# Patient Record
Sex: FEMALE | Race: BLACK OR AFRICAN AMERICAN | NOT HISPANIC OR LATINO
[De-identification: names, ages, dates, MRNs, and addresses within clinical notes are randomized per-mention and may not be internally consistent; named-entity substitution may affect disease eponyms.]

---

## 2017-08-09 NOTE — ASU PATIENT PROFILE, ADULT - PMH
Afib    CHF (congestive heart failure)    DM (diabetes mellitus)    Glaucoma    HLD (hyperlipidemia)    HTN (hypertension)    Hypothyroid    Personal history of PE (pulmonary embolism)    PVD (peripheral vascular disease)

## 2017-08-10 ENCOUNTER — TRANSCRIPTION ENCOUNTER (OUTPATIENT)
Age: 82
End: 2017-08-10

## 2017-08-10 ENCOUNTER — OUTPATIENT (OUTPATIENT)
Dept: OUTPATIENT SERVICES | Facility: HOSPITAL | Age: 82
LOS: 1 days | End: 2017-08-10
Payer: COMMERCIAL

## 2017-08-10 VITALS
OXYGEN SATURATION: 99 % | DIASTOLIC BLOOD PRESSURE: 73 MMHG | HEIGHT: 65 IN | TEMPERATURE: 98 F | HEART RATE: 83 BPM | RESPIRATION RATE: 20 BRPM | SYSTOLIC BLOOD PRESSURE: 161 MMHG | WEIGHT: 232.81 LBS

## 2017-08-10 VITALS
HEART RATE: 70 BPM | RESPIRATION RATE: 20 BRPM | SYSTOLIC BLOOD PRESSURE: 133 MMHG | OXYGEN SATURATION: 96 % | DIASTOLIC BLOOD PRESSURE: 56 MMHG

## 2017-08-10 DIAGNOSIS — H25.811 COMBINED FORMS OF AGE-RELATED CATARACT, RIGHT EYE: ICD-10-CM

## 2017-08-10 DIAGNOSIS — Z41.9 ENCOUNTER FOR PROCEDURE FOR PURPOSES OTHER THAN REMEDYING HEALTH STATE, UNSPECIFIED: Chronic | ICD-10-CM

## 2017-08-10 DIAGNOSIS — E89.0 POSTPROCEDURAL HYPOTHYROIDISM: Chronic | ICD-10-CM

## 2017-08-10 DIAGNOSIS — H40.1413 CAPSULAR GLAUCOMA WITH PSEUDOEXFOLIATION OF LENS, RIGHT EYE, SEVERE STAGE: ICD-10-CM

## 2017-08-10 PROCEDURE — 66180 AQUEOUS SHUNT EYE W/GRAFT: CPT | Mod: RT

## 2017-08-10 PROCEDURE — C1780: CPT

## 2017-08-10 PROCEDURE — 66984 XCAPSL CTRC RMVL W/O ECP: CPT | Mod: RT

## 2017-08-10 PROCEDURE — C1783: CPT

## 2017-08-10 PROCEDURE — C1762: CPT

## 2017-08-10 NOTE — ASU DISCHARGE PLAN (ADULT/PEDIATRIC). - SPECIAL INSTRUCTIONS
Your eye patch will remain on overnight. Your doctor will remove it at your appointment tomorrow and instruct you on what drops to take at that time. Continue drops as usual in the other eye. Bring the eye kit and all of your other eye drops to the office for each visit. You may experience some itching or scratchiness following surgery. This is normal and is usually relieved with Tylenol which can be taken 650mg by mouth every 4-6 hours for pain. Avoid Aspirin or Motrin. If you experience persistent decrease in vision, pain, excessive bleeding , temperature above 101, persistent nausea or vomiting contact your surgeon at [phone].

## 2017-08-10 NOTE — ASU DISCHARGE PLAN (ADULT/PEDIATRIC). - PROCEDURE
Right eye phacoemulsification cataract extraction with Intraocular Lens implant Right eye phacoemulsification cataract extraction with Intraocular Lens implant,

## 2017-12-11 ENCOUNTER — APPOINTMENT (OUTPATIENT)
Dept: VASCULAR SURGERY | Facility: CLINIC | Age: 82
End: 2017-12-11
Payer: MEDICARE

## 2017-12-11 VITALS
TEMPERATURE: 98 F | HEIGHT: 68 IN | WEIGHT: 225 LBS | HEART RATE: 85 BPM | DIASTOLIC BLOOD PRESSURE: 102 MMHG | BODY MASS INDEX: 34.1 KG/M2 | SYSTOLIC BLOOD PRESSURE: 186 MMHG

## 2017-12-11 DIAGNOSIS — Z86.73 PERSONAL HISTORY OF TRANSIENT ISCHEMIC ATTACK (TIA), AND CEREBRAL INFARCTION W/OUT RESIDUAL DEFICITS: ICD-10-CM

## 2017-12-11 DIAGNOSIS — K46.9 UNSPECIFIED ABDOMINAL HERNIA W/OUT OBSTRUCTION OR GANGRENE: ICD-10-CM

## 2017-12-11 DIAGNOSIS — Z87.448 PERSONAL HISTORY OF OTHER DISEASES OF URINARY SYSTEM: ICD-10-CM

## 2017-12-11 DIAGNOSIS — I73.9 PERIPHERAL VASCULAR DISEASE, UNSPECIFIED: ICD-10-CM

## 2017-12-11 DIAGNOSIS — Z86.69 PERSONAL HISTORY OF OTHER DISEASES OF THE NERVOUS SYSTEM AND SENSE ORGANS: ICD-10-CM

## 2017-12-11 DIAGNOSIS — I10 ESSENTIAL (PRIMARY) HYPERTENSION: ICD-10-CM

## 2017-12-11 DIAGNOSIS — H40.9 UNSPECIFIED GLAUCOMA: ICD-10-CM

## 2017-12-11 DIAGNOSIS — Z86.39 PERSONAL HISTORY OF OTHER ENDOCRINE, NUTRITIONAL AND METABOLIC DISEASE: ICD-10-CM

## 2017-12-11 DIAGNOSIS — I83.90 ASYMPTOMATIC VARICOSE VEINS OF UNSPECIFIED LOWER EXTREMITY: ICD-10-CM

## 2017-12-11 DIAGNOSIS — Z87.39 PERSONAL HISTORY OF OTHER DISEASES OF THE MUSCULOSKELETAL SYSTEM AND CONNECTIVE TISSUE: ICD-10-CM

## 2017-12-11 DIAGNOSIS — E11.9 TYPE 2 DIABETES MELLITUS W/OUT COMPLICATIONS: ICD-10-CM

## 2017-12-11 PROCEDURE — 99204 OFFICE O/P NEW MOD 45 MIN: CPT | Mod: 25

## 2017-12-11 PROCEDURE — 93923 UPR/LXTR ART STDY 3+ LVLS: CPT

## 2017-12-11 RX ORDER — LATANOPROST/PF 0.005 %
0.01 DROPS OPHTHALMIC (EYE)
Qty: 8 | Refills: 0 | Status: ACTIVE | COMMUNITY
Start: 2017-05-31

## 2017-12-11 RX ORDER — BLOOD SUGAR DIAGNOSTIC
STRIP MISCELLANEOUS
Qty: 150 | Refills: 0 | Status: ACTIVE | COMMUNITY
Start: 2017-02-07

## 2017-12-11 RX ORDER — BRIMONIDINE TARTRATE 2 MG/MG
0.2 SOLUTION/ DROPS OPHTHALMIC
Qty: 15 | Refills: 0 | Status: ACTIVE | COMMUNITY
Start: 2016-11-09

## 2017-12-11 RX ORDER — PEN NEEDLE, DIABETIC 29 G X1/2"
32G X 4 MM NEEDLE, DISPOSABLE MISCELLANEOUS
Qty: 100 | Refills: 0 | Status: ACTIVE | COMMUNITY
Start: 2016-12-15

## 2018-01-16 ENCOUNTER — APPOINTMENT (OUTPATIENT)
Age: 83
End: 2018-01-16

## 2018-03-11 ENCOUNTER — INPATIENT (INPATIENT)
Facility: HOSPITAL | Age: 83
LOS: 5 days | Discharge: HOME | End: 2018-03-17
Attending: HOSPITALIST | Admitting: HOSPITALIST
Payer: MEDICARE

## 2018-03-11 VITALS
TEMPERATURE: 98 F | DIASTOLIC BLOOD PRESSURE: 107 MMHG | OXYGEN SATURATION: 98 % | RESPIRATION RATE: 28 BRPM | HEART RATE: 108 BPM | SYSTOLIC BLOOD PRESSURE: 212 MMHG

## 2018-03-11 DIAGNOSIS — J18.9 PNEUMONIA, UNSPECIFIED ORGANISM: ICD-10-CM

## 2018-03-11 DIAGNOSIS — I48.91 UNSPECIFIED ATRIAL FIBRILLATION: ICD-10-CM

## 2018-03-11 DIAGNOSIS — Z41.9 ENCOUNTER FOR PROCEDURE FOR PURPOSES OTHER THAN REMEDYING HEALTH STATE, UNSPECIFIED: Chronic | ICD-10-CM

## 2018-03-11 DIAGNOSIS — Z29.9 ENCOUNTER FOR PROPHYLACTIC MEASURES, UNSPECIFIED: ICD-10-CM

## 2018-03-11 DIAGNOSIS — E03.9 HYPOTHYROIDISM, UNSPECIFIED: ICD-10-CM

## 2018-03-11 DIAGNOSIS — I50.9 HEART FAILURE, UNSPECIFIED: ICD-10-CM

## 2018-03-11 DIAGNOSIS — E89.0 POSTPROCEDURAL HYPOTHYROIDISM: Chronic | ICD-10-CM

## 2018-03-11 DIAGNOSIS — E11.9 TYPE 2 DIABETES MELLITUS WITHOUT COMPLICATIONS: ICD-10-CM

## 2018-03-11 DIAGNOSIS — I10 ESSENTIAL (PRIMARY) HYPERTENSION: ICD-10-CM

## 2018-03-11 DIAGNOSIS — E78.5 HYPERLIPIDEMIA, UNSPECIFIED: ICD-10-CM

## 2018-03-11 DIAGNOSIS — I73.9 PERIPHERAL VASCULAR DISEASE, UNSPECIFIED: ICD-10-CM

## 2018-03-11 LAB
ALBUMIN SERPL ELPH-MCNC: 3.7 G/DL — SIGNIFICANT CHANGE UP (ref 3.3–5)
ALP SERPL-CCNC: 91 U/L — SIGNIFICANT CHANGE UP (ref 40–120)
ALT FLD-CCNC: 31 U/L — SIGNIFICANT CHANGE UP (ref 4–33)
APPEARANCE UR: CLEAR — SIGNIFICANT CHANGE UP
APTT BLD: 35.8 SEC — SIGNIFICANT CHANGE UP (ref 27.5–37.4)
AST SERPL-CCNC: 18 U/L — SIGNIFICANT CHANGE UP (ref 4–32)
B PERT DNA SPEC QL NAA+PROBE: SIGNIFICANT CHANGE UP
BASE EXCESS BLDV CALC-SCNC: 5.8 MMOL/L — SIGNIFICANT CHANGE UP
BASOPHILS # BLD AUTO: 0.02 K/UL — SIGNIFICANT CHANGE UP (ref 0–0.2)
BASOPHILS NFR BLD AUTO: 0.2 % — SIGNIFICANT CHANGE UP (ref 0–2)
BILIRUB SERPL-MCNC: 1.2 MG/DL — SIGNIFICANT CHANGE UP (ref 0.2–1.2)
BILIRUB UR-MCNC: NEGATIVE — SIGNIFICANT CHANGE UP
BLOOD GAS VENOUS - CREATININE: 1.18 MG/DL — SIGNIFICANT CHANGE UP (ref 0.5–1.3)
BLOOD UR QL VISUAL: HIGH
BUN SERPL-MCNC: 19 MG/DL — SIGNIFICANT CHANGE UP (ref 7–23)
C PNEUM DNA SPEC QL NAA+PROBE: NOT DETECTED — SIGNIFICANT CHANGE UP
CALCIUM SERPL-MCNC: 9.5 MG/DL — SIGNIFICANT CHANGE UP (ref 8.4–10.5)
CHLORIDE BLDV-SCNC: 105 MMOL/L — SIGNIFICANT CHANGE UP (ref 96–108)
CHLORIDE SERPL-SCNC: 100 MMOL/L — SIGNIFICANT CHANGE UP (ref 98–107)
CK MB BLD-MCNC: 3.13 NG/ML — SIGNIFICANT CHANGE UP (ref 1–4.7)
CK MB BLD-MCNC: SIGNIFICANT CHANGE UP (ref 0–2.5)
CK SERPL-CCNC: 52 U/L — SIGNIFICANT CHANGE UP (ref 25–170)
CO2 SERPL-SCNC: 27 MMOL/L — SIGNIFICANT CHANGE UP (ref 22–31)
COLOR SPEC: SIGNIFICANT CHANGE UP
CREAT SERPL-MCNC: 1.27 MG/DL — SIGNIFICANT CHANGE UP (ref 0.5–1.3)
EOSINOPHIL # BLD AUTO: 0.14 K/UL — SIGNIFICANT CHANGE UP (ref 0–0.5)
EOSINOPHIL NFR BLD AUTO: 1.6 % — SIGNIFICANT CHANGE UP (ref 0–6)
FLUAV H1 2009 PAND RNA SPEC QL NAA+PROBE: NOT DETECTED — SIGNIFICANT CHANGE UP
FLUAV H1 RNA SPEC QL NAA+PROBE: NOT DETECTED — SIGNIFICANT CHANGE UP
FLUAV H3 RNA SPEC QL NAA+PROBE: NOT DETECTED — SIGNIFICANT CHANGE UP
FLUAV SUBTYP SPEC NAA+PROBE: SIGNIFICANT CHANGE UP
FLUBV RNA SPEC QL NAA+PROBE: NOT DETECTED — SIGNIFICANT CHANGE UP
GAS PNL BLDV: 139 MMOL/L — SIGNIFICANT CHANGE UP (ref 136–146)
GLUCOSE BLDV-MCNC: 200 — HIGH (ref 70–99)
GLUCOSE SERPL-MCNC: 197 MG/DL — HIGH (ref 70–99)
GLUCOSE UR-MCNC: 100 — HIGH
HADV DNA SPEC QL NAA+PROBE: NOT DETECTED — SIGNIFICANT CHANGE UP
HCO3 BLDV-SCNC: 28 MMOL/L — HIGH (ref 20–27)
HCOV 229E RNA SPEC QL NAA+PROBE: NOT DETECTED — SIGNIFICANT CHANGE UP
HCOV HKU1 RNA SPEC QL NAA+PROBE: NOT DETECTED — SIGNIFICANT CHANGE UP
HCOV NL63 RNA SPEC QL NAA+PROBE: NOT DETECTED — SIGNIFICANT CHANGE UP
HCOV OC43 RNA SPEC QL NAA+PROBE: NOT DETECTED — SIGNIFICANT CHANGE UP
HCT VFR BLD CALC: 34.6 % — SIGNIFICANT CHANGE UP (ref 34.5–45)
HCT VFR BLDV CALC: 33.6 % — LOW (ref 34.5–45)
HGB BLD-MCNC: 11.3 G/DL — LOW (ref 11.5–15.5)
HGB BLDV-MCNC: 10.9 G/DL — LOW (ref 11.5–15.5)
HMPV RNA SPEC QL NAA+PROBE: NOT DETECTED — SIGNIFICANT CHANGE UP
HPIV1 RNA SPEC QL NAA+PROBE: NOT DETECTED — SIGNIFICANT CHANGE UP
HPIV2 RNA SPEC QL NAA+PROBE: NOT DETECTED — SIGNIFICANT CHANGE UP
HPIV3 RNA SPEC QL NAA+PROBE: NOT DETECTED — SIGNIFICANT CHANGE UP
HPIV4 RNA SPEC QL NAA+PROBE: NOT DETECTED — SIGNIFICANT CHANGE UP
IMM GRANULOCYTES # BLD AUTO: 0.03 # — SIGNIFICANT CHANGE UP
IMM GRANULOCYTES NFR BLD AUTO: 0.3 % — SIGNIFICANT CHANGE UP (ref 0–1.5)
INR BLD: 2.68 — HIGH (ref 0.88–1.17)
KETONES UR-MCNC: SIGNIFICANT CHANGE UP
LACTATE BLDV-MCNC: 1.3 MMOL/L — SIGNIFICANT CHANGE UP (ref 0.5–2)
LEUKOCYTE ESTERASE UR-ACNC: NEGATIVE — SIGNIFICANT CHANGE UP
LIDOCAIN IGE QN: 23.9 U/L — SIGNIFICANT CHANGE UP (ref 7–60)
LYMPHOCYTES # BLD AUTO: 1.28 K/UL — SIGNIFICANT CHANGE UP (ref 1–3.3)
LYMPHOCYTES # BLD AUTO: 14.8 % — SIGNIFICANT CHANGE UP (ref 13–44)
M PNEUMO DNA SPEC QL NAA+PROBE: NOT DETECTED — SIGNIFICANT CHANGE UP
MCHC RBC-ENTMCNC: 30.5 PG — SIGNIFICANT CHANGE UP (ref 27–34)
MCHC RBC-ENTMCNC: 32.7 % — SIGNIFICANT CHANGE UP (ref 32–36)
MCV RBC AUTO: 93.5 FL — SIGNIFICANT CHANGE UP (ref 80–100)
MONOCYTES # BLD AUTO: 1.17 K/UL — HIGH (ref 0–0.9)
MONOCYTES NFR BLD AUTO: 13.6 % — SIGNIFICANT CHANGE UP (ref 2–14)
NEUTROPHILS # BLD AUTO: 5.99 K/UL — SIGNIFICANT CHANGE UP (ref 1.8–7.4)
NEUTROPHILS NFR BLD AUTO: 69.5 % — SIGNIFICANT CHANGE UP (ref 43–77)
NITRITE UR-MCNC: NEGATIVE — SIGNIFICANT CHANGE UP
NON-SQ EPI CELLS # UR AUTO: <1 — SIGNIFICANT CHANGE UP
NRBC # FLD: 0 — SIGNIFICANT CHANGE UP
PCO2 BLDV: 49 MMHG — SIGNIFICANT CHANGE UP (ref 41–51)
PH BLDV: 7.41 PH — SIGNIFICANT CHANGE UP (ref 7.32–7.43)
PH UR: 7 — SIGNIFICANT CHANGE UP (ref 4.6–8)
PLATELET # BLD AUTO: 223 K/UL — SIGNIFICANT CHANGE UP (ref 150–400)
PMV BLD: 10.8 FL — SIGNIFICANT CHANGE UP (ref 7–13)
PO2 BLDV: 26 MMHG — LOW (ref 35–40)
POTASSIUM BLDV-SCNC: 3.9 MMOL/L — SIGNIFICANT CHANGE UP (ref 3.4–4.5)
POTASSIUM SERPL-MCNC: 4.2 MMOL/L — SIGNIFICANT CHANGE UP (ref 3.5–5.3)
POTASSIUM SERPL-SCNC: 4.2 MMOL/L — SIGNIFICANT CHANGE UP (ref 3.5–5.3)
PROT SERPL-MCNC: 7.5 G/DL — SIGNIFICANT CHANGE UP (ref 6–8.3)
PROT UR-MCNC: 300 MG/DL — HIGH
PROTHROM AB SERPL-ACNC: 30.3 SEC — HIGH (ref 9.8–13.1)
RBC # BLD: 3.7 M/UL — LOW (ref 3.8–5.2)
RBC # FLD: 16.2 % — HIGH (ref 10.3–14.5)
RBC CASTS # UR COMP ASSIST: HIGH (ref 0–?)
RSV RNA SPEC QL NAA+PROBE: NOT DETECTED — SIGNIFICANT CHANGE UP
RV+EV RNA SPEC QL NAA+PROBE: NOT DETECTED — SIGNIFICANT CHANGE UP
SAO2 % BLDV: 37.5 % — LOW (ref 60–85)
SODIUM SERPL-SCNC: 140 MMOL/L — SIGNIFICANT CHANGE UP (ref 135–145)
SP GR SPEC: 1.01 — SIGNIFICANT CHANGE UP (ref 1–1.04)
SQUAMOUS # UR AUTO: SIGNIFICANT CHANGE UP
TROPONIN T SERPL-MCNC: < 0.06 NG/ML — SIGNIFICANT CHANGE UP (ref 0–0.06)
UROBILINOGEN FLD QL: NORMAL MG/DL — SIGNIFICANT CHANGE UP
WBC # BLD: 8.63 K/UL — SIGNIFICANT CHANGE UP (ref 3.8–10.5)
WBC # FLD AUTO: 8.63 K/UL — SIGNIFICANT CHANGE UP (ref 3.8–10.5)
WBC UR QL: SIGNIFICANT CHANGE UP (ref 0–?)

## 2018-03-11 PROCEDURE — 76700 US EXAM ABDOM COMPLETE: CPT | Mod: 26

## 2018-03-11 PROCEDURE — 71046 X-RAY EXAM CHEST 2 VIEWS: CPT | Mod: 26

## 2018-03-11 PROCEDURE — 71275 CT ANGIOGRAPHY CHEST: CPT | Mod: 26

## 2018-03-11 PROCEDURE — 74177 CT ABD & PELVIS W/CONTRAST: CPT | Mod: 26

## 2018-03-11 PROCEDURE — 99223 1ST HOSP IP/OBS HIGH 75: CPT | Mod: GC

## 2018-03-11 RX ORDER — SPIRONOLACTONE 25 MG/1
25 TABLET, FILM COATED ORAL
Qty: 0 | Refills: 0 | Status: DISCONTINUED | OUTPATIENT
Start: 2018-03-11 | End: 2018-03-14

## 2018-03-11 RX ORDER — VALSARTAN 80 MG/1
160 TABLET ORAL DAILY
Qty: 0 | Refills: 0 | Status: DISCONTINUED | OUTPATIENT
Start: 2018-03-11 | End: 2018-03-12

## 2018-03-11 RX ORDER — DEXTROSE 50 % IN WATER 50 %
25 SYRINGE (ML) INTRAVENOUS ONCE
Qty: 0 | Refills: 0 | Status: DISCONTINUED | OUTPATIENT
Start: 2018-03-11 | End: 2018-03-17

## 2018-03-11 RX ORDER — DORZOLAMIDE HYDROCHLORIDE TIMOLOL MALEATE 20; 5 MG/ML; MG/ML
1 SOLUTION/ DROPS OPHTHALMIC
Qty: 0 | Refills: 0 | Status: DISCONTINUED | OUTPATIENT
Start: 2018-03-11 | End: 2018-03-17

## 2018-03-11 RX ORDER — BRIMONIDINE TARTRATE 2 MG/MG
1 SOLUTION/ DROPS OPHTHALMIC
Qty: 0 | Refills: 0 | Status: DISCONTINUED | OUTPATIENT
Start: 2018-03-11 | End: 2018-03-17

## 2018-03-11 RX ORDER — AZITHROMYCIN 500 MG/1
500 TABLET, FILM COATED ORAL ONCE
Qty: 0 | Refills: 0 | Status: COMPLETED | OUTPATIENT
Start: 2018-03-11 | End: 2018-03-11

## 2018-03-11 RX ORDER — ALLOPURINOL 300 MG
100 TABLET ORAL DAILY
Qty: 0 | Refills: 0 | Status: DISCONTINUED | OUTPATIENT
Start: 2018-03-11 | End: 2018-03-17

## 2018-03-11 RX ORDER — AZITHROMYCIN 500 MG/1
500 TABLET, FILM COATED ORAL EVERY 24 HOURS
Qty: 0 | Refills: 0 | Status: DISCONTINUED | OUTPATIENT
Start: 2018-03-12 | End: 2018-03-16

## 2018-03-11 RX ORDER — LEVOTHYROXINE SODIUM 125 MCG
100 TABLET ORAL DAILY
Qty: 0 | Refills: 0 | Status: DISCONTINUED | OUTPATIENT
Start: 2018-03-11 | End: 2018-03-17

## 2018-03-11 RX ORDER — CEFTRIAXONE 500 MG/1
1 INJECTION, POWDER, FOR SOLUTION INTRAMUSCULAR; INTRAVENOUS EVERY 24 HOURS
Qty: 0 | Refills: 0 | Status: DISCONTINUED | OUTPATIENT
Start: 2018-03-12 | End: 2018-03-16

## 2018-03-11 RX ORDER — POLYETHYLENE GLYCOL 3350 17 G/17G
17 POWDER, FOR SOLUTION ORAL DAILY
Qty: 0 | Refills: 0 | Status: DISCONTINUED | OUTPATIENT
Start: 2018-03-11 | End: 2018-03-17

## 2018-03-11 RX ORDER — VALSARTAN 80 MG/1
160 TABLET ORAL ONCE
Qty: 0 | Refills: 0 | Status: COMPLETED | OUTPATIENT
Start: 2018-03-11 | End: 2018-03-11

## 2018-03-11 RX ORDER — ASPIRIN/CALCIUM CARB/MAGNESIUM 324 MG
81 TABLET ORAL DAILY
Qty: 0 | Refills: 0 | Status: DISCONTINUED | OUTPATIENT
Start: 2018-03-11 | End: 2018-03-17

## 2018-03-11 RX ORDER — CARVEDILOL PHOSPHATE 80 MG/1
25 CAPSULE, EXTENDED RELEASE ORAL ONCE
Qty: 0 | Refills: 0 | Status: COMPLETED | OUTPATIENT
Start: 2018-03-11 | End: 2018-03-11

## 2018-03-11 RX ORDER — SIMVASTATIN 20 MG/1
20 TABLET, FILM COATED ORAL AT BEDTIME
Qty: 0 | Refills: 0 | Status: DISCONTINUED | OUTPATIENT
Start: 2018-03-11 | End: 2018-03-17

## 2018-03-11 RX ORDER — INSULIN LISPRO 100/ML
VIAL (ML) SUBCUTANEOUS
Qty: 0 | Refills: 0 | Status: DISCONTINUED | OUTPATIENT
Start: 2018-03-11 | End: 2018-03-17

## 2018-03-11 RX ORDER — LATANOPROST 0.05 MG/ML
1 SOLUTION/ DROPS OPHTHALMIC; TOPICAL AT BEDTIME
Qty: 0 | Refills: 0 | Status: DISCONTINUED | OUTPATIENT
Start: 2018-03-11 | End: 2018-03-17

## 2018-03-11 RX ORDER — CEFTRIAXONE 500 MG/1
1 INJECTION, POWDER, FOR SOLUTION INTRAMUSCULAR; INTRAVENOUS ONCE
Qty: 0 | Refills: 0 | Status: COMPLETED | OUTPATIENT
Start: 2018-03-11 | End: 2018-03-11

## 2018-03-11 RX ORDER — INSULIN LISPRO 100/ML
VIAL (ML) SUBCUTANEOUS AT BEDTIME
Qty: 0 | Refills: 0 | Status: DISCONTINUED | OUTPATIENT
Start: 2018-03-11 | End: 2018-03-17

## 2018-03-11 RX ORDER — CILOSTAZOL 100 MG/1
100 TABLET ORAL
Qty: 0 | Refills: 0 | Status: DISCONTINUED | OUTPATIENT
Start: 2018-03-11 | End: 2018-03-17

## 2018-03-11 RX ORDER — AMLODIPINE BESYLATE 2.5 MG/1
10 TABLET ORAL DAILY
Qty: 0 | Refills: 0 | Status: DISCONTINUED | OUTPATIENT
Start: 2018-03-11 | End: 2018-03-17

## 2018-03-11 RX ORDER — CARVEDILOL PHOSPHATE 80 MG/1
25 CAPSULE, EXTENDED RELEASE ORAL EVERY 12 HOURS
Qty: 0 | Refills: 0 | Status: DISCONTINUED | OUTPATIENT
Start: 2018-03-11 | End: 2018-03-17

## 2018-03-11 RX ADMIN — Medication 1: at 19:00

## 2018-03-11 RX ADMIN — SIMVASTATIN 20 MILLIGRAM(S): 20 TABLET, FILM COATED ORAL at 23:21

## 2018-03-11 RX ADMIN — AZITHROMYCIN 250 MILLIGRAM(S): 500 TABLET, FILM COATED ORAL at 15:34

## 2018-03-11 RX ADMIN — CARVEDILOL PHOSPHATE 25 MILLIGRAM(S): 80 CAPSULE, EXTENDED RELEASE ORAL at 18:53

## 2018-03-11 RX ADMIN — CEFTRIAXONE 100 GRAM(S): 500 INJECTION, POWDER, FOR SOLUTION INTRAMUSCULAR; INTRAVENOUS at 14:58

## 2018-03-11 RX ADMIN — CARVEDILOL PHOSPHATE 25 MILLIGRAM(S): 80 CAPSULE, EXTENDED RELEASE ORAL at 12:47

## 2018-03-11 RX ADMIN — VALSARTAN 160 MILLIGRAM(S): 80 TABLET ORAL at 12:47

## 2018-03-11 RX ADMIN — LATANOPROST 1 DROP(S): 0.05 SOLUTION/ DROPS OPHTHALMIC; TOPICAL at 23:21

## 2018-03-11 RX ADMIN — DORZOLAMIDE HYDROCHLORIDE TIMOLOL MALEATE 1 DROP(S): 20; 5 SOLUTION/ DROPS OPHTHALMIC at 21:00

## 2018-03-11 RX ADMIN — BRIMONIDINE TARTRATE 1 DROP(S): 2 SOLUTION/ DROPS OPHTHALMIC at 20:59

## 2018-03-11 RX ADMIN — CILOSTAZOL 100 MILLIGRAM(S): 100 TABLET ORAL at 23:21

## 2018-03-11 NOTE — ED PROVIDER NOTE - CARE PLAN
Principal Discharge DX:	Pneumonia due to infectious organism, unspecified laterality, unspecified part of lung Principal Discharge DX:	Multifocal pneumonia

## 2018-03-11 NOTE — H&P ADULT - NSHPSOCIALHISTORY_GEN_ALL_CORE
Patient lives alone with  and is his primary caretaker. Able to do all ADLs without help. Walks with cane. No tobacco, alcohol, or drug use.

## 2018-03-11 NOTE — ED PROVIDER NOTE - OBJECTIVE STATEMENT
88 y/o female Afib on coumadin, CHF (EF ), DM, HLD, HTN, Hypothyroid, PE p/w shortness of breath. Symptoms started yesterday at rest and on exertion. Also c/o epigastric pain, described as heaviness, constant, feeling bloated. Passing Gas, last BM was yesterday, nortmal color and consistatncy. Endorsed urinary frequency, no dysuria or hematuria. Denies new swelling in legs. + white productive cough. No fever. 89 y/o female Afib on coumadin, CHF (EF ), DM, HLD, HTN, Hypothyroid, PE p/w shortness of breath. Symptoms started yesterday at rest and on exertion. Also c/o epigastric pain, described as heaviness, constant, feeling bloated. Passing Gas, last BM was yesterday, normal color and consistency. Endorsed urinary frequency, no dysuria or hematuria. Denies new swelling in legs. + white productive cough. No fever. 89 y/o female Afib on coumadin, CHF (EF ), DM, HLD, HTN, Hypothyroid, PE p/w shortness of breath. Symptoms started yesterday at rest and on exertion. Also c/o epigastric pain, described as heaviness, constant, feeling bloated. Passing Gas, last BM was yesterday, normal color and consistency. Endorsed urinary frequency, no dysuria or hematuria. Denies chronic swelling in legs. + white productive cough. No fever.

## 2018-03-11 NOTE — ED PROVIDER NOTE - ATTENDING CONTRIBUTION TO CARE
Lane: 88 yo female with a h/o PE, Afib on coumadin, CHF, DM, HLD, HTN, Hypothyroid, c/o 2 days of progressively worsening SOB that began with exertion but now occurign at rest. Pt also c/o epigastric abdominal  pain and bloating. No associated nausea, vomiting, diarrhea or constipation. No chest pain. + chronic LE edema but no worsening or edema or new pain. + productive cough- white sputum. No fevers, chills, dysuria or flank pain. exam; well appearing, NAD. +tachypnea but speaking in full sentences and does not appear in distress. + subtle rales at b/l bases. abdomen is soft but distended, + reproducible umbilical hernia. No CVA TTP. 3+ b/l LE edema, no calf TTP, venous stasis skin changes but neurovascularly intact distally. A/P- 88 yo female with SOB- h/o PE and abdominal pain. will obtain cbc, cmp, cardiac enzymes, bnp, ct chest and abdomen. will give her home bp meds which she did nto take today and reassess vitals.

## 2018-03-11 NOTE — H&P ADULT - NSHPPHYSICALEXAM_GEN_ALL_CORE
PHYSICAL EXAM:    GENERAL: Comfortable, no acute distress, on NC  HEAD:  Normocephalic, atraumatic  EYES: EOMI, PERRLA  HEENT: dry mucus membranes   NECK: Supple, No JVD  NERVOUS SYSTEM:  AAOx3, no focal neuro deficits, gross sensation and strength in tact bilaterally   CHEST/LUNG: gross ronchi in all lung fields bilaterally   HEART: irregularly irregular, no murmurs  ABDOMEN: +BS, distended but soft, small reducible umbilical hernia appreciated   EXTREMITIES:   No clubbing or cyanosis, 2+ pitting edema in extremities bilaterally   MUSCULOSKELETAL: No muscle tenderness, no joint tenderness  SKIN: warm and dry, no rash Vital Signs Last 24 Hrs  T(C): 36.4 (11 Mar 2018 18:37), Max: 37 (11 Mar 2018 18:04)  T(F): 97.5 (11 Mar 2018 18:37), Max: 98.6 (11 Mar 2018 18:04)  HR: 72 (11 Mar 2018 18:37) (71 - 108)  BP: 167/84 (11 Mar 2018 18:37) (148/72 - 212/107)  BP(mean): --  RR: 18 (11 Mar 2018 18:37) (18 - 28)  SpO2: 100% (11 Mar 2018 18:37) (98% - 100%)    PHYSICAL EXAM:    GENERAL: Comfortable, no acute distress, on NC  HEAD:  Normocephalic, atraumatic  EYES: EOMI, PERRLA  HEENT: dry mucus membranes   NECK: Supple, No JVD  NERVOUS SYSTEM:  AAOx3, no focal neuro deficits, gross sensation and strength in tact bilaterally   CHEST/LUNG: gross ronchi in all lung fields bilaterally   HEART: irregularly irregular, no murmurs  ABDOMEN: +BS, distended but soft, small reducible umbilical hernia appreciated   EXTREMITIES:   No clubbing or cyanosis, 2+ pitting edema in extremities bilaterally   MUSCULOSKELETAL: No muscle tenderness, no joint tenderness  SKIN: warm and dry, no rash

## 2018-03-11 NOTE — H&P ADULT - HISTORY OF PRESENT ILLNESS
Patient is an 89 y/op F with a PMH significant for atrial fibrillation (on coumadin), CHF (EF?), T2DM, HTN, HLD, hypothyroidism who presents with 2 days of dyspnea at rest. At baseline, the patient is able to walk about 100 feet before becoming short of breath. The last two days, she has been short of breath with minimal exertion. She also endorses trouble laying flat due to feelings of dyspnea. She also has a cough productive of white sputum. Denies any fevers or chills. She visited her brother in a nursing home 2 weeks ago and thinks there may have been someone sick there. She also endorses some abdominal discomfort and early satiety for the past month associated with a 10-15 pound weight loss. She denies any nausea, vomiting, diarrhea, melena or hematochezia. Was up to date on her screening up until 15 years ago without issues.     The patient states her BP usually runs in the 150s-160s and occasionally goes up to 180-190. She say her cardiologist on 2/28 who said her heart function was okay.     In the ED, she was afebrile, BP: 212/108 --> 188/85, HR: 108, RR 28 --> 19, and sating 98% on room air. Because of the CT chest findings suggestive of multifocal pneumonia she received ceftriaxone and azithromycin. She was also given coreg and valsartan for her elevated BP.

## 2018-03-11 NOTE — ED ADULT TRIAGE NOTE - CHIEF COMPLAINT QUOTE
abd pains,diff breathing sin ce yesterday  .   eports urinary frequency denies v. reports n  reports cough " always"  white  productive.  reports ble swelling  -no change

## 2018-03-11 NOTE — H&P ADULT - PROBLEM SELECTOR PLAN 1
- patient with CT chest showing multifocal pneumonia, likely CAP  - continue ceftriaxone and azithromycin, will d/c azithromycin if urine legionella is negative   - continue supportive care measures with NC - patient with CT chest showing multifocal pneumonia, likely CAP  - continue ceftriaxone and azithromycin, will d/c azithromycin if urine legionella is negative   - continue supportive care measures with NC, wean off NC PRN

## 2018-03-11 NOTE — ED ADULT NURSE REASSESSMENT NOTE - NS ED NURSE REASSESS COMMENT FT1
Received report from previous shift, pt appears comfortable on assessment denies complaints at this time, VS as documented, will continue to monitor.

## 2018-03-11 NOTE — H&P ADULT - NSHPREVIEWOFSYSTEMS_GEN_ALL_CORE
REVIEW OF SYSTEMS    CONSTITUTIONAL:  Denies fevers or chills, +15-20 pound weight loss  HEENT:  no vision changes, no throat pain  SKIN:  Denies rash or itching.  CARDIOVASCULAR:  Denies chest pain, ELIZABETH  RESPIRATORY:  + SOB with cough   GASTROINTESTINAL:  + early satiety with abdominal discomfort   GENITOURINARY:  Denies any hematuria, dysuria  NEUROLOGICAL:  Denies headache, dizziness, syncope, paralysis, ataxia, numbness or tingling in the extremities. No change in bowel or bladder control.  MUSCULOSKELETAL:  Denies muscle, back pain, joint pain or stiffness.  HEMATOLOGIC:  Denies anemia, bleeding or bruising.  LYMPHATICS:  Denies enlarged nodes.   PSYCHIATRIC:  Denies history of depression or anxiety.  ENDOCRINOLOGIC:  Denies reports of sweating, cold or heat intolerance. No polyuria or polydipsia.  ALLERGIES:  Denies history of asthma, hives, eczema or rhinitis.

## 2018-03-11 NOTE — H&P ADULT - PROBLEM SELECTOR PLAN 3
- continue home BP meds  - per patient, tends to have elevated pressures in the 150s-160s - continue home BP meds  - per patient, tends to have elevated pressures in the 150s-160s - titrate to maintain BP goals < 150s

## 2018-03-11 NOTE — H&P ADULT - PROBLEM SELECTOR PLAN 2
- rate controlled  - continue coumadin 3mg  - INR goal between 2.0-3.0 - rate controlled w/ Coreg   - continue coumadin 3mg  - INR goal between 2.0-3.0

## 2018-03-11 NOTE — H&P ADULT - PROBLEM SELECTOR PLAN 4
- appears euvolemic on exam  - will continue home medications  - echo pending - appears euvolemic on exam  - will continue home medications  - echo pending  - touch base with outpatient cardiology for outpatient echo and ekg

## 2018-03-11 NOTE — H&P ADULT - ASSESSMENT
Patient is an 89 y/op F with a PMH significant for atrial fibrillation (on coumadin), CHF (EF?), T2DM, HTN, HLD, hypothyroidism who presents with 2 days of dyspnea at rest found to have community acquired multifocal pneumonia.

## 2018-03-11 NOTE — PROVIDER CONTACT NOTE (OTHER) - ASSESSMENT
patient blood pressure is 167/84 no distress noted. patient denies any chest pain or headaches. has blood pressure medication due

## 2018-03-11 NOTE — H&P ADULT - NSHPLABSRESULTS_GEN_ALL_CORE
11.3   8.63  )-----------( 223      ( 11 Mar 2018 10:48 )             34.6           03-11    140  |  100  |  19  ----------------------------<  197<H>  4.2   |  27  |  1.27    Ca    9.5      11 Mar 2018 10:48    TPro  7.5  /  Alb  3.7  /  TBili  1.2  /  DBili  x   /  AST  18  /  ALT  31  /  AlkPhos  91  03-11        < from: Xray Chest 2 Views PA/Lat (03.11.18 @ 13:54) >      INTERPRETATION:  Bilateral patchy opacities better seen on CT compatible   with pneumonia.    < end of copied text >        < from: CT Angio Chest w/ IV Cont (03.11.18 @ 12:12) >    IMPRESSION:        Chest CT:    Bilateral patchy consolidations and groundglass opacities, concerning for   multifocal pneumonia. Small right pleural effusion.    No evidence of pulmonary embolism.    CT abdomen and pelvis:    Umbilical hernia containing fat and nonobstructed small bowel. Small   right inguinal hernia.    Mildly heterogenous liver. 5 mm hypodensity within the right liver lobe,   too small to characterize.    Colonic diverticulosis without evidence of diverticulitis.          < end of copied text >    RVP negative 11.3   8.63  )-----------( 223      ( 11 Mar 2018 10:48 )             34.6           03-11    140  |  100  |  19  ----------------------------<  197<H>  4.2   |  27  |  1.27    Ca    9.5      11 Mar 2018 10:48    TPro  7.5  /  Alb  3.7  /  TBili  1.2  /  DBili  x   /  AST  18  /  ALT  31  /  AlkPhos  91  03-11        < from: Xray Chest 2 Views PA/Lat (03.11.18 @ 13:54) >      INTERPRETATION:  Bilateral patchy opacities better seen on CT compatible   with pneumonia.    < end of copied text >        < from: CT Angio Chest w/ IV Cont (03.11.18 @ 12:12) >    IMPRESSION:        Chest CT:    Bilateral patchy consolidations and groundglass opacities, concerning for   multifocal pneumonia. Small right pleural effusion.    No evidence of pulmonary embolism.    CT abdomen and pelvis:    Umbilical hernia containing fat and nonobstructed small bowel. Small   right inguinal hernia.    Mildly heterogenous liver. 5 mm hypodensity within the right liver lobe,   too small to characterize.    Colonic diverticulosis without evidence of diverticulitis.    < end of copied text >    EKG: Atrial Flutter - variable conduction; incomplete RBB   RVP negative

## 2018-03-11 NOTE — ED PROVIDER NOTE - PRINCIPAL DIAGNOSIS
Pneumonia due to infectious organism, unspecified laterality, unspecified part of lung Multifocal pneumonia

## 2018-03-11 NOTE — H&P ADULT - ATTENDING COMMENTS
Agree with Housestaff Note Above, edits made where appropriate, case discussed with housestaff    Patient seen and examined. This is an 89F c/o dyspnea x 2 days c/b abdominal bloating and difficulty lying flat. Also has had cough with white sputum. ROS otherwise negative.   VS and PE as above  Labs, imaging, EKG reviewed    A/P: 89F being admitted for Multifocal CAP and uncontrolled hypertension   1. CAP - Cef/Azithro, f/u BCx and sputum Cx, Urine legionella  2. Afib - Aflutter on EKG, c/w rate control and AC, touch base with cardiologist for outpt records  3. HTN urgency - c/w home BP meds, may need 4th agent if continues to be uncontrolled  4. CHF - unknown EF, check echocardiogram, not in clinical acute CHF  Rest of plan as above

## 2018-03-12 ENCOUNTER — TRANSCRIPTION ENCOUNTER (OUTPATIENT)
Age: 83
End: 2018-03-12

## 2018-03-12 DIAGNOSIS — N17.9 ACUTE KIDNEY FAILURE, UNSPECIFIED: ICD-10-CM

## 2018-03-12 LAB
BACTERIA UR CULT: SIGNIFICANT CHANGE UP
BUN SERPL-MCNC: 27 MG/DL — HIGH (ref 7–23)
CALCIUM SERPL-MCNC: 8.7 MG/DL — SIGNIFICANT CHANGE UP (ref 8.4–10.5)
CHLORIDE SERPL-SCNC: 102 MMOL/L — SIGNIFICANT CHANGE UP (ref 98–107)
CO2 SERPL-SCNC: 29 MMOL/L — SIGNIFICANT CHANGE UP (ref 22–31)
CREAT SERPL-MCNC: 1.68 MG/DL — HIGH (ref 0.5–1.3)
GLUCOSE BLDC GLUCOMTR-MCNC: 172 MG/DL — HIGH (ref 70–99)
GLUCOSE BLDC GLUCOMTR-MCNC: 189 MG/DL — HIGH (ref 70–99)
GLUCOSE SERPL-MCNC: 109 MG/DL — HIGH (ref 70–99)
HBA1C BLD-MCNC: 8.4 % — HIGH (ref 4–5.6)
HCT VFR BLD CALC: 31.2 % — LOW (ref 34.5–45)
HGB BLD-MCNC: 10 G/DL — LOW (ref 11.5–15.5)
INR BLD: 2.56 — HIGH (ref 0.88–1.17)
MCHC RBC-ENTMCNC: 30.7 PG — SIGNIFICANT CHANGE UP (ref 27–34)
MCHC RBC-ENTMCNC: 32.1 % — SIGNIFICANT CHANGE UP (ref 32–36)
MCV RBC AUTO: 95.7 FL — SIGNIFICANT CHANGE UP (ref 80–100)
NRBC # FLD: 0 — SIGNIFICANT CHANGE UP
PLATELET # BLD AUTO: 202 K/UL — SIGNIFICANT CHANGE UP (ref 150–400)
PMV BLD: 11.1 FL — SIGNIFICANT CHANGE UP (ref 7–13)
POTASSIUM SERPL-MCNC: 4.4 MMOL/L — SIGNIFICANT CHANGE UP (ref 3.5–5.3)
POTASSIUM SERPL-SCNC: 4.4 MMOL/L — SIGNIFICANT CHANGE UP (ref 3.5–5.3)
PROTHROM AB SERPL-ACNC: 29 SEC — HIGH (ref 9.8–13.1)
RBC # BLD: 3.26 M/UL — LOW (ref 3.8–5.2)
RBC # FLD: 16.2 % — HIGH (ref 10.3–14.5)
SODIUM SERPL-SCNC: 141 MMOL/L — SIGNIFICANT CHANGE UP (ref 135–145)
SPECIMEN SOURCE: SIGNIFICANT CHANGE UP
WBC # BLD: 6.84 K/UL — SIGNIFICANT CHANGE UP (ref 3.8–10.5)
WBC # FLD AUTO: 6.84 K/UL — SIGNIFICANT CHANGE UP (ref 3.8–10.5)

## 2018-03-12 PROCEDURE — 99233 SBSQ HOSP IP/OBS HIGH 50: CPT | Mod: GC

## 2018-03-12 RX ORDER — WARFARIN SODIUM 2.5 MG/1
3 TABLET ORAL ONCE
Qty: 0 | Refills: 0 | Status: COMPLETED | OUTPATIENT
Start: 2018-03-12 | End: 2018-03-12

## 2018-03-12 RX ORDER — SODIUM CHLORIDE 9 MG/ML
1000 INJECTION INTRAMUSCULAR; INTRAVENOUS; SUBCUTANEOUS
Qty: 0 | Refills: 0 | Status: DISCONTINUED | OUTPATIENT
Start: 2018-03-12 | End: 2018-03-13

## 2018-03-12 RX ADMIN — DORZOLAMIDE HYDROCHLORIDE TIMOLOL MALEATE 1 DROP(S): 20; 5 SOLUTION/ DROPS OPHTHALMIC at 08:48

## 2018-03-12 RX ADMIN — LATANOPROST 1 DROP(S): 0.05 SOLUTION/ DROPS OPHTHALMIC; TOPICAL at 21:22

## 2018-03-12 RX ADMIN — DORZOLAMIDE HYDROCHLORIDE TIMOLOL MALEATE 1 DROP(S): 20; 5 SOLUTION/ DROPS OPHTHALMIC at 20:19

## 2018-03-12 RX ADMIN — BRIMONIDINE TARTRATE 1 DROP(S): 2 SOLUTION/ DROPS OPHTHALMIC at 20:19

## 2018-03-12 RX ADMIN — CILOSTAZOL 100 MILLIGRAM(S): 100 TABLET ORAL at 20:19

## 2018-03-12 RX ADMIN — CARVEDILOL PHOSPHATE 25 MILLIGRAM(S): 80 CAPSULE, EXTENDED RELEASE ORAL at 06:24

## 2018-03-12 RX ADMIN — CARVEDILOL PHOSPHATE 25 MILLIGRAM(S): 80 CAPSULE, EXTENDED RELEASE ORAL at 17:23

## 2018-03-12 RX ADMIN — WARFARIN SODIUM 3 MILLIGRAM(S): 2.5 TABLET ORAL at 17:23

## 2018-03-12 RX ADMIN — CEFTRIAXONE 100 GRAM(S): 500 INJECTION, POWDER, FOR SOLUTION INTRAMUSCULAR; INTRAVENOUS at 20:19

## 2018-03-12 RX ADMIN — Medication 81 MILLIGRAM(S): at 11:29

## 2018-03-12 RX ADMIN — AMLODIPINE BESYLATE 10 MILLIGRAM(S): 2.5 TABLET ORAL at 06:24

## 2018-03-12 RX ADMIN — CILOSTAZOL 100 MILLIGRAM(S): 100 TABLET ORAL at 08:48

## 2018-03-12 RX ADMIN — AZITHROMYCIN 250 MILLIGRAM(S): 500 TABLET, FILM COATED ORAL at 20:19

## 2018-03-12 RX ADMIN — SODIUM CHLORIDE 75 MILLILITER(S): 9 INJECTION INTRAMUSCULAR; INTRAVENOUS; SUBCUTANEOUS at 15:09

## 2018-03-12 RX ADMIN — BRIMONIDINE TARTRATE 1 DROP(S): 2 SOLUTION/ DROPS OPHTHALMIC at 08:48

## 2018-03-12 RX ADMIN — Medication 100 MILLIGRAM(S): at 11:29

## 2018-03-12 RX ADMIN — Medication 1: at 17:13

## 2018-03-12 RX ADMIN — Medication 100 MICROGRAM(S): at 06:24

## 2018-03-12 RX ADMIN — SPIRONOLACTONE 25 MILLIGRAM(S): 25 TABLET, FILM COATED ORAL at 08:48

## 2018-03-12 RX ADMIN — SIMVASTATIN 20 MILLIGRAM(S): 20 TABLET, FILM COATED ORAL at 21:22

## 2018-03-12 RX ADMIN — Medication 1: at 12:26

## 2018-03-12 RX ADMIN — VALSARTAN 160 MILLIGRAM(S): 80 TABLET ORAL at 06:24

## 2018-03-12 NOTE — PROGRESS NOTE ADULT - PROBLEM SELECTOR PLAN 2
- rate controlled w/ Coreg   - continue coumadin 3mg  - INR goal between 2.0-3.0 - Cr uptrended to 1.83 from 1.22 yesterday, likely secondary to recent contrast load  - spoke with outpatient cardiologist: no history of CHF, EF of 61%, will give 75cc/hr normal saline for 12 hours and check BMP tomorrow morning  - private nephrologist recs appreciated, will hold valsartan in the setting of NIKHIL and restart once Cr has downtrended

## 2018-03-12 NOTE — PROGRESS NOTE ADULT - PROBLEM SELECTOR PLAN 3
- continue home BP meds  - per patient, tends to have elevated pressures in the 150s-160s - titrate to maintain BP goals < 150s - rate controlled w/ Coreg   - continue coumadin 3mg  - INR goal between 2.0-3.0

## 2018-03-12 NOTE — PHYSICAL THERAPY INITIAL EVALUATION ADULT - CRITERIA FOR SKILLED THERAPEUTIC INTERVENTIONS
therapy frequency/anticipated equipment needs at discharge/impairments found/anticipated discharge recommendation/predicted duration of therapy intervention/rehab potential/Inpatient restorative rehab

## 2018-03-12 NOTE — DISCHARGE NOTE ADULT - ADDITIONAL INSTRUCTIONS
Please follow up your primary care physician, your cardiologist, and your nephrologist within 2 weeks of your discharge. Their numbers are all listed below. Please follow up your primary care physician, your cardiologist, and your nephrologist within 1-2 weeks of your discharge.    Please follow up with your nephrologist, Dr. Segovia, within 1 week. Their office number is .

## 2018-03-12 NOTE — DISCHARGE NOTE ADULT - MEDICATION SUMMARY - MEDICATIONS TO TAKE
I will START or STAY ON the medications listed below when I get home from the hospital:    Rolling Walker  -- 1   -- Indication: For PT    Aspirin Enteric Coated 81 mg oral delayed release tablet  -- 1 tab(s) by mouth once a day  -- Indication: For CAD    warfarin 3 mg oral tablet  -- 1 tab(s) by mouth once a day  -- Indication: For Afib    glipiZIDE 5 mg oral tablet  -- 1 tab(s) by mouth 2 times a day  -- Indication: For DM (diabetes mellitus)    Levemir FlexTouch 100 units/mL subcutaneous solution  -- Inject 16 units at bedtime   -- Indication: For DM (diabetes mellitus)    allopurinol 100 mg oral tablet  -- 1 tab(s) by mouth once a day  -- Indication: For Gout    simvastatin 20 mg oral tablet  -- 1 tab(s) by mouth once a day (at bedtime)  -- Indication: For CAD    carvedilol 25 mg oral tablet  -- 1 tab(s) by mouth 2 times a day  -- Indication: For CHF (congestive heart failure)    amLODIPine 10 mg oral tablet  -- 1 tab(s) by mouth once a day  -- Indication: For HTN (hypertension)    diclofenac 1% topical gel  -- Apply to affected area twice a day  -- Indication: For Rash    cilostazol 100 mg oral tablet  -- 1 tab(s) by mouth 2 times a day  -- Indication: For PVD (peripheral vascular disease)    dorzolamide-timolol 2.23%-0.68% ophthalmic solution  -- 1 drop(s) to each affected eye (right eye) 2 times a day  -- Indication: For Glaucoma    brimonidine 0.2% ophthalmic solution  -- 1 drop(s) to each affected eye (right eye) 2 times a day  -- Indication: For Glaucoma    latanoprost 0.005% ophthalmic solution  -- 1 drop(s) to each affected eye once a day (in the evening)  -- Indication: For Glaucoma    levothyroxine 100 mcg (0.1 mg) oral tablet  -- 1 tab(s) by mouth once a day  -- Indication: For Hypothyroid

## 2018-03-12 NOTE — PROGRESS NOTE ADULT - PROBLEM SELECTOR PLAN 5
- Hgb A1c in the AM  - ISS while in the hospital with FS before meals and at bedtime - appears euvolemic on exam  - will continue home medications  - echo pending  - touch base with outpatient cardiology for outpatient echo and ekg - appears euvolemic on exam  - per outpatient cardiologist, patient had a diagnosis of CHF per PMD in 2014 but on his evaluation, doesn't appear to have CHF and most recent echo has an EF of 61%  - will continue home medications including coreg and spironolactone

## 2018-03-12 NOTE — PROGRESS NOTE ADULT - PROBLEM SELECTOR PLAN 1
- patient with CT chest showing multifocal pneumonia, likely CAP  - continue ceftriaxone and azithromycin, will d/c azithromycin if urine legionella is negative   - continue supportive care measures with NC, wean off NC PRN - patient with CT chest showing multifocal pneumonia, likely CAP  - continue ceftriaxone and azithromycin, will d/c azithromycin if urine legionella is negative, still pending   - continue supportive care measures with NC, wean off NC PRN

## 2018-03-12 NOTE — PROGRESS NOTE ADULT - PROBLEM SELECTOR PLAN 4
- appears euvolemic on exam  - will continue home medications  - echo pending  - touch base with outpatient cardiology for outpatient echo and ekg - Cr uptrended to 1.83 from 1.22 yesterday  - likely secondary to poor PO intake   - will continue to monitor, once we have information on patient's EF can start gentle hydration with fluids - continue home BP meds except valsartan in the setting of NIKHIL  - per patient, tends to have elevated pressures in the 150s-160s - titrate to maintain BP goals < 150s

## 2018-03-12 NOTE — DISCHARGE NOTE ADULT - PATIENT PORTAL LINK FT
You can access the [facility] Portal, offered by [facility], by registering with the following website: [email or web address]

## 2018-03-12 NOTE — DISCHARGE NOTE ADULT - CARE PROVIDER_API CALL
Darrel Magallanes), Internal Medicine  [address] [place] [address]  Phone: [phone]  Fax: [phone]    Da Santillan), Cardiovascular Disease; Internal Medicine  [address] [place] [address]  Phone: [phone]  Fax: [phone]    Shahid Segovia), Internal Medicine  [facility] [place] [address]  Phone: [phone]  Fax: [phone]

## 2018-03-12 NOTE — DISCHARGE NOTE ADULT - OTHER SIGNIFICANT FINDINGS
< from: CT Abdomen and Pelvis w/ IV Cont (03.11.18 @ 12:45) >  IMPRESSION:        Chest CT:    Bilateral patchy consolidations and groundglass opacities, concerning for   multifocal pneumonia. Small right pleural effusion.    No evidence of pulmonary embolism.    CT abdomen and pelvis:    Umbilical hernia containing fat and nonobstructed small bowel. Small   right inguinal hernia.    Mildly heterogenous liver. 5 mm hypodensity within the right liver lobe,   too small to characterize.    Colonic diverticulosis without evidence of diverticulitis.          < end of copied text >

## 2018-03-12 NOTE — DISCHARGE NOTE ADULT - HOSPITAL COURSE
Patient is an 89 y/op F with a PMH significant for atrial fibrillation (on coumadin), CHF (EF?), T2DM, HTN, HLD, hypothyroidism who presents with 2 days of dyspnea at rest. At baseline, the patient is able to walk about 100 feet before becoming short of breath. The last two days, she has been short of breath with minimal exertion. She also endorses trouble laying flat due to feelings of dyspnea. She also has a cough productive of white sputum. Denies any fevers or chills. She visited her brother in a nursing home 2 weeks ago and thinks there may have been someone sick there. She also endorses some abdominal discomfort and early satiety for the past month associated with a 10-15 pound weight loss. She denies any nausea, vomiting, diarrhea, melena or hematochezia. Was up to date on her screening up until 15 years ago without issues.     In the ED, she was afebrile, BP: 212/108 --> 188/85, HR: 108, RR 28 --> 19, and sating 98% on room air. Because of the CT chest findings suggestive of multifocal pneumonia she received ceftriaxone and azithromycin. She was also given coreg and valsartan for her elevated BP.     She completed a 5 day course of azithromycin and ceftriaxone for community acquired pneumonia. Her hospital course was complicated by contrast induced nephropathy for which she received fluids. Her ARB and aldactone were held in the hospital because of this. Patient is an 89 y/op F with a PMH significant for atrial fibrillation (on coumadin), CHF (EF?), T2DM, HTN, HLD, hypothyroidism who presents with 2 days of dyspnea at rest. At baseline, the patient is able to walk about 100 feet before becoming short of breath. The last two days, she has been short of breath with minimal exertion. She also endorses trouble laying flat due to feelings of dyspnea. She also has a cough productive of white sputum. Denies any fevers or chills. She visited her brother in a nursing home 2 weeks ago and thinks there may have been someone sick there. She also endorses some abdominal discomfort and early satiety for the past month associated with a 10-15 pound weight loss. She denies any nausea, vomiting, diarrhea, melena or hematochezia. Was up to date on her screening up until 15 years ago without issues.     In the ED, she was afebrile, BP: 212/108 --> 188/85, HR: 108, RR 28 --> 19, and sating 98% on room air. Because of the CT chest findings suggestive of multifocal pneumonia she received ceftriaxone and azithromycin. She was also given coreg and valsartan for her elevated BP.     She completed a 5 day course of azithromycin and ceftriaxone for community acquired pneumonia. Her hospital course was complicated by contrast induced nephropathy for which she received fluids. Her ARB and aldactone were held in the hospital because of this. She was deemed stable for discharge home with her aldactone and valsartan held. She will follow up with her nephrologist who will decide when it will be appropriate to restart these medications. Patient is an 89 y/op F with a PMH significant for atrial fibrillation (on coumadin), CHF (EF?), T2DM, HTN, HLD, hypothyroidism who presents with 2 days of dyspnea at rest. At baseline, the patient is able to walk about 100 feet before becoming short of breath. The last two days, she has been short of breath with minimal exertion. She also endorses trouble laying flat due to feelings of dyspnea. She also has a cough productive of white sputum. Denies any fevers or chills. She visited her brother in a nursing home 2 weeks ago and thinks there may have been someone sick there. She also endorses some abdominal discomfort and early satiety for the past month associated with a 10-15 pound weight loss. She denies any nausea, vomiting, diarrhea, melena or hematochezia. Was up to date on her screening up until 15 years ago without issues.     In the ED, she was afebrile, BP: 212/108 --> 188/85, HR: 108, RR 28 --> 19, and sating 98% on room air. Because of the CT chest findings suggestive of multifocal pneumonia she received ceftriaxone and azithromycin. She was also given coreg and valsartan for her elevated BP.     She completed a 5 day course of azithromycin and ceftriaxone for community acquired pneumonia. Her hospital course was complicated by contrast induced nephropathy for which she received fluids. Her ARB and aldactone were held in the hospital because of this. She was deemed stable for discharge home with her aldactone and valsartan held. She will follow up with her nephrologist who will decide when it will be appropriate to restart these medications. The family was told to call to make an appointment to be seen this week. Attempted to call today but the office is closed. Patient is an 89 y/op F with a PMH significant for atrial fibrillation (on coumadin), CHF (EF?), T2DM, HTN, HLD, hypothyroidism who presents with 2 days of dyspnea at rest. At baseline, the patient is able to walk about 100 feet before becoming short of breath. The last two days, she has been short of breath with minimal exertion. She also endorses trouble laying flat due to feelings of dyspnea. She also has a cough productive of white sputum. Denies any fevers or chills. She visited her brother in a nursing home 2 weeks ago and thinks there may have been someone sick there. She also endorses some abdominal discomfort and early satiety for the past month associated with a 10-15 pound weight loss. She denies any nausea, vomiting, diarrhea, melena or hematochezia. Was up to date on her screening up until 15 years ago without issues.     In the ED, she was afebrile, BP: 212/108 --> 188/85, HR: 108, RR 28 --> 19, and sating 98% on room air. Because of the CT chest findings suggestive of multifocal pneumonia she received ceftriaxone and azithromycin. She was also given coreg and valsartan for her elevated BP.     She completed a 5 day course of azithromycin and ceftriaxone for community acquired pneumonia. Her hospital course was complicated by contrast induced nephropathy for which she received fluids. Her ARB and aldactone were held in the hospital because of this. She was deemed stable for discharge home with her aldactone and valsartan held. She will follow up with her nephrologist who will decide when it will be appropriate to restart these medications. The family was told to call to make an appointment to be seen this week. Attempted to call today but the office is closed to communicate dc plan with her PCP.

## 2018-03-12 NOTE — DISCHARGE NOTE ADULT - MEDICATION SUMMARY - MEDICATIONS TO STOP TAKING
I will STOP taking the medications listed below when I get home from the hospital:    valsartan 160 mg oral tablet  -- 1 tab(s) by mouth once a day    spironolactone 25 mg oral tablet  -- 1 tab(s) by mouth 3 times a week (M-W-F)

## 2018-03-12 NOTE — DISCHARGE NOTE ADULT - CARE PROVIDERS DIRECT ADDRESSES
[email or web address]

## 2018-03-12 NOTE — DISCHARGE NOTE ADULT - CARE PLAN
Assessment and plan of treatment:	HEPATIC LESION FOLLOW UP Principal Discharge DX:	Multifocal pneumonia  Goal:	Resolution  Assessment and plan of treatment:	You came in with a pneumonia in your lungs. You were treated with 5 days of antibiotics including ceftriaxone and azithromycin. Please follow up with your PMD in 1 week.  Secondary Diagnosis:	Acute kidney injury superimposed on CKD  Goal:	Improved  Assessment and plan of treatment:	You got a CT chest when you came in to the hospital because you have a history of PE and you were short of breath. The contrast affected your kidneys so we gave you some fluids for support. Please follow up with your nephrologist when you are discharged from the hospital  Secondary Diagnosis:	Afib  Secondary Diagnosis:	CHF (congestive heart failure)  Secondary Diagnosis:	HLD (hyperlipidemia)  Secondary Diagnosis:	PVD (peripheral vascular disease) Principal Discharge DX:	Multifocal pneumonia  Goal:	Resolution  Assessment and plan of treatment:	You came in with a pneumonia in your lungs. You were treated with 5 days of antibiotics including ceftriaxone and azithromycin. Please follow up with your PMD in 1 week.  Secondary Diagnosis:	Acute kidney injury superimposed on CKD  Goal:	Improved  Assessment and plan of treatment:	You got a CT chest when you came in to the hospital because you have a history of PE and you were short of breath. The contrast affected your kidneys so we gave you some fluids for support. Please follow up with your nephrologist when you are discharged from the hospital within a few days to discuss when you will restart your medications that were held in the hospital.  Secondary Diagnosis:	Afib  Goal:	Maintenance  Assessment and plan of treatment:	Please continue to take your coumadin and carvedilol. Please follow up with your cardiologist as planned.  Secondary Diagnosis:	CHF (congestive heart failure)  Goal:	Maintenance  Assessment and plan of treatment:	Please continue to take your medications as prescribed. Please follow up with your cardiologist as planned.  Secondary Diagnosis:	HLD (hyperlipidemia)  Goal:	Maintenance  Assessment and plan of treatment:	Please continue to take your statin.  Secondary Diagnosis:	PVD (peripheral vascular disease)  Goal:	Maintenance  Assessment and plan of treatment:	Please continue to take your medications as prescribed. Principal Discharge DX:	Multifocal pneumonia  Goal:	Resolution  Assessment and plan of treatment:	You came in with a pneumonia in your lungs. You were treated with 5 days of antibiotics including ceftriaxone and azithromycin. Please follow up with your PMD in 1 week.  Secondary Diagnosis:	Acute kidney injury superimposed on CKD  Goal:	Improved  Assessment and plan of treatment:	You got a CT chest when you came in to the hospital because you have a history of PE and you were short of breath. The contrast affected your kidneys so we gave you some fluids for support. Please follow up with your nephrologist when you are discharged from the hospital within a few days to discuss when you will restart your medications that were held in the hospital. His number is .  Secondary Diagnosis:	Afib  Goal:	Maintenance  Assessment and plan of treatment:	Please continue to take your coumadin and carvedilol. Please follow up with your cardiologist as planned.  Secondary Diagnosis:	CHF (congestive heart failure)  Goal:	Maintenance  Assessment and plan of treatment:	Please continue to take your medications as prescribed. Please follow up with your cardiologist as planned.  Secondary Diagnosis:	HLD (hyperlipidemia)  Goal:	Maintenance  Assessment and plan of treatment:	Please continue to take your statin.  Secondary Diagnosis:	PVD (peripheral vascular disease)  Goal:	Maintenance  Assessment and plan of treatment:	Please continue to take your medications as prescribed.

## 2018-03-12 NOTE — PHYSICAL THERAPY INITIAL EVALUATION ADULT - ADDITIONAL COMMENTS
Pt. left seated at edge of bed post-PT in NAD, per patient request, with all lines/tubes intact & table/call bell within reach.  Daughter present at bedside.  RN Kristina mckeon.

## 2018-03-12 NOTE — CONSULT NOTE ADULT - SUBJECTIVE AND OBJECTIVE BOX
[facility] - Radha / Florin S /Nate/ GASTON Castillo/ GASTON Tellez/ Shahid Segovia (office pt)/ BABAR Luna  ---------------------------------------------------------------------------------------------------------------  Patient seen and examined bedside    89 y/op F with a PMH of atrial fibrillation (on coumadin), CHF (EF?), T2DM, HTN, HLD, hypothyroidism who presents with 2 days of dyspnea at rest. She also endorses  short of breath with minimal exertion and  trouble laying flat due to dyspnea. She also has a cough productive of white sputum. In the ED, her BP was found to be 212/108 --> 188/85, got CT chest w/iv contrast- suggestive of multifocal pneumonia she received ceftriaxone and azithromycin. She was also given coreg and valsartan for her elevated BP. Renal consulted for elevated BUN/Cr. History obtained from pt, chart and son bedside. Son states pts home meds have been changed for past 2 months by PCP for unknown reason, saw cardiologist , diuretic was changed per pt, no further details available. Denies any fevers or chills, nausea, vomiting, diarrhea, or urinary sxs. Denied recent NSAID use. currently states sob improving.     PAST MEDICAL & SURGICAL HISTORY:  Personal history of PE (pulmonary embolism)  Glaucoma  PVD (peripheral vascular disease)  Hypothyroid  HTN (hypertension)  HLD (hyperlipidemia)  CHF (congestive heart failure)  DM (diabetes mellitus)  Afib  Elective surgery: abdominal abcess removals  History of partial thyroidectomy    Allergies: No Known Allergies    Home Medications: Reviewed  allopurinol 100 mg oral tablet: 1 tab(s) orally once a day (11 Mar 2018 16:55)  amLODIPine 10 mg oral tablet: 1 tab(s) orally once a day (11 Mar 2018 16:55)  Aspirin Enteric Coated 81 mg oral delayed release tablet: 1 tab(s) orally once a day (11 Mar 2018 16:55)  brimonidine 0.2% ophthalmic solution: 1 drop(s) to each affected eye (right eye) 2 times a day (11 Mar 2018 16:55)  carvedilol 25 mg oral tablet: 1 tab(s) orally 2 times a day (11 Mar 2018 16:55)  cilostazol 100 mg oral tablet: 1 tab(s) orally 2 times a day (11 Mar 2018 16:55)  diclofenac 1% topical gel: Apply to affected area twice a day (11 Mar 2018 16:55)  dorzolamide-timolol 2.23%-0.68% ophthalmic solution: 1 drop(s) to each affected eye (right eye) 2 times a day (11 Mar 2018 16:55)  glipiZIDE 5 mg oral tablet: 1 tab(s) orally 2 times a day (11 Mar 2018 16:55)  latanoprost 0.005% ophthalmic solution: 1 drop(s) to each affected eye once a day (in the evening) (11 Mar 2018 16:55)  Levemir FlexTouch 100 units/mL subcutaneous solution: Inject 16 units at bedtime  (11 Mar 2018 16:55)  levothyroxine 100 mcg (0.1 mg) oral tablet: 1 tab(s) orally once a day (11 Mar 2018 16:55)  simvastatin 20 mg oral tablet: 1 tab(s) orally once a day (at bedtime) (11 Mar 2018 16:55)  spironolactone 25 mg oral tablet: 1 tab(s) orally 3 times a week () (11 Mar 2018 16:55)  valsartan 160 mg oral tablet: 1 tab(s) orally once a day (11 Mar 2018 16:55)  warfarin 3 mg oral tablet: 1 tab(s) orally once a day (11 Mar 2018 16:55)    Hospital Medications:   MEDICATIONS  (STANDING):  allopurinol 100 milliGRAM(s) Oral daily  amLODIPine   Tablet 10 milliGRAM(s) Oral daily  aspirin enteric coated 81 milliGRAM(s) Oral daily  azithromycin  IVPB 500 milliGRAM(s) IV Intermittent every 24 hours  brimonidine 0.2% Ophthalmic Solution 1 Drop(s) Right EYE two times a day  carvedilol 25 milliGRAM(s) Oral every 12 hours  cefTRIAXone   IVPB 1 Gram(s) IV Intermittent every 24 hours  cilostazol 100 milliGRAM(s) Oral two times a day  dextrose 50% Injectable 25 Gram(s) IV Push once  dorzolamide 2%/timolol 0.5% Ophthalmic Solution 1 Drop(s) Right EYE two times a day  insulin lispro (HumaLOG) corrective regimen sliding scale   SubCutaneous three times a day before meals  insulin lispro (HumaLOG) corrective regimen sliding scale   SubCutaneous at bedtime  latanoprost 0.005% Ophthalmic Solution 1 Drop(s) Both EYES at bedtime  levothyroxine 100 MICROGram(s) Oral daily  simvastatin 20 milliGRAM(s) Oral at bedtime  spironolactone 25 milliGRAM(s) Oral <User Schedule>  valsartan 160 milliGRAM(s) Oral daily    SOCIAL HISTORY:  Denies ETOh,Smoking, illicit drug use  FAMILY HISTORY:  No pertinent family history in first degree relatives      REVIEW OF SYSTEMS:  CONSTITUTIONAL: No weakness, fevers or chills  EYES/ENT: No visual changes;  No vertigo or throat pain   NECK: No pain or stiffness  RESPIRATORY: +cough, no wheezing, hemoptysis; +shortness of breath  CARDIOVASCULAR: No chest pain or palpitations.  GASTROINTESTINAL: No abdominal or epigastric pain. No nausea, vomiting, or hematemesis; No diarrhea or constipation. No melena or hematochezia.  GENITOURINARY: No dysuria, frequency, foamy urine, urinary urgency, incontinence or hematuria  NEUROLOGICAL: No numbness or weakness  SKIN: No itching, burning, rashes, or lesions   VASCULAR: No bilateral lower extremity edema.   All other review of systems is negative unless indicated above.    VITALS:  T(F): 98 (18 @ 08:46), Max: 98.8 (18 @ 05:29)  HR: 65 (18 @ 08:46)  BP: 138/71 (18 @ 08:46)  RR: 18 (18 @ 08:46)  SpO2: 100% (18 @ 08:46)  Wt(kg): --    Height (cm): 172.72 ( @ 18:37)  Weight (kg): 106.6 ( 18:37)  BMI (kg/m2): 35.7 (:37)  BSA (m2): 2.19 ( @ 18:37)    PHYSICAL EXAM:  Constitutional: NAD  HEENT: anicteric sclera, oropharynx clear, MMM  Neck: No JVD  Respiratory: dec bibasilar BS, no wheezes, rales or rhonchi  Cardiovascular: S1, S2, RRR  Gastrointestinal: BS+, soft, NT, obese  Extremities: No cyanosis or clubbing. trace peripheral edema  Neurological: A/O x 3, no focal deficits  Psychiatric: Normal mood, normal affect  : No CVA tenderness. No michaud.   Skin: dark pigmented skin LEs      LABS:      141  |  102  |  27<H>  ----------------------------<  109<H>  4.4   |  29  |  1.68<H>    Ca    8.7      12 Mar 2018 06:30    TPro  7.5  /  Alb  3.7  /  TBili  1.2  /  DBili      /  AST  18  /  ALT  31  /  AlkPhos  91      Creatinine Trend: 1.68 <--, 1.27 <--                        10.0   6.84  )-----------( 202      ( 12 Mar 2018 06:30 )             31.2     Urine Studies:  Urinalysis Basic - ( 11 Mar 2018 11:20 )    Color: PALE YELLOW / Appearance: CLEAR / S.012 / pH: 7.0  Gluc: 100 / Ketone: TRACE  / Bili: NEGATIVE / Urobili: NORMAL mg/dL   Blood: SMALL / Protein: 300 mg/dL / Nitrite: NEGATIVE   Leuk Esterase: NEGATIVE / RBC: 5-10 / WBC 0-2   Sq Epi: OCC / Non Sq Epi:  / Bacteria:       RADIOLOGY & ADDITIONAL STUDIES:    US Abdomen Complete (18 @ 14:02) >  Right kidney: 9.8 x 3.7 cm. No hydronephrosis. Right renal cyst measuring   2.7 x 1.9 x 2.2 cm.    Left kidney: 10.3 x 4.7 by cm.  No hydronephrosis. Left renal cyst   measuring 1.5 x 1.5 x 1.2 cm.    No evidence of cholecystitis    CT Angio Chest w/ IV Cont (18 @ 12:12) >  Bilateral patchy consolidations and groundglass opacities, concerning for   multifocal pneumonia. Small right pleural effusion.

## 2018-03-12 NOTE — PHYSICAL THERAPY INITIAL EVALUATION ADULT - PERTINENT HX OF CURRENT PROBLEM, REHAB EVAL
Pt. is an 88 y/o female admitted to [facility] on 03/11/18 with a dx of PNA and SOB/dyspnea.  PT consult request secondary to debility.  H/O A-Fib.  CT of chest = concern for PNA, (-) for PE.  CXr = PNA.

## 2018-03-12 NOTE — PHYSICAL THERAPY INITIAL EVALUATION ADULT - PLANNED THERAPY INTERVENTIONS, PT EVAL
gait training/balance training/prosthetic fitting/training/bed mobility training/transfer training/strengthening

## 2018-03-12 NOTE — PROGRESS NOTE ADULT - SUBJECTIVE AND OBJECTIVE BOX
Tamara Davis MD  Medicine Team 2  Pager [phone]        Subjective: Patient seen and examined. No acute events overnight. This morning feels tired but says SOB has improved. Denies fevers, chills, CP, abdominal pain, nausea, vomiting, or diarrhea.         VITAL SIGNS:  Vital Signs Last 24 Hrs  T(C): 36.7 (12 Mar 2018 08:46), Max: 37.1 (12 Mar 2018 05:29)  T(F): 98 (12 Mar 2018 08:46), Max: 98.8 (12 Mar 2018 05:29)  HR: 65 (12 Mar 2018 08:46) (65 - 108)  BP: 138/71 (12 Mar 2018 08:46) (138/71 - 212/107)  BP(mean): --  RR: 18 (12 Mar 2018 08:46) (18 - 28)  SpO2: 100% (12 Mar 2018 08:46) (98% - 100%)      PHYSICAL EXAM:     GENERAL: no acute distress, sleeping with NC  HEENT: PERRLA, EOMI, moist oropharynx   RESPIRATORY: still ronchorous breath sounds but improved air entry bilaterally   CARDIOVASCULAR: irregularly irregular, no murmurs   ABDOMINAL: soft, non-tender, non-distended, positive bowel sounds   EXTREMITIES: no clubbing or cyanosis, 1+ edema in LE bilaterally   NEUROLOGICAL: alert and oriented x 3, non-focal  SKIN: no rashes or lesions   MUSCULOSKELETAL: no gross joint deformity                          10.0   6.84  )-----------( 202      ( 12 Mar 2018 06:30 )             31.2     03-12    141  |  102  |  27<H>  ----------------------------<  109<H>  4.4   |  29  |  1.68<H>    Ca    8.7      12 Mar 2018 06:30    TPro  7.5  /  Alb  3.7  /  TBili  1.2  /  DBili  x   /  AST  18  /  ALT  31  /  AlkPhos  91  03-11      CAPILLARY BLOOD GLUCOSE      POCT Blood Glucose.: 147 mg/dL (12 Mar 2018 08:32)  POCT Blood Glucose.: 173 mg/dL (11 Mar 2018 22:17)  POCT Blood Glucose.: 175 mg/dL (11 Mar 2018 18:36)      MEDICATIONS  (STANDING):  allopurinol 100 milliGRAM(s) Oral daily  amLODIPine   Tablet 10 milliGRAM(s) Oral daily  aspirin enteric coated 81 milliGRAM(s) Oral daily  azithromycin  IVPB 500 milliGRAM(s) IV Intermittent every 24 hours  brimonidine 0.2% Ophthalmic Solution 1 Drop(s) Right EYE two times a day  carvedilol 25 milliGRAM(s) Oral every 12 hours  cefTRIAXone   IVPB 1 Gram(s) IV Intermittent every 24 hours  cilostazol 100 milliGRAM(s) Oral two times a day  dextrose 50% Injectable 25 Gram(s) IV Push once  dorzolamide 2%/timolol 0.5% Ophthalmic Solution 1 Drop(s) Right EYE two times a day  insulin lispro (HumaLOG) corrective regimen sliding scale   SubCutaneous three times a day before meals  insulin lispro (HumaLOG) corrective regimen sliding scale   SubCutaneous at bedtime  latanoprost 0.005% Ophthalmic Solution 1 Drop(s) Both EYES at bedtime  levothyroxine 100 MICROGram(s) Oral daily  simvastatin 20 milliGRAM(s) Oral at bedtime  spironolactone 25 milliGRAM(s) Oral <User Schedule>  valsartan 160 milliGRAM(s) Oral daily    MEDICATIONS  (PRN):  polyethylene glycol 3350 17 Gram(s) Oral daily PRN Constipation Tamara Davis MD  Medicine Team 2  Pager [phone]    Patient is a 89y old  Female who presents with a chief complaint of sob (12 Mar 2018 11:51)      Subjective: Patient seen and examined. No acute events overnight. This morning feels tired but says SOB has improved. Denies fevers, chills, CP, abdominal pain, nausea, vomiting, or diarrhea.         VITAL SIGNS:  Vital Signs Last 24 Hrs  T(C): 36.7 (12 Mar 2018 08:46), Max: 37.1 (12 Mar 2018 05:29)  T(F): 98 (12 Mar 2018 08:46), Max: 98.8 (12 Mar 2018 05:29)  HR: 65 (12 Mar 2018 08:46) (65 - 108)  BP: 138/71 (12 Mar 2018 08:46) (138/71 - 212/107)  BP(mean): --  RR: 18 (12 Mar 2018 08:46) (18 - 28)  SpO2: 100% (12 Mar 2018 08:46) (98% - 100%)      PHYSICAL EXAM:     GENERAL: no acute distress, sleeping with NC  HEENT: PERRLA, EOMI, moist oropharynx   RESPIRATORY: still ronchorous breath sounds but improved air entry bilaterally   CARDIOVASCULAR: irregularly irregular, no murmurs   ABDOMINAL: soft, non-tender, non-distended, positive bowel sounds   EXTREMITIES: no clubbing or cyanosis, 1+ edema in LE bilaterally   NEUROLOGICAL: alert and oriented x 3, non-focal  SKIN: no rashes or lesions   MUSCULOSKELETAL: no gross joint deformity                          10.0   6.84  )-----------( 202      ( 12 Mar 2018 06:30 )             31.2     03-12    141  |  102  |  27<H>  ----------------------------<  109<H>  4.4   |  29  |  1.68<H>    Ca    8.7      12 Mar 2018 06:30    TPro  7.5  /  Alb  3.7  /  TBili  1.2  /  DBili  x   /  AST  18  /  ALT  31  /  AlkPhos  91  03-11      CAPILLARY BLOOD GLUCOSE      POCT Blood Glucose.: 147 mg/dL (12 Mar 2018 08:32)  POCT Blood Glucose.: 173 mg/dL (11 Mar 2018 22:17)  POCT Blood Glucose.: 175 mg/dL (11 Mar 2018 18:36)      MEDICATIONS  (STANDING):  allopurinol 100 milliGRAM(s) Oral daily  amLODIPine   Tablet 10 milliGRAM(s) Oral daily  aspirin enteric coated 81 milliGRAM(s) Oral daily  azithromycin  IVPB 500 milliGRAM(s) IV Intermittent every 24 hours  brimonidine 0.2% Ophthalmic Solution 1 Drop(s) Right EYE two times a day  carvedilol 25 milliGRAM(s) Oral every 12 hours  cefTRIAXone   IVPB 1 Gram(s) IV Intermittent every 24 hours  cilostazol 100 milliGRAM(s) Oral two times a day  dextrose 50% Injectable 25 Gram(s) IV Push once  dorzolamide 2%/timolol 0.5% Ophthalmic Solution 1 Drop(s) Right EYE two times a day  insulin lispro (HumaLOG) corrective regimen sliding scale   SubCutaneous three times a day before meals  insulin lispro (HumaLOG) corrective regimen sliding scale   SubCutaneous at bedtime  latanoprost 0.005% Ophthalmic Solution 1 Drop(s) Both EYES at bedtime  levothyroxine 100 MICROGram(s) Oral daily  simvastatin 20 milliGRAM(s) Oral at bedtime  spironolactone 25 milliGRAM(s) Oral <User Schedule>  valsartan 160 milliGRAM(s) Oral daily    MEDICATIONS  (PRN):  polyethylene glycol 3350 17 Gram(s) Oral daily PRN Constipation

## 2018-03-12 NOTE — DISCHARGE NOTE ADULT - HOME CARE AGENCY
[facility] at [place]  [phone].  RN to visit the day after discharge.  Physical therapy to follow. [facility] at [place]  [phone].  RN to visit the day after discharge. RN will call you to arrange visit time.  Physical therapy to follow.

## 2018-03-12 NOTE — PROGRESS NOTE ADULT - PROBLEM SELECTOR PLAN 6
- stable  - continue with cilostazol and aspirin - Hgb A1c 8.4  - ISS while in the hospital with FS before meals and at bedtime and will dose basal bolus once have 24 hour information

## 2018-03-12 NOTE — DISCHARGE NOTE ADULT - DURABLE MEDICAL EQUIPMENT AGENCY
Rolling waker delivered to pt bedside  3/14/18 from [facility]  [phone] Rolling walker delivered to pt bedside  3/14/18  from [facility]  [phone]

## 2018-03-12 NOTE — DISCHARGE NOTE ADULT - PLAN OF CARE
HEPATIC LESION FOLLOW UP Resolution You came in with a pneumonia in your lungs. You were treated with 5 days of antibiotics including ceftriaxone and azithromycin. Please follow up with your PMD in 1 week. Improved You got a CT chest when you came in to the hospital because you have a history of PE and you were short of breath. The contrast affected your kidneys so we gave you some fluids for support. Please follow up with your nephrologist when you are discharged from the hospital You got a CT chest when you came in to the hospital because you have a history of PE and you were short of breath. The contrast affected your kidneys so we gave you some fluids for support. Please follow up with your nephrologist when you are discharged from the hospital within a few days to discuss when you will restart your medications that were held in the hospital. Maintenance Please continue to take your coumadin and carvedilol. Please follow up with your cardiologist as planned. Please continue to take your medications as prescribed. Please follow up with your cardiologist as planned. Please continue to take your statin. Please continue to take your medications as prescribed. You got a CT chest when you came in to the hospital because you have a history of PE and you were short of breath. The contrast affected your kidneys so we gave you some fluids for support. Please follow up with your nephrologist when you are discharged from the hospital within a few days to discuss when you will restart your medications that were held in the hospital. His number is .

## 2018-03-12 NOTE — PROGRESS NOTE ADULT - PROBLEM SELECTOR PLAN 9
- on coumadin for afib  - DASH/TLC diet  - Dispo pending improvement - stable  - continue home synthroid

## 2018-03-12 NOTE — CONSULT NOTE ADULT - PROBLEM SELECTOR RECOMMENDATION 9
NIKHIL on CKD3. no e/o obstructive uropathy on sono/CT.   hold Valsartan for now. no indication for mucomyst at this point  monitor  BMP daily and u/o   dose all meds for eGFR<15ml/min.   avoid ACEi/ARB for now/NSAIDs/Nephrotoxics.

## 2018-03-12 NOTE — CONSULT NOTE ADULT - ASSESSMENT
Patient is an 89 y/op F with a PMH significant for atrial fibrillation (on coumadin), CHF (EF?), T2DM, HTN, HLD, CKD, hypothyroidism who presents with 2 days of dyspnea at rest found to have community acquired multifocal pneumonia. Rebal consulted for NIKHIL on CKD    NIKHIL on CKD3, b/l Cr 1.4 12/2015, no recent sr Cr currently available.   NIKHIL likely 2/2 sepsis+hemodynamics/high BP. Cr trending up 1.27>1.68  s/p iv contrast 3/11, unlikely BESS      Multifocal pneumonia on CT chest    Afib.  Plan: - rate controlled w/ Coreg. AC per primary team  HTN, now controlled.   CHF   Diabetes mellitus-Mx as per medicine      labs, chart, rad reviewed

## 2018-03-12 NOTE — PHYSICAL THERAPY INITIAL EVALUATION ADULT - ASR EQUIP NEEDS DISCH PT EVAL
current recommendation for inpatient rehab; if pt. is discharged home, a rolling walker will likely be required for safe discharge

## 2018-03-13 LAB
BUN SERPL-MCNC: 39 MG/DL — HIGH (ref 7–23)
CALCIUM SERPL-MCNC: 8.4 MG/DL — SIGNIFICANT CHANGE UP (ref 8.4–10.5)
CHLORIDE SERPL-SCNC: 101 MMOL/L — SIGNIFICANT CHANGE UP (ref 98–107)
CO2 SERPL-SCNC: 24 MMOL/L — SIGNIFICANT CHANGE UP (ref 22–31)
CREAT SERPL-MCNC: 2.04 MG/DL — HIGH (ref 0.5–1.3)
GLUCOSE BLDC GLUCOMTR-MCNC: 168 MG/DL — HIGH (ref 70–99)
GLUCOSE BLDC GLUCOMTR-MCNC: 192 MG/DL — HIGH (ref 70–99)
GLUCOSE BLDC GLUCOMTR-MCNC: 223 MG/DL — HIGH (ref 70–99)
GLUCOSE BLDC GLUCOMTR-MCNC: 229 MG/DL — HIGH (ref 70–99)
GLUCOSE SERPL-MCNC: 150 MG/DL — HIGH (ref 70–99)
HCT VFR BLD CALC: 30.2 % — LOW (ref 34.5–45)
HGB BLD-MCNC: 9.6 G/DL — LOW (ref 11.5–15.5)
INR BLD: 2.06 — HIGH (ref 0.88–1.17)
MAGNESIUM SERPL-MCNC: 2 MG/DL — SIGNIFICANT CHANGE UP (ref 1.6–2.6)
MCHC RBC-ENTMCNC: 29.4 PG — SIGNIFICANT CHANGE UP (ref 27–34)
MCHC RBC-ENTMCNC: 31.8 % — LOW (ref 32–36)
MCV RBC AUTO: 92.6 FL — SIGNIFICANT CHANGE UP (ref 80–100)
NRBC # FLD: 0 — SIGNIFICANT CHANGE UP
PHOSPHATE SERPL-MCNC: 3.4 MG/DL — SIGNIFICANT CHANGE UP (ref 2.5–4.5)
PLATELET # BLD AUTO: 199 K/UL — SIGNIFICANT CHANGE UP (ref 150–400)
PMV BLD: 11.3 FL — SIGNIFICANT CHANGE UP (ref 7–13)
POTASSIUM SERPL-MCNC: 4.1 MMOL/L — SIGNIFICANT CHANGE UP (ref 3.5–5.3)
POTASSIUM SERPL-SCNC: 4.1 MMOL/L — SIGNIFICANT CHANGE UP (ref 3.5–5.3)
PROTHROM AB SERPL-ACNC: 24.1 SEC — HIGH (ref 9.8–13.1)
RBC # BLD: 3.26 M/UL — LOW (ref 3.8–5.2)
RBC # FLD: 15.9 % — HIGH (ref 10.3–14.5)
SODIUM SERPL-SCNC: 139 MMOL/L — SIGNIFICANT CHANGE UP (ref 135–145)
WBC # BLD: 6.95 K/UL — SIGNIFICANT CHANGE UP (ref 3.8–10.5)
WBC # FLD AUTO: 6.95 K/UL — SIGNIFICANT CHANGE UP (ref 3.8–10.5)

## 2018-03-13 PROCEDURE — 99233 SBSQ HOSP IP/OBS HIGH 50: CPT | Mod: GC

## 2018-03-13 RX ORDER — LANOLIN ALCOHOL/MO/W.PET/CERES
3 CREAM (GRAM) TOPICAL ONCE
Qty: 0 | Refills: 0 | Status: COMPLETED | OUTPATIENT
Start: 2018-03-13 | End: 2018-03-13

## 2018-03-13 RX ORDER — WARFARIN SODIUM 2.5 MG/1
3 TABLET ORAL ONCE
Qty: 0 | Refills: 0 | Status: COMPLETED | OUTPATIENT
Start: 2018-03-13 | End: 2018-03-13

## 2018-03-13 RX ORDER — SODIUM CHLORIDE 9 MG/ML
1000 INJECTION INTRAMUSCULAR; INTRAVENOUS; SUBCUTANEOUS
Qty: 0 | Refills: 0 | Status: DISCONTINUED | OUTPATIENT
Start: 2018-03-13 | End: 2018-03-14

## 2018-03-13 RX ORDER — LANOLIN ALCOHOL/MO/W.PET/CERES
1 CREAM (GRAM) TOPICAL ONCE
Qty: 0 | Refills: 0 | Status: DISCONTINUED | OUTPATIENT
Start: 2018-03-13 | End: 2018-03-13

## 2018-03-13 RX ADMIN — Medication 1: at 08:36

## 2018-03-13 RX ADMIN — Medication 2: at 17:48

## 2018-03-13 RX ADMIN — AZITHROMYCIN 250 MILLIGRAM(S): 500 TABLET, FILM COATED ORAL at 20:30

## 2018-03-13 RX ADMIN — CILOSTAZOL 100 MILLIGRAM(S): 100 TABLET ORAL at 19:51

## 2018-03-13 RX ADMIN — Medication 1: at 12:21

## 2018-03-13 RX ADMIN — BRIMONIDINE TARTRATE 1 DROP(S): 2 SOLUTION/ DROPS OPHTHALMIC at 08:37

## 2018-03-13 RX ADMIN — CARVEDILOL PHOSPHATE 25 MILLIGRAM(S): 80 CAPSULE, EXTENDED RELEASE ORAL at 17:49

## 2018-03-13 RX ADMIN — CEFTRIAXONE 100 GRAM(S): 500 INJECTION, POWDER, FOR SOLUTION INTRAMUSCULAR; INTRAVENOUS at 19:51

## 2018-03-13 RX ADMIN — Medication 100 MICROGRAM(S): at 06:11

## 2018-03-13 RX ADMIN — SODIUM CHLORIDE 75 MILLILITER(S): 9 INJECTION INTRAMUSCULAR; INTRAVENOUS; SUBCUTANEOUS at 12:19

## 2018-03-13 RX ADMIN — WARFARIN SODIUM 3 MILLIGRAM(S): 2.5 TABLET ORAL at 17:48

## 2018-03-13 RX ADMIN — CARVEDILOL PHOSPHATE 25 MILLIGRAM(S): 80 CAPSULE, EXTENDED RELEASE ORAL at 06:11

## 2018-03-13 RX ADMIN — BRIMONIDINE TARTRATE 1 DROP(S): 2 SOLUTION/ DROPS OPHTHALMIC at 19:51

## 2018-03-13 RX ADMIN — Medication 100 MILLIGRAM(S): at 12:20

## 2018-03-13 RX ADMIN — AMLODIPINE BESYLATE 10 MILLIGRAM(S): 2.5 TABLET ORAL at 06:11

## 2018-03-13 RX ADMIN — Medication 3 MILLIGRAM(S): at 21:13

## 2018-03-13 RX ADMIN — SIMVASTATIN 20 MILLIGRAM(S): 20 TABLET, FILM COATED ORAL at 21:13

## 2018-03-13 RX ADMIN — DORZOLAMIDE HYDROCHLORIDE TIMOLOL MALEATE 1 DROP(S): 20; 5 SOLUTION/ DROPS OPHTHALMIC at 08:38

## 2018-03-13 RX ADMIN — Medication 81 MILLIGRAM(S): at 12:20

## 2018-03-13 RX ADMIN — LATANOPROST 1 DROP(S): 0.05 SOLUTION/ DROPS OPHTHALMIC; TOPICAL at 21:13

## 2018-03-13 RX ADMIN — CILOSTAZOL 100 MILLIGRAM(S): 100 TABLET ORAL at 08:37

## 2018-03-13 RX ADMIN — DORZOLAMIDE HYDROCHLORIDE TIMOLOL MALEATE 1 DROP(S): 20; 5 SOLUTION/ DROPS OPHTHALMIC at 19:51

## 2018-03-13 NOTE — PROGRESS NOTE ADULT - SUBJECTIVE AND OBJECTIVE BOX
Tamara Davis MD  Medicine Team 2  Pager [phone]      Patient is a 89y old  Female who presents with a chief complaint of sob (12 Mar 2018 11:51)          Subjective: Overnight,         VITAL SIGNS:  Vital Signs Last 24 Hrs  T(C): 37.2 (13 Mar 2018 05:22), Max: 37.2 (13 Mar 2018 05:22)  T(F): 98.9 (13 Mar 2018 05:22), Max: 98.9 (13 Mar 2018 05:22)  HR: 72 (13 Mar 2018 05:22) (65 - 73)  BP: 138/63 (13 Mar 2018 05:22) (112/62 - 138/71)  BP(mean): --  RR: 18 (13 Mar 2018 05:22) (18 - 18)  SpO2: 97% (13 Mar 2018 05:22) (97% - 100%)      PHYSICAL EXAM:     GENERAL: no acute distress  HEENT: PERRLA, EOMI, moist oropharynx   RESPIRATORY: CTAB, no w/c   CARDIOVASCULAR: RRR, no murmurs, gallops, rubs  ABDOMINAL: soft, non-tender, non-distended, positive bowel sounds   EXTREMITIES: no clubbing, cyanosis, or edema  NEUROLOGICAL: alert and oriented x 3, non-focal  SKIN: no rashes or lesions   MUSCULOSKELETAL: no gross joint deformity                          10.0   6.84  )-----------( 202      ( 12 Mar 2018 06:30 )             31.2     03-12    141  |  102  |  27<H>  ----------------------------<  109<H>  4.4   |  29  |  1.68<H>    Ca    8.7      12 Mar 2018 06:30    TPro  7.5  /  Alb  3.7  /  TBili  1.2  /  DBili  x   /  AST  18  /  ALT  31  /  AlkPhos  91  03-11      CAPILLARY BLOOD GLUCOSE      POCT Blood Glucose.: 189 mg/dL (12 Mar 2018 21:45)  POCT Blood Glucose.: 172 mg/dL (12 Mar 2018 16:51)  POCT Blood Glucose.: 185 mg/dL (12 Mar 2018 12:12)  POCT Blood Glucose.: 147 mg/dL (12 Mar 2018 08:32)      MEDICATIONS  (STANDING):  allopurinol 100 milliGRAM(s) Oral daily  amLODIPine   Tablet 10 milliGRAM(s) Oral daily  aspirin enteric coated 81 milliGRAM(s) Oral daily  azithromycin  IVPB 500 milliGRAM(s) IV Intermittent every 24 hours  brimonidine 0.2% Ophthalmic Solution 1 Drop(s) Right EYE two times a day  carvedilol 25 milliGRAM(s) Oral every 12 hours  cefTRIAXone   IVPB 1 Gram(s) IV Intermittent every 24 hours  cilostazol 100 milliGRAM(s) Oral two times a day  dextrose 50% Injectable 25 Gram(s) IV Push once  dorzolamide 2%/timolol 0.5% Ophthalmic Solution 1 Drop(s) Right EYE two times a day  insulin lispro (HumaLOG) corrective regimen sliding scale   SubCutaneous three times a day before meals  insulin lispro (HumaLOG) corrective regimen sliding scale   SubCutaneous at bedtime  latanoprost 0.005% Ophthalmic Solution 1 Drop(s) Both EYES at bedtime  levothyroxine 100 MICROGram(s) Oral daily  melatonin 3 milliGRAM(s) Oral once  simvastatin 20 milliGRAM(s) Oral at bedtime  sodium chloride 0.9%. 1000 milliLiter(s) (75 mL/Hr) IV Continuous <Continuous>  spironolactone 25 milliGRAM(s) Oral <User Schedule>    MEDICATIONS  (PRN):  polyethylene glycol 3350 17 Gram(s) Oral daily PRN Constipation Tamara Davis MD  Medicine Team 2  Pager [phone]      Patient is a 89y old  Female who presents with a chief complaint of sob (12 Mar 2018 11:51)          Subjective: Overnight, no acute events. This morning patient feels well. She is off NC and denies any SOB. No CP, fevers, abdominal pain, nausea vomiting, or diarrhea. Last BM was yesterday. Says her mood is sadn        VITAL SIGNS:  Vital Signs Last 24 Hrs  T(C): 37.2 (13 Mar 2018 05:22), Max: 37.2 (13 Mar 2018 05:22)  T(F): 98.9 (13 Mar 2018 05:22), Max: 98.9 (13 Mar 2018 05:22)  HR: 72 (13 Mar 2018 05:22) (65 - 73)  BP: 138/63 (13 Mar 2018 05:22) (112/62 - 138/71)  BP(mean): --  RR: 18 (13 Mar 2018 05:22) (18 - 18)  SpO2: 97% (13 Mar 2018 05:22) (97% - 100%)      PHYSICAL EXAM:     GENERAL: no acute distress  HEENT: PERRLA, EOMI, moist oropharynx   RESPIRATORY: CTAB, no w/c   CARDIOVASCULAR: RRR, no murmurs, gallops, rubs  ABDOMINAL: soft, non-tender, non-distended, positive bowel sounds   EXTREMITIES: no clubbing, cyanosis, or edema  NEUROLOGICAL: alert and oriented x 3, non-focal  SKIN: no rashes or lesions   MUSCULOSKELETAL: no gross joint deformity                          10.0   6.84  )-----------( 202      ( 12 Mar 2018 06:30 )             31.2     03-12    141  |  102  |  27<H>  ----------------------------<  109<H>  4.4   |  29  |  1.68<H>    Ca    8.7      12 Mar 2018 06:30    TPro  7.5  /  Alb  3.7  /  TBili  1.2  /  DBili  x   /  AST  18  /  ALT  31  /  AlkPhos  91  03-11      CAPILLARY BLOOD GLUCOSE      POCT Blood Glucose.: 189 mg/dL (12 Mar 2018 21:45)  POCT Blood Glucose.: 172 mg/dL (12 Mar 2018 16:51)  POCT Blood Glucose.: 185 mg/dL (12 Mar 2018 12:12)  POCT Blood Glucose.: 147 mg/dL (12 Mar 2018 08:32)      MEDICATIONS  (STANDING):  allopurinol 100 milliGRAM(s) Oral daily  amLODIPine   Tablet 10 milliGRAM(s) Oral daily  aspirin enteric coated 81 milliGRAM(s) Oral daily  azithromycin  IVPB 500 milliGRAM(s) IV Intermittent every 24 hours  brimonidine 0.2% Ophthalmic Solution 1 Drop(s) Right EYE two times a day  carvedilol 25 milliGRAM(s) Oral every 12 hours  cefTRIAXone   IVPB 1 Gram(s) IV Intermittent every 24 hours  cilostazol 100 milliGRAM(s) Oral two times a day  dextrose 50% Injectable 25 Gram(s) IV Push once  dorzolamide 2%/timolol 0.5% Ophthalmic Solution 1 Drop(s) Right EYE two times a day  insulin lispro (HumaLOG) corrective regimen sliding scale   SubCutaneous three times a day before meals  insulin lispro (HumaLOG) corrective regimen sliding scale   SubCutaneous at bedtime  latanoprost 0.005% Ophthalmic Solution 1 Drop(s) Both EYES at bedtime  levothyroxine 100 MICROGram(s) Oral daily  melatonin 3 milliGRAM(s) Oral once  simvastatin 20 milliGRAM(s) Oral at bedtime  sodium chloride 0.9%. 1000 milliLiter(s) (75 mL/Hr) IV Continuous <Continuous>  spironolactone 25 milliGRAM(s) Oral <User Schedule>    MEDICATIONS  (PRN):  polyethylene glycol 3350 17 Gram(s) Oral daily PRN Constipation aTmara Davis MD  Medicine Team 2  Pager [phone]      Patient is a 89y old  Female who presents with a chief complaint of sob (12 Mar 2018 11:51)          Subjective: Overnight, no acute events. This morning patient feels well. She is off NC and denies any SOB. No CP, fevers, abdominal pain, nausea vomiting, or diarrhea. Last BM was yesterday. Says her mood is sad and she feels overwhelmed about her health and feels like she is approaching old age. She says she prays to help her feel better.         VITAL SIGNS:  Vital Signs Last 24 Hrs  T(C): 37.2 (13 Mar 2018 05:22), Max: 37.2 (13 Mar 2018 05:22)  T(F): 98.9 (13 Mar 2018 05:22), Max: 98.9 (13 Mar 2018 05:22)  HR: 72 (13 Mar 2018 05:22) (65 - 73)  BP: 138/63 (13 Mar 2018 05:22) (112/62 - 138/71)  BP(mean): --  RR: 18 (13 Mar 2018 05:22) (18 - 18)  SpO2: 97% (13 Mar 2018 05:22) (97% - 100%)      PHYSICAL EXAM:     GENERAL: no acute distress, off NC  HEENT: PERRLA, EOMI, moist oropharynx   RESPIRATORY: coarse breath sounds in lungs bilaterally   CARDIOVASCULAR: irregularly irregular, no murmurs appreciated   ABDOMINAL: +BS, soft, reducible umbilical hernia appreciated , scar tissue present in the RLQ, non tender to palpation   EXTREMITIES: 1+ edema in extremities bilaterally   NEUROLOGICAL: alert and oriented x 3, no focal deficits   SKIN: no rashes or lesions   MUSCULOSKELETAL: no gross joint deformity                          10.0   6.84  )-----------( 202      ( 12 Mar 2018 06:30 )             31.2     03-12    141  |  102  |  27<H>  ----------------------------<  109<H>  4.4   |  29  |  1.68<H>    Ca    8.7      12 Mar 2018 06:30    TPro  7.5  /  Alb  3.7  /  TBili  1.2  /  DBili  x   /  AST  18  /  ALT  31  /  AlkPhos  91  03-11      CAPILLARY BLOOD GLUCOSE      POCT Blood Glucose.: 189 mg/dL (12 Mar 2018 21:45)  POCT Blood Glucose.: 172 mg/dL (12 Mar 2018 16:51)  POCT Blood Glucose.: 185 mg/dL (12 Mar 2018 12:12)  POCT Blood Glucose.: 147 mg/dL (12 Mar 2018 08:32)      MEDICATIONS  (STANDING):  allopurinol 100 milliGRAM(s) Oral daily  amLODIPine   Tablet 10 milliGRAM(s) Oral daily  aspirin enteric coated 81 milliGRAM(s) Oral daily  azithromycin  IVPB 500 milliGRAM(s) IV Intermittent every 24 hours  brimonidine 0.2% Ophthalmic Solution 1 Drop(s) Right EYE two times a day  carvedilol 25 milliGRAM(s) Oral every 12 hours  cefTRIAXone   IVPB 1 Gram(s) IV Intermittent every 24 hours  cilostazol 100 milliGRAM(s) Oral two times a day  dextrose 50% Injectable 25 Gram(s) IV Push once  dorzolamide 2%/timolol 0.5% Ophthalmic Solution 1 Drop(s) Right EYE two times a day  insulin lispro (HumaLOG) corrective regimen sliding scale   SubCutaneous three times a day before meals  insulin lispro (HumaLOG) corrective regimen sliding scale   SubCutaneous at bedtime  latanoprost 0.005% Ophthalmic Solution 1 Drop(s) Both EYES at bedtime  levothyroxine 100 MICROGram(s) Oral daily  melatonin 3 milliGRAM(s) Oral once  simvastatin 20 milliGRAM(s) Oral at bedtime  sodium chloride 0.9%. 1000 milliLiter(s) (75 mL/Hr) IV Continuous <Continuous>  spironolactone 25 milliGRAM(s) Oral <User Schedule>    MEDICATIONS  (PRN):  polyethylene glycol 3350 17 Gram(s) Oral daily PRN Constipation

## 2018-03-13 NOTE — PROGRESS NOTE ADULT - PROBLEM SELECTOR PLAN 6
- Hgb A1c 8.4  - ISS while in the hospital with FS before meals and at bedtime and will dose basal bolus once have 24 hour information

## 2018-03-13 NOTE — PROGRESS NOTE ADULT - PROBLEM SELECTOR PLAN 4
- continue home BP meds except valsartan in the setting of NIKHIL  - per patient, tends to have elevated pressures in the 150s-160s - titrate to maintain BP goals < 150s

## 2018-03-13 NOTE — PROGRESS NOTE ADULT - PROBLEM SELECTOR PLAN 10
- on coumadin for afib  - DASH/TLC diet  - Dispo pending improvement - on coumadin for afib  - DASH/TLC diet  - Dispo pending improvement --> PT has recommended rehab facility but will continue to monitor and adjust recommendation

## 2018-03-13 NOTE — PROGRESS NOTE ADULT - PROBLEM SELECTOR PLAN 2
- Cr uptrended to 1.83 from 1.22 yesterday, likely secondary to recent contrast load  - spoke with outpatient cardiologist: no history of CHF, EF of 61%, will give 75cc/hr normal saline for 12 hours and check BMP tomorrow morning  - private nephrologist recs appreciated, will hold valsartan in the setting of NIKHIL and restart once Cr has downtrended - Cr 2.04, likely secondary to recent contrast load  - continue IV fluids at 75cc for 12 hours today and check BMP tomorrow   - private nephrologist recs appreciated, will hold valsartan in the setting of NIKHIL and restart once Cr has downtrended

## 2018-03-13 NOTE — PROGRESS NOTE ADULT - PROBLEM SELECTOR PLAN 1
- patient with CT chest showing multifocal pneumonia, likely CAP  - continue ceftriaxone and azithromycin (day 3), will d/c azithromycin if urine legionella is negative, still pending   - continue supportive care measures with NC, wean off NC PRN - patient with CT chest showing multifocal pneumonia, likely CAP  - continue ceftriaxone and azithromycin (day 3), will d/c azithromycin if urine legionella is negative, still pending   - treat for 5 days for CAP, will transition to oral abx for discharge   - continue supportive care measures with NC, wean off NC PRN

## 2018-03-13 NOTE — PROGRESS NOTE ADULT - PROBLEM SELECTOR PLAN 1
creat trending up.  likely related to sepsis from multifocal pneumonia  no obstruction on ct scan abd, sono abd  ? BESS  urine output slightly dec as per PT  off iv fluids  K ok, on aldactone, monitor K closely. bmp daily  supportive care   avoid iv ns, nephrotoxics eg nsaids

## 2018-03-13 NOTE — PROGRESS NOTE ADULT - PROBLEM SELECTOR PLAN 5
- appears euvolemic on exam  - per outpatient cardiologist, patient had a diagnosis of CHF per PMD in 2014 but on his evaluation, doesn't appear to have CHF and most recent echo has an EF of 61%  - will continue home medications including coreg and spironolactone

## 2018-03-13 NOTE — PROGRESS NOTE ADULT - SUBJECTIVE AND OBJECTIVE BOX
Pt seen and examined at bedside  feels better  dyspnea better  cough better  no chest pain.no fever,chills  passing urine, though 'not as much as before '  no vomiting,diarrea      Allergies:  No Known Allergies    Hospital Medications:   MEDICATIONS  (STANDING):  allopurinol 100 milliGRAM(s) Oral daily  amLODIPine   Tablet 10 milliGRAM(s) Oral daily  aspirin enteric coated 81 milliGRAM(s) Oral daily  azithromycin  IVPB 500 milliGRAM(s) IV Intermittent every 24 hours  brimonidine 0.2% Ophthalmic Solution 1 Drop(s) Right EYE two times a day  carvedilol 25 milliGRAM(s) Oral every 12 hours  cefTRIAXone   IVPB 1 Gram(s) IV Intermittent every 24 hours  cilostazol 100 milliGRAM(s) Oral two times a day  dextrose 50% Injectable 25 Gram(s) IV Push once  dorzolamide 2%/timolol 0.5% Ophthalmic Solution 1 Drop(s) Right EYE two times a day  insulin lispro (HumaLOG) corrective regimen sliding scale   SubCutaneous three times a day before meals  insulin lispro (HumaLOG) corrective regimen sliding scale   SubCutaneous at bedtime  latanoprost 0.005% Ophthalmic Solution 1 Drop(s) Both EYES at bedtime  levothyroxine 100 MICROGram(s) Oral daily  melatonin 3 milliGRAM(s) Oral once  simvastatin 20 milliGRAM(s) Oral at bedtime  sodium chloride 0.9%. 1000 milliLiter(s) (75 mL/Hr) IV Continuous <Continuous>  spironolactone 25 milliGRAM(s) Oral <User Schedule>         VITALS:  T(F): 98.9 (18 @ 05:22), Max: 98.9 (18 @ 05:22)  HR: 72 (18 @ 05:22)  BP: 138/63 (18 @ 05:22)  RR: 18 (18 @ 05:22)  SpO2: 97% (18 @ 05:22)  Wt(kg): --      PHYSICAL EXAM:  Constitutional: NAD, sitting comfortably in chair. no tachypnea  HEENT: anicteric sclera, oropharynx clear, MMM  Neck: No JVD  Respiratory: crepts  all over. good air entry  Cardiovascular: S1, S2, irreg  Gastrointestinal: BS+, soft, NT/ND  Extremities: No cyanosis or clubbing. 1+ leg edema  Neurological: A/O x 3, no focal deficits  Psychiatric: Normal mood, normal affect  : No CVA tenderness. No michaud.   Skin: No rashes       LABS:      139  |  101  |  39<H>  ----------------------------<  150<H>  4.1   |  24  |  2.04<H>    Ca    8.4      13 Mar 2018 07:35  Phos  3.4       Mg     2.0         TPro  7.5  /  Alb  3.7  /  TBili  1.2  /  DBili      /  AST  18  /  ALT  31  /  AlkPhos  91      Creatinine Trend: 2.04 <--, 1.68 <--, 1.27 <--                        9.6    6.95  )-----------( 199      ( 13 Mar 2018 07:35 )             30.2     Urine Studies:  Urinalysis Basic - ( 11 Mar 2018 11:20 )    Color: PALE YELLOW / Appearance: CLEAR / S.012 / pH: 7.0  Gluc: 100 / Ketone: TRACE  / Bili: NEGATIVE / Urobili: NORMAL mg/dL   Blood: SMALL / Protein: 300 mg/dL / Nitrite: NEGATIVE   Leuk Esterase: NEGATIVE / RBC: 5-10 / WBC 0-2   Sq Epi: OCC / Non Sq Epi:  / Bacteria:         RADIOLOGY & ADDITIONAL STUDIES:

## 2018-03-14 LAB
BUN SERPL-MCNC: 42 MG/DL — HIGH (ref 7–23)
CALCIUM SERPL-MCNC: 8.5 MG/DL — SIGNIFICANT CHANGE UP (ref 8.4–10.5)
CHLORIDE SERPL-SCNC: 101 MMOL/L — SIGNIFICANT CHANGE UP (ref 98–107)
CO2 SERPL-SCNC: 24 MMOL/L — SIGNIFICANT CHANGE UP (ref 22–31)
CREAT SERPL-MCNC: 2.28 MG/DL — HIGH (ref 0.5–1.3)
GLUCOSE BLDC GLUCOMTR-MCNC: 153 MG/DL — HIGH (ref 70–99)
GLUCOSE BLDC GLUCOMTR-MCNC: 162 MG/DL — HIGH (ref 70–99)
GLUCOSE BLDC GLUCOMTR-MCNC: 175 MG/DL — HIGH (ref 70–99)
GLUCOSE BLDC GLUCOMTR-MCNC: 267 MG/DL — HIGH (ref 70–99)
GLUCOSE SERPL-MCNC: 166 MG/DL — HIGH (ref 70–99)
INR BLD: 2.16 — HIGH (ref 0.88–1.17)
POTASSIUM SERPL-MCNC: 4.5 MMOL/L — SIGNIFICANT CHANGE UP (ref 3.5–5.3)
POTASSIUM SERPL-SCNC: 4.5 MMOL/L — SIGNIFICANT CHANGE UP (ref 3.5–5.3)
PROTHROM AB SERPL-ACNC: 24.3 SEC — HIGH (ref 9.8–13.1)
SODIUM SERPL-SCNC: 138 MMOL/L — SIGNIFICANT CHANGE UP (ref 135–145)

## 2018-03-14 PROCEDURE — 99233 SBSQ HOSP IP/OBS HIGH 50: CPT | Mod: GC

## 2018-03-14 RX ORDER — SODIUM CHLORIDE 9 MG/ML
1000 INJECTION INTRAMUSCULAR; INTRAVENOUS; SUBCUTANEOUS
Qty: 0 | Refills: 0 | Status: DISCONTINUED | OUTPATIENT
Start: 2018-03-14 | End: 2018-03-15

## 2018-03-14 RX ORDER — LANOLIN ALCOHOL/MO/W.PET/CERES
3 CREAM (GRAM) TOPICAL ONCE
Qty: 0 | Refills: 0 | Status: COMPLETED | OUTPATIENT
Start: 2018-03-14 | End: 2018-03-14

## 2018-03-14 RX ORDER — WARFARIN SODIUM 2.5 MG/1
3 TABLET ORAL ONCE
Qty: 0 | Refills: 0 | Status: COMPLETED | OUTPATIENT
Start: 2018-03-14 | End: 2018-03-14

## 2018-03-14 RX ORDER — INSULIN DETEMIR 100/ML (3)
16 INSULIN PEN (ML) SUBCUTANEOUS AT BEDTIME
Qty: 0 | Refills: 0 | Status: DISCONTINUED | OUTPATIENT
Start: 2018-03-14 | End: 2018-03-17

## 2018-03-14 RX ADMIN — Medication 100 MICROGRAM(S): at 06:20

## 2018-03-14 RX ADMIN — BRIMONIDINE TARTRATE 1 DROP(S): 2 SOLUTION/ DROPS OPHTHALMIC at 21:13

## 2018-03-14 RX ADMIN — DORZOLAMIDE HYDROCHLORIDE TIMOLOL MALEATE 1 DROP(S): 20; 5 SOLUTION/ DROPS OPHTHALMIC at 21:13

## 2018-03-14 RX ADMIN — CARVEDILOL PHOSPHATE 25 MILLIGRAM(S): 80 CAPSULE, EXTENDED RELEASE ORAL at 17:34

## 2018-03-14 RX ADMIN — Medication 1: at 08:55

## 2018-03-14 RX ADMIN — Medication 16 UNIT(S): at 21:27

## 2018-03-14 RX ADMIN — Medication 1: at 17:34

## 2018-03-14 RX ADMIN — SIMVASTATIN 20 MILLIGRAM(S): 20 TABLET, FILM COATED ORAL at 21:13

## 2018-03-14 RX ADMIN — CARVEDILOL PHOSPHATE 25 MILLIGRAM(S): 80 CAPSULE, EXTENDED RELEASE ORAL at 06:20

## 2018-03-14 RX ADMIN — Medication 100 MILLIGRAM(S): at 12:58

## 2018-03-14 RX ADMIN — CILOSTAZOL 100 MILLIGRAM(S): 100 TABLET ORAL at 21:13

## 2018-03-14 RX ADMIN — LATANOPROST 1 DROP(S): 0.05 SOLUTION/ DROPS OPHTHALMIC; TOPICAL at 21:14

## 2018-03-14 RX ADMIN — SODIUM CHLORIDE 75 MILLILITER(S): 9 INJECTION INTRAMUSCULAR; INTRAVENOUS; SUBCUTANEOUS at 12:57

## 2018-03-14 RX ADMIN — Medication 3: at 12:57

## 2018-03-14 RX ADMIN — CEFTRIAXONE 100 GRAM(S): 500 INJECTION, POWDER, FOR SOLUTION INTRAMUSCULAR; INTRAVENOUS at 21:21

## 2018-03-14 RX ADMIN — AMLODIPINE BESYLATE 10 MILLIGRAM(S): 2.5 TABLET ORAL at 06:20

## 2018-03-14 RX ADMIN — Medication 81 MILLIGRAM(S): at 12:58

## 2018-03-14 RX ADMIN — SPIRONOLACTONE 25 MILLIGRAM(S): 25 TABLET, FILM COATED ORAL at 09:00

## 2018-03-14 RX ADMIN — DORZOLAMIDE HYDROCHLORIDE TIMOLOL MALEATE 1 DROP(S): 20; 5 SOLUTION/ DROPS OPHTHALMIC at 08:56

## 2018-03-14 RX ADMIN — Medication 3 MILLIGRAM(S): at 21:46

## 2018-03-14 RX ADMIN — BRIMONIDINE TARTRATE 1 DROP(S): 2 SOLUTION/ DROPS OPHTHALMIC at 08:57

## 2018-03-14 RX ADMIN — SODIUM CHLORIDE 75 MILLILITER(S): 9 INJECTION INTRAMUSCULAR; INTRAVENOUS; SUBCUTANEOUS at 21:21

## 2018-03-14 RX ADMIN — WARFARIN SODIUM 3 MILLIGRAM(S): 2.5 TABLET ORAL at 17:34

## 2018-03-14 RX ADMIN — AZITHROMYCIN 250 MILLIGRAM(S): 500 TABLET, FILM COATED ORAL at 21:21

## 2018-03-14 RX ADMIN — CILOSTAZOL 100 MILLIGRAM(S): 100 TABLET ORAL at 08:56

## 2018-03-14 NOTE — PROGRESS NOTE ADULT - PROBLEM SELECTOR PLAN 1
- patient with CT chest showing multifocal pneumonia, likely CAP  - continue ceftriaxone and azithromycin (day 4), will d/c azithromycin if urine legionella is negative, still pending   - treat for 5 days for CAP, will transition to oral abx for discharge   - no longer requiring NC - patient with CT chest showing multifocal pneumonia, likely CAP  - continue ceftriaxone and azithromycin (day 4)  - treat for 5 days for CAP, will transition to oral abx for discharge   - no longer requiring NC

## 2018-03-14 NOTE — PROGRESS NOTE ADULT - SUBJECTIVE AND OBJECTIVE BOX
Tamara Davis MD  Medicine Team 2  Pager [phone]      Patient is a 89y old  Female who presents with a chief complaint of sob (12 Mar 2018 11:51)       Subjective: Patient seen and examined. No events overnight. This AM feels her SOB is improved. Denies fevers, chills, CP, abdominal pain, nausea, or vomiting.         VITAL SIGNS:  Vital Signs Last 24 Hrs  T(C): 36.7 (14 Mar 2018 06:17), Max: 36.7 (14 Mar 2018 06:17)  T(F): 98 (14 Mar 2018 06:17), Max: 98 (14 Mar 2018 06:17)  HR: 70 (14 Mar 2018 06:17) (69 - 71)  BP: 149/81 (14 Mar 2018 06:17) (136/70 - 149/81)  BP(mean): --  RR: 18 (14 Mar 2018 06:17) (18 - 18)  SpO2: 95% (14 Mar 2018 06:17) (95% - 100%)      PHYSICAL EXAM:     GENERAL: no acute distress  HEENT: MMM  RESPIRATORY: coarse breath sounds but improved from admission  CARDIOVASCULAR: RRR, no murmurs  ABDOMINAL: +BS, distended but soft, reducible umbilical hernia, RLQ area of fibrosis, non tender to palpation   EXTREMITIES: 1+ edema in the extremities bilaterally   NEUROLOGICAL: alert and oriented x 3, no focal neurological deficits   SKIN: no rashes or lesions   MUSCULOSKELETAL: no gross joint deformity                          9.6    6.95  )-----------( 199      ( 13 Mar 2018 07:35 )             30.2     03-14    138  |  101  |  42<H>  ----------------------------<  166<H>  4.5   |  24  |  2.28<H>    Ca    8.5      14 Mar 2018 07:00  Phos  3.4     03-13  Mg     2.0     03-13        CAPILLARY BLOOD GLUCOSE      POCT Blood Glucose.: 175 mg/dL (14 Mar 2018 08:39)  POCT Blood Glucose.: 229 mg/dL (13 Mar 2018 21:07)  POCT Blood Glucose.: 223 mg/dL (13 Mar 2018 17:02)  POCT Blood Glucose.: 192 mg/dL (13 Mar 2018 12:04)      MEDICATIONS  (STANDING):  allopurinol 100 milliGRAM(s) Oral daily  amLODIPine   Tablet 10 milliGRAM(s) Oral daily  aspirin enteric coated 81 milliGRAM(s) Oral daily  azithromycin  IVPB 500 milliGRAM(s) IV Intermittent every 24 hours  brimonidine 0.2% Ophthalmic Solution 1 Drop(s) Right EYE two times a day  carvedilol 25 milliGRAM(s) Oral every 12 hours  cefTRIAXone   IVPB 1 Gram(s) IV Intermittent every 24 hours  cilostazol 100 milliGRAM(s) Oral two times a day  dextrose 50% Injectable 25 Gram(s) IV Push once  dorzolamide 2%/timolol 0.5% Ophthalmic Solution 1 Drop(s) Right EYE two times a day  insulin lispro (HumaLOG) corrective regimen sliding scale   SubCutaneous three times a day before meals  insulin lispro (HumaLOG) corrective regimen sliding scale   SubCutaneous at bedtime  latanoprost 0.005% Ophthalmic Solution 1 Drop(s) Both EYES at bedtime  levothyroxine 100 MICROGram(s) Oral daily  simvastatin 20 milliGRAM(s) Oral at bedtime  sodium chloride 0.9%. 1000 milliLiter(s) (75 mL/Hr) IV Continuous <Continuous>  spironolactone 25 milliGRAM(s) Oral <User Schedule>    MEDICATIONS  (PRN):  polyethylene glycol 3350 17 Gram(s) Oral daily PRN Constipation Tamara Davis MD  Medicine Team 2  Pager [phone]      Patient is a 89y old  Female who presents with a chief complaint of sob (12 Mar 2018 11:51)       Subjective: Patient seen and examined. No events overnight. This AM feels her SOB is improved. Denies fevers, chills, CP, abdominal pain, nausea, or vomiting.         VITAL SIGNS:  Vital Signs Last 24 Hrs  T(C): 36.7 (14 Mar 2018 06:17), Max: 36.7 (14 Mar 2018 06:17)  T(F): 98 (14 Mar 2018 06:17), Max: 98 (14 Mar 2018 06:17)  HR: 70 (14 Mar 2018 06:17) (69 - 71)  BP: 149/81 (14 Mar 2018 06:17) (136/70 - 149/81)  BP(mean): --  RR: 18 (14 Mar 2018 06:17) (18 - 18)  SpO2: 95% (14 Mar 2018 06:17) (95% - 100%)      PHYSICAL EXAM:     GENERAL: no acute distress  HEENT: MMM  RESPIRATORY: coarse breath sounds but improved from admission  CARDIOVASCULAR: irregularly irregular, no murmurs appreciated   ABDOMINAL: +BS, distended but soft, reducible umbilical hernia, RLQ area of fibrosis, non tender to palpation   EXTREMITIES: 1+ edema in the extremities bilaterally   NEUROLOGICAL: alert and oriented x 3, no focal neurological deficits   SKIN: no rashes or lesions   MUSCULOSKELETAL: no gross joint deformity                          9.6    6.95  )-----------( 199      ( 13 Mar 2018 07:35 )             30.2     03-14    138  |  101  |  42<H>  ----------------------------<  166<H>  4.5   |  24  |  2.28<H>    Ca    8.5      14 Mar 2018 07:00  Phos  3.4     03-13  Mg     2.0     03-13        CAPILLARY BLOOD GLUCOSE      POCT Blood Glucose.: 175 mg/dL (14 Mar 2018 08:39)  POCT Blood Glucose.: 229 mg/dL (13 Mar 2018 21:07)  POCT Blood Glucose.: 223 mg/dL (13 Mar 2018 17:02)  POCT Blood Glucose.: 192 mg/dL (13 Mar 2018 12:04)      MEDICATIONS  (STANDING):  allopurinol 100 milliGRAM(s) Oral daily  amLODIPine   Tablet 10 milliGRAM(s) Oral daily  aspirin enteric coated 81 milliGRAM(s) Oral daily  azithromycin  IVPB 500 milliGRAM(s) IV Intermittent every 24 hours  brimonidine 0.2% Ophthalmic Solution 1 Drop(s) Right EYE two times a day  carvedilol 25 milliGRAM(s) Oral every 12 hours  cefTRIAXone   IVPB 1 Gram(s) IV Intermittent every 24 hours  cilostazol 100 milliGRAM(s) Oral two times a day  dextrose 50% Injectable 25 Gram(s) IV Push once  dorzolamide 2%/timolol 0.5% Ophthalmic Solution 1 Drop(s) Right EYE two times a day  insulin lispro (HumaLOG) corrective regimen sliding scale   SubCutaneous three times a day before meals  insulin lispro (HumaLOG) corrective regimen sliding scale   SubCutaneous at bedtime  latanoprost 0.005% Ophthalmic Solution 1 Drop(s) Both EYES at bedtime  levothyroxine 100 MICROGram(s) Oral daily  simvastatin 20 milliGRAM(s) Oral at bedtime  sodium chloride 0.9%. 1000 milliLiter(s) (75 mL/Hr) IV Continuous <Continuous>  spironolactone 25 milliGRAM(s) Oral <User Schedule>    MEDICATIONS  (PRN):  polyethylene glycol 3350 17 Gram(s) Oral daily PRN Constipation

## 2018-03-14 NOTE — PROGRESS NOTE ADULT - PROBLEM SELECTOR PLAN 2
- Cr continues to uptrend, likely secondary to recent contrast load  - private nephrologist recs appreciated, will hold valsartan in the setting of NIKHIL and restart once Cr has downtrended  - expect BESS to start to downtrend within 3-7 days, will continue to monitor - Cr continues to uptrend, likely secondary to recent contrast load, continue IV fluids at 75cc/hr  - private nephrologist recs appreciated, will hold valsartan in the setting of NIKHIL and restart once Cr has downtrended  - expect BESS to start to downtrend within 3-7 days, will continue to monitor

## 2018-03-14 NOTE — PROGRESS NOTE ADULT - PROBLEM SELECTOR PLAN 10
- on coumadin for afib  - DASH/TLC diet  - Dispo pending improvement --> PT has recommended rehab facility but will continue to monitor and adjust recommendation - on coumadin for afib  - DASH/TLC diet  - Dispo pending improvement in Cr, home with home PT

## 2018-03-14 NOTE — PROGRESS NOTE ADULT - PROBLEM SELECTOR PLAN 1
creat trending up. likely related to sepsis from multifocal pneumonia  no obstruction on ct scan abd, sono abd  ? BESS  agree w/gentle IVhydration  hold aldactone for now  bmp daily  supportive care   avoid ACEi/ARB for now, nephrotoxics eg nsaids

## 2018-03-14 NOTE — PROGRESS NOTE ADULT - PROBLEM SELECTOR PLAN 6
- Hgb A1c 8.4  - ISS while in the hospital with FS before meals and at bedtime and will dose basal bolus once have 24 hour information - Hgb A1c 8.4  - levemir 16U at bedtime (home dose) and ISS with meals  - FS with meals and before bedtime

## 2018-03-14 NOTE — PROGRESS NOTE ADULT - SUBJECTIVE AND OBJECTIVE BOX
Pt seen and examined at bedside    feels better today  appetite improving  dyspnea better  cough better  no chest pain.no fever,chills  no vomiting,diarrea  started on IVF/NS today    Allergies:  No Known Allergies    Hospital Medications:   MEDICATIONS  (STANDING):  allopurinol 100 milliGRAM(s) Oral daily  amLODIPine   Tablet 10 milliGRAM(s) Oral daily  aspirin enteric coated 81 milliGRAM(s) Oral daily  azithromycin  IVPB 500 milliGRAM(s) IV Intermittent every 24 hours  brimonidine 0.2% Ophthalmic Solution 1 Drop(s) Right EYE two times a day  carvedilol 25 milliGRAM(s) Oral every 12 hours  cefTRIAXone   IVPB 1 Gram(s) IV Intermittent every 24 hours  cilostazol 100 milliGRAM(s) Oral two times a day  dextrose 50% Injectable 25 Gram(s) IV Push once  dorzolamide 2%/timolol 0.5% Ophthalmic Solution 1 Drop(s) Right EYE two times a day  insulin lispro (HumaLOG) corrective regimen sliding scale   SubCutaneous three times a day before meals  insulin lispro (HumaLOG) corrective regimen sliding scale   SubCutaneous at bedtime  latanoprost 0.005% Ophthalmic Solution 1 Drop(s) Both EYES at bedtime  levothyroxine 100 MICROGram(s) Oral daily  melatonin 3 milliGRAM(s) Oral once  simvastatin 20 milliGRAM(s) Oral at bedtime  sodium chloride 0.9%. 1000 milliLiter(s) (75 mL/Hr) IV Continuous <Continuous>  spironolactone 25 milliGRAM(s) Oral <User Schedule>       VITALS:  Vital Signs Last 24 Hrs  T(C): 36.5 (14 Mar 2018 13:29), Max: 36.7 (14 Mar 2018 06:17)  T(F): 97.7 (14 Mar 2018 13:29), Max: 98 (14 Mar 2018 06:17)  HR: 70 (14 Mar 2018 17:33) (69 - 70)  BP: 146/81 (14 Mar 2018 17:33) (138/55 - 149/81)  BP(mean): --  RR: 18 (14 Mar 2018 13:29) (18 - 18)  SpO2: 98% (14 Mar 2018 13:29) (95% - 100%)      PHYSICAL EXAM:  Constitutional: NAD, sitting comfortably in chair. no tachypnea  HEENT: anicteric sclera, oropharynx clear, MMM  Neck: No JVD  Respiratory: coarse BS  Cardiovascular: S1, S2, irreg  Gastrointestinal: BS+, soft, NT/ND  Extremities: No cyanosis or clubbing. 1+ leg edema  Neurological: A/O x 3, no focal deficits  Psychiatric: Normal mood, normal affect  : No CVA tenderness. No michaud.   Skin: No rashes       LABS:      138  |  101  |  42<H>  ----------------------------<  166<H>  4.5   |  24  |  2.28<H>    Ca    8.5      14 Mar 2018 07:00  Phos  3.4     03-13  Mg     2.0     03-13    Creatinine Trend: 2.28<-2.04 <--, 1.68 <--, 1.27 <--                                           9.6    6.95  )-----------( 199      ( 13 Mar 2018 07:35 )             30.2       Urine Studies:  Urinalysis Basic - ( 11 Mar 2018 11:20 )    Color: PALE YELLOW / Appearance: CLEAR / S.012 / pH: 7.0  Gluc: 100 / Ketone: TRACE  / Bili: NEGATIVE / Urobili: NORMAL mg/dL   Blood: SMALL / Protein: 300 mg/dL / Nitrite: NEGATIVE   Leuk Esterase: NEGATIVE / RBC: 5-10 / WBC 0-2   Sq Epi: OCC / Non Sq Epi:  / Bacteria:         RADIOLOGY & ADDITIONAL STUDIES:

## 2018-03-15 LAB
BUN SERPL-MCNC: 46 MG/DL — HIGH (ref 7–23)
CALCIUM SERPL-MCNC: 7.7 MG/DL — LOW (ref 8.4–10.5)
CHLORIDE SERPL-SCNC: 108 MMOL/L — HIGH (ref 98–107)
CO2 SERPL-SCNC: 23 MMOL/L — SIGNIFICANT CHANGE UP (ref 22–31)
CREAT SERPL-MCNC: 2.19 MG/DL — HIGH (ref 0.5–1.3)
GLUCOSE BLDC GLUCOMTR-MCNC: 121 MG/DL — HIGH (ref 70–99)
GLUCOSE BLDC GLUCOMTR-MCNC: 142 MG/DL — HIGH (ref 70–99)
GLUCOSE BLDC GLUCOMTR-MCNC: 183 MG/DL — HIGH (ref 70–99)
GLUCOSE BLDC GLUCOMTR-MCNC: 227 MG/DL — HIGH (ref 70–99)
GLUCOSE SERPL-MCNC: 118 MG/DL — HIGH (ref 70–99)
INR BLD: 2.7 — HIGH (ref 0.88–1.17)
MAGNESIUM SERPL-MCNC: 1.9 MG/DL — SIGNIFICANT CHANGE UP (ref 1.6–2.6)
PHOSPHATE SERPL-MCNC: 4.1 MG/DL — SIGNIFICANT CHANGE UP (ref 2.5–4.5)
POTASSIUM SERPL-MCNC: 3.9 MMOL/L — SIGNIFICANT CHANGE UP (ref 3.5–5.3)
POTASSIUM SERPL-SCNC: 3.9 MMOL/L — SIGNIFICANT CHANGE UP (ref 3.5–5.3)
PROTHROM AB SERPL-ACNC: 31.7 SEC — HIGH (ref 9.8–13.1)
SODIUM SERPL-SCNC: 142 MMOL/L — SIGNIFICANT CHANGE UP (ref 135–145)

## 2018-03-15 PROCEDURE — 99233 SBSQ HOSP IP/OBS HIGH 50: CPT | Mod: GC

## 2018-03-15 RX ORDER — WARFARIN SODIUM 2.5 MG/1
3 TABLET ORAL ONCE
Qty: 0 | Refills: 0 | Status: COMPLETED | OUTPATIENT
Start: 2018-03-15 | End: 2018-03-15

## 2018-03-15 RX ORDER — SODIUM CHLORIDE 9 MG/ML
1000 INJECTION INTRAMUSCULAR; INTRAVENOUS; SUBCUTANEOUS
Qty: 0 | Refills: 0 | Status: DISCONTINUED | OUTPATIENT
Start: 2018-03-15 | End: 2018-03-16

## 2018-03-15 RX ADMIN — WARFARIN SODIUM 3 MILLIGRAM(S): 2.5 TABLET ORAL at 17:18

## 2018-03-15 RX ADMIN — Medication 100 MILLIGRAM(S): at 13:46

## 2018-03-15 RX ADMIN — Medication 81 MILLIGRAM(S): at 13:45

## 2018-03-15 RX ADMIN — AZITHROMYCIN 250 MILLIGRAM(S): 500 TABLET, FILM COATED ORAL at 20:39

## 2018-03-15 RX ADMIN — BRIMONIDINE TARTRATE 1 DROP(S): 2 SOLUTION/ DROPS OPHTHALMIC at 20:38

## 2018-03-15 RX ADMIN — DORZOLAMIDE HYDROCHLORIDE TIMOLOL MALEATE 1 DROP(S): 20; 5 SOLUTION/ DROPS OPHTHALMIC at 08:46

## 2018-03-15 RX ADMIN — Medication 100 MICROGRAM(S): at 06:08

## 2018-03-15 RX ADMIN — BRIMONIDINE TARTRATE 1 DROP(S): 2 SOLUTION/ DROPS OPHTHALMIC at 08:46

## 2018-03-15 RX ADMIN — AMLODIPINE BESYLATE 10 MILLIGRAM(S): 2.5 TABLET ORAL at 06:08

## 2018-03-15 RX ADMIN — DORZOLAMIDE HYDROCHLORIDE TIMOLOL MALEATE 1 DROP(S): 20; 5 SOLUTION/ DROPS OPHTHALMIC at 20:38

## 2018-03-15 RX ADMIN — LATANOPROST 1 DROP(S): 0.05 SOLUTION/ DROPS OPHTHALMIC; TOPICAL at 21:19

## 2018-03-15 RX ADMIN — SIMVASTATIN 20 MILLIGRAM(S): 20 TABLET, FILM COATED ORAL at 21:19

## 2018-03-15 RX ADMIN — CILOSTAZOL 100 MILLIGRAM(S): 100 TABLET ORAL at 20:39

## 2018-03-15 RX ADMIN — CILOSTAZOL 100 MILLIGRAM(S): 100 TABLET ORAL at 08:46

## 2018-03-15 RX ADMIN — Medication 1: at 17:18

## 2018-03-15 RX ADMIN — Medication 16 UNIT(S): at 21:30

## 2018-03-15 RX ADMIN — CEFTRIAXONE 100 GRAM(S): 500 INJECTION, POWDER, FOR SOLUTION INTRAMUSCULAR; INTRAVENOUS at 20:37

## 2018-03-15 RX ADMIN — CARVEDILOL PHOSPHATE 25 MILLIGRAM(S): 80 CAPSULE, EXTENDED RELEASE ORAL at 17:18

## 2018-03-15 RX ADMIN — CARVEDILOL PHOSPHATE 25 MILLIGRAM(S): 80 CAPSULE, EXTENDED RELEASE ORAL at 06:08

## 2018-03-15 RX ADMIN — POLYETHYLENE GLYCOL 3350 17 GRAM(S): 17 POWDER, FOR SOLUTION ORAL at 13:45

## 2018-03-15 RX ADMIN — SODIUM CHLORIDE 75 MILLILITER(S): 9 INJECTION INTRAMUSCULAR; INTRAVENOUS; SUBCUTANEOUS at 13:48

## 2018-03-15 NOTE — PROGRESS NOTE ADULT - PROBLEM SELECTOR PLAN 5
- continues to appear euvolemic on exam  - per outpatient cardiologist, patient had a diagnosis of CHF per PMD in 2014 but on his evaluation, doesn't appear to have CHF and most recent echo has an EF of 61%  - will continue home medications including coreg and spironolactone

## 2018-03-15 NOTE — PROGRESS NOTE ADULT - SUBJECTIVE AND OBJECTIVE BOX
Tamara Davis MD  Medicine Team 2  Pager [phone]      Patient is a 89y old  Female who presents with a chief complaint of sob (12 Mar 2018 11:51)          Subjective: Overnight patient requested something to help her sleep and was given melatonin. This morning when seen, patient says melatonin helped her sleep. Her own  and the hospital  came to see her yesterday and she is very thankful. She denies fevers, chills, CP, SOB, nausea, vomiting, or abdominal pain. Walked with PT yesterday with a rolling walker without difficulty.         VITAL SIGNS:  Vital Signs Last 24 Hrs  T(C): 36.4 (15 Mar 2018 06:06), Max: 36.6 (14 Mar 2018 20:44)  T(F): 97.6 (15 Mar 2018 06:06), Max: 97.9 (14 Mar 2018 20:44)  HR: 67 (15 Mar 2018 06:06) (64 - 70)  BP: 123/58 (15 Mar 2018 06:06) (123/58 - 146/81)  BP(mean): --  RR: 18 (15 Mar 2018 06:06) (18 - 18)  SpO2: 100% (15 Mar 2018 06:06) (98% - 100%)      PHYSICAL EXAM:     GENERAL: no acute distress, resting comfortably   HEENT: PERRLA, EOMI, moist oropharynx   RESPIRATORY: improved air entry bilaterally with coarse breath sounds, slightly diminished at the bases bilaterally   CARDIOVASCULAR: irregularly irregular, no murmurs   ABDOMINAL: +BS, distended but soft, reducible 3 inch umbilical hernia, RLQ subcutaneous adhesion, non tender to palpation   EXTREMITIES: 1+ edema in LE bilaterally   NEUROLOGICAL: alert and oriented x 3, no focal deficits   SKIN: no rashes or lesions   MUSCULOSKELETAL: no gross joint deformity      03-15    142  |  108<H>  |  46<H>  ----------------------------<  118<H>  3.9   |  23  |  2.19<H>    Ca    7.7<L>      15 Mar 2018 06:11  Phos  4.1     03-15  Mg     1.9     03-15        CAPILLARY BLOOD GLUCOSE      POCT Blood Glucose.: 121 mg/dL (15 Mar 2018 08:21)  POCT Blood Glucose.: 153 mg/dL (14 Mar 2018 21:24)  POCT Blood Glucose.: 162 mg/dL (14 Mar 2018 17:09)  POCT Blood Glucose.: 267 mg/dL (14 Mar 2018 12:16)      MEDICATIONS  (STANDING):  allopurinol 100 milliGRAM(s) Oral daily  amLODIPine   Tablet 10 milliGRAM(s) Oral daily  aspirin enteric coated 81 milliGRAM(s) Oral daily  azithromycin  IVPB 500 milliGRAM(s) IV Intermittent every 24 hours  brimonidine 0.2% Ophthalmic Solution 1 Drop(s) Right EYE two times a day  carvedilol 25 milliGRAM(s) Oral every 12 hours  cefTRIAXone   IVPB 1 Gram(s) IV Intermittent every 24 hours  cilostazol 100 milliGRAM(s) Oral two times a day  dextrose 50% Injectable 25 Gram(s) IV Push once  dorzolamide 2%/timolol 0.5% Ophthalmic Solution 1 Drop(s) Right EYE two times a day  insulin detemir injectable (LEVEMIR) 16 Unit(s) SubCutaneous at bedtime  insulin lispro (HumaLOG) corrective regimen sliding scale   SubCutaneous three times a day before meals  insulin lispro (HumaLOG) corrective regimen sliding scale   SubCutaneous at bedtime  latanoprost 0.005% Ophthalmic Solution 1 Drop(s) Both EYES at bedtime  levothyroxine 100 MICROGram(s) Oral daily  simvastatin 20 milliGRAM(s) Oral at bedtime  sodium chloride 0.9%. 1000 milliLiter(s) (75 mL/Hr) IV Continuous <Continuous>    MEDICATIONS  (PRN):  polyethylene glycol 3350 17 Gram(s) Oral daily PRN Constipation

## 2018-03-15 NOTE — PROGRESS NOTE ADULT - PROBLEM SELECTOR PLAN 2
- Cr now downtrending (2.28 --> 2.19)  - likely secondary to BESS from CTA chest done on admission, continue IV fluids at 75cc per hour  - holding valsartan given NIKHIL, will likely hold and can resume upon follow up with nephrologist outpatient

## 2018-03-15 NOTE — PROGRESS NOTE ADULT - SUBJECTIVE AND OBJECTIVE BOX
Pt seen and examined at bedside  feels fairly well  dyspnea improving  diuresing  ok, says not urinating as much as previously  no fever,chills  no chest pain      Allergies:  No Known Allergies    Hospital Medications:   MEDICATIONS  (STANDING):  allopurinol 100 milliGRAM(s) Oral daily  amLODIPine   Tablet 10 milliGRAM(s) Oral daily  aspirin enteric coated 81 milliGRAM(s) Oral daily  azithromycin  IVPB 500 milliGRAM(s) IV Intermittent every 24 hours  brimonidine 0.2% Ophthalmic Solution 1 Drop(s) Right EYE two times a day  carvedilol 25 milliGRAM(s) Oral every 12 hours  cefTRIAXone   IVPB 1 Gram(s) IV Intermittent every 24 hours  cilostazol 100 milliGRAM(s) Oral two times a day  dextrose 50% Injectable 25 Gram(s) IV Push once  dorzolamide 2%/timolol 0.5% Ophthalmic Solution 1 Drop(s) Right EYE two times a day  insulin detemir injectable (LEVEMIR) 16 Unit(s) SubCutaneous at bedtime  insulin lispro (HumaLOG) corrective regimen sliding scale   SubCutaneous three times a day before meals  insulin lispro (HumaLOG) corrective regimen sliding scale   SubCutaneous at bedtime  latanoprost 0.005% Ophthalmic Solution 1 Drop(s) Both EYES at bedtime  levothyroxine 100 MICROGram(s) Oral daily  simvastatin 20 milliGRAM(s) Oral at bedtime  sodium chloride 0.9%. 1000 milliLiter(s) (75 mL/Hr) IV Continuous <Continuous>         VITALS:  T(F): 97.6 (03-15-18 @ 06:06), Max: 97.9 (18 @ 20:44)  HR: 67 (03-15-18 @ 06:06)  BP: 123/58 (03-15-18 @ 06:06)  RR: 18 (03-15-18 @ 06:06)  SpO2: 100% (03-15-18 @ 06:06)  Wt(kg): --      PHYSICAL EXAM:  Constitutional: NAD. sitting comfortably in chair  HEENT: anicteric sclera, oropharynx clear, MMM  Neck: No JVD  Respiratory:scattered  crepts, diffusely  Cardiovascular: S1, S2, irreg  Gastrointestinal: BS+, soft, NT/ND  Extremities: No cyanosis or clubbing. 2+ leg edema  Neurological: A/O x 3, no focal deficits  Psychiatric: Normal mood, normal affect  : No CVA tenderness. No michaud.   Skin: No rashes       LABS:  03-15    142  |  108<H>  |  46<H>  ----------------------------<  118<H>  3.9   |  23  |  2.19<H>    Ca    7.7<L>      15 Mar 2018 06:11  Phos  4.1     -15  Mg     1.9     03-15      Creatinine Trend: 2.19 <--, 2.28 <--, 2.04 <--, 1.68 <--, 1.27 <--    Urine Studies:  Urinalysis Basic - ( 11 Mar 2018 11:20 )    Color: PALE YELLOW / Appearance: CLEAR / S.012 / pH: 7.0  Gluc: 100 / Ketone: TRACE  / Bili: NEGATIVE / Urobili: NORMAL mg/dL   Blood: SMALL / Protein: 300 mg/dL / Nitrite: NEGATIVE   Leuk Esterase: NEGATIVE / RBC: 5-10 / WBC 0-2   Sq Epi: OCC / Non Sq Epi:  / Bacteria:         RADIOLOGY & ADDITIONAL STUDIES:

## 2018-03-15 NOTE — PROGRESS NOTE ADULT - PROBLEM SELECTOR PLAN 6
- Hgb A1c 8.4  - levemir 16U at bedtime (home dose) and ISS with meals  - FS with meals and before bedtime

## 2018-03-15 NOTE — PROGRESS NOTE ADULT - PROBLEM SELECTOR PLAN 1
Cr stable over past 3 days., very slightly dec from yesterday. presently on IV fluids,scattered crepts, leg edema . will d/c iv fluids. . off aldactone.  get f/u chest x-ray  no obstruction on ct scan abd, sono abd  ? BESS  agree w/gentle IVhydration  hold aldactone for now  bmp daily  supportive care   avoid ACEi/ARB for now, nephrotoxics eg nsaids

## 2018-03-15 NOTE — PROGRESS NOTE ADULT - PROBLEM SELECTOR PLAN 1
- patient with CT chest showing multifocal pneumonia, likely CAP  - continue ceftriaxone and azithromycin (day 5/5)

## 2018-03-16 LAB
BUN SERPL-MCNC: 55 MG/DL — HIGH (ref 7–23)
CALCIUM SERPL-MCNC: 8.5 MG/DL — SIGNIFICANT CHANGE UP (ref 8.4–10.5)
CHLORIDE SERPL-SCNC: 104 MMOL/L — SIGNIFICANT CHANGE UP (ref 98–107)
CO2 SERPL-SCNC: 22 MMOL/L — SIGNIFICANT CHANGE UP (ref 22–31)
CREAT SERPL-MCNC: 2.31 MG/DL — HIGH (ref 0.5–1.3)
GLUCOSE BLDC GLUCOMTR-MCNC: 112 MG/DL — HIGH (ref 70–99)
GLUCOSE BLDC GLUCOMTR-MCNC: 139 MG/DL — HIGH (ref 70–99)
GLUCOSE BLDC GLUCOMTR-MCNC: 194 MG/DL — HIGH (ref 70–99)
GLUCOSE BLDC GLUCOMTR-MCNC: 195 MG/DL — HIGH (ref 70–99)
GLUCOSE SERPL-MCNC: 115 MG/DL — HIGH (ref 70–99)
INR BLD: 2.22 — HIGH (ref 0.88–1.17)
MAGNESIUM SERPL-MCNC: 2.1 MG/DL — SIGNIFICANT CHANGE UP (ref 1.6–2.6)
PHOSPHATE SERPL-MCNC: 4.1 MG/DL — SIGNIFICANT CHANGE UP (ref 2.5–4.5)
POTASSIUM SERPL-MCNC: 4.2 MMOL/L — SIGNIFICANT CHANGE UP (ref 3.5–5.3)
POTASSIUM SERPL-SCNC: 4.2 MMOL/L — SIGNIFICANT CHANGE UP (ref 3.5–5.3)
PROTHROM AB SERPL-ACNC: 25 SEC — HIGH (ref 9.8–13.1)
SODIUM SERPL-SCNC: 139 MMOL/L — SIGNIFICANT CHANGE UP (ref 135–145)

## 2018-03-16 PROCEDURE — 99233 SBSQ HOSP IP/OBS HIGH 50: CPT | Mod: GC

## 2018-03-16 RX ORDER — WARFARIN SODIUM 2.5 MG/1
3 TABLET ORAL ONCE
Qty: 0 | Refills: 0 | Status: COMPLETED | OUTPATIENT
Start: 2018-03-16 | End: 2018-03-16

## 2018-03-16 RX ADMIN — Medication 100 MILLIGRAM(S): at 12:28

## 2018-03-16 RX ADMIN — SIMVASTATIN 20 MILLIGRAM(S): 20 TABLET, FILM COATED ORAL at 21:45

## 2018-03-16 RX ADMIN — Medication 81 MILLIGRAM(S): at 12:29

## 2018-03-16 RX ADMIN — Medication 16 UNIT(S): at 21:46

## 2018-03-16 RX ADMIN — BRIMONIDINE TARTRATE 1 DROP(S): 2 SOLUTION/ DROPS OPHTHALMIC at 08:50

## 2018-03-16 RX ADMIN — CARVEDILOL PHOSPHATE 25 MILLIGRAM(S): 80 CAPSULE, EXTENDED RELEASE ORAL at 05:43

## 2018-03-16 RX ADMIN — BRIMONIDINE TARTRATE 1 DROP(S): 2 SOLUTION/ DROPS OPHTHALMIC at 21:46

## 2018-03-16 RX ADMIN — DORZOLAMIDE HYDROCHLORIDE TIMOLOL MALEATE 1 DROP(S): 20; 5 SOLUTION/ DROPS OPHTHALMIC at 08:49

## 2018-03-16 RX ADMIN — CILOSTAZOL 100 MILLIGRAM(S): 100 TABLET ORAL at 21:45

## 2018-03-16 RX ADMIN — CARVEDILOL PHOSPHATE 25 MILLIGRAM(S): 80 CAPSULE, EXTENDED RELEASE ORAL at 17:37

## 2018-03-16 RX ADMIN — CILOSTAZOL 100 MILLIGRAM(S): 100 TABLET ORAL at 08:54

## 2018-03-16 RX ADMIN — Medication 100 MICROGRAM(S): at 05:43

## 2018-03-16 RX ADMIN — AMLODIPINE BESYLATE 10 MILLIGRAM(S): 2.5 TABLET ORAL at 05:43

## 2018-03-16 RX ADMIN — LATANOPROST 1 DROP(S): 0.05 SOLUTION/ DROPS OPHTHALMIC; TOPICAL at 21:45

## 2018-03-16 RX ADMIN — WARFARIN SODIUM 3 MILLIGRAM(S): 2.5 TABLET ORAL at 17:37

## 2018-03-16 RX ADMIN — DORZOLAMIDE HYDROCHLORIDE TIMOLOL MALEATE 1 DROP(S): 20; 5 SOLUTION/ DROPS OPHTHALMIC at 21:45

## 2018-03-16 RX ADMIN — Medication 1: at 12:28

## 2018-03-16 NOTE — PROGRESS NOTE ADULT - PROBLEM SELECTOR PLAN 1
no need for iv fluids. . off aldactone/lasix  supportive care   may d/c renal stand point, off all diuretics, no ACEi/ARB. advised to f/u w/dr Segovia in 1-2weeks upon d/c, his new office infor given to pts daughter bedside.  avoid ACEi/ARB for now, nephrotoxics eg nsaids

## 2018-03-16 NOTE — PROGRESS NOTE ADULT - SUBJECTIVE AND OBJECTIVE BOX
Pt seen and examined at bedside, earlier today    feels fairly well  dyspnea improved  diuresing well  no fever,chills  no chest pain      Allergies:  No Known Allergies    Hospital Medications:   MEDICATIONS  (STANDING):  allopurinol 100 milliGRAM(s) Oral daily  amLODIPine   Tablet 10 milliGRAM(s) Oral daily  aspirin enteric coated 81 milliGRAM(s) Oral daily  azithromycin  IVPB 500 milliGRAM(s) IV Intermittent every 24 hours  brimonidine 0.2% Ophthalmic Solution 1 Drop(s) Right EYE two times a day  carvedilol 25 milliGRAM(s) Oral every 12 hours  cefTRIAXone   IVPB 1 Gram(s) IV Intermittent every 24 hours  cilostazol 100 milliGRAM(s) Oral two times a day  dextrose 50% Injectable 25 Gram(s) IV Push once  dorzolamide 2%/timolol 0.5% Ophthalmic Solution 1 Drop(s) Right EYE two times a day  insulin detemir injectable (LEVEMIR) 16 Unit(s) SubCutaneous at bedtime  insulin lispro (HumaLOG) corrective regimen sliding scale   SubCutaneous three times a day before meals  insulin lispro (HumaLOG) corrective regimen sliding scale   SubCutaneous at bedtime  latanoprost 0.005% Ophthalmic Solution 1 Drop(s) Both EYES at bedtime  levothyroxine 100 MICROGram(s) Oral daily  simvastatin 20 milliGRAM(s) Oral at bedtime  sodium chloride 0.9%. 1000 milliLiter(s) (75 mL/Hr) IV Continuous <Continuous>      VITALS:  Vital Signs Last 24 Hrs  T(C): 36.8 (16 Mar 2018 13:23), Max: 36.8 (16 Mar 2018 13:23)  T(F): 98.2 (16 Mar 2018 13:23), Max: 98.2 (16 Mar 2018 13:23)  HR: 65 (16 Mar 2018 13:23) (65 - 67)  BP: 125/65 (16 Mar 2018 13:23) (112/53 - 155/78)  BP(mean): --  RR: 18 (16 Mar 2018 13:23) (18 - 19)  SpO2: 100% (16 Mar 2018 13:23) (100% - 110%)      PHYSICAL EXAM:  Constitutional: NAD. sitting comfortably in chair  HEENT: anicteric sclera, oropharynx clear, MMM  Neck: No JVD  Respiratory:scattered  crepts, diffusely  Cardiovascular: S1, S2, irreg  Gastrointestinal: BS+, soft, NT/ND  Extremities: No cyanosis or clubbing. 2+ leg edema  Neurological: A/O x 3, no focal deficits  Psychiatric: Normal mood, normal affect  : No CVA tenderness. No michaud.   Skin: No rashes       LABS:      139  |  104  |  55<H>  ----------------------------<  115<H>  4.2   |  22  |  2.31<H>    Ca    8.5      16 Mar 2018 06:30  Phos  4.1     -16  Mg     2.1     -16    Creatinine Trend: 2.31<-2.19 <--, 2.28 <--, 2.04 <--, 1.68 <--, 1.27 <--      Urine Studies:  Urinalysis Basic - ( 11 Mar 2018 11:20 )    Color: PALE YELLOW / Appearance: CLEAR / S.012 / pH: 7.0  Gluc: 100 / Ketone: TRACE  / Bili: NEGATIVE / Urobili: NORMAL mg/dL   Blood: SMALL / Protein: 300 mg/dL / Nitrite: NEGATIVE   Leuk Esterase: NEGATIVE / RBC: 5-10 / WBC 0-2   Sq Epi: OCC / Non Sq Epi:  / Bacteria:         RADIOLOGY & ADDITIONAL STUDIES:

## 2018-03-16 NOTE — PROGRESS NOTE ADULT - PROBLEM SELECTOR PLAN 5
- continues to appear euvolemic on exam  - per outpatient cardiologist, patient had a diagnosis of CHF per PMD in 2014 but on his evaluation, doesn't appear to have CHF and most recent echo has an EF of 61%  - will continue home medications including coreg and spironolactone - continues to appear euvolemic on exam  - per outpatient cardiologist, patient had a diagnosis of CHF per PMD in 2014 but on his evaluation, doesn't appear to have CHF and most recent echo has an EF of 61%  - will continue home medications including coreg

## 2018-03-16 NOTE — PROGRESS NOTE ADULT - PROBLEM SELECTOR PLAN 4
- continue home BP meds except valsartan in the setting of NIKHIL  - per patient, tends to have elevated pressures in the 150s-160s - titrate to maintain BP goals < 150s - continue home BP meds except valsartan/aldactone in the setting of NIKHIL  - per patient, tends to have elevated pressures in the 150s-160s - titrate to maintain BP goals < 150s

## 2018-03-16 NOTE — PROGRESS NOTE ADULT - PROBLEM SELECTOR PLAN 1
- patient with CT chest showing multifocal pneumonia, likely CAP  - completed 5 days of ceftriaxone and azithromycin

## 2018-03-16 NOTE — PROGRESS NOTE ADULT - PROBLEM SELECTOR PLAN 3
compensated clinically  off aldactone/lasix  has chronic p edema  salt and fluid restriction, reinforced w/pt, family

## 2018-03-16 NOTE — PROGRESS NOTE ADULT - SUBJECTIVE AND OBJECTIVE BOX
Tamara Davis MD  Medicine Team 2  Pager [phone]      Patient is a 89y old  Female who presents with a chief complaint of sob (12 Mar 2018 11:51)          Subjective:         VITAL SIGNS:  Vital Signs Last 24 Hrs  T(C): 36.4 (16 Mar 2018 04:56), Max: 36.9 (15 Mar 2018 14:00)  T(F): 97.6 (16 Mar 2018 04:56), Max: 98.5 (15 Mar 2018 14:00)  HR: 67 (16 Mar 2018 04:56) (65 - 69)  BP: 155/78 (16 Mar 2018 04:56) (112/53 - 155/78)  BP(mean): --  RR: 18 (16 Mar 2018 04:56) (18 - 19)  SpO2: 100% (16 Mar 2018 04:56) (100% - 110%)      PHYSICAL EXAM:     GENERAL: no acute distress  HEENT: PERRLA, EOMI, moist oropharynx   RESPIRATORY: CTAB, no w/c   CARDIOVASCULAR: RRR, no murmurs, gallops, rubs  ABDOMINAL: soft, non-tender, non-distended, positive bowel sounds   EXTREMITIES: no clubbing, cyanosis, or edema  NEUROLOGICAL: alert and oriented x 3, non-focal  SKIN: no rashes or lesions   MUSCULOSKELETAL: no gross joint deformity      03-15    142  |  108<H>  |  46<H>  ----------------------------<  118<H>  3.9   |  23  |  2.19<H>    Ca    7.7<L>      15 Mar 2018 06:11  Phos  4.1     03-15  Mg     1.9     03-15        CAPILLARY BLOOD GLUCOSE      POCT Blood Glucose.: 227 mg/dL (15 Mar 2018 21:29)  POCT Blood Glucose.: 183 mg/dL (15 Mar 2018 17:03)  POCT Blood Glucose.: 142 mg/dL (15 Mar 2018 12:13)  POCT Blood Glucose.: 121 mg/dL (15 Mar 2018 08:21)      MEDICATIONS  (STANDING):  allopurinol 100 milliGRAM(s) Oral daily  amLODIPine   Tablet 10 milliGRAM(s) Oral daily  aspirin enteric coated 81 milliGRAM(s) Oral daily  azithromycin  IVPB 500 milliGRAM(s) IV Intermittent every 24 hours  brimonidine 0.2% Ophthalmic Solution 1 Drop(s) Right EYE two times a day  carvedilol 25 milliGRAM(s) Oral every 12 hours  cefTRIAXone   IVPB 1 Gram(s) IV Intermittent every 24 hours  cilostazol 100 milliGRAM(s) Oral two times a day  dextrose 50% Injectable 25 Gram(s) IV Push once  dorzolamide 2%/timolol 0.5% Ophthalmic Solution 1 Drop(s) Right EYE two times a day  insulin detemir injectable (LEVEMIR) 16 Unit(s) SubCutaneous at bedtime  insulin lispro (HumaLOG) corrective regimen sliding scale   SubCutaneous three times a day before meals  insulin lispro (HumaLOG) corrective regimen sliding scale   SubCutaneous at bedtime  latanoprost 0.005% Ophthalmic Solution 1 Drop(s) Both EYES at bedtime  levothyroxine 100 MICROGram(s) Oral daily  simvastatin 20 milliGRAM(s) Oral at bedtime  sodium chloride 0.9%. 1000 milliLiter(s) (75 mL/Hr) IV Continuous <Continuous>    MEDICATIONS  (PRN):  polyethylene glycol 3350 17 Gram(s) Oral daily PRN Constipation Tamara Davis MD  Medicine Team 2  Pager [phone]      Patient is a 89y old  Female who presents with a chief complaint of sob (12 Mar 2018 11:51)          Subjective: Overnight, no acute events. This morning patient feels well. SOB has resolved, no CP, nausea, vomiting, or diarrhea. Wants to go home.         VITAL SIGNS:  Vital Signs Last 24 Hrs  T(C): 36.4 (16 Mar 2018 04:56), Max: 36.9 (15 Mar 2018 14:00)  T(F): 97.6 (16 Mar 2018 04:56), Max: 98.5 (15 Mar 2018 14:00)  HR: 67 (16 Mar 2018 04:56) (65 - 69)  BP: 155/78 (16 Mar 2018 04:56) (112/53 - 155/78)  BP(mean): --  RR: 18 (16 Mar 2018 04:56) (18 - 19)  SpO2: 100% (16 Mar 2018 04:56) (100% - 110%)      PHYSICAL EXAM:     GENERAL: no acute distress  HEENT: PERRLA, EOMI, moist oropharynx   RESPIRATORY: crackles appreciated at the bases bilaterally  CARDIOVASCULAR: irregularly irregular, no murmurs   ABDOMINAL: +BS, reducible umbilical hernia, distended by soft, non tender   EXTREMITIES: no clubbing or cyanosis, 1-2+ edema in LE bilaterally   NEUROLOGICAL: alert and oriented x 3, non-focal  SKIN: no rashes or lesions   MUSCULOSKELETAL: no gross joint deformity      03-15    142  |  108<H>  |  46<H>  ----------------------------<  118<H>  3.9   |  23  |  2.19<H>    Ca    7.7<L>      15 Mar 2018 06:11  Phos  4.1     03-15  Mg     1.9     03-15        CAPILLARY BLOOD GLUCOSE      POCT Blood Glucose.: 227 mg/dL (15 Mar 2018 21:29)  POCT Blood Glucose.: 183 mg/dL (15 Mar 2018 17:03)  POCT Blood Glucose.: 142 mg/dL (15 Mar 2018 12:13)  POCT Blood Glucose.: 121 mg/dL (15 Mar 2018 08:21)      MEDICATIONS  (STANDING):  allopurinol 100 milliGRAM(s) Oral daily  amLODIPine   Tablet 10 milliGRAM(s) Oral daily  aspirin enteric coated 81 milliGRAM(s) Oral daily  azithromycin  IVPB 500 milliGRAM(s) IV Intermittent every 24 hours  brimonidine 0.2% Ophthalmic Solution 1 Drop(s) Right EYE two times a day  carvedilol 25 milliGRAM(s) Oral every 12 hours  cefTRIAXone   IVPB 1 Gram(s) IV Intermittent every 24 hours  cilostazol 100 milliGRAM(s) Oral two times a day  dextrose 50% Injectable 25 Gram(s) IV Push once  dorzolamide 2%/timolol 0.5% Ophthalmic Solution 1 Drop(s) Right EYE two times a day  insulin detemir injectable (LEVEMIR) 16 Unit(s) SubCutaneous at bedtime  insulin lispro (HumaLOG) corrective regimen sliding scale   SubCutaneous three times a day before meals  insulin lispro (HumaLOG) corrective regimen sliding scale   SubCutaneous at bedtime  latanoprost 0.005% Ophthalmic Solution 1 Drop(s) Both EYES at bedtime  levothyroxine 100 MICROGram(s) Oral daily  simvastatin 20 milliGRAM(s) Oral at bedtime  sodium chloride 0.9%. 1000 milliLiter(s) (75 mL/Hr) IV Continuous <Continuous>    MEDICATIONS  (PRN):  polyethylene glycol 3350 17 Gram(s) Oral daily PRN Constipation

## 2018-03-16 NOTE — PROGRESS NOTE ADULT - PROBLEM SELECTOR PLAN 2
- Cr now downtrending (2.28 --> 2.19)  - likely secondary to BESS from CTA chest done on admission, continue IV fluids at 75cc per hour  - holding valsartan given NIKHIL, will likely hold and can resume upon follow up with nephrologist outpatient - Cr uptrending (2.28 --> 2.19 --> 2.31)  - likely secondary to BESS from CTA chest done on admission, holding fluids given crackles on exam  - continue to hold valsartan and aldactone, appreciate renal recs

## 2018-03-17 VITALS
RESPIRATION RATE: 17 BRPM | DIASTOLIC BLOOD PRESSURE: 71 MMHG | OXYGEN SATURATION: 97 % | TEMPERATURE: 98 F | SYSTOLIC BLOOD PRESSURE: 143 MMHG | HEART RATE: 65 BPM

## 2018-03-17 LAB
BUN SERPL-MCNC: 57 MG/DL — HIGH (ref 7–23)
CALCIUM SERPL-MCNC: 8.7 MG/DL — SIGNIFICANT CHANGE UP (ref 8.4–10.5)
CHLORIDE SERPL-SCNC: 104 MMOL/L — SIGNIFICANT CHANGE UP (ref 98–107)
CO2 SERPL-SCNC: 23 MMOL/L — SIGNIFICANT CHANGE UP (ref 22–31)
CREAT SERPL-MCNC: 2.34 MG/DL — HIGH (ref 0.5–1.3)
GLUCOSE BLDC GLUCOMTR-MCNC: 152 MG/DL — HIGH (ref 70–99)
GLUCOSE BLDC GLUCOMTR-MCNC: 96 MG/DL — SIGNIFICANT CHANGE UP (ref 70–99)
GLUCOSE SERPL-MCNC: 111 MG/DL — HIGH (ref 70–99)
INR BLD: 2.4 — HIGH (ref 0.88–1.17)
POTASSIUM SERPL-MCNC: 4.2 MMOL/L — SIGNIFICANT CHANGE UP (ref 3.5–5.3)
POTASSIUM SERPL-SCNC: 4.2 MMOL/L — SIGNIFICANT CHANGE UP (ref 3.5–5.3)
PROTHROM AB SERPL-ACNC: 28.1 SEC — HIGH (ref 9.8–13.1)
SODIUM SERPL-SCNC: 139 MMOL/L — SIGNIFICANT CHANGE UP (ref 135–145)

## 2018-03-17 PROCEDURE — 99239 HOSP IP/OBS DSCHRG MGMT >30: CPT

## 2018-03-17 RX ORDER — SPIRONOLACTONE 25 MG/1
1 TABLET, FILM COATED ORAL
Qty: 0 | Refills: 0 | COMMUNITY

## 2018-03-17 RX ORDER — VALSARTAN 80 MG/1
1 TABLET ORAL
Qty: 0 | Refills: 0 | COMMUNITY

## 2018-03-17 RX ADMIN — Medication 1: at 12:40

## 2018-03-17 RX ADMIN — Medication 81 MILLIGRAM(S): at 12:40

## 2018-03-17 RX ADMIN — DORZOLAMIDE HYDROCHLORIDE TIMOLOL MALEATE 1 DROP(S): 20; 5 SOLUTION/ DROPS OPHTHALMIC at 08:54

## 2018-03-17 RX ADMIN — AMLODIPINE BESYLATE 10 MILLIGRAM(S): 2.5 TABLET ORAL at 05:02

## 2018-03-17 RX ADMIN — CILOSTAZOL 100 MILLIGRAM(S): 100 TABLET ORAL at 08:54

## 2018-03-17 RX ADMIN — BRIMONIDINE TARTRATE 1 DROP(S): 2 SOLUTION/ DROPS OPHTHALMIC at 08:53

## 2018-03-17 RX ADMIN — CARVEDILOL PHOSPHATE 25 MILLIGRAM(S): 80 CAPSULE, EXTENDED RELEASE ORAL at 05:02

## 2018-03-17 RX ADMIN — Medication 100 MILLIGRAM(S): at 12:40

## 2018-03-17 RX ADMIN — Medication 100 MICROGRAM(S): at 05:02

## 2018-03-17 NOTE — PROGRESS NOTE ADULT - ASSESSMENT
Patient is an 89 y/op F with a PMH significant for atrial fibrillation (on coumadin), CHF (EF?), T2DM, HTN, HLD, hypothyroidism who presents with 2 days of dyspnea at rest found to have community acquired multifocal pneumonia s/p treat with 5 days of ceftriaxone and azithromycin. Hospital course complicated by BESS and pending improvement prior to discharge.
Patient is an 89 y/op F with a PMH significant for atrial fibrillation (on coumadin), CHF (EF?), T2DM, HTN, HLD, hypothyroidism who presents with 2 days of dyspnea at rest found to have community acquired multifocal pneumonia.
Patient is an 89 y/op F with a PMH significant for atrial fibrillation (on coumadin), CHF (EF?), T2DM, HTN, HLD, hypothyroidism who presents with 2 days of dyspnea at rest found to have community acquired multifocal pneumonia.
Patient is an 89 y/op F with a PMH significant for atrial fibrillation (on coumadin), CHF (EF?), T2DM, HTN, HLD, CKD, hypothyroidism who presents with 2 days of dyspnea at rest found to have community acquired multifocal pneumonia. Rebal consulted for NIKHIL on CKD    NIKHIL on CKD3, b/l Cr 1.4 12/2015, no recent sr Cr currently available.   NIKHIL likely 2/2 sepsis+hemodynamics/high BP. Cr continues to trend up   s/p iv contrast 3/11, ?BESS    Multifocal pneumonia on CT chest    Afib.  Plan: - rate controlled w/ Coreg. AC per primary team  HTN, now controlled well.   CHF   Diabetes mellitus-Mx as per medicine      labs reviewed
Patient is an 89 y/op F with a PMH significant for atrial fibrillation (on coumadin), CHF (EF?), T2DM, HTN, HLD, CKD, hypothyroidism who presents with 2 days of dyspnea at rest found to have community acquired multifocal pneumonia. Rebal consulted for NIKHIL on CKD    NIKHIL on CKD3, b/l Cr 1.4 12/2015, no recent sr Cr currently available.   NIKHIL likely 2/2 sepsis+hemodynamics/high BP. Cr continues to trend up   s/p iv contrast 3/11, ?BESS    Multifocal pneumonia on CT chest    Afib.  Plan: - rate controlled w/ Coreg. AC per primary team  HTN, now controlled well.   CHF   Diabetes mellitus-Mx as per medicine      labs reviewed
Patient is an 89 y/op F with a PMH significant for atrial fibrillation (on coumadin), CHF (EF?), T2DM, HTN, HLD, CKD, hypothyroidism who presents with 2 days of dyspnea at rest found to have community acquired multifocal pneumonia. Rebal consulted for NIKHIL on CKD    NIKHIL on CKD3, b/l Cr 1.4 12/2015, no recent sr Cr currently available.   NIKHIL likely 2/2 sepsis+hemodynamics/high BP. Cr trending up 1.27>1.68  s/p iv contrast 3/11, unlikely BESS      Multifocal pneumonia on CT chest    Afib.  Plan: - rate controlled w/ Coreg. AC per primary team  HTN, now controlled.   CHF   Diabetes mellitus-Mx as per medicine      labs, chart, rad reviewed
Patient is an 89 y/op F with a PMH significant for atrial fibrillation (on coumadin), CHF (EF?), T2DM, HTN, HLD, CKD, hypothyroidism who presents with 2 days of dyspnea at rest found to have community acquired multifocal pneumonia. Renal consulted for NIKHIL on CKD    CKD4, office chart reviewed, sr Cr 4/2017 was 2.1, ranged 2.1-2.4  no NIKHIL. pts Cr on admission was low, likely 2/2 dilutional. pts sr Cr now 2.1-2.3 closer to her baseline.   no obstruction on ct scan abd, sono abd  s/p iv contrast 3/11, no e/o BESS    Multifocal pneumonia on CT chest    Afib.  Plan: - rate controlled w/ Coreg. AC per primary team  HTN, now controlled well.   CHF   Diabetes mellitus-Mx as per medicine      labs reviewed
Patient is an 89 y/op F with a PMH significant for atrial fibrillation (on coumadin), CHF (EF?), T2DM, HTN, HLD, hypothyroidism who presents with 2 days of dyspnea at rest found to have community acquired multifocal pneumonia s/p treat with 5 days of ceftriaxone and azithromycin. Hospital course complicated by BESS and pending improvement prior to discharge.
Patient is an 89 y/op F with a PMH significant for atrial fibrillation (on coumadin), CHF (EF?), T2DM, HTN, HLD, hypothyroidism who presents with 2 days of dyspnea at rest found to have community acquired multifocal pneumonia.
Patient is an 89 y/op F with a PMH significant for atrial fibrillation (on coumadin), CHF (EF?), T2DM, HTN, HLD, hypothyroidism who presents with 2 days of dyspnea at rest found to have community acquired multifocal pneumonia. Hospital course complicated by BESS and pending improvement prior to discharge.

## 2018-03-17 NOTE — PROGRESS NOTE ADULT - PROBLEM SELECTOR PLAN 4
- continue home BP meds except valsartan/aldactone in the setting of NIKHIL  - per patient, tends to have elevated pressures in the 150s-160s - titrate to maintain BP goals < 150s - continue home BP meds except valsartan/aldactone in the setting of NIKHIL  - per patient, tends to have elevated pressures in the 150s-160s

## 2018-03-17 NOTE — PROGRESS NOTE ADULT - SUBJECTIVE AND OBJECTIVE BOX
Tamara Davis MD  Medicine Team 2  Pager [phone]      Patient is a 89y old  Female who presents with a chief complaint of sob (12 Mar 2018 11:51)          Subjective: No events overnight. This morning patient feels         VITAL SIGNS:  Vital Signs Last 24 Hrs  T(C): 36.4 (17 Mar 2018 05:00), Max: 36.8 (16 Mar 2018 13:23)  T(F): 97.6 (17 Mar 2018 05:00), Max: 98.2 (16 Mar 2018 13:23)  HR: 65 (17 Mar 2018 05:00) (65 - 69)  BP: 143/71 (17 Mar 2018 05:00) (125/65 - 151/67)  BP(mean): --  RR: 17 (17 Mar 2018 05:00) (17 - 18)  SpO2: 97% (17 Mar 2018 05:00) (97% - 100%)      PHYSICAL EXAM:     GENERAL: no acute distress  HEENT: PERRLA, EOMI, moist oropharynx   RESPIRATORY: CTAB, no w/c   CARDIOVASCULAR: RRR, no murmurs, gallops, rubs  ABDOMINAL: soft, non-tender, non-distended, positive bowel sounds   EXTREMITIES: no clubbing, cyanosis, or edema  NEUROLOGICAL: alert and oriented x 3, non-focal  SKIN: no rashes or lesions   MUSCULOSKELETAL: no gross joint deformity      03-16    139  |  104  |  55<H>  ----------------------------<  115<H>  4.2   |  22  |  2.31<H>    Ca    8.5      16 Mar 2018 06:30  Phos  4.1     03-16  Mg     2.1     03-16        CAPILLARY BLOOD GLUCOSE      POCT Blood Glucose.: 195 mg/dL (16 Mar 2018 21:37)  POCT Blood Glucose.: 139 mg/dL (16 Mar 2018 17:15)  POCT Blood Glucose.: 194 mg/dL (16 Mar 2018 12:04)  POCT Blood Glucose.: 112 mg/dL (16 Mar 2018 08:33)      MEDICATIONS  (STANDING):  allopurinol 100 milliGRAM(s) Oral daily  amLODIPine   Tablet 10 milliGRAM(s) Oral daily  aspirin enteric coated 81 milliGRAM(s) Oral daily  brimonidine 0.2% Ophthalmic Solution 1 Drop(s) Right EYE two times a day  carvedilol 25 milliGRAM(s) Oral every 12 hours  cilostazol 100 milliGRAM(s) Oral two times a day  dextrose 50% Injectable 25 Gram(s) IV Push once  dorzolamide 2%/timolol 0.5% Ophthalmic Solution 1 Drop(s) Right EYE two times a day  insulin detemir injectable (LEVEMIR) 16 Unit(s) SubCutaneous at bedtime  insulin lispro (HumaLOG) corrective regimen sliding scale   SubCutaneous three times a day before meals  insulin lispro (HumaLOG) corrective regimen sliding scale   SubCutaneous at bedtime  latanoprost 0.005% Ophthalmic Solution 1 Drop(s) Both EYES at bedtime  levothyroxine 100 MICROGram(s) Oral daily  simvastatin 20 milliGRAM(s) Oral at bedtime    MEDICATIONS  (PRN):  polyethylene glycol 3350 17 Gram(s) Oral daily PRN Constipation Tamara Davis MD  Medicine Team 2  Pager [phone]      Patient is a 89y old  Female who presents with a chief complaint of sob (12 Mar 2018 11:51)          Subjective: No events overnight. This morning patient feels well and well rested. Denies any CP, SOB, nausea, vomiting, or diarrhea. Had a small BM yesterday. Eager to go home.         VITAL SIGNS:  Vital Signs Last 24 Hrs  T(C): 36.4 (17 Mar 2018 05:00), Max: 36.8 (16 Mar 2018 13:23)  T(F): 97.6 (17 Mar 2018 05:00), Max: 98.2 (16 Mar 2018 13:23)  HR: 65 (17 Mar 2018 05:00) (65 - 69)  BP: 143/71 (17 Mar 2018 05:00) (125/65 - 151/67)  BP(mean): --  RR: 17 (17 Mar 2018 05:00) (17 - 18)  SpO2: 97% (17 Mar 2018 05:00) (97% - 100%)      PHYSICAL EXAM:     GENERAL: no acute distress  HEENT: PERRLA, EOMI, moist oropharynx   RESPIRATORY: CTAB, no wheezes or crackles appreciatd  CARDIOVASCULAR: irregularly irregular, no murmurs appreciated   ABDOMINAL: +BS, 3in reducible umbilical hernia, distended but soft, palpable LLQ adhesion non tender    EXTREMITIES: trace pedal edema in LE bilaterally   NEUROLOGICAL: alert and oriented x 3, no focal neurological deficits   SKIN: no rashes or lesions   MUSCULOSKELETAL: no gross joint deformity      03-16    139  |  104  |  55<H>  ----------------------------<  115<H>  4.2   |  22  |  2.31<H>    Ca    8.5      16 Mar 2018 06:30  Phos  4.1     03-16  Mg     2.1     03-16        CAPILLARY BLOOD GLUCOSE      POCT Blood Glucose.: 195 mg/dL (16 Mar 2018 21:37)  POCT Blood Glucose.: 139 mg/dL (16 Mar 2018 17:15)  POCT Blood Glucose.: 194 mg/dL (16 Mar 2018 12:04)  POCT Blood Glucose.: 112 mg/dL (16 Mar 2018 08:33)      MEDICATIONS  (STANDING):  allopurinol 100 milliGRAM(s) Oral daily  amLODIPine   Tablet 10 milliGRAM(s) Oral daily  aspirin enteric coated 81 milliGRAM(s) Oral daily  brimonidine 0.2% Ophthalmic Solution 1 Drop(s) Right EYE two times a day  carvedilol 25 milliGRAM(s) Oral every 12 hours  cilostazol 100 milliGRAM(s) Oral two times a day  dextrose 50% Injectable 25 Gram(s) IV Push once  dorzolamide 2%/timolol 0.5% Ophthalmic Solution 1 Drop(s) Right EYE two times a day  insulin detemir injectable (LEVEMIR) 16 Unit(s) SubCutaneous at bedtime  insulin lispro (HumaLOG) corrective regimen sliding scale   SubCutaneous three times a day before meals  insulin lispro (HumaLOG) corrective regimen sliding scale   SubCutaneous at bedtime  latanoprost 0.005% Ophthalmic Solution 1 Drop(s) Both EYES at bedtime  levothyroxine 100 MICROGram(s) Oral daily  simvastatin 20 milliGRAM(s) Oral at bedtime    MEDICATIONS  (PRN):  polyethylene glycol 3350 17 Gram(s) Oral daily PRN Constipation

## 2018-03-17 NOTE — PROGRESS NOTE ADULT - ATTENDING COMMENTS
Patient seen and examined.  Case discussed with house staff.  Agree with above as edited.   89yoF admitted for Multifocal CAP, c/b with NIKHIL on CKD III secondary to BESS   c/w IV ceftriaxone and azithromycin for 5 day course  NIKHIL on CKD III -  Appears that Cr is plateuing. Renal following.  HTN urgency - improved with BP meds - c/w coreg, amlodipine. Holding ARB and spironolactone.  Afib - rate control with coreg. Dose coumadin daily  CHFpEF - on coreg. Monitor volume status.   d/c planning once Cr improves
Patient seen and examined.  Case discussed with house staff.  Agree with above as edited.   89yoF admitted for Multifocal CAP, c/b with NIKHIL on CKD III secondary to BESS   now s/p IV ceftriaxone and azithromycin for 5 day course  NIKHIL on CKD III -  Cr is still rising.  patient now hypervolemic so fluids d/c'd. Will monitor volume status closely. Leave off IVFluids and without diuretics today.  Encouraged ambulation. d/w patient and daughter bedside.  HTN urgency - improved with BP meds - c/w coreg, amlodipine. Holding ARB and spironolactone.  Afib - rate control with coreg. Dose coumadin daily  d/c planning once Cr improves
stable for dc home with outpatient f/u.    TIME SPENT: 35mins
Patient seen and examined.  Case discussed with house staff.  Agree with above as edited.   89yoF admitted for Multifocal CAP now with NIKHIL on CKD III   c/w IV ceftriaxone and azithromycin . Check urine legionella  NIKHIL on CKD III -  DDx included hemodynamic ATN from infection/BP and BESS. Monitor CR which is rising. Renal following  HTN urgency - improved with BP meds - c/w coreg, amlodipine, sprionolactone. Hold ARB in setting of NIKHIL  Afib - rate control with coreg. Dose coumadin daily  CHFpEF - on coreg. Monitor volume status. c/w spironolactone
Patient seen and examined.  Case discussed with house staff.  Agree with above as edited.   89yoF admitted for Multifocal CAP now with NIKHIL on CKD III   c/w IV ceftriaxone and azithromycin for 5 day course  NIKHIL on CKD III -  DDx included hemodynamic ATN from infection/BP and BESS. Monitor CR which is rising. Renal following.  HTN urgency - improved with BP meds - c/w coreg, amlodipine, sprionolactone. Holding ARB in setting of NIKHIL  Afib - rate control with coreg. Dose coumadin daily  CHFpEF - on coreg. Monitor volume status. c/w spironolactone
Patient seen and examined.  Case discussed with house staff.  Agree with above as edited.   89yoF admitted for Multifocal CAP now with NIKHIL on CKD III   c/w IV ceftriaxone and azithromycin . Check urine legionella  NIKHIL on CKD III -  DDx included hemodynamic ATN from infection/BP and BESS. Monitor CR  HTN urgency - improved with BP meds - c/w coreg, amlodipine, sprionolactone. Hold ARB in setting of NIKHIL  Afib - rate control with coreg  CHFpEF - on coreg. Monitor volume status. c/w spironolactone

## 2018-03-17 NOTE — PROGRESS NOTE ADULT - PROVIDER SPECIALTY LIST ADULT
Internal Medicine
Nephrology
Internal Medicine
Internal Medicine

## 2018-03-17 NOTE — PROGRESS NOTE ADULT - NSHPATTENDINGPLANDISCUSS_GEN_ALL_CORE
pt
pt, family bedside, resident earlier
Dr. Davis
HS2, Patient
Dr. Davis

## 2018-03-17 NOTE — PROGRESS NOTE ADULT - PROBLEM SELECTOR PLAN 1
- Cr uptrending (2.28 --> 2.19 --> 2.31)  - likely secondary to BESS from CTA chest done on admission, holding fluids given crackles on exam  - continue to hold valsartan and aldactone, appreciate renal recs - Cr uptrending (2.28 --> 2.19 --> 2.31) but pending AM BMP   - likely secondary to BESS from CTA chest done on admission  - will discharge without restarting ARB and aldactone and will follow up with nephrologist outpatient

## 2018-03-17 NOTE — PROGRESS NOTE ADULT - PROBLEM SELECTOR PROBLEM 1
Acute kidney injury superimposed on CKD
Multifocal pneumonia
Multifocal pneumonia
Acute kidney injury superimposed on CKD
Multifocal pneumonia

## 2018-03-17 NOTE — PROGRESS NOTE ADULT - PROBLEM SELECTOR PLAN 5
- continues to appear euvolemic on exam  - per outpatient cardiologist, patient had a diagnosis of CHF per PMD in 2014 but on his evaluation, doesn't appear to have CHF and most recent echo has an EF of 61%  - will continue home medications including coreg - appears euvolemic today  - per outpatient cardiologist, patient had a diagnosis of CHF per PMD in 2014 but on his evaluation, doesn't appear to have CHF and most recent echo has an EF of 61%  - will continue home medications including coreg

## 2018-06-13 ENCOUNTER — INPATIENT (INPATIENT)
Facility: HOSPITAL | Age: 83
LOS: 15 days | Discharge: NURSING, REHAB OR LONG-TERM CARE | End: 2018-06-29
Attending: INTERNAL MEDICINE | Admitting: INTERNAL MEDICINE
Payer: MEDICARE

## 2018-06-13 VITALS
TEMPERATURE: 99 F | DIASTOLIC BLOOD PRESSURE: 88 MMHG | RESPIRATION RATE: 18 BRPM | OXYGEN SATURATION: 99 % | HEART RATE: 69 BPM | SYSTOLIC BLOOD PRESSURE: 171 MMHG

## 2018-06-13 DIAGNOSIS — E89.0 POSTPROCEDURAL HYPOTHYROIDISM: Chronic | ICD-10-CM

## 2018-06-13 DIAGNOSIS — Z41.9 ENCOUNTER FOR PROCEDURE FOR PURPOSES OTHER THAN REMEDYING HEALTH STATE, UNSPECIFIED: Chronic | ICD-10-CM

## 2018-06-13 DIAGNOSIS — Z29.9 ENCOUNTER FOR PROPHYLACTIC MEASURES, UNSPECIFIED: ICD-10-CM

## 2018-06-13 DIAGNOSIS — I50.9 HEART FAILURE, UNSPECIFIED: ICD-10-CM

## 2018-06-13 DIAGNOSIS — R55 SYNCOPE AND COLLAPSE: ICD-10-CM

## 2018-06-13 DIAGNOSIS — I10 ESSENTIAL (PRIMARY) HYPERTENSION: ICD-10-CM

## 2018-06-13 DIAGNOSIS — H40.9 UNSPECIFIED GLAUCOMA: ICD-10-CM

## 2018-06-13 DIAGNOSIS — E03.9 HYPOTHYROIDISM, UNSPECIFIED: ICD-10-CM

## 2018-06-13 DIAGNOSIS — E11.9 TYPE 2 DIABETES MELLITUS WITHOUT COMPLICATIONS: ICD-10-CM

## 2018-06-13 DIAGNOSIS — I48.91 UNSPECIFIED ATRIAL FIBRILLATION: ICD-10-CM

## 2018-06-13 DIAGNOSIS — E78.5 HYPERLIPIDEMIA, UNSPECIFIED: ICD-10-CM

## 2018-06-13 LAB
ALBUMIN SERPL ELPH-MCNC: 3.8 G/DL — SIGNIFICANT CHANGE UP (ref 3.3–5)
ALP SERPL-CCNC: 83 U/L — SIGNIFICANT CHANGE UP (ref 40–120)
ALT FLD-CCNC: 20 U/L — SIGNIFICANT CHANGE UP (ref 4–33)
APTT BLD: 37.3 SEC — SIGNIFICANT CHANGE UP (ref 27.5–37.4)
AST SERPL-CCNC: 20 U/L — SIGNIFICANT CHANGE UP (ref 4–32)
BASE EXCESS BLDV CALC-SCNC: 7 MMOL/L — SIGNIFICANT CHANGE UP
BASOPHILS # BLD AUTO: 0.02 K/UL — SIGNIFICANT CHANGE UP (ref 0–0.2)
BASOPHILS NFR BLD AUTO: 0.3 % — SIGNIFICANT CHANGE UP (ref 0–2)
BILIRUB SERPL-MCNC: 0.5 MG/DL — SIGNIFICANT CHANGE UP (ref 0.2–1.2)
BLOOD GAS VENOUS - CREATININE: 2.43 MG/DL — HIGH (ref 0.5–1.3)
BUN SERPL-MCNC: 65 MG/DL — HIGH (ref 7–23)
CALCIUM SERPL-MCNC: 10.1 MG/DL — SIGNIFICANT CHANGE UP (ref 8.4–10.5)
CHLORIDE BLDV-SCNC: 106 MMOL/L — SIGNIFICANT CHANGE UP (ref 96–108)
CHLORIDE SERPL-SCNC: 100 MMOL/L — SIGNIFICANT CHANGE UP (ref 98–107)
CO2 SERPL-SCNC: 31 MMOL/L — SIGNIFICANT CHANGE UP (ref 22–31)
CREAT SERPL-MCNC: 2.39 MG/DL — HIGH (ref 0.5–1.3)
EOSINOPHIL # BLD AUTO: 0.11 K/UL — SIGNIFICANT CHANGE UP (ref 0–0.5)
EOSINOPHIL NFR BLD AUTO: 1.7 % — SIGNIFICANT CHANGE UP (ref 0–6)
GAS PNL BLDV: 138 MMOL/L — SIGNIFICANT CHANGE UP (ref 136–146)
GLUCOSE BLDC GLUCOMTR-MCNC: 107 MG/DL — HIGH (ref 70–99)
GLUCOSE BLDV-MCNC: 145 — HIGH (ref 70–99)
GLUCOSE SERPL-MCNC: 136 MG/DL — HIGH (ref 70–99)
HCO3 BLDV-SCNC: 29 MMOL/L — HIGH (ref 20–27)
HCT VFR BLD CALC: 32.6 % — LOW (ref 34.5–45)
HCT VFR BLDV CALC: 32.9 % — LOW (ref 34.5–45)
HGB BLD-MCNC: 10.2 G/DL — LOW (ref 11.5–15.5)
HGB BLDV-MCNC: 10.6 G/DL — LOW (ref 11.5–15.5)
IMM GRANULOCYTES # BLD AUTO: 0.02 # — SIGNIFICANT CHANGE UP
IMM GRANULOCYTES NFR BLD AUTO: 0.3 % — SIGNIFICANT CHANGE UP (ref 0–1.5)
INR BLD: 2.8 — HIGH (ref 0.88–1.17)
LACTATE BLDV-MCNC: 0.9 MMOL/L — SIGNIFICANT CHANGE UP (ref 0.5–2)
LYMPHOCYTES # BLD AUTO: 1.33 K/UL — SIGNIFICANT CHANGE UP (ref 1–3.3)
LYMPHOCYTES # BLD AUTO: 20.3 % — SIGNIFICANT CHANGE UP (ref 13–44)
MCHC RBC-ENTMCNC: 29.6 PG — SIGNIFICANT CHANGE UP (ref 27–34)
MCHC RBC-ENTMCNC: 31.3 % — LOW (ref 32–36)
MCV RBC AUTO: 94.5 FL — SIGNIFICANT CHANGE UP (ref 80–100)
MONOCYTES # BLD AUTO: 0.79 K/UL — SIGNIFICANT CHANGE UP (ref 0–0.9)
MONOCYTES NFR BLD AUTO: 12 % — SIGNIFICANT CHANGE UP (ref 2–14)
NEUTROPHILS # BLD AUTO: 4.29 K/UL — SIGNIFICANT CHANGE UP (ref 1.8–7.4)
NEUTROPHILS NFR BLD AUTO: 65.4 % — SIGNIFICANT CHANGE UP (ref 43–77)
NRBC # FLD: 0 — SIGNIFICANT CHANGE UP
PCO2 BLDV: 58 MMHG — HIGH (ref 41–51)
PH BLDV: 7.37 PH — SIGNIFICANT CHANGE UP (ref 7.32–7.43)
PLATELET # BLD AUTO: 192 K/UL — SIGNIFICANT CHANGE UP (ref 150–400)
PMV BLD: 12.2 FL — SIGNIFICANT CHANGE UP (ref 7–13)
PO2 BLDV: 39 MMHG — SIGNIFICANT CHANGE UP (ref 35–40)
POTASSIUM BLDV-SCNC: 4.5 MMOL/L — SIGNIFICANT CHANGE UP (ref 3.4–4.5)
POTASSIUM SERPL-MCNC: 4.6 MMOL/L — SIGNIFICANT CHANGE UP (ref 3.5–5.3)
POTASSIUM SERPL-SCNC: 4.6 MMOL/L — SIGNIFICANT CHANGE UP (ref 3.5–5.3)
PROT SERPL-MCNC: 7.4 G/DL — SIGNIFICANT CHANGE UP (ref 6–8.3)
PROTHROM AB SERPL-ACNC: 32.9 SEC — HIGH (ref 9.8–13.1)
RBC # BLD: 3.45 M/UL — LOW (ref 3.8–5.2)
RBC # FLD: 16.5 % — HIGH (ref 10.3–14.5)
SAO2 % BLDV: 66.8 % — SIGNIFICANT CHANGE UP (ref 60–85)
SODIUM SERPL-SCNC: 140 MMOL/L — SIGNIFICANT CHANGE UP (ref 135–145)
TROPONIN T, HIGH SENSITIVITY RESULT: 73 NG/L — CRITICAL HIGH (ref ?–14)
TROPONIN T, HIGH SENSITIVITY RESULT: 78 NG/L — CRITICAL HIGH (ref ?–14)
TSH SERPL-MCNC: 2 UIU/ML — SIGNIFICANT CHANGE UP (ref 0.27–4.2)
WBC # BLD: 6.56 K/UL — SIGNIFICANT CHANGE UP (ref 3.8–10.5)
WBC # FLD AUTO: 6.56 K/UL — SIGNIFICANT CHANGE UP (ref 3.8–10.5)

## 2018-06-13 PROCEDURE — 71045 X-RAY EXAM CHEST 1 VIEW: CPT | Mod: 26

## 2018-06-13 RX ORDER — AMLODIPINE BESYLATE 2.5 MG/1
1 TABLET ORAL
Qty: 0 | Refills: 0 | COMMUNITY

## 2018-06-13 RX ORDER — BRIMONIDINE TARTRATE 2 MG/MG
1 SOLUTION/ DROPS OPHTHALMIC
Qty: 0 | Refills: 0 | COMMUNITY

## 2018-06-13 RX ORDER — DEXTROSE 50 % IN WATER 50 %
12.5 SYRINGE (ML) INTRAVENOUS ONCE
Qty: 0 | Refills: 0 | Status: DISCONTINUED | OUTPATIENT
Start: 2018-06-13 | End: 2018-06-29

## 2018-06-13 RX ORDER — ALLOPURINOL 300 MG
100 TABLET ORAL DAILY
Qty: 0 | Refills: 0 | Status: DISCONTINUED | OUTPATIENT
Start: 2018-06-13 | End: 2018-06-29

## 2018-06-13 RX ORDER — BRIMONIDINE TARTRATE 2 MG/MG
1 SOLUTION/ DROPS OPHTHALMIC THREE TIMES A DAY
Qty: 0 | Refills: 0 | Status: DISCONTINUED | OUTPATIENT
Start: 2018-06-13 | End: 2018-06-19

## 2018-06-13 RX ORDER — DORZOLAMIDE HYDROCHLORIDE TIMOLOL MALEATE 20; 5 MG/ML; MG/ML
1 SOLUTION/ DROPS OPHTHALMIC
Qty: 0 | Refills: 0 | COMMUNITY

## 2018-06-13 RX ORDER — INSULIN LISPRO 100/ML
VIAL (ML) SUBCUTANEOUS AT BEDTIME
Qty: 0 | Refills: 0 | Status: DISCONTINUED | OUTPATIENT
Start: 2018-06-13 | End: 2018-06-29

## 2018-06-13 RX ORDER — FUROSEMIDE 40 MG
40 TABLET ORAL
Qty: 0 | Refills: 0 | Status: DISCONTINUED | OUTPATIENT
Start: 2018-06-13 | End: 2018-06-18

## 2018-06-13 RX ORDER — CILOSTAZOL 100 MG/1
100 TABLET ORAL
Qty: 0 | Refills: 0 | Status: DISCONTINUED | OUTPATIENT
Start: 2018-06-13 | End: 2018-06-29

## 2018-06-13 RX ORDER — INSULIN LISPRO 100/ML
VIAL (ML) SUBCUTANEOUS
Qty: 0 | Refills: 0 | Status: DISCONTINUED | OUTPATIENT
Start: 2018-06-13 | End: 2018-06-29

## 2018-06-13 RX ORDER — DEXTROSE 50 % IN WATER 50 %
25 SYRINGE (ML) INTRAVENOUS ONCE
Qty: 0 | Refills: 0 | Status: DISCONTINUED | OUTPATIENT
Start: 2018-06-13 | End: 2018-06-29

## 2018-06-13 RX ORDER — INSULIN GLARGINE 100 [IU]/ML
15 INJECTION, SOLUTION SUBCUTANEOUS AT BEDTIME
Qty: 0 | Refills: 0 | Status: DISCONTINUED | OUTPATIENT
Start: 2018-06-13 | End: 2018-06-29

## 2018-06-13 RX ORDER — SIMVASTATIN 20 MG/1
20 TABLET, FILM COATED ORAL AT BEDTIME
Qty: 0 | Refills: 0 | Status: DISCONTINUED | OUTPATIENT
Start: 2018-06-13 | End: 2018-06-29

## 2018-06-13 RX ORDER — LEVOTHYROXINE SODIUM 125 MCG
100 TABLET ORAL DAILY
Qty: 0 | Refills: 0 | Status: DISCONTINUED | OUTPATIENT
Start: 2018-06-13 | End: 2018-06-29

## 2018-06-13 RX ORDER — CARVEDILOL PHOSPHATE 80 MG/1
25 CAPSULE, EXTENDED RELEASE ORAL EVERY 12 HOURS
Qty: 0 | Refills: 0 | Status: DISCONTINUED | OUTPATIENT
Start: 2018-06-13 | End: 2018-06-19

## 2018-06-13 RX ORDER — DORZOLAMIDE HYDROCHLORIDE TIMOLOL MALEATE 20; 5 MG/ML; MG/ML
1 SOLUTION/ DROPS OPHTHALMIC
Qty: 0 | Refills: 0 | Status: DISCONTINUED | OUTPATIENT
Start: 2018-06-13 | End: 2018-06-19

## 2018-06-13 RX ORDER — SODIUM CHLORIDE 9 MG/ML
1000 INJECTION, SOLUTION INTRAVENOUS
Qty: 0 | Refills: 0 | Status: DISCONTINUED | OUTPATIENT
Start: 2018-06-13 | End: 2018-06-29

## 2018-06-13 RX ORDER — LATANOPROST 0.05 MG/ML
1 SOLUTION/ DROPS OPHTHALMIC; TOPICAL AT BEDTIME
Qty: 0 | Refills: 0 | Status: DISCONTINUED | OUTPATIENT
Start: 2018-06-13 | End: 2018-06-29

## 2018-06-13 RX ORDER — ASPIRIN/CALCIUM CARB/MAGNESIUM 324 MG
81 TABLET ORAL DAILY
Qty: 0 | Refills: 0 | Status: DISCONTINUED | OUTPATIENT
Start: 2018-06-13 | End: 2018-06-29

## 2018-06-13 RX ORDER — DEXTROSE 50 % IN WATER 50 %
15 SYRINGE (ML) INTRAVENOUS ONCE
Qty: 0 | Refills: 0 | Status: DISCONTINUED | OUTPATIENT
Start: 2018-06-13 | End: 2018-06-29

## 2018-06-13 RX ORDER — GLUCAGON INJECTION, SOLUTION 0.5 MG/.1ML
1 INJECTION, SOLUTION SUBCUTANEOUS ONCE
Qty: 0 | Refills: 0 | Status: DISCONTINUED | OUTPATIENT
Start: 2018-06-13 | End: 2018-06-29

## 2018-06-13 RX ADMIN — BRIMONIDINE TARTRATE 1 DROP(S): 2 SOLUTION/ DROPS OPHTHALMIC at 23:21

## 2018-06-13 RX ADMIN — Medication 40 MILLIGRAM(S): at 23:08

## 2018-06-13 RX ADMIN — LATANOPROST 1 DROP(S): 0.05 SOLUTION/ DROPS OPHTHALMIC; TOPICAL at 23:08

## 2018-06-13 RX ADMIN — SIMVASTATIN 20 MILLIGRAM(S): 20 TABLET, FILM COATED ORAL at 23:22

## 2018-06-13 NOTE — H&P ADULT - PROBLEM SELECTOR PLAN 3
Routine blood glucose check.   Stop PO medication, continue with lantus and start sliding scale.   Check hemoglobin a1c.   Low carb diet

## 2018-06-13 NOTE — ED PROVIDER NOTE - PROGRESS NOTE DETAILS
Lane: Pt signed out to me by Dr Jonas pending troponin. Troponin mildly elevated, will admit to cards and trend enzymes

## 2018-06-13 NOTE — ED PROVIDER NOTE - MEDICAL DECISION MAKING DETAILS
88 y/o F with h/o afib on coumadin and CHF presents after syncopal episode. +Systolic murmur. No recent echos in system. labs, CXR, admit to TELE for ACS w/u and echo

## 2018-06-13 NOTE — H&P ADULT - HISTORY OF PRESENT ILLNESS
88 y/o F with h/o a. fib on coumadin, CHF, DM, HTN, HLD, DM, hypothyroidism presents to the ED for syncope witnessed by the son. As per son, patient was going to the car and as she was getting into the car, pt syncopized as she was in the seat for about 45 seconds. As per son, there was no seizure activity and no head trauma. Pt was slightly confused when she regain consciousness. Pt also states she has been having worsening shortness of breath whenever she does anything, such as putting on her pants, getting into cars etc. Pt reports worsening lower extremity edema. Pt also states she has been urinating less. Pt states she usually urinate about 6 times a day and so far she has only urinate 1 time a day. Pt also reports she is having stressors in her life because she had to bury her brother today.  Pt denies chest pain, palpitations, numbness, tingling, N/V/D/C, urinary/ bowel incontinence or any other complaints at this time.

## 2018-06-13 NOTE — ED PROVIDER NOTE - ATTENDING CONTRIBUTION TO CARE
DR. JIMENEZ, ATTENDING MD-  I performed a face to face bedside interview with patient regarding history of present illness, review of symptoms and past medical history. I completed an independent physical exam.  I have discussed patient's plan of care with the resident.   Documentation as above in the note.    Pritesh: s/p syncopal episode today while walking to her car after seeing her doctor (was for INR check).  No CP, but with dyspnea prior to event.  No loss of bowel/bladder control or reported seizure activity.  Patient reports having sx of PFEIFFER frequently, often with assoc lightheadedness, but no past syncope.  On my exam: pleasant, NAD, lungs grossly clear but poor exam due to body habitus and lack of deep breaths, heart: irr irr with holo-systolic murmur, abd obese, soft, nontender with reducible umbilical hernia, LE: chronic venous stasis changes.  A/P syncope: concern for cardiac source given her story and risk factors.  With her systolic murmur and h/o afib I am also concerned for symptomatic aortic stenosis.  will need admission to [facility] for cardiac workup

## 2018-06-13 NOTE — H&P ADULT - NSHPLABSRESULTS_GEN_ALL_CORE
LABS:                        10.2   6.56  )-----------( 192      ( 13 Jun 2018 15:20 )             32.6     06-13    140  |  100  |  65<H>  ----------------------------<  136<H>  4.6   |  31  |  2.39<H>    Ca    10.1      13 Jun 2018 15:20    TPro  7.4  /  Alb  3.8  /  TBili  0.5  /  DBili  x   /  AST  20  /  ALT  20  /  AlkPhos  83  06-13    PT/INR - ( 13 Jun 2018 15:20 )   PT: 32.9 SEC;   INR: 2.80          PTT - ( 13 Jun 2018 15:20 )  PTT:37.3 SEC    CAPILLARY BLOOD GLUCOSE    EKG shows a. fib @ 66 bpm Bifascicular block  ms    CXR: Hazy increased bilateral lung markings and indistinct hilar shadows compatible with congestion and edema.No pleural effusions or pneumothorax.Stable cardiac and aortic calcifications.Leftward deviated trachea related to an enlarged right thyroid as demonstrated on chest CT from 3/11/2018.Generalized osteopenia and mild spinal degenerative changes.

## 2018-06-13 NOTE — H&P ADULT - NSHPPHYSICALEXAM_GEN_ALL_CORE
GENERAL APPEARANCE: Well developed, well nourished, alert and cooperative, and appears to be in no acute distress.  HEAD: normocephalic.  EYES: PERRL, EOMI.   EARS: External auditory canals clear, hearing grossly intact.  NECK: Neck supple, non-tender without lymphadenopathy, masses or thyromegaly.  CARDIAC: Normal S1 and S2. No S3, S4 or murmurs. Rhythm is regular.   LUNGS: + rales bilaterally. Clear to auscultation without rhonchi, wheezing or diminished breath sounds.  ABDOMEN: Positive bowel sounds. Soft, nondistended, nontender. No guarding or rebound. No masses.  EXTREMITIES: No significant deformity or joint abnormality. 3+ edema bilaterally. No calf tenderness. Peripheral pulses intact.   SKIN: Skin normal color, texture and turgor with no lesions or eruptions.  PSYCHIATRIC: The mental examination revealed the patient was oriented to person, place, and time. The patient was able to demonstrate good judgement and reason, without hallucinations, abnormal affect or abnormal behaviors during the examination. Patient is not suicidal.

## 2018-06-13 NOTE — H&P ADULT - PROBLEM SELECTOR PLAN 1
Admit to telemetry.  Orthostatic every 24 hours.    Trend CE. TTE ordered.   check cbc,tsh, lipid, hemoglobin a1c, bmp with mag and phos.   f/u MD note

## 2018-06-13 NOTE — ED ADULT TRIAGE NOTE - CHIEF COMPLAINT QUOTE
Patient brought to ER by EMS from her son's car. After walking and she sat in his car she had a syncopal episode. .

## 2018-06-13 NOTE — ED PROVIDER NOTE - OBJECTIVE STATEMENT
88 y/o F with h/o afib on coumadin, CHF, DM2, HTN, HLD, DM2, hypothyroid presents after syncopal episode today witnessed by son. Patient was walking from office to the car and became very short of breath. Unknown how long LOC, son not present at this moment. Denies any chest pain or palpitations prior to LOC. No tongue biting or bowel/bladder incontinence. Has been having worsening PFEIFFER x one month as well as LE edema and orthopnea. Denies fever, chills, abdominal pain, nausea/vomiting/diarrhea, dysuria, increased frequency, hematuria.

## 2018-06-13 NOTE — ED ADULT NURSE NOTE - OBJECTIVE STATEMENT
patient alert ox3 came in c/o passed out while sitting in the car. denies cp. not in any distress. c/o SOB since march skin warm and dry, not in any distress. connected to CM shows AFib. labs done as ordered. awaiting results.

## 2018-06-13 NOTE — H&P ADULT - PROBLEM SELECTOR PLAN 6
02-Feb-2018 16:56
[id number] score of 5. Patient is on coumadin. INR level is therapeutic.   Continue with current medications.

## 2018-06-13 NOTE — H&P ADULT - ASSESSMENT
89 y/o F with h/o a. fib on coumadin, CHF, DM, HTN, HLD, DM, hypothyroidism, PE, presents to the ED for syncope. Admit to telemetry.

## 2018-06-13 NOTE — H&P ADULT - ATTENDING COMMENTS
Patient seen and examined.  Agree with above.   -admitted with syncope and dyspnea  -elevated troponin T noted - ? renal failure or heart failure  -check CPK  -check TTE  -EPS sridevi Banks MD

## 2018-06-13 NOTE — H&P ADULT - NSHPREVIEWOFSYSTEMS_GEN_ALL_CORE
Constitutional: No fever, fatigue or weight loss.  Skin: No rash.  Eyes: No recent vision problems or eye pain.  ENT: No congestion, ear pain, or sore throat.  Endocrine: No thyroid problems.  Cardiovascular: No chest pain or palpation.  Respiratory: + shortness of breath. No cough, congestion, or wheezing.  Gastrointestinal: No abdominal pain, nausea, vomiting, or diarrhea.  Genitourinary: No dysuria.  Musculoskeletal: No joint swelling.  Neurologic: No headache. + syncope.

## 2018-06-13 NOTE — ED PROVIDER NOTE - CARE PLAN
Principal Discharge DX:	Syncope, unspecified syncope type  Secondary Diagnosis:	Atrial fibrillation, unspecified type  Secondary Diagnosis:	CHF (congestive heart failure)

## 2018-06-14 DIAGNOSIS — N18.4 CHRONIC KIDNEY DISEASE, STAGE 4 (SEVERE): ICD-10-CM

## 2018-06-14 DIAGNOSIS — I10 ESSENTIAL (PRIMARY) HYPERTENSION: ICD-10-CM

## 2018-06-14 LAB
BUN SERPL-MCNC: 59 MG/DL — HIGH (ref 7–23)
CALCIUM SERPL-MCNC: 9.6 MG/DL — SIGNIFICANT CHANGE UP (ref 8.4–10.5)
CHLORIDE SERPL-SCNC: 103 MMOL/L — SIGNIFICANT CHANGE UP (ref 98–107)
CHOLEST SERPL-MCNC: 122 MG/DL — SIGNIFICANT CHANGE UP (ref 120–199)
CK MB BLD-MCNC: 3 NG/ML — SIGNIFICANT CHANGE UP (ref 1–4.7)
CK MB BLD-MCNC: SIGNIFICANT CHANGE UP (ref 0–2.5)
CK SERPL-CCNC: 37 U/L — SIGNIFICANT CHANGE UP (ref 25–170)
CO2 SERPL-SCNC: 30 MMOL/L — SIGNIFICANT CHANGE UP (ref 22–31)
CREAT SERPL-MCNC: 2.28 MG/DL — HIGH (ref 0.5–1.3)
GLUCOSE BLDC GLUCOMTR-MCNC: 100 MG/DL — HIGH (ref 70–99)
GLUCOSE BLDC GLUCOMTR-MCNC: 127 MG/DL — HIGH (ref 70–99)
GLUCOSE BLDC GLUCOMTR-MCNC: 131 MG/DL — HIGH (ref 70–99)
GLUCOSE BLDC GLUCOMTR-MCNC: 262 MG/DL — HIGH (ref 70–99)
GLUCOSE BLDC GLUCOMTR-MCNC: 77 MG/DL — SIGNIFICANT CHANGE UP (ref 70–99)
GLUCOSE SERPL-MCNC: 75 MG/DL — SIGNIFICANT CHANGE UP (ref 70–99)
HBA1C BLD-MCNC: 8 % — HIGH (ref 4–5.6)
HCT VFR BLD CALC: 30.2 % — LOW (ref 34.5–45)
HDLC SERPL-MCNC: 54 MG/DL — SIGNIFICANT CHANGE UP (ref 45–65)
HGB BLD-MCNC: 9.5 G/DL — LOW (ref 11.5–15.5)
LIPID PNL WITH DIRECT LDL SERPL: 60 MG/DL — SIGNIFICANT CHANGE UP
MAGNESIUM SERPL-MCNC: 2.3 MG/DL — SIGNIFICANT CHANGE UP (ref 1.6–2.6)
MCHC RBC-ENTMCNC: 29.6 PG — SIGNIFICANT CHANGE UP (ref 27–34)
MCHC RBC-ENTMCNC: 31.5 % — LOW (ref 32–36)
MCV RBC AUTO: 94.1 FL — SIGNIFICANT CHANGE UP (ref 80–100)
NRBC # FLD: 0 — SIGNIFICANT CHANGE UP
NT-PROBNP SERPL-SCNC: 3380 PG/ML — SIGNIFICANT CHANGE UP
PHOSPHATE SERPL-MCNC: 3.9 MG/DL — SIGNIFICANT CHANGE UP (ref 2.5–4.5)
PLATELET # BLD AUTO: 166 K/UL — SIGNIFICANT CHANGE UP (ref 150–400)
PMV BLD: 11.8 FL — SIGNIFICANT CHANGE UP (ref 7–13)
POTASSIUM SERPL-MCNC: 4.7 MMOL/L — SIGNIFICANT CHANGE UP (ref 3.5–5.3)
POTASSIUM SERPL-SCNC: 4.7 MMOL/L — SIGNIFICANT CHANGE UP (ref 3.5–5.3)
RBC # BLD: 3.21 M/UL — LOW (ref 3.8–5.2)
RBC # FLD: 16.6 % — HIGH (ref 10.3–14.5)
SODIUM SERPL-SCNC: 144 MMOL/L — SIGNIFICANT CHANGE UP (ref 135–145)
TRIGL SERPL-MCNC: 57 MG/DL — SIGNIFICANT CHANGE UP (ref 10–149)
TROPONIN T, HIGH SENSITIVITY RESULT: 83 NG/L — CRITICAL HIGH (ref ?–14)
TSH SERPL-MCNC: 1.44 UIU/ML — SIGNIFICANT CHANGE UP (ref 0.27–4.2)
WBC # BLD: 4.5 K/UL — SIGNIFICANT CHANGE UP (ref 3.8–10.5)
WBC # FLD AUTO: 4.5 K/UL — SIGNIFICANT CHANGE UP (ref 3.8–10.5)

## 2018-06-14 PROCEDURE — 99233 SBSQ HOSP IP/OBS HIGH 50: CPT

## 2018-06-14 RX ADMIN — BRIMONIDINE TARTRATE 1 DROP(S): 2 SOLUTION/ DROPS OPHTHALMIC at 05:32

## 2018-06-14 RX ADMIN — Medication 100 MILLIGRAM(S): at 11:35

## 2018-06-14 RX ADMIN — LATANOPROST 1 DROP(S): 0.05 SOLUTION/ DROPS OPHTHALMIC; TOPICAL at 22:04

## 2018-06-14 RX ADMIN — BRIMONIDINE TARTRATE 1 DROP(S): 2 SOLUTION/ DROPS OPHTHALMIC at 22:04

## 2018-06-14 RX ADMIN — Medication 81 MILLIGRAM(S): at 11:35

## 2018-06-14 RX ADMIN — Medication 1: at 22:04

## 2018-06-14 RX ADMIN — INSULIN GLARGINE 15 UNIT(S): 100 INJECTION, SOLUTION SUBCUTANEOUS at 01:16

## 2018-06-14 RX ADMIN — INSULIN GLARGINE 15 UNIT(S): 100 INJECTION, SOLUTION SUBCUTANEOUS at 22:04

## 2018-06-14 RX ADMIN — Medication 40 MILLIGRAM(S): at 17:54

## 2018-06-14 RX ADMIN — CARVEDILOL PHOSPHATE 25 MILLIGRAM(S): 80 CAPSULE, EXTENDED RELEASE ORAL at 17:54

## 2018-06-14 RX ADMIN — CILOSTAZOL 100 MILLIGRAM(S): 100 TABLET ORAL at 17:55

## 2018-06-14 RX ADMIN — BRIMONIDINE TARTRATE 1 DROP(S): 2 SOLUTION/ DROPS OPHTHALMIC at 14:27

## 2018-06-14 RX ADMIN — Medication 100 MICROGRAM(S): at 05:32

## 2018-06-14 RX ADMIN — CILOSTAZOL 100 MILLIGRAM(S): 100 TABLET ORAL at 05:32

## 2018-06-14 RX ADMIN — DORZOLAMIDE HYDROCHLORIDE TIMOLOL MALEATE 1 DROP(S): 20; 5 SOLUTION/ DROPS OPHTHALMIC at 05:32

## 2018-06-14 RX ADMIN — SIMVASTATIN 20 MILLIGRAM(S): 20 TABLET, FILM COATED ORAL at 22:04

## 2018-06-14 NOTE — CONSULT NOTE ADULT - ASSESSMENT
89y Female with DM, HTN, CKD III/IV, Afib on Coumadin, admitted after episode of syncope, volume overload 2/2 decompensated heart failure.

## 2018-06-14 NOTE — CONSULT NOTE ADULT - SUBJECTIVE AND OBJECTIVE BOX
[facility] Consult Note Nephrology - CONSULTATION NOTE    Patient is a 89y Female with DM, HTN, CKD III/IV, Afib on Coumadin, admitted after episode of syncope. Pt was getting into car, with son at her side. Pt feeling winded due to exertion of getting into car, and had episode of momentary syncope, witnessed by her son. Pt regained consciousness after 1 min. No fall, seizure like activity, chest pain, or palpitations. Of note, pt was hospitalized several months ago for PNA, and since then she reports feeling more SOB with minimal activity. Associated with progressively worsening LE swelling. She was seen by her cardiologist, and had her diuretic dose adjusted, without much improvement in her symptoms. Denies F/C/N/C. Pt noticed decreased UOP as well.     PAST MEDICAL & SURGICAL HISTORY:  Personal history of PE (pulmonary embolism)  Glaucoma  PVD (peripheral vascular disease)  Hypothyroid  HTN (hypertension)  HLD (hyperlipidemia)  CHF (congestive heart failure)  DM (diabetes mellitus)  Afib  Elective surgery: abdominal abcess removals  History of partial thyroidectomy    No Known Allergies    Home Medications Reviewed  Hospital Medications:   MEDICATIONS  (STANDING):  allopurinol 100 milliGRAM(s) Oral daily  aspirin enteric coated 81 milliGRAM(s) Oral daily  brimonidine 0.2% Ophthalmic Solution 1 Drop(s) Both EYES three times a day  carvedilol 25 milliGRAM(s) Oral every 12 hours  cilostazol 100 milliGRAM(s) Oral two times a day  dextrose 5%. 1000 milliLiter(s) (50 mL/Hr) IV Continuous <Continuous>  dextrose 50% Injectable 12.5 Gram(s) IV Push once  dextrose 50% Injectable 25 Gram(s) IV Push once  dextrose 50% Injectable 25 Gram(s) IV Push once  dorzolamide 2%/timolol 0.5% Ophthalmic Solution 1 Drop(s) Both EYES two times a day  furosemide   Injectable 40 milliGRAM(s) IV Push two times a day  insulin glargine Injectable (LANTUS) 15 Unit(s) SubCutaneous at bedtime  insulin lispro (HumaLOG) corrective regimen sliding scale   SubCutaneous three times a day before meals  insulin lispro (HumaLOG) corrective regimen sliding scale   SubCutaneous at bedtime  latanoprost 0.005% Ophthalmic Solution 1 Drop(s) Both EYES at bedtime  levothyroxine 100 MICROGram(s) Oral daily  simvastatin 20 milliGRAM(s) Oral at bedtime    SOCIAL HISTORY:  Denies ETOh,Smoking,   FAMILY HISTORY:  No pertinent family history in first degree relatives    REVIEW OF SYSTEMS:  CONSTITUTIONAL: No weakness, fevers or chills  EYES/ENT: No visual changes;  No vertigo or throat pain   NECK: No pain or stiffness  RESPIRATORY: No cough, wheezing, hemoptysis; +shortness of breath  CARDIOVASCULAR: No chest pain or palpitations.  GASTROINTESTINAL: No abdominal or epigastric pain. No nausea, vomiting, or hematemesis; No diarrhea or constipation. No melena or hematochezia.  GENITOURINARY: No dysuria, frequency, foamy urine, urinary urgency, incontinence or hematuria. Decreased urine output.   NEUROLOGICAL: No numbness or weakness  SKIN: No itching, burning, rashes, or lesions   VASCULAR: +bilateral lower extremity edema.   All other review of systems is negative unless indicated above.    VITALS:  T(F): 97.2 (06-14-18 @ 07:26), Max: 98.7 (06-13-18 @ 14:35)  HR: 52 (06-14-18 @ 07:26)  BP: 108/59 (06-14-18 @ 07:26)  RR: 18 (06-14-18 @ 07:26)  SpO2: 100% (06-14-18 @ 07:26)  Wt(kg): --    Height (cm): 172.72 (06-14 @ 02:39)  Weight (kg): 106 (06-14 @ 02:39)  BMI (kg/m2): 35.5 (06-14 @ 02:39)  BSA (m2): 2.18 (06-14 @ 02:39)    PHYSICAL EXAM:  Constitutional: NAD, obese.   HEENT: anicteric sclera, oropharynx clear, MMM  Neck: No JVD  Respiratory: Diminished breath sounds, No wheezing.   Cardiovascular: S1, S2, RRR  Gastrointestinal: BS+, soft, NT. Distended.   Extremities: No cyanosis or clubbing. 3+ peripheral LE edema in b/l LE   Neurological: A/O x 3, no focal deficits  Psychiatric: Normal mood, normal affect  : No CVA tenderness. No michaud.   Skin: No rashes    LABS:  06-14    144  |  103  |  59<H>  ----------------------------<  75  4.7   |  30  |  2.28<H>    Ca    9.6      14 Jun 2018 09:20  Phos  3.9     06-14  Mg     2.3     06-14    TPro  7.4  /  Alb  3.8  /  TBili  0.5  /  DBili      /  AST  20  /  ALT  20  /  AlkPhos  83  06-13    Creatinine Trend: 2.28 <--, 2.39 <--                        9.5    4.50  )-----------( 166      ( 14 Jun 2018 09:20 )             30.2     Urine Studies:      RADIOLOGY & ADDITIONAL STUDIES:  < from: Xray Chest 1 View- PORTABLE-Urgent (06.13.18 @ 15:41) >  IMPRESSION:  Hazy increased bilateral lung markings and indistinct hilar shadows   compatible with congestion and edema.    No pleural effusions or pneumothorax.    Stable cardiac and aortic calcifications.    Leftward deviated trachea relatedto an enlarged right thyroid as   demonstrated on chest CT from 3/11/2018.    Generalized osteopenia and mild spinal degenerative changes.

## 2018-06-14 NOTE — CONSULT NOTE ADULT - SUBJECTIVE AND OBJECTIVE BOX
89 yo woman w/ hx of chronic afib/flutter (on coumadin), CHF? (unknown EF), DM, HTN, hypothyroidism who presented for syncope.  Yesterday, was getting in the car w/ the help of her son, and suddenly passed out.  No lightheadedness, facial flushing, n/v, or any other concerning sx's.  No seizure like activity noted by family.  Patient presented to our ED for further management.  Patient admitted to the general medicine service, went into slow afib, HR 40s this morning.  EP service consulted for PPM evaluation.     Patient also endorsing exertional dyspnea, orthopnea, PND, leg swelling, as well as +20 lbs weight gain despite taking PO lasix.  States that her UOP has decreased over the past few weeks as well.  Otherwise no other sx's.        PMH:   Personal history of PE (pulmonary embolism)  Glaucoma  PVD (peripheral vascular disease)  Hypothyroid  HTN (hypertension)  HLD (hyperlipidemia)  CHF (congestive heart failure)  DM (diabetes mellitus)  Afib      PSH:   Elective surgery  History of partial thyroidectomy      Medications:   allopurinol 100 milliGRAM(s) Oral daily  aspirin enteric coated 81 milliGRAM(s) Oral daily  brimonidine 0.2% Ophthalmic Solution 1 Drop(s) Both EYES three times a day  carvedilol 25 milliGRAM(s) Oral every 12 hours  cilostazol 100 milliGRAM(s) Oral two times a day  dextrose 40% Gel 15 Gram(s) Oral once PRN  dextrose 5%. 1000 milliLiter(s) IV Continuous <Continuous>  dextrose 50% Injectable 12.5 Gram(s) IV Push once  dextrose 50% Injectable 25 Gram(s) IV Push once  dextrose 50% Injectable 25 Gram(s) IV Push once  dorzolamide 2%/timolol 0.5% Ophthalmic Solution 1 Drop(s) Both EYES two times a day  furosemide   Injectable 40 milliGRAM(s) IV Push two times a day  glucagon  Injectable 1 milliGRAM(s) IntraMuscular once PRN  insulin glargine Injectable (LANTUS) 15 Unit(s) SubCutaneous at bedtime  insulin lispro (HumaLOG) corrective regimen sliding scale   SubCutaneous three times a day before meals  insulin lispro (HumaLOG) corrective regimen sliding scale   SubCutaneous at bedtime  latanoprost 0.005% Ophthalmic Solution 1 Drop(s) Both EYES at bedtime  levothyroxine 100 MICROGram(s) Oral daily  simvastatin 20 milliGRAM(s) Oral at bedtime      Allergies:  No Known Allergies      FAMILY HISTORY:  No pertinent family history in first degree relatives      Social History:  Smoking History:  Alcohol Use:  Drug Use:    Review of Systems:  REVIEW OF SYSTEMS:    CONSTITUTIONAL: No weakness, fevers or chills  EYES/ENT: No visual changes;  No dysphagia  NECK: No pain or stiffness  RESPIRATORY: +shortness of breath, orthopnea, PND  CARDIOVASCULAR: +lower extremity edema  GASTROINTESTINAL: No abdominal or epigastric pain. No nausea, vomiting, or hematemesis; No diarrhea or constipation. No melena or hematochezia.  BACK: No back pain  GENITOURINARY: No dysuria, frequency or hematuria  NEUROLOGICAL: No numbness or weakness  SKIN: No itching, burning, rashes, or lesions   All other review of systems is negative unless indicated above.    Physical Exam:  T(F): 97.6 (-), Max: 98.7 (-13)  HR: 65 () (52 - 76)  BP: 104/57 (-14) (104/50 - 178/67)  RR: 18 (-14)  SpO2: 100% (-)  GENERAL: No acute distress, well-developed  HEAD:  Atraumatic, Normocephalic  ENT: EOMI, PERRLA, conjunctiva and sclera clear, Neck supple, +JVP ~ 12 cm H20   CHEST/LUNG: Diffuse crackles w/ no wheeze/rhonchi   BACK: No spinal tenderness  HEART: Irreg rhythm, S1 variable, unable to appreciate S2; III/VI systolic ejection murmur; concern for AS, rubs, or gallops  ABDOMEN: Soft, Nontender, Nondistended; Bowel sounds present  EXTREMITIES:  No clubbing, cyanosis, or edema  PSYCH: Nl behavior, nl affect  NEUROLOGY: AAOx3, non-focal, cranial nerves intact  SKIN: Normal color, No rashes or lesions  LINES:    Cardiovascular Diagnostic Testing:    ECG: atypical flutter, HR 60s, left post fasc block, RBBB,     Echo:    pending     Stress Testing:    < from: Nuclear Stress Test-Pharmacologic (13 @ 10:30) >  * The left ventricle was normal in size.  * Tracer uptake was homogeneous throughout the left  ventricle.  * Normal study; no evidence for myocardial infarction or  ischemia.  * Gated wall motion analysis was performed, and shows  normal wall motion.    < end of copied text >    Telemetry:  aflutter, HR 60s     Labs: Personally reviewed                        9.5    4.50  )-----------( 166      ( 2018 09:20 )             30.2     -    144  |  103  |  59<H>  ----------------------------<  75  4.7   |  30  |  2.28<H>    Ca    9.6      2018 09:20  Phos  3.9       Mg     2.3         TPro  7.4  /  Alb  3.8  /  TBili  0.5  /  DBili  x   /  AST  20  /  ALT  20  /  AlkPhos  83      PT/INR - ( 2018 15:20 )   PT: 32.9 SEC;   INR: 2.80          PTT - ( 2018 15:20 )  PTT:37.3 SEC  CARDIAC MARKERS ( 2018 09:20 )  x     / x     / 37 u/L / 3.00 ng/mL / x          Serum Pro-Brain Natriuretic Peptide: 3380 pg/mL ( @ 06:15)  Serum Pro-Brain Natriuretic Peptide: 2943 pg/mL ( @ 17:19)    Total Cholesterol: 122  LDL: 60  HDL: 54  T    Hemoglobin A1C, Whole Blood: 8.0 % ( @ 09:20)    Thyroid Stimulating Hormone, Serum: 1.44 uIU/mL ( @ 09:20)  Thyroid Stimulating Hormone, Serum: 2.00 uIU/mL ( @ 15:20)    Assessment     89 yo woman w/ hx of chronic afib/flutter (on coumadin), CHF? (unknown EF), DM, HTN, hypothyroidism who presented for syncope in the setting of chronic bifascicular block.  Also in ADHF; concerns for AS.      RECS  - diurese aggressively w/ IV lasix  - obtain TTE; f/u results  - cont rest of meds   - hold AV jimy blockers  - once more euvolemic, will set up for micra PPM placement     Will cont to follow     A Aubrey Fuller EP 89 yo woman w/ hx of chronic afib/flutter (on coumadin), CHF? (unknown EF), DM, HTN, hypothyroidism who presented for syncope.  Yesterday, was getting in the car w/ the help of her son, and suddenly passed out.  No lightheadedness, facial flushing, n/v, or any other concerning sx's.  No seizure like activity noted by family.  Patient presented to our ED for further management.  Patient admitted to the general medicine service, went into slow afib, HR 40s this morning.  EP service consulted for PPM evaluation.     Patient also endorsing exertional dyspnea, orthopnea, PND, leg swelling, as well as +20 lbs weight gain despite taking PO lasix.  States that her UOP has decreased over the past few weeks as well.  Otherwise no other sx's.        PMH:   Personal history of PE (pulmonary embolism)  Glaucoma  PVD (peripheral vascular disease)  Hypothyroid  HTN (hypertension)  HLD (hyperlipidemia)  CHF (congestive heart failure)  DM (diabetes mellitus)  Afib      PSH:   Elective surgery  History of partial thyroidectomy      Medications:   allopurinol 100 milliGRAM(s) Oral daily  aspirin enteric coated 81 milliGRAM(s) Oral daily  brimonidine 0.2% Ophthalmic Solution 1 Drop(s) Both EYES three times a day  carvedilol 25 milliGRAM(s) Oral every 12 hours  cilostazol 100 milliGRAM(s) Oral two times a day  dextrose 40% Gel 15 Gram(s) Oral once PRN  dextrose 5%. 1000 milliLiter(s) IV Continuous <Continuous>  dextrose 50% Injectable 12.5 Gram(s) IV Push once  dextrose 50% Injectable 25 Gram(s) IV Push once  dextrose 50% Injectable 25 Gram(s) IV Push once  dorzolamide 2%/timolol 0.5% Ophthalmic Solution 1 Drop(s) Both EYES two times a day  furosemide   Injectable 40 milliGRAM(s) IV Push two times a day  glucagon  Injectable 1 milliGRAM(s) IntraMuscular once PRN  insulin glargine Injectable (LANTUS) 15 Unit(s) SubCutaneous at bedtime  insulin lispro (HumaLOG) corrective regimen sliding scale   SubCutaneous three times a day before meals  insulin lispro (HumaLOG) corrective regimen sliding scale   SubCutaneous at bedtime  latanoprost 0.005% Ophthalmic Solution 1 Drop(s) Both EYES at bedtime  levothyroxine 100 MICROGram(s) Oral daily  simvastatin 20 milliGRAM(s) Oral at bedtime      Allergies:  No Known Allergies      FAMILY HISTORY:  No pertinent family history in first degree relatives      Social History:  Smoking History:  Alcohol Use:  Drug Use:    Review of Systems:  REVIEW OF SYSTEMS:    CONSTITUTIONAL: No weakness, fevers or chills  EYES/ENT: No visual changes;  No dysphagia  NECK: No pain or stiffness  RESPIRATORY: +shortness of breath, orthopnea, PND  CARDIOVASCULAR: +lower extremity edema  GASTROINTESTINAL: No abdominal or epigastric pain. No nausea, vomiting, or hematemesis; No diarrhea or constipation. No melena or hematochezia.  BACK: No back pain  GENITOURINARY: No dysuria, frequency or hematuria  NEUROLOGICAL: No numbness or weakness  SKIN: No itching, burning, rashes, or lesions   All other review of systems is negative unless indicated above.    Physical Exam:  T(F): 97.6 (-), Max: 98.7 (-13)  HR: 65 () (52 - 76)  BP: 104/57 (-14) (104/50 - 178/67)  RR: 18 (-14)  SpO2: 100% (-)  GENERAL: No acute distress, well-developed  HEAD:  Atraumatic, Normocephalic  ENT: EOMI, PERRLA, conjunctiva and sclera clear, Neck supple, +JVP ~ 12 cm H20   CHEST/LUNG: Diffuse crackles w/ no wheeze/rhonchi   BACK: No spinal tenderness  HEART: Irreg rhythm, S1 variable, unable to appreciate S2; III/VI systolic ejection murmur; concern for AS, rubs, or gallops  ABDOMEN: Soft, Nontender, Nondistended; Bowel sounds present  EXTREMITIES:  No clubbing, cyanosis, or edema  PSYCH: Nl behavior, nl affect  NEUROLOGY: AAOx3, non-focal, cranial nerves intact  SKIN: Normal color, No rashes or lesions  LINES:    Cardiovascular Diagnostic Testing:    ECG: atypical flutter, HR 60s, left post fasc block, RBBB,     Echo:    pending     Stress Testing:    < from: Nuclear Stress Test-Pharmacologic (13 @ 10:30) >  * The left ventricle was normal in size.  * Tracer uptake was homogeneous throughout the left  ventricle.  * Normal study; no evidence for myocardial infarction or  ischemia.  * Gated wall motion analysis was performed, and shows  normal wall motion.    < end of copied text >    Telemetry:  aflutter, HR 60s     Labs: Personally reviewed                        9.5    4.50  )-----------( 166      ( 2018 09:20 )             30.2     -    144  |  103  |  59<H>  ----------------------------<  75  4.7   |  30  |  2.28<H>    Ca    9.6      2018 09:20  Phos  3.9       Mg     2.3         TPro  7.4  /  Alb  3.8  /  TBili  0.5  /  DBili  x   /  AST  20  /  ALT  20  /  AlkPhos  83      PT/INR - ( 2018 15:20 )   PT: 32.9 SEC;   INR: 2.80          PTT - ( 2018 15:20 )  PTT:37.3 SEC  CARDIAC MARKERS ( 2018 09:20 )  x     / x     / 37 u/L / 3.00 ng/mL / x          Serum Pro-Brain Natriuretic Peptide: 3380 pg/mL ( @ 06:15)  Serum Pro-Brain Natriuretic Peptide: 2943 pg/mL ( @ 17:19)    Total Cholesterol: 122  LDL: 60  HDL: 54  T    Hemoglobin A1C, Whole Blood: 8.0 % ( @ 09:20)    Thyroid Stimulating Hormone, Serum: 1.44 uIU/mL ( @ 09:20)  Thyroid Stimulating Hormone, Serum: 2.00 uIU/mL ( @ 15:20)    Assessment     89 yo woman w/ hx of chronic afib/flutter (on coumadin), CHF? (unknown EF), DM, HTN, hypothyroidism who presented for syncope in the setting of chronic bifascicular block and slow Afib (HR 40s).  Also in ADHF     RECS  - diurese aggressively w/ IV lasix  - obtain TTE; f/u results  - cont rest of meds   - hold AV jimy blockers  - once more euvolemic, will set up for micra PPM placement (likely next week)     Will cont to follow     A Aubrey CLIFFORD   [place] [facility]

## 2018-06-14 NOTE — PHYSICAL THERAPY INITIAL EVALUATION ADULT - PERTINENT HX OF CURRENT PROBLEM, REHAB EVAL
Pt, is a 89 year old female admitted to [facility] secondary to syncope and collapse. PMH: HTN, CHF, Afib.

## 2018-06-14 NOTE — CONSULT NOTE ADULT - SUBJECTIVE AND OBJECTIVE BOX
Patient is a 89y old  Female who presents with a chief complaint of syncope.      HPI:  88 y/o F with h/o a. fib on coumadin, CHF, DM, HTN, HLD, DM, hypothyroidism presents to the ED for syncope witnessed by the son. As per son, patient was going to the car and as she was getting into the car, pt syncopized as she was in the seat for about 45 seconds. As per son, there was no seizure activity and no head trauma. Pt was slightly confused when she regain consciousness. Pt also states she has been having worsening shortness of breath whenever she does anything, such as putting on her pants, getting into cars etc. Pt reports worsening lower extremity edema. Pt also states she has been urinating less. Pt states she usually urinate about 6 times a day and so far she has only urinate 1 time a day. Pt also reports she is having stressors in her life because she had to bury her brother today.  Pt denies chest pain, palpitations, numbness, tingling, N/V/D/C, urinary/ bowel incontinence or any other complaints at this time.  I feel fine now.       PAST MEDICAL & SURGICAL HISTORY:  Personal history of PE (pulmonary embolism)  Glaucoma  PVD (peripheral vascular disease)  Hypothyroid  HTN (hypertension)  HLD (hyperlipidemia)  CHF (congestive heart failure)  DM (diabetes mellitus)  Afib  Elective surgery: abdominal abcess removals  History of partial thyroidectomy      Social History: Denies smoking ,alcohol or drugs     FAMILY HISTORY:  No pertinent family history in first degree relatives      Allergies    No Known Allergies    Intolerances        REVIEW OF SYSTEMS:    CONSTITUTIONAL: No fever, weight loss, or fatigue  EYES: No eye pain, visual disturbances, or discharge  RESPIRATORY: No cough, wheezing, chills or hemoptysis; No shortness of breath  CARDIOVASCULAR: No chest pain, palpitations, dizziness, or leg swelling  GASTROINTESTINAL: No abdominal or epigastric pain. No nausea, vomiting, or hematemesis; No diarrhea or constipation. No melena or hematochezia.  GENITOURINARY: No dysuria, frequency, hematuria, or incontinence  NEUROLOGICAL: No headaches, memory loss, loss of strength, numbness, or tremors but LOC   SKIN: No itching, burning, rashes, or lesions   ENDOCRINE: No heat or cold intolerance; No hair loss  MUSCULOSKELETAL: No joint pain or swelling; No muscle, back, or extremity pain  PSYCHIATRIC: No depression, anxiety, mood swings, or difficulty sleeping      MEDICATIONS  (STANDING):  allopurinol 100 milliGRAM(s) Oral daily  aspirin enteric coated 81 milliGRAM(s) Oral daily  brimonidine 0.2% Ophthalmic Solution 1 Drop(s) Both EYES three times a day  carvedilol 25 milliGRAM(s) Oral every 12 hours  cilostazol 100 milliGRAM(s) Oral two times a day  dextrose 5%. 1000 milliLiter(s) (50 mL/Hr) IV Continuous <Continuous>  dextrose 50% Injectable 12.5 Gram(s) IV Push once  dextrose 50% Injectable 25 Gram(s) IV Push once  dextrose 50% Injectable 25 Gram(s) IV Push once  dorzolamide 2%/timolol 0.5% Ophthalmic Solution 1 Drop(s) Both EYES two times a day  furosemide   Injectable 40 milliGRAM(s) IV Push two times a day  insulin glargine Injectable (LANTUS) 15 Unit(s) SubCutaneous at bedtime  insulin lispro (HumaLOG) corrective regimen sliding scale   SubCutaneous three times a day before meals  insulin lispro (HumaLOG) corrective regimen sliding scale   SubCutaneous at bedtime  latanoprost 0.005% Ophthalmic Solution 1 Drop(s) Both EYES at bedtime  levothyroxine 100 MICROGram(s) Oral daily  simvastatin 20 milliGRAM(s) Oral at bedtime    MEDICATIONS  (PRN):  dextrose 40% Gel 15 Gram(s) Oral once PRN Blood Glucose LESS THAN 70 milliGRAM(s)/deciliter  glucagon  Injectable 1 milliGRAM(s) IntraMuscular once PRN Glucose LESS THAN 70 milligrams/deciliter      Vital Signs Last 24 Hrs  T(C): 36.4 (14 Jun 2018 22:02), Max: 36.4 (14 Jun 2018 13:32)  T(F): 97.6 (14 Jun 2018 22:02), Max: 97.6 (14 Jun 2018 13:32)  HR: 67 (14 Jun 2018 22:02) (52 - 72)  BP: 131/65 (14 Jun 2018 22:02) (104/50 - 162/73)  BP(mean): --  RR: 18 (14 Jun 2018 22:02) (16 - 18)  SpO2: 100% (14 Jun 2018 22:02) (100% - 100%)    PHYSICAL EXAM:    GENERAL: NAD, Obese , NC Oxygen   HEAD:  Atraumatic, Normocephalic  EYES: EOMI, PERRLA, conjunctiva and sclera clear  ENMT: No tonsillar erythema, exudates, or enlargement; Moist mucous membranes, Good dentition, No lesions  NECK: Supple, No JVD, Normal thyroid  NERVOUS SYSTEM:  Alert & Oriented X3, Non focal   CHEST/LUNG: Clear to percussion bilaterally;Basal  rales,   HEART: Regular rate and rhythm; No murmurs, rubs, or gallops  ABDOMEN: Soft, Nontender, Nondistended; Bowel sounds present  EXTREMITIES: , No clubbing, cyanosis, but 2+ edema      LABS:                        9.5    4.50  )-----------( 166      ( 14 Jun 2018 09:20 )             30.2     06-14    144  |  103  |  59<H>  ----------------------------<  75  4.7   |  30  |  2.28<H>    Ca    9.6      14 Jun 2018 09:20  Phos  3.9     06-14  Mg     2.3     06-14    TPro  7.4  /  Alb  3.8  /  TBili  0.5  /  DBili  x   /  AST  20  /  ALT  20  /  AlkPhos  83  06-13    PT/INR - ( 13 Jun 2018 15:20 )   PT: 32.9 SEC;   INR: 2.80          PTT - ( 13 Jun 2018 15:20 )  PTT:37.3 SEC        RADIOLOGY & ADDITIONAL STUDIES:

## 2018-06-14 NOTE — CONSULT NOTE ADULT - ASSESSMENT
89 y/o F with h/o a. fib on coumadin, CHF, DM, HTN, HLD, DM, hypothyroidism, PE, presents to the ED for syncope. Admit to telemetry.      Problem/Plan - 1:  ·  Problem: Syncope, unspecified syncope type.  Plan: Work Up in progress.   Orthostatic negative .    Trend CE. TTE ordered.   Cardiology and EP helping.      Problem/Plan - 2:  ·  Problem: CHF (congestive heart failure).  Plan: Pt is hypervolemic.   Strict I and O, daily weights.   TTE ordered.   Start lasix 40 mg IV BID.      Problem/Plan - 3:  ·  Problem: DM (diabetes mellitus).  Plan: Sugars fluctuating so will monitor closely   Stop PO medication, continue with lantus and start sliding scale.   Check hemoglobin a1c.   Low carb diet.      Problem/Plan - 4:  ·  Problem: HLD (hyperlipidemia).  Plan:   Continue with current medications.   Low salt, low cholesterol diet.      Problem/Plan - 5:  ·  Problem: HTN (hypertension).  Plan: BP readings fine.   Continue with current medications.   Low salt,low cholesterol, DASH diet.      Problem/Plan - 6:  Problem: Atrial fibrillation, unspecified type. Plan: [id number] score of 5. Patient is on coumadin. INR level is therapeutic.   Continue with current medications.     Problem/Plan - 7:  ·  Problem: Glaucoma.  Plan: Continue with current medications.      Problem/Plan - 8:  ·  Problem: Hypothyroid.  Plan: Continue with synthyroid.      Problem/Plan - 9:  ·  Problem: Need for prophylactic measure.  Plan: Pt is on coumadin.

## 2018-06-14 NOTE — PHYSICAL THERAPY INITIAL EVALUATION ADULT - MANUAL MUSCLE TESTING RESULTS, REHAB EVAL
Bilateral UE muscle strength grossly 3/5 Throughout; Bilateral hip muscle strength grossly 3-/5 throughout, remainder of bilateral LE muscle strength grossly 3/5 Throughout.

## 2018-06-14 NOTE — PHYSICAL THERAPY INITIAL EVALUATION ADULT - RANGE OF MOTION EXAMINATION, REHAB EVAL
bilateral upper extremity ROM was WFL (within functional limits)/bilateral hip Active ROM 0- 100 degrees, Passive ROM WFL/bilateral lower extremity ROM was WFL (within functional limits)

## 2018-06-15 LAB
BUN SERPL-MCNC: 65 MG/DL — HIGH (ref 7–23)
CALCIUM SERPL-MCNC: 9.5 MG/DL — SIGNIFICANT CHANGE UP (ref 8.4–10.5)
CHLORIDE SERPL-SCNC: 103 MMOL/L — SIGNIFICANT CHANGE UP (ref 98–107)
CO2 SERPL-SCNC: 33 MMOL/L — HIGH (ref 22–31)
CREAT SERPL-MCNC: 2.59 MG/DL — HIGH (ref 0.5–1.3)
GLUCOSE BLDC GLUCOMTR-MCNC: 132 MG/DL — HIGH (ref 70–99)
GLUCOSE BLDC GLUCOMTR-MCNC: 140 MG/DL — HIGH (ref 70–99)
GLUCOSE BLDC GLUCOMTR-MCNC: 145 MG/DL — HIGH (ref 70–99)
GLUCOSE BLDC GLUCOMTR-MCNC: 82 MG/DL — SIGNIFICANT CHANGE UP (ref 70–99)
GLUCOSE SERPL-MCNC: 80 MG/DL — SIGNIFICANT CHANGE UP (ref 70–99)
HCT VFR BLD CALC: 30.5 % — LOW (ref 34.5–45)
HGB BLD-MCNC: 9.2 G/DL — LOW (ref 11.5–15.5)
MAGNESIUM SERPL-MCNC: 2.3 MG/DL — SIGNIFICANT CHANGE UP (ref 1.6–2.6)
MCHC RBC-ENTMCNC: 29.7 PG — SIGNIFICANT CHANGE UP (ref 27–34)
MCHC RBC-ENTMCNC: 30.2 % — LOW (ref 32–36)
MCV RBC AUTO: 98.4 FL — SIGNIFICANT CHANGE UP (ref 80–100)
NRBC # FLD: 0 — SIGNIFICANT CHANGE UP
PHOSPHATE SERPL-MCNC: 4.5 MG/DL — SIGNIFICANT CHANGE UP (ref 2.5–4.5)
PLATELET # BLD AUTO: 158 K/UL — SIGNIFICANT CHANGE UP (ref 150–400)
PMV BLD: 12.1 FL — SIGNIFICANT CHANGE UP (ref 7–13)
POTASSIUM SERPL-MCNC: 4.7 MMOL/L — SIGNIFICANT CHANGE UP (ref 3.5–5.3)
POTASSIUM SERPL-SCNC: 4.7 MMOL/L — SIGNIFICANT CHANGE UP (ref 3.5–5.3)
RBC # BLD: 3.1 M/UL — LOW (ref 3.8–5.2)
RBC # FLD: 16.8 % — HIGH (ref 10.3–14.5)
SODIUM SERPL-SCNC: 143 MMOL/L — SIGNIFICANT CHANGE UP (ref 135–145)
WBC # BLD: 4.66 K/UL — SIGNIFICANT CHANGE UP (ref 3.8–10.5)
WBC # FLD AUTO: 4.66 K/UL — SIGNIFICANT CHANGE UP (ref 3.8–10.5)

## 2018-06-15 PROCEDURE — 99233 SBSQ HOSP IP/OBS HIGH 50: CPT

## 2018-06-15 RX ORDER — LANOLIN ALCOHOL/MO/W.PET/CERES
3 CREAM (GRAM) TOPICAL ONCE
Qty: 0 | Refills: 0 | Status: COMPLETED | OUTPATIENT
Start: 2018-06-15 | End: 2018-06-15

## 2018-06-15 RX ADMIN — Medication 40 MILLIGRAM(S): at 06:05

## 2018-06-15 RX ADMIN — DORZOLAMIDE HYDROCHLORIDE TIMOLOL MALEATE 1 DROP(S): 20; 5 SOLUTION/ DROPS OPHTHALMIC at 17:23

## 2018-06-15 RX ADMIN — Medication 100 MICROGRAM(S): at 06:05

## 2018-06-15 RX ADMIN — BRIMONIDINE TARTRATE 1 DROP(S): 2 SOLUTION/ DROPS OPHTHALMIC at 06:05

## 2018-06-15 RX ADMIN — DORZOLAMIDE HYDROCHLORIDE TIMOLOL MALEATE 1 DROP(S): 20; 5 SOLUTION/ DROPS OPHTHALMIC at 06:05

## 2018-06-15 RX ADMIN — Medication 100 MILLIGRAM(S): at 13:12

## 2018-06-15 RX ADMIN — SIMVASTATIN 20 MILLIGRAM(S): 20 TABLET, FILM COATED ORAL at 21:30

## 2018-06-15 RX ADMIN — CILOSTAZOL 100 MILLIGRAM(S): 100 TABLET ORAL at 17:22

## 2018-06-15 RX ADMIN — Medication 40 MILLIGRAM(S): at 17:24

## 2018-06-15 RX ADMIN — BRIMONIDINE TARTRATE 1 DROP(S): 2 SOLUTION/ DROPS OPHTHALMIC at 21:30

## 2018-06-15 RX ADMIN — CARVEDILOL PHOSPHATE 25 MILLIGRAM(S): 80 CAPSULE, EXTENDED RELEASE ORAL at 17:22

## 2018-06-15 RX ADMIN — CILOSTAZOL 100 MILLIGRAM(S): 100 TABLET ORAL at 06:05

## 2018-06-15 RX ADMIN — Medication 3 MILLIGRAM(S): at 22:36

## 2018-06-15 RX ADMIN — LATANOPROST 1 DROP(S): 0.05 SOLUTION/ DROPS OPHTHALMIC; TOPICAL at 21:30

## 2018-06-15 RX ADMIN — INSULIN GLARGINE 15 UNIT(S): 100 INJECTION, SOLUTION SUBCUTANEOUS at 21:56

## 2018-06-15 RX ADMIN — BRIMONIDINE TARTRATE 1 DROP(S): 2 SOLUTION/ DROPS OPHTHALMIC at 13:12

## 2018-06-15 RX ADMIN — Medication 81 MILLIGRAM(S): at 13:12

## 2018-06-15 NOTE — DIETITIAN INITIAL EVALUATION ADULT. - ORAL INTAKE PTA
Patient reports low intake for 2 months PTA, taking three meals that were the equivalent of one meal.  Typical foods include breakfast of cereal, coffee with fat free milk or egg with toast, lunch of chicken or fish with a piece of plantain, potato or carrot, and maybe a leaf of lettuce, and dinner of a cup of milk or greek yogurt.  She also drinks water with lemon juice.

## 2018-06-15 NOTE — DIETITIAN INITIAL EVALUATION ADULT. - OTHER INFO
Patient seen for consult.  Patient with a drop (amount not specified) in body weight to 210 pounds and then an increase.  Medical notes report a 20 pounds increase due to swelling.  Hospital weight is currently 238.9 pounds (6/15/2018).  Patient reports PTA low intake and has been noted with edema.  Based on these two criteria patient meets the criteria for severe malnutrition.  In the hospital, daughter reports patient taking on average 75% of her hospital meals, but reports feeling full.  Patient reports she has not had a BM while in the hospital and feels this is due to fluid.  CHF nutrition for managing fluids was provided and low salt was encouraged.  Since medical team arrived and daughter needed to speak with them before leaving, interview was cut short.  Handouts were left behind for T2DM, since patient had prior education, and CHF.  Recommend to discuss nutrition for CKD on follow-up.  No known food allergies.

## 2018-06-15 NOTE — DIETITIAN INITIAL EVALUATION ADULT. - NS AS NUTRI INTERV ED CONTENT
Nutrition relationship to health/disease/Provided T2DM nutrition handout for refresh.  Explained how to measure fluids, encouraged low salt diet, provided CHF nutrition handout as leave behind.  Recommend additional education on CKD nutrition at follow-up./Purpose of the nutrition education

## 2018-06-15 NOTE — CHART NOTE - NSCHARTNOTEFT_GEN_A_CORE
NUTRITION SERVICES     Upon Nutritional Assessment by the Registered Dietitian your patient was determined to meet criteria/ has evidence of the following diagnosis/diagnoses:  [ ] Mild Protein Calorie Malnutrition   [ ] Moderate Protein Calorie Malnutrition   [x ] Severe Protein Calorie Malnutrition   [ ] Unspecified Protein Calorie Malnutrition   [ ] Underweight / BMI <19  [ ] Morbid Obesity / BMI >40    Findings as based on:  [ x] Comprehensive nutrition assessment   [ ] Nutrition Focused Physical Exam  [ ] Other:       Nutrition Plan/Recommendations:      Refer to Initial Dietitian Evaluation or Nutrition Follow-Up Documentation for Nutritional Recommendations.     PROVIDER Section:     By signing this assessment you are acknowledging and agree with the diagnosis/diagnoses assigned by the Registered Dietitian    Comments:

## 2018-06-15 NOTE — DIETITIAN INITIAL EVALUATION ADULT. - ADHERENCE
Patient watches leafy greens due to coumadin, follows a low salt diet and is aware of diabetes restrictions.

## 2018-06-15 NOTE — DIETITIAN INITIAL EVALUATION ADULT. - NS AS NUTRI INTERV MEALS SNACK
General/healthful diet/1) continue with current consistent carbohdyrate, DASH/TLC (cholesterol and sodium restricted)/TLC diet with medical team restrictions of fluids at 1500ml, and low sodium, 2) Monitor weights, labs, BM's, skin integrity, p.o. intake./Mineral - modified diet/Carbohydrate - modified diet

## 2018-06-15 NOTE — PROGRESS NOTE ADULT - SUBJECTIVE AND OBJECTIVE BOX
INTERVAL HPI/OVERNIGHT EVENTS: I feel better .   Vital Signs Last 24 Hrs  T(C): 36.3 (15 Sonny 2018 14:20), Max: 36.4 (14 Jun 2018 22:02)  T(F): 97.3 (15 Sonny 2018 14:20), Max: 97.6 (14 Jun 2018 22:02)  HR: 64 (15 Sonny 2018 17:21) (58 - 68)  BP: 142/73 (15 Sonny 2018 17:21) (101/55 - 142/73)  BP(mean): --  RR: 18 (15 Sonny 2018 14:20) (18 - 18)  SpO2: 100% (15 Sonny 2018 14:20) (100% - 100%)  I&O's Summary    14 Jun 2018 07:01  -  15 Sonny 2018 07:00  --------------------------------------------------------  IN: 200 mL / OUT: 300 mL / NET: -100 mL      MEDICATIONS  (STANDING):  allopurinol 100 milliGRAM(s) Oral daily  aspirin enteric coated 81 milliGRAM(s) Oral daily  brimonidine 0.2% Ophthalmic Solution 1 Drop(s) Both EYES three times a day  carvedilol 25 milliGRAM(s) Oral every 12 hours  cilostazol 100 milliGRAM(s) Oral two times a day  dextrose 5%. 1000 milliLiter(s) (50 mL/Hr) IV Continuous <Continuous>  dextrose 50% Injectable 12.5 Gram(s) IV Push once  dextrose 50% Injectable 25 Gram(s) IV Push once  dextrose 50% Injectable 25 Gram(s) IV Push once  dorzolamide 2%/timolol 0.5% Ophthalmic Solution 1 Drop(s) Both EYES two times a day  furosemide   Injectable 40 milliGRAM(s) IV Push two times a day  insulin glargine Injectable (LANTUS) 15 Unit(s) SubCutaneous at bedtime  insulin lispro (HumaLOG) corrective regimen sliding scale   SubCutaneous three times a day before meals  insulin lispro (HumaLOG) corrective regimen sliding scale   SubCutaneous at bedtime  latanoprost 0.005% Ophthalmic Solution 1 Drop(s) Both EYES at bedtime  levothyroxine 100 MICROGram(s) Oral daily  simvastatin 20 milliGRAM(s) Oral at bedtime    MEDICATIONS  (PRN):  dextrose 40% Gel 15 Gram(s) Oral once PRN Blood Glucose LESS THAN 70 milliGRAM(s)/deciliter  glucagon  Injectable 1 milliGRAM(s) IntraMuscular once PRN Glucose LESS THAN 70 milligrams/deciliter    LABS:                        9.2    4.66  )-----------( 158      ( 15 Sonny 2018 07:00 )             30.5     06-15    143  |  103  |  65<H>  ----------------------------<  80  4.7   |  33<H>  |  2.59<H>    Ca    9.5      15 Sonny 2018 07:00  Phos  4.5     06-15  Mg     2.3     06-15          CAPILLARY BLOOD GLUCOSE      POCT Blood Glucose.: 140 mg/dL (15 Sonny 2018 17:49)  POCT Blood Glucose.: 132 mg/dL (15 Sonny 2018 12:58)  POCT Blood Glucose.: 82 mg/dL (15 Sonny 2018 08:52)  POCT Blood Glucose.: 262 mg/dL (14 Jun 2018 21:51)          REVIEW OF SYSTEMS:  CONSTITUTIONAL: No fever, weight loss, or fatigue  EYES: No eye pain, visual disturbances, or discharge  ENMT:  No difficulty hearing, tinnitus, vertigo; No sinus or throat pain  NECK: No pain or stiffness  RESPIRATORY: No cough, wheezing, chills or hemoptysis; shortness of breath  CARDIOVASCULAR: No chest pain, palpitations, dizziness, or leg swelling  GASTROINTESTINAL: No abdominal or epigastric pain. No nausea, vomiting, or hematemesis; No diarrhea or constipation. No melena or hematochezia.  GENITOURINARY: No dysuria, frequency, hematuria, or incontinence  NEUROLOGICAL: No headaches, memory loss, loss of strength, numbness, or tremors  SKIN: No itching, burning, rashes, or lesions   LYMPH NODES: No enlarged glands  ENDOCRINE: No heat or cold intolerance; No hair loss  MUSCULOSKELETAL: No joint pain or swelling; No muscle, back, or extremity pain      Consultant(s) Notes Reviewed:  [x ] YES  [ ] NO    PHYSICAL EXAM:  GENERAL: NAD, well-groomed, well-developed, not in any distress ,  HEAD:  Atraumatic, Normocephalic  EYES: EOMI, PERRLA, conjunctiva and sclera clear  ENMT: No tonsillar erythema, exudates, or enlargement; Moist mucous membranes, Good dentition, No lesions  NECK: Supple, No JVD, Normal thyroid  NERVOUS SYSTEM:  Alert & Oriented X3, No focal deficit   CHEST/LUNG: Good air entry bilateral except bases with few  rales, rhonchi,  HEART: Regular rate and rhythm; No murmurs, rubs, or gallops  ABDOMEN: Soft, Nontender, Nondistended; Bowel sounds present  EXTREMITIES:  No clubbing, cyanosis,but + edema    Care Discussed with Consultants/Other Providers [ x] YES  [ ] NO

## 2018-06-15 NOTE — PROGRESS NOTE ADULT - SUBJECTIVE AND OBJECTIVE BOX
Pt seen and examined at bedside  No without new complaints. Denies SOB currently.     Allergies:  No Known Allergies    Hospital Medications:   MEDICATIONS  (STANDING):  allopurinol 100 milliGRAM(s) Oral daily  aspirin enteric coated 81 milliGRAM(s) Oral daily  brimonidine 0.2% Ophthalmic Solution 1 Drop(s) Both EYES three times a day  carvedilol 25 milliGRAM(s) Oral every 12 hours  cilostazol 100 milliGRAM(s) Oral two times a day  dextrose 5%. 1000 milliLiter(s) (50 mL/Hr) IV Continuous <Continuous>  dextrose 50% Injectable 12.5 Gram(s) IV Push once  dextrose 50% Injectable 25 Gram(s) IV Push once  dextrose 50% Injectable 25 Gram(s) IV Push once  dorzolamide 2%/timolol 0.5% Ophthalmic Solution 1 Drop(s) Both EYES two times a day  furosemide   Injectable 40 milliGRAM(s) IV Push two times a day  insulin glargine Injectable (LANTUS) 15 Unit(s) SubCutaneous at bedtime  insulin lispro (HumaLOG) corrective regimen sliding scale   SubCutaneous three times a day before meals  insulin lispro (HumaLOG) corrective regimen sliding scale   SubCutaneous at bedtime  latanoprost 0.005% Ophthalmic Solution 1 Drop(s) Both EYES at bedtime  levothyroxine 100 MICROGram(s) Oral daily  simvastatin 20 milliGRAM(s) Oral at bedtime    VITALS:  T(F): 97.4 (06-15-18 @ 07:54), Max: 97.6 (06-14-18 @ 22:02)  HR: 61 (06-15-18 @ 07:54)  BP: 101/55 (06-15-18 @ 07:54)  RR: 18 (06-15-18 @ 07:54)  SpO2: 100% (06-15-18 @ 07:54)  Wt(kg): --    06-14 @ 07:01  -  06-15 @ 07:00  --------------------------------------------------------  IN: 200 mL / OUT: 300 mL / NET: -100 mL    PHYSICAL EXAM:  Constitutional: NAD  HEENT: anicteric sclera, oropharynx clear, MMM  Neck: No JVD  Respiratory: CTAB, no wheezes, rales or rhonchi  Cardiovascular: S1, S2, RRR  Gastrointestinal: BS+, soft, NT distended.   Extremities: No cyanosis or clubbing. 2+ peripheral LE edema  Neurological: A/O x 3, no focal deficits  Psychiatric: Normal mood, normal affect  : No CVA tenderness. No michaud.   Skin: No rashes    LABS:  06-15    143  |  103  |  65<H>  ----------------------------<  80  4.7   |  33<H>  |  2.59<H>    Ca    9.5      15 Sonny 2018 07:00  Phos  4.5     06-15  Mg     2.3     06-15    TPro  7.4  /  Alb  3.8  /  TBili  0.5  /  DBili      /  AST  20  /  ALT  20  /  AlkPhos  83  06-13    Creatinine Trend: 2.59 <--, 2.28 <--, 2.39 <--                        9.2    4.66  )-----------( 158      ( 15 Sonny 2018 07:00 )             30.5     Urine Studies:      RADIOLOGY & ADDITIONAL STUDIES:

## 2018-06-15 NOTE — DIETITIAN INITIAL EVALUATION ADULT. - PROBLEM SELECTOR PLAN 6
[id number] score of 5. Patient is on coumadin. INR level is therapeutic.   Continue with current medications.

## 2018-06-15 NOTE — DIETITIAN INITIAL EVALUATION ADULT. - NS AS NUTRI INTERV MEALS SNACK3
General/healthful diet/Mineral - modified diet/Continue diet as noted above.  Please monitor intake and if it drops below 75% of meals, consider adding Nepro 1x per day./Carbohydrate - modified diet

## 2018-06-15 NOTE — DIETITIAN INITIAL EVALUATION ADULT. - ENERGY NEEDS
Ht: 68", Wt; 238.9 pounds,  pounds, %%, BMI 36.3  1+ generalized edema, 2+ left ankle and foot edema, 3+ right ankle, foot and leg edema, Hong Score 17, skin intact.  Fluids per medical team.

## 2018-06-15 NOTE — PROGRESS NOTE ADULT - SUBJECTIVE AND OBJECTIVE BOX
Patient seen and examined at bedside.  Doing better.  Breathing improved.  No other new/acute complaints.      Medications:  allopurinol 100 milliGRAM(s) Oral daily  aspirin enteric coated 81 milliGRAM(s) Oral daily  brimonidine 0.2% Ophthalmic Solution 1 Drop(s) Both EYES three times a day  carvedilol 25 milliGRAM(s) Oral every 12 hours  cilostazol 100 milliGRAM(s) Oral two times a day  dextrose 40% Gel 15 Gram(s) Oral once PRN  dextrose 5%. 1000 milliLiter(s) IV Continuous <Continuous>  dextrose 50% Injectable 12.5 Gram(s) IV Push once  dextrose 50% Injectable 25 Gram(s) IV Push once  dextrose 50% Injectable 25 Gram(s) IV Push once  dorzolamide 2%/timolol 0.5% Ophthalmic Solution 1 Drop(s) Both EYES two times a day  furosemide   Injectable 40 milliGRAM(s) IV Push two times a day  glucagon  Injectable 1 milliGRAM(s) IntraMuscular once PRN  insulin glargine Injectable (LANTUS) 15 Unit(s) SubCutaneous at bedtime  insulin lispro (HumaLOG) corrective regimen sliding scale   SubCutaneous three times a day before meals  insulin lispro (HumaLOG) corrective regimen sliding scale   SubCutaneous at bedtime  latanoprost 0.005% Ophthalmic Solution 1 Drop(s) Both EYES at bedtime  levothyroxine 100 MICROGram(s) Oral daily  simvastatin 20 milliGRAM(s) Oral at bedtime      PAST MEDICAL & SURGICAL HISTORY:  Personal history of PE (pulmonary embolism)  Glaucoma  PVD (peripheral vascular disease)  Hypothyroid  HTN (hypertension)  HLD (hyperlipidemia)  CHF (congestive heart failure)  DM (diabetes mellitus)  Afib  Elective surgery: abdominal abcess removals  History of partial thyroidectomy        Vitals:  T(F): 97.4 (06-15), Max: 97.6 (06-14)  HR: 61 (06-15) (58 - 67)  BP: 101/55 (06-15) (101/55 - 131/65)  RR: 18 (06-15)  SpO2: 100% (06-15)  I&O's Summary    14 Jun 2018 07:01  -  15 Jun 2018 07:00  --------------------------------------------------------  IN: 200 mL / OUT: 300 mL / NET: -100 mL        Physical Exam:  GENERAL: No acute distress, well-developed  HEAD:  Atraumatic, Normocephalic  ENT: EOMI, PERRLA, conjunctiva and sclera clear, Neck supple, +JVP ~ 12 cm H20   CHEST/LUNG: Diffuse crackles w/ no wheeze/rhonchi; improved   BACK: No spinal tenderness  HEART: Irreg rhythm, S1 variable, unable to appreciate S2; III/VI systolic ejection murmur; concern for AS, rubs, or gallops  ABDOMEN: Soft, Nontender, Nondistended; Bowel sounds present  EXTREMITIES:  +3 BLE pitting edema   PSYCH: Nl behavior, nl affect  NEUROLOGY: AAOx3, non-focal, cranial nerves intact  SKIN: Normal color, No rashes or lesions                          9.2    4.66  )-----------( 158      ( 15 Sonny 2018 07:00 )             30.5     06-15    143  |  103  |  65<H>  ----------------------------<  80  4.7   |  33<H>  |  2.59<H>    Ca    9.5      15 Sonny 2018 07:00  Phos  4.5     06-15  Mg     2.3     06-15    TPro  7.4  /  Alb  3.8  /  TBili  0.5  /  DBili  x   /  AST  20  /  ALT  20  /  AlkPhos  83  06-13    PT/INR - ( 13 Jun 2018 15:20 )   PT: 32.9 SEC;   INR: 2.80          PTT - ( 13 Jun 2018 15:20 )  PTT:37.3 SEC  CARDIAC MARKERS ( 14 Jun 2018 09:20 )  x     / x     / 37 u/L / 3.00 ng/mL / x          Serum Pro-Brain Natriuretic Peptide: 3380 pg/mL (06-14 @ 06:15)  Serum Pro-Brain Natriuretic Peptide: 2943 pg/mL (06-13 @ 17:19)    Interpretation of Telemetry:  afib, HR 60s;  this morning occasional episodes into the 30s-40s     Assessment     88 yo woman w/ hx of chronic afib/flutter (on coumadin), CHF? (unknown EF), DM, HTN, hypothyroidism who presented for syncope in the setting of chronic bifascicular block and slow Afib (HR 40s).  Also in ADHF     RECS  - cont IV lasix  - f/u TTE  - hold AV jimy blockers; resume rest of home meds   - once more euvolemic, will set up for micra PPM placement (likely next week)       A Aubrey Fuller EP

## 2018-06-16 LAB
BUN SERPL-MCNC: 68 MG/DL — HIGH (ref 7–23)
CALCIUM SERPL-MCNC: 9.7 MG/DL — SIGNIFICANT CHANGE UP (ref 8.4–10.5)
CHLORIDE SERPL-SCNC: 103 MMOL/L — SIGNIFICANT CHANGE UP (ref 98–107)
CO2 SERPL-SCNC: 30 MMOL/L — SIGNIFICANT CHANGE UP (ref 22–31)
CREAT SERPL-MCNC: 2.61 MG/DL — HIGH (ref 0.5–1.3)
GLUCOSE BLDC GLUCOMTR-MCNC: 144 MG/DL — HIGH (ref 70–99)
GLUCOSE BLDC GLUCOMTR-MCNC: 149 MG/DL — HIGH (ref 70–99)
GLUCOSE BLDC GLUCOMTR-MCNC: 150 MG/DL — HIGH (ref 70–99)
GLUCOSE BLDC GLUCOMTR-MCNC: 90 MG/DL — SIGNIFICANT CHANGE UP (ref 70–99)
GLUCOSE SERPL-MCNC: 105 MG/DL — HIGH (ref 70–99)
HCT VFR BLD CALC: 30 % — LOW (ref 34.5–45)
HGB BLD-MCNC: 9.6 G/DL — LOW (ref 11.5–15.5)
INR BLD: 1.84 — HIGH (ref 0.88–1.17)
MAGNESIUM SERPL-MCNC: 2.2 MG/DL — SIGNIFICANT CHANGE UP (ref 1.6–2.6)
MCHC RBC-ENTMCNC: 29.5 PG — SIGNIFICANT CHANGE UP (ref 27–34)
MCHC RBC-ENTMCNC: 32 % — SIGNIFICANT CHANGE UP (ref 32–36)
MCV RBC AUTO: 92.3 FL — SIGNIFICANT CHANGE UP (ref 80–100)
NRBC # FLD: 0 — SIGNIFICANT CHANGE UP
PHOSPHATE SERPL-MCNC: 3.6 MG/DL — SIGNIFICANT CHANGE UP (ref 2.5–4.5)
PLATELET # BLD AUTO: 164 K/UL — SIGNIFICANT CHANGE UP (ref 150–400)
PMV BLD: 12.2 FL — SIGNIFICANT CHANGE UP (ref 7–13)
POTASSIUM SERPL-MCNC: 4.7 MMOL/L — SIGNIFICANT CHANGE UP (ref 3.5–5.3)
POTASSIUM SERPL-SCNC: 4.7 MMOL/L — SIGNIFICANT CHANGE UP (ref 3.5–5.3)
PROTHROM AB SERPL-ACNC: 21.4 SEC — HIGH (ref 9.8–13.1)
RBC # BLD: 3.25 M/UL — LOW (ref 3.8–5.2)
RBC # FLD: 16.5 % — HIGH (ref 10.3–14.5)
SODIUM SERPL-SCNC: 142 MMOL/L — SIGNIFICANT CHANGE UP (ref 135–145)
WBC # BLD: 5.34 K/UL — SIGNIFICANT CHANGE UP (ref 3.8–10.5)
WBC # FLD AUTO: 5.34 K/UL — SIGNIFICANT CHANGE UP (ref 3.8–10.5)

## 2018-06-16 RX ADMIN — INSULIN GLARGINE 15 UNIT(S): 100 INJECTION, SOLUTION SUBCUTANEOUS at 22:12

## 2018-06-16 RX ADMIN — BRIMONIDINE TARTRATE 1 DROP(S): 2 SOLUTION/ DROPS OPHTHALMIC at 06:11

## 2018-06-16 RX ADMIN — LATANOPROST 1 DROP(S): 0.05 SOLUTION/ DROPS OPHTHALMIC; TOPICAL at 21:31

## 2018-06-16 RX ADMIN — DORZOLAMIDE HYDROCHLORIDE TIMOLOL MALEATE 1 DROP(S): 20; 5 SOLUTION/ DROPS OPHTHALMIC at 06:11

## 2018-06-16 RX ADMIN — CILOSTAZOL 100 MILLIGRAM(S): 100 TABLET ORAL at 17:27

## 2018-06-16 RX ADMIN — BRIMONIDINE TARTRATE 1 DROP(S): 2 SOLUTION/ DROPS OPHTHALMIC at 13:26

## 2018-06-16 RX ADMIN — CILOSTAZOL 100 MILLIGRAM(S): 100 TABLET ORAL at 06:11

## 2018-06-16 RX ADMIN — CARVEDILOL PHOSPHATE 25 MILLIGRAM(S): 80 CAPSULE, EXTENDED RELEASE ORAL at 06:11

## 2018-06-16 RX ADMIN — Medication 81 MILLIGRAM(S): at 13:26

## 2018-06-16 RX ADMIN — BRIMONIDINE TARTRATE 1 DROP(S): 2 SOLUTION/ DROPS OPHTHALMIC at 21:31

## 2018-06-16 RX ADMIN — SIMVASTATIN 20 MILLIGRAM(S): 20 TABLET, FILM COATED ORAL at 21:32

## 2018-06-16 RX ADMIN — Medication 40 MILLIGRAM(S): at 06:11

## 2018-06-16 RX ADMIN — Medication 100 MICROGRAM(S): at 06:11

## 2018-06-16 RX ADMIN — Medication 40 MILLIGRAM(S): at 17:28

## 2018-06-16 RX ADMIN — Medication 100 MILLIGRAM(S): at 13:26

## 2018-06-16 RX ADMIN — DORZOLAMIDE HYDROCHLORIDE TIMOLOL MALEATE 1 DROP(S): 20; 5 SOLUTION/ DROPS OPHTHALMIC at 17:27

## 2018-06-16 NOTE — PROGRESS NOTE ADULT - SUBJECTIVE AND OBJECTIVE BOX
Subjective:  Denied  chest pain or sob       MEDICATIONS  (STANDING):  allopurinol 100 milliGRAM(s) Oral daily  aspirin enteric coated 81 milliGRAM(s) Oral daily  brimonidine 0.2% Ophthalmic Solution 1 Drop(s) Both EYES three times a day  carvedilol 25 milliGRAM(s) Oral every 12 hours  cilostazol 100 milliGRAM(s) Oral two times a day  dextrose 5%. 1000 milliLiter(s) (50 mL/Hr) IV Continuous <Continuous>  dextrose 50% Injectable 12.5 Gram(s) IV Push once  dextrose 50% Injectable 25 Gram(s) IV Push once  dextrose 50% Injectable 25 Gram(s) IV Push once  dorzolamide 2%/timolol 0.5% Ophthalmic Solution 1 Drop(s) Both EYES two times a day  furosemide   Injectable 40 milliGRAM(s) IV Push two times a day  insulin glargine Injectable (LANTUS) 15 Unit(s) SubCutaneous at bedtime  insulin lispro (HumaLOG) corrective regimen sliding scale   SubCutaneous three times a day before meals  insulin lispro (HumaLOG) corrective regimen sliding scale   SubCutaneous at bedtime  latanoprost 0.005% Ophthalmic Solution 1 Drop(s) Both EYES at bedtime  levothyroxine 100 MICROGram(s) Oral daily  simvastatin 20 milliGRAM(s) Oral at bedtime    MEDICATIONS  (PRN):  dextrose 40% Gel 15 Gram(s) Oral once PRN Blood Glucose LESS THAN 70 milliGRAM(s)/deciliter  glucagon  Injectable 1 milliGRAM(s) IntraMuscular once PRN Glucose LESS THAN 70 milligrams/deciliter      LABS:                        9.6    5.34  )-----------( 164      ( 16 Jun 2018 06:00 )             30.0     Hemoglobin: 9.6 g/dL (06-16 @ 06:00)  Hemoglobin: 9.2 g/dL (06-15 @ 07:00)  Hemoglobin: 9.5 g/dL (06-14 @ 09:20)  Hemoglobin: 10.2 g/dL (06-13 @ 15:20)    06-16    142  |  103  |  68<H>  ----------------------------<  105<H>  4.7   |  30  |  2.61<H>    Ca    9.7      16 Jun 2018 06:00  Phos  3.6     06-16  Mg     2.2     06-16      Creatinine Trend: 2.61<--, 2.59<--, 2.28<--, 2.39<--     CARDIAC MARKERS ( 14 Jun 2018 09:20 )  x     / x     / 37 u/L / 3.00 ng/mL / x            PHYSICAL EXAM  Vital Signs Last 24 Hrs  T(C): 36.3 (16 Jun 2018 12:51), Max: 36.3 (15 Sonny 2018 14:20)  T(F): 97.4 (16 Jun 2018 12:51), Max: 97.4 (16 Jun 2018 06:09)  HR: 64 (16 Jun 2018 12:51) (64 - 70)  BP: 159/84 (16 Jun 2018 12:51) (113/65 - 159/84)  BP(mean): --  RR: 18 (16 Jun 2018 12:51) (18 - 18)  SpO2: 100% (16 Jun 2018 12:51) (95% - 100%)    Heart: normal S1, S2, IRR, no m/r/g  Lungs: cta b/l  Abd: soft nT, nD  Ext: 2+ edema in LE bilaterally       TELEMETRY:  	    ECG: < from: 12 Lead ECG (06.13.18 @ 14:56) >  Atrial fibrillation  Right bundle branch block  Left posterior fascicular block  *** Bifascicular block ***  Cannot rule out Inferior infarct , age undetermined  T wave abnormality, consider lateral ischemia  Abnormal ECG    < end of copied text >   	  RADIOLOGY:   DIAGNOSTIC TESTING:  [ ] Echocardiogram:  [ ]  Catheterization:  [ ] Stress Test:    OTHER: 	      ASSESSMENT/PLAN: 	89y Female with history of Afib on ac, HTN, CKD admitted with syncope and volume overload.   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  Of note, Dr Banks had a long discussion with the patient and the patient's hcp / daughter.  They want conservative cardiac care at this time and do not want invasive cardiac procedures (i.e. cath).   - will f/u w/ family GOC

## 2018-06-16 NOTE — PROGRESS NOTE ADULT - SUBJECTIVE AND OBJECTIVE BOX
[facility] - Radha / Florin FELDMAN /Nate/ GASTON Castillo/ GASTON Tellez/ Shahid Segovia / BABAR Njeru  ---------------------------------------------------------------------------------------------------------------    Patient seen and examined bedside    Subjective and Objective: No overnight events. no sob. No complaints today.     Allergies: No Known Allergies      Hospital Medications:   MEDICATIONS  (STANDING):  allopurinol 100 milliGRAM(s) Oral daily  aspirin enteric coated 81 milliGRAM(s) Oral daily  brimonidine 0.2% Ophthalmic Solution 1 Drop(s) Both EYES three times a day  carvedilol 25 milliGRAM(s) Oral every 12 hours  cilostazol 100 milliGRAM(s) Oral two times a day  dextrose 5%. 1000 milliLiter(s) (50 mL/Hr) IV Continuous <Continuous>  dextrose 50% Injectable 12.5 Gram(s) IV Push once  dextrose 50% Injectable 25 Gram(s) IV Push once  dextrose 50% Injectable 25 Gram(s) IV Push once  dorzolamide 2%/timolol 0.5% Ophthalmic Solution 1 Drop(s) Both EYES two times a day  furosemide   Injectable 40 milliGRAM(s) IV Push two times a day  insulin glargine Injectable (LANTUS) 15 Unit(s) SubCutaneous at bedtime  insulin lispro (HumaLOG) corrective regimen sliding scale   SubCutaneous three times a day before meals  insulin lispro (HumaLOG) corrective regimen sliding scale   SubCutaneous at bedtime  latanoprost 0.005% Ophthalmic Solution 1 Drop(s) Both EYES at bedtime  levothyroxine 100 MICROGram(s) Oral daily  simvastatin 20 milliGRAM(s) Oral at bedtime    VITALS:  T(F): 97.4 (06-16-18 @ 12:51), Max: 97.4 (06-16-18 @ 06:09)  HR: 64 (06-16-18 @ 12:51)  BP: 159/84 (06-16-18 @ 12:51)  RR: 18 (06-16-18 @ 12:51)  SpO2: 100% (06-16-18 @ 12:51)  Wt(kg): --    06-15 @ 07:01  -  06-16 @ 07:00  --------------------------------------------------------  IN: 300 mL / OUT: 0 mL / NET: 300 mL      PHYSICAL EXAM:  Constitutional: NAD  HEENT: anicteric sclera, oropharynx clear  Neck: No JVD  Respiratory: CTAB, no wheezes, rales or rhonchi  Cardiovascular: S1, S2, RRR  Gastrointestinal: BS+, soft, NT/ND  Extremities: No cyanosis or clubbing. 2+ peripheral edema  Neurological: A/O x 3, no focal deficits  Psychiatric: Normal mood, normal affect  : No CVA tenderness. No michaud.   Skin: No rashes    LABS:  06-16    142  |  103  |  68<H>  ----------------------------<  105<H>  4.7   |  30  |  2.61<H>    Ca    9.7      16 Jun 2018 06:00  Phos  3.6     06-16  Mg     2.2     06-16      Creatinine Trend: 2.61 <--, 2.59 <--, 2.28 <--, 2.39 <--                        9.6    5.34  )-----------( 164      ( 16 Jun 2018 06:00 )             30.0     Urine Studies:    RADIOLOGY & ADDITIONAL STUDIES:  < from: Xray Chest 1 View- PORTABLE-Urgent (06.13.18 @ 15:41) >    IMPRESSION:  Hazy increased bilateral lung markings and indistinct hilar shadows   compatible with congestion and edema.    No pleural effusions or pneumothorax.    Stable cardiac and aortic calcifications.    Leftward deviated trachea relatedto an enlarged right thyroid as   demonstrated on chest CT from 3/11/2018.    Generalized osteopenia and mild spinal degenerative changes.

## 2018-06-16 NOTE — PROGRESS NOTE ADULT - SUBJECTIVE AND OBJECTIVE BOX
INTERVAL HPI/OVERNIGHT EVENTS: Seen and examined with daughter in room . I feel better.   Vital Signs Last 24 Hrs  T(C): 36.3 (16 Jun 2018 12:51), Max: 36.3 (15 Sonny 2018 21:29)  T(F): 97.4 (16 Jun 2018 12:51), Max: 97.4 (16 Jun 2018 06:09)  HR: 58 (16 Jun 2018 17:15) (58 - 70)  BP: 132/69 (16 Jun 2018 17:15) (123/70 - 159/84)  BP(mean): --  RR: 18 (16 Jun 2018 12:51) (18 - 18)  SpO2: 100% (16 Jun 2018 12:51) (95% - 100%)  I&O's Summary    15 Sonny 2018 07:01  -  16 Jun 2018 07:00  --------------------------------------------------------  IN: 300 mL / OUT: 0 mL / NET: 300 mL      MEDICATIONS  (STANDING):  allopurinol 100 milliGRAM(s) Oral daily  aspirin enteric coated 81 milliGRAM(s) Oral daily  brimonidine 0.2% Ophthalmic Solution 1 Drop(s) Both EYES three times a day  carvedilol 25 milliGRAM(s) Oral every 12 hours  cilostazol 100 milliGRAM(s) Oral two times a day  dextrose 5%. 1000 milliLiter(s) (50 mL/Hr) IV Continuous <Continuous>  dextrose 50% Injectable 12.5 Gram(s) IV Push once  dextrose 50% Injectable 25 Gram(s) IV Push once  dextrose 50% Injectable 25 Gram(s) IV Push once  dorzolamide 2%/timolol 0.5% Ophthalmic Solution 1 Drop(s) Both EYES two times a day  furosemide   Injectable 40 milliGRAM(s) IV Push two times a day  insulin glargine Injectable (LANTUS) 15 Unit(s) SubCutaneous at bedtime  insulin lispro (HumaLOG) corrective regimen sliding scale   SubCutaneous three times a day before meals  insulin lispro (HumaLOG) corrective regimen sliding scale   SubCutaneous at bedtime  latanoprost 0.005% Ophthalmic Solution 1 Drop(s) Both EYES at bedtime  levothyroxine 100 MICROGram(s) Oral daily  simvastatin 20 milliGRAM(s) Oral at bedtime    MEDICATIONS  (PRN):  dextrose 40% Gel 15 Gram(s) Oral once PRN Blood Glucose LESS THAN 70 milliGRAM(s)/deciliter  glucagon  Injectable 1 milliGRAM(s) IntraMuscular once PRN Glucose LESS THAN 70 milligrams/deciliter    LABS:                        9.6    5.34  )-----------( 164      ( 16 Jun 2018 06:00 )             30.0     06-16    142  |  103  |  68<H>  ----------------------------<  105<H>  4.7   |  30  |  2.61<H>    Ca    9.7      16 Jun 2018 06:00  Phos  3.6     06-16  Mg     2.2     06-16          CAPILLARY BLOOD GLUCOSE      POCT Blood Glucose.: 150 mg/dL (16 Jun 2018 17:49)  POCT Blood Glucose.: 144 mg/dL (16 Jun 2018 12:33)  POCT Blood Glucose.: 90 mg/dL (16 Jun 2018 08:37)  POCT Blood Glucose.: 145 mg/dL (15 Sonny 2018 21:52)          REVIEW OF SYSTEMS:  CONSTITUTIONAL: No fever, weight loss, or fatigue  EYES: No eye pain, visual disturbances, or discharge  ENMT:  No difficulty hearing, tinnitus, vertigo; No sinus or throat pain  NECK: No pain or stiffness  BREASTS: No pain, masses, or nipple discharge  RESPIRATORY: No cough, wheezing, chills or hemoptysis; No shortness of breath  CARDIOVASCULAR: No chest pain, palpitations, dizziness, or leg swelling  GASTROINTESTINAL: No abdominal or epigastric pain. No nausea, vomiting, or hematemesis; No diarrhea or constipation. No melena or hematochezia.  GENITOURINARY: No dysuria, frequency, hematuria, or incontinence  NEUROLOGICAL: No headaches, memory loss, loss of strength, numbness, or tremors  SKIN: No itching, burning, rashes, or lesions   LYMPH NODES: No enlarged glands  ENDOCRINE: No heat or cold intolerance; No hair loss  MUSCULOSKELETAL: No joint pain or swelling; No muscle, back, or extremity pain    Consultant(s) Notes Reviewed:  [x ] YES  [ ] NO    PHYSICAL EXAM:  GENERAL: NAD, well-groomed, well-developed, not in any distress ,  HEAD:  Atraumatic, Normocephalic  EYES: EOMI, PERRLA, conjunctiva and sclera clear  ENMT: No tonsillar erythema, exudates, or enlargement; Moist mucous membranes, Good dentition, No lesions  NECK: Supple, No JVD, Normal thyroid  NERVOUS SYSTEM:  Alert & Oriented X3, No focal deficit   CHEST/LUNG: Good air entry bilateral with no  rales, rhonchi, wheezing, or rubs  HEART: Regular rate and rhythm; No murmurs, rubs, or gallops  ABDOMEN: Soft, Nontender, Nondistended; Bowel sounds present  EXTREMITIES:  2+ Peripheral Pulses, No clubbing, cyanosis, or edema  Care Discussed with Consultants/Other Providers [ x] YES  [ ] NO

## 2018-06-17 LAB
APTT BLD: 92.9 SEC — HIGH (ref 27.5–37.4)
BUN SERPL-MCNC: 68 MG/DL — HIGH (ref 7–23)
CALCIUM SERPL-MCNC: 9.4 MG/DL — SIGNIFICANT CHANGE UP (ref 8.4–10.5)
CHLORIDE SERPL-SCNC: 104 MMOL/L — SIGNIFICANT CHANGE UP (ref 98–107)
CO2 SERPL-SCNC: 29 MMOL/L — SIGNIFICANT CHANGE UP (ref 22–31)
CREAT SERPL-MCNC: 2.49 MG/DL — HIGH (ref 0.5–1.3)
GLUCOSE BLDC GLUCOMTR-MCNC: 104 MG/DL — HIGH (ref 70–99)
GLUCOSE BLDC GLUCOMTR-MCNC: 156 MG/DL — HIGH (ref 70–99)
GLUCOSE BLDC GLUCOMTR-MCNC: 179 MG/DL — HIGH (ref 70–99)
GLUCOSE BLDC GLUCOMTR-MCNC: 99 MG/DL — SIGNIFICANT CHANGE UP (ref 70–99)
GLUCOSE SERPL-MCNC: 90 MG/DL — SIGNIFICANT CHANGE UP (ref 70–99)
HCT VFR BLD CALC: 30.4 % — LOW (ref 34.5–45)
HGB BLD-MCNC: 9.1 G/DL — LOW (ref 11.5–15.5)
INR BLD: 1.75 — HIGH (ref 0.88–1.17)
MAGNESIUM SERPL-MCNC: 2.2 MG/DL — SIGNIFICANT CHANGE UP (ref 1.6–2.6)
MCHC RBC-ENTMCNC: 29 PG — SIGNIFICANT CHANGE UP (ref 27–34)
MCHC RBC-ENTMCNC: 29.9 % — LOW (ref 32–36)
MCV RBC AUTO: 96.8 FL — SIGNIFICANT CHANGE UP (ref 80–100)
NRBC # FLD: 0 — SIGNIFICANT CHANGE UP
PHOSPHATE SERPL-MCNC: 4.2 MG/DL — SIGNIFICANT CHANGE UP (ref 2.5–4.5)
PLATELET # BLD AUTO: 148 K/UL — LOW (ref 150–400)
PMV BLD: 12.4 FL — SIGNIFICANT CHANGE UP (ref 7–13)
POTASSIUM SERPL-MCNC: 4.3 MMOL/L — SIGNIFICANT CHANGE UP (ref 3.5–5.3)
POTASSIUM SERPL-SCNC: 4.3 MMOL/L — SIGNIFICANT CHANGE UP (ref 3.5–5.3)
PROTHROM AB SERPL-ACNC: 19.6 SEC — HIGH (ref 9.8–13.1)
RBC # BLD: 3.14 M/UL — LOW (ref 3.8–5.2)
RBC # FLD: 16.6 % — HIGH (ref 10.3–14.5)
SODIUM SERPL-SCNC: 144 MMOL/L — SIGNIFICANT CHANGE UP (ref 135–145)
WBC # BLD: 4.12 K/UL — SIGNIFICANT CHANGE UP (ref 3.8–10.5)
WBC # FLD AUTO: 4.12 K/UL — SIGNIFICANT CHANGE UP (ref 3.8–10.5)

## 2018-06-17 RX ORDER — HEPARIN SODIUM 5000 [USP'U]/ML
INJECTION INTRAVENOUS; SUBCUTANEOUS
Qty: 25000 | Refills: 0 | Status: DISCONTINUED | OUTPATIENT
Start: 2018-06-17 | End: 2018-06-19

## 2018-06-17 RX ORDER — HEPARIN SODIUM 5000 [USP'U]/ML
4000 INJECTION INTRAVENOUS; SUBCUTANEOUS EVERY 6 HOURS
Qty: 0 | Refills: 0 | Status: DISCONTINUED | OUTPATIENT
Start: 2018-06-17 | End: 2018-06-19

## 2018-06-17 RX ORDER — WARFARIN SODIUM 2.5 MG/1
3 TABLET ORAL ONCE
Qty: 0 | Refills: 0 | Status: COMPLETED | OUTPATIENT
Start: 2018-06-17 | End: 2018-06-17

## 2018-06-17 RX ORDER — HEPARIN SODIUM 5000 [USP'U]/ML
9000 INJECTION INTRAVENOUS; SUBCUTANEOUS EVERY 6 HOURS
Qty: 0 | Refills: 0 | Status: DISCONTINUED | OUTPATIENT
Start: 2018-06-17 | End: 2018-06-19

## 2018-06-17 RX ADMIN — Medication 100 MICROGRAM(S): at 05:32

## 2018-06-17 RX ADMIN — HEPARIN SODIUM 1900 UNIT(S)/HR: 5000 INJECTION INTRAVENOUS; SUBCUTANEOUS at 23:34

## 2018-06-17 RX ADMIN — BRIMONIDINE TARTRATE 1 DROP(S): 2 SOLUTION/ DROPS OPHTHALMIC at 21:27

## 2018-06-17 RX ADMIN — Medication 1: at 17:51

## 2018-06-17 RX ADMIN — Medication 40 MILLIGRAM(S): at 17:04

## 2018-06-17 RX ADMIN — INSULIN GLARGINE 15 UNIT(S): 100 INJECTION, SOLUTION SUBCUTANEOUS at 21:45

## 2018-06-17 RX ADMIN — CILOSTAZOL 100 MILLIGRAM(S): 100 TABLET ORAL at 17:04

## 2018-06-17 RX ADMIN — DORZOLAMIDE HYDROCHLORIDE TIMOLOL MALEATE 1 DROP(S): 20; 5 SOLUTION/ DROPS OPHTHALMIC at 05:32

## 2018-06-17 RX ADMIN — Medication 81 MILLIGRAM(S): at 13:35

## 2018-06-17 RX ADMIN — DORZOLAMIDE HYDROCHLORIDE TIMOLOL MALEATE 1 DROP(S): 20; 5 SOLUTION/ DROPS OPHTHALMIC at 17:04

## 2018-06-17 RX ADMIN — CILOSTAZOL 100 MILLIGRAM(S): 100 TABLET ORAL at 05:32

## 2018-06-17 RX ADMIN — HEPARIN SODIUM 1900 UNIT(S)/HR: 5000 INJECTION INTRAVENOUS; SUBCUTANEOUS at 17:04

## 2018-06-17 RX ADMIN — SIMVASTATIN 20 MILLIGRAM(S): 20 TABLET, FILM COATED ORAL at 21:27

## 2018-06-17 RX ADMIN — BRIMONIDINE TARTRATE 1 DROP(S): 2 SOLUTION/ DROPS OPHTHALMIC at 13:35

## 2018-06-17 RX ADMIN — LATANOPROST 1 DROP(S): 0.05 SOLUTION/ DROPS OPHTHALMIC; TOPICAL at 21:27

## 2018-06-17 RX ADMIN — WARFARIN SODIUM 3 MILLIGRAM(S): 2.5 TABLET ORAL at 17:04

## 2018-06-17 RX ADMIN — Medication 100 MILLIGRAM(S): at 13:35

## 2018-06-17 RX ADMIN — CARVEDILOL PHOSPHATE 25 MILLIGRAM(S): 80 CAPSULE, EXTENDED RELEASE ORAL at 17:04

## 2018-06-17 RX ADMIN — BRIMONIDINE TARTRATE 1 DROP(S): 2 SOLUTION/ DROPS OPHTHALMIC at 05:32

## 2018-06-17 RX ADMIN — Medication 40 MILLIGRAM(S): at 05:32

## 2018-06-17 NOTE — PROGRESS NOTE ADULT - SUBJECTIVE AND OBJECTIVE BOX
[facility] - Radha / Florin FELDMAN /Nate/ GASTON Castillo/ GASTON Tellez/ Shahid Segovia / BABAR Njeru  ---------------------------------------------------------------------------------------------------------------    Patient seen and examined bedside    Subjective and Objective: No overnight events. no sob. No new complaints today. +LE swelling    Allergies: No Known Allergies    Hospital Medications:   MEDICATIONS  (STANDING):  allopurinol 100 milliGRAM(s) Oral daily  aspirin enteric coated 81 milliGRAM(s) Oral daily  brimonidine 0.2% Ophthalmic Solution 1 Drop(s) Both EYES three times a day  carvedilol 25 milliGRAM(s) Oral every 12 hours  cilostazol 100 milliGRAM(s) Oral two times a day  dextrose 5%. 1000 milliLiter(s) (50 mL/Hr) IV Continuous <Continuous>  dextrose 50% Injectable 12.5 Gram(s) IV Push once  dextrose 50% Injectable 25 Gram(s) IV Push once  dextrose 50% Injectable 25 Gram(s) IV Push once  dorzolamide 2%/timolol 0.5% Ophthalmic Solution 1 Drop(s) Both EYES two times a day  furosemide   Injectable 40 milliGRAM(s) IV Push two times a day  heparin  Infusion.  Unit(s)/Hr (19 mL/Hr) IV Continuous <Continuous>  insulin glargine Injectable (LANTUS) 15 Unit(s) SubCutaneous at bedtime  insulin lispro (HumaLOG) corrective regimen sliding scale   SubCutaneous three times a day before meals  insulin lispro (HumaLOG) corrective regimen sliding scale   SubCutaneous at bedtime  latanoprost 0.005% Ophthalmic Solution 1 Drop(s) Both EYES at bedtime  levothyroxine 100 MICROGram(s) Oral daily  simvastatin 20 milliGRAM(s) Oral at bedtime  warfarin 3 milliGRAM(s) Oral once    VITALS:  T(F): 98 (06-17-18 @ 12:00), Max: 98 (06-17-18 @ 12:00)  HR: 57 (06-17-18 @ 12:00)  BP: 114/67 (06-17-18 @ 12:00)  RR: 18 (06-17-18 @ 12:00)  SpO2: 98% (06-17-18 @ 12:00)  Wt(kg): --    06-16 @ 07:01  -  06-17 @ 07:00  --------------------------------------------------------  IN: 200 mL / OUT: 0 mL / NET: 200 mL    06-17 @ 07:01  -  06-17 @ 16:19  --------------------------------------------------------  IN: 0 mL / OUT: 250 mL / NET: -250 mL        PHYSICAL EXAM:  Constitutional: NAD  HEENT: anicteric sclera, oropharynx clear  Neck: No JVD  Respiratory: dec bibasilar BS, no wheezes, rales or rhonchi  Cardiovascular: S1, S2, RRR  Gastrointestinal: BS+, soft, NT/ND  Extremities: No cyanosis or clubbing. 2+ peripheral edema  Neurological: A/O x 3, no focal deficits  Psychiatric: Normal mood, normal affect  : No CVA tenderness. No michaud.   Skin: No rashes    LABS:  06-17    144  |  104  |  68<H>  ----------------------------<  90  4.3   |  29  |  2.49<H>    Ca    9.4      17 Jun 2018 04:30  Phos  4.2     06-17  Mg     2.2     06-17      Creatinine Trend: 2.49 <--, 2.61 <--, 2.59 <--, 2.28 <--, 2.39 <--                        9.1    4.12  )-----------( 148      ( 17 Jun 2018 04:30 )             30.4     Urine Studies:        RADIOLOGY & ADDITIONAL STUDIES:

## 2018-06-17 NOTE — PROGRESS NOTE ADULT - SUBJECTIVE AND OBJECTIVE BOX
Subjective:  Denied  chest pain or sob                     resting in bed       MEDICATIONS  (STANDING):  allopurinol 100 milliGRAM(s) Oral daily  aspirin enteric coated 81 milliGRAM(s) Oral daily  brimonidine 0.2% Ophthalmic Solution 1 Drop(s) Both EYES three times a day  carvedilol 25 milliGRAM(s) Oral every 12 hours  cilostazol 100 milliGRAM(s) Oral two times a day  dextrose 5%. 1000 milliLiter(s) (50 mL/Hr) IV Continuous <Continuous>  dextrose 50% Injectable 12.5 Gram(s) IV Push once  dextrose 50% Injectable 25 Gram(s) IV Push once  dextrose 50% Injectable 25 Gram(s) IV Push once  dorzolamide 2%/timolol 0.5% Ophthalmic Solution 1 Drop(s) Both EYES two times a day  furosemide   Injectable 40 milliGRAM(s) IV Push two times a day  insulin glargine Injectable (LANTUS) 15 Unit(s) SubCutaneous at bedtime  insulin lispro (HumaLOG) corrective regimen sliding scale   SubCutaneous three times a day before meals  insulin lispro (HumaLOG) corrective regimen sliding scale   SubCutaneous at bedtime  latanoprost 0.005% Ophthalmic Solution 1 Drop(s) Both EYES at bedtime  levothyroxine 100 MICROGram(s) Oral daily  simvastatin 20 milliGRAM(s) Oral at bedtime    MEDICATIONS  (PRN):  dextrose 40% Gel 15 Gram(s) Oral once PRN Blood Glucose LESS THAN 70 milliGRAM(s)/deciliter  glucagon  Injectable 1 milliGRAM(s) IntraMuscular once PRN Glucose LESS THAN 70 milligrams/deciliter      LABS:                        9.1    4.12  )-----------( 148      ( 17 Jun 2018 04:30 )             30.4     Hemoglobin: 9.1 g/dL (06-17 @ 04:30)  Hemoglobin: 9.6 g/dL (06-16 @ 06:00)  Hemoglobin: 9.2 g/dL (06-15 @ 07:00)  Hemoglobin: 9.5 g/dL (06-14 @ 09:20)  Hemoglobin: 10.2 g/dL (06-13 @ 15:20)    06-17    144  |  104  |  68<H>  ----------------------------<  90  4.3   |  29  |  2.49<H>    Ca    9.4      17 Jun 2018 04:30  Phos  4.2     06-17  Mg     2.2     06-17      Creatinine Trend: 2.49<--, 2.61<--, 2.59<--, 2.28<--, 2.39<--   PT/INR - ( 17 Jun 2018 05:30 )   PT: 19.6 SEC;   INR: 1.75            PHYSICAL EXAM  Vital Signs Last 24 Hrs  T(C): 36.4 (17 Jun 2018 05:30), Max: 36.4 (17 Jun 2018 05:30)  T(F): 97.5 (17 Jun 2018 05:30), Max: 97.5 (17 Jun 2018 05:30)  HR: 58 (17 Jun 2018 05:30) (58 - 67)  BP: 112/60 (17 Jun 2018 05:30) (112/60 - 152/85)  BP(mean): --  RR: 18 (17 Jun 2018 05:30) (18 - 18)  SpO2: 100% (17 Jun 2018 05:30) (100% - 100%)    Heart: normal S1, S2, IRR, no m/r/g  Lungs: cta b/l  Abd: soft nT, nD  Ext: + edema in LE bilaterally       TELEMETRY:  AF 	    ECG: < from: 12 Lead ECG (06.13.18 @ 14:56) >  Atrial fibrillation  Right bundle branch block  Left posterior fascicular block  *** Bifascicular block ***  Cannot rule out Inferior infarct , age undetermined  T wave abnormality, consider lateral ischemia  Abnormal ECG    < end of copied text >   	  RADIOLOGY:   DIAGNOSTIC TESTING:  [ ] Echocardiogram:  [ ]  Catheterization:  [ ] Stress Test:    OTHER: 	      ASSESSMENT/PLAN: 	89y Female with history of Afib on ac, HTN, CKD admitted with syncope and volume overload.   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  Of note, Dr Banks had a long discussion with the patient and the patient's hcp / daughter.  They want conservative cardiac care at this time and do not want invasive cardiac procedures (i.e. cath).   - will f/u w/ family GOC

## 2018-06-17 NOTE — PROGRESS NOTE ADULT - SUBJECTIVE AND OBJECTIVE BOX
INTERVAL HPI/OVERNIGHT EVENTS: No new concerns.   Vital Signs Last 24 Hrs  T(C): 36.4 (17 Jun 2018 05:30), Max: 36.4 (17 Jun 2018 05:30)  T(F): 97.5 (17 Jun 2018 05:30), Max: 97.5 (17 Jun 2018 05:30)  HR: 58 (17 Jun 2018 05:30) (58 - 67)  BP: 112/60 (17 Jun 2018 05:30) (112/60 - 152/85)  BP(mean): --  RR: 18 (17 Jun 2018 05:30) (18 - 18)  SpO2: 100% (17 Jun 2018 05:30) (100% - 100%)  I&O's Summary    16 Jun 2018 07:01  -  17 Jun 2018 07:00  --------------------------------------------------------  IN: 200 mL / OUT: 0 mL / NET: 200 mL      MEDICATIONS  (STANDING):  allopurinol 100 milliGRAM(s) Oral daily  aspirin enteric coated 81 milliGRAM(s) Oral daily  brimonidine 0.2% Ophthalmic Solution 1 Drop(s) Both EYES three times a day  carvedilol 25 milliGRAM(s) Oral every 12 hours  cilostazol 100 milliGRAM(s) Oral two times a day  dextrose 5%. 1000 milliLiter(s) (50 mL/Hr) IV Continuous <Continuous>  dextrose 50% Injectable 12.5 Gram(s) IV Push once  dextrose 50% Injectable 25 Gram(s) IV Push once  dextrose 50% Injectable 25 Gram(s) IV Push once  dorzolamide 2%/timolol 0.5% Ophthalmic Solution 1 Drop(s) Both EYES two times a day  furosemide   Injectable 40 milliGRAM(s) IV Push two times a day  insulin glargine Injectable (LANTUS) 15 Unit(s) SubCutaneous at bedtime  insulin lispro (HumaLOG) corrective regimen sliding scale   SubCutaneous three times a day before meals  insulin lispro (HumaLOG) corrective regimen sliding scale   SubCutaneous at bedtime  latanoprost 0.005% Ophthalmic Solution 1 Drop(s) Both EYES at bedtime  levothyroxine 100 MICROGram(s) Oral daily  simvastatin 20 milliGRAM(s) Oral at bedtime    MEDICATIONS  (PRN):  dextrose 40% Gel 15 Gram(s) Oral once PRN Blood Glucose LESS THAN 70 milliGRAM(s)/deciliter  glucagon  Injectable 1 milliGRAM(s) IntraMuscular once PRN Glucose LESS THAN 70 milligrams/deciliter    LABS:                        9.1    4.12  )-----------( 148      ( 17 Jun 2018 04:30 )             30.4     06-17    144  |  104  |  68<H>  ----------------------------<  90  4.3   |  29  |  2.49<H>    Ca    9.4      17 Jun 2018 04:30  Phos  4.2     06-17  Mg     2.2     06-17      PT/INR - ( 17 Jun 2018 05:30 )   PT: 19.6 SEC;   INR: 1.75              CAPILLARY BLOOD GLUCOSE      POCT Blood Glucose.: 99 mg/dL (17 Jun 2018 12:28)  POCT Blood Glucose.: 104 mg/dL (17 Jun 2018 08:48)  POCT Blood Glucose.: 149 mg/dL (16 Jun 2018 22:02)  POCT Blood Glucose.: 150 mg/dL (16 Jun 2018 17:49)          REVIEW OF SYSTEMS:  CONSTITUTIONAL: No fever, weight loss, or fatigue  EYES: No eye pain, visual disturbances, or discharge  ENMT:  No difficulty hearing, tinnitus, vertigo; No sinus or throat pain  NECK: No pain or stiffness  BREASTS: No pain, masses, or nipple discharge  RESPIRATORY: No cough, wheezing, chills or hemoptysis; No shortness of breath  CARDIOVASCULAR: No chest pain, palpitations, dizziness, or leg swelling  GASTROINTESTINAL: No abdominal or epigastric pain. No nausea, vomiting, or hematemesis; No diarrhea or constipation. No melena or hematochezia.  GENITOURINARY: No dysuria, frequency, hematuria, or incontinence  NEUROLOGICAL: No headaches, memory loss, loss of strength, numbness, or tremors  SKIN: No itching, burning, rashes, or lesions   LYMPH NODES: No enlarged glands  ENDOCRINE: No heat or cold intolerance; No hair loss  MUSCULOSKELETAL: No joint pain or swelling; No muscle, back, or extremity pain  PSYCHIATRIC: No depression, anxiety, mood swings, or difficulty sleeping  HEME/LYMPH: No easy bruising, or bleeding gums  ALLERY AND IMMUNOLOGIC: No hives or eczema    RADIOLOGY & ADDITIONAL TESTS:    Consultant(s) Notes Reviewed:  [x ] YES  [ ] NO    PHYSICAL EXAM:  GENERAL: NAD, obese , not in any distress ,  HEAD:  Atraumatic, Normocephalic  EYES: EOMI, PERRLA, conjunctiva and sclera clear  ENMT: No tonsillar erythema, exudates, or enlargement; Moist mucous membranes, Good dentition, No lesions  NECK: Supple, No JVD, Normal thyroid  NERVOUS SYSTEM:  Alert & Oriented X3, No focal deficit   CHEST/LUNG: Good air entry bilateral with occ   rales,   HEART: Regular rate and rhythm; No murmurs, rubs, or gallops  ABDOMEN: Soft, Nontender, Nondistended; Bowel sounds present  EXTREMITIES:  2+ Peripheral Pulses, No clubbing, cyanosis, or edema  SKIN: No rashes or lesions    Care Discussed with Consultants/Other Providers [ x] YES  [ ] NO

## 2018-06-18 LAB
APTT BLD: > 200 SEC — CRITICAL HIGH (ref 27.5–37.4)
BUN SERPL-MCNC: 73 MG/DL — HIGH (ref 7–23)
CALCIUM SERPL-MCNC: 9.5 MG/DL — SIGNIFICANT CHANGE UP (ref 8.4–10.5)
CHLORIDE SERPL-SCNC: 103 MMOL/L — SIGNIFICANT CHANGE UP (ref 98–107)
CO2 SERPL-SCNC: 30 MMOL/L — SIGNIFICANT CHANGE UP (ref 22–31)
CREAT SERPL-MCNC: 2.59 MG/DL — HIGH (ref 0.5–1.3)
GLUCOSE BLDC GLUCOMTR-MCNC: 124 MG/DL — HIGH (ref 70–99)
GLUCOSE BLDC GLUCOMTR-MCNC: 150 MG/DL — HIGH (ref 70–99)
GLUCOSE BLDC GLUCOMTR-MCNC: 185 MG/DL — HIGH (ref 70–99)
GLUCOSE BLDC GLUCOMTR-MCNC: 194 MG/DL — HIGH (ref 70–99)
GLUCOSE SERPL-MCNC: 106 MG/DL — HIGH (ref 70–99)
HCT VFR BLD CALC: 27.9 % — LOW (ref 34.5–45)
HCT VFR BLD CALC: 29.2 % — LOW (ref 34.5–45)
HGB BLD-MCNC: 8.5 G/DL — LOW (ref 11.5–15.5)
HGB BLD-MCNC: 9.2 G/DL — LOW (ref 11.5–15.5)
INR BLD: 1.59 — HIGH (ref 0.88–1.17)
INR BLD: 1.6 — HIGH (ref 0.88–1.17)
MAGNESIUM SERPL-MCNC: 2.2 MG/DL — SIGNIFICANT CHANGE UP (ref 1.6–2.6)
MCHC RBC-ENTMCNC: 29.4 PG — SIGNIFICANT CHANGE UP (ref 27–34)
MCHC RBC-ENTMCNC: 29.4 PG — SIGNIFICANT CHANGE UP (ref 27–34)
MCHC RBC-ENTMCNC: 30.5 % — LOW (ref 32–36)
MCHC RBC-ENTMCNC: 31.5 % — LOW (ref 32–36)
MCV RBC AUTO: 93.3 FL — SIGNIFICANT CHANGE UP (ref 80–100)
MCV RBC AUTO: 96.5 FL — SIGNIFICANT CHANGE UP (ref 80–100)
NRBC # FLD: 0 — SIGNIFICANT CHANGE UP
NRBC # FLD: 0 — SIGNIFICANT CHANGE UP
PHOSPHATE SERPL-MCNC: 4.2 MG/DL — SIGNIFICANT CHANGE UP (ref 2.5–4.5)
PLATELET # BLD AUTO: 140 K/UL — LOW (ref 150–400)
PLATELET # BLD AUTO: 158 K/UL — SIGNIFICANT CHANGE UP (ref 150–400)
PMV BLD: 11.4 FL — SIGNIFICANT CHANGE UP (ref 7–13)
PMV BLD: 12.7 FL — SIGNIFICANT CHANGE UP (ref 7–13)
POTASSIUM SERPL-MCNC: 4.1 MMOL/L — SIGNIFICANT CHANGE UP (ref 3.5–5.3)
POTASSIUM SERPL-SCNC: 4.1 MMOL/L — SIGNIFICANT CHANGE UP (ref 3.5–5.3)
PROTHROM AB SERPL-ACNC: 18.5 SEC — HIGH (ref 9.8–13.1)
PROTHROM AB SERPL-ACNC: 18.6 SEC — HIGH (ref 9.8–13.1)
RBC # BLD: 2.89 M/UL — LOW (ref 3.8–5.2)
RBC # BLD: 3.13 M/UL — LOW (ref 3.8–5.2)
RBC # FLD: 16.3 % — HIGH (ref 10.3–14.5)
RBC # FLD: 16.5 % — HIGH (ref 10.3–14.5)
SODIUM SERPL-SCNC: 144 MMOL/L — SIGNIFICANT CHANGE UP (ref 135–145)
WBC # BLD: 5.13 K/UL — SIGNIFICANT CHANGE UP (ref 3.8–10.5)
WBC # BLD: 5.13 K/UL — SIGNIFICANT CHANGE UP (ref 3.8–10.5)
WBC # FLD AUTO: 5.13 K/UL — SIGNIFICANT CHANGE UP (ref 3.8–10.5)
WBC # FLD AUTO: 5.13 K/UL — SIGNIFICANT CHANGE UP (ref 3.8–10.5)

## 2018-06-18 PROCEDURE — 99233 SBSQ HOSP IP/OBS HIGH 50: CPT

## 2018-06-18 PROCEDURE — 93306 TTE W/DOPPLER COMPLETE: CPT | Mod: 26

## 2018-06-18 RX ORDER — LANOLIN ALCOHOL/MO/W.PET/CERES
3 CREAM (GRAM) TOPICAL AT BEDTIME
Qty: 0 | Refills: 0 | Status: DISCONTINUED | OUTPATIENT
Start: 2018-06-18 | End: 2018-06-29

## 2018-06-18 RX ORDER — WARFARIN SODIUM 2.5 MG/1
3 TABLET ORAL ONCE
Qty: 0 | Refills: 0 | Status: COMPLETED | OUTPATIENT
Start: 2018-06-18 | End: 2018-06-18

## 2018-06-18 RX ORDER — NYSTATIN CREAM 100000 [USP'U]/G
1 CREAM TOPICAL
Qty: 0 | Refills: 0 | Status: DISCONTINUED | OUTPATIENT
Start: 2018-06-18 | End: 2018-06-29

## 2018-06-18 RX ORDER — FUROSEMIDE 40 MG
80 TABLET ORAL
Qty: 0 | Refills: 0 | Status: DISCONTINUED | OUTPATIENT
Start: 2018-06-18 | End: 2018-06-25

## 2018-06-18 RX ADMIN — BRIMONIDINE TARTRATE 1 DROP(S): 2 SOLUTION/ DROPS OPHTHALMIC at 21:45

## 2018-06-18 RX ADMIN — CILOSTAZOL 100 MILLIGRAM(S): 100 TABLET ORAL at 17:20

## 2018-06-18 RX ADMIN — Medication 40 MILLIGRAM(S): at 05:45

## 2018-06-18 RX ADMIN — HEPARIN SODIUM 0 UNIT(S)/HR: 5000 INJECTION INTRAVENOUS; SUBCUTANEOUS at 16:30

## 2018-06-18 RX ADMIN — HEPARIN SODIUM 0 UNIT(S)/HR: 5000 INJECTION INTRAVENOUS; SUBCUTANEOUS at 08:14

## 2018-06-18 RX ADMIN — WARFARIN SODIUM 3 MILLIGRAM(S): 2.5 TABLET ORAL at 17:20

## 2018-06-18 RX ADMIN — Medication 100 MILLIGRAM(S): at 09:53

## 2018-06-18 RX ADMIN — Medication 1: at 13:22

## 2018-06-18 RX ADMIN — DORZOLAMIDE HYDROCHLORIDE TIMOLOL MALEATE 1 DROP(S): 20; 5 SOLUTION/ DROPS OPHTHALMIC at 05:45

## 2018-06-18 RX ADMIN — Medication 80 MILLIGRAM(S): at 17:20

## 2018-06-18 RX ADMIN — Medication 1: at 18:02

## 2018-06-18 RX ADMIN — Medication 81 MILLIGRAM(S): at 09:53

## 2018-06-18 RX ADMIN — SIMVASTATIN 20 MILLIGRAM(S): 20 TABLET, FILM COATED ORAL at 21:45

## 2018-06-18 RX ADMIN — HEPARIN SODIUM 1600 UNIT(S)/HR: 5000 INJECTION INTRAVENOUS; SUBCUTANEOUS at 09:09

## 2018-06-18 RX ADMIN — CILOSTAZOL 100 MILLIGRAM(S): 100 TABLET ORAL at 05:45

## 2018-06-18 RX ADMIN — BRIMONIDINE TARTRATE 1 DROP(S): 2 SOLUTION/ DROPS OPHTHALMIC at 05:45

## 2018-06-18 RX ADMIN — LATANOPROST 1 DROP(S): 0.05 SOLUTION/ DROPS OPHTHALMIC; TOPICAL at 21:45

## 2018-06-18 RX ADMIN — BRIMONIDINE TARTRATE 1 DROP(S): 2 SOLUTION/ DROPS OPHTHALMIC at 13:22

## 2018-06-18 RX ADMIN — HEPARIN SODIUM 1300 UNIT(S)/HR: 5000 INJECTION INTRAVENOUS; SUBCUTANEOUS at 17:27

## 2018-06-18 RX ADMIN — Medication 3 MILLIGRAM(S): at 21:45

## 2018-06-18 RX ADMIN — Medication 100 MICROGRAM(S): at 05:45

## 2018-06-18 RX ADMIN — CARVEDILOL PHOSPHATE 25 MILLIGRAM(S): 80 CAPSULE, EXTENDED RELEASE ORAL at 17:20

## 2018-06-18 RX ADMIN — DORZOLAMIDE HYDROCHLORIDE TIMOLOL MALEATE 1 DROP(S): 20; 5 SOLUTION/ DROPS OPHTHALMIC at 17:20

## 2018-06-18 RX ADMIN — INSULIN GLARGINE 15 UNIT(S): 100 INJECTION, SOLUTION SUBCUTANEOUS at 21:46

## 2018-06-18 RX ADMIN — CARVEDILOL PHOSPHATE 25 MILLIGRAM(S): 80 CAPSULE, EXTENDED RELEASE ORAL at 05:45

## 2018-06-18 NOTE — PROGRESS NOTE ADULT - SUBJECTIVE AND OBJECTIVE BOX
INTERVAL HPI/OVERNIGHT EVENTS: I feel better.   Vital Signs Last 24 Hrs  T(C): 36.6 (18 Jun 2018 12:21), Max: 36.7 (18 Jun 2018 05:43)  T(F): 97.8 (18 Jun 2018 12:21), Max: 98 (18 Jun 2018 05:43)  HR: 65 (18 Jun 2018 12:21) (60 - 65)  BP: 125/59 (18 Jun 2018 12:21) (102/48 - 132/54)  BP(mean): --  RR: 18 (18 Jun 2018 12:21) (18 - 18)  SpO2: 100% (18 Jun 2018 12:21) (98% - 100%)  I&O's Summary    17 Jun 2018 07:01  -  18 Jun 2018 07:00  --------------------------------------------------------  IN: 209 mL / OUT: 250 mL / NET: -41 mL      MEDICATIONS  (STANDING):  allopurinol 100 milliGRAM(s) Oral daily  aspirin enteric coated 81 milliGRAM(s) Oral daily  brimonidine 0.2% Ophthalmic Solution 1 Drop(s) Both EYES three times a day  carvedilol 25 milliGRAM(s) Oral every 12 hours  cilostazol 100 milliGRAM(s) Oral two times a day  dextrose 5%. 1000 milliLiter(s) (50 mL/Hr) IV Continuous <Continuous>  dextrose 50% Injectable 12.5 Gram(s) IV Push once  dextrose 50% Injectable 25 Gram(s) IV Push once  dextrose 50% Injectable 25 Gram(s) IV Push once  dorzolamide 2%/timolol 0.5% Ophthalmic Solution 1 Drop(s) Both EYES two times a day  furosemide   Injectable 80 milliGRAM(s) IV Push two times a day  heparin  Infusion.  Unit(s)/Hr (19 mL/Hr) IV Continuous <Continuous>  insulin glargine Injectable (LANTUS) 15 Unit(s) SubCutaneous at bedtime  insulin lispro (HumaLOG) corrective regimen sliding scale   SubCutaneous three times a day before meals  insulin lispro (HumaLOG) corrective regimen sliding scale   SubCutaneous at bedtime  latanoprost 0.005% Ophthalmic Solution 1 Drop(s) Both EYES at bedtime  levothyroxine 100 MICROGram(s) Oral daily  simvastatin 20 milliGRAM(s) Oral at bedtime  warfarin 3 milliGRAM(s) Oral once    MEDICATIONS  (PRN):  dextrose 40% Gel 15 Gram(s) Oral once PRN Blood Glucose LESS THAN 70 milliGRAM(s)/deciliter  glucagon  Injectable 1 milliGRAM(s) IntraMuscular once PRN Glucose LESS THAN 70 milligrams/deciliter  heparin  Injectable 9000 Unit(s) IV Push every 6 hours PRN For aPTT less than 40  heparin  Injectable 4000 Unit(s) IV Push every 6 hours PRN For aPTT between 40 - 57    LABS:                        9.2    5.13  )-----------( 158      ( 18 Jun 2018 04:00 )             29.2     06-18    144  |  103  |  73<H>  ----------------------------<  106<H>  4.1   |  30  |  2.59<H>    Ca    9.5      18 Jun 2018 04:00  Phos  4.2     06-18  Mg     2.2     06-18      PT/INR - ( 18 Jun 2018 06:42 )   PT: Test not performed 06/18/18 0750:  PT previously reported as: 18.5  H SEC SEC;   INR: Test not performed 06/18/18 0750:  INR previously reported as: 1.59  H         PTT - ( 18 Jun 2018 06:42 )  PTT:-- SEC    CAPILLARY BLOOD GLUCOSE      POCT Blood Glucose.: 185 mg/dL (18 Jun 2018 12:39)  POCT Blood Glucose.: 124 mg/dL (18 Jun 2018 08:43)  POCT Blood Glucose.: 179 mg/dL (17 Jun 2018 21:33)  POCT Blood Glucose.: 156 mg/dL (17 Jun 2018 17:49)          REVIEW OF SYSTEMS:  CONSTITUTIONAL: No fever, weight loss, or fatigue  EYES: No eye pain, visual disturbances, or discharge  ENMT:  No difficulty hearing, tinnitus, vertigo; No sinus or throat pain  NECK: No pain or stiffness  BREASTS: No pain, masses, or nipple discharge  RESPIRATORY: No cough, wheezing, chills or hemoptysis; No shortness of breath  CARDIOVASCULAR: No chest pain, palpitations, dizziness, but  leg swelling  GASTROINTESTINAL: No abdominal or epigastric pain. No nausea, vomiting, or hematemesis; No diarrhea or constipation. No melena or hematochezia.  GENITOURINARY: No dysuria, frequency, hematuria, or incontinence  NEUROLOGICAL: No headaches, memory loss, loss of strength, numbness, or tremors  SKIN: No itching, burning, rashes, or lesions   LYMPH NODES: No enlarged glands  ENDOCRINE: No heat or cold intolerance; No hair loss      Consultant(s) Notes Reviewed:  [x ] YES  [ ] NO    PHYSICAL EXAM:  GENERAL: NAD, well-groomed, well-developed,not in any distress ,  HEAD:  Atraumatic, Normocephalic  EYES: EOMI, PERRLA, conjunctiva and sclera clear  ENMT: No tonsillar erythema, exudates, or enlargement; Moist mucous membranes, Good dentition, No lesions  NECK: Supple, No JVD, Normal thyroid  NERVOUS SYSTEM:  Alert & Oriented X3, No focal deficit   CHEST/LUNG: Good air entry bilateral with no  rales, rhonchi, wheezing, or rubs  HEART: Regular rate and rhythm; No murmurs, rubs, or gallops  ABDOMEN: Soft, Nontender, Nondistended; Bowel sounds present  EXTREMITIES:  2+ Peripheral Pulses, No clubbing, cyanosis,but +edema  SKIN: No rashes or lesions    Care Discussed with Consultants/Other Providers [ x] YES  [ ] NO

## 2018-06-18 NOTE — PROGRESS NOTE ADULT - SUBJECTIVE AND OBJECTIVE BOX
[facility] - Radha / Florin S /Nate/ GASTON Castillo/ GASTON Tellez/ Shahid Segovia / BABAR Njeru  ---------------------------------------------------------------------------------------------------------------    Patient seen and examined bedside    Subjective and Objective: No overnight events. Just returned from TTE. sob present but better. No new complaints today. denied N/V/D/urinary sxs    Allergies: No Known Allergies      Hospital Medications:   MEDICATIONS  (STANDING):  allopurinol 100 milliGRAM(s) Oral daily  aspirin enteric coated 81 milliGRAM(s) Oral daily  brimonidine 0.2% Ophthalmic Solution 1 Drop(s) Both EYES three times a day  carvedilol 25 milliGRAM(s) Oral every 12 hours  cilostazol 100 milliGRAM(s) Oral two times a day  dextrose 5%. 1000 milliLiter(s) (50 mL/Hr) IV Continuous <Continuous>  dextrose 50% Injectable 12.5 Gram(s) IV Push once  dextrose 50% Injectable 25 Gram(s) IV Push once  dextrose 50% Injectable 25 Gram(s) IV Push once  dorzolamide 2%/timolol 0.5% Ophthalmic Solution 1 Drop(s) Both EYES two times a day  furosemide   Injectable 80 milliGRAM(s) IV Push two times a day  heparin  Infusion.  Unit(s)/Hr (19 mL/Hr) IV Continuous <Continuous>  insulin glargine Injectable (LANTUS) 15 Unit(s) SubCutaneous at bedtime  insulin lispro (HumaLOG) corrective regimen sliding scale   SubCutaneous three times a day before meals  insulin lispro (HumaLOG) corrective regimen sliding scale   SubCutaneous at bedtime  latanoprost 0.005% Ophthalmic Solution 1 Drop(s) Both EYES at bedtime  levothyroxine 100 MICROGram(s) Oral daily  simvastatin 20 milliGRAM(s) Oral at bedtime  warfarin 3 milliGRAM(s) Oral once      VITALS:  T(F): 97.8 (06-18-18 @ 12:21), Max: 98 (06-18-18 @ 05:43)  HR: 65 (06-18-18 @ 12:21)  BP: 125/59 (06-18-18 @ 12:21)  RR: 18 (06-18-18 @ 12:21)  SpO2: 100% (06-18-18 @ 12:21)  Wt(kg): --    06-17 @ 07:01  -  06-18 @ 07:00  --------------------------------------------------------  IN: 209 mL / OUT: 250 mL / NET: -41 mL      PHYSICAL EXAM:  Constitutional: NAD, obese  HEENT: anicteric sclera, oropharynx clear  Neck: No JVD  Respiratory: dec bibasilar, no wheezes,  Cardiovascular: S1, S2, RRR  Gastrointestinal: BS+, soft, NT/ND  Extremities: No cyanosis or clubbing. 3+ peripheral edema  Neurological: A/O x 3, no focal deficits  Psychiatric: Normal mood, normal affect  : No CVA tenderness. No michaud.   Skin: No rashes    LABS:  06-18    144  |  103  |  73<H>  ----------------------------<  106<H>  4.1   |  30  |  2.59<H>    Ca    9.5      18 Jun 2018 04:00  Phos  4.2     06-18  Mg     2.2     06-18      Creatinine Trend: 2.59 <--, 2.49 <--, 2.61 <--, 2.59 <--, 2.28 <--, 2.39 <--                        9.2    5.13  )-----------( 158      ( 18 Jun 2018 04:00 )             29.2     Urine Studies:        RADIOLOGY & ADDITIONAL STUDIES:

## 2018-06-18 NOTE — PROVIDER CONTACT NOTE (CRITICAL VALUE NOTIFICATION) - BACKGROUND
(Admit Diagnosis) Syncope and collapse  (PMH) Personal history of PE (pulmonary embolism)  (PMH) Glaucoma  (PMH) PVD (peripheral vascular disease)  (PMH) Hypothyroid

## 2018-06-18 NOTE — PROGRESS NOTE ADULT - SUBJECTIVE AND OBJECTIVE BOX
Subjective: No CP or SOB    MEDICATIONS  (STANDING):  allopurinol 100 milliGRAM(s) Oral daily  aspirin enteric coated 81 milliGRAM(s) Oral daily  brimonidine 0.2% Ophthalmic Solution 1 Drop(s) Both EYES three times a day  carvedilol 25 milliGRAM(s) Oral every 12 hours  cilostazol 100 milliGRAM(s) Oral two times a day  dorzolamide 2%/timolol 0.5% Ophthalmic Solution 1 Drop(s) Both EYES two times a day  furosemide   Injectable 80 milliGRAM(s) IV Push two times a day  heparin  Infusion.  Unit(s)/Hr (19 mL/Hr) IV Continuous <Continuous>  insulin glargine Injectable (LANTUS) 15 Unit(s) SubCutaneous at bedtime  insulin lispro (HumaLOG) corrective regimen sliding scale   SubCutaneous three times a day before meals  insulin lispro (HumaLOG) corrective regimen sliding scale   SubCutaneous at bedtime  latanoprost 0.005% Ophthalmic Solution 1 Drop(s) Both EYES at bedtime  levothyroxine 100 MICROGram(s) Oral daily  simvastatin 20 milliGRAM(s) Oral at bedtime  warfarin 3 milliGRAM(s) Oral once    MEDICATIONS  (PRN):  dextrose 40% Gel 15 Gram(s) Oral once PRN Blood Glucose LESS THAN 70 milliGRAM(s)/deciliter  glucagon  Injectable 1 milliGRAM(s) IntraMuscular once PRN Glucose LESS THAN 70 milligrams/deciliter  heparin  Injectable 9000 Unit(s) IV Push every 6 hours PRN For aPTT less than 40  heparin  Injectable 4000 Unit(s) IV Push every 6 hours PRN For aPTT between 40 - 57      LABS:                        9.2    5.13  )-----------( 158      ( 18 Jun 2018 04:00 )             29.2     144  |  103  |  73<H>  ----------------------------<  106<H>  4.1   |  30  |  2.59<H>    Ca    9.5      18 Jun 2018 04:00  Phos  4.2     06-18  Mg     2.2     06-18    Creatinine Trend: 2.59<--, 2.49<--, 2.61<--, 2.59<--, 2.28<--, 2.39<--   PT/INR - ( 18 Jun 2018 06:42 )   PT: Test not performed 06/18/18 0750:  PT previously reported as: 18.5  H SEC SEC;   INR: Test not performed 06/18/18 0750:  INR previously reported as: 1.59  H     PTT - ( 18 Jun 2018 15:02 )  PTT:> 200.0 SEC    PHYSICAL EXAM  Vital Signs Last 24 Hrs  T(C): 36.6 (18 Jun 2018 12:21), Max: 36.7 (18 Jun 2018 05:43)  T(F): 97.8 (18 Jun 2018 12:21), Max: 98 (18 Jun 2018 05:43)  HR: 65 (18 Jun 2018 12:21) (60 - 65)  BP: 125/59 (18 Jun 2018 12:21) (102/48 - 132/54)  RR: 18 (18 Jun 2018 12:21) (18 - 18)  SpO2: 100% (18 Jun 2018 12:21) (98% - 100%)    Heart: normal S1, S2, IRR, no m/r/g  Lungs: cta b/l  Abd: soft nT, nD  Ext: + edema in LE bilaterally     DATA:    TELEMETRY:  AF 	      ECG: < from: 12 Lead ECG (06.13.18 @ 14:56) >  Atrial fibrillation  Right bundle branch block  Left posterior fascicular block  *** Bifascicular block ***  Cannot rule out Inferior infarct , age undetermined  T wave abnormality, consider lateral ischemia  Abnormal ECG    < end of copied text >    < from: Transthoracic Echocardiogram (06.18.18 @ 10:33) >  CONCLUSIONS:  1. Mitral annular calcification, otherwise normal mitral  valve. Mild mitral regurgitation.  2. Severely dilated left atrium.  LA volume index = 69  cc/m2.  3. Normal left ventricular internal dimensions and wall  thicknesses.  4. Endocardium not well visualized; grossly normal left  ventricular systolic function.  5. Moderate right atrial enlargement.  6. Right ventricular enlargement with decreased right  ventricular systolic function.  7. Estimated right ventricular systolic pressure equals 53  mm Hg, assuming right atrial pressure equals 10 mm Hg,  consistent with moderate pulmonary hypertension.  8. Normal tricuspid valve.  Moderate-severe tricuspid  regurgitation.  *** No previous Echo exam.  ------------------------------------------------------------------------  Confirmed on  6/18/2018 - 12:27:29 by Lance Ferris M.D.    < end of copied text >      	      ASSESSMENT/PLAN: 	89y Female with history of Afib on AC, HTN, CKD admitted with syncope and volume overload c/w acute on chronic CHF    --Pt  still volume overloaded, continue with IV diuresis to keep O > I.  D/W Renal.  Increased Lasix 80 IV BID  --TTE noted above  --Plan for [facility] PPM per house EP  --Pt  with elevated Troponin with negative CPK, likely secondary to ADHF and CKD.  Of note, Dr Banks had a long discussion with the patient and the patient's hcp / daughter.  They want conservative cardiac care at this time and do not want invasive cardiac procedures (i.e. cath).   --On Heparin --> Coumadin, Goal INR 2-3    -Son and  updated at bedside    Kristina Conteh PA-C

## 2018-06-18 NOTE — PROGRESS NOTE ADULT - SUBJECTIVE AND OBJECTIVE BOX
Patient seen and evaluated today.  No significant events overnight.  Feels generally well; "getting there"; denies dizziness.  Telemetry review demonstrates Afib with HR: 63 (06-18-18 @ 05:43) (57 - 65).    MEDICATIONS:  allopurinol 100 milliGRAM(s) Oral daily  aspirin enteric coated 81 milliGRAM(s) Oral daily  brimonidine 0.2% Ophthalmic Solution 1 Drop(s) Both EYES three times a day  carvedilol 25 milliGRAM(s) Oral every 12 hours  cilostazol 100 milliGRAM(s) Oral two times a day  dextrose 40% Gel 15 Gram(s) Oral once PRN  dextrose 5%. 1000 milliLiter(s) IV Continuous <Continuous>  dextrose 50% Injectable 12.5 Gram(s) IV Push once  dextrose 50% Injectable 25 Gram(s) IV Push once  dextrose 50% Injectable 25 Gram(s) IV Push once  dorzolamide 2%/timolol 0.5% Ophthalmic Solution 1 Drop(s) Both EYES two times a day  furosemide   Injectable 40 milliGRAM(s) IV Push two times a day  glucagon  Injectable 1 milliGRAM(s) IntraMuscular once PRN  heparin  Infusion.  Unit(s)/Hr IV Continuous <Continuous>  heparin  Injectable 9000 Unit(s) IV Push every 6 hours PRN  heparin  Injectable 4000 Unit(s) IV Push every 6 hours PRN  insulin glargine Injectable (LANTUS) 15 Unit(s) SubCutaneous at bedtime  insulin lispro (HumaLOG) corrective regimen sliding scale   SubCutaneous three times a day before meals  insulin lispro (HumaLOG) corrective regimen sliding scale   SubCutaneous at bedtime  latanoprost 0.005% Ophthalmic Solution 1 Drop(s) Both EYES at bedtime  levothyroxine 100 MICROGram(s) Oral daily  simvastatin 20 milliGRAM(s) Oral at bedtime    REVIEW OF SYSTEMS:  CONSTITUTIONAL: No fever, weight loss, or fatigue  NECK: No pain or stiffness  RESPIRATORY: No cough, wheezing, chills or hemoptysis; No Shortness of Breath  CARDIOVASCULAR: No chest pain, palpitations, passing out, dizziness, or leg swelling  GASTROINTESTINAL: No abdominal or epigastric pain. No nausea, vomiting, or hematemesis; No diarrhea or constipation. No melena or hematochezia.  NEUROLOGICAL: No headaches, memory loss, loss of strength, numbness, or tremors  SKIN: No itching, burning, rashes, or lesions   PSYCHIATRIC: No depression, anxiety, mood swings, or difficulty sleeping  HEME/LYMPH: No easy bruising, or bleeding gums  ALLERGY AND IMMUNOLOGIC: No hives or eczema	  All others negative	    PHYSICAL EXAM:  T(C): 36.7 (06-18-18 @ 05:43), Max: 36.7 (06-17-18 @ 12:00)  HR: 63 (06-18-18 @ 05:43) (57 - 65)  BP: 132/54 (06-18-18 @ 05:43) (102/48 - 132/54)  RR: 18 (06-18-18 @ 05:43) (18 - 18)  SpO2: 100% (06-18-18 @ 05:43) (98% - 100%)  Wt(kg): --  I&O's Summary    17 Jun 2018 07:01  -  18 Jun 2018 07:00  --------------------------------------------------------  IN: 209 mL / OUT: 250 mL / NET: -41 mL         Appearance: Normal	  HEENT:   Normal oral mucosa, PERRL, EOMI	  Lymphatic: No lymphadenopathy  Cardiovascular: Normal S1 S2, No JVD, No murmurs, No edema  Respiratory: Lungs clear to auscultation	  Psychiatry: A & O x 3, Mood & affect appropriate  Gastrointestinal:  Soft, Non-tender, + BS	  Skin: No rashes, No ecchymoses, No cyanosis	  Neurologic: Non-focal  Extremities: Normal range of motion, No clubbing, cyanosis or edema  Vascular: Peripheral pulses palpable 2+ bilaterally    DIAGNOSTICS:  TELEMETRY: as above 	    LABS:	 	                          9.2    5.13  )-----------( 158      ( 18 Jun 2018 04:00 )             29.2     06-18    144  |  103  |  73<H>  ----------------------------<  106<H>  4.1   |  30  |  2.59<H>    Ca    9.5      18 Jun 2018 04:00  Phos  4.2     06-18  Mg     2.2     06-18      proBNP: Serum Pro-Brain Natriuretic Peptide: 3380 pg/mL (06-14 @ 06:15)  Serum Pro-Brain Natriuretic Peptide: 2943 pg/mL (06-13 @ 17:19) Patient seen and evaluated today.  No significant events overnight.  Feels generally well; "getting there"; denies dizziness.  Telemetry review demonstrates Afib with HR: 63 (06-18-18 @ 05:43) (57 - 65).    MEDICATIONS:  allopurinol 100 milliGRAM(s) Oral daily  aspirin enteric coated 81 milliGRAM(s) Oral daily  brimonidine 0.2% Ophthalmic Solution 1 Drop(s) Both EYES three times a day  carvedilol 25 milliGRAM(s) Oral every 12 hours  cilostazol 100 milliGRAM(s) Oral two times a day  dextrose 40% Gel 15 Gram(s) Oral once PRN  dextrose 5%. 1000 milliLiter(s) IV Continuous <Continuous>  dextrose 50% Injectable 12.5 Gram(s) IV Push once  dextrose 50% Injectable 25 Gram(s) IV Push once  dextrose 50% Injectable 25 Gram(s) IV Push once  dorzolamide 2%/timolol 0.5% Ophthalmic Solution 1 Drop(s) Both EYES two times a day  furosemide   Injectable 40 milliGRAM(s) IV Push two times a day  glucagon  Injectable 1 milliGRAM(s) IntraMuscular once PRN  heparin  Infusion.  Unit(s)/Hr IV Continuous <Continuous>  heparin  Injectable 9000 Unit(s) IV Push every 6 hours PRN  heparin  Injectable 4000 Unit(s) IV Push every 6 hours PRN  insulin glargine Injectable (LANTUS) 15 Unit(s) SubCutaneous at bedtime  insulin lispro (HumaLOG) corrective regimen sliding scale   SubCutaneous three times a day before meals  insulin lispro (HumaLOG) corrective regimen sliding scale   SubCutaneous at bedtime  latanoprost 0.005% Ophthalmic Solution 1 Drop(s) Both EYES at bedtime  levothyroxine 100 MICROGram(s) Oral daily  simvastatin 20 milliGRAM(s) Oral at bedtime    REVIEW OF SYSTEMS:  CONSTITUTIONAL: No fever, weight loss, or fatigue  NECK: No pain or stiffness  RESPIRATORY: No cough, wheezing, chills or hemoptysis; No Shortness of Breath  CARDIOVASCULAR: No chest pain, palpitations, passing out, dizziness, or leg swelling  GASTROINTESTINAL: No abdominal or epigastric pain. No nausea, vomiting, or hematemesis; No diarrhea or constipation. No melena or hematochezia.  NEUROLOGICAL: No headaches, memory loss, loss of strength, numbness, or tremors  SKIN: No itching, burning, rashes, or lesions   PSYCHIATRIC: No depression, anxiety, mood swings, or difficulty sleeping  HEME/LYMPH: No easy bruising, or bleeding gums  ALLERGY AND IMMUNOLOGIC: No hives or eczema	  All others negative	    PHYSICAL EXAM:  T(C): 36.7 (06-18-18 @ 05:43), Max: 36.7 (06-17-18 @ 12:00)  HR: 63 (06-18-18 @ 05:43) (57 - 65)  BP: 132/54 (06-18-18 @ 05:43) (102/48 - 132/54)  RR: 18 (06-18-18 @ 05:43) (18 - 18)  SpO2: 100% (06-18-18 @ 05:43) (98% - 100%)  Wt(kg): --  I&O's Summary    17 Jun 2018 07:01  -  18 Jun 2018 07:00  --------------------------------------------------------  IN: 209 mL / OUT: 250 mL / NET: -41 mL         Appearance: Mildly somnolent yet conversive	  HEENT:   Normal oral mucosa, PERRL, EOMI	  Lymphatic: No lymphadenopathy  Cardiovascular: Normal S1 S2, No JVD, No murmurs, No edema  Respiratory: with fine bibasilar rales; supplemental O2  Psychiatry: A & O x 3, Mood & affect appropriate  Gastrointestinal:  Soft, Non-tender, + BS; obese	  Skin: No rashes, No ecchymoses, No cyanosis	  Neurologic: Non-focal  Extremities: Normal range of motion, No clubbing, cyanosis; 2+ bilateral lower extremity pitting edema  Vascular: Peripheral pulses palpable 2+ bilaterally    DIAGNOSTICS:  TELEMETRY: as above 	    LABS:	 	                          9.2    5.13  )-----------( 158      ( 18 Jun 2018 04:00 )             29.2     06-18    144  |  103  |  73<H>  ----------------------------<  106<H>  4.1   |  30  |  2.59<H>    Ca    9.5      18 Jun 2018 04:00  Phos  4.2     06-18  Mg     2.2     06-18      proBNP: Serum Pro-Brain Natriuretic Peptide: 3380 pg/mL (06-14 @ 06:15)  Serum Pro-Brain Natriuretic Peptide: 2943 pg/mL (06-13 @ 17:19)

## 2018-06-19 DIAGNOSIS — K92.1 MELENA: ICD-10-CM

## 2018-06-19 LAB
APTT BLD: 150.3 SEC — CRITICAL HIGH (ref 27.5–37.4)
APTT BLD: 66.1 SEC — HIGH (ref 27.5–37.4)
APTT BLD: 74.8 SEC — HIGH (ref 27.5–37.4)
BUN SERPL-MCNC: 76 MG/DL — HIGH (ref 7–23)
CALCIUM SERPL-MCNC: 9.7 MG/DL — SIGNIFICANT CHANGE UP (ref 8.4–10.5)
CHLORIDE SERPL-SCNC: 100 MMOL/L — SIGNIFICANT CHANGE UP (ref 98–107)
CO2 SERPL-SCNC: 32 MMOL/L — HIGH (ref 22–31)
CREAT SERPL-MCNC: 2.6 MG/DL — HIGH (ref 0.5–1.3)
GLUCOSE BLDC GLUCOMTR-MCNC: 113 MG/DL — HIGH (ref 70–99)
GLUCOSE BLDC GLUCOMTR-MCNC: 126 MG/DL — HIGH (ref 70–99)
GLUCOSE BLDC GLUCOMTR-MCNC: 139 MG/DL — HIGH (ref 70–99)
GLUCOSE BLDC GLUCOMTR-MCNC: 148 MG/DL — HIGH (ref 70–99)
GLUCOSE SERPL-MCNC: 109 MG/DL — HIGH (ref 70–99)
HCT VFR BLD CALC: 30.9 % — LOW (ref 34.5–45)
HCT VFR BLD CALC: 31.7 % — LOW (ref 34.5–45)
HGB BLD-MCNC: 9.3 G/DL — LOW (ref 11.5–15.5)
HGB BLD-MCNC: 9.8 G/DL — LOW (ref 11.5–15.5)
INR BLD: 1.71 — HIGH (ref 0.88–1.17)
INR BLD: 1.72 — HIGH (ref 0.88–1.17)
MAGNESIUM SERPL-MCNC: 2.2 MG/DL — SIGNIFICANT CHANGE UP (ref 1.6–2.6)
MCHC RBC-ENTMCNC: 29.2 PG — SIGNIFICANT CHANGE UP (ref 27–34)
MCHC RBC-ENTMCNC: 29.6 PG — SIGNIFICANT CHANGE UP (ref 27–34)
MCHC RBC-ENTMCNC: 30.1 % — LOW (ref 32–36)
MCHC RBC-ENTMCNC: 30.9 % — LOW (ref 32–36)
MCV RBC AUTO: 95.8 FL — SIGNIFICANT CHANGE UP (ref 80–100)
MCV RBC AUTO: 97.2 FL — SIGNIFICANT CHANGE UP (ref 80–100)
NRBC # FLD: 0 — SIGNIFICANT CHANGE UP
NRBC # FLD: 0 — SIGNIFICANT CHANGE UP
PLATELET # BLD AUTO: 132 K/UL — LOW (ref 150–400)
PLATELET # BLD AUTO: 140 K/UL — LOW (ref 150–400)
PMV BLD: 11.8 FL — SIGNIFICANT CHANGE UP (ref 7–13)
PMV BLD: 12 FL — SIGNIFICANT CHANGE UP (ref 7–13)
POTASSIUM SERPL-MCNC: 4.1 MMOL/L — SIGNIFICANT CHANGE UP (ref 3.5–5.3)
POTASSIUM SERPL-SCNC: 4.1 MMOL/L — SIGNIFICANT CHANGE UP (ref 3.5–5.3)
PROTHROM AB SERPL-ACNC: 19.3 SEC — HIGH (ref 9.8–13.1)
PROTHROM AB SERPL-ACNC: 19.9 SEC — HIGH (ref 9.8–13.1)
RBC # BLD: 3.18 M/UL — LOW (ref 3.8–5.2)
RBC # BLD: 3.31 M/UL — LOW (ref 3.8–5.2)
RBC # FLD: 16.2 % — HIGH (ref 10.3–14.5)
RBC # FLD: 16.4 % — HIGH (ref 10.3–14.5)
SODIUM SERPL-SCNC: 143 MMOL/L — SIGNIFICANT CHANGE UP (ref 135–145)
WBC # BLD: 5.14 K/UL — SIGNIFICANT CHANGE UP (ref 3.8–10.5)
WBC # BLD: 5.31 K/UL — SIGNIFICANT CHANGE UP (ref 3.8–10.5)
WBC # FLD AUTO: 5.14 K/UL — SIGNIFICANT CHANGE UP (ref 3.8–10.5)
WBC # FLD AUTO: 5.31 K/UL — SIGNIFICANT CHANGE UP (ref 3.8–10.5)

## 2018-06-19 PROCEDURE — 99232 SBSQ HOSP IP/OBS MODERATE 35: CPT

## 2018-06-19 RX ORDER — DOCUSATE SODIUM 100 MG
100 CAPSULE ORAL THREE TIMES A DAY
Qty: 0 | Refills: 0 | Status: DISCONTINUED | OUTPATIENT
Start: 2018-06-19 | End: 2018-06-29

## 2018-06-19 RX ORDER — POLYETHYLENE GLYCOL 3350 17 G/17G
17 POWDER, FOR SOLUTION ORAL DAILY
Qty: 0 | Refills: 0 | Status: DISCONTINUED | OUTPATIENT
Start: 2018-06-19 | End: 2018-06-20

## 2018-06-19 RX ORDER — SENNA PLUS 8.6 MG/1
2 TABLET ORAL AT BEDTIME
Qty: 0 | Refills: 0 | Status: DISCONTINUED | OUTPATIENT
Start: 2018-06-19 | End: 2018-06-29

## 2018-06-19 RX ORDER — PANTOPRAZOLE SODIUM 20 MG/1
40 TABLET, DELAYED RELEASE ORAL EVERY 12 HOURS
Qty: 0 | Refills: 0 | Status: DISCONTINUED | OUTPATIENT
Start: 2018-06-19 | End: 2018-06-25

## 2018-06-19 RX ADMIN — NYSTATIN CREAM 1 APPLICATION(S): 100000 CREAM TOPICAL at 06:12

## 2018-06-19 RX ADMIN — BRIMONIDINE TARTRATE 1 DROP(S): 2 SOLUTION/ DROPS OPHTHALMIC at 14:00

## 2018-06-19 RX ADMIN — BRIMONIDINE TARTRATE 1 DROP(S): 2 SOLUTION/ DROPS OPHTHALMIC at 06:12

## 2018-06-19 RX ADMIN — HEPARIN SODIUM 1000 UNIT(S)/HR: 5000 INJECTION INTRAVENOUS; SUBCUTANEOUS at 09:59

## 2018-06-19 RX ADMIN — DORZOLAMIDE HYDROCHLORIDE TIMOLOL MALEATE 1 DROP(S): 20; 5 SOLUTION/ DROPS OPHTHALMIC at 06:12

## 2018-06-19 RX ADMIN — Medication 80 MILLIGRAM(S): at 17:47

## 2018-06-19 RX ADMIN — Medication 100 MICROGRAM(S): at 06:12

## 2018-06-19 RX ADMIN — CILOSTAZOL 100 MILLIGRAM(S): 100 TABLET ORAL at 06:12

## 2018-06-19 RX ADMIN — HEPARIN SODIUM 1000 UNIT(S)/HR: 5000 INJECTION INTRAVENOUS; SUBCUTANEOUS at 01:32

## 2018-06-19 RX ADMIN — Medication 100 MILLIGRAM(S): at 10:01

## 2018-06-19 RX ADMIN — INSULIN GLARGINE 15 UNIT(S): 100 INJECTION, SOLUTION SUBCUTANEOUS at 23:18

## 2018-06-19 RX ADMIN — NYSTATIN CREAM 1 APPLICATION(S): 100000 CREAM TOPICAL at 17:47

## 2018-06-19 RX ADMIN — HEPARIN SODIUM 0 UNIT(S)/HR: 5000 INJECTION INTRAVENOUS; SUBCUTANEOUS at 00:24

## 2018-06-19 RX ADMIN — Medication 80 MILLIGRAM(S): at 06:12

## 2018-06-19 RX ADMIN — PANTOPRAZOLE SODIUM 40 MILLIGRAM(S): 20 TABLET, DELAYED RELEASE ORAL at 17:56

## 2018-06-19 RX ADMIN — DORZOLAMIDE HYDROCHLORIDE TIMOLOL MALEATE 1 DROP(S): 20; 5 SOLUTION/ DROPS OPHTHALMIC at 17:47

## 2018-06-19 RX ADMIN — LATANOPROST 1 DROP(S): 0.05 SOLUTION/ DROPS OPHTHALMIC; TOPICAL at 23:18

## 2018-06-19 RX ADMIN — SIMVASTATIN 20 MILLIGRAM(S): 20 TABLET, FILM COATED ORAL at 23:18

## 2018-06-19 RX ADMIN — CILOSTAZOL 100 MILLIGRAM(S): 100 TABLET ORAL at 17:48

## 2018-06-19 RX ADMIN — Medication 81 MILLIGRAM(S): at 10:01

## 2018-06-19 NOTE — CONSULT NOTE ADULT - SUBJECTIVE AND OBJECTIVE BOX
Chief Complaint:  Patient is a 89y old  Female who presents with a chief complaint of syncope. (2018 21:22)    Personal history of PE (pulmonary embolism)  Glaucoma  PVD (peripheral vascular disease)  Hypothyroid  HTN (hypertension)  HLD (hyperlipidemia)  CHF (congestive heart failure)  DM (diabetes mellitus)  Afib  Elective surgery  History of partial thyroidectomy     HPI:  88 y/o F with h/o a. fib on coumadin, CHF, DM, HTN, HLD, DM, hypothyroidism presents to the ED for syncope witnessed by the son. As per son, patient was going to the car and as she was getting into the car, pt syncopized as she was in the seat for about 45 seconds. As per son, there was no seizure activity and no head trauma. Pt was slightly confused when she regain consciousness. Pt also states she has been having worsening shortness of breath whenever she does anything, such as putting on her pants, getting into cars etc. Pt reports worsening lower extremity edema. Pt also states she has been urinating less. Pt states she usually urinate about 6 times a day and so far she has only urinate 1 time a day. Pt also reports she is having stressors in her life because she had to bury her brother today.  Pt denies chest pain, palpitations, numbness, tingling, N/V/D/C, urinary/ bowel incontinence or any other complaints at this time. (2018 21:22)      No Known Allergies      allopurinol 100 milliGRAM(s) Oral daily  aspirin enteric coated 81 milliGRAM(s) Oral daily  brimonidine 0.2% Ophthalmic Solution 1 Drop(s) Both EYES three times a day  cilostazol 100 milliGRAM(s) Oral two times a day  dextrose 40% Gel 15 Gram(s) Oral once PRN  dextrose 5%. 1000 milliLiter(s) IV Continuous <Continuous>  dextrose 50% Injectable 12.5 Gram(s) IV Push once  dextrose 50% Injectable 25 Gram(s) IV Push once  dextrose 50% Injectable 25 Gram(s) IV Push once  docusate sodium 100 milliGRAM(s) Oral three times a day  dorzolamide 2%/timolol 0.5% Ophthalmic Solution 1 Drop(s) Both EYES two times a day  furosemide   Injectable 80 milliGRAM(s) IV Push two times a day  glucagon  Injectable 1 milliGRAM(s) IntraMuscular once PRN  insulin glargine Injectable (LANTUS) 15 Unit(s) SubCutaneous at bedtime  insulin lispro (HumaLOG) corrective regimen sliding scale   SubCutaneous three times a day before meals  insulin lispro (HumaLOG) corrective regimen sliding scale   SubCutaneous at bedtime  latanoprost 0.005% Ophthalmic Solution 1 Drop(s) Both EYES at bedtime  levothyroxine 100 MICROGram(s) Oral daily  melatonin 3 milliGRAM(s) Oral at bedtime PRN  metolazone 10 milliGRAM(s) Oral daily  nystatin Powder 1 Application(s) Topical two times a day  polyethylene glycol 3350 17 Gram(s) Oral daily PRN  senna 2 Tablet(s) Oral at bedtime  simvastatin 20 milliGRAM(s) Oral at bedtime        FAMILY HISTORY:  No pertinent family history in first degree relatives        Review of Systems:    General:  No wt loss, fevers, chills, night sweats,fatigue,   Eyes:  Good vision, no reported pain  ENT:  No sore throat, pain, runny nose, dysphagia  CV:  No pain, palpitatioins, hypo/hypertension  Resp:  No dyspnea, cough, tachypnea, wheezing  :  No pain, bleeding, incontinence, nocturia  Muscle:  No pain, weakness  Neuro:  No weakness, tingling, memory problems  Psych:  No fatigue, insomnia, mood problems, depression  Endocrine:  No polyuria, polydypsia, cold/heat intolerance  Heme:  No petechiae, ecchymosis, easy bruisability  Skin:  No rash, tattoos, scars, edema    Relevant Family History:       Relevant Social History:       Physical Exam:    Vital Signs:  Vital Signs Last 24 Hrs  T(C): 36.6 (2018 12:58), Max: 36.7 (2018 06:10)  T(F): 97.8 (2018 12:58), Max: 98.1 (2018 06:10)  HR: 67 (2018 12:58) (57 - 67)  BP: 130/64 (2018 12:58) (126/53 - 130/64)  BP(mean): --  RR: 18 (2018 12:58) (18 - 18)  SpO2: 97% (2018 12:58) (97% - 100%)  Daily     Daily Weight in k (2018 06:29)    General:  Appears stated age, well-groomed, well-nourished, no distress  HEENT:  NC/AT,  conjunctivae clear and pink, no thyromegaly, nodules, adenopathy, no JVD  Chest:  Full & symmetric excursion, no increased effort, breath sounds clear  Cardiovascular:  Regular rhythm, S1, S2, no murmur/rub/S3/S4, no abdominal bruit, no edema  Abdomen:  Soft, non-tender, non-distended, normoactive bowel sounds,  no masses ,no hepatosplenomeagaly, no signs of chronic liver disease  Extremities:  no cyanosis,clubbing or edema  Skin:  No rash/erythema/ecchymoses/petechiae/wounds/abscess/warm/dry  Neuro/Psych:  Alert, oriented, no asterixis, no tremor, no encephalopathy    Laboratory:                            9.8    5.31  )-----------( 132      ( 2018 16:53 )             31.7     06-19    143  |  100  |  76<H>  ----------------------------<  109<H>  4.1   |  32<H>  |  2.60<H>    Ca    9.7      2018 06:00  Phos  4.2     06-18  Mg     2.2     -19        PT/INR - ( 2018 16:53 )   PT: 19.9 SEC;   INR: 1.71          PTT - ( 2018 16:53 )  PTT:74.8 SEC      Imaging:

## 2018-06-19 NOTE — PROGRESS NOTE ADULT - SUBJECTIVE AND OBJECTIVE BOX
Pt seen and examined at bedside  feels slightly better  dyspnea slightly better  denies any chest pain  urinating ok      Allergies:  No Known Allergies    Hospital Medications:   MEDICATIONS  (STANDING):  allopurinol 100 milliGRAM(s) Oral daily  aspirin enteric coated 81 milliGRAM(s) Oral daily  brimonidine 0.2% Ophthalmic Solution 1 Drop(s) Both EYES three times a day  carvedilol 25 milliGRAM(s) Oral every 12 hours  cilostazol 100 milliGRAM(s) Oral two times a day  dextrose 5%. 1000 milliLiter(s) (50 mL/Hr) IV Continuous <Continuous>  dextrose 50% Injectable 12.5 Gram(s) IV Push once  dextrose 50% Injectable 25 Gram(s) IV Push once  dextrose 50% Injectable 25 Gram(s) IV Push once  dorzolamide 2%/timolol 0.5% Ophthalmic Solution 1 Drop(s) Both EYES two times a day  furosemide   Injectable 80 milliGRAM(s) IV Push two times a day  heparin  Infusion.  Unit(s)/Hr (19 mL/Hr) IV Continuous <Continuous>  insulin glargine Injectable (LANTUS) 15 Unit(s) SubCutaneous at bedtime  insulin lispro (HumaLOG) corrective regimen sliding scale   SubCutaneous three times a day before meals  insulin lispro (HumaLOG) corrective regimen sliding scale   SubCutaneous at bedtime  latanoprost 0.005% Ophthalmic Solution 1 Drop(s) Both EYES at bedtime  levothyroxine 100 MICROGram(s) Oral daily  nystatin Powder 1 Application(s) Topical two times a day  simvastatin 20 milliGRAM(s) Oral at bedtime         VITALS:  T(F): 98.1 (06-19-18 @ 06:10), Max: 98.1 (06-19-18 @ 06:10)  HR: 57 (06-19-18 @ 06:10)  BP: 126/53 (06-19-18 @ 06:10)  RR: 18 (06-19-18 @ 06:10)  SpO2: 100% (06-19-18 @ 06:10)  Wt(kg): --    06-18 @ 07:01  -  06-19 @ 07:00  --------------------------------------------------------  IN: 338 mL / OUT: 0 mL / NET: 338 mL        PHYSICAL EXAM:  Constitutional: NAD, sitting comfortably in chair  HEENT: anicteric sclera, oropharynx clear, MMM  Neck: No JVD  Respiratory: bibasilar crepts both bases  Cardiovascular: S1, S2, RRR  Gastrointestinal: BS+, soft, NT/ND  Extremities: No cyanosis or clubbing. 4+ leg edema  Neurological: A/O x 3, no focal deficits  Psychiatric: Normal mood, normal affect  : No CVA tenderness. No michaud.   Skin: No rashes       LABS:  06-19    143  |  100  |  76<H>  ----------------------------<  109<H>  4.1   |  32<H>  |  2.60<H>    Ca    9.7      19 Jun 2018 06:00  Phos  4.2     06-18  Mg     2.2     06-19      Creatinine Trend: 2.60 <--, 2.59 <--, 2.49 <--, 2.61 <--, 2.59 <--, 2.28 <--, 2.39 <--                        9.3    5.14  )-----------( 140      ( 19 Jun 2018 06:00 )             30.9     Urine Studies:      RADIOLOGY & ADDITIONAL STUDIES:

## 2018-06-19 NOTE — PROGRESS NOTE ADULT - SUBJECTIVE AND OBJECTIVE BOX
Subjective: No CP or SOB      allopurinol 100 milliGRAM(s) Oral daily  aspirin enteric coated 81 milliGRAM(s) Oral daily  brimonidine 0.2% Ophthalmic Solution 1 Drop(s) Both EYES three times a day  carvedilol 25 milliGRAM(s) Oral every 12 hours  cilostazol 100 milliGRAM(s) Oral two times a day  dextrose 40% Gel 15 Gram(s) Oral once PRN  dextrose 5%. 1000 milliLiter(s) IV Continuous <Continuous>  dextrose 50% Injectable 12.5 Gram(s) IV Push once  dextrose 50% Injectable 25 Gram(s) IV Push once  dextrose 50% Injectable 25 Gram(s) IV Push once  dorzolamide 2%/timolol 0.5% Ophthalmic Solution 1 Drop(s) Both EYES two times a day  furosemide   Injectable 80 milliGRAM(s) IV Push two times a day  glucagon  Injectable 1 milliGRAM(s) IntraMuscular once PRN  heparin  Infusion.  Unit(s)/Hr IV Continuous <Continuous>  heparin  Injectable 9000 Unit(s) IV Push every 6 hours PRN  heparin  Injectable 4000 Unit(s) IV Push every 6 hours PRN  insulin glargine Injectable (LANTUS) 15 Unit(s) SubCutaneous at bedtime  insulin lispro (HumaLOG) corrective regimen sliding scale   SubCutaneous three times a day before meals  insulin lispro (HumaLOG) corrective regimen sliding scale   SubCutaneous at bedtime  latanoprost 0.005% Ophthalmic Solution 1 Drop(s) Both EYES at bedtime  levothyroxine 100 MICROGram(s) Oral daily  melatonin 3 milliGRAM(s) Oral at bedtime PRN  nystatin Powder 1 Application(s) Topical two times a day  simvastatin 20 milliGRAM(s) Oral at bedtime                            9.3    5.14  )-----------( 140      ( 19 Jun 2018 06:00 )             30.9       06-19    143  |  100  |  76<H>  ----------------------------<  109<H>  4.1   |  32<H>  |  2.60<H>    Ca    9.7      19 Jun 2018 06:00  Phos  4.2     06-18  Mg     2.2     06-19              T(C): 36.7 (06-19-18 @ 06:10), Max: 36.7 (06-19-18 @ 06:10)  HR: 57 (06-19-18 @ 06:10) (57 - 65)  BP: 126/53 (06-19-18 @ 06:10) (122/60 - 127/53)  RR: 18 (06-19-18 @ 06:10) (18 - 18)  SpO2: 100% (06-19-18 @ 06:10) (98% - 100%)  Wt(kg): --    I&O's Summary    18 Jun 2018 07:01  -  19 Jun 2018 07:00  --------------------------------------------------------  IN: 338 mL / OUT: 0 mL / NET: 338 mL        Heart: normal S1, S2, IRR, no m/r/g  Lungs: cta b/l  Abd: soft nT, nD  Ext: 2+ edema in LE bilaterally     DATA:    TELEMETRY:  AF 	      ECG: < from: 12 Lead ECG (06.13.18 @ 14:56) >  Atrial fibrillation  Right bundle branch block  Left posterior fascicular block  *** Bifascicular block ***  Cannot rule out Inferior infarct , age undetermined  T wave abnormality, consider lateral ischemia  Abnormal ECG    < end of copied text >    < from: Transthoracic Echocardiogram (06.18.18 @ 10:33) >  CONCLUSIONS:  1. Mitral annular calcification, otherwise normal mitral  valve. Mild mitral regurgitation.  2. Severely dilated left atrium.  LA volume index = 69  cc/m2.  3. Normal left ventricular internal dimensions and wall  thicknesses.  4. Endocardium not well visualized; grossly normal left  ventricular systolic function.  5. Moderate right atrial enlargement.  6. Right ventricular enlargement with decreased right  ventricular systolic function.  7. Estimated right ventricular systolic pressure equals 53  mm Hg, assuming right atrial pressure equals 10 mm Hg,  consistent with moderate pulmonary hypertension.  8. Normal tricuspid valve.  Moderate-severe tricuspid  regurgitation.  *** No previous Echo exam.  ------------------------------------------------------------------------  Confirmed on  6/18/2018 - 12:27:29 by Lance Ferris M.D.    < end of copied text >      	      ASSESSMENT/PLAN: 	89y Female with history of Afib on AC, HTN, CKD admitted with syncope and volume overload c/w acute on chronic CHF    -Appreciate EP follow up - patient and patient's family is refusing PPM at this time  -pt. family does not want invasive cv procedures at this time  -therefore, conservative cardiac care and medical management recommended at this time  -pt. still volume overloaded  -continue with iv diuresis to keep O > I  -follow up renal and monitor cr  -dc planning when patient euvolemic    Trisha Banks MD

## 2018-06-19 NOTE — CONSULT NOTE ADULT - PROBLEM SELECTOR RECOMMENDATION 9
unclear if this is a real melena  pt was recently on iron   monitor CBC ; transfuse prn  ppi bid  stool OB  hold AC
Cr at baseline level.   Electrolytes acceptable.   Agree with IV diuresis at this time, strict I/O.  ECHO pending.

## 2018-06-19 NOTE — PROGRESS NOTE ADULT - SUBJECTIVE AND OBJECTIVE BOX
Patient seen and examined at bedside.  Doing better.  Breathing improved.  No other new/acute complaints.      Medications:  allopurinol 100 milliGRAM(s) Oral daily  aspirin enteric coated 81 milliGRAM(s) Oral daily  brimonidine 0.2% Ophthalmic Solution 1 Drop(s) Both EYES three times a day  carvedilol 25 milliGRAM(s) Oral every 12 hours  cilostazol 100 milliGRAM(s) Oral two times a day  dextrose 40% Gel 15 Gram(s) Oral once PRN  dextrose 5%. 1000 milliLiter(s) IV Continuous <Continuous>  dextrose 50% Injectable 12.5 Gram(s) IV Push once  dextrose 50% Injectable 25 Gram(s) IV Push once  dextrose 50% Injectable 25 Gram(s) IV Push once  dorzolamide 2%/timolol 0.5% Ophthalmic Solution 1 Drop(s) Both EYES two times a day  furosemide   Injectable 40 milliGRAM(s) IV Push two times a day  glucagon  Injectable 1 milliGRAM(s) IntraMuscular once PRN  insulin glargine Injectable (LANTUS) 15 Unit(s) SubCutaneous at bedtime  insulin lispro (HumaLOG) corrective regimen sliding scale   SubCutaneous three times a day before meals  insulin lispro (HumaLOG) corrective regimen sliding scale   SubCutaneous at bedtime  latanoprost 0.005% Ophthalmic Solution 1 Drop(s) Both EYES at bedtime  levothyroxine 100 MICROGram(s) Oral daily  simvastatin 20 milliGRAM(s) Oral at bedtime      PAST MEDICAL & SURGICAL HISTORY:  Personal history of PE (pulmonary embolism)  Glaucoma  PVD (peripheral vascular disease)  Hypothyroid  HTN (hypertension)  HLD (hyperlipidemia)  CHF (congestive heart failure)  DM (diabetes mellitus)  Afib  Elective surgery: abdominal abcess removals  History of partial thyroidectomy        Vitals:  T(F): 97.4 (06-15), Max: 97.6 (06-14)  HR: 61 (06-15) (58 - 67)  BP: 101/55 (06-15) (101/55 - 131/65)  RR: 18 (06-15)  SpO2: 100% (06-15)  I&O's Summary    14 Jun 2018 07:01  -  15 Jun 2018 07:00  --------------------------------------------------------  IN: 200 mL / OUT: 300 mL / NET: -100 mL        Physical Exam:  GENERAL: No acute distress, well-developed  HEAD:  Atraumatic, Normocephalic  ENT: EOMI, PERRLA, conjunctiva and sclera clear, Neck supple, +JVP ~ 12 cm H20   CHEST/LUNG: Diffuse crackles w/ no wheeze/rhonchi; improved   BACK: No spinal tenderness  HEART: Irreg rhythm, S1 variable, unable to appreciate S2; III/VI systolic ejection murmur; concern for AS, rubs, or gallops  ABDOMEN: Soft, Nontender, Nondistended; Bowel sounds present  EXTREMITIES:  +3 BLE pitting edema   PSYCH: Nl behavior, nl affect  NEUROLOGY: AAOx3, non-focal, cranial nerves intact  SKIN: Normal color, No rashes or lesions                          9.2    4.66  )-----------( 158      ( 15 Sonny 2018 07:00 )             30.5     06-15    143  |  103  |  65<H>  ----------------------------<  80  4.7   |  33<H>  |  2.59<H>    Ca    9.5      15 Sonny 2018 07:00  Phos  4.5     06-15  Mg     2.3     06-15    TPro  7.4  /  Alb  3.8  /  TBili  0.5  /  DBili  x   /  AST  20  /  ALT  20  /  AlkPhos  83  06-13    PT/INR - ( 13 Jun 2018 15:20 )   PT: 32.9 SEC;   INR: 2.80          PTT - ( 13 Jun 2018 15:20 )  PTT:37.3 SEC  CARDIAC MARKERS ( 14 Jun 2018 09:20 )  x     / x     / 37 u/L / 3.00 ng/mL / x          Serum Pro-Brain Natriuretic Peptide: 3380 pg/mL (06-14 @ 06:15)  Serum Pro-Brain Natriuretic Peptide: 2943 pg/mL (06-13 @ 17:19)    Interpretation of Telemetry:  afib, HR 60s;  this morning occasional episodes into the 50s     Assessment     88 yo woman w/ hx of chronic afib/flutter (on coumadin), CHF? (unknown EF), DM, HTN, hypothyroidism who presented for syncope in the setting of chronic bifascicular block and slow Afib (HR 50s this morning).    Still hypervolemic  Pt and family refusing PPM   TTE w/ preserved LV function     RECS  - cont IV lasix, rest of meds as per primary/cardiology team   - cont holding AV jimy blockers; resume rest of home meds   - once cleared for discharge, will set up w/ event monitor/cardionet (3 week monitor)     NICOLE Fuller EP

## 2018-06-19 NOTE — PROGRESS NOTE ADULT - SUBJECTIVE AND OBJECTIVE BOX
INTERVAL HPI/OVERNIGHT EVENTS: I feel better .   Vital Signs Last 24 Hrs  T(C): 36.7 (19 Jun 2018 17:35), Max: 36.7 (19 Jun 2018 06:10)  T(F): 98 (19 Jun 2018 17:35), Max: 98.1 (19 Jun 2018 06:10)  HR: 77 (19 Jun 2018 17:35) (57 - 77)  BP: 122/78 (19 Jun 2018 17:35) (122/78 - 130/64)  BP(mean): --  RR: 18 (19 Jun 2018 17:35) (18 - 18)  SpO2: 98% (19 Jun 2018 17:35) (97% - 100%)  I&O's Summary    18 Jun 2018 07:01  -  19 Jun 2018 07:00  --------------------------------------------------------  IN: 338 mL / OUT: 0 mL / NET: 338 mL      MEDICATIONS  (STANDING):  allopurinol 100 milliGRAM(s) Oral daily  aspirin enteric coated 81 milliGRAM(s) Oral daily  cilostazol 100 milliGRAM(s) Oral two times a day  dextrose 5%. 1000 milliLiter(s) (50 mL/Hr) IV Continuous <Continuous>  dextrose 50% Injectable 12.5 Gram(s) IV Push once  dextrose 50% Injectable 25 Gram(s) IV Push once  dextrose 50% Injectable 25 Gram(s) IV Push once  docusate sodium 100 milliGRAM(s) Oral three times a day  furosemide   Injectable 80 milliGRAM(s) IV Push two times a day  insulin glargine Injectable (LANTUS) 15 Unit(s) SubCutaneous at bedtime  insulin lispro (HumaLOG) corrective regimen sliding scale   SubCutaneous three times a day before meals  insulin lispro (HumaLOG) corrective regimen sliding scale   SubCutaneous at bedtime  latanoprost 0.005% Ophthalmic Solution 1 Drop(s) Both EYES at bedtime  levothyroxine 100 MICROGram(s) Oral daily  metolazone 10 milliGRAM(s) Oral daily  nystatin Powder 1 Application(s) Topical two times a day  pantoprazole  Injectable 40 milliGRAM(s) IV Push every 12 hours  senna 2 Tablet(s) Oral at bedtime  simvastatin 20 milliGRAM(s) Oral at bedtime    MEDICATIONS  (PRN):  dextrose 40% Gel 15 Gram(s) Oral once PRN Blood Glucose LESS THAN 70 milliGRAM(s)/deciliter  glucagon  Injectable 1 milliGRAM(s) IntraMuscular once PRN Glucose LESS THAN 70 milligrams/deciliter  melatonin 3 milliGRAM(s) Oral at bedtime PRN Insomnia  polyethylene glycol 3350 17 Gram(s) Oral daily PRN Constipation    LABS:                        9.8    5.31  )-----------( 132      ( 19 Jun 2018 16:53 )             31.7     06-19    143  |  100  |  76<H>  ----------------------------<  109<H>  4.1   |  32<H>  |  2.60<H>    Ca    9.7      19 Jun 2018 06:00  Phos  4.2     06-18  Mg     2.2     06-19      PT/INR - ( 19 Jun 2018 16:53 )   PT: 19.9 SEC;   INR: 1.71          PTT - ( 19 Jun 2018 16:53 )  PTT:74.8 SEC    CAPILLARY BLOOD GLUCOSE      POCT Blood Glucose.: 126 mg/dL (19 Jun 2018 17:38)  POCT Blood Glucose.: 139 mg/dL (19 Jun 2018 12:25)  POCT Blood Glucose.: 113 mg/dL (19 Jun 2018 08:29)  POCT Blood Glucose.: 150 mg/dL (18 Jun 2018 21:46)          REVIEW OF SYSTEMS:  CONSTITUTIONAL: No fever, weight loss, or fatigue  EYES: No eye pain, visual disturbances, or discharge  ENMT:  No difficulty hearing, tinnitus, vertigo; No sinus or throat pain  NECK: No pain or stiffness  BREASTS: No pain, masses, or nipple discharge  RESPIRATORY: No cough, wheezing, chills or hemoptysis; less shortness of breath  CARDIOVASCULAR: No chest pain, palpitations, dizziness, but leg swelling  GASTROINTESTINAL: No abdominal or epigastric pain. No nausea, vomiting, or hematemesis; No diarrhea or constipation. No melena or hematochezia.  GENITOURINARY: No dysuria, frequency, hematuria, or incontinence  NEUROLOGICAL: No headaches, memory loss, loss of strength, numbness, or tremors  SKIN: No itching, burning, rashes, or lesions   LYMPH NODES: No enlarged glands  ENDOCRINE: No heat or cold intolerance; No hair loss    Consultant(s) Notes Reviewed:  [x ] YES  [ ] NO    PHYSICAL EXAM:  GENERAL: NAD, Obese ,not in any distress ,  HEAD:  Atraumatic, Normocephalic  EYES: EOMI, PERRLA, conjunctiva and sclera clear  ENMT: No tonsillar erythema, exudates, or enlargement; Moist mucous membranes, Good dentition, No lesions  NECK: Supple, No JVD, Normal thyroid  NERVOUS SYSTEM:  Alert & Oriented X3, No focal deficit   CHEST/LUNG: Good air entry bilateral with no  rales, rhonchi, wheezing, or rubs  HEART: Regular rate and rhythm; No murmurs, rubs, or gallops  ABDOMEN: Soft, Nontender, Nondistended; Bowel sounds present  EXTREMITIES:  2+ Peripheral Pulses, No clubbing, cyanosis, but 2+ edema    Care Discussed with Consultants/Other Providers [ x] YES  [ ] NO

## 2018-06-20 LAB
BUN SERPL-MCNC: 76 MG/DL — HIGH (ref 7–23)
CALCIUM SERPL-MCNC: 9.5 MG/DL — SIGNIFICANT CHANGE UP (ref 8.4–10.5)
CHLORIDE SERPL-SCNC: 101 MMOL/L — SIGNIFICANT CHANGE UP (ref 98–107)
CO2 SERPL-SCNC: 32 MMOL/L — HIGH (ref 22–31)
CREAT SERPL-MCNC: 2.45 MG/DL — HIGH (ref 0.5–1.3)
GLUCOSE BLDC GLUCOMTR-MCNC: 144 MG/DL — HIGH (ref 70–99)
GLUCOSE BLDC GLUCOMTR-MCNC: 149 MG/DL — HIGH (ref 70–99)
GLUCOSE BLDC GLUCOMTR-MCNC: 204 MG/DL — HIGH (ref 70–99)
GLUCOSE BLDC GLUCOMTR-MCNC: 75 MG/DL — SIGNIFICANT CHANGE UP (ref 70–99)
GLUCOSE SERPL-MCNC: 102 MG/DL — HIGH (ref 70–99)
HCT VFR BLD CALC: 27.6 % — LOW (ref 34.5–45)
HGB BLD-MCNC: 8.8 G/DL — LOW (ref 11.5–15.5)
INR BLD: 1.82 — HIGH (ref 0.88–1.17)
MAGNESIUM SERPL-MCNC: 2.1 MG/DL — SIGNIFICANT CHANGE UP (ref 1.6–2.6)
MCHC RBC-ENTMCNC: 29.1 PG — SIGNIFICANT CHANGE UP (ref 27–34)
MCHC RBC-ENTMCNC: 31.9 % — LOW (ref 32–36)
MCV RBC AUTO: 91.4 FL — SIGNIFICANT CHANGE UP (ref 80–100)
NRBC # FLD: 0 — SIGNIFICANT CHANGE UP
PLATELET # BLD AUTO: 132 K/UL — LOW (ref 150–400)
PMV BLD: 12.9 FL — SIGNIFICANT CHANGE UP (ref 7–13)
POTASSIUM SERPL-MCNC: 3.8 MMOL/L — SIGNIFICANT CHANGE UP (ref 3.5–5.3)
POTASSIUM SERPL-SCNC: 3.8 MMOL/L — SIGNIFICANT CHANGE UP (ref 3.5–5.3)
PROTHROM AB SERPL-ACNC: 21.2 SEC — HIGH (ref 9.8–13.1)
RBC # BLD: 3.02 M/UL — LOW (ref 3.8–5.2)
RBC # FLD: 16.1 % — HIGH (ref 10.3–14.5)
SODIUM SERPL-SCNC: 143 MMOL/L — SIGNIFICANT CHANGE UP (ref 135–145)
WBC # BLD: 5.24 K/UL — SIGNIFICANT CHANGE UP (ref 3.8–10.5)
WBC # FLD AUTO: 5.24 K/UL — SIGNIFICANT CHANGE UP (ref 3.8–10.5)

## 2018-06-20 PROCEDURE — 99232 SBSQ HOSP IP/OBS MODERATE 35: CPT

## 2018-06-20 RX ORDER — SIMETHICONE 80 MG/1
80 TABLET, CHEWABLE ORAL EVERY 6 HOURS
Qty: 0 | Refills: 0 | Status: COMPLETED | OUTPATIENT
Start: 2018-06-20 | End: 2018-06-24

## 2018-06-20 RX ORDER — WARFARIN SODIUM 2.5 MG/1
3 TABLET ORAL ONCE
Qty: 0 | Refills: 0 | Status: COMPLETED | OUTPATIENT
Start: 2018-06-20 | End: 2018-06-20

## 2018-06-20 RX ORDER — POLYETHYLENE GLYCOL 3350 17 G/17G
17 POWDER, FOR SOLUTION ORAL
Qty: 0 | Refills: 0 | Status: DISCONTINUED | OUTPATIENT
Start: 2018-06-20 | End: 2018-06-29

## 2018-06-20 RX ADMIN — WARFARIN SODIUM 3 MILLIGRAM(S): 2.5 TABLET ORAL at 17:08

## 2018-06-20 RX ADMIN — Medication 100 MILLIGRAM(S): at 11:53

## 2018-06-20 RX ADMIN — Medication 2: at 17:08

## 2018-06-20 RX ADMIN — SIMETHICONE 80 MILLIGRAM(S): 80 TABLET, CHEWABLE ORAL at 22:22

## 2018-06-20 RX ADMIN — Medication 100 MILLIGRAM(S): at 22:21

## 2018-06-20 RX ADMIN — Medication 80 MILLIGRAM(S): at 06:03

## 2018-06-20 RX ADMIN — PANTOPRAZOLE SODIUM 40 MILLIGRAM(S): 20 TABLET, DELAYED RELEASE ORAL at 06:03

## 2018-06-20 RX ADMIN — NYSTATIN CREAM 1 APPLICATION(S): 100000 CREAM TOPICAL at 17:07

## 2018-06-20 RX ADMIN — Medication 80 MILLIGRAM(S): at 17:07

## 2018-06-20 RX ADMIN — LATANOPROST 1 DROP(S): 0.05 SOLUTION/ DROPS OPHTHALMIC; TOPICAL at 22:21

## 2018-06-20 RX ADMIN — NYSTATIN CREAM 1 APPLICATION(S): 100000 CREAM TOPICAL at 06:03

## 2018-06-20 RX ADMIN — Medication 81 MILLIGRAM(S): at 11:53

## 2018-06-20 RX ADMIN — SIMVASTATIN 20 MILLIGRAM(S): 20 TABLET, FILM COATED ORAL at 22:21

## 2018-06-20 RX ADMIN — Medication 100 MILLIGRAM(S): at 06:03

## 2018-06-20 RX ADMIN — SIMETHICONE 80 MILLIGRAM(S): 80 TABLET, CHEWABLE ORAL at 17:08

## 2018-06-20 RX ADMIN — INSULIN GLARGINE 15 UNIT(S): 100 INJECTION, SOLUTION SUBCUTANEOUS at 22:21

## 2018-06-20 RX ADMIN — CILOSTAZOL 100 MILLIGRAM(S): 100 TABLET ORAL at 06:03

## 2018-06-20 RX ADMIN — SENNA PLUS 2 TABLET(S): 8.6 TABLET ORAL at 22:21

## 2018-06-20 RX ADMIN — CILOSTAZOL 100 MILLIGRAM(S): 100 TABLET ORAL at 17:07

## 2018-06-20 RX ADMIN — Medication 3 MILLIGRAM(S): at 22:22

## 2018-06-20 RX ADMIN — SIMETHICONE 80 MILLIGRAM(S): 80 TABLET, CHEWABLE ORAL at 11:53

## 2018-06-20 RX ADMIN — POLYETHYLENE GLYCOL 3350 17 GRAM(S): 17 POWDER, FOR SOLUTION ORAL at 17:08

## 2018-06-20 RX ADMIN — PANTOPRAZOLE SODIUM 40 MILLIGRAM(S): 20 TABLET, DELAYED RELEASE ORAL at 17:07

## 2018-06-20 RX ADMIN — Medication 100 MILLIGRAM(S): at 13:43

## 2018-06-20 RX ADMIN — Medication 100 MICROGRAM(S): at 06:03

## 2018-06-20 NOTE — PROGRESS NOTE ADULT - SUBJECTIVE AND OBJECTIVE BOX
Patient seen and examined at bedside.  Doing better.  Breathing improved.    Now w/ melena; being evaluated by GI service     Medications:  allopurinol 100 milliGRAM(s) Oral daily  aspirin enteric coated 81 milliGRAM(s) Oral daily  brimonidine 0.2% Ophthalmic Solution 1 Drop(s) Both EYES three times a day  carvedilol 25 milliGRAM(s) Oral every 12 hours  cilostazol 100 milliGRAM(s) Oral two times a day  dextrose 40% Gel 15 Gram(s) Oral once PRN  dextrose 5%. 1000 milliLiter(s) IV Continuous <Continuous>  dextrose 50% Injectable 12.5 Gram(s) IV Push once  dextrose 50% Injectable 25 Gram(s) IV Push once  dextrose 50% Injectable 25 Gram(s) IV Push once  dorzolamide 2%/timolol 0.5% Ophthalmic Solution 1 Drop(s) Both EYES two times a day  furosemide   Injectable 40 milliGRAM(s) IV Push two times a day  glucagon  Injectable 1 milliGRAM(s) IntraMuscular once PRN  insulin glargine Injectable (LANTUS) 15 Unit(s) SubCutaneous at bedtime  insulin lispro (HumaLOG) corrective regimen sliding scale   SubCutaneous three times a day before meals  insulin lispro (HumaLOG) corrective regimen sliding scale   SubCutaneous at bedtime  latanoprost 0.005% Ophthalmic Solution 1 Drop(s) Both EYES at bedtime  levothyroxine 100 MICROGram(s) Oral daily  simvastatin 20 milliGRAM(s) Oral at bedtime      PAST MEDICAL & SURGICAL HISTORY:  Personal history of PE (pulmonary embolism)  Glaucoma  PVD (peripheral vascular disease)  Hypothyroid  HTN (hypertension)  HLD (hyperlipidemia)  CHF (congestive heart failure)  DM (diabetes mellitus)  Afib  Elective surgery: abdominal abcess removals  History of partial thyroidectomy        Vitals:  T(F): 97.4 (06-15), Max: 97.6 (06-14)  HR: 61 (06-15) (58 - 67)  BP: 101/55 (06-15) (101/55 - 131/65)  RR: 18 (06-15)  SpO2: 100% (06-15)  I&O's Summary    14 Jun 2018 07:01  -  15 Jun 2018 07:00  --------------------------------------------------------  IN: 200 mL / OUT: 300 mL / NET: -100 mL        Physical Exam:  GENERAL: No acute distress, well-developed  HEAD:  Atraumatic, Normocephalic  ENT: EOMI, PERRLA, conjunctiva and sclera clear, Neck supple, +JVP ~ 12 cm H20   CHEST/LUNG: Diffuse crackles w/ no wheeze/rhonchi; improved   BACK: No spinal tenderness  HEART: Irreg rhythm, S1 variable, unable to appreciate S2; III/VI systolic ejection murmur; concern for AS, rubs, or gallops  ABDOMEN: Soft, Nontender, Nondistended; Bowel sounds present  EXTREMITIES:  +3 BLE pitting edema   PSYCH: Nl behavior, nl affect  NEUROLOGY: AAOx3, non-focal, cranial nerves intact  SKIN: Normal color, No rashes or lesions                          9.2    4.66  )-----------( 158      ( 15 Sonny 2018 07:00 )             30.5     06-15    143  |  103  |  65<H>  ----------------------------<  80  4.7   |  33<H>  |  2.59<H>    Ca    9.5      15 Sonny 2018 07:00  Phos  4.5     06-15  Mg     2.3     06-15    TPro  7.4  /  Alb  3.8  /  TBili  0.5  /  DBili  x   /  AST  20  /  ALT  20  /  AlkPhos  83  06-13    PT/INR - ( 13 Jun 2018 15:20 )   PT: 32.9 SEC;   INR: 2.80          PTT - ( 13 Jun 2018 15:20 )  PTT:37.3 SEC  CARDIAC MARKERS ( 14 Jun 2018 09:20 )  x     / x     / 37 u/L / 3.00 ng/mL / x          Serum Pro-Brain Natriuretic Peptide: 3380 pg/mL (06-14 @ 06:15)  Serum Pro-Brain Natriuretic Peptide: 2943 pg/mL (06-13 @ 17:19)    Interpretation of Telemetry:  afib, HR 60s;  this morning occasional episodes into the 50s     Assessment     89 yo woman w/ hx of chronic afib/flutter (on coumadin), CHF? (unknown EF), DM, HTN, hypothyroidism who presented for syncope in the setting of chronic bifascicular block and slow Afib (HR 50s this morning).    Still hypervolemic  Pt and family refusing PPM   TTE w/ preserved LV function   Now w/ possible GIB; on PPI therapy as per GI    RECS  - cont IV lasix, rest of meds as per primary/cardiology team   - cont holding AV jimy blockers; resume rest of home meds   - hold off on micra PPM    Will set up w/ cardianet/3 week event monitor as outpatient upon discharge    A Aubrey Fuller EP

## 2018-06-20 NOTE — PROGRESS NOTE ADULT - SUBJECTIVE AND OBJECTIVE BOX
[facility] - Radha / Florin FELDMAN /Nate/ GASTON Castillo/ GASTON Tellez/ Shahid Segovia / BABAR Njeru  ---------------------------------------------------------------------------------------------------------------    Patient seen and examined bedside    Subjective and Objective: No overnight events. sob w/exertion persists. no sob at rest. No new complaints today. feeling better. OOB to chair.   states making a lot of urine, has incontinence    Allergies: No Known Allergies      Hospital Medications:   MEDICATIONS  (STANDING):  allopurinol 100 milliGRAM(s) Oral daily  aspirin enteric coated 81 milliGRAM(s) Oral daily  cilostazol 100 milliGRAM(s) Oral two times a day  dextrose 5%. 1000 milliLiter(s) (50 mL/Hr) IV Continuous <Continuous>  dextrose 50% Injectable 12.5 Gram(s) IV Push once  dextrose 50% Injectable 25 Gram(s) IV Push once  dextrose 50% Injectable 25 Gram(s) IV Push once  docusate sodium 100 milliGRAM(s) Oral three times a day  furosemide   Injectable 80 milliGRAM(s) IV Push two times a day  insulin glargine Injectable (LANTUS) 15 Unit(s) SubCutaneous at bedtime  insulin lispro (HumaLOG) corrective regimen sliding scale   SubCutaneous three times a day before meals  insulin lispro (HumaLOG) corrective regimen sliding scale   SubCutaneous at bedtime  latanoprost 0.005% Ophthalmic Solution 1 Drop(s) Both EYES at bedtime  levothyroxine 100 MICROGram(s) Oral daily  metolazone 10 milliGRAM(s) Oral daily  nystatin Powder 1 Application(s) Topical two times a day  pantoprazole  Injectable 40 milliGRAM(s) IV Push every 12 hours  polyethylene glycol 3350 17 Gram(s) Oral two times a day  senna 2 Tablet(s) Oral at bedtime  simethicone 80 milliGRAM(s) Chew every 6 hours  simvastatin 20 milliGRAM(s) Oral at bedtime  warfarin 3 milliGRAM(s) Oral once    VITALS:  T(F): 97.5 (06-20-18 @ 12:55), Max: 98 (06-19-18 @ 17:35)  HR: 75 (06-20-18 @ 12:55)  BP: 113/56 (06-20-18 @ 12:55)  RR: 18 (06-20-18 @ 12:55)  SpO2: 95% (06-20-18 @ 12:55)  Wt(kg): --    06-20 @ 07:01  -  06-20 @ 13:12  --------------------------------------------------------  IN: 250 mL / OUT: 0 mL / NET: 250 mL    PHYSICAL EXAM:  Constitutional: NAD, obese  HEENT: anicteric sclera, oropharynx clear  Neck: No JVD  Respiratory: dec bibasilar BS, no wheezes, rales or rhonchi  Cardiovascular: S1, S2, RRR  Gastrointestinal: BS+, soft, NT/ND  Extremities: No cyanosis or clubbing. 3+ peripheral edema  Neurological: A/O x 3, no focal deficits  Psychiatric: Normal mood, normal affect  : No CVA tenderness. No michaud.   Skin: No rashes    LABS:  06-20    143  |  101  |  76<H>  ----------------------------<  102<H>  3.8   |  32<H>  |  2.45<H>    Ca    9.5      20 Jun 2018 03:51  Mg     2.1     06-20      Creatinine Trend: 2.45 <--, 2.60 <--, 2.59 <--, 2.49 <--, 2.61 <--, 2.59 <--, 2.28 <--, 2.39 <--                        8.8    5.24  )-----------( 132      ( 20 Jun 2018 03:51 )             27.6     Urine Studies:        RADIOLOGY & ADDITIONAL STUDIES:

## 2018-06-20 NOTE — PROGRESS NOTE ADULT - SUBJECTIVE AND OBJECTIVE BOX
S: No CP or SOB    MEDICATIONS  (STANDING):  allopurinol 100 milliGRAM(s) Oral daily  aspirin enteric coated 81 milliGRAM(s) Oral daily  cilostazol 100 milliGRAM(s) Oral two times a day  docusate sodium 100 milliGRAM(s) Oral three times a day  furosemide   Injectable 80 milliGRAM(s) IV Push two times a day  insulin glargine Injectable (LANTUS) 15 Unit(s) SubCutaneous at bedtime  insulin lispro (HumaLOG) corrective regimen sliding scale   SubCutaneous three times a day before meals  insulin lispro (HumaLOG) corrective regimen sliding scale   SubCutaneous at bedtime  latanoprost 0.005% Ophthalmic Solution 1 Drop(s) Both EYES at bedtime  levothyroxine 100 MICROGram(s) Oral daily  metolazone 10 milliGRAM(s) Oral daily  nystatin Powder 1 Application(s) Topical two times a day  pantoprazole  Injectable 40 milliGRAM(s) IV Push every 12 hours  polyethylene glycol 3350 17 Gram(s) Oral two times a day  senna 2 Tablet(s) Oral at bedtime  simethicone 80 milliGRAM(s) Chew every 6 hours  simvastatin 20 milliGRAM(s) Oral at bedtime    MEDICATIONS  (PRN):  dextrose 40% Gel 15 Gram(s) Oral once PRN Blood Glucose LESS THAN 70 milliGRAM(s)/deciliter  glucagon  Injectable 1 milliGRAM(s) IntraMuscular once PRN Glucose LESS THAN 70 milligrams/deciliter  melatonin 3 milliGRAM(s) Oral at bedtime PRN Insomnia      LABS:                        8.8    5.24  )-----------( 132      ( 20 Jun 2018 03:51 )             27.6     143  |  101  |  76<H>  ----------------------------<  102<H>  3.8   |  32<H>  |  2.45<H>    Ca    9.5      20 Jun 2018 03:51  Mg     2.1     06-20    Creatinine Trend: 2.45<--, 2.60<--, 2.59<--, 2.49<--, 2.61<--, 2.59<--   PT/INR - ( 20 Jun 2018 03:51 )   PT: 21.2 SEC;   INR: 1.82     PTT - ( 19 Jun 2018 16:53 )  PTT:74.8 SEC    PHYSICAL EXAM  Vital Signs Last 24 Hrs  T(C): 36.6 (20 Jun 2018 05:58), Max: 36.7 (19 Jun 2018 17:35)  T(F): 97.9 (20 Jun 2018 05:58), Max: 98 (19 Jun 2018 17:35)  HR: 73 (20 Jun 2018 05:58) (67 - 77)  BP: 133/65 (20 Jun 2018 05:58) (122/78 - 163/74)  RR: 18 (20 Jun 2018 05:58) (18 - 18)  SpO2: 100% (20 Jun 2018 05:58) (95% - 100%)    Heart: normal S1, S2, IRR, no m/r/g  Lungs: cta b/l  Abd: soft nT, nD  Ext: 2+ edema in LE bilaterally     DATA:    TELEMETRY:  AF 	      ECG: < from: 12 Lead ECG (06.13.18 @ 14:56) >  Atrial fibrillation  Right bundle branch block  Left posterior fascicular block  *** Bifascicular block ***  Cannot rule out Inferior infarct , age undetermined  T wave abnormality, consider lateral ischemia  Abnormal ECG    < end of copied text >    < from: Transthoracic Echocardiogram (06.18.18 @ 10:33) >  CONCLUSIONS:  1. Mitral annular calcification, otherwise normal mitral  valve. Mild mitral regurgitation.  2. Severely dilated left atrium.  LA volume index = 69  cc/m2.  3. Normal left ventricular internal dimensions and wall  thicknesses.  4. Endocardium not well visualized; grossly normal left  ventricular systolic function.  5. Moderate right atrial enlargement.  6. Right ventricular enlargement with decreased right  ventricular systolic function.  7. Estimated right ventricular systolic pressure equals 53  mm Hg, assuming right atrial pressure equals 10 mm Hg,  consistent with moderate pulmonary hypertension.  8. Normal tricuspid valve.  Moderate-severe tricuspid  regurgitation.  *** No previous Echo exam.  ------------------------------------------------------------------------  Confirmed on  6/18/2018 - 12:27:29 by Lance Ferris M.D.    < end of copied text >      ASSESSMENT/PLAN: 	89y Female with history of Afib on AC, HTN, CKD admitted with syncope and volume overload c/w acute on chronic diastolic and Right sided CHF    -Appreciate EP follow up - patient and patient's family is refusing PPM at this time  -pt. family does not want invasive cv procedures at this time  -therefore, conservative cardiac care and medical management recommended at this time  -pt. still volume overloaded  -continue with iv diuresis to keep O > I, increased Lasix and added Zaroxolyn  -follow up renal and monitor cr  -episode of melena, GI eval appreciated.  Await Stool OB    Kristina Conteh PA-C S: No CP or SOB    MEDICATIONS  (STANDING):  allopurinol 100 milliGRAM(s) Oral daily  aspirin enteric coated 81 milliGRAM(s) Oral daily  cilostazol 100 milliGRAM(s) Oral two times a day  docusate sodium 100 milliGRAM(s) Oral three times a day  furosemide   Injectable 80 milliGRAM(s) IV Push two times a day  insulin glargine Injectable (LANTUS) 15 Unit(s) SubCutaneous at bedtime  insulin lispro (HumaLOG) corrective regimen sliding scale   SubCutaneous three times a day before meals  insulin lispro (HumaLOG) corrective regimen sliding scale   SubCutaneous at bedtime  latanoprost 0.005% Ophthalmic Solution 1 Drop(s) Both EYES at bedtime  levothyroxine 100 MICROGram(s) Oral daily  metolazone 10 milliGRAM(s) Oral daily  nystatin Powder 1 Application(s) Topical two times a day  pantoprazole  Injectable 40 milliGRAM(s) IV Push every 12 hours  polyethylene glycol 3350 17 Gram(s) Oral two times a day  senna 2 Tablet(s) Oral at bedtime  simethicone 80 milliGRAM(s) Chew every 6 hours  simvastatin 20 milliGRAM(s) Oral at bedtime    MEDICATIONS  (PRN):  dextrose 40% Gel 15 Gram(s) Oral once PRN Blood Glucose LESS THAN 70 milliGRAM(s)/deciliter  glucagon  Injectable 1 milliGRAM(s) IntraMuscular once PRN Glucose LESS THAN 70 milligrams/deciliter  melatonin 3 milliGRAM(s) Oral at bedtime PRN Insomnia      LABS:                        8.8    5.24  )-----------( 132      ( 20 Jun 2018 03:51 )             27.6     143  |  101  |  76<H>  ----------------------------<  102<H>  3.8   |  32<H>  |  2.45<H>    Ca    9.5      20 Jun 2018 03:51  Mg     2.1     06-20    Creatinine Trend: 2.45<--, 2.60<--, 2.59<--, 2.49<--, 2.61<--, 2.59<--   PT/INR - ( 20 Jun 2018 03:51 )   PT: 21.2 SEC;   INR: 1.82     PTT - ( 19 Jun 2018 16:53 )  PTT:74.8 SEC    PHYSICAL EXAM  Vital Signs Last 24 Hrs  T(C): 36.6 (20 Jun 2018 05:58), Max: 36.7 (19 Jun 2018 17:35)  T(F): 97.9 (20 Jun 2018 05:58), Max: 98 (19 Jun 2018 17:35)  HR: 73 (20 Jun 2018 05:58) (67 - 77)  BP: 133/65 (20 Jun 2018 05:58) (122/78 - 163/74)  RR: 18 (20 Jun 2018 05:58) (18 - 18)  SpO2: 100% (20 Jun 2018 05:58) (95% - 100%)    Heart: normal S1, S2, IRR, no m/r/g  Lungs: cta b/l  Abd: soft nT, nD  Ext: 2+ edema in LE bilaterally     DATA:    TELEMETRY:  AF 	      ECG: < from: 12 Lead ECG (06.13.18 @ 14:56) >  Atrial fibrillation  Right bundle branch block  Left posterior fascicular block  *** Bifascicular block ***  Cannot rule out Inferior infarct , age undetermined  T wave abnormality, consider lateral ischemia  Abnormal ECG    < end of copied text >    < from: Transthoracic Echocardiogram (06.18.18 @ 10:33) >  CONCLUSIONS:  1. Mitral annular calcification, otherwise normal mitral  valve. Mild mitral regurgitation.  2. Severely dilated left atrium.  LA volume index = 69  cc/m2.  3. Normal left ventricular internal dimensions and wall  thicknesses.  4. Endocardium not well visualized; grossly normal left  ventricular systolic function.  5. Moderate right atrial enlargement.  6. Right ventricular enlargement with decreased right  ventricular systolic function.  7. Estimated right ventricular systolic pressure equals 53  mm Hg, assuming right atrial pressure equals 10 mm Hg,  consistent with moderate pulmonary hypertension.  8. Normal tricuspid valve.  Moderate-severe tricuspid  regurgitation.  *** No previous Echo exam.  ------------------------------------------------------------------------  Confirmed on  6/18/2018 - 12:27:29 by Lance Ferris M.D.    < end of copied text >      ASSESSMENT/PLAN: 	89y Female with history of Afib on AC, HTN, CKD admitted with syncope and volume overload c/w acute on chronic diastolic and Right sided CHF    -Appreciate EP follow up - patient and patient's family is refusing PPM at this time  -pt. family does not want invasive cv procedures at this time  -therefore, conservative cardiac care and medical management recommended at this time  -pt. still volume overloaded  -continue with iv diuresis to keep O > I, increased Lasix and added Zaroxolyn  -follow up renal and monitor cr  -episode of melena, GI eval appreciated.  Await Stool OB  --On Coumadin, goal INR 2-3, off Heparin Gtt.  May need to hold Coumadin if Occult +/H/H dropping    Kristina Conteh PA-C

## 2018-06-20 NOTE — PROGRESS NOTE ADULT - SUBJECTIVE AND OBJECTIVE BOX
INTERVAL HPI/OVERNIGHT EVENTS: Seen and examined with   Vital Signs Last 24 Hrs  T(C): 36.4 (20 Jun 2018 12:55), Max: 36.6 (19 Jun 2018 23:13)  T(F): 97.5 (20 Jun 2018 12:55), Max: 97.9 (19 Jun 2018 23:13)  HR: 75 (20 Jun 2018 12:55) (73 - 77)  BP: 113/56 (20 Jun 2018 12:55) (113/56 - 163/74)  BP(mean): --  RR: 18 (20 Jun 2018 12:55) (18 - 18)  SpO2: 95% (20 Jun 2018 12:55) (95% - 100%)  I&O's Summary    20 Jun 2018 07:01  -  20 Jun 2018 18:05  --------------------------------------------------------  IN: 250 mL / OUT: 0 mL / NET: 250 mL      MEDICATIONS  (STANDING):  allopurinol 100 milliGRAM(s) Oral daily  aspirin enteric coated 81 milliGRAM(s) Oral daily  cilostazol 100 milliGRAM(s) Oral two times a day  dextrose 5%. 1000 milliLiter(s) (50 mL/Hr) IV Continuous <Continuous>  dextrose 50% Injectable 12.5 Gram(s) IV Push once  dextrose 50% Injectable 25 Gram(s) IV Push once  dextrose 50% Injectable 25 Gram(s) IV Push once  docusate sodium 100 milliGRAM(s) Oral three times a day  furosemide   Injectable 80 milliGRAM(s) IV Push two times a day  insulin glargine Injectable (LANTUS) 15 Unit(s) SubCutaneous at bedtime  insulin lispro (HumaLOG) corrective regimen sliding scale   SubCutaneous three times a day before meals  insulin lispro (HumaLOG) corrective regimen sliding scale   SubCutaneous at bedtime  latanoprost 0.005% Ophthalmic Solution 1 Drop(s) Both EYES at bedtime  levothyroxine 100 MICROGram(s) Oral daily  metolazone 10 milliGRAM(s) Oral daily  nystatin Powder 1 Application(s) Topical two times a day  pantoprazole  Injectable 40 milliGRAM(s) IV Push every 12 hours  polyethylene glycol 3350 17 Gram(s) Oral two times a day  senna 2 Tablet(s) Oral at bedtime  simethicone 80 milliGRAM(s) Chew every 6 hours  simvastatin 20 milliGRAM(s) Oral at bedtime    MEDICATIONS  (PRN):  dextrose 40% Gel 15 Gram(s) Oral once PRN Blood Glucose LESS THAN 70 milliGRAM(s)/deciliter  glucagon  Injectable 1 milliGRAM(s) IntraMuscular once PRN Glucose LESS THAN 70 milligrams/deciliter  melatonin 3 milliGRAM(s) Oral at bedtime PRN Insomnia    LABS:                        8.8    5.24  )-----------( 132      ( 20 Jun 2018 03:51 )             27.6     06-20    143  |  101  |  76<H>  ----------------------------<  102<H>  3.8   |  32<H>  |  2.45<H>    Ca    9.5      20 Jun 2018 03:51  Mg     2.1     06-20      PT/INR - ( 20 Jun 2018 03:51 )   PT: 21.2 SEC;   INR: 1.82          PTT - ( 19 Jun 2018 16:53 )  PTT:74.8 SEC    CAPILLARY BLOOD GLUCOSE      POCT Blood Glucose.: 204 mg/dL (20 Jun 2018 17:05)  POCT Blood Glucose.: 144 mg/dL (20 Jun 2018 12:29)  POCT Blood Glucose.: 75 mg/dL (20 Jun 2018 08:42)  POCT Blood Glucose.: 148 mg/dL (19 Jun 2018 21:56)          REVIEW OF SYSTEMS:  CONSTITUTIONAL: No fever, weight loss, or fatigue  EYES: No eye pain, visual disturbances, or discharge  ENMT:  No difficulty hearing, tinnitus, vertigo; No sinus or throat pain  NECK: No pain or stiffness  BREASTS: No pain, masses, or nipple discharge  RESPIRATORY: No cough, wheezing, chills or hemoptysis; No shortness of breath  CARDIOVASCULAR: No chest pain, palpitations, dizziness, or leg swelling  GASTROINTESTINAL: No abdominal or epigastric pain. No nausea, vomiting, or hematemesis; No diarrhea or constipation. No melena or hematochezia.  GENITOURINARY: No dysuria, frequency, hematuria, or incontinence  NEUROLOGICAL: No headaches, memory loss, loss of strength, numbness, or tremors  SKIN: No itching, burning, rashes, or lesions   LYMPH NODES: No enlarged glands  ENDOCRINE: No heat or cold intolerance; No hair loss  MUSCULOSKELETAL: No joint pain or swelling; No muscle, back, or extremity pain      Consultant(s) Notes Reviewed:  [x ] YES  [ ] NO    PHYSICAL EXAM:  GENERAL: NAD, well-groomed, well-developed ,not in any distress ,  HEAD:  Atraumatic, Normocephalic  EYES: EOMI, PERRLA, conjunctiva and sclera clear  ENMT: No tonsillar erythema, exudates, or enlargement; Moist mucous membranes, Good dentition, No lesions  NECK: Supple, No JVD, Normal thyroid  NERVOUS SYSTEM:  Alert & Oriented X3, No focal deficit   CHEST/LUNG: Good air entry bilateral with basal  rales,   HEART: Regular rate and rhythm; No murmurs, rubs, or gallops  ABDOMEN: Soft, Nontender, Nondistended; Bowel sounds present  EXTREMITIES:  2+ Peripheral Pulses, No clubbing, cyanosis, but ++ edema  SKIN: No rashes or lesions    Care Discussed with Consultants/Other Providers [ x] YES  [ ] NO

## 2018-06-20 NOTE — PROGRESS NOTE ADULT - SUBJECTIVE AND OBJECTIVE BOX
INTERVAL HPI/OVERNIGHT EVENTS:    pt reports had a black stool overnight/early am   abdominal bloating this morning, no pain   eating breakfast without pain or difficulties    MEDICATIONS  (STANDING):  allopurinol 100 milliGRAM(s) Oral daily  aspirin enteric coated 81 milliGRAM(s) Oral daily  cilostazol 100 milliGRAM(s) Oral two times a day  dextrose 5%. 1000 milliLiter(s) (50 mL/Hr) IV Continuous <Continuous>  dextrose 50% Injectable 12.5 Gram(s) IV Push once  dextrose 50% Injectable 25 Gram(s) IV Push once  dextrose 50% Injectable 25 Gram(s) IV Push once  docusate sodium 100 milliGRAM(s) Oral three times a day  furosemide   Injectable 80 milliGRAM(s) IV Push two times a day  insulin glargine Injectable (LANTUS) 15 Unit(s) SubCutaneous at bedtime  insulin lispro (HumaLOG) corrective regimen sliding scale   SubCutaneous three times a day before meals  insulin lispro (HumaLOG) corrective regimen sliding scale   SubCutaneous at bedtime  latanoprost 0.005% Ophthalmic Solution 1 Drop(s) Both EYES at bedtime  levothyroxine 100 MICROGram(s) Oral daily  metolazone 10 milliGRAM(s) Oral daily  nystatin Powder 1 Application(s) Topical two times a day  pantoprazole  Injectable 40 milliGRAM(s) IV Push every 12 hours  polyethylene glycol 3350 17 Gram(s) Oral two times a day  senna 2 Tablet(s) Oral at bedtime  simvastatin 20 milliGRAM(s) Oral at bedtime    MEDICATIONS  (PRN):  dextrose 40% Gel 15 Gram(s) Oral once PRN Blood Glucose LESS THAN 70 milliGRAM(s)/deciliter  glucagon  Injectable 1 milliGRAM(s) IntraMuscular once PRN Glucose LESS THAN 70 milligrams/deciliter  melatonin 3 milliGRAM(s) Oral at bedtime PRN Insomnia      Allergies    No Known Allergies    Intolerances        Review of Systems:    General:  No wt loss, fevers, chills, night sweats, fatigue   Eyes:  Good vision, no reported pain  ENT:  No sore throat, pain, runny nose, dysphagia  CV:  No pain, palpitations, hypo/hypertension  Resp:  No dyspnea, cough, tachypnea, wheezing  GI:  No pain, No nausea, No vomiting, No diarrhea, No constipation, No weight loss, No fever, No pruritis, No rectal bleeding, No melena, No dysphagia  :  No pain, bleeding, incontinence, nocturia  Muscle:  No pain, weakness  Neuro:  No weakness, tingling, memory problems  Psych:  No fatigue, insomnia, mood problems, depression  Endocrine:  No polyuria, polydypsia, cold/heat intolerance  Heme:  No petechiae, ecchymosis, easy bruisability  Skin:  No rash, tattoos, scars, edema      Vital Signs Last 24 Hrs  T(C): 36.6 (20 Jun 2018 05:58), Max: 36.7 (19 Jun 2018 17:35)  T(F): 97.9 (20 Jun 2018 05:58), Max: 98 (19 Jun 2018 17:35)  HR: 73 (20 Jun 2018 05:58) (67 - 77)  BP: 133/65 (20 Jun 2018 05:58) (122/78 - 163/74)  BP(mean): --  RR: 18 (20 Jun 2018 05:58) (18 - 18)  SpO2: 100% (20 Jun 2018 05:58) (95% - 100%)    PHYSICAL EXAM:    Constitutional: NAD  HEENT: EOMI, throat clear  Neck: No LAD, supple  Respiratory: CTA and P  Cardiovascular: S1 and S2, RRR, no M  Gastrointestinal: BS+, soft, NT/ND, neg HSM,  Extremities: No peripheral edema, neg clubbing, cyanosis  Vascular: 2+ peripheral pulses  Neurological: A/O x 3, no focal deficits  Psychiatric: Normal mood, normal affect  Skin: No rashes      LABS:                        8.8    5.24  )-----------( 132      ( 20 Jun 2018 03:51 )             27.6     06-20    143  |  101  |  76<H>  ----------------------------<  102<H>  3.8   |  32<H>  |  2.45<H>    Ca    9.5      20 Jun 2018 03:51  Mg     2.1     06-20      PT/INR - ( 20 Jun 2018 03:51 )   PT: 21.2 SEC;   INR: 1.82          PTT - ( 19 Jun 2018 16:53 )  PTT:74.8 SEC      RADIOLOGY & ADDITIONAL TESTS:

## 2018-06-21 LAB
BUN SERPL-MCNC: 71 MG/DL — HIGH (ref 7–23)
CALCIUM SERPL-MCNC: 10.3 MG/DL — SIGNIFICANT CHANGE UP (ref 8.4–10.5)
CHLORIDE SERPL-SCNC: 98 MMOL/L — SIGNIFICANT CHANGE UP (ref 98–107)
CO2 SERPL-SCNC: 33 MMOL/L — HIGH (ref 22–31)
CREAT SERPL-MCNC: 2.23 MG/DL — HIGH (ref 0.5–1.3)
GLUCOSE BLDC GLUCOMTR-MCNC: 132 MG/DL — HIGH (ref 70–99)
GLUCOSE BLDC GLUCOMTR-MCNC: 192 MG/DL — HIGH (ref 70–99)
GLUCOSE BLDC GLUCOMTR-MCNC: 193 MG/DL — HIGH (ref 70–99)
GLUCOSE BLDC GLUCOMTR-MCNC: 89 MG/DL — SIGNIFICANT CHANGE UP (ref 70–99)
GLUCOSE SERPL-MCNC: 100 MG/DL — HIGH (ref 70–99)
HCT VFR BLD CALC: 31 % — LOW (ref 34.5–45)
HGB BLD-MCNC: 9.9 G/DL — LOW (ref 11.5–15.5)
INR BLD: 1.67 — HIGH (ref 0.88–1.17)
MAGNESIUM SERPL-MCNC: 2.1 MG/DL — SIGNIFICANT CHANGE UP (ref 1.6–2.6)
MCHC RBC-ENTMCNC: 29.5 PG — SIGNIFICANT CHANGE UP (ref 27–34)
MCHC RBC-ENTMCNC: 31.9 % — LOW (ref 32–36)
MCV RBC AUTO: 92.3 FL — SIGNIFICANT CHANGE UP (ref 80–100)
NRBC # FLD: 0 — SIGNIFICANT CHANGE UP
OB PNL STL: NEGATIVE — SIGNIFICANT CHANGE UP
PLATELET # BLD AUTO: 138 K/UL — LOW (ref 150–400)
PMV BLD: 12.5 FL — SIGNIFICANT CHANGE UP (ref 7–13)
POTASSIUM SERPL-MCNC: 4 MMOL/L — SIGNIFICANT CHANGE UP (ref 3.5–5.3)
POTASSIUM SERPL-SCNC: 4 MMOL/L — SIGNIFICANT CHANGE UP (ref 3.5–5.3)
PROTHROM AB SERPL-ACNC: 18.7 SEC — HIGH (ref 9.8–13.1)
RBC # BLD: 3.36 M/UL — LOW (ref 3.8–5.2)
RBC # FLD: 16 % — HIGH (ref 10.3–14.5)
SODIUM SERPL-SCNC: 143 MMOL/L — SIGNIFICANT CHANGE UP (ref 135–145)
WBC # BLD: 6.37 K/UL — SIGNIFICANT CHANGE UP (ref 3.8–10.5)
WBC # FLD AUTO: 6.37 K/UL — SIGNIFICANT CHANGE UP (ref 3.8–10.5)

## 2018-06-21 PROCEDURE — 99233 SBSQ HOSP IP/OBS HIGH 50: CPT

## 2018-06-21 RX ORDER — WARFARIN SODIUM 2.5 MG/1
3 TABLET ORAL ONCE
Qty: 0 | Refills: 0 | Status: COMPLETED | OUTPATIENT
Start: 2018-06-21 | End: 2018-06-21

## 2018-06-21 RX ORDER — HYDRALAZINE HCL 50 MG
25 TABLET ORAL EVERY 12 HOURS
Qty: 0 | Refills: 0 | Status: DISCONTINUED | OUTPATIENT
Start: 2018-06-21 | End: 2018-06-21

## 2018-06-21 RX ORDER — HYDRALAZINE HCL 50 MG
25 TABLET ORAL EVERY 12 HOURS
Qty: 0 | Refills: 0 | Status: DISCONTINUED | OUTPATIENT
Start: 2018-06-21 | End: 2018-06-29

## 2018-06-21 RX ADMIN — Medication 100 MILLIGRAM(S): at 21:52

## 2018-06-21 RX ADMIN — WARFARIN SODIUM 3 MILLIGRAM(S): 2.5 TABLET ORAL at 17:30

## 2018-06-21 RX ADMIN — Medication 81 MILLIGRAM(S): at 11:41

## 2018-06-21 RX ADMIN — CILOSTAZOL 100 MILLIGRAM(S): 100 TABLET ORAL at 17:30

## 2018-06-21 RX ADMIN — SENNA PLUS 2 TABLET(S): 8.6 TABLET ORAL at 21:52

## 2018-06-21 RX ADMIN — NYSTATIN CREAM 1 APPLICATION(S): 100000 CREAM TOPICAL at 05:22

## 2018-06-21 RX ADMIN — PANTOPRAZOLE SODIUM 40 MILLIGRAM(S): 20 TABLET, DELAYED RELEASE ORAL at 17:31

## 2018-06-21 RX ADMIN — Medication 100 MILLIGRAM(S): at 05:21

## 2018-06-21 RX ADMIN — SIMVASTATIN 20 MILLIGRAM(S): 20 TABLET, FILM COATED ORAL at 21:52

## 2018-06-21 RX ADMIN — INSULIN GLARGINE 15 UNIT(S): 100 INJECTION, SOLUTION SUBCUTANEOUS at 21:52

## 2018-06-21 RX ADMIN — Medication 100 MICROGRAM(S): at 05:22

## 2018-06-21 RX ADMIN — SIMETHICONE 80 MILLIGRAM(S): 80 TABLET, CHEWABLE ORAL at 21:53

## 2018-06-21 RX ADMIN — CILOSTAZOL 100 MILLIGRAM(S): 100 TABLET ORAL at 05:21

## 2018-06-21 RX ADMIN — POLYETHYLENE GLYCOL 3350 17 GRAM(S): 17 POWDER, FOR SOLUTION ORAL at 05:22

## 2018-06-21 RX ADMIN — Medication 100 MILLIGRAM(S): at 11:41

## 2018-06-21 RX ADMIN — Medication 3 MILLIGRAM(S): at 21:52

## 2018-06-21 RX ADMIN — Medication 25 MILLIGRAM(S): at 21:52

## 2018-06-21 RX ADMIN — PANTOPRAZOLE SODIUM 40 MILLIGRAM(S): 20 TABLET, DELAYED RELEASE ORAL at 05:21

## 2018-06-21 RX ADMIN — Medication 80 MILLIGRAM(S): at 05:21

## 2018-06-21 RX ADMIN — SIMETHICONE 80 MILLIGRAM(S): 80 TABLET, CHEWABLE ORAL at 05:21

## 2018-06-21 RX ADMIN — NYSTATIN CREAM 1 APPLICATION(S): 100000 CREAM TOPICAL at 17:30

## 2018-06-21 RX ADMIN — LATANOPROST 1 DROP(S): 0.05 SOLUTION/ DROPS OPHTHALMIC; TOPICAL at 21:53

## 2018-06-21 RX ADMIN — Medication 80 MILLIGRAM(S): at 17:30

## 2018-06-21 RX ADMIN — SIMETHICONE 80 MILLIGRAM(S): 80 TABLET, CHEWABLE ORAL at 11:41

## 2018-06-21 RX ADMIN — Medication 1: at 13:38

## 2018-06-21 RX ADMIN — SIMETHICONE 80 MILLIGRAM(S): 80 TABLET, CHEWABLE ORAL at 17:30

## 2018-06-21 RX ADMIN — Medication 25 MILLIGRAM(S): at 13:46

## 2018-06-21 NOTE — PROGRESS NOTE ADULT - SUBJECTIVE AND OBJECTIVE BOX
S: No CP or SOB      MEDICATIONS  (STANDING):  allopurinol 100 milliGRAM(s) Oral daily  aspirin enteric coated 81 milliGRAM(s) Oral daily  cilostazol 100 milliGRAM(s) Oral two times a day  dextrose 5%. 1000 milliLiter(s) (50 mL/Hr) IV Continuous <Continuous>  dextrose 50% Injectable 12.5 Gram(s) IV Push once  dextrose 50% Injectable 25 Gram(s) IV Push once  dextrose 50% Injectable 25 Gram(s) IV Push once  docusate sodium 100 milliGRAM(s) Oral three times a day  furosemide   Injectable 80 milliGRAM(s) IV Push two times a day  insulin glargine Injectable (LANTUS) 15 Unit(s) SubCutaneous at bedtime  insulin lispro (HumaLOG) corrective regimen sliding scale   SubCutaneous three times a day before meals  insulin lispro (HumaLOG) corrective regimen sliding scale   SubCutaneous at bedtime  latanoprost 0.005% Ophthalmic Solution 1 Drop(s) Both EYES at bedtime  levothyroxine 100 MICROGram(s) Oral daily  metolazone 10 milliGRAM(s) Oral daily  nystatin Powder 1 Application(s) Topical two times a day  pantoprazole  Injectable 40 milliGRAM(s) IV Push every 12 hours  polyethylene glycol 3350 17 Gram(s) Oral two times a day  senna 2 Tablet(s) Oral at bedtime  simethicone 80 milliGRAM(s) Chew every 6 hours  simvastatin 20 milliGRAM(s) Oral at bedtime    MEDICATIONS  (PRN):  dextrose 40% Gel 15 Gram(s) Oral once PRN Blood Glucose LESS THAN 70 milliGRAM(s)/deciliter  glucagon  Injectable 1 milliGRAM(s) IntraMuscular once PRN Glucose LESS THAN 70 milligrams/deciliter  melatonin 3 milliGRAM(s) Oral at bedtime PRN Insomnia      LABS:                        9.9    6.37  )-----------( 138      ( 21 Jun 2018 04:00 )             31.0     143  |  98  |  71<H>  ----------------------------<  100<H>  4.0   |  33<H>  |  2.23<H>    Ca    10.3      21 Jun 2018 04:00  Mg     2.1     06-21    Creatinine Trend: 2.23<--, 2.45<--, 2.60<--, 2.59<--, 2.49<--, 2.61<--   PT/INR - ( 21 Jun 2018 04:00 )   PT: 18.7 SEC;   INR: 1.67       PHYSICAL EXAM  Vital Signs Last 24 Hrs  T(C): 36.8 (20 Jun 2018 22:06), Max: 36.8 (20 Jun 2018 22:06)  T(F): 98.3 (20 Jun 2018 22:06), Max: 98.3 (20 Jun 2018 22:06)  HR: 74 (21 Jun 2018 05:19) (70 - 79)  BP: 159/74 (21 Jun 2018 05:19) (113/56 - 159/74)  RR: 18 (21 Jun 2018 05:19) (18 - 18)  SpO2: 95% (21 Jun 2018 05:19) (95% - 98%)    Heart: normal S1, S2, IRR, no m/r/g  Lungs: cta b/l  Abd: soft nT, nD  Ext: 2+ edema in LE bilaterally     DATA:    TELEMETRY:  AF 	      ECG: < from: 12 Lead ECG (06.13.18 @ 14:56) >  Atrial fibrillation  Right bundle branch block  Left posterior fascicular block  *** Bifascicular block ***  Cannot rule out Inferior infarct , age undetermined  T wave abnormality, consider lateral ischemia  Abnormal ECG    < end of copied text >    < from: Transthoracic Echocardiogram (06.18.18 @ 10:33) >  CONCLUSIONS:  1. Mitral annular calcification, otherwise normal mitral  valve. Mild mitral regurgitation.  2. Severely dilated left atrium.  LA volume index = 69  cc/m2.  3. Normal left ventricular internal dimensions and wall  thicknesses.  4. Endocardium not well visualized; grossly normal left  ventricular systolic function.  5. Moderate right atrial enlargement.  6. Right ventricular enlargement with decreased right  ventricular systolic function.  7. Estimated right ventricular systolic pressure equals 53  mm Hg, assuming right atrial pressure equals 10 mm Hg,  consistent with moderate pulmonary hypertension.  8. Normal tricuspid valve.  Moderate-severe tricuspid  regurgitation.  *** No previous Echo exam.  ------------------------------------------------------------------------  Confirmed on  6/18/2018 - 12:27:29 by Lance Ferris M.D.    < end of copied text >      ASSESSMENT/PLAN: 	89y Female with history of Afib on AC, HTN, CKD admitted with syncope and volume overload c/w acute on chronic diastolic and Right sided CHF, with chronic bifasicular block and bradycardia, with pauses <3 seconds    -Appreciate EP follow up - patient and patient's family is refusing PPM at this time  -pt. family does not want invasive cv procedures at this time  -therefore, conservative cardiac care and medical management recommended at this time, will avoid AVN blockers  -pt. still volume overloaded  -continue with iv diuresis to keep O > I, on IV Lasix and Zaroxolyn  -follow up renal and monitor cr  -episode of melena, GI eval appreciated.  Await Stool OB  --On Coumadin, goal INR 2-3, off Heparin Gtt.    --given elevated BP, avoid AVN blockers/ACE/ARB, so start Hydralazine 25 BID    Kristina Conteh PA-C

## 2018-06-21 NOTE — PROVIDER CONTACT NOTE (OTHER) - ACTION/TREATMENT ORDERED:
will continue to monitor
Will cont to monitor for any further cardiac events.
will continue to monitor
follow nomogram

## 2018-06-21 NOTE — PROVIDER CONTACT NOTE (OTHER) - BACKGROUND
Pt has hx of afib and pauses.
(Admit Diagnosis) Syncope and collapse  (PMH) Personal history of PE (pulmonary embolism)  (PMH) Glaucoma  (PMH) PVD (peripheral vascular disease)  (PMH) Hypothyroid
htn

## 2018-06-21 NOTE — PROVIDER CONTACT NOTE (OTHER) - ASSESSMENT
Pt assessed to be sleeping, no distress noted.
asymptomatic
vitals stable   no acute distress   fall safety precautions maintained

## 2018-06-21 NOTE — PROGRESS NOTE ADULT - SUBJECTIVE AND OBJECTIVE BOX
Patient seen and examined at bedside.  Doing better.  Breathing improved.    No recurrent melena w/ improved Hgb levels     Medications:  allopurinol 100 milliGRAM(s) Oral daily  aspirin enteric coated 81 milliGRAM(s) Oral daily  brimonidine 0.2% Ophthalmic Solution 1 Drop(s) Both EYES three times a day  carvedilol 25 milliGRAM(s) Oral every 12 hours  cilostazol 100 milliGRAM(s) Oral two times a day  dextrose 40% Gel 15 Gram(s) Oral once PRN  dextrose 5%. 1000 milliLiter(s) IV Continuous <Continuous>  dextrose 50% Injectable 12.5 Gram(s) IV Push once  dextrose 50% Injectable 25 Gram(s) IV Push once  dextrose 50% Injectable 25 Gram(s) IV Push once  dorzolamide 2%/timolol 0.5% Ophthalmic Solution 1 Drop(s) Both EYES two times a day  furosemide   Injectable 40 milliGRAM(s) IV Push two times a day  glucagon  Injectable 1 milliGRAM(s) IntraMuscular once PRN  insulin glargine Injectable (LANTUS) 15 Unit(s) SubCutaneous at bedtime  insulin lispro (HumaLOG) corrective regimen sliding scale   SubCutaneous three times a day before meals  insulin lispro (HumaLOG) corrective regimen sliding scale   SubCutaneous at bedtime  latanoprost 0.005% Ophthalmic Solution 1 Drop(s) Both EYES at bedtime  levothyroxine 100 MICROGram(s) Oral daily  simvastatin 20 milliGRAM(s) Oral at bedtime      PAST MEDICAL & SURGICAL HISTORY:  Personal history of PE (pulmonary embolism)  Glaucoma  PVD (peripheral vascular disease)  Hypothyroid  HTN (hypertension)  HLD (hyperlipidemia)  CHF (congestive heart failure)  DM (diabetes mellitus)  Afib  Elective surgery: abdominal abcess removals  History of partial thyroidectomy        Vitals:  T(F): 97.4 (06-15), Max: 97.6 (06-14)  HR: 61 (06-15) (58 - 67)  BP: 101/55 (06-15) (101/55 - 131/65)  RR: 18 (06-15)  SpO2: 100% (06-15)  I&O's Summary    14 Jun 2018 07:01  -  15 Jun 2018 07:00  --------------------------------------------------------  IN: 200 mL / OUT: 300 mL / NET: -100 mL        Physical Exam:  GENERAL: No acute distress, well-developed  HEAD:  Atraumatic, Normocephalic  ENT: EOMI, PERRLA, conjunctiva and sclera clear, Neck supple, +JVP ~ 10 cm H20   CHEST/LUNG: Diffuse crackles w/ no wheeze/rhonchi; improved   BACK: No spinal tenderness  HEART: Irreg rhythm, S1 variable, unable to appreciate S2; III/VI systolic ejection murmur; concern for AS, rubs, or gallops  ABDOMEN: Soft, Nontender, Nondistended; Bowel sounds present  EXTREMITIES:  +3 BLE pitting edema; improved   PSYCH: Nl behavior, nl affect  NEUROLOGY: AAOx3, non-focal, cranial nerves intact  SKIN: Normal color, No rashes or lesions                          9.2    4.66  )-----------( 158      ( 15 Sonny 2018 07:00 )             30.5     06-15    143  |  103  |  65<H>  ----------------------------<  80  4.7   |  33<H>  |  2.59<H>    Ca    9.5      15 Sonny 2018 07:00  Phos  4.5     06-15  Mg     2.3     06-15    TPro  7.4  /  Alb  3.8  /  TBili  0.5  /  DBili  x   /  AST  20  /  ALT  20  /  AlkPhos  83  06-13    PT/INR - ( 13 Jun 2018 15:20 )   PT: 32.9 SEC;   INR: 2.80          PTT - ( 13 Jun 2018 15:20 )  PTT:37.3 SEC  CARDIAC MARKERS ( 14 Jun 2018 09:20 )  x     / x     / 37 u/L / 3.00 ng/mL / x          Serum Pro-Brain Natriuretic Peptide: 3380 pg/mL (06-14 @ 06:15)  Serum Pro-Brain Natriuretic Peptide: 2943 pg/mL (06-13 @ 17:19)    Interpretation of Telemetry:  afib, HR 60s;  this morning occasional episodes into the 50s     Assessment     88 yo woman w/ hx of chronic afib/flutter (on coumadin), CHF? (unknown EF), DM, HTN, hypothyroidism who presented for syncope in the setting of chronic bifascicular block and slow Afib (HR 50s this morning).    Volume status improved; still hypervolemic   Pt and family refusing PPM   TTE w/ preserved LV function   Now w/ possible GIB; on PPI therapy as per GI; no recurrent melena w/ improved Hgb.  Considering possible EGD     RECS  - cont IV lasix, rest of meds as per primary/cardiology team   - cont holding AV jimy blockers; resume rest of home meds   - hold off on micra PPM    Will set up w/ cardianet/3 week event monitor as outpatient upon discharge    A Aubrey Fuller EP

## 2018-06-21 NOTE — PROGRESS NOTE ADULT - SUBJECTIVE AND OBJECTIVE BOX
Pt seen and examined at bedside  No acute events overnight. Feeling well, denies SOB. Improving LE swelling.     Allergies:  No Known Allergies    Hospital Medications:   MEDICATIONS  (STANDING):  allopurinol 100 milliGRAM(s) Oral daily  aspirin enteric coated 81 milliGRAM(s) Oral daily  cilostazol 100 milliGRAM(s) Oral two times a day  dextrose 5%. 1000 milliLiter(s) (50 mL/Hr) IV Continuous <Continuous>  dextrose 50% Injectable 12.5 Gram(s) IV Push once  dextrose 50% Injectable 25 Gram(s) IV Push once  dextrose 50% Injectable 25 Gram(s) IV Push once  docusate sodium 100 milliGRAM(s) Oral three times a day  furosemide   Injectable 80 milliGRAM(s) IV Push two times a day  hydrALAZINE 25 milliGRAM(s) Oral every 12 hours  insulin glargine Injectable (LANTUS) 15 Unit(s) SubCutaneous at bedtime  insulin lispro (HumaLOG) corrective regimen sliding scale   SubCutaneous three times a day before meals  insulin lispro (HumaLOG) corrective regimen sliding scale   SubCutaneous at bedtime  latanoprost 0.005% Ophthalmic Solution 1 Drop(s) Both EYES at bedtime  levothyroxine 100 MICROGram(s) Oral daily  metolazone 10 milliGRAM(s) Oral daily  nystatin Powder 1 Application(s) Topical two times a day  pantoprazole  Injectable 40 milliGRAM(s) IV Push every 12 hours  polyethylene glycol 3350 17 Gram(s) Oral two times a day  senna 2 Tablet(s) Oral at bedtime  simethicone 80 milliGRAM(s) Chew every 6 hours  simvastatin 20 milliGRAM(s) Oral at bedtime    VITALS:  T(F): 98.3 (06-20-18 @ 22:06), Max: 98.3 (06-20-18 @ 22:06)  HR: 74 (06-21-18 @ 05:19)  BP: 159/74 (06-21-18 @ 05:19)  RR: 18 (06-21-18 @ 05:19)  SpO2: 95% (06-21-18 @ 05:19)  Wt(kg): --    06-20 @ 07:01  -  06-21 @ 07:00  --------------------------------------------------------  IN: 250 mL / OUT: 0 mL / NET: 250 mL    06-21 @ 07:01  -  06-21 @ 12:24  --------------------------------------------------------  IN: 120 mL / OUT: 0 mL / NET: 120 mL        PHYSICAL EXAM:  Constitutional: NAD  HEENT: anicteric sclera, oropharynx clear, MMM  Neck: No JVD  Respiratory: CTAB, no wheezes, rales or rhonchi  Cardiovascular: S1, S2, RRR  Gastrointestinal: BS+, soft, NT/ND  Extremities: No cyanosis or clubbing. 1+ peripheral LE edema, wrinkling noted  Neurological: A/O x 3, no focal deficits  Psychiatric: Normal mood, normal affect  : No CVA tenderness. No michaud.   Skin: No rashes  LABS:  06-21    143  |  98  |  71<H>  ----------------------------<  100<H>  4.0   |  33<H>  |  2.23<H>    Ca    10.3      21 Jun 2018 04:00  Mg     2.1     06-21      Creatinine Trend: 2.23 <--, 2.45 <--, 2.60 <--, 2.59 <--, 2.49 <--, 2.61 <--, 2.59 <--                        9.9    6.37  )-----------( 138      ( 21 Jun 2018 04:00 )             31.0     Urine Studies:      RADIOLOGY & ADDITIONAL STUDIES:

## 2018-06-21 NOTE — PROGRESS NOTE ADULT - SUBJECTIVE AND OBJECTIVE BOX
INTERVAL HPI/OVERNIGHT EVENTS: Seen and examined with daughter /HCP in room. feel better . daughter had questions about PPM.   Vital Signs Last 24 Hrs  T(C): 36.9 (21 Jun 2018 12:53), Max: 36.9 (21 Jun 2018 12:53)  T(F): 98.5 (21 Jun 2018 12:53), Max: 98.5 (21 Jun 2018 12:53)  HR: 78 (21 Jun 2018 12:53) (70 - 79)  BP: 129/53 (21 Jun 2018 14:59) (122/67 - 175/80)  BP(mean): --  RR: 18 (21 Jun 2018 12:53) (18 - 18)  SpO2: 94% (21 Jun 2018 12:53) (94% - 98%)  I&O's Summary    20 Jun 2018 07:01  -  21 Jun 2018 07:00  --------------------------------------------------------  IN: 250 mL / OUT: 0 mL / NET: 250 mL    21 Jun 2018 07:01  -  21 Jun 2018 15:38  --------------------------------------------------------  IN: 120 mL / OUT: 0 mL / NET: 120 mL      MEDICATIONS  (STANDING):  allopurinol 100 milliGRAM(s) Oral daily  aspirin enteric coated 81 milliGRAM(s) Oral daily  cilostazol 100 milliGRAM(s) Oral two times a day  dextrose 5%. 1000 milliLiter(s) (50 mL/Hr) IV Continuous <Continuous>  dextrose 50% Injectable 12.5 Gram(s) IV Push once  dextrose 50% Injectable 25 Gram(s) IV Push once  dextrose 50% Injectable 25 Gram(s) IV Push once  docusate sodium 100 milliGRAM(s) Oral three times a day  furosemide   Injectable 80 milliGRAM(s) IV Push two times a day  hydrALAZINE 25 milliGRAM(s) Oral every 12 hours  insulin glargine Injectable (LANTUS) 15 Unit(s) SubCutaneous at bedtime  insulin lispro (HumaLOG) corrective regimen sliding scale   SubCutaneous three times a day before meals  insulin lispro (HumaLOG) corrective regimen sliding scale   SubCutaneous at bedtime  latanoprost 0.005% Ophthalmic Solution 1 Drop(s) Both EYES at bedtime  levothyroxine 100 MICROGram(s) Oral daily  metolazone 10 milliGRAM(s) Oral daily  nystatin Powder 1 Application(s) Topical two times a day  pantoprazole  Injectable 40 milliGRAM(s) IV Push every 12 hours  polyethylene glycol 3350 17 Gram(s) Oral two times a day  senna 2 Tablet(s) Oral at bedtime  simethicone 80 milliGRAM(s) Chew every 6 hours  simvastatin 20 milliGRAM(s) Oral at bedtime  warfarin 3 milliGRAM(s) Oral once    MEDICATIONS  (PRN):  dextrose 40% Gel 15 Gram(s) Oral once PRN Blood Glucose LESS THAN 70 milliGRAM(s)/deciliter  glucagon  Injectable 1 milliGRAM(s) IntraMuscular once PRN Glucose LESS THAN 70 milligrams/deciliter  melatonin 3 milliGRAM(s) Oral at bedtime PRN Insomnia    LABS:                        9.9    6.37  )-----------( 138      ( 21 Jun 2018 04:00 )             31.0     06-21    143  |  98  |  71<H>  ----------------------------<  100<H>  4.0   |  33<H>  |  2.23<H>    Ca    10.3      21 Jun 2018 04:00  Mg     2.1     06-21      PT/INR - ( 21 Jun 2018 04:00 )   PT: 18.7 SEC;   INR: 1.67          PTT - ( 19 Jun 2018 16:53 )  PTT:74.8 SEC    CAPILLARY BLOOD GLUCOSE      POCT Blood Glucose.: 192 mg/dL (21 Jun 2018 13:05)  POCT Blood Glucose.: 89 mg/dL (21 Jun 2018 08:42)  POCT Blood Glucose.: 149 mg/dL (20 Jun 2018 22:18)  POCT Blood Glucose.: 204 mg/dL (20 Jun 2018 17:05)          REVIEW OF SYSTEMS:  CONSTITUTIONAL: No fever, weight loss, or fatigue  EYES: No eye pain, visual disturbances, or discharge  ENMT:  No difficulty hearing, tinnitus, vertigo; No sinus or throat pain  NECK: No pain or stiffness  BREASTS: No pain, masses, or nipple discharge  RESPIRATORY: No cough, wheezing, chills or hemoptysis; No shortness of breath  CARDIOVASCULAR: No chest pain, palpitations, dizziness, but  leg swelling  GASTROINTESTINAL: No abdominal or epigastric pain. No nausea, vomiting, or hematemesis; No diarrhea or constipation. No melena or hematochezia.  GENITOURINARY: No dysuria, frequency, hematuria, or incontinence  NEUROLOGICAL: No headaches, memory loss, loss of strength, numbness, or tremors  SKIN: No itching, burning, rashes, or lesions   LYMPH NODES: No enlarged glands  ENDOCRINE: No heat or cold intolerance; No hair loss  MUSCULOSKELETAL: No joint pain or swelling; No muscle, back, or extremity pain      Consultant(s) Notes Reviewed:  [x ] YES  [ ] NO    PHYSICAL EXAM:  GENERAL: NAD, well-groomed, well-developed,not in any distress ,  HEAD:  Atraumatic, Normocephalic  EYES: EOMI, PERRLA, conjunctiva and sclera clear  ENMT: No tonsillar erythema, exudates, or enlargement; Moist mucous membranes, Good dentition, No lesions  NECK: Supple, No JVD, Normal thyroid  NERVOUS SYSTEM:  Alert & Oriented X3, No focal deficit   CHEST/LUNG: Good air entry bilateral with basal   rales,  HEART: Regular rate and rhythm; No murmurs, rubs, or gallops  ABDOMEN: Soft, Nontender, Nondistended; Bowel sounds present  EXTREMITIES:  2+ Peripheral Pulses, No clubbing, cyanosis, but ++ edema    Care Discussed with Consultants/Other Providers [ x] YES  [ ] NO

## 2018-06-21 NOTE — PROGRESS NOTE ADULT - SUBJECTIVE AND OBJECTIVE BOX
INTERVAL HPI/OVERNIGHT EVENTS:    pt reports no black stools this morning or overnight  no abdominal events  feeling well     MEDICATIONS  (STANDING):  allopurinol 100 milliGRAM(s) Oral daily  aspirin enteric coated 81 milliGRAM(s) Oral daily  cilostazol 100 milliGRAM(s) Oral two times a day  dextrose 5%. 1000 milliLiter(s) (50 mL/Hr) IV Continuous <Continuous>  dextrose 50% Injectable 12.5 Gram(s) IV Push once  dextrose 50% Injectable 25 Gram(s) IV Push once  dextrose 50% Injectable 25 Gram(s) IV Push once  docusate sodium 100 milliGRAM(s) Oral three times a day  furosemide   Injectable 80 milliGRAM(s) IV Push two times a day  hydrALAZINE 25 milliGRAM(s) Oral every 12 hours  insulin glargine Injectable (LANTUS) 15 Unit(s) SubCutaneous at bedtime  insulin lispro (HumaLOG) corrective regimen sliding scale   SubCutaneous three times a day before meals  insulin lispro (HumaLOG) corrective regimen sliding scale   SubCutaneous at bedtime  latanoprost 0.005% Ophthalmic Solution 1 Drop(s) Both EYES at bedtime  levothyroxine 100 MICROGram(s) Oral daily  metolazone 10 milliGRAM(s) Oral daily  nystatin Powder 1 Application(s) Topical two times a day  pantoprazole  Injectable 40 milliGRAM(s) IV Push every 12 hours  polyethylene glycol 3350 17 Gram(s) Oral two times a day  senna 2 Tablet(s) Oral at bedtime  simethicone 80 milliGRAM(s) Chew every 6 hours  simvastatin 20 milliGRAM(s) Oral at bedtime    MEDICATIONS  (PRN):  dextrose 40% Gel 15 Gram(s) Oral once PRN Blood Glucose LESS THAN 70 milliGRAM(s)/deciliter  glucagon  Injectable 1 milliGRAM(s) IntraMuscular once PRN Glucose LESS THAN 70 milligrams/deciliter  melatonin 3 milliGRAM(s) Oral at bedtime PRN Insomnia      Allergies    No Known Allergies    Intolerances        Review of Systems:    General:  No wt loss, fevers, chills, night sweats, fatigue   Eyes:  Good vision, no reported pain  ENT:  No sore throat, pain, runny nose, dysphagia  CV:  No pain, palpitations, hypo/hypertension  Resp:  No dyspnea, cough, tachypnea, wheezing  GI:  No pain, No nausea, No vomiting, No diarrhea, No constipation, No weight loss, No fever, No pruritis, No rectal bleeding, No melena, No dysphagia  :  No pain, bleeding, incontinence, nocturia  Muscle:  No pain, weakness  Neuro:  No weakness, tingling, memory problems  Psych:  No fatigue, insomnia, mood problems, depression  Endocrine:  No polyuria, polydypsia, cold/heat intolerance  Heme:  No petechiae, ecchymosis, easy bruisability  Skin:  No rash, tattoos, scars, edema      Vital Signs Last 24 Hrs  T(C): 36.9 (21 Jun 2018 12:53), Max: 36.9 (21 Jun 2018 12:53)  T(F): 98.5 (21 Jun 2018 12:53), Max: 98.5 (21 Jun 2018 12:53)  HR: 78 (21 Jun 2018 12:53) (70 - 79)  BP: 175/80 (21 Jun 2018 12:53) (122/67 - 175/80)  BP(mean): --  RR: 18 (21 Jun 2018 12:53) (18 - 18)  SpO2: 94% (21 Jun 2018 12:53) (94% - 98%)    PHYSICAL EXAM:    Constitutional: NAD  HEENT: EOMI, throat clear  Neck: No LAD, supple  Respiratory: CTA and P  Cardiovascular: S1 and S2, RRR, no M  Gastrointestinal: BS+, soft, NT/ND, neg HSM,  Extremities: No peripheral edema, neg clubbing, cyanosis  Vascular: 2+ peripheral pulses  Neurological: A/O x 3, no focal deficits  Psychiatric: Normal mood, normal affect  Skin: No rashes      LABS:                        9.9    6.37  )-----------( 138      ( 21 Jun 2018 04:00 )             31.0     06-21    143  |  98  |  71<H>  ----------------------------<  100<H>  4.0   |  33<H>  |  2.23<H>    Ca    10.3      21 Jun 2018 04:00  Mg     2.1     06-21      PT/INR - ( 21 Jun 2018 04:00 )   PT: 18.7 SEC;   INR: 1.67          PTT - ( 19 Jun 2018 16:53 )  PTT:74.8 SEC      RADIOLOGY & ADDITIONAL TESTS:

## 2018-06-22 LAB
BUN SERPL-MCNC: 67 MG/DL — HIGH (ref 7–23)
CALCIUM SERPL-MCNC: 10.1 MG/DL — SIGNIFICANT CHANGE UP (ref 8.4–10.5)
CHLORIDE SERPL-SCNC: 96 MMOL/L — LOW (ref 98–107)
CO2 SERPL-SCNC: 38 MMOL/L — HIGH (ref 22–31)
CREAT SERPL-MCNC: 2.19 MG/DL — HIGH (ref 0.5–1.3)
GLUCOSE BLDC GLUCOMTR-MCNC: 145 MG/DL — HIGH (ref 70–99)
GLUCOSE BLDC GLUCOMTR-MCNC: 159 MG/DL — HIGH (ref 70–99)
GLUCOSE BLDC GLUCOMTR-MCNC: 204 MG/DL — HIGH (ref 70–99)
GLUCOSE BLDC GLUCOMTR-MCNC: 94 MG/DL — SIGNIFICANT CHANGE UP (ref 70–99)
GLUCOSE SERPL-MCNC: 91 MG/DL — SIGNIFICANT CHANGE UP (ref 70–99)
HCT VFR BLD CALC: 29.9 % — LOW (ref 34.5–45)
HGB BLD-MCNC: 9.7 G/DL — LOW (ref 11.5–15.5)
INR BLD: 1.74 — HIGH (ref 0.88–1.17)
MAGNESIUM SERPL-MCNC: 1.9 MG/DL — SIGNIFICANT CHANGE UP (ref 1.6–2.6)
MCHC RBC-ENTMCNC: 29.8 PG — SIGNIFICANT CHANGE UP (ref 27–34)
MCHC RBC-ENTMCNC: 32.4 % — SIGNIFICANT CHANGE UP (ref 32–36)
MCV RBC AUTO: 91.7 FL — SIGNIFICANT CHANGE UP (ref 80–100)
NRBC # FLD: 0 — SIGNIFICANT CHANGE UP
PHOSPHATE SERPL-MCNC: 2.7 MG/DL — SIGNIFICANT CHANGE UP (ref 2.5–4.5)
PLATELET # BLD AUTO: 135 K/UL — LOW (ref 150–400)
PMV BLD: 13 FL — SIGNIFICANT CHANGE UP (ref 7–13)
POTASSIUM SERPL-MCNC: 3.5 MMOL/L — SIGNIFICANT CHANGE UP (ref 3.5–5.3)
POTASSIUM SERPL-SCNC: 3.5 MMOL/L — SIGNIFICANT CHANGE UP (ref 3.5–5.3)
PROTHROM AB SERPL-ACNC: 20.2 SEC — HIGH (ref 9.8–13.1)
RBC # BLD: 3.26 M/UL — LOW (ref 3.8–5.2)
RBC # FLD: 16.1 % — HIGH (ref 10.3–14.5)
SODIUM SERPL-SCNC: 143 MMOL/L — SIGNIFICANT CHANGE UP (ref 135–145)
WBC # BLD: 6.85 K/UL — SIGNIFICANT CHANGE UP (ref 3.8–10.5)
WBC # FLD AUTO: 6.85 K/UL — SIGNIFICANT CHANGE UP (ref 3.8–10.5)

## 2018-06-22 RX ORDER — WARFARIN SODIUM 2.5 MG/1
4 TABLET ORAL ONCE
Qty: 0 | Refills: 0 | Status: COMPLETED | OUTPATIENT
Start: 2018-06-22 | End: 2018-06-22

## 2018-06-22 RX ORDER — POTASSIUM CHLORIDE 20 MEQ
20 PACKET (EA) ORAL ONCE
Qty: 0 | Refills: 0 | Status: COMPLETED | OUTPATIENT
Start: 2018-06-22 | End: 2018-06-22

## 2018-06-22 RX ADMIN — Medication 80 MILLIGRAM(S): at 17:54

## 2018-06-22 RX ADMIN — SIMETHICONE 80 MILLIGRAM(S): 80 TABLET, CHEWABLE ORAL at 17:55

## 2018-06-22 RX ADMIN — Medication 100 MILLIGRAM(S): at 05:37

## 2018-06-22 RX ADMIN — NYSTATIN CREAM 1 APPLICATION(S): 100000 CREAM TOPICAL at 05:37

## 2018-06-22 RX ADMIN — SIMVASTATIN 20 MILLIGRAM(S): 20 TABLET, FILM COATED ORAL at 21:19

## 2018-06-22 RX ADMIN — SIMETHICONE 80 MILLIGRAM(S): 80 TABLET, CHEWABLE ORAL at 05:37

## 2018-06-22 RX ADMIN — Medication 100 MILLIGRAM(S): at 11:29

## 2018-06-22 RX ADMIN — Medication 100 MILLIGRAM(S): at 21:19

## 2018-06-22 RX ADMIN — POLYETHYLENE GLYCOL 3350 17 GRAM(S): 17 POWDER, FOR SOLUTION ORAL at 05:37

## 2018-06-22 RX ADMIN — SENNA PLUS 2 TABLET(S): 8.6 TABLET ORAL at 21:19

## 2018-06-22 RX ADMIN — Medication 80 MILLIGRAM(S): at 05:37

## 2018-06-22 RX ADMIN — SIMETHICONE 80 MILLIGRAM(S): 80 TABLET, CHEWABLE ORAL at 11:29

## 2018-06-22 RX ADMIN — Medication 81 MILLIGRAM(S): at 11:29

## 2018-06-22 RX ADMIN — Medication 1: at 17:55

## 2018-06-22 RX ADMIN — PANTOPRAZOLE SODIUM 40 MILLIGRAM(S): 20 TABLET, DELAYED RELEASE ORAL at 05:37

## 2018-06-22 RX ADMIN — LATANOPROST 1 DROP(S): 0.05 SOLUTION/ DROPS OPHTHALMIC; TOPICAL at 21:19

## 2018-06-22 RX ADMIN — CILOSTAZOL 100 MILLIGRAM(S): 100 TABLET ORAL at 05:37

## 2018-06-22 RX ADMIN — Medication 25 MILLIGRAM(S): at 17:55

## 2018-06-22 RX ADMIN — CILOSTAZOL 100 MILLIGRAM(S): 100 TABLET ORAL at 17:55

## 2018-06-22 RX ADMIN — POLYETHYLENE GLYCOL 3350 17 GRAM(S): 17 POWDER, FOR SOLUTION ORAL at 17:55

## 2018-06-22 RX ADMIN — Medication 20 MILLIEQUIVALENT(S): at 13:56

## 2018-06-22 RX ADMIN — WARFARIN SODIUM 4 MILLIGRAM(S): 2.5 TABLET ORAL at 17:55

## 2018-06-22 RX ADMIN — Medication 25 MILLIGRAM(S): at 05:37

## 2018-06-22 RX ADMIN — PANTOPRAZOLE SODIUM 40 MILLIGRAM(S): 20 TABLET, DELAYED RELEASE ORAL at 17:55

## 2018-06-22 RX ADMIN — Medication 100 MICROGRAM(S): at 05:37

## 2018-06-22 RX ADMIN — NYSTATIN CREAM 1 APPLICATION(S): 100000 CREAM TOPICAL at 17:52

## 2018-06-22 RX ADMIN — INSULIN GLARGINE 15 UNIT(S): 100 INJECTION, SOLUTION SUBCUTANEOUS at 22:04

## 2018-06-22 NOTE — PROGRESS NOTE ADULT - SUBJECTIVE AND OBJECTIVE BOX
S: No CP or SOB    MEDICATIONS  (STANDING):  allopurinol 100 milliGRAM(s) Oral daily  aspirin enteric coated 81 milliGRAM(s) Oral daily  cilostazol 100 milliGRAM(s) Oral two times a day  dextrose 5%. 1000 milliLiter(s) (50 mL/Hr) IV Continuous <Continuous>  dextrose 50% Injectable 12.5 Gram(s) IV Push once  dextrose 50% Injectable 25 Gram(s) IV Push once  dextrose 50% Injectable 25 Gram(s) IV Push once  docusate sodium 100 milliGRAM(s) Oral three times a day  furosemide   Injectable 80 milliGRAM(s) IV Push two times a day  hydrALAZINE 25 milliGRAM(s) Oral every 12 hours  insulin glargine Injectable (LANTUS) 15 Unit(s) SubCutaneous at bedtime  insulin lispro (HumaLOG) corrective regimen sliding scale   SubCutaneous three times a day before meals  insulin lispro (HumaLOG) corrective regimen sliding scale   SubCutaneous at bedtime  latanoprost 0.005% Ophthalmic Solution 1 Drop(s) Both EYES at bedtime  levothyroxine 100 MICROGram(s) Oral daily  metolazone 10 milliGRAM(s) Oral daily  nystatin Powder 1 Application(s) Topical two times a day  pantoprazole  Injectable 40 milliGRAM(s) IV Push every 12 hours  polyethylene glycol 3350 17 Gram(s) Oral two times a day  senna 2 Tablet(s) Oral at bedtime  simethicone 80 milliGRAM(s) Chew every 6 hours  simvastatin 20 milliGRAM(s) Oral at bedtime  warfarin 4 milliGRAM(s) Oral once    MEDICATIONS  (PRN):  dextrose 40% Gel 15 Gram(s) Oral once PRN Blood Glucose LESS THAN 70 milliGRAM(s)/deciliter  glucagon  Injectable 1 milliGRAM(s) IntraMuscular once PRN Glucose LESS THAN 70 milligrams/deciliter  melatonin 3 milliGRAM(s) Oral at bedtime PRN Insomnia      LABS:                        9.7    6.85  )-----------( 135      ( 22 Jun 2018 07:10 )             29.9     Hemoglobin: 9.7 g/dL (06-22 @ 07:10)  Hemoglobin: 9.9 g/dL (06-21 @ 04:00)  Hemoglobin: 8.8 g/dL (06-20 @ 03:51)  Hemoglobin: 9.8 g/dL (06-19 @ 16:53)  Hemoglobin: 9.3 g/dL (06-19 @ 06:00)    06-22    143  |  96<L>  |  67<H>  ----------------------------<  91  3.5   |  38<H>  |  2.19<H>    Ca    10.1      22 Jun 2018 07:10  Phos  2.7     06-22  Mg     1.9     06-22      Creatinine Trend: 2.19<--, 2.23<--, 2.45<--, 2.60<--, 2.59<--, 2.49<--   PT/INR - ( 22 Jun 2018 07:10 )   PT: 20.2 SEC;   INR: 1.74            PHYSICAL EXAM  Vital Signs Last 24 Hrs  T(C): 36.9 (22 Jun 2018 11:26), Max: 37 (21 Jun 2018 21:49)  T(F): 98.4 (22 Jun 2018 11:26), Max: 98.6 (21 Jun 2018 21:49)  HR: 80 (22 Jun 2018 11:26) (80 - 81)  BP: 126/55 (22 Jun 2018 11:26) (126/55 - 149/63)  BP(mean): --  RR: 16 (22 Jun 2018 11:26) (16 - 16)  SpO2: 95% (22 Jun 2018 11:26) (95% - 96%)    Heart: normal S1, S2, IRR, no m/r/g  Lungs: cta b/l  Abd: soft nT, nD  Ext: 2+ edema in LE bilaterally     DATA:    TELEMETRY:  AF 	      ECG: < from: 12 Lead ECG (06.13.18 @ 14:56) >  Atrial fibrillation  Right bundle branch block  Left posterior fascicular block  *** Bifascicular block ***  Cannot rule out Inferior infarct , age undetermined  T wave abnormality, consider lateral ischemia  Abnormal ECG    < end of copied text >    < from: Transthoracic Echocardiogram (06.18.18 @ 10:33) >  CONCLUSIONS:  1. Mitral annular calcification, otherwise normal mitral  valve. Mild mitral regurgitation.  2. Severely dilated left atrium.  LA volume index = 69  cc/m2.  3. Normal left ventricular internal dimensions and wall  thicknesses.  4. Endocardium not well visualized; grossly normal left  ventricular systolic function.  5. Moderate right atrial enlargement.  6. Right ventricular enlargement with decreased right  ventricular systolic function.  7. Estimated right ventricular systolic pressure equals 53  mm Hg, assuming right atrial pressure equals 10 mm Hg,  consistent with moderate pulmonary hypertension.  8. Normal tricuspid valve.  Moderate-severe tricuspid  regurgitation.  *** No previous Echo exam.  ------------------------------------------------------------------------  Confirmed on  6/18/2018 - 12:27:29 by Lance Ferris M.D.    < end of copied text >      ASSESSMENT/PLAN: 	89y Female with history of Afib on AC, HTN, CKD admitted with syncope and volume overload c/w acute on chronic diastolic and Right sided CHF, with chronic bifasicular block and bradycardia, with pauses <3 seconds    -Appreciate EP follow up - patient and patient's family is refusing PPM at this time  -pt. family does not want invasive cv procedures at this time  -therefore, conservative cardiac care and medical management recommended at this time, will avoid AVN blockers  -pt still volume overloaded  -continue with iv diuresis to keep O > I, on IV Lasix and Zaroxolyn  -follow up renal and monitor cr  -episode of melena, GI eval appreciated.  Guaiac neg   --On Coumadin, goal INR 2-3, off Heparin Gtt.    --given elevated BP, avoid AVN blockers/ACE/ARB, c/w Hydralazine 25 BID

## 2018-06-22 NOTE — PROGRESS NOTE ADULT - SUBJECTIVE AND OBJECTIVE BOX
INTERVAL HPI/OVERNIGHT EVENTS: no new concerns.   Vital Signs Last 24 Hrs  T(C): 36.9 (22 Jun 2018 11:26), Max: 37 (21 Jun 2018 21:49)  T(F): 98.4 (22 Jun 2018 11:26), Max: 98.6 (21 Jun 2018 21:49)  HR: 80 (22 Jun 2018 11:26) (80 - 81)  BP: 126/55 (22 Jun 2018 11:26) (126/55 - 149/63)  BP(mean): --  RR: 16 (22 Jun 2018 11:26) (16 - 16)  SpO2: 95% (22 Jun 2018 11:26) (95% - 96%)  I&O's Summary    21 Jun 2018 07:01  -  22 Jun 2018 07:00  --------------------------------------------------------  IN: 120 mL / OUT: 0 mL / NET: 120 mL      MEDICATIONS  (STANDING):  allopurinol 100 milliGRAM(s) Oral daily  aspirin enteric coated 81 milliGRAM(s) Oral daily  cilostazol 100 milliGRAM(s) Oral two times a day  dextrose 5%. 1000 milliLiter(s) (50 mL/Hr) IV Continuous <Continuous>  dextrose 50% Injectable 12.5 Gram(s) IV Push once  dextrose 50% Injectable 25 Gram(s) IV Push once  dextrose 50% Injectable 25 Gram(s) IV Push once  docusate sodium 100 milliGRAM(s) Oral three times a day  furosemide   Injectable 80 milliGRAM(s) IV Push two times a day  hydrALAZINE 25 milliGRAM(s) Oral every 12 hours  insulin glargine Injectable (LANTUS) 15 Unit(s) SubCutaneous at bedtime  insulin lispro (HumaLOG) corrective regimen sliding scale   SubCutaneous three times a day before meals  insulin lispro (HumaLOG) corrective regimen sliding scale   SubCutaneous at bedtime  latanoprost 0.005% Ophthalmic Solution 1 Drop(s) Both EYES at bedtime  levothyroxine 100 MICROGram(s) Oral daily  metolazone 10 milliGRAM(s) Oral daily  nystatin Powder 1 Application(s) Topical two times a day  pantoprazole  Injectable 40 milliGRAM(s) IV Push every 12 hours  polyethylene glycol 3350 17 Gram(s) Oral two times a day  senna 2 Tablet(s) Oral at bedtime  simethicone 80 milliGRAM(s) Chew every 6 hours  simvastatin 20 milliGRAM(s) Oral at bedtime  warfarin 4 milliGRAM(s) Oral once    MEDICATIONS  (PRN):  dextrose 40% Gel 15 Gram(s) Oral once PRN Blood Glucose LESS THAN 70 milliGRAM(s)/deciliter  glucagon  Injectable 1 milliGRAM(s) IntraMuscular once PRN Glucose LESS THAN 70 milligrams/deciliter  melatonin 3 milliGRAM(s) Oral at bedtime PRN Insomnia    LABS:                        9.7    6.85  )-----------( 135      ( 22 Jun 2018 07:10 )             29.9     06-22    143  |  96<L>  |  67<H>  ----------------------------<  91  3.5   |  38<H>  |  2.19<H>    Ca    10.1      22 Jun 2018 07:10  Phos  2.7     06-22  Mg     1.9     06-22      PT/INR - ( 22 Jun 2018 07:10 )   PT: 20.2 SEC;   INR: 1.74              CAPILLARY BLOOD GLUCOSE      POCT Blood Glucose.: 145 mg/dL (22 Jun 2018 12:38)  POCT Blood Glucose.: 94 mg/dL (22 Jun 2018 08:33)  POCT Blood Glucose.: 193 mg/dL (21 Jun 2018 21:22)  POCT Blood Glucose.: 132 mg/dL (21 Jun 2018 17:14)              Consultant(s) Notes Reviewed:  [x ] YES  [ ] NO    PHYSICAL EXAM:  GENERAL: NAD, Obese ,not in any distress ,  HEAD:  Atraumatic, Normocephalic  EYES: EOMI, PERRLA, conjunctiva and sclera clear  ENMT: No tonsillar erythema, exudates, or enlargement; Moist mucous membranes, Good dentition, No lesions  NECK: Supple, No JVD, Normal thyroid  NERVOUS SYSTEM:  Alert & Oriented X0,   CHEST/LUNG: Good air entry bilateral with basal   rales, rhonchi, wheezing, or rubs  HEART: Regular rate and rhythm; No murmurs, rubs, or gallops  ABDOMEN: Soft, Nontender, Nondistended; Bowel sounds present  EXTREMITIES:  2+ Peripheral Pulses, No clubbing, cyanosis, but ++ edema  SKIN: No rashes or lesions    Care Discussed with Consultants/Other Providers [ x] YES  [ ] NO INTERVAL HPI/OVERNIGHT EVENTS: no new concerns.   Vital Signs Last 24 Hrs  T(C): 36.9 (22 Jun 2018 11:26), Max: 37 (21 Jun 2018 21:49)  T(F): 98.4 (22 Jun 2018 11:26), Max: 98.6 (21 Jun 2018 21:49)  HR: 80 (22 Jun 2018 11:26) (80 - 81)  BP: 126/55 (22 Jun 2018 11:26) (126/55 - 149/63)  BP(mean): --  RR: 16 (22 Jun 2018 11:26) (16 - 16)  SpO2: 95% (22 Jun 2018 11:26) (95% - 96%)  I&O's Summary    21 Jun 2018 07:01  -  22 Jun 2018 07:00  --------------------------------------------------------  IN: 120 mL / OUT: 0 mL / NET: 120 mL      MEDICATIONS  (STANDING):  allopurinol 100 milliGRAM(s) Oral daily  aspirin enteric coated 81 milliGRAM(s) Oral daily  cilostazol 100 milliGRAM(s) Oral two times a day  dextrose 5%. 1000 milliLiter(s) (50 mL/Hr) IV Continuous <Continuous>  dextrose 50% Injectable 12.5 Gram(s) IV Push once  dextrose 50% Injectable 25 Gram(s) IV Push once  dextrose 50% Injectable 25 Gram(s) IV Push once  docusate sodium 100 milliGRAM(s) Oral three times a day  furosemide   Injectable 80 milliGRAM(s) IV Push two times a day  hydrALAZINE 25 milliGRAM(s) Oral every 12 hours  insulin glargine Injectable (LANTUS) 15 Unit(s) SubCutaneous at bedtime  insulin lispro (HumaLOG) corrective regimen sliding scale   SubCutaneous three times a day before meals  insulin lispro (HumaLOG) corrective regimen sliding scale   SubCutaneous at bedtime  latanoprost 0.005% Ophthalmic Solution 1 Drop(s) Both EYES at bedtime  levothyroxine 100 MICROGram(s) Oral daily  metolazone 10 milliGRAM(s) Oral daily  nystatin Powder 1 Application(s) Topical two times a day  pantoprazole  Injectable 40 milliGRAM(s) IV Push every 12 hours  polyethylene glycol 3350 17 Gram(s) Oral two times a day  senna 2 Tablet(s) Oral at bedtime  simethicone 80 milliGRAM(s) Chew every 6 hours  simvastatin 20 milliGRAM(s) Oral at bedtime  warfarin 4 milliGRAM(s) Oral once    MEDICATIONS  (PRN):  dextrose 40% Gel 15 Gram(s) Oral once PRN Blood Glucose LESS THAN 70 milliGRAM(s)/deciliter  glucagon  Injectable 1 milliGRAM(s) IntraMuscular once PRN Glucose LESS THAN 70 milligrams/deciliter  melatonin 3 milliGRAM(s) Oral at bedtime PRN Insomnia    LABS:                        9.7    6.85  )-----------( 135      ( 22 Jun 2018 07:10 )             29.9     06-22    143  |  96<L>  |  67<H>  ----------------------------<  91  3.5   |  38<H>  |  2.19<H>    Ca    10.1      22 Jun 2018 07:10  Phos  2.7     06-22  Mg     1.9     06-22      PT/INR - ( 22 Jun 2018 07:10 )   PT: 20.2 SEC;   INR: 1.74              CAPILLARY BLOOD GLUCOSE      POCT Blood Glucose.: 145 mg/dL (22 Jun 2018 12:38)  POCT Blood Glucose.: 94 mg/dL (22 Jun 2018 08:33)  POCT Blood Glucose.: 193 mg/dL (21 Jun 2018 21:22)  POCT Blood Glucose.: 132 mg/dL (21 Jun 2018 17:14)              Consultant(s) Notes Reviewed:  [x ] YES  [ ] NO    PHYSICAL EXAM:  GENERAL: NAD, Obese ,not in any distress ,  HEAD:  Atraumatic, Normocephalic  EYES: EOMI, PERRLA, conjunctiva and sclera clear  ENMT: No tonsillar erythema, exudates, or enlargement; Moist mucous membranes, Good dentition, No lesions  NECK: Supple, No JVD, Normal thyroid  NERVOUS SYSTEM:  Alert & Oriented X3   CHEST/LUNG: Good air entry bilateral with basal   rales, rhonchi, wheezing, or rubs  HEART: Regular rate and rhythm; No murmurs, rubs, or gallops  ABDOMEN: Soft, Nontender, Nondistended; Bowel sounds present  EXTREMITIES:  2+ Peripheral Pulses, No clubbing, cyanosis, but ++ edema  SKIN: No rashes or lesions    Care Discussed with Consultants/Other Providers [ x] YES  [ ] NO

## 2018-06-22 NOTE — PROGRESS NOTE ADULT - SUBJECTIVE AND OBJECTIVE BOX
Pt seen and examined at bedside  Pt feeling better, denies SOB.     Allergies:  No Known Allergies    Hospital Medications:   MEDICATIONS  (STANDING):  allopurinol 100 milliGRAM(s) Oral daily  aspirin enteric coated 81 milliGRAM(s) Oral daily  cilostazol 100 milliGRAM(s) Oral two times a day  dextrose 5%. 1000 milliLiter(s) (50 mL/Hr) IV Continuous <Continuous>  dextrose 50% Injectable 12.5 Gram(s) IV Push once  dextrose 50% Injectable 25 Gram(s) IV Push once  dextrose 50% Injectable 25 Gram(s) IV Push once  docusate sodium 100 milliGRAM(s) Oral three times a day  furosemide   Injectable 80 milliGRAM(s) IV Push two times a day  hydrALAZINE 25 milliGRAM(s) Oral every 12 hours  insulin glargine Injectable (LANTUS) 15 Unit(s) SubCutaneous at bedtime  insulin lispro (HumaLOG) corrective regimen sliding scale   SubCutaneous three times a day before meals  insulin lispro (HumaLOG) corrective regimen sliding scale   SubCutaneous at bedtime  latanoprost 0.005% Ophthalmic Solution 1 Drop(s) Both EYES at bedtime  levothyroxine 100 MICROGram(s) Oral daily  metolazone 10 milliGRAM(s) Oral daily  nystatin Powder 1 Application(s) Topical two times a day  pantoprazole  Injectable 40 milliGRAM(s) IV Push every 12 hours  polyethylene glycol 3350 17 Gram(s) Oral two times a day  senna 2 Tablet(s) Oral at bedtime  simethicone 80 milliGRAM(s) Chew every 6 hours  simvastatin 20 milliGRAM(s) Oral at bedtime  warfarin 4 milliGRAM(s) Oral once    VITALS:  T(F): 98.4 (06-22-18 @ 11:26), Max: 98.6 (06-21-18 @ 21:49)  HR: 80 (06-22-18 @ 11:26)  BP: 126/55 (06-22-18 @ 11:26)  RR: 16 (06-22-18 @ 11:26)  SpO2: 95% (06-22-18 @ 11:26)  Wt(kg): --    06-21 @ 07:01  -  06-22 @ 07:00  --------------------------------------------------------  IN: 120 mL / OUT: 0 mL / NET: 120 mL        PHYSICAL EXAM:  Constitutional: NAD  HEENT: anicteric sclera, oropharynx clear, MMM  Neck: No JVD  Respiratory: Bibasilar crackles   Cardiovascular: S1, S2, RRR  Gastrointestinal: BS+, soft, NT/ND  Extremities: No cyanosis or clubbing. 12 peripheral LE edema, wrinkling noted  Neurological: A/O x 3, no focal deficits  Psychiatric: Normal mood, normal affect  : No CVA tenderness. No michaud.   Skin: No rashes    LABS:  06-22    143  |  96<L>  |  67<H>  ----------------------------<  91  3.5   |  38<H>  |  2.19<H>    Ca    10.1      22 Jun 2018 07:10  Phos  2.7     06-22  Mg     1.9     06-22      Creatinine Trend: 2.19 <--, 2.23 <--, 2.45 <--, 2.60 <--, 2.59 <--, 2.49 <--, 2.61 <--                        9.7    6.85  )-----------( 135      ( 22 Jun 2018 07:10 )             29.9     Urine Studies:      RADIOLOGY & ADDITIONAL STUDIES:

## 2018-06-22 NOTE — PROGRESS NOTE ADULT - SUBJECTIVE AND OBJECTIVE BOX
INTERVAL HPI/OVERNIGHT EVENTS:    gas discomfort resolved  no melena       MEDICATIONS  (STANDING):  allopurinol 100 milliGRAM(s) Oral daily  aspirin enteric coated 81 milliGRAM(s) Oral daily  cilostazol 100 milliGRAM(s) Oral two times a day  dextrose 5%. 1000 milliLiter(s) (50 mL/Hr) IV Continuous <Continuous>  dextrose 50% Injectable 12.5 Gram(s) IV Push once  dextrose 50% Injectable 25 Gram(s) IV Push once  dextrose 50% Injectable 25 Gram(s) IV Push once  docusate sodium 100 milliGRAM(s) Oral three times a day  furosemide   Injectable 80 milliGRAM(s) IV Push two times a day  hydrALAZINE 25 milliGRAM(s) Oral every 12 hours  insulin glargine Injectable (LANTUS) 15 Unit(s) SubCutaneous at bedtime  insulin lispro (HumaLOG) corrective regimen sliding scale   SubCutaneous three times a day before meals  insulin lispro (HumaLOG) corrective regimen sliding scale   SubCutaneous at bedtime  latanoprost 0.005% Ophthalmic Solution 1 Drop(s) Both EYES at bedtime  levothyroxine 100 MICROGram(s) Oral daily  metolazone 10 milliGRAM(s) Oral daily  nystatin Powder 1 Application(s) Topical two times a day  pantoprazole  Injectable 40 milliGRAM(s) IV Push every 12 hours  polyethylene glycol 3350 17 Gram(s) Oral two times a day  senna 2 Tablet(s) Oral at bedtime  simethicone 80 milliGRAM(s) Chew every 6 hours  simvastatin 20 milliGRAM(s) Oral at bedtime    MEDICATIONS  (PRN):  dextrose 40% Gel 15 Gram(s) Oral once PRN Blood Glucose LESS THAN 70 milliGRAM(s)/deciliter  glucagon  Injectable 1 milliGRAM(s) IntraMuscular once PRN Glucose LESS THAN 70 milligrams/deciliter  melatonin 3 milliGRAM(s) Oral at bedtime PRN Insomnia      Allergies    No Known Allergies    Intolerances        Review of Systems:    General:  No wt loss, fevers, chills, night sweats, fatigue   Eyes:  Good vision, no reported pain  ENT:  No sore throat, pain, runny nose, dysphagia  CV:  No pain, palpitations, hypo/hypertension  Resp:  No dyspnea, cough, tachypnea, wheezing  GI:  No pain, No nausea, No vomiting, No diarrhea, No constipation, No weight loss, No fever, No pruritis, No rectal bleeding, No melena, No dysphagia  :  No pain, bleeding, incontinence, nocturia  Muscle:  No pain, weakness  Neuro:  No weakness, tingling, memory problems  Psych:  No fatigue, insomnia, mood problems, depression  Endocrine:  No polyuria, polydypsia, cold/heat intolerance  Heme:  No petechiae, ecchymosis, easy bruisability  Skin:  No rash, tattoos, scars, edema      Vital Signs Last 24 Hrs  T(C): 36.9 (22 Jun 2018 05:35), Max: 37 (21 Jun 2018 21:49)  T(F): 98.5 (22 Jun 2018 05:35), Max: 98.6 (21 Jun 2018 21:49)  HR: 80 (22 Jun 2018 05:35) (78 - 81)  BP: 149/63 (22 Jun 2018 05:35) (129/53 - 175/80)  BP(mean): --  RR: 16 (22 Jun 2018 05:35) (16 - 18)  SpO2: 96% (22 Jun 2018 05:35) (94% - 96%)    PHYSICAL EXAM:    Constitutional: NAD  HEENT: EOMI, throat clear  Neck: No LAD, supple  Respiratory: CTA and P  Cardiovascular: S1 and S2, RRR, no M  Gastrointestinal: BS+, soft, NT/ND, neg HSM,  Extremities: No peripheral edema, neg clubbing, cyanosis  Vascular: 2+ peripheral pulses  Neurological: A/O x 3, no focal deficits  Psychiatric: Normal mood, normal affect  Skin: No rashes      LABS:                        9.7    6.85  )-----------( 135      ( 22 Jun 2018 07:10 )             29.9     06-22    143  |  96<L>  |  67<H>  ----------------------------<  91  3.5   |  38<H>  |  2.19<H>    Ca    10.1      22 Jun 2018 07:10  Phos  2.7     06-22  Mg     1.9     06-22      PT/INR - ( 22 Jun 2018 07:10 )   PT: 20.2 SEC;   INR: 1.74                RADIOLOGY & ADDITIONAL TESTS:

## 2018-06-23 LAB
BUN SERPL-MCNC: 72 MG/DL — HIGH (ref 7–23)
CALCIUM SERPL-MCNC: 10.2 MG/DL — SIGNIFICANT CHANGE UP (ref 8.4–10.5)
CHLORIDE SERPL-SCNC: 95 MMOL/L — LOW (ref 98–107)
CO2 SERPL-SCNC: 39 MMOL/L — HIGH (ref 22–31)
CREAT SERPL-MCNC: 2.36 MG/DL — HIGH (ref 0.5–1.3)
GLUCOSE BLDC GLUCOMTR-MCNC: 135 MG/DL — HIGH (ref 70–99)
GLUCOSE BLDC GLUCOMTR-MCNC: 135 MG/DL — HIGH (ref 70–99)
GLUCOSE BLDC GLUCOMTR-MCNC: 217 MG/DL — HIGH (ref 70–99)
GLUCOSE BLDC GLUCOMTR-MCNC: 90 MG/DL — SIGNIFICANT CHANGE UP (ref 70–99)
GLUCOSE SERPL-MCNC: 88 MG/DL — SIGNIFICANT CHANGE UP (ref 70–99)
HCT VFR BLD CALC: 29.5 % — LOW (ref 34.5–45)
HGB BLD-MCNC: 9.9 G/DL — LOW (ref 11.5–15.5)
INR BLD: 1.68 — HIGH (ref 0.88–1.17)
MAGNESIUM SERPL-MCNC: 2 MG/DL — SIGNIFICANT CHANGE UP (ref 1.6–2.6)
MCHC RBC-ENTMCNC: 29.6 PG — SIGNIFICANT CHANGE UP (ref 27–34)
MCHC RBC-ENTMCNC: 33.6 % — SIGNIFICANT CHANGE UP (ref 32–36)
MCV RBC AUTO: 88.3 FL — SIGNIFICANT CHANGE UP (ref 80–100)
NRBC # FLD: 0 — SIGNIFICANT CHANGE UP
PHOSPHATE SERPL-MCNC: 2.7 MG/DL — SIGNIFICANT CHANGE UP (ref 2.5–4.5)
PLATELET # BLD AUTO: 133 K/UL — LOW (ref 150–400)
PMV BLD: 12.9 FL — SIGNIFICANT CHANGE UP (ref 7–13)
POTASSIUM SERPL-MCNC: 3.7 MMOL/L — SIGNIFICANT CHANGE UP (ref 3.5–5.3)
POTASSIUM SERPL-SCNC: 3.7 MMOL/L — SIGNIFICANT CHANGE UP (ref 3.5–5.3)
PROTHROM AB SERPL-ACNC: 18.8 SEC — HIGH (ref 9.8–13.1)
RBC # BLD: 3.34 M/UL — LOW (ref 3.8–5.2)
RBC # FLD: 16.2 % — HIGH (ref 10.3–14.5)
SODIUM SERPL-SCNC: 143 MMOL/L — SIGNIFICANT CHANGE UP (ref 135–145)
WBC # BLD: 6.86 K/UL — SIGNIFICANT CHANGE UP (ref 3.8–10.5)
WBC # FLD AUTO: 6.86 K/UL — SIGNIFICANT CHANGE UP (ref 3.8–10.5)

## 2018-06-23 RX ORDER — POTASSIUM CHLORIDE 20 MEQ
20 PACKET (EA) ORAL ONCE
Qty: 0 | Refills: 0 | Status: COMPLETED | OUTPATIENT
Start: 2018-06-23 | End: 2018-06-23

## 2018-06-23 RX ORDER — WARFARIN SODIUM 2.5 MG/1
4 TABLET ORAL ONCE
Qty: 0 | Refills: 0 | Status: COMPLETED | OUTPATIENT
Start: 2018-06-23 | End: 2018-06-23

## 2018-06-23 RX ADMIN — WARFARIN SODIUM 4 MILLIGRAM(S): 2.5 TABLET ORAL at 17:43

## 2018-06-23 RX ADMIN — Medication 100 MILLIGRAM(S): at 06:38

## 2018-06-23 RX ADMIN — SENNA PLUS 2 TABLET(S): 8.6 TABLET ORAL at 21:37

## 2018-06-23 RX ADMIN — LATANOPROST 1 DROP(S): 0.05 SOLUTION/ DROPS OPHTHALMIC; TOPICAL at 21:37

## 2018-06-23 RX ADMIN — CILOSTAZOL 100 MILLIGRAM(S): 100 TABLET ORAL at 17:43

## 2018-06-23 RX ADMIN — POLYETHYLENE GLYCOL 3350 17 GRAM(S): 17 POWDER, FOR SOLUTION ORAL at 17:43

## 2018-06-23 RX ADMIN — Medication 81 MILLIGRAM(S): at 12:22

## 2018-06-23 RX ADMIN — NYSTATIN CREAM 1 APPLICATION(S): 100000 CREAM TOPICAL at 17:36

## 2018-06-23 RX ADMIN — SIMETHICONE 80 MILLIGRAM(S): 80 TABLET, CHEWABLE ORAL at 12:21

## 2018-06-23 RX ADMIN — CILOSTAZOL 100 MILLIGRAM(S): 100 TABLET ORAL at 06:38

## 2018-06-23 RX ADMIN — Medication 25 MILLIGRAM(S): at 17:44

## 2018-06-23 RX ADMIN — SIMETHICONE 80 MILLIGRAM(S): 80 TABLET, CHEWABLE ORAL at 06:38

## 2018-06-23 RX ADMIN — SIMVASTATIN 20 MILLIGRAM(S): 20 TABLET, FILM COATED ORAL at 21:37

## 2018-06-23 RX ADMIN — PANTOPRAZOLE SODIUM 40 MILLIGRAM(S): 20 TABLET, DELAYED RELEASE ORAL at 17:43

## 2018-06-23 RX ADMIN — Medication 80 MILLIGRAM(S): at 06:38

## 2018-06-23 RX ADMIN — Medication 20 MILLIEQUIVALENT(S): at 08:57

## 2018-06-23 RX ADMIN — POLYETHYLENE GLYCOL 3350 17 GRAM(S): 17 POWDER, FOR SOLUTION ORAL at 06:38

## 2018-06-23 RX ADMIN — PANTOPRAZOLE SODIUM 40 MILLIGRAM(S): 20 TABLET, DELAYED RELEASE ORAL at 06:38

## 2018-06-23 RX ADMIN — Medication 3 MILLIGRAM(S): at 21:39

## 2018-06-23 RX ADMIN — Medication 25 MILLIGRAM(S): at 06:38

## 2018-06-23 RX ADMIN — SIMETHICONE 80 MILLIGRAM(S): 80 TABLET, CHEWABLE ORAL at 17:43

## 2018-06-23 RX ADMIN — SIMETHICONE 80 MILLIGRAM(S): 80 TABLET, CHEWABLE ORAL at 21:37

## 2018-06-23 RX ADMIN — Medication 100 MICROGRAM(S): at 06:38

## 2018-06-23 RX ADMIN — Medication 100 MILLIGRAM(S): at 12:21

## 2018-06-23 RX ADMIN — Medication 80 MILLIGRAM(S): at 17:43

## 2018-06-23 RX ADMIN — Medication 100 MILLIGRAM(S): at 21:37

## 2018-06-23 RX ADMIN — NYSTATIN CREAM 1 APPLICATION(S): 100000 CREAM TOPICAL at 06:38

## 2018-06-23 RX ADMIN — INSULIN GLARGINE 15 UNIT(S): 100 INJECTION, SOLUTION SUBCUTANEOUS at 22:32

## 2018-06-23 NOTE — PROGRESS NOTE ADULT - SUBJECTIVE AND OBJECTIVE BOX
S: No CP or SOB      MEDICATIONS  (STANDING):  allopurinol 100 milliGRAM(s) Oral daily  aspirin enteric coated 81 milliGRAM(s) Oral daily  cilostazol 100 milliGRAM(s) Oral two times a day  dextrose 5%. 1000 milliLiter(s) (50 mL/Hr) IV Continuous <Continuous>  dextrose 50% Injectable 12.5 Gram(s) IV Push once  dextrose 50% Injectable 25 Gram(s) IV Push once  dextrose 50% Injectable 25 Gram(s) IV Push once  docusate sodium 100 milliGRAM(s) Oral three times a day  furosemide   Injectable 80 milliGRAM(s) IV Push two times a day  hydrALAZINE 25 milliGRAM(s) Oral every 12 hours  insulin glargine Injectable (LANTUS) 15 Unit(s) SubCutaneous at bedtime  insulin lispro (HumaLOG) corrective regimen sliding scale   SubCutaneous three times a day before meals  insulin lispro (HumaLOG) corrective regimen sliding scale   SubCutaneous at bedtime  latanoprost 0.005% Ophthalmic Solution 1 Drop(s) Both EYES at bedtime  levothyroxine 100 MICROGram(s) Oral daily  metolazone 10 milliGRAM(s) Oral daily  nystatin Powder 1 Application(s) Topical two times a day  pantoprazole  Injectable 40 milliGRAM(s) IV Push every 12 hours  polyethylene glycol 3350 17 Gram(s) Oral two times a day  senna 2 Tablet(s) Oral at bedtime  simethicone 80 milliGRAM(s) Chew every 6 hours  simvastatin 20 milliGRAM(s) Oral at bedtime  warfarin 4 milliGRAM(s) Oral once    MEDICATIONS  (PRN):  dextrose 40% Gel 15 Gram(s) Oral once PRN Blood Glucose LESS THAN 70 milliGRAM(s)/deciliter  glucagon  Injectable 1 milliGRAM(s) IntraMuscular once PRN Glucose LESS THAN 70 milligrams/deciliter  melatonin 3 milliGRAM(s) Oral at bedtime PRN Insomnia      LABS:                        9.9    6.86  )-----------( 133      ( 23 Jun 2018 06:37 )             29.5     Hemoglobin: 9.9 g/dL (06-23 @ 06:37)  Hemoglobin: 9.7 g/dL (06-22 @ 07:10)  Hemoglobin: 9.9 g/dL (06-21 @ 04:00)  Hemoglobin: 8.8 g/dL (06-20 @ 03:51)  Hemoglobin: 9.8 g/dL (06-19 @ 16:53)    06-23    143  |  95<L>  |  72<H>  ----------------------------<  88  3.7   |  39<H>  |  2.36<H>    Ca    10.2      23 Jun 2018 06:37  Phos  2.7     06-23  Mg     2.0     06-23      Creatinine Trend: 2.36<--, 2.19<--, 2.23<--, 2.45<--, 2.60<--, 2.59<--   PT/INR - ( 23 Jun 2018 06:37 )   PT: 18.8 SEC;   INR: 1.68         PHYSICAL EXAM  Vital Signs Last 24 Hrs  T(C): 36.8 (23 Jun 2018 12:18), Max: 36.8 (22 Jun 2018 21:17)  T(F): 98.2 (23 Jun 2018 12:18), Max: 98.3 (22 Jun 2018 21:17)  HR: 76 (23 Jun 2018 12:18) (75 - 86)  BP: 127/66 (23 Jun 2018 12:18) (127/66 - 158/77)  BP(mean): --  RR: 181 (23 Jun 2018 12:18) (16 - 181)  SpO2: 100% (23 Jun 2018 12:18) (95% - 100%)    Heart: normal S1, S2, IRR, no m/r/g  Lungs: cta b/l  Abd: soft nT, nD  Ext: 2+ edema in LE bilaterally     DATA:    TELEMETRY:  AF 	      ECG: < from: 12 Lead ECG (06.13.18 @ 14:56) >  Atrial fibrillation  Right bundle branch block  Left posterior fascicular block  *** Bifascicular block ***  Cannot rule out Inferior infarct , age undetermined  T wave abnormality, consider lateral ischemia  Abnormal ECG    < end of copied text >    < from: Transthoracic Echocardiogram (06.18.18 @ 10:33) >  CONCLUSIONS:  1. Mitral annular calcification, otherwise normal mitral  valve. Mild mitral regurgitation.  2. Severely dilated left atrium.  LA volume index = 69  cc/m2.  3. Normal left ventricular internal dimensions and wall  thicknesses.  4. Endocardium not well visualized; grossly normal left  ventricular systolic function.  5. Moderate right atrial enlargement.  6. Right ventricular enlargement with decreased right  ventricular systolic function.  7. Estimated right ventricular systolic pressure equals 53  mm Hg, assuming right atrial pressure equals 10 mm Hg,  consistent with moderate pulmonary hypertension.  8. Normal tricuspid valve.  Moderate-severe tricuspid  regurgitation.  *** No previous Echo exam.  ------------------------------------------------------------------------  Confirmed on  6/18/2018 - 12:27:29 by Lance Ferris M.D.    < end of copied text >      ASSESSMENT/PLAN: 	89y Female with history of Afib on AC, HTN, CKD admitted with syncope and volume overload c/w acute on chronic diastolic and Right sided CHF, with chronic bifasicular block and bradycardia, with pauses <3 seconds    -Appreciate EP follow up -  patient's son at bedside today  states family is now leaning toward PPM F/U on family/ patients final decision & EP   -pt  family does not want invasive cv procedures at this time  -therefore, conservative cardiac care and medical management recommended at this time, will avoid AVN blockers  -volume overloaded improving   -continue with iv diuresis to keep O > I, on IV Lasix and Zaroxolyn  -follow up renal and monitor cr  -episode of melena, GI eval appreciated.  Guaiac neg   --On Coumadin, goal INR 2-3, off Heparin Gtt.    --given elevated BP, avoid AVN blockers/ACE/ARB, c/w Hydralazine 25 BID

## 2018-06-23 NOTE — PROGRESS NOTE ADULT - SUBJECTIVE AND OBJECTIVE BOX
INTERVAL HPI/OVERNIGHT EVENTS:  INTERVAL HPI/OVERNIGHT EVENTS:    MEDICATIONS  (STANDING):  allopurinol 100 milliGRAM(s) Oral daily  aspirin enteric coated 81 milliGRAM(s) Oral daily  cilostazol 100 milliGRAM(s) Oral two times a day  dextrose 5%. 1000 milliLiter(s) (50 mL/Hr) IV Continuous <Continuous>  dextrose 50% Injectable 12.5 Gram(s) IV Push once  dextrose 50% Injectable 25 Gram(s) IV Push once  dextrose 50% Injectable 25 Gram(s) IV Push once  docusate sodium 100 milliGRAM(s) Oral three times a day  furosemide   Injectable 80 milliGRAM(s) IV Push two times a day  hydrALAZINE 25 milliGRAM(s) Oral every 12 hours  insulin glargine Injectable (LANTUS) 15 Unit(s) SubCutaneous at bedtime  insulin lispro (HumaLOG) corrective regimen sliding scale   SubCutaneous three times a day before meals  insulin lispro (HumaLOG) corrective regimen sliding scale   SubCutaneous at bedtime  latanoprost 0.005% Ophthalmic Solution 1 Drop(s) Both EYES at bedtime  levothyroxine 100 MICROGram(s) Oral daily  metolazone 10 milliGRAM(s) Oral daily  nystatin Powder 1 Application(s) Topical two times a day  pantoprazole  Injectable 40 milliGRAM(s) IV Push every 12 hours  polyethylene glycol 3350 17 Gram(s) Oral two times a day  senna 2 Tablet(s) Oral at bedtime  simethicone 80 milliGRAM(s) Chew every 6 hours  simvastatin 20 milliGRAM(s) Oral at bedtime  warfarin 4 milliGRAM(s) Oral once    MEDICATIONS  (PRN):  dextrose 40% Gel 15 Gram(s) Oral once PRN Blood Glucose LESS THAN 70 milliGRAM(s)/deciliter  glucagon  Injectable 1 milliGRAM(s) IntraMuscular once PRN Glucose LESS THAN 70 milligrams/deciliter  melatonin 3 milliGRAM(s) Oral at bedtime PRN Insomnia      Allergies    No Known Allergies    Intolerances        Review of Systems:    General:  No wt loss, fevers, chills, night sweats,fatigue,   Eyes:  Good vision, no reported pain  ENT:  No sore throat, pain, runny nose, dysphagia  CV:  No pain, palpitatioins, hypo/hypertension  Resp:  No dyspnea, cough, tachypnea, wheezing  GI:  No pain, No nausea, No vomiting, No diarrhea, No constipatiion, No weight loss, No fever, No pruritis, No rectal bleeding, No tarry stools, No dysphagia,  :  No pain, bleeding, incontinence, nocturia  Muscle:  No pain, weakness  Neuro:  No weakness, tingling, memory problems  Psych:  No fatigue, insomnia, mood problems, depression  Endocrine:  No polyuria, polydypsia, cold/heat intolerance  Heme:  No petechiae, ecchymosis, easy bruisability  Skin:  No rash, tattoos, scars, edema      Vital Signs Last 24 Hrs  T(C): 36.8 (23 Jun 2018 12:18), Max: 36.8 (22 Jun 2018 21:17)  T(F): 98.2 (23 Jun 2018 12:18), Max: 98.3 (22 Jun 2018 21:17)  HR: 76 (23 Jun 2018 12:18) (75 - 86)  BP: 127/66 (23 Jun 2018 12:18) (127/66 - 158/77)  BP(mean): --  RR: 181 (23 Jun 2018 12:18) (16 - 181)  SpO2: 100% (23 Jun 2018 12:18) (95% - 100%)    PHYSICAL EXAM:    Constitutional: NAD, well-developed  HEENT: EOMI, throat clear  Neck: No LAD, supple  Respiratory: CTA and P  Cardiovascular: S1 and S2, RRR, no M  Gastrointestinal: BS+, soft, NT/ND, neg HSM,  Extremities: No peripheral edema, neg clubing, cyanosis  Vascular: 2+ peripheral pulses  Neurological: A/O x 3, no focal deficits  Psychiatric: Normal mood, normal affect  Skin: No rashes      LABS:                        9.9    6.86  )-----------( 133      ( 23 Jun 2018 06:37 )             29.5     06-23    143  |  95<L>  |  72<H>  ----------------------------<  88  3.7   |  39<H>  |  2.36<H>    Ca    10.2      23 Jun 2018 06:37  Phos  2.7     06-23  Mg     2.0     06-23      PT/INR - ( 23 Jun 2018 06:37 )   PT: 18.8 SEC;   INR: 1.68                RADIOLOGY & ADDITIONAL TESTS:      MEDICATIONS  (STANDING):  allopurinol 100 milliGRAM(s) Oral daily  aspirin enteric coated 81 milliGRAM(s) Oral daily  cilostazol 100 milliGRAM(s) Oral two times a day  dextrose 5%. 1000 milliLiter(s) (50 mL/Hr) IV Continuous <Continuous>  dextrose 50% Injectable 12.5 Gram(s) IV Push once  dextrose 50% Injectable 25 Gram(s) IV Push once  dextrose 50% Injectable 25 Gram(s) IV Push once  docusate sodium 100 milliGRAM(s) Oral three times a day  furosemide   Injectable 80 milliGRAM(s) IV Push two times a day  hydrALAZINE 25 milliGRAM(s) Oral every 12 hours  insulin glargine Injectable (LANTUS) 15 Unit(s) SubCutaneous at bedtime  insulin lispro (HumaLOG) corrective regimen sliding scale   SubCutaneous three times a day before meals  insulin lispro (HumaLOG) corrective regimen sliding scale   SubCutaneous at bedtime  latanoprost 0.005% Ophthalmic Solution 1 Drop(s) Both EYES at bedtime  levothyroxine 100 MICROGram(s) Oral daily  metolazone 10 milliGRAM(s) Oral daily  nystatin Powder 1 Application(s) Topical two times a day  pantoprazole  Injectable 40 milliGRAM(s) IV Push every 12 hours  polyethylene glycol 3350 17 Gram(s) Oral two times a day  senna 2 Tablet(s) Oral at bedtime  simethicone 80 milliGRAM(s) Chew every 6 hours  simvastatin 20 milliGRAM(s) Oral at bedtime  warfarin 4 milliGRAM(s) Oral once    MEDICATIONS  (PRN):  dextrose 40% Gel 15 Gram(s) Oral once PRN Blood Glucose LESS THAN 70 milliGRAM(s)/deciliter  glucagon  Injectable 1 milliGRAM(s) IntraMuscular once PRN Glucose LESS THAN 70 milligrams/deciliter  melatonin 3 milliGRAM(s) Oral at bedtime PRN Insomnia      Allergies    No Known Allergies    Intolerances        Review of Systems:    General:  No wt loss, fevers, chills, night sweats,fatigue,   Eyes:  Good vision, no reported pain  ENT:  No sore throat, pain, runny nose, dysphagia  CV:  No pain, palpitatioins, hypo/hypertension  Resp:  No dyspnea, cough, tachypnea, wheezing  GI:  No pain, No nausea, No vomiting, No diarrhea, No constipatiion, No weight loss, No fever, No pruritis, No rectal bleeding, No tarry stools, No dysphagia,  :  No pain, bleeding, incontinence, nocturia  Muscle:  No pain, weakness  Neuro:  No weakness, tingling, memory problems  Psych:  No fatigue, insomnia, mood problems, depression  Endocrine:  No polyuria, polydypsia, cold/heat intolerance  Heme:  No petechiae, ecchymosis, easy bruisability  Skin:  No rash, tattoos, scars, edema      Vital Signs Last 24 Hrs  T(C): 36.8 (23 Jun 2018 12:18), Max: 36.8 (22 Jun 2018 21:17)  T(F): 98.2 (23 Jun 2018 12:18), Max: 98.3 (22 Jun 2018 21:17)  HR: 76 (23 Jun 2018 12:18) (75 - 86)  BP: 127/66 (23 Jun 2018 12:18) (127/66 - 158/77)  BP(mean): --  RR: 181 (23 Jun 2018 12:18) (16 - 181)  SpO2: 100% (23 Jun 2018 12:18) (95% - 100%)    PHYSICAL EXAM:    Constitutional: NAD, well-developed  HEENT: EOMI, throat clear  Neck: No LAD, supple  Respiratory: CTA and P  Cardiovascular: S1 and S2, RRR, no M  Gastrointestinal: BS+, soft, NT/ND, neg HSM,  Extremities: No peripheral edema, neg clubing, cyanosis  Vascular: 2+ peripheral pulses  Neurological: A/O x 3, no focal deficits  Psychiatric: Normal mood, normal affect  Skin: No rashes      LABS:                        9.9    6.86  )-----------( 133      ( 23 Jun 2018 06:37 )             29.5     06-23    143  |  95<L>  |  72<H>  ----------------------------<  88  3.7   |  39<H>  |  2.36<H>    Ca    10.2      23 Jun 2018 06:37  Phos  2.7     06-23  Mg     2.0     06-23      PT/INR - ( 23 Jun 2018 06:37 )   PT: 18.8 SEC;   INR: 1.68                RADIOLOGY & ADDITIONAL TESTS:

## 2018-06-23 NOTE — PROGRESS NOTE ADULT - SUBJECTIVE AND OBJECTIVE BOX
INTERVAL HPI/OVERNIGHT EVENTS: Seen and examined with family in room.   Vital Signs Last 24 Hrs  T(C): 36.8 (23 Jun 2018 12:18), Max: 36.8 (22 Jun 2018 21:17)  T(F): 98.2 (23 Jun 2018 12:18), Max: 98.3 (22 Jun 2018 21:17)  HR: 76 (23 Jun 2018 12:18) (75 - 86)  BP: 127/66 (23 Jun 2018 12:18) (127/66 - 158/77)  BP(mean): --  RR: 181 (23 Jun 2018 12:18) (16 - 181)  SpO2: 100% (23 Jun 2018 12:18) (95% - 100%)  I&O's Summary    22 Jun 2018 07:01  -  23 Jun 2018 07:00  --------------------------------------------------------  IN: 300 mL / OUT: 0 mL / NET: 300 mL    23 Jun 2018 07:01  -  23 Jun 2018 15:20  --------------------------------------------------------  IN: 120 mL / OUT: 0 mL / NET: 120 mL      MEDICATIONS  (STANDING):  allopurinol 100 milliGRAM(s) Oral daily  aspirin enteric coated 81 milliGRAM(s) Oral daily  cilostazol 100 milliGRAM(s) Oral two times a day  dextrose 5%. 1000 milliLiter(s) (50 mL/Hr) IV Continuous <Continuous>  dextrose 50% Injectable 12.5 Gram(s) IV Push once  dextrose 50% Injectable 25 Gram(s) IV Push once  dextrose 50% Injectable 25 Gram(s) IV Push once  docusate sodium 100 milliGRAM(s) Oral three times a day  furosemide   Injectable 80 milliGRAM(s) IV Push two times a day  hydrALAZINE 25 milliGRAM(s) Oral every 12 hours  insulin glargine Injectable (LANTUS) 15 Unit(s) SubCutaneous at bedtime  insulin lispro (HumaLOG) corrective regimen sliding scale   SubCutaneous three times a day before meals  insulin lispro (HumaLOG) corrective regimen sliding scale   SubCutaneous at bedtime  latanoprost 0.005% Ophthalmic Solution 1 Drop(s) Both EYES at bedtime  levothyroxine 100 MICROGram(s) Oral daily  metolazone 10 milliGRAM(s) Oral daily  nystatin Powder 1 Application(s) Topical two times a day  pantoprazole  Injectable 40 milliGRAM(s) IV Push every 12 hours  polyethylene glycol 3350 17 Gram(s) Oral two times a day  senna 2 Tablet(s) Oral at bedtime  simethicone 80 milliGRAM(s) Chew every 6 hours  simvastatin 20 milliGRAM(s) Oral at bedtime  warfarin 4 milliGRAM(s) Oral once    MEDICATIONS  (PRN):  dextrose 40% Gel 15 Gram(s) Oral once PRN Blood Glucose LESS THAN 70 milliGRAM(s)/deciliter  glucagon  Injectable 1 milliGRAM(s) IntraMuscular once PRN Glucose LESS THAN 70 milligrams/deciliter  melatonin 3 milliGRAM(s) Oral at bedtime PRN Insomnia    LABS:                        9.9    6.86  )-----------( 133      ( 23 Jun 2018 06:37 )             29.5     06-23    143  |  95<L>  |  72<H>  ----------------------------<  88  3.7   |  39<H>  |  2.36<H>    Ca    10.2      23 Jun 2018 06:37  Phos  2.7     06-23  Mg     2.0     06-23      PT/INR - ( 23 Jun 2018 06:37 )   PT: 18.8 SEC;   INR: 1.68              CAPILLARY BLOOD GLUCOSE      POCT Blood Glucose.: 135 mg/dL (23 Jun 2018 12:56)  POCT Blood Glucose.: 90 mg/dL (23 Jun 2018 08:43)  POCT Blood Glucose.: 204 mg/dL (22 Jun 2018 21:52)  POCT Blood Glucose.: 159 mg/dL (22 Jun 2018 17:32)          REVIEW OF SYSTEMS:  CONSTITUTIONAL: No fever, weight loss, or fatigue  EYES: No eye pain, visual disturbances, or discharge  ENMT:  No difficulty hearing, tinnitus, vertigo; No sinus or throat pain  NECK: No pain or stiffness  BREASTS: No pain, masses, or nipple discharge  RESPIRATORY: No cough, wheezing, chills or hemoptysis; No shortness of breath  CARDIOVASCULAR: No chest pain, palpitations, dizziness, or leg swelling  GASTROINTESTINAL: No abdominal or epigastric pain. No nausea, vomiting, or hematemesis; No diarrhea or constipation. No melena or hematochezia.  GENITOURINARY: No dysuria, frequency, hematuria, or incontinence  NEUROLOGICAL: No headaches, memory loss, loss of strength, numbness, or tremors  SKIN: No itching, burning, rashes, or lesions   LYMPH NODES: No enlarged glands  ENDOCRINE: No heat or cold intolerance; No hair loss  MUSCULOSKELETAL: No joint pain or swelling; No muscle, back, or extremity pain  PSYCHIATRIC: No depression, anxiety, mood swings, or difficulty sleeping  HEME/LYMPH: No easy bruising, or bleeding gums  ALLERY AND IMMUNOLOGIC: No hives or eczema    RADIOLOGY & ADDITIONAL TESTS:    Consultant(s) Notes Reviewed:  [x ] YES  [ ] NO    PHYSICAL EXAM:  GENERAL: NAD, well-groomed, well-developed,not in any distress ,  HEAD:  Atraumatic, Normocephalic  EYES: EOMI, PERRLA, conjunctiva and sclera clear  ENMT: No tonsillar erythema, exudates, or enlargement; Moist mucous membranes, Good dentition, No lesions  NECK: Supple, No JVD, Normal thyroid  NERVOUS SYSTEM:  Alert & Oriented X3, No focal deficit   CHEST/LUNG: Good air entry bilateral with few   rales, rhonchi, wheezing, or rubs  HEART: Regular rate and rhythm; No murmurs, rubs, or gallops  ABDOMEN: Soft, Nontender, Nondistended; Bowel sounds present  EXTREMITIES:  2+ Peripheral Pulses, No clubbing, cyanosis, less edema  SKIN: No rashes or lesions    Care Discussed with Consultants/Other Providers [ x] YES  [ ] NO

## 2018-06-23 NOTE — PROGRESS NOTE ADULT - SUBJECTIVE AND OBJECTIVE BOX
Patient seen and examined  no complaints    No Known Allergies    Hospital Medications:   MEDICATIONS  (STANDING):  allopurinol 100 milliGRAM(s) Oral daily  aspirin enteric coated 81 milliGRAM(s) Oral daily  cilostazol 100 milliGRAM(s) Oral two times a day  dextrose 5%. 1000 milliLiter(s) (50 mL/Hr) IV Continuous <Continuous>  dextrose 50% Injectable 12.5 Gram(s) IV Push once  dextrose 50% Injectable 25 Gram(s) IV Push once  dextrose 50% Injectable 25 Gram(s) IV Push once  docusate sodium 100 milliGRAM(s) Oral three times a day  furosemide   Injectable 80 milliGRAM(s) IV Push two times a day  hydrALAZINE 25 milliGRAM(s) Oral every 12 hours  insulin glargine Injectable (LANTUS) 15 Unit(s) SubCutaneous at bedtime  insulin lispro (HumaLOG) corrective regimen sliding scale   SubCutaneous three times a day before meals  insulin lispro (HumaLOG) corrective regimen sliding scale   SubCutaneous at bedtime  latanoprost 0.005% Ophthalmic Solution 1 Drop(s) Both EYES at bedtime  levothyroxine 100 MICROGram(s) Oral daily  metolazone 10 milliGRAM(s) Oral daily  nystatin Powder 1 Application(s) Topical two times a day  pantoprazole  Injectable 40 milliGRAM(s) IV Push every 12 hours  polyethylene glycol 3350 17 Gram(s) Oral two times a day  senna 2 Tablet(s) Oral at bedtime  simethicone 80 milliGRAM(s) Chew every 6 hours  simvastatin 20 milliGRAM(s) Oral at bedtime  warfarin 4 milliGRAM(s) Oral once        VITALS:  T(F): 98.2 (06-23-18 @ 12:18), Max: 98.3 (06-22-18 @ 21:17)  HR: 76 (06-23-18 @ 12:18)  BP: 127/66 (06-23-18 @ 12:18)  RR: 181 (06-23-18 @ 12:18)  SpO2: 100% (06-23-18 @ 12:18)  Wt(kg): --    06-22 @ 07:01  -  06-23 @ 07:00  --------------------------------------------------------  IN: 300 mL / OUT: 0 mL / NET: 300 mL      PHYSICAL EXAM:  Constitutional: NAD  HEENT: anicteric sclera, oropharynx clear, MMM  Neck: No JVD  Respiratory: Bibasilar crackles   Cardiovascular: S1, S2, RRR  Gastrointestinal: BS+, soft, NT/ND  Extremities: No cyanosis or clubbing. 12 peripheral LE edema, wrinkling noted  Neurological: A/O x 3, no focal deficits  Psychiatric: Normal mood, normal affect  : No CVA tenderness. No michaud.   Skin: No rashes    LABS:  06-23    143  |  95<L>  |  72<H>  ----------------------------<  88  3.7   |  39<H>  |  2.36<H>    Ca    10.2      23 Jun 2018 06:37  Phos  2.7     06-23  Mg     2.0     06-23      Creatinine Trend: 2.36 <--, 2.19 <--, 2.23 <--, 2.45 <--, 2.60 <--, 2.59 <--, 2.49 <--                        9.9    6.86  )-----------( 133      ( 23 Jun 2018 06:37 )             29.5     Urine Studies:      RADIOLOGY & ADDITIONAL STUDIES:

## 2018-06-24 LAB
BUN SERPL-MCNC: 69 MG/DL — HIGH (ref 7–23)
CALCIUM SERPL-MCNC: 10.2 MG/DL — SIGNIFICANT CHANGE UP (ref 8.4–10.5)
CHLORIDE SERPL-SCNC: 92 MMOL/L — LOW (ref 98–107)
CO2 SERPL-SCNC: 40 MMOL/L — HIGH (ref 22–31)
CREAT SERPL-MCNC: 2.38 MG/DL — HIGH (ref 0.5–1.3)
GLUCOSE BLDC GLUCOMTR-MCNC: 125 MG/DL — HIGH (ref 70–99)
GLUCOSE BLDC GLUCOMTR-MCNC: 139 MG/DL — HIGH (ref 70–99)
GLUCOSE BLDC GLUCOMTR-MCNC: 195 MG/DL — HIGH (ref 70–99)
GLUCOSE BLDC GLUCOMTR-MCNC: 277 MG/DL — HIGH (ref 70–99)
GLUCOSE SERPL-MCNC: 112 MG/DL — HIGH (ref 70–99)
HCT VFR BLD CALC: 31.7 % — LOW (ref 34.5–45)
HGB BLD-MCNC: 10.2 G/DL — LOW (ref 11.5–15.5)
INR BLD: 1.67 — HIGH (ref 0.88–1.17)
MAGNESIUM SERPL-MCNC: 2 MG/DL — SIGNIFICANT CHANGE UP (ref 1.6–2.6)
MCHC RBC-ENTMCNC: 29.7 PG — SIGNIFICANT CHANGE UP (ref 27–34)
MCHC RBC-ENTMCNC: 32.2 % — SIGNIFICANT CHANGE UP (ref 32–36)
MCV RBC AUTO: 92.2 FL — SIGNIFICANT CHANGE UP (ref 80–100)
NRBC # FLD: 0 — SIGNIFICANT CHANGE UP
PHOSPHATE SERPL-MCNC: 3.2 MG/DL — SIGNIFICANT CHANGE UP (ref 2.5–4.5)
PLATELET # BLD AUTO: 148 K/UL — LOW (ref 150–400)
PMV BLD: 13 FL — SIGNIFICANT CHANGE UP (ref 7–13)
POTASSIUM SERPL-MCNC: 3.5 MMOL/L — SIGNIFICANT CHANGE UP (ref 3.5–5.3)
POTASSIUM SERPL-SCNC: 3.5 MMOL/L — SIGNIFICANT CHANGE UP (ref 3.5–5.3)
PROTHROM AB SERPL-ACNC: 19.4 SEC — HIGH (ref 9.8–13.1)
RBC # BLD: 3.44 M/UL — LOW (ref 3.8–5.2)
RBC # FLD: 16.2 % — HIGH (ref 10.3–14.5)
SODIUM SERPL-SCNC: 141 MMOL/L — SIGNIFICANT CHANGE UP (ref 135–145)
WBC # BLD: 6.38 K/UL — SIGNIFICANT CHANGE UP (ref 3.8–10.5)
WBC # FLD AUTO: 6.38 K/UL — SIGNIFICANT CHANGE UP (ref 3.8–10.5)

## 2018-06-24 RX ORDER — POTASSIUM CHLORIDE 20 MEQ
40 PACKET (EA) ORAL ONCE
Qty: 0 | Refills: 0 | Status: COMPLETED | OUTPATIENT
Start: 2018-06-24 | End: 2018-06-24

## 2018-06-24 RX ORDER — WARFARIN SODIUM 2.5 MG/1
5 TABLET ORAL ONCE
Qty: 0 | Refills: 0 | Status: COMPLETED | OUTPATIENT
Start: 2018-06-24 | End: 2018-06-24

## 2018-06-24 RX ADMIN — Medication 100 MILLIGRAM(S): at 13:13

## 2018-06-24 RX ADMIN — Medication 25 MILLIGRAM(S): at 17:07

## 2018-06-24 RX ADMIN — Medication 1: at 18:00

## 2018-06-24 RX ADMIN — Medication 80 MILLIGRAM(S): at 05:58

## 2018-06-24 RX ADMIN — Medication 100 MICROGRAM(S): at 05:58

## 2018-06-24 RX ADMIN — CILOSTAZOL 100 MILLIGRAM(S): 100 TABLET ORAL at 17:07

## 2018-06-24 RX ADMIN — PANTOPRAZOLE SODIUM 40 MILLIGRAM(S): 20 TABLET, DELAYED RELEASE ORAL at 05:58

## 2018-06-24 RX ADMIN — Medication 25 MILLIGRAM(S): at 05:58

## 2018-06-24 RX ADMIN — INSULIN GLARGINE 15 UNIT(S): 100 INJECTION, SOLUTION SUBCUTANEOUS at 22:14

## 2018-06-24 RX ADMIN — WARFARIN SODIUM 5 MILLIGRAM(S): 2.5 TABLET ORAL at 17:07

## 2018-06-24 RX ADMIN — Medication 40 MILLIEQUIVALENT(S): at 12:31

## 2018-06-24 RX ADMIN — Medication 100 MILLIGRAM(S): at 12:31

## 2018-06-24 RX ADMIN — Medication 3: at 12:55

## 2018-06-24 RX ADMIN — LATANOPROST 1 DROP(S): 0.05 SOLUTION/ DROPS OPHTHALMIC; TOPICAL at 21:22

## 2018-06-24 RX ADMIN — PANTOPRAZOLE SODIUM 40 MILLIGRAM(S): 20 TABLET, DELAYED RELEASE ORAL at 17:08

## 2018-06-24 RX ADMIN — SIMETHICONE 80 MILLIGRAM(S): 80 TABLET, CHEWABLE ORAL at 05:58

## 2018-06-24 RX ADMIN — Medication 80 MILLIGRAM(S): at 17:08

## 2018-06-24 RX ADMIN — Medication 3 MILLIGRAM(S): at 22:17

## 2018-06-24 RX ADMIN — SIMVASTATIN 20 MILLIGRAM(S): 20 TABLET, FILM COATED ORAL at 21:22

## 2018-06-24 RX ADMIN — NYSTATIN CREAM 1 APPLICATION(S): 100000 CREAM TOPICAL at 05:58

## 2018-06-24 RX ADMIN — NYSTATIN CREAM 1 APPLICATION(S): 100000 CREAM TOPICAL at 17:08

## 2018-06-24 RX ADMIN — CILOSTAZOL 100 MILLIGRAM(S): 100 TABLET ORAL at 05:58

## 2018-06-24 RX ADMIN — POLYETHYLENE GLYCOL 3350 17 GRAM(S): 17 POWDER, FOR SOLUTION ORAL at 17:07

## 2018-06-24 RX ADMIN — Medication 81 MILLIGRAM(S): at 12:31

## 2018-06-24 NOTE — PROGRESS NOTE ADULT - SUBJECTIVE AND OBJECTIVE BOX
INTERVAL HPI/OVERNIGHT EVENTS:  No new overnight event.  No N/V/D.  Tolerating diet.   MEDICATIONS  (STANDING):  allopurinol 100 milliGRAM(s) Oral daily  aspirin enteric coated 81 milliGRAM(s) Oral daily  cilostazol 100 milliGRAM(s) Oral two times a day  dextrose 5%. 1000 milliLiter(s) (50 mL/Hr) IV Continuous <Continuous>  dextrose 50% Injectable 12.5 Gram(s) IV Push once  dextrose 50% Injectable 25 Gram(s) IV Push once  dextrose 50% Injectable 25 Gram(s) IV Push once  docusate sodium 100 milliGRAM(s) Oral three times a day  furosemide   Injectable 80 milliGRAM(s) IV Push two times a day  hydrALAZINE 25 milliGRAM(s) Oral every 12 hours  insulin glargine Injectable (LANTUS) 15 Unit(s) SubCutaneous at bedtime  insulin lispro (HumaLOG) corrective regimen sliding scale   SubCutaneous three times a day before meals  insulin lispro (HumaLOG) corrective regimen sliding scale   SubCutaneous at bedtime  latanoprost 0.005% Ophthalmic Solution 1 Drop(s) Both EYES at bedtime  levothyroxine 100 MICROGram(s) Oral daily  metolazone 10 milliGRAM(s) Oral daily  nystatin Powder 1 Application(s) Topical two times a day  pantoprazole  Injectable 40 milliGRAM(s) IV Push every 12 hours  polyethylene glycol 3350 17 Gram(s) Oral two times a day  potassium chloride    Tablet ER 40 milliEquivalent(s) Oral once  senna 2 Tablet(s) Oral at bedtime  simvastatin 20 milliGRAM(s) Oral at bedtime  warfarin 5 milliGRAM(s) Oral once    MEDICATIONS  (PRN):  dextrose 40% Gel 15 Gram(s) Oral once PRN Blood Glucose LESS THAN 70 milliGRAM(s)/deciliter  glucagon  Injectable 1 milliGRAM(s) IntraMuscular once PRN Glucose LESS THAN 70 milligrams/deciliter  melatonin 3 milliGRAM(s) Oral at bedtime PRN Insomnia      Allergies    No Known Allergies    Intolerances        Review of Systems:    General:  No wt loss, fevers, chills, night sweats,fatigue,   Eyes:  Good vision, no reported pain  ENT:  No sore throat, pain, runny nose, dysphagia  CV:  No pain, palpitatioins, hypo/hypertension  Resp:  No dyspnea, cough, tachypnea, wheezing  GI:  No pain, No nausea, No vomiting, No diarrhea, No constipatiion, No weight loss, No fever, No pruritis, No rectal bleeding, No tarry stools, No dysphagia,  :  No pain, bleeding, incontinence, nocturia  Muscle:  No pain, weakness  Neuro:  No weakness, tingling, memory problems  Psych:  No fatigue, insomnia, mood problems, depression  Endocrine:  No polyuria, polydypsia, cold/heat intolerance  Heme:  No petechiae, ecchymosis, easy bruisability  Skin:  No rash, tattoos, scars, edema      Vital Signs Last 24 Hrs  T(C): 36.7 (24 Jun 2018 05:54), Max: 36.8 (23 Jun 2018 12:18)  T(F): 98.1 (24 Jun 2018 05:54), Max: 98.2 (23 Jun 2018 12:18)  HR: 78 (24 Jun 2018 05:54) (74 - 78)  BP: 147/68 (24 Jun 2018 05:54) (127/66 - 160/78)  BP(mean): --  RR: 18 (24 Jun 2018 05:54) (18 - 181)  SpO2: 98% (24 Jun 2018 05:54) (96% - 100%)    PHYSICAL EXAM:    Constitutional: NAD, well-developed  HEENT: EOMI, throat clear  Neck: No LAD, supple  Respiratory: CTA and P  Cardiovascular: S1 and S2, RRR, no M  Gastrointestinal: BS+, soft, NT/ND, neg HSM,  Extremities: No peripheral edema, neg clubing, cyanosis  Vascular: 2+ peripheral pulses  Neurological: A/O x 3, no focal deficits  Psychiatric: Normal mood, normal affect  Skin: No rashes      LABS:                        10.2   6.38  )-----------( 148      ( 24 Jun 2018 06:30 )             31.7     06-24    141  |  92<L>  |  69<H>  ----------------------------<  112<H>  3.5   |  40<H>  |  2.38<H>    Ca    10.2      24 Jun 2018 06:30  Phos  3.2     06-24  Mg     2.0     06-24      PT/INR - ( 24 Jun 2018 06:30 )   PT: 19.4 SEC;   INR: 1.67                RADIOLOGY & ADDITIONAL TESTS:

## 2018-06-24 NOTE — PROGRESS NOTE ADULT - SUBJECTIVE AND OBJECTIVE BOX
INTERVAL HPI/OVERNIGHT EVENTS: I feel better . Daughter in room.   Vital Signs Last 24 Hrs  T(C): 36.3 (24 Jun 2018 12:22), Max: 36.8 (23 Jun 2018 19:12)  T(F): 97.3 (24 Jun 2018 12:22), Max: 98.2 (23 Jun 2018 19:12)  HR: 77 (24 Jun 2018 12:22) (74 - 78)  BP: 117/62 (24 Jun 2018 12:22) (117/62 - 160/78)  BP(mean): --  RR: 18 (24 Jun 2018 12:22) (18 - 18)  SpO2: 92% (24 Jun 2018 12:22) (92% - 98%)  I&O's Summary    23 Jun 2018 07:01  -  24 Jun 2018 07:00  --------------------------------------------------------  IN: 220 mL / OUT: 0 mL / NET: 220 mL    24 Jun 2018 07:01  -  24 Jun 2018 16:04  --------------------------------------------------------  IN: 360 mL / OUT: 0 mL / NET: 360 mL      MEDICATIONS  (STANDING):  allopurinol 100 milliGRAM(s) Oral daily  aspirin enteric coated 81 milliGRAM(s) Oral daily  cilostazol 100 milliGRAM(s) Oral two times a day  dextrose 5%. 1000 milliLiter(s) (50 mL/Hr) IV Continuous <Continuous>  dextrose 50% Injectable 12.5 Gram(s) IV Push once  dextrose 50% Injectable 25 Gram(s) IV Push once  dextrose 50% Injectable 25 Gram(s) IV Push once  docusate sodium 100 milliGRAM(s) Oral three times a day  furosemide   Injectable 80 milliGRAM(s) IV Push two times a day  hydrALAZINE 25 milliGRAM(s) Oral every 12 hours  insulin glargine Injectable (LANTUS) 15 Unit(s) SubCutaneous at bedtime  insulin lispro (HumaLOG) corrective regimen sliding scale   SubCutaneous three times a day before meals  insulin lispro (HumaLOG) corrective regimen sliding scale   SubCutaneous at bedtime  latanoprost 0.005% Ophthalmic Solution 1 Drop(s) Both EYES at bedtime  levothyroxine 100 MICROGram(s) Oral daily  metolazone 10 milliGRAM(s) Oral daily  nystatin Powder 1 Application(s) Topical two times a day  pantoprazole  Injectable 40 milliGRAM(s) IV Push every 12 hours  polyethylene glycol 3350 17 Gram(s) Oral two times a day  senna 2 Tablet(s) Oral at bedtime  simvastatin 20 milliGRAM(s) Oral at bedtime  warfarin 5 milliGRAM(s) Oral once    MEDICATIONS  (PRN):  dextrose 40% Gel 15 Gram(s) Oral once PRN Blood Glucose LESS THAN 70 milliGRAM(s)/deciliter  glucagon  Injectable 1 milliGRAM(s) IntraMuscular once PRN Glucose LESS THAN 70 milligrams/deciliter  melatonin 3 milliGRAM(s) Oral at bedtime PRN Insomnia    LABS:                        10.2   6.38  )-----------( 148      ( 24 Jun 2018 06:30 )             31.7     06-24    141  |  92<L>  |  69<H>  ----------------------------<  112<H>  3.5   |  40<H>  |  2.38<H>    Ca    10.2      24 Jun 2018 06:30  Phos  3.2     06-24  Mg     2.0     06-24      PT/INR - ( 24 Jun 2018 06:30 )   PT: 19.4 SEC;   INR: 1.67              CAPILLARY BLOOD GLUCOSE      POCT Blood Glucose.: 277 mg/dL (24 Jun 2018 12:52)  POCT Blood Glucose.: 125 mg/dL (24 Jun 2018 08:43)  POCT Blood Glucose.: 217 mg/dL (23 Jun 2018 22:19)  POCT Blood Glucose.: 135 mg/dL (23 Jun 2018 17:32)          REVIEW OF SYSTEMS:  CONSTITUTIONAL: No fever, weight loss, or fatigue  EYES: No eye pain, visual disturbances, or discharge  ENMT:  No difficulty hearing, tinnitus, vertigo; No sinus or throat pain  NECK: No pain or stiffness  BREASTS: No pain, masses, or nipple discharge  RESPIRATORY: No cough, wheezing, chills or hemoptysis; No shortness of breath  CARDIOVASCULAR: No chest pain, palpitations, dizziness, or leg swelling  GASTROINTESTINAL: No abdominal or epigastric pain. No nausea, vomiting, or hematemesis; No diarrhea or constipation. No melena or hematochezia.  GENITOURINARY: No dysuria, frequency, hematuria, or incontinence  NEUROLOGICAL: No headaches, memory loss, loss of strength, numbness, or tremors  SKIN: No itching, burning, rashes, or lesions   LYMPH NODES: No enlarged glands  ENDOCRINE: No heat or cold intolerance; No hair loss  MUSCULOSKELETAL: No joint pain or swelling; No muscle, back, or extremity pain  PSYCHIATRIC: No depression, anxiety, mood swings, or difficulty sleeping  HEME/LYMPH: No easy bruising, or bleeding gums  ALLERY AND IMMUNOLOGIC: No hives or eczema    RADIOLOGY & ADDITIONAL TESTS:    Consultant(s) Notes Reviewed:  [x ] YES  [ ] NO    PHYSICAL EXAM:  GENERAL: NAD, well-groomed, well-developed,not in any distress ,  HEAD:  Atraumatic, Normocephalic  EYES: EOMI, PERRLA, conjunctiva and sclera clear  ENMT: No tonsillar erythema, exudates, or enlargement; Moist mucous membranes, Good dentition, No lesions  NECK: Supple, No JVD, Normal thyroid  NERVOUS SYSTEM:  Alert & Oriented X3, No focal deficit   CHEST/LUNG: Good air entry bilateral with no  rales, rhonchi, wheezing, or rubs  HEART: Regular rate and rhythm; No murmurs, rubs, or gallops  ABDOMEN: Soft, Nontender, Nondistended; Bowel sounds present  EXTREMITIES:  2+ Peripheral Pulses, No clubbing, cyanosis, or edema  SKIN: No rashes or lesions    Care Discussed with Consultants/Other Providers [ x] YES  [ ] NO

## 2018-06-24 NOTE — PROGRESS NOTE ADULT - SUBJECTIVE AND OBJECTIVE BOX
Patient seen and examined  no complaints    No Known Allergies    Hospital Medications:   MEDICATIONS  (STANDING):  allopurinol 100 milliGRAM(s) Oral daily  aspirin enteric coated 81 milliGRAM(s) Oral daily  cilostazol 100 milliGRAM(s) Oral two times a day  dextrose 5%. 1000 milliLiter(s) (50 mL/Hr) IV Continuous <Continuous>  dextrose 50% Injectable 12.5 Gram(s) IV Push once  dextrose 50% Injectable 25 Gram(s) IV Push once  dextrose 50% Injectable 25 Gram(s) IV Push once  docusate sodium 100 milliGRAM(s) Oral three times a day  furosemide   Injectable 80 milliGRAM(s) IV Push two times a day  hydrALAZINE 25 milliGRAM(s) Oral every 12 hours  insulin glargine Injectable (LANTUS) 15 Unit(s) SubCutaneous at bedtime  insulin lispro (HumaLOG) corrective regimen sliding scale   SubCutaneous three times a day before meals  insulin lispro (HumaLOG) corrective regimen sliding scale   SubCutaneous at bedtime  latanoprost 0.005% Ophthalmic Solution 1 Drop(s) Both EYES at bedtime  levothyroxine 100 MICROGram(s) Oral daily  metolazone 10 milliGRAM(s) Oral daily  nystatin Powder 1 Application(s) Topical two times a day  pantoprazole  Injectable 40 milliGRAM(s) IV Push every 12 hours  polyethylene glycol 3350 17 Gram(s) Oral two times a day  potassium chloride    Tablet ER 40 milliEquivalent(s) Oral once  senna 2 Tablet(s) Oral at bedtime  simvastatin 20 milliGRAM(s) Oral at bedtime  warfarin 5 milliGRAM(s) Oral once        VITALS:  T(F): 98.1 (06-24-18 @ 05:54), Max: 98.2 (06-23-18 @ 12:18)  HR: 78 (06-24-18 @ 05:54)  BP: 147/68 (06-24-18 @ 05:54)  RR: 18 (06-24-18 @ 05:54)  SpO2: 98% (06-24-18 @ 05:54)  Wt(kg): --    06-23 @ 07:01  -  06-24 @ 07:00  --------------------------------------------------------  IN: 220 mL / OUT: 0 mL / NET: 220 mL        PHYSICAL EXAM:  Constitutional: NAD  HEENT: anicteric sclera, oropharynx clear, MMM  Neck: No JVD  Respiratory: Bibasilar crackles   Cardiovascular: S1, S2, RRR  Gastrointestinal: BS+, soft, NT/ND  Extremities: No cyanosis or clubbing. 12 peripheral LE edema, wrinkling noted  Neurological: A/O x 3, no focal deficits  Psychiatric: Normal mood, normal affect  : No CVA tenderness. No michaud.   Skin: No rashes    LABS:  06-24    141  |  92<L>  |  69<H>  ----------------------------<  112<H>  3.5   |  40<H>  |  2.38<H>    Ca    10.2      24 Jun 2018 06:30  Phos  3.2     06-24  Mg     2.0     06-24      Creatinine Trend: 2.38 <--, 2.36 <--, 2.19 <--, 2.23 <--, 2.45 <--, 2.60 <--, 2.59 <--                        10.2   6.38  )-----------( 148      ( 24 Jun 2018 06:30 )             31.7     Urine Studies:      RADIOLOGY & ADDITIONAL STUDIES:

## 2018-06-24 NOTE — PROGRESS NOTE ADULT - SUBJECTIVE AND OBJECTIVE BOX
S: No CP or SOB     reports sill uncomfortable to lie flat for extended periods of time     MEDICATIONS  (STANDING):  allopurinol 100 milliGRAM(s) Oral daily  aspirin enteric coated 81 milliGRAM(s) Oral daily  cilostazol 100 milliGRAM(s) Oral two times a day  dextrose 5%. 1000 milliLiter(s) (50 mL/Hr) IV Continuous <Continuous>  dextrose 50% Injectable 12.5 Gram(s) IV Push once  dextrose 50% Injectable 25 Gram(s) IV Push once  dextrose 50% Injectable 25 Gram(s) IV Push once  docusate sodium 100 milliGRAM(s) Oral three times a day  furosemide   Injectable 80 milliGRAM(s) IV Push two times a day  hydrALAZINE 25 milliGRAM(s) Oral every 12 hours  insulin glargine Injectable (LANTUS) 15 Unit(s) SubCutaneous at bedtime  insulin lispro (HumaLOG) corrective regimen sliding scale   SubCutaneous three times a day before meals  insulin lispro (HumaLOG) corrective regimen sliding scale   SubCutaneous at bedtime  latanoprost 0.005% Ophthalmic Solution 1 Drop(s) Both EYES at bedtime  levothyroxine 100 MICROGram(s) Oral daily  metolazone 10 milliGRAM(s) Oral daily  nystatin Powder 1 Application(s) Topical two times a day  pantoprazole  Injectable 40 milliGRAM(s) IV Push every 12 hours  polyethylene glycol 3350 17 Gram(s) Oral two times a day  senna 2 Tablet(s) Oral at bedtime  simvastatin 20 milliGRAM(s) Oral at bedtime  warfarin 5 milliGRAM(s) Oral once    MEDICATIONS  (PRN):  dextrose 40% Gel 15 Gram(s) Oral once PRN Blood Glucose LESS THAN 70 milliGRAM(s)/deciliter  glucagon  Injectable 1 milliGRAM(s) IntraMuscular once PRN Glucose LESS THAN 70 milligrams/deciliter  melatonin 3 milliGRAM(s) Oral at bedtime PRN Insomnia      LABS:                        10.2   6.38  )-----------( 148      ( 24 Jun 2018 06:30 )             31.7     Hemoglobin: 10.2 g/dL (06-24 @ 06:30)  Hemoglobin: 9.9 g/dL (06-23 @ 06:37)  Hemoglobin: 9.7 g/dL (06-22 @ 07:10)  Hemoglobin: 9.9 g/dL (06-21 @ 04:00)  Hemoglobin: 8.8 g/dL (06-20 @ 03:51)    06-24    141  |  92<L>  |  69<H>  ----------------------------<  112<H>  3.5   |  40<H>  |  2.38<H>    Ca    10.2      24 Jun 2018 06:30  Phos  3.2     06-24  Mg     2.0     06-24      Creatinine Trend: 2.38<--, 2.36<--, 2.19<--, 2.23<--, 2.45<--, 2.60<--   PT/INR - ( 24 Jun 2018 06:30 )   PT: 19.4 SEC;   INR: 1.67         PHYSICAL EXAM  Vital Signs Last 24 Hrs  T(C): 36.3 (24 Jun 2018 12:22), Max: 36.8 (23 Jun 2018 19:12)  T(F): 97.3 (24 Jun 2018 12:22), Max: 98.2 (23 Jun 2018 19:12)  HR: 77 (24 Jun 2018 12:22) (74 - 78)  BP: 117/62 (24 Jun 2018 12:22) (117/62 - 160/78)  BP(mean): --  RR: 18 (24 Jun 2018 12:22) (18 - 18)  SpO2: 92% (24 Jun 2018 12:22) (92% - 98%)    Heart: normal S1, S2, IRR, no m/r/g  Lungs: cta b/l  Abd: soft nT, nD  Ext: 2+ edema in LE bilaterally     DATA:    TELEMETRY:  AF 	      ECG: < from: 12 Lead ECG (06.13.18 @ 14:56) >  Atrial fibrillation  Right bundle branch block  Left posterior fascicular block  *** Bifascicular block ***  Cannot rule out Inferior infarct , age undetermined  T wave abnormality, consider lateral ischemia  Abnormal ECG    < end of copied text >    < from: Transthoracic Echocardiogram (06.18.18 @ 10:33) >  CONCLUSIONS:  1. Mitral annular calcification, otherwise normal mitral  valve. Mild mitral regurgitation.  2. Severely dilated left atrium.  LA volume index = 69  cc/m2.  3. Normal left ventricular internal dimensions and wall  thicknesses.  4. Endocardium not well visualized; grossly normal left  ventricular systolic function.  5. Moderate right atrial enlargement.  6. Right ventricular enlargement with decreased right  ventricular systolic function.  7. Estimated right ventricular systolic pressure equals 53  mm Hg, assuming right atrial pressure equals 10 mm Hg,  consistent with moderate pulmonary hypertension.  8. Normal tricuspid valve.  Moderate-severe tricuspid  regurgitation.  *** No previous Echo exam.  ------------------------------------------------------------------------  Confirmed on  6/18/2018 - 12:27:29 by Lance Ferris M.D.    < end of copied text >      ASSESSMENT/PLAN: 	89y Female with history of Afib on AC, HTN, CKD admitted with syncope and volume overload c/w acute on chronic diastolic and Right sided CHF, with chronic bifasicular block and bradycardia, with pauses <3 seconds    -F/U EP  -  patient's son at bedside yesterday   states family is now leaning toward PPM F/U on family/ patients final decision & EP   -pt  family does not want invasive cv procedures at this time  -therefore, conservative cardiac care and medical management recommended at this time, will avoid AVN blockers  -volume overloaded improving   -continue with iv diuresis to keep O > I, on IV Lasix and Zaroxolyn  -follow up renal and monitor cr  -episode of melena, GI eval appreciated.  Guaiac neg   --On Coumadin, goal INR 2-3, off Heparin Gtt.    --given elevated BP, avoid AVN blockers/ACE/ARB, c/w Hydralazine 25 BID

## 2018-06-25 LAB
BASE EXCESS BLDV CALC-SCNC: 14.8 MMOL/L — SIGNIFICANT CHANGE UP
BUN SERPL-MCNC: 71 MG/DL — HIGH (ref 7–23)
CALCIUM SERPL-MCNC: 9.7 MG/DL — SIGNIFICANT CHANGE UP (ref 8.4–10.5)
CHLORIDE SERPL-SCNC: 90 MMOL/L — LOW (ref 98–107)
CO2 SERPL-SCNC: 38 MMOL/L — HIGH (ref 22–31)
CREAT SERPL-MCNC: 2.84 MG/DL — HIGH (ref 0.5–1.3)
GAS PNL BLDV: 138 MMOL/L — SIGNIFICANT CHANGE UP (ref 136–146)
GLUCOSE BLDC GLUCOMTR-MCNC: 145 MG/DL — HIGH (ref 70–99)
GLUCOSE BLDC GLUCOMTR-MCNC: 174 MG/DL — HIGH (ref 70–99)
GLUCOSE BLDC GLUCOMTR-MCNC: 202 MG/DL — HIGH (ref 70–99)
GLUCOSE BLDC GLUCOMTR-MCNC: 258 MG/DL — HIGH (ref 70–99)
GLUCOSE BLDV-MCNC: 221 — HIGH (ref 70–99)
GLUCOSE SERPL-MCNC: 120 MG/DL — HIGH (ref 70–99)
HCO3 BLDV-SCNC: 37 MMOL/L — HIGH (ref 20–27)
HCT VFR BLD CALC: 31.8 % — LOW (ref 34.5–45)
HCT VFR BLDV CALC: 33.7 % — LOW (ref 34.5–45)
HGB BLD-MCNC: 10.3 G/DL — LOW (ref 11.5–15.5)
HGB BLDV-MCNC: 10.9 G/DL — LOW (ref 11.5–15.5)
INR BLD: 1.82 — HIGH (ref 0.88–1.17)
MAGNESIUM SERPL-MCNC: 2 MG/DL — SIGNIFICANT CHANGE UP (ref 1.6–2.6)
MCHC RBC-ENTMCNC: 29.7 PG — SIGNIFICANT CHANGE UP (ref 27–34)
MCHC RBC-ENTMCNC: 32.4 % — SIGNIFICANT CHANGE UP (ref 32–36)
MCV RBC AUTO: 91.6 FL — SIGNIFICANT CHANGE UP (ref 80–100)
NRBC # FLD: 0 — SIGNIFICANT CHANGE UP
PCO2 BLDV: 56 MMHG — HIGH (ref 41–51)
PH BLDV: 7.46 PH — HIGH (ref 7.32–7.43)
PHOSPHATE SERPL-MCNC: 3.2 MG/DL — SIGNIFICANT CHANGE UP (ref 2.5–4.5)
PLATELET # BLD AUTO: 149 K/UL — LOW (ref 150–400)
PMV BLD: 12.9 FL — SIGNIFICANT CHANGE UP (ref 7–13)
PO2 BLDV: 31 MMHG — LOW (ref 35–40)
POTASSIUM BLDV-SCNC: 3.6 MMOL/L — SIGNIFICANT CHANGE UP (ref 3.4–4.5)
POTASSIUM SERPL-MCNC: 3.5 MMOL/L — SIGNIFICANT CHANGE UP (ref 3.5–5.3)
POTASSIUM SERPL-SCNC: 3.5 MMOL/L — SIGNIFICANT CHANGE UP (ref 3.5–5.3)
PROTHROM AB SERPL-ACNC: 21.2 SEC — HIGH (ref 9.8–13.1)
RBC # BLD: 3.47 M/UL — LOW (ref 3.8–5.2)
RBC # FLD: 16.1 % — HIGH (ref 10.3–14.5)
SAO2 % BLDV: 51.9 % — LOW (ref 60–85)
SODIUM SERPL-SCNC: 140 MMOL/L — SIGNIFICANT CHANGE UP (ref 135–145)
WBC # BLD: 7.54 K/UL — SIGNIFICANT CHANGE UP (ref 3.8–10.5)
WBC # FLD AUTO: 7.54 K/UL — SIGNIFICANT CHANGE UP (ref 3.8–10.5)

## 2018-06-25 PROCEDURE — 99233 SBSQ HOSP IP/OBS HIGH 50: CPT

## 2018-06-25 RX ORDER — PANTOPRAZOLE SODIUM 20 MG/1
40 TABLET, DELAYED RELEASE ORAL
Qty: 0 | Refills: 0 | Status: DISCONTINUED | OUTPATIENT
Start: 2018-06-25 | End: 2018-06-29

## 2018-06-25 RX ORDER — WARFARIN SODIUM 2.5 MG/1
5 TABLET ORAL ONCE
Qty: 0 | Refills: 0 | Status: COMPLETED | OUTPATIENT
Start: 2018-06-25 | End: 2018-06-25

## 2018-06-25 RX ORDER — FUROSEMIDE 40 MG
80 TABLET ORAL
Qty: 0 | Refills: 0 | Status: DISCONTINUED | OUTPATIENT
Start: 2018-06-25 | End: 2018-06-26

## 2018-06-25 RX ADMIN — Medication 3 MILLIGRAM(S): at 21:46

## 2018-06-25 RX ADMIN — Medication 81 MILLIGRAM(S): at 11:13

## 2018-06-25 RX ADMIN — Medication 25 MILLIGRAM(S): at 17:15

## 2018-06-25 RX ADMIN — WARFARIN SODIUM 5 MILLIGRAM(S): 2.5 TABLET ORAL at 17:55

## 2018-06-25 RX ADMIN — Medication 100 MILLIGRAM(S): at 11:13

## 2018-06-25 RX ADMIN — LATANOPROST 1 DROP(S): 0.05 SOLUTION/ DROPS OPHTHALMIC; TOPICAL at 21:46

## 2018-06-25 RX ADMIN — INSULIN GLARGINE 15 UNIT(S): 100 INJECTION, SOLUTION SUBCUTANEOUS at 22:06

## 2018-06-25 RX ADMIN — POLYETHYLENE GLYCOL 3350 17 GRAM(S): 17 POWDER, FOR SOLUTION ORAL at 05:58

## 2018-06-25 RX ADMIN — Medication 3: at 13:13

## 2018-06-25 RX ADMIN — NYSTATIN CREAM 1 APPLICATION(S): 100000 CREAM TOPICAL at 17:15

## 2018-06-25 RX ADMIN — PANTOPRAZOLE SODIUM 40 MILLIGRAM(S): 20 TABLET, DELAYED RELEASE ORAL at 17:15

## 2018-06-25 RX ADMIN — Medication 1: at 17:51

## 2018-06-25 RX ADMIN — CILOSTAZOL 100 MILLIGRAM(S): 100 TABLET ORAL at 05:57

## 2018-06-25 RX ADMIN — Medication 80 MILLIGRAM(S): at 17:15

## 2018-06-25 RX ADMIN — NYSTATIN CREAM 1 APPLICATION(S): 100000 CREAM TOPICAL at 05:58

## 2018-06-25 RX ADMIN — SIMVASTATIN 20 MILLIGRAM(S): 20 TABLET, FILM COATED ORAL at 21:46

## 2018-06-25 RX ADMIN — PANTOPRAZOLE SODIUM 40 MILLIGRAM(S): 20 TABLET, DELAYED RELEASE ORAL at 05:57

## 2018-06-25 RX ADMIN — Medication 100 MICROGRAM(S): at 05:57

## 2018-06-25 RX ADMIN — Medication 80 MILLIGRAM(S): at 05:57

## 2018-06-25 RX ADMIN — CILOSTAZOL 100 MILLIGRAM(S): 100 TABLET ORAL at 17:15

## 2018-06-25 RX ADMIN — Medication 100 MILLIGRAM(S): at 05:57

## 2018-06-25 RX ADMIN — Medication 25 MILLIGRAM(S): at 05:57

## 2018-06-25 NOTE — PROGRESS NOTE ADULT - SUBJECTIVE AND OBJECTIVE BOX
INTERVAL HPI/OVERNIGHT EVENTS: I feel better . Daughter in room.   Vital Signs Last 24 Hrs  T(C): 36.8 (25 Jun 2018 21:43), Max: 36.8 (25 Jun 2018 21:43)  T(F): 98.2 (25 Jun 2018 21:43), Max: 98.2 (25 Jun 2018 21:43)  HR: 78 (25 Jun 2018 21:43) (72 - 85)  BP: 117/54 (25 Jun 2018 21:43) (117/54 - 145/64)  BP(mean): --  RR: 18 (25 Jun 2018 21:43) (18 - 18)  SpO2: 96% (25 Jun 2018 21:43) (94% - 96%)  I&O's Summary    24 Jun 2018 07:01  -  25 Jun 2018 07:00  --------------------------------------------------------  IN: 560 mL / OUT: 0 mL / NET: 560 mL    25 Jun 2018 07:01  -  25 Jun 2018 22:45  --------------------------------------------------------  IN: 280 mL / OUT: 150 mL / NET: 130 mL      MEDICATIONS  (STANDING):  allopurinol 100 milliGRAM(s) Oral daily  aspirin enteric coated 81 milliGRAM(s) Oral daily  cilostazol 100 milliGRAM(s) Oral two times a day  dextrose 5%. 1000 milliLiter(s) (50 mL/Hr) IV Continuous <Continuous>  dextrose 50% Injectable 12.5 Gram(s) IV Push once  dextrose 50% Injectable 25 Gram(s) IV Push once  dextrose 50% Injectable 25 Gram(s) IV Push once  docusate sodium 100 milliGRAM(s) Oral three times a day  furosemide    Tablet 80 milliGRAM(s) Oral two times a day  hydrALAZINE 25 milliGRAM(s) Oral every 12 hours  insulin glargine Injectable (LANTUS) 15 Unit(s) SubCutaneous at bedtime  insulin lispro (HumaLOG) corrective regimen sliding scale   SubCutaneous three times a day before meals  insulin lispro (HumaLOG) corrective regimen sliding scale   SubCutaneous at bedtime  latanoprost 0.005% Ophthalmic Solution 1 Drop(s) Both EYES at bedtime  levothyroxine 100 MICROGram(s) Oral daily  metolazone 10 milliGRAM(s) Oral daily  nystatin Powder 1 Application(s) Topical two times a day  pantoprazole    Tablet 40 milliGRAM(s) Oral two times a day  polyethylene glycol 3350 17 Gram(s) Oral two times a day  senna 2 Tablet(s) Oral at bedtime  simvastatin 20 milliGRAM(s) Oral at bedtime    MEDICATIONS  (PRN):  dextrose 40% Gel 15 Gram(s) Oral once PRN Blood Glucose LESS THAN 70 milliGRAM(s)/deciliter  glucagon  Injectable 1 milliGRAM(s) IntraMuscular once PRN Glucose LESS THAN 70 milligrams/deciliter  melatonin 3 milliGRAM(s) Oral at bedtime PRN Insomnia    LABS:                        10.3   7.54  )-----------( 149      ( 25 Jun 2018 05:44 )             31.8     06-25    140  |  90<L>  |  71<H>  ----------------------------<  120<H>  3.5   |  38<H>  |  2.84<H>    Ca    9.7      25 Jun 2018 05:44  Phos  3.2     06-25  Mg     2.0     06-25      PT/INR - ( 25 Jun 2018 05:44 )   PT: 21.2 SEC;   INR: 1.82              CAPILLARY BLOOD GLUCOSE      POCT Blood Glucose.: 202 mg/dL (25 Jun 2018 21:59)  POCT Blood Glucose.: 174 mg/dL (25 Jun 2018 17:30)  POCT Blood Glucose.: 258 mg/dL (25 Jun 2018 12:46)  POCT Blood Glucose.: 145 mg/dL (25 Jun 2018 08:51)          REVIEW OF SYSTEMS:  CONSTITUTIONAL: No fever, weight loss, or fatigue  EYES: No eye pain, visual disturbances, or discharge  ENMT:  No difficulty hearing, tinnitus, vertigo; No sinus or throat pain  NECK: No pain or stiffness  BREASTS: No pain, masses, or nipple discharge  RESPIRATORY: No cough, wheezing, chills or hemoptysis; No shortness of breath  CARDIOVASCULAR: No chest pain, palpitations, dizziness, less leg swelling  GASTROINTESTINAL: No abdominal or epigastric pain. No nausea, vomiting, or hematemesis; No diarrhea or constipation. No melena or hematochezia.  GENITOURINARY: No dysuria, frequency, hematuria, or incontinence  NEUROLOGICAL: No headaches, memory loss, loss of strength, numbness, or tremors  SKIN: No itching, burning, rashes, or lesions   LYMPH NODES: No enlarged glands  ENDOCRINE: No heat or cold intolerance; No hair loss  MUSCULOSKELETAL: No joint pain or swelling; No muscle, back, or extremity pain  PSYCHIATRIC: No depression, anxiety, mood swings, or difficulty sleeping    Consultant(s) Notes Reviewed:  [x ] YES  [ ] NO    PHYSICAL EXAM:  GENERAL: NAD, Obesity ,not in any distress ,  HEAD:  Atraumatic, Normocephalic  EYES: EOMI, PERRLA, conjunctiva and sclera clear  ENMT: No tonsillar erythema, exudates, or enlargement; Moist mucous membranes, Good dentition, No lesions  NECK: Supple, No JVD, Normal thyroid  NERVOUS SYSTEM:  Alert & Oriented X3, No focal deficit   CHEST/LUNG: Good air entry bilateral with no  rales, rhonchi, wheezing, or rubs  HEART: Regular rate and rhythm; No murmurs, rubs, or gallops  ABDOMEN: Soft, Nontender, Nondistended; Bowel sounds present  EXTREMITIES:  2+ Peripheral Pulses, No clubbing, cyanosis, but less  edema    Care Discussed with Consultants/Other Providers [ x] YES  [ ] NO

## 2018-06-25 NOTE — PROGRESS NOTE ADULT - SUBJECTIVE AND OBJECTIVE BOX
[facility] - Radha / Florin FELDMAN /Nate/ GASTON Castillo/ GASTON Tellez/ Shahid Segovia / BABAR Njeru  ---------------------------------------------------------------------------------------------------------------    Patient seen and examined bedside    Subjective and Objective: No overnight events, sob resolved. feeling better but reports inc b/l LEs pain to touch  Daughter bedside    Allergies: No Known Allergies      Hospital Medications:   MEDICATIONS  (STANDING):  allopurinol 100 milliGRAM(s) Oral daily  aspirin enteric coated 81 milliGRAM(s) Oral daily  cilostazol 100 milliGRAM(s) Oral two times a day  dextrose 5%. 1000 milliLiter(s) (50 mL/Hr) IV Continuous <Continuous>  dextrose 50% Injectable 12.5 Gram(s) IV Push once  dextrose 50% Injectable 25 Gram(s) IV Push once  dextrose 50% Injectable 25 Gram(s) IV Push once  docusate sodium 100 milliGRAM(s) Oral three times a day  furosemide   Injectable 80 milliGRAM(s) IV Push two times a day  hydrALAZINE 25 milliGRAM(s) Oral every 12 hours  insulin glargine Injectable (LANTUS) 15 Unit(s) SubCutaneous at bedtime  insulin lispro (HumaLOG) corrective regimen sliding scale   SubCutaneous three times a day before meals  insulin lispro (HumaLOG) corrective regimen sliding scale   SubCutaneous at bedtime  latanoprost 0.005% Ophthalmic Solution 1 Drop(s) Both EYES at bedtime  levothyroxine 100 MICROGram(s) Oral daily  metolazone 10 milliGRAM(s) Oral daily  nystatin Powder 1 Application(s) Topical two times a day  pantoprazole    Tablet 40 milliGRAM(s) Oral two times a day  polyethylene glycol 3350 17 Gram(s) Oral two times a day  senna 2 Tablet(s) Oral at bedtime  simvastatin 20 milliGRAM(s) Oral at bedtime    VITALS:  T(F): 97.8 (06-25-18 @ 12:31), Max: 97.9 (06-24-18 @ 19:18)  HR: 75 (06-25-18 @ 12:31)  BP: 145/57 (06-25-18 @ 12:31)  RR: 18 (06-25-18 @ 12:31)  SpO2: 94% (06-25-18 @ 12:31)  Wt(kg): --    06-24 @ 07:01  -  06-25 @ 07:00  --------------------------------------------------------  IN: 560 mL / OUT: 0 mL / NET: 560 mL    06-25 @ 07:01  -  06-25 @ 13:36  --------------------------------------------------------  IN: 280 mL / OUT: 150 mL / NET: 130 mL      PHYSICAL EXAM:  Constitutional: NAD  HEENT: anicteric sclera, oropharynx clear  Neck: No JVD  Respiratory: CTAB, no wheezes, rales or rhonchi  Cardiovascular: S1, S2, RRR  Gastrointestinal: BS+, soft, NT/ND  Extremities: No cyanosis or clubbing. trace peripheral edema b/l (much better)  Neurological: A/O x 3, no focal deficits  Psychiatric: Normal mood, normal affect  : No CVA tenderness. No michaud.   Skin: chronic dermatitis changes    LABS:  06-25    140  |  90<L>  |  71<H>  ----------------------------<  120<H>  3.5   |  38<H>  |  2.84<H>    Ca    9.7      25 Jun 2018 05:44  Phos  3.2     06-25  Mg     2.0     06-25      Creatinine Trend: 2.84 <--, 2.38 <--, 2.36 <--, 2.19 <--, 2.23 <--, 2.45 <--, 2.60 <--                        10.3   7.54  )-----------( 149      ( 25 Jun 2018 05:44 )             31.8     Urine Studies:        RADIOLOGY & ADDITIONAL STUDIES:

## 2018-06-25 NOTE — PROGRESS NOTE ADULT - SUBJECTIVE AND OBJECTIVE BOX
Patient seen and examined at bedside.  Doing better.  Breathing improved.      Medications:  allopurinol 100 milliGRAM(s) Oral daily  aspirin enteric coated 81 milliGRAM(s) Oral daily  brimonidine 0.2% Ophthalmic Solution 1 Drop(s) Both EYES three times a day  carvedilol 25 milliGRAM(s) Oral every 12 hours  cilostazol 100 milliGRAM(s) Oral two times a day  dextrose 40% Gel 15 Gram(s) Oral once PRN  dextrose 5%. 1000 milliLiter(s) IV Continuous <Continuous>  dextrose 50% Injectable 12.5 Gram(s) IV Push once  dextrose 50% Injectable 25 Gram(s) IV Push once  dextrose 50% Injectable 25 Gram(s) IV Push once  dorzolamide 2%/timolol 0.5% Ophthalmic Solution 1 Drop(s) Both EYES two times a day  furosemide   Injectable 40 milliGRAM(s) IV Push two times a day  glucagon  Injectable 1 milliGRAM(s) IntraMuscular once PRN  insulin glargine Injectable (LANTUS) 15 Unit(s) SubCutaneous at bedtime  insulin lispro (HumaLOG) corrective regimen sliding scale   SubCutaneous three times a day before meals  insulin lispro (HumaLOG) corrective regimen sliding scale   SubCutaneous at bedtime  latanoprost 0.005% Ophthalmic Solution 1 Drop(s) Both EYES at bedtime  levothyroxine 100 MICROGram(s) Oral daily  simvastatin 20 milliGRAM(s) Oral at bedtime      PAST MEDICAL & SURGICAL HISTORY:  Personal history of PE (pulmonary embolism)  Glaucoma  PVD (peripheral vascular disease)  Hypothyroid  HTN (hypertension)  HLD (hyperlipidemia)  CHF (congestive heart failure)  DM (diabetes mellitus)  Afib  Elective surgery: abdominal abcess removals  History of partial thyroidectomy        Vitals:  T(F): 97.4 (06-15), Max: 97.6 (06-14)  HR: 61 (06-15) (58 - 67)  BP: 101/55 (06-15) (101/55 - 131/65)  RR: 18 (06-15)  SpO2: 100% (06-15)  I&O's Summary    14 Jun 2018 07:01  -  15 Jun 2018 07:00  --------------------------------------------------------  IN: 200 mL / OUT: 300 mL / NET: -100 mL        Physical Exam:  GENERAL: No acute distress, well-developed  HEAD:  Atraumatic, Normocephalic  ENT: EOMI, PERRLA, conjunctiva and sclera clear, Neck supple, +JVP ~ 10 cm H20   CHEST/LUNG: Diffuse crackles w/ no wheeze/rhonchi; improved   BACK: No spinal tenderness  HEART: Irreg rhythm, S1 variable, unable to appreciate S2; III/VI systolic ejection murmur;   ABDOMEN: Soft, Nontender, Nondistended; Bowel sounds present  EXTREMITIES:  +3 BLE pitting edema; improved   PSYCH: Nl behavior, nl affect  NEUROLOGY: AAOx3, non-focal, cranial nerves intact  SKIN: Normal color, No rashes or lesions                          9.2    4.66  )-----------( 158      ( 15 Sonny 2018 07:00 )             30.5     06-15    143  |  103  |  65<H>  ----------------------------<  80  4.7   |  33<H>  |  2.59<H>    Ca    9.5      15 Sonny 2018 07:00  Phos  4.5     06-15  Mg     2.3     06-15    TPro  7.4  /  Alb  3.8  /  TBili  0.5  /  DBili  x   /  AST  20  /  ALT  20  /  AlkPhos  83  06-13    PT/INR - ( 13 Jun 2018 15:20 )   PT: 32.9 SEC;   INR: 2.80          PTT - ( 13 Jun 2018 15:20 )  PTT:37.3 SEC  CARDIAC MARKERS ( 14 Jun 2018 09:20 )  x     / x     / 37 u/L / 3.00 ng/mL / x          Serum Pro-Brain Natriuretic Peptide: 3380 pg/mL (06-14 @ 06:15)  Serum Pro-Brain Natriuretic Peptide: 2943 pg/mL (06-13 @ 17:19)    Interpretation of Telemetry:  afib, HR 60s;  this morning occasional episodes into the 50s     Assessment     89 yo woman w/ hx of chronic afib/flutter (on coumadin), CHF? (unknown EF), DM, HTN, hypothyroidism who presented for syncope in the setting of chronic bifascicular block and slow Afib (HR 50s this morning).  HR improved w/ no recurrent syncope   Volume status improved as well     TTE w/ preserved LV function   no evidence of GIB   Family considering PPM placement     RECS  - agree w/ transitioning to PO lasix tomorrow   - cont holding AV jimy blockers; resume rest of home meds   - will discuss w/ family at bedside risks and benefits of PPM     NICOLE Fuller EP

## 2018-06-25 NOTE — PROGRESS NOTE ADULT - SUBJECTIVE AND OBJECTIVE BOX
INTERVAL HPI/OVERNIGHT EVENTS:    pt reports brown bm this morning   denies n/v/d/c, abdominal pain, melena or brbpr     MEDICATIONS  (STANDING):  allopurinol 100 milliGRAM(s) Oral daily  aspirin enteric coated 81 milliGRAM(s) Oral daily  cilostazol 100 milliGRAM(s) Oral two times a day  dextrose 5%. 1000 milliLiter(s) (50 mL/Hr) IV Continuous <Continuous>  dextrose 50% Injectable 12.5 Gram(s) IV Push once  dextrose 50% Injectable 25 Gram(s) IV Push once  dextrose 50% Injectable 25 Gram(s) IV Push once  docusate sodium 100 milliGRAM(s) Oral three times a day  furosemide   Injectable 80 milliGRAM(s) IV Push two times a day  hydrALAZINE 25 milliGRAM(s) Oral every 12 hours  insulin glargine Injectable (LANTUS) 15 Unit(s) SubCutaneous at bedtime  insulin lispro (HumaLOG) corrective regimen sliding scale   SubCutaneous three times a day before meals  insulin lispro (HumaLOG) corrective regimen sliding scale   SubCutaneous at bedtime  latanoprost 0.005% Ophthalmic Solution 1 Drop(s) Both EYES at bedtime  levothyroxine 100 MICROGram(s) Oral daily  metolazone 10 milliGRAM(s) Oral daily  nystatin Powder 1 Application(s) Topical two times a day  pantoprazole  Injectable 40 milliGRAM(s) IV Push every 12 hours  polyethylene glycol 3350 17 Gram(s) Oral two times a day  senna 2 Tablet(s) Oral at bedtime  simvastatin 20 milliGRAM(s) Oral at bedtime    MEDICATIONS  (PRN):  dextrose 40% Gel 15 Gram(s) Oral once PRN Blood Glucose LESS THAN 70 milliGRAM(s)/deciliter  glucagon  Injectable 1 milliGRAM(s) IntraMuscular once PRN Glucose LESS THAN 70 milligrams/deciliter  melatonin 3 milliGRAM(s) Oral at bedtime PRN Insomnia      Allergies    No Known Allergies    Intolerances        Review of Systems:    General:  No wt loss, fevers, chills, night sweats, fatigue   Eyes:  Good vision, no reported pain  ENT:  No sore throat, pain, runny nose, dysphagia  CV:  No pain, palpitations, hypo/hypertension  Resp:  No dyspnea, cough, tachypnea, wheezing  GI:  No pain, No nausea, No vomiting, No diarrhea, No constipation, No weight loss, No fever, No pruritis, No rectal bleeding, No melena, No dysphagia  :  No pain, bleeding, incontinence, nocturia  Muscle:  No pain, weakness  Neuro:  No weakness, tingling, memory problems  Psych:  No fatigue, insomnia, mood problems, depression  Endocrine:  No polyuria, polydypsia, cold/heat intolerance  Heme:  No petechiae, ecchymosis, easy bruisability  Skin:  No rash, tattoos, scars, edema      Vital Signs Last 24 Hrs  T(C): 36.6 (25 Jun 2018 05:55), Max: 36.6 (24 Jun 2018 19:18)  T(F): 97.8 (25 Jun 2018 05:55), Max: 97.9 (24 Jun 2018 19:18)  HR: 85 (25 Jun 2018 05:55) (73 - 85)  BP: 145/64 (25 Jun 2018 05:55) (117/62 - 145/64)  BP(mean): --  RR: 18 (25 Jun 2018 05:55) (18 - 18)  SpO2: 95% (25 Jun 2018 05:55) (92% - 100%)    PHYSICAL EXAM:    Constitutional: NAD  HEENT: EOMI, throat clear  Neck: No LAD, supple  Respiratory: CTA and P  Cardiovascular: S1 and S2, RRR, no M  Gastrointestinal: BS+, soft, NT/ND, neg HSM,  Extremities: No peripheral edema, neg clubbing, cyanosis  Vascular: 2+ peripheral pulses  Neurological: A/O x 3, no focal deficits  Psychiatric: Normal mood, normal affect  Skin: No rashes      LABS:                        10.3   7.54  )-----------( 149      ( 25 Jun 2018 05:44 )             31.8     06-25    140  |  90<L>  |  71<H>  ----------------------------<  120<H>  3.5   |  38<H>  |  2.84<H>    Ca    9.7      25 Jun 2018 05:44  Phos  3.2     06-25  Mg     2.0     06-25      PT/INR - ( 25 Jun 2018 05:44 )   PT: 21.2 SEC;   INR: 1.82                RADIOLOGY & ADDITIONAL TESTS:

## 2018-06-25 NOTE — PROGRESS NOTE ADULT - SUBJECTIVE AND OBJECTIVE BOX
S: No CP or SOB    MEDICATIONS  (STANDING):  allopurinol 100 milliGRAM(s) Oral daily  aspirin enteric coated 81 milliGRAM(s) Oral daily  cilostazol 100 milliGRAM(s) Oral two times a day  docusate sodium 100 milliGRAM(s) Oral three times a day  furosemide    Tablet 80 milliGRAM(s) Oral two times a day  hydrALAZINE 25 milliGRAM(s) Oral every 12 hours  insulin glargine Injectable (LANTUS) 15 Unit(s) SubCutaneous at bedtime  insulin lispro (HumaLOG) corrective regimen sliding scale   SubCutaneous three times a day before meals  insulin lispro (HumaLOG) corrective regimen sliding scale   SubCutaneous at bedtime  latanoprost 0.005% Ophthalmic Solution 1 Drop(s) Both EYES at bedtime  levothyroxine 100 MICROGram(s) Oral daily  metolazone 10 milliGRAM(s) Oral daily  nystatin Powder 1 Application(s) Topical two times a day  pantoprazole    Tablet 40 milliGRAM(s) Oral two times a day  polyethylene glycol 3350 17 Gram(s) Oral two times a day  senna 2 Tablet(s) Oral at bedtime  simvastatin 20 milliGRAM(s) Oral at bedtime    MEDICATIONS  (PRN):  dextrose 40% Gel 15 Gram(s) Oral once PRN Blood Glucose LESS THAN 70 milliGRAM(s)/deciliter  glucagon  Injectable 1 milliGRAM(s) IntraMuscular once PRN Glucose LESS THAN 70 milligrams/deciliter  melatonin 3 milliGRAM(s) Oral at bedtime PRN Insomnia    LABS:                        10.3   7.54  )-----------( 149      ( 25 Jun 2018 05:44 )             31.8     140  |  90<L>  |  71<H>  ----------------------------<  120<H>  3.5   |  38<H>  |  2.84<H>    Ca    9.7      25 Jun 2018 05:44  Phos  3.2     06-25  Mg     2.0     06-25    Creatinine Trend: 2.84<--, 2.38<--, 2.36<--, 2.19<--, 2.23<--, 2.45<--   PT/INR - ( 25 Jun 2018 05:44 )   PT: 21.2 SEC;   INR: 1.82       PHYSICAL EXAM  Vital Signs Last 24 Hrs  T(C): 36.6 (25 Jun 2018 12:31), Max: 36.6 (24 Jun 2018 19:18)  T(F): 97.8 (25 Jun 2018 12:31), Max: 97.9 (24 Jun 2018 19:18)  HR: 75 (25 Jun 2018 12:31) (73 - 85)  BP: 145/57 (25 Jun 2018 12:31) (122/56 - 145/64)  RR: 18 (25 Jun 2018 12:31) (18 - 18)  SpO2: 94% (25 Jun 2018 12:31) (94% - 100%)    Heart: normal S1, S2, IRR, no m/r/g  Lungs: cta b/l  Abd: soft nT, nD  Ext: 2+ edema in LE bilaterally     DATA:    TELEMETRY:  AF 	      ECG: < from: 12 Lead ECG (06.13.18 @ 14:56) >  Atrial fibrillation  Right bundle branch block  Left posterior fascicular block  *** Bifascicular block ***  Cannot rule out Inferior infarct , age undetermined  T wave abnormality, consider lateral ischemia  Abnormal ECG    < end of copied text >    < from: Transthoracic Echocardiogram (06.18.18 @ 10:33) >  CONCLUSIONS:  1. Mitral annular calcification, otherwise normal mitral  valve. Mild mitral regurgitation.  2. Severely dilated left atrium.  LA volume index = 69  cc/m2.  3. Normal left ventricular internal dimensions and wall  thicknesses.  4. Endocardium not well visualized; grossly normal left  ventricular systolic function.  5. Moderate right atrial enlargement.  6. Right ventricular enlargement with decreased right  ventricular systolic function.  7. Estimated right ventricular systolic pressure equals 53  mm Hg, assuming right atrial pressure equals 10 mm Hg,  consistent with moderate pulmonary hypertension.  8. Normal tricuspid valve.  Moderate-severe tricuspid  regurgitation.  *** No previous Echo exam.  ------------------------------------------------------------------------  Confirmed on  6/18/2018 - 12:27:29 by Lance La Center, M.D.    < end of copied text >      ASSESSMENT/PLAN: 	89 year old Female with history of Afib on AC, HTN, CKD admitted with syncope and volume overload c/w acute on chronic diastolic and Right sided CHF, with chronic bifasicular block and bradycardia, with pauses <3 seconds    --F/U EP -- family is now interested in PPM  --Pt  family does not want invasive cv procedures at this time, therefore conservative cardiac care and medical management recommended at this time, will avoid AVN blockers  --Volume overload resolving, will change to Lasix 80 PO BID  -episode of melena, GI eval appreciated.  Guaiac neg   --On Coumadin, goal INR 2-3  --given elevated BP, avoid AVN blockers, c/w Hydralazine 25 BID.  (Pts daughter in law who is a physician prefers Hydralazine over ARB)    Kristina Conteh PA-C

## 2018-06-26 LAB
BASOPHILS # BLD AUTO: 0.02 K/UL — SIGNIFICANT CHANGE UP (ref 0–0.2)
BASOPHILS NFR BLD AUTO: 0.3 % — SIGNIFICANT CHANGE UP (ref 0–2)
BUN SERPL-MCNC: 78 MG/DL — HIGH (ref 7–23)
CALCIUM SERPL-MCNC: 9.6 MG/DL — SIGNIFICANT CHANGE UP (ref 8.4–10.5)
CHLORIDE SERPL-SCNC: 89 MMOL/L — LOW (ref 98–107)
CO2 SERPL-SCNC: 38 MMOL/L — HIGH (ref 22–31)
CREAT SERPL-MCNC: 3.57 MG/DL — HIGH (ref 0.5–1.3)
EOSINOPHIL # BLD AUTO: 0.19 K/UL — SIGNIFICANT CHANGE UP (ref 0–0.5)
EOSINOPHIL NFR BLD AUTO: 2.9 % — SIGNIFICANT CHANGE UP (ref 0–6)
GLUCOSE BLDC GLUCOMTR-MCNC: 141 MG/DL — HIGH (ref 70–99)
GLUCOSE BLDC GLUCOMTR-MCNC: 169 MG/DL — HIGH (ref 70–99)
GLUCOSE BLDC GLUCOMTR-MCNC: 173 MG/DL — HIGH (ref 70–99)
GLUCOSE BLDC GLUCOMTR-MCNC: 195 MG/DL — HIGH (ref 70–99)
GLUCOSE SERPL-MCNC: 140 MG/DL — HIGH (ref 70–99)
HCT VFR BLD CALC: 35 % — SIGNIFICANT CHANGE UP (ref 34.5–45)
HCT VFR BLD CALC: 35 % — SIGNIFICANT CHANGE UP (ref 34.5–45)
HGB BLD-MCNC: 10.9 G/DL — LOW (ref 11.5–15.5)
HGB BLD-MCNC: 10.9 G/DL — LOW (ref 11.5–15.5)
IMM GRANULOCYTES # BLD AUTO: 0.02 # — SIGNIFICANT CHANGE UP
IMM GRANULOCYTES NFR BLD AUTO: 0.3 % — SIGNIFICANT CHANGE UP (ref 0–1.5)
INR BLD: 1.98 — HIGH (ref 0.88–1.17)
LYMPHOCYTES # BLD AUTO: 2.06 K/UL — SIGNIFICANT CHANGE UP (ref 1–3.3)
LYMPHOCYTES # BLD AUTO: 31 % — SIGNIFICANT CHANGE UP (ref 13–44)
MAGNESIUM SERPL-MCNC: 2.1 MG/DL — SIGNIFICANT CHANGE UP (ref 1.6–2.6)
MCHC RBC-ENTMCNC: 29 PG — SIGNIFICANT CHANGE UP (ref 27–34)
MCHC RBC-ENTMCNC: 29 PG — SIGNIFICANT CHANGE UP (ref 27–34)
MCHC RBC-ENTMCNC: 31.1 % — LOW (ref 32–36)
MCHC RBC-ENTMCNC: 31.1 % — LOW (ref 32–36)
MCV RBC AUTO: 93.1 FL — SIGNIFICANT CHANGE UP (ref 80–100)
MCV RBC AUTO: 93.1 FL — SIGNIFICANT CHANGE UP (ref 80–100)
MONOCYTES # BLD AUTO: 1.35 K/UL — HIGH (ref 0–0.9)
MONOCYTES NFR BLD AUTO: 20.3 % — HIGH (ref 2–14)
NEUTROPHILS # BLD AUTO: 3 K/UL — SIGNIFICANT CHANGE UP (ref 1.8–7.4)
NEUTROPHILS NFR BLD AUTO: 45.2 % — SIGNIFICANT CHANGE UP (ref 43–77)
NRBC # FLD: 0 — SIGNIFICANT CHANGE UP
NRBC # FLD: 0 — SIGNIFICANT CHANGE UP
PLATELET # BLD AUTO: 161 K/UL — SIGNIFICANT CHANGE UP (ref 150–400)
PLATELET # BLD AUTO: 161 K/UL — SIGNIFICANT CHANGE UP (ref 150–400)
PMV BLD: 13.3 FL — HIGH (ref 7–13)
PMV BLD: 13.3 FL — HIGH (ref 7–13)
POTASSIUM SERPL-MCNC: 3.5 MMOL/L — SIGNIFICANT CHANGE UP (ref 3.5–5.3)
POTASSIUM SERPL-SCNC: 3.5 MMOL/L — SIGNIFICANT CHANGE UP (ref 3.5–5.3)
PROTHROM AB SERPL-ACNC: 22.3 SEC — HIGH (ref 9.8–13.1)
RBC # BLD: 3.76 M/UL — LOW (ref 3.8–5.2)
RBC # BLD: 3.76 M/UL — LOW (ref 3.8–5.2)
RBC # FLD: 16 % — HIGH (ref 10.3–14.5)
RBC # FLD: 16 % — HIGH (ref 10.3–14.5)
SODIUM SERPL-SCNC: 141 MMOL/L — SIGNIFICANT CHANGE UP (ref 135–145)
WBC # BLD: 6.64 K/UL — SIGNIFICANT CHANGE UP (ref 3.8–10.5)
WBC # BLD: 6.64 K/UL — SIGNIFICANT CHANGE UP (ref 3.8–10.5)
WBC # FLD AUTO: 6.64 K/UL — SIGNIFICANT CHANGE UP (ref 3.8–10.5)
WBC # FLD AUTO: 6.64 K/UL — SIGNIFICANT CHANGE UP (ref 3.8–10.5)

## 2018-06-26 PROCEDURE — 99232 SBSQ HOSP IP/OBS MODERATE 35: CPT

## 2018-06-26 PROCEDURE — 93970 EXTREMITY STUDY: CPT | Mod: 26

## 2018-06-26 RX ORDER — WARFARIN SODIUM 2.5 MG/1
5 TABLET ORAL ONCE
Qty: 0 | Refills: 0 | Status: COMPLETED | OUTPATIENT
Start: 2018-06-26 | End: 2018-06-26

## 2018-06-26 RX ADMIN — Medication 100 MILLIGRAM(S): at 05:29

## 2018-06-26 RX ADMIN — Medication 25 MILLIGRAM(S): at 17:00

## 2018-06-26 RX ADMIN — Medication 25 MILLIGRAM(S): at 05:29

## 2018-06-26 RX ADMIN — NYSTATIN CREAM 1 APPLICATION(S): 100000 CREAM TOPICAL at 05:29

## 2018-06-26 RX ADMIN — CILOSTAZOL 100 MILLIGRAM(S): 100 TABLET ORAL at 17:00

## 2018-06-26 RX ADMIN — SENNA PLUS 2 TABLET(S): 8.6 TABLET ORAL at 22:12

## 2018-06-26 RX ADMIN — INSULIN GLARGINE 15 UNIT(S): 100 INJECTION, SOLUTION SUBCUTANEOUS at 22:13

## 2018-06-26 RX ADMIN — Medication 81 MILLIGRAM(S): at 13:00

## 2018-06-26 RX ADMIN — NYSTATIN CREAM 1 APPLICATION(S): 100000 CREAM TOPICAL at 17:00

## 2018-06-26 RX ADMIN — Medication 100 MICROGRAM(S): at 05:29

## 2018-06-26 RX ADMIN — Medication 100 MILLIGRAM(S): at 22:12

## 2018-06-26 RX ADMIN — Medication 100 MILLIGRAM(S): at 13:00

## 2018-06-26 RX ADMIN — POLYETHYLENE GLYCOL 3350 17 GRAM(S): 17 POWDER, FOR SOLUTION ORAL at 17:03

## 2018-06-26 RX ADMIN — PANTOPRAZOLE SODIUM 40 MILLIGRAM(S): 20 TABLET, DELAYED RELEASE ORAL at 17:00

## 2018-06-26 RX ADMIN — LATANOPROST 1 DROP(S): 0.05 SOLUTION/ DROPS OPHTHALMIC; TOPICAL at 22:12

## 2018-06-26 RX ADMIN — Medication 1: at 18:02

## 2018-06-26 RX ADMIN — Medication 80 MILLIGRAM(S): at 05:30

## 2018-06-26 RX ADMIN — Medication 3 MILLIGRAM(S): at 22:12

## 2018-06-26 RX ADMIN — Medication 1: at 13:00

## 2018-06-26 RX ADMIN — WARFARIN SODIUM 5 MILLIGRAM(S): 2.5 TABLET ORAL at 17:00

## 2018-06-26 RX ADMIN — CILOSTAZOL 100 MILLIGRAM(S): 100 TABLET ORAL at 05:29

## 2018-06-26 RX ADMIN — SIMVASTATIN 20 MILLIGRAM(S): 20 TABLET, FILM COATED ORAL at 22:12

## 2018-06-26 RX ADMIN — PANTOPRAZOLE SODIUM 40 MILLIGRAM(S): 20 TABLET, DELAYED RELEASE ORAL at 05:30

## 2018-06-26 NOTE — PROGRESS NOTE ADULT - SUBJECTIVE AND OBJECTIVE BOX
Patient seen and examined at bedside.  Doing better.  Breathing improved.      Medications:  allopurinol 100 milliGRAM(s) Oral daily  aspirin enteric coated 81 milliGRAM(s) Oral daily  brimonidine 0.2% Ophthalmic Solution 1 Drop(s) Both EYES three times a day  carvedilol 25 milliGRAM(s) Oral every 12 hours  cilostazol 100 milliGRAM(s) Oral two times a day  dextrose 40% Gel 15 Gram(s) Oral once PRN  dextrose 5%. 1000 milliLiter(s) IV Continuous <Continuous>  dextrose 50% Injectable 12.5 Gram(s) IV Push once  dextrose 50% Injectable 25 Gram(s) IV Push once  dextrose 50% Injectable 25 Gram(s) IV Push once  dorzolamide 2%/timolol 0.5% Ophthalmic Solution 1 Drop(s) Both EYES two times a day  furosemide   Injectable 40 milliGRAM(s) IV Push two times a day  glucagon  Injectable 1 milliGRAM(s) IntraMuscular once PRN  insulin glargine Injectable (LANTUS) 15 Unit(s) SubCutaneous at bedtime  insulin lispro (HumaLOG) corrective regimen sliding scale   SubCutaneous three times a day before meals  insulin lispro (HumaLOG) corrective regimen sliding scale   SubCutaneous at bedtime  latanoprost 0.005% Ophthalmic Solution 1 Drop(s) Both EYES at bedtime  levothyroxine 100 MICROGram(s) Oral daily  simvastatin 20 milliGRAM(s) Oral at bedtime      PAST MEDICAL & SURGICAL HISTORY:  Personal history of PE (pulmonary embolism)  Glaucoma  PVD (peripheral vascular disease)  Hypothyroid  HTN (hypertension)  HLD (hyperlipidemia)  CHF (congestive heart failure)  DM (diabetes mellitus)  Afib  Elective surgery: abdominal abcess removals  History of partial thyroidectomy        Vitals:  T(F): 97.4 (06-15), Max: 97.6 (06-14)  HR: 61 (06-15) (58 - 67)  BP: 101/55 (06-15) (101/55 - 131/65)  RR: 18 (06-15)  SpO2: 100% (06-15)  I&O's Summary    14 Jun 2018 07:01  -  15 Jun 2018 07:00  --------------------------------------------------------  IN: 200 mL / OUT: 300 mL / NET: -100 mL        Physical Exam:  GENERAL: No acute distress, well-developed  HEAD:  Atraumatic, Normocephalic  ENT: EOMI, PERRLA, conjunctiva and sclera clear, Neck supple, JVP 8 cm H20; improved    CHEST/LUNG: Mild crackles w/ no wheeze/rhonchi; improved   BACK: No spinal tenderness  HEART: Irreg rhythm, S1 variable, unable to appreciate S2; III/VI systolic ejection murmur;   ABDOMEN: Soft, Nontender, Nondistended; Bowel sounds present  EXTREMITIES:  no BLE pitting edema; improved   PSYCH: Nl behavior, nl affect  NEUROLOGY: AAOx3, non-focal, cranial nerves intact  SKIN: Normal color, No rashes or lesions                          9.2    4.66  )-----------( 158      ( 15 Sonny 2018 07:00 )             30.5     06-15    143  |  103  |  65<H>  ----------------------------<  80  4.7   |  33<H>  |  2.59<H>    Ca    9.5      15 Sonny 2018 07:00  Phos  4.5     06-15  Mg     2.3     06-15    TPro  7.4  /  Alb  3.8  /  TBili  0.5  /  DBili  x   /  AST  20  /  ALT  20  /  AlkPhos  83  06-13    PT/INR - ( 13 Jun 2018 15:20 )   PT: 32.9 SEC;   INR: 2.80          PTT - ( 13 Jun 2018 15:20 )  PTT:37.3 SEC  CARDIAC MARKERS ( 14 Jun 2018 09:20 )  x     / x     / 37 u/L / 3.00 ng/mL / x          Serum Pro-Brain Natriuretic Peptide: 3380 pg/mL (06-14 @ 06:15)  Serum Pro-Brain Natriuretic Peptide: 2943 pg/mL (06-13 @ 17:19)    Interpretation of Telemetry:  afib, HR 60s-70s    Assessment     89 yo woman w/ hx of chronic afib/flutter (on coumadin), CHF? (unknown EF), DM, HTN, hypothyroidism who presented for syncope in the setting of chronic bifascicular block and slow Afib (HR 50s this morning).  HR improved w/ no recurrent syncope   Euvolemic. TTE w/ preserved LV function   Multiple discussions w/ family.  No PPM placement at this point      RECS  -on PO lasix   - cont holding AV jimy blockers; resume rest of home meds   - will set up w/ cardianet monitor upon discharge     A Aubrey Fuller EP

## 2018-06-26 NOTE — CHART NOTE - NSCHARTNOTEFT_GEN_A_CORE
Patient scheduled for appointment with Dr. Kushal Perdomo on 8/19/2018 at 11 am at [facility] outpatient EP clinic.    [facility] monitored will be mailed out to her address within a week after discharge.      Call [phone] for questions or concerns     A Aubrey CLIFFORD

## 2018-06-26 NOTE — PROGRESS NOTE ADULT - SUBJECTIVE AND OBJECTIVE BOX
Pt seen and examined at bedside  No acute events overnight. Appetite fair. Denies SOB.     Allergies:  No Known Allergies    Hospital Medications:   MEDICATIONS  (STANDING):  allopurinol 100 milliGRAM(s) Oral daily  aspirin enteric coated 81 milliGRAM(s) Oral daily  cilostazol 100 milliGRAM(s) Oral two times a day  dextrose 5%. 1000 milliLiter(s) (50 mL/Hr) IV Continuous <Continuous>  dextrose 50% Injectable 12.5 Gram(s) IV Push once  dextrose 50% Injectable 25 Gram(s) IV Push once  dextrose 50% Injectable 25 Gram(s) IV Push once  docusate sodium 100 milliGRAM(s) Oral three times a day  hydrALAZINE 25 milliGRAM(s) Oral every 12 hours  insulin glargine Injectable (LANTUS) 15 Unit(s) SubCutaneous at bedtime  insulin lispro (HumaLOG) corrective regimen sliding scale   SubCutaneous three times a day before meals  insulin lispro (HumaLOG) corrective regimen sliding scale   SubCutaneous at bedtime  latanoprost 0.005% Ophthalmic Solution 1 Drop(s) Both EYES at bedtime  levothyroxine 100 MICROGram(s) Oral daily  metolazone 10 milliGRAM(s) Oral daily  nystatin Powder 1 Application(s) Topical two times a day  pantoprazole    Tablet 40 milliGRAM(s) Oral two times a day  polyethylene glycol 3350 17 Gram(s) Oral two times a day  senna 2 Tablet(s) Oral at bedtime  simvastatin 20 milliGRAM(s) Oral at bedtime  warfarin 5 milliGRAM(s) Oral once    VITALS:  T(F): 98 (06-26-18 @ 05:26), Max: 98.2 (06-25-18 @ 21:43)  HR: 76 (06-26-18 @ 05:26)  BP: 129/65 (06-26-18 @ 05:26)  RR: 18 (06-26-18 @ 05:26)  SpO2: 100% (06-26-18 @ 05:26)  Wt(kg): --    06-25 @ 07:01  -  06-26 @ 07:00  --------------------------------------------------------  IN: 280 mL / OUT: 150 mL / NET: 130 mL    PHYSICAL EXAM:  Constitutional: NAD  HEENT: anicteric sclera, oropharynx clear  Neck: No JVD  Respiratory: CTAB, no wheezes, rales or rhonchi  Cardiovascular: S1, S2, RRR  Gastrointestinal: BS+, soft, NT/ND  Extremities: No cyanosis or clubbing. trace peripheral edema b/l   Neurological: A/O x 3, no focal deficits  Psychiatric: Normal mood, normal affect  : No CVA tenderness. No michaud.   Skin: chronic dermatitis changes    LABS:  06-26    141  |  89<L>  |  78<H>  ----------------------------<  140<H>  3.5   |  38<H>  |  3.57<H>    Ca    9.6      26 Jun 2018 06:02  Phos  3.2     06-25  Mg     2.1     06-26      Creatinine Trend: 3.57 <--, 2.84 <--, 2.38 <--, 2.36 <--, 2.19 <--, 2.23 <--, 2.45 <--                        10.9   6.64  )-----------( 161      ( 26 Jun 2018 06:02 )             35.0     Urine Studies:      RADIOLOGY & ADDITIONAL STUDIES:

## 2018-06-26 NOTE — PROGRESS NOTE ADULT - SUBJECTIVE AND OBJECTIVE BOX
S: No CP or SOB    MEDICATIONS  (STANDING):  allopurinol 100 milliGRAM(s) Oral daily  aspirin enteric coated 81 milliGRAM(s) Oral daily  cilostazol 100 milliGRAM(s) Oral two times a day  docusate sodium 100 milliGRAM(s) Oral three times a day  hydrALAZINE 25 milliGRAM(s) Oral every 12 hours  insulin glargine Injectable (LANTUS) 15 Unit(s) SubCutaneous at bedtime  insulin lispro (HumaLOG) corrective regimen sliding scale   SubCutaneous three times a day before meals  insulin lispro (HumaLOG) corrective regimen sliding scale   SubCutaneous at bedtime  latanoprost 0.005% Ophthalmic Solution 1 Drop(s) Both EYES at bedtime  levothyroxine 100 MICROGram(s) Oral daily  metolazone 10 milliGRAM(s) Oral daily  nystatin Powder 1 Application(s) Topical two times a day  pantoprazole    Tablet 40 milliGRAM(s) Oral two times a day  polyethylene glycol 3350 17 Gram(s) Oral two times a day  senna 2 Tablet(s) Oral at bedtime  simvastatin 20 milliGRAM(s) Oral at bedtime  warfarin 5 milliGRAM(s) Oral once    MEDICATIONS  (PRN):  dextrose 40% Gel 15 Gram(s) Oral once PRN Blood Glucose LESS THAN 70 milliGRAM(s)/deciliter  glucagon  Injectable 1 milliGRAM(s) IntraMuscular once PRN Glucose LESS THAN 70 milligrams/deciliter  melatonin 3 milliGRAM(s) Oral at bedtime PRN Insomnia    LABS:                        10.9   6.64  )-----------( 161      ( 26 Jun 2018 06:02 )             35.0    141  |  89<L>  |  78<H>  ----------------------------<  140<H>  3.5   |  38<H>  |  3.57<H>    Ca    9.6      26 Jun 2018 06:02  Phos  3.2     06-25  Mg     2.1     06-26    Creatinine Trend: 3.57<--, 2.84<--, 2.38<--, 2.36<--, 2.19<--, 2.23<--   PT/INR - ( 26 Jun 2018 06:02 )   PT: 22.3 SEC;   INR: 1.98       PHYSICAL EXAM  Vital Signs Last 24 Hrs  T(C): 36.7 (26 Jun 2018 05:26), Max: 36.8 (25 Jun 2018 21:43)  T(F): 98 (26 Jun 2018 05:26), Max: 98.2 (25 Jun 2018 21:43)  HR: 76 (26 Jun 2018 05:26) (72 - 78)  BP: 129/65 (26 Jun 2018 05:26) (117/54 - 145/57)  RR: 18 (26 Jun 2018 05:26) (18 - 18)  SpO2: 100% (26 Jun 2018 05:26) (94% - 100%)    Heart: normal S1, S2, IRR, no m/r/g  Lungs: cta b/l  Abd: soft nT, nD  Ext: 2+ edema in LE bilaterally     DATA:    TELEMETRY:  AF 	      ECG: < from: 12 Lead ECG (06.13.18 @ 14:56) >  Atrial fibrillation  Right bundle branch block  Left posterior fascicular block  *** Bifascicular block ***  Cannot rule out Inferior infarct , age undetermined  T wave abnormality, consider lateral ischemia  Abnormal ECG    < end of copied text >    < from: Transthoracic Echocardiogram (06.18.18 @ 10:33) >  CONCLUSIONS:  1. Mitral annular calcification, otherwise normal mitral  valve. Mild mitral regurgitation.  2. Severely dilated left atrium.  LA volume index = 69  cc/m2.  3. Normal left ventricular internal dimensions and wall  thicknesses.  4. Endocardium not well visualized; grossly normal left  ventricular systolic function.  5. Moderate right atrial enlargement.  6. Right ventricular enlargement with decreased right  ventricular systolic function.  7. Estimated right ventricular systolic pressure equals 53  mm Hg, assuming right atrial pressure equals 10 mm Hg,  consistent with moderate pulmonary hypertension.  8. Normal tricuspid valve.  Moderate-severe tricuspid  regurgitation.  *** No previous Echo exam.  ------------------------------------------------------------------------  Confirmed on  6/18/2018 - 12:27:29 by Lance Ferris M.D.    < end of copied text >      ASSESSMENT/PLAN: 	89 year old Female with history of Afib on AC, HTN, CKD admitted with syncope and volume overload c/w acute on chronic diastolic and Right sided CHF, with chronic bifasicular block and bradycardia, with pauses <3 seconds    --Family declined PPM  --Plan for OP event monitor per EP  --Pt  family does not want invasive cv procedures at this time, therefore conservative cardiac care and medical management recommended at this time, will avoid AVN blockers  --Volume overload resolving, noted with NIKHIL today.  Hold Lasix,  Renal F/U  --episode of melena, GI eval appreciated.  Guaiac neg   --On Coumadin, goal INR 2-3  --given elevated BP, avoid AVN blockers, c/w Hydralazine 25 BID.  (Pts daughter in law who is a physician prefers Hydralazine over ARB)    Kristina Conteh PA-C

## 2018-06-26 NOTE — PROGRESS NOTE ADULT - SUBJECTIVE AND OBJECTIVE BOX
INTERVAL HPI/OVERNIGHT EVENTS:    pt without abdominal complaints  passing flatus  bm overnight, nonbloody    MEDICATIONS  (STANDING):  allopurinol 100 milliGRAM(s) Oral daily  aspirin enteric coated 81 milliGRAM(s) Oral daily  cilostazol 100 milliGRAM(s) Oral two times a day  dextrose 5%. 1000 milliLiter(s) (50 mL/Hr) IV Continuous <Continuous>  dextrose 50% Injectable 12.5 Gram(s) IV Push once  dextrose 50% Injectable 25 Gram(s) IV Push once  dextrose 50% Injectable 25 Gram(s) IV Push once  docusate sodium 100 milliGRAM(s) Oral three times a day  hydrALAZINE 25 milliGRAM(s) Oral every 12 hours  insulin glargine Injectable (LANTUS) 15 Unit(s) SubCutaneous at bedtime  insulin lispro (HumaLOG) corrective regimen sliding scale   SubCutaneous three times a day before meals  insulin lispro (HumaLOG) corrective regimen sliding scale   SubCutaneous at bedtime  latanoprost 0.005% Ophthalmic Solution 1 Drop(s) Both EYES at bedtime  levothyroxine 100 MICROGram(s) Oral daily  metolazone 10 milliGRAM(s) Oral daily  nystatin Powder 1 Application(s) Topical two times a day  pantoprazole    Tablet 40 milliGRAM(s) Oral two times a day  polyethylene glycol 3350 17 Gram(s) Oral two times a day  senna 2 Tablet(s) Oral at bedtime  simvastatin 20 milliGRAM(s) Oral at bedtime  warfarin 5 milliGRAM(s) Oral once    MEDICATIONS  (PRN):  dextrose 40% Gel 15 Gram(s) Oral once PRN Blood Glucose LESS THAN 70 milliGRAM(s)/deciliter  glucagon  Injectable 1 milliGRAM(s) IntraMuscular once PRN Glucose LESS THAN 70 milligrams/deciliter  melatonin 3 milliGRAM(s) Oral at bedtime PRN Insomnia      Allergies    No Known Allergies    Intolerances        Review of Systems:    General:  No wt loss, fevers, chills, night sweats, fatigue   Eyes:  Good vision, no reported pain  ENT:  No sore throat, pain, runny nose, dysphagia  CV:  No pain, palpitations, hypo/hypertension  Resp:  No dyspnea, cough, tachypnea, wheezing  GI:  No pain, No nausea, No vomiting, No diarrhea, No constipation, No weight loss, No fever, No pruritis, No rectal bleeding, No melena, No dysphagia  :  No pain, bleeding, incontinence, nocturia  Muscle:  No pain, weakness  Neuro:  No weakness, tingling, memory problems  Psych:  No fatigue, insomnia, mood problems, depression  Endocrine:  No polyuria, polydypsia, cold/heat intolerance  Heme:  No petechiae, ecchymosis, easy bruisability  Skin:  No rash, tattoos, scars, edema      Vital Signs Last 24 Hrs  T(C): 36.7 (26 Jun 2018 05:26), Max: 36.8 (25 Jun 2018 21:43)  T(F): 98 (26 Jun 2018 05:26), Max: 98.2 (25 Jun 2018 21:43)  HR: 76 (26 Jun 2018 05:26) (72 - 78)  BP: 129/65 (26 Jun 2018 05:26) (117/54 - 145/57)  BP(mean): --  RR: 18 (26 Jun 2018 05:26) (18 - 18)  SpO2: 100% (26 Jun 2018 05:26) (94% - 100%)    PHYSICAL EXAM:    Constitutional: NAD  HEENT: EOMI, throat clear  Neck: No LAD, supple  Respiratory: CTA and P  Cardiovascular: S1 and S2, RRR, no M  Gastrointestinal: BS+, soft, NT/ND, neg HSM,  Extremities: No peripheral edema, neg clubbing, cyanosis  Vascular: 2+ peripheral pulses  Neurological: A/O x 3, no focal deficits  Psychiatric: Normal mood, normal affect  Skin: No rashes      LABS:                        10.9   6.64  )-----------( 161      ( 26 Jun 2018 06:02 )             35.0     06-26    141  |  89<L>  |  78<H>  ----------------------------<  140<H>  3.5   |  38<H>  |  3.57<H>    Ca    9.6      26 Jun 2018 06:02  Phos  3.2     06-25  Mg     2.1     06-26      PT/INR - ( 26 Jun 2018 06:02 )   PT: 22.3 SEC;   INR: 1.98                RADIOLOGY & ADDITIONAL TESTS:

## 2018-06-26 NOTE — PROGRESS NOTE ADULT - SUBJECTIVE AND OBJECTIVE BOX
INTERVAL HPI/OVERNIGHT EVENTS: No new concerns.   Vital Signs Last 24 Hrs  T(C): 36.7 (26 Jun 2018 12:48), Max: 36.8 (25 Jun 2018 21:43)  T(F): 98 (26 Jun 2018 12:48), Max: 98.2 (25 Jun 2018 21:43)  HR: 73 (26 Jun 2018 16:47) (73 - 83)  BP: 124/63 (26 Jun 2018 16:47) (117/54 - 129/65)  BP(mean): --  RR: 17 (26 Jun 2018 12:48) (17 - 18)  SpO2: 100% (26 Jun 2018 12:48) (96% - 100%)  I&O's Summary    25 Jun 2018 07:01  -  26 Jun 2018 07:00  --------------------------------------------------------  IN: 280 mL / OUT: 150 mL / NET: 130 mL      MEDICATIONS  (STANDING):  allopurinol 100 milliGRAM(s) Oral daily  aspirin enteric coated 81 milliGRAM(s) Oral daily  cilostazol 100 milliGRAM(s) Oral two times a day  dextrose 5%. 1000 milliLiter(s) (50 mL/Hr) IV Continuous <Continuous>  dextrose 50% Injectable 12.5 Gram(s) IV Push once  dextrose 50% Injectable 25 Gram(s) IV Push once  dextrose 50% Injectable 25 Gram(s) IV Push once  docusate sodium 100 milliGRAM(s) Oral three times a day  hydrALAZINE 25 milliGRAM(s) Oral every 12 hours  insulin glargine Injectable (LANTUS) 15 Unit(s) SubCutaneous at bedtime  insulin lispro (HumaLOG) corrective regimen sliding scale   SubCutaneous three times a day before meals  insulin lispro (HumaLOG) corrective regimen sliding scale   SubCutaneous at bedtime  latanoprost 0.005% Ophthalmic Solution 1 Drop(s) Both EYES at bedtime  levothyroxine 100 MICROGram(s) Oral daily  metolazone 10 milliGRAM(s) Oral daily  nystatin Powder 1 Application(s) Topical two times a day  pantoprazole    Tablet 40 milliGRAM(s) Oral two times a day  polyethylene glycol 3350 17 Gram(s) Oral two times a day  senna 2 Tablet(s) Oral at bedtime  simvastatin 20 milliGRAM(s) Oral at bedtime    MEDICATIONS  (PRN):  dextrose 40% Gel 15 Gram(s) Oral once PRN Blood Glucose LESS THAN 70 milliGRAM(s)/deciliter  glucagon  Injectable 1 milliGRAM(s) IntraMuscular once PRN Glucose LESS THAN 70 milligrams/deciliter  melatonin 3 milliGRAM(s) Oral at bedtime PRN Insomnia    LABS:                        10.9   6.64  )-----------( 161      ( 26 Jun 2018 06:02 )             35.0     06-26    141  |  89<L>  |  78<H>  ----------------------------<  140<H>  3.5   |  38<H>  |  3.57<H>    Ca    9.6      26 Jun 2018 06:02  Phos  3.2     06-25  Mg     2.1     06-26      PT/INR - ( 26 Jun 2018 06:02 )   PT: 22.3 SEC;   INR: 1.98              CAPILLARY BLOOD GLUCOSE      POCT Blood Glucose.: 173 mg/dL (26 Jun 2018 17:28)  POCT Blood Glucose.: 195 mg/dL (26 Jun 2018 12:45)  POCT Blood Glucose.: 141 mg/dL (26 Jun 2018 08:49)  POCT Blood Glucose.: 202 mg/dL (25 Jun 2018 21:59)          REVIEW OF SYSTEMS:  CONSTITUTIONAL: No fever, weight loss, or fatigue  EYES: No eye pain, visual disturbances, or discharge  ENMT:  No difficulty hearing, tinnitus, vertigo; No sinus or throat pain  NECK: No pain or stiffness  BREASTS: No pain, masses, or nipple discharge  RESPIRATORY: No cough, wheezing, chills or hemoptysis; No shortness of breath  CARDIOVASCULAR: No chest pain, palpitations, dizziness, or leg swelling  GASTROINTESTINAL: No abdominal or epigastric pain. No nausea, vomiting, or hematemesis; No diarrhea or constipation. No melena or hematochezia.  GENITOURINARY: No dysuria, frequency, hematuria, or incontinence  NEUROLOGICAL: No headaches, memory loss, loss of strength, numbness, or tremors  SKIN: No itching, burning, rashes, or lesions   LYMPH NODES: No enlarged glands  ENDOCRINE: No heat or cold intolerance; No hair loss  MUSCULOSKELETAL: No joint pain or swelling; No muscle, back, or extremity pain    Consultant(s) Notes Reviewed:  [x ] YES  [ ] NO    PHYSICAL EXAM:  GENERAL: NAD, Obese ,not in any distress ,  HEAD:  Atraumatic, Normocephalic  EYES: EOMI, PERRLA, conjunctiva and sclera clear  ENMT: No tonsillar erythema, exudates, or enlargement; Moist mucous membranes, Good dentition, No lesions  NECK: Supple, No JVD, Normal thyroid  NERVOUS SYSTEM:  Alert & Oriented X3, No focal deficit   CHEST/LUNG: Good air entry bilateral with basal   rales,  HEART: Regular rate and rhythm; No murmurs, rubs, or gallops  ABDOMEN: Soft, Nontender, Nondistended; Bowel sounds present  EXTREMITIES:  2+ Peripheral Pulses, No clubbing, cyanosis, but 1+ edema    Care Discussed with Consultants/Other Providers [ x] YES  [ ] NO

## 2018-06-27 LAB
BUN SERPL-MCNC: 91 MG/DL — HIGH (ref 7–23)
CALCIUM SERPL-MCNC: 9.4 MG/DL — SIGNIFICANT CHANGE UP (ref 8.4–10.5)
CHLORIDE SERPL-SCNC: 89 MMOL/L — LOW (ref 98–107)
CO2 SERPL-SCNC: 35 MMOL/L — HIGH (ref 22–31)
CREAT SERPL-MCNC: 3.29 MG/DL — HIGH (ref 0.5–1.3)
GLUCOSE BLDC GLUCOMTR-MCNC: 169 MG/DL — HIGH (ref 70–99)
GLUCOSE BLDC GLUCOMTR-MCNC: 189 MG/DL — HIGH (ref 70–99)
GLUCOSE BLDC GLUCOMTR-MCNC: 191 MG/DL — HIGH (ref 70–99)
GLUCOSE BLDC GLUCOMTR-MCNC: 95 MG/DL — SIGNIFICANT CHANGE UP (ref 70–99)
GLUCOSE SERPL-MCNC: 102 MG/DL — HIGH (ref 70–99)
HCT VFR BLD CALC: 33.4 % — LOW (ref 34.5–45)
HGB BLD-MCNC: 10.7 G/DL — LOW (ref 11.5–15.5)
INR BLD: 2.28 — HIGH (ref 0.88–1.17)
MCHC RBC-ENTMCNC: 29.5 PG — SIGNIFICANT CHANGE UP (ref 27–34)
MCHC RBC-ENTMCNC: 32 % — SIGNIFICANT CHANGE UP (ref 32–36)
MCV RBC AUTO: 92 FL — SIGNIFICANT CHANGE UP (ref 80–100)
NRBC # FLD: 0 — SIGNIFICANT CHANGE UP
PLATELET # BLD AUTO: 171 K/UL — SIGNIFICANT CHANGE UP (ref 150–400)
PMV BLD: 12.8 FL — SIGNIFICANT CHANGE UP (ref 7–13)
POTASSIUM SERPL-MCNC: SIGNIFICANT CHANGE UP MMOL/L (ref 3.5–5.3)
POTASSIUM SERPL-SCNC: SIGNIFICANT CHANGE UP MMOL/L (ref 3.5–5.3)
PROTHROM AB SERPL-ACNC: 26.7 SEC — HIGH (ref 9.8–13.1)
RBC # BLD: 3.63 M/UL — LOW (ref 3.8–5.2)
RBC # FLD: 15.9 % — HIGH (ref 10.3–14.5)
SODIUM SERPL-SCNC: 137 MMOL/L — SIGNIFICANT CHANGE UP (ref 135–145)
WBC # BLD: 6.13 K/UL — SIGNIFICANT CHANGE UP (ref 3.8–10.5)
WBC # FLD AUTO: 6.13 K/UL — SIGNIFICANT CHANGE UP (ref 3.8–10.5)

## 2018-06-27 PROCEDURE — 99232 SBSQ HOSP IP/OBS MODERATE 35: CPT

## 2018-06-27 RX ORDER — WARFARIN SODIUM 2.5 MG/1
5 TABLET ORAL ONCE
Qty: 0 | Refills: 0 | Status: COMPLETED | OUTPATIENT
Start: 2018-06-27 | End: 2018-06-27

## 2018-06-27 RX ADMIN — NYSTATIN CREAM 1 APPLICATION(S): 100000 CREAM TOPICAL at 17:25

## 2018-06-27 RX ADMIN — NYSTATIN CREAM 1 APPLICATION(S): 100000 CREAM TOPICAL at 06:09

## 2018-06-27 RX ADMIN — Medication 1: at 13:04

## 2018-06-27 RX ADMIN — Medication 3 MILLIGRAM(S): at 21:56

## 2018-06-27 RX ADMIN — INSULIN GLARGINE 15 UNIT(S): 100 INJECTION, SOLUTION SUBCUTANEOUS at 21:57

## 2018-06-27 RX ADMIN — LATANOPROST 1 DROP(S): 0.05 SOLUTION/ DROPS OPHTHALMIC; TOPICAL at 21:56

## 2018-06-27 RX ADMIN — WARFARIN SODIUM 5 MILLIGRAM(S): 2.5 TABLET ORAL at 17:25

## 2018-06-27 RX ADMIN — SIMVASTATIN 20 MILLIGRAM(S): 20 TABLET, FILM COATED ORAL at 21:56

## 2018-06-27 RX ADMIN — PANTOPRAZOLE SODIUM 40 MILLIGRAM(S): 20 TABLET, DELAYED RELEASE ORAL at 17:25

## 2018-06-27 RX ADMIN — CILOSTAZOL 100 MILLIGRAM(S): 100 TABLET ORAL at 17:25

## 2018-06-27 RX ADMIN — Medication 100 MILLIGRAM(S): at 13:04

## 2018-06-27 RX ADMIN — Medication 81 MILLIGRAM(S): at 13:04

## 2018-06-27 RX ADMIN — Medication 100 MILLIGRAM(S): at 06:09

## 2018-06-27 RX ADMIN — CILOSTAZOL 100 MILLIGRAM(S): 100 TABLET ORAL at 06:09

## 2018-06-27 RX ADMIN — Medication 25 MILLIGRAM(S): at 06:09

## 2018-06-27 RX ADMIN — POLYETHYLENE GLYCOL 3350 17 GRAM(S): 17 POWDER, FOR SOLUTION ORAL at 06:09

## 2018-06-27 RX ADMIN — PANTOPRAZOLE SODIUM 40 MILLIGRAM(S): 20 TABLET, DELAYED RELEASE ORAL at 06:09

## 2018-06-27 RX ADMIN — Medication 100 MICROGRAM(S): at 06:09

## 2018-06-27 RX ADMIN — Medication 25 MILLIGRAM(S): at 17:25

## 2018-06-27 RX ADMIN — Medication 1: at 17:34

## 2018-06-27 NOTE — PROGRESS NOTE ADULT - SUBJECTIVE AND OBJECTIVE BOX
Pt seen and examined at bedside  Pt sitting up in chair, without complaints.   No events overnight.     Allergies:  No Known Allergies    Hospital Medications:   MEDICATIONS  (STANDING):  allopurinol 100 milliGRAM(s) Oral daily  aspirin enteric coated 81 milliGRAM(s) Oral daily  cilostazol 100 milliGRAM(s) Oral two times a day  dextrose 5%. 1000 milliLiter(s) (50 mL/Hr) IV Continuous <Continuous>  dextrose 50% Injectable 12.5 Gram(s) IV Push once  dextrose 50% Injectable 25 Gram(s) IV Push once  dextrose 50% Injectable 25 Gram(s) IV Push once  docusate sodium 100 milliGRAM(s) Oral three times a day  hydrALAZINE 25 milliGRAM(s) Oral every 12 hours  insulin glargine Injectable (LANTUS) 15 Unit(s) SubCutaneous at bedtime  insulin lispro (HumaLOG) corrective regimen sliding scale   SubCutaneous three times a day before meals  insulin lispro (HumaLOG) corrective regimen sliding scale   SubCutaneous at bedtime  latanoprost 0.005% Ophthalmic Solution 1 Drop(s) Both EYES at bedtime  levothyroxine 100 MICROGram(s) Oral daily  nystatin Powder 1 Application(s) Topical two times a day  pantoprazole    Tablet 40 milliGRAM(s) Oral two times a day  polyethylene glycol 3350 17 Gram(s) Oral two times a day  senna 2 Tablet(s) Oral at bedtime  simvastatin 20 milliGRAM(s) Oral at bedtime  warfarin 5 milliGRAM(s) Oral once    VITALS:  T(F): 97.6 (06-27-18 @ 06:07), Max: 98 (06-26-18 @ 12:48)  HR: 79 (06-27-18 @ 06:07)  BP: 140/67 (06-27-18 @ 06:07)  RR: 18 (06-27-18 @ 06:07)  SpO2: 96% (06-27-18 @ 06:07)  Wt(kg): --    06-26 @ 07:01  -  06-27 @ 07:00  --------------------------------------------------------  IN: 200 mL / OUT: 0 mL / NET: 200 mL      PHYSICAL EXAM:  Constitutional: NAD  HEENT: anicteric sclera, oropharynx clear  Neck: No JVD  Respiratory: CTAB, no wheezes, rales or rhonchi  Cardiovascular: S1, S2, RRR  Gastrointestinal: BS+, soft, NT/ND  Extremities: No cyanosis or clubbing. trace peripheral edema b/l   Neurological: A/O x 3, no focal deficits  Psychiatric: Normal mood, normal affect  : No CVA tenderness. No michaud.   Skin: chronic dermatitis changes    LABS:  06-27    137  |  89<L>  |  91<H>  ----------------------------<  102<H>  Test not performed SPECIMEN GROSSLY HEMOLYZED   |  35<H>  |  3.29<H>    Ca    9.4      27 Jun 2018 06:00  Mg     2.1     06-26      Creatinine Trend: 3.29 <--, 3.57 <--, 2.84 <--, 2.38 <--, 2.36 <--, 2.19 <--, 2.23 <--                        10.7   6.13  )-----------( 171      ( 27 Jun 2018 06:00 )             33.4     Urine Studies:      RADIOLOGY & ADDITIONAL STUDIES:

## 2018-06-27 NOTE — PROGRESS NOTE ADULT - SUBJECTIVE AND OBJECTIVE BOX
INTERVAL HPI/OVERNIGHT EVENTS: No new concerns.   Vital Signs Last 24 Hrs  T(C): 36.4 (27 Jun 2018 12:19), Max: 36.5 (26 Jun 2018 19:26)  T(F): 97.5 (27 Jun 2018 12:19), Max: 97.7 (26 Jun 2018 19:26)  HR: 68 (27 Jun 2018 12:19) (68 - 80)  BP: 113/55 (27 Jun 2018 12:19) (113/55 - 140/67)  BP(mean): --  RR: 17 (27 Jun 2018 12:19) (17 - 18)  SpO2: 99% (27 Jun 2018 12:19) (96% - 100%)  I&O's Summary    26 Jun 2018 07:01  -  27 Jun 2018 07:00  --------------------------------------------------------  IN: 200 mL / OUT: 0 mL / NET: 200 mL    27 Jun 2018 07:01  -  27 Jun 2018 16:04  --------------------------------------------------------  IN: 240 mL / OUT: 150 mL / NET: 90 mL      MEDICATIONS  (STANDING):  allopurinol 100 milliGRAM(s) Oral daily  aspirin enteric coated 81 milliGRAM(s) Oral daily  cilostazol 100 milliGRAM(s) Oral two times a day  dextrose 5%. 1000 milliLiter(s) (50 mL/Hr) IV Continuous <Continuous>  dextrose 50% Injectable 12.5 Gram(s) IV Push once  dextrose 50% Injectable 25 Gram(s) IV Push once  dextrose 50% Injectable 25 Gram(s) IV Push once  docusate sodium 100 milliGRAM(s) Oral three times a day  hydrALAZINE 25 milliGRAM(s) Oral every 12 hours  insulin glargine Injectable (LANTUS) 15 Unit(s) SubCutaneous at bedtime  insulin lispro (HumaLOG) corrective regimen sliding scale   SubCutaneous three times a day before meals  insulin lispro (HumaLOG) corrective regimen sliding scale   SubCutaneous at bedtime  latanoprost 0.005% Ophthalmic Solution 1 Drop(s) Both EYES at bedtime  levothyroxine 100 MICROGram(s) Oral daily  nystatin Powder 1 Application(s) Topical two times a day  pantoprazole    Tablet 40 milliGRAM(s) Oral two times a day  polyethylene glycol 3350 17 Gram(s) Oral two times a day  senna 2 Tablet(s) Oral at bedtime  simvastatin 20 milliGRAM(s) Oral at bedtime  warfarin 5 milliGRAM(s) Oral once    MEDICATIONS  (PRN):  dextrose 40% Gel 15 Gram(s) Oral once PRN Blood Glucose LESS THAN 70 milliGRAM(s)/deciliter  glucagon  Injectable 1 milliGRAM(s) IntraMuscular once PRN Glucose LESS THAN 70 milligrams/deciliter  melatonin 3 milliGRAM(s) Oral at bedtime PRN Insomnia    LABS:                        10.7   6.13  )-----------( 171      ( 27 Jun 2018 06:00 )             33.4     06-27    137  |  89<L>  |  91<H>  ----------------------------<  102<H>  Test not performed SPECIMEN GROSSLY HEMOLYZED   |  35<H>  |  3.29<H>    Ca    9.4      27 Jun 2018 06:00  Mg     2.1     06-26      PT/INR - ( 27 Jun 2018 06:00 )   PT: 26.7 SEC;   INR: 2.28              CAPILLARY BLOOD GLUCOSE      POCT Blood Glucose.: 169 mg/dL (27 Jun 2018 12:36)  POCT Blood Glucose.: 95 mg/dL (27 Jun 2018 08:45)  POCT Blood Glucose.: 169 mg/dL (26 Jun 2018 21:47)  POCT Blood Glucose.: 173 mg/dL (26 Jun 2018 17:28)          REVIEW OF SYSTEMS:  CONSTITUTIONAL: No fever, weight loss, or fatigue  EYES: No eye pain, visual disturbances, or discharge  ENMT:  No difficulty hearing, tinnitus, vertigo; No sinus or throat pain  NECK: No pain or stiffness  BREASTS: No pain, masses, or nipple discharge  RESPIRATORY: No cough, wheezing, chills or hemoptysis; No shortness of breath  CARDIOVASCULAR: No chest pain, palpitations, dizziness, or leg swelling  GASTROINTESTINAL: No abdominal or epigastric pain. No nausea, vomiting, or hematemesis; No diarrhea or constipation. No melena or hematochezia.  GENITOURINARY: No dysuria, frequency, hematuria, or incontinence  NEUROLOGICAL: No headaches, memory loss, loss of strength, numbness, or tremors  SKIN: No itching, burning, rashes, or lesions   LYMPH NODES: No enlarged glands  ENDOCRINE: No heat or cold intolerance; No hair loss  MUSCULOSKELETAL: No joint pain or swelling; No muscle, back, or extremity pain  PSYCHIATRIC: No depression, anxiety, mood swings, or difficulty sleeping  HEME/LYMPH: No easy bruising, or bleeding gums  ALLERY AND IMMUNOLOGIC: No hives or eczema    RADIOLOGY & ADDITIONAL TESTS:    Consultant(s) Notes Reviewed:  [x ] YES  [ ] NO    PHYSICAL EXAM:  GENERAL: NAD, obese  not in any distress ,  HEAD:  Atraumatic, Normocephalic  EYES: EOMI, PERRLA, conjunctiva and sclera clear  ENMT: No tonsillar erythema, exudates, or enlargement; Moist mucous membranes, Good dentition, No lesions  NECK: Supple, No JVD, Normal thyroid  NERVOUS SYSTEM:  Alert & Oriented X3, No focal deficit   CHEST/LUNG: Good air entry bilateral with no  rales, rhonchi, wheezing, or rubs  HEART: Regular rate and rhythm; No murmurs, rubs, or gallops  ABDOMEN: Soft, Nontender, Nondistended; Bowel sounds present  EXTREMITIES:  2+ Peripheral Pulses, No clubbing, cyanosis, but  trace edema  SKIN: No rashes or lesions    Care Discussed with Consultants/Other Providers [ x] YES  [ ] NO

## 2018-06-27 NOTE — PROGRESS NOTE ADULT - SUBJECTIVE AND OBJECTIVE BOX
Patient seen and examined at bedside.  Doing better.  Breathing improved.      Medications:  allopurinol 100 milliGRAM(s) Oral daily  aspirin enteric coated 81 milliGRAM(s) Oral daily  brimonidine 0.2% Ophthalmic Solution 1 Drop(s) Both EYES three times a day  carvedilol 25 milliGRAM(s) Oral every 12 hours  cilostazol 100 milliGRAM(s) Oral two times a day  dextrose 40% Gel 15 Gram(s) Oral once PRN  dextrose 5%. 1000 milliLiter(s) IV Continuous <Continuous>  dextrose 50% Injectable 12.5 Gram(s) IV Push once  dextrose 50% Injectable 25 Gram(s) IV Push once  dextrose 50% Injectable 25 Gram(s) IV Push once  dorzolamide 2%/timolol 0.5% Ophthalmic Solution 1 Drop(s) Both EYES two times a day  furosemide   Injectable 40 milliGRAM(s) IV Push two times a day  glucagon  Injectable 1 milliGRAM(s) IntraMuscular once PRN  insulin glargine Injectable (LANTUS) 15 Unit(s) SubCutaneous at bedtime  insulin lispro (HumaLOG) corrective regimen sliding scale   SubCutaneous three times a day before meals  insulin lispro (HumaLOG) corrective regimen sliding scale   SubCutaneous at bedtime  latanoprost 0.005% Ophthalmic Solution 1 Drop(s) Both EYES at bedtime  levothyroxine 100 MICROGram(s) Oral daily  simvastatin 20 milliGRAM(s) Oral at bedtime      PAST MEDICAL & SURGICAL HISTORY:  Personal history of PE (pulmonary embolism)  Glaucoma  PVD (peripheral vascular disease)  Hypothyroid  HTN (hypertension)  HLD (hyperlipidemia)  CHF (congestive heart failure)  DM (diabetes mellitus)  Afib  Elective surgery: abdominal abcess removals  History of partial thyroidectomy        Vitals:  T(F): 97.4 (06-15), Max: 97.6 (06-14)  HR: 61 (06-15) (58 - 67)  BP: 101/55 (06-15) (101/55 - 131/65)  RR: 18 (06-15)  SpO2: 100% (06-15)  I&O's Summary    14 Jun 2018 07:01  -  15 Jun 2018 07:00  --------------------------------------------------------  IN: 200 mL / OUT: 300 mL / NET: -100 mL        Physical Exam:  GENERAL: No acute distress, well-developed  HEAD:  Atraumatic, Normocephalic  ENT: EOMI, PERRLA, conjunctiva and sclera clear, Neck supple, JVP 8 cm H20; improved    CHEST/LUNG: Mild crackles w/ no wheeze/rhonchi; improved   BACK: No spinal tenderness  HEART: Irreg rhythm, S1 variable, unable to appreciate S2; III/VI systolic ejection murmur;   ABDOMEN: Soft, Nontender, Nondistended; Bowel sounds present  EXTREMITIES:  no BLE pitting edema; improved   PSYCH: Nl behavior, nl affect  NEUROLOGY: AAOx3, non-focal, cranial nerves intact  SKIN: Normal color, No rashes or lesions                          9.2    4.66  )-----------( 158      ( 15 Sonny 2018 07:00 )             30.5     06-15    143  |  103  |  65<H>  ----------------------------<  80  4.7   |  33<H>  |  2.59<H>    Ca    9.5      15 Sonny 2018 07:00  Phos  4.5     06-15  Mg     2.3     06-15    TPro  7.4  /  Alb  3.8  /  TBili  0.5  /  DBili  x   /  AST  20  /  ALT  20  /  AlkPhos  83  06-13    PT/INR - ( 13 Jun 2018 15:20 )   PT: 32.9 SEC;   INR: 2.80          PTT - ( 13 Jun 2018 15:20 )  PTT:37.3 SEC  CARDIAC MARKERS ( 14 Jun 2018 09:20 )  x     / x     / 37 u/L / 3.00 ng/mL / x          Serum Pro-Brain Natriuretic Peptide: 3380 pg/mL (06-14 @ 06:15)  Serum Pro-Brain Natriuretic Peptide: 2943 pg/mL (06-13 @ 17:19)    Interpretation of Telemetry:  afib, HR 60s-70s    Assessment     88 yo woman w/ hx of chronic afib/flutter (on coumadin), CHF? (unknown EF), DM, HTN, hypothyroidism who presented for syncope in the setting of chronic bifascicular block and slow Afib (HR 50s this morning).  HR improved w/ no recurrent syncope   Euvolemic. TTE w/ preserved LV function   Multiple discussions w/ family.  No PPM placement at this point.  Spoke w/ family this morning and would prefer for patient to be discharged w/ cardionet monitor from [facility] to rehab.  Would like to speak w/ Dr. Perdomo about this.    Has outpatient EP appt at [facility] clinic on 8/19/2018     RECS  - cont holding AV jimy blockers; resume rest of home meds   - family will discuss w/ Dr. Perdomo later on today regarding cardianet monitor.      NICOLE Burgos MD   House [facility]

## 2018-06-27 NOTE — PROGRESS NOTE ADULT - SUBJECTIVE AND OBJECTIVE BOX
S: No CP or SOB      MEDICATIONS  (STANDING):  allopurinol 100 milliGRAM(s) Oral daily  aspirin enteric coated 81 milliGRAM(s) Oral daily  cilostazol 100 milliGRAM(s) Oral two times a day  dextrose 5%. 1000 milliLiter(s) (50 mL/Hr) IV Continuous <Continuous>  dextrose 50% Injectable 12.5 Gram(s) IV Push once  dextrose 50% Injectable 25 Gram(s) IV Push once  dextrose 50% Injectable 25 Gram(s) IV Push once  docusate sodium 100 milliGRAM(s) Oral three times a day  hydrALAZINE 25 milliGRAM(s) Oral every 12 hours  insulin glargine Injectable (LANTUS) 15 Unit(s) SubCutaneous at bedtime  insulin lispro (HumaLOG) corrective regimen sliding scale   SubCutaneous three times a day before meals  insulin lispro (HumaLOG) corrective regimen sliding scale   SubCutaneous at bedtime  latanoprost 0.005% Ophthalmic Solution 1 Drop(s) Both EYES at bedtime  levothyroxine 100 MICROGram(s) Oral daily  nystatin Powder 1 Application(s) Topical two times a day  pantoprazole    Tablet 40 milliGRAM(s) Oral two times a day  polyethylene glycol 3350 17 Gram(s) Oral two times a day  senna 2 Tablet(s) Oral at bedtime  simvastatin 20 milliGRAM(s) Oral at bedtime  warfarin 5 milliGRAM(s) Oral once    MEDICATIONS  (PRN):  dextrose 40% Gel 15 Gram(s) Oral once PRN Blood Glucose LESS THAN 70 milliGRAM(s)/deciliter  glucagon  Injectable 1 milliGRAM(s) IntraMuscular once PRN Glucose LESS THAN 70 milligrams/deciliter  melatonin 3 milliGRAM(s) Oral at bedtime PRN Insomnia      LABS:                        10.7   6.13  )-----------( 171      ( 27 Jun 2018 06:00 )             33.4     137  |  89<L>  |  91<H>  ----------------------------<  102<H>  Test not performed SPECIMEN GROSSLY HEMOLYZED   |  35<H>  |  3.29<H>    Ca    9.4      27 Jun 2018 06:00  Mg     2.1     06-26    Creatinine Trend: 3.29<--, 3.57<--, 2.84<--, 2.38<--, 2.36<--, 2.19<--   PT/INR - ( 27 Jun 2018 06:00 )   PT: 26.7 SEC;   INR: 2.28       PHYSICAL EXAM  Vital Signs Last 24 Hrs  T(C): 36.4 (27 Jun 2018 12:19), Max: 36.7 (26 Jun 2018 12:48)  T(F): 97.5 (27 Jun 2018 12:19), Max: 98 (26 Jun 2018 12:48)  HR: 68 (27 Jun 2018 12:19) (68 - 83)  BP: 113/55 (27 Jun 2018 12:19) (113/55 - 140/67)  RR: 17 (27 Jun 2018 12:19) (17 - 18)  SpO2: 99% (27 Jun 2018 12:19) (96% - 100%)    Heart: normal S1, S2, IRR, no m/r/g  Lungs: cta b/l  Abd: soft nT, nD  Ext: 2+ edema in LE bilaterally     DATA:    TELEMETRY:  AF 	      ECG: < from: 12 Lead ECG (06.13.18 @ 14:56) >  Atrial fibrillation  Right bundle branch block  Left posterior fascicular block  *** Bifascicular block ***  Cannot rule out Inferior infarct , age undetermined  T wave abnormality, consider lateral ischemia  Abnormal ECG    < end of copied text >    < from: Transthoracic Echocardiogram (06.18.18 @ 10:33) >  CONCLUSIONS:  1. Mitral annular calcification, otherwise normal mitral  valve. Mild mitral regurgitation.  2. Severely dilated left atrium.  LA volume index = 69  cc/m2.  3. Normal left ventricular internal dimensions and wall  thicknesses.  4. Endocardium not well visualized; grossly normal left  ventricular systolic function.  5. Moderate right atrial enlargement.  6. Right ventricular enlargement with decreased right  ventricular systolic function.  7. Estimated right ventricular systolic pressure equals 53  mm Hg, assuming right atrial pressure equals 10 mm Hg,  consistent with moderate pulmonary hypertension.  8. Normal tricuspid valve.  Moderate-severe tricuspid  regurgitation.  *** No previous Echo exam.  ------------------------------------------------------------------------  Confirmed on  6/18/2018 - 12:27:29 by Lance Ferris M.D.    < end of copied text >      ASSESSMENT/PLAN: 	89 year old Female with history of Afib on AC, HTN, CKD admitted with syncope and volume overload c/w acute on chronic diastolic and Right sided CHF, with chronic bifasicular block and bradycardia, with pauses <3 seconds    --Family declined PPM  --Plan for OP event monitor per EP  --Pt  family does not want invasive cv procedures at this time, therefore conservative cardiac care and medical management recommended at this time, will avoid AVN blockers  --Volume overload resolving, noted with NIKHIL today.  Hold Lasix again today, hopefully resume if stale in AM.  Renal F/U appreciated.  --episode of melena, GI eval appreciated.  Guaiac neg   --On Coumadin, goal INR 2-3  --given elevated BP, avoid AVN blockers, c/w Hydralazine 25 BID.  (Pts daughter in law who is a physician prefers Hydralazine over ARB)  --DC planning to [facility] in next 24-48 hours    Kristina Conteh PA-C

## 2018-06-27 NOTE — PROGRESS NOTE ADULT - SUBJECTIVE AND OBJECTIVE BOX
INTERVAL HPI/OVERNIGHT EVENTS:    family bedside  reports brown stools   no abd pain   no n/v    MEDICATIONS  (STANDING):  allopurinol 100 milliGRAM(s) Oral daily  aspirin enteric coated 81 milliGRAM(s) Oral daily  cilostazol 100 milliGRAM(s) Oral two times a day  dextrose 5%. 1000 milliLiter(s) (50 mL/Hr) IV Continuous <Continuous>  dextrose 50% Injectable 12.5 Gram(s) IV Push once  dextrose 50% Injectable 25 Gram(s) IV Push once  dextrose 50% Injectable 25 Gram(s) IV Push once  docusate sodium 100 milliGRAM(s) Oral three times a day  hydrALAZINE 25 milliGRAM(s) Oral every 12 hours  insulin glargine Injectable (LANTUS) 15 Unit(s) SubCutaneous at bedtime  insulin lispro (HumaLOG) corrective regimen sliding scale   SubCutaneous three times a day before meals  insulin lispro (HumaLOG) corrective regimen sliding scale   SubCutaneous at bedtime  latanoprost 0.005% Ophthalmic Solution 1 Drop(s) Both EYES at bedtime  levothyroxine 100 MICROGram(s) Oral daily  nystatin Powder 1 Application(s) Topical two times a day  pantoprazole    Tablet 40 milliGRAM(s) Oral two times a day  polyethylene glycol 3350 17 Gram(s) Oral two times a day  senna 2 Tablet(s) Oral at bedtime  simvastatin 20 milliGRAM(s) Oral at bedtime  warfarin 5 milliGRAM(s) Oral once    MEDICATIONS  (PRN):  dextrose 40% Gel 15 Gram(s) Oral once PRN Blood Glucose LESS THAN 70 milliGRAM(s)/deciliter  glucagon  Injectable 1 milliGRAM(s) IntraMuscular once PRN Glucose LESS THAN 70 milligrams/deciliter  melatonin 3 milliGRAM(s) Oral at bedtime PRN Insomnia      Allergies    No Known Allergies    Intolerances        Review of Systems:    General:  No wt loss, fevers, chills, night sweats, fatigue   Eyes:  Good vision, no reported pain  ENT:  No sore throat, pain, runny nose, dysphagia  CV:  No pain, palpitations, hypo/hypertension  Resp:  No dyspnea, cough, tachypnea, wheezing  GI:  No pain, No nausea, No vomiting, No diarrhea, No constipation, No weight loss, No fever, No pruritis, No rectal bleeding, No melena, No dysphagia  :  No pain, bleeding, incontinence, nocturia  Muscle:  No pain, weakness  Neuro:  No weakness, tingling, memory problems  Psych:  No fatigue, insomnia, mood problems, depression  Endocrine:  No polyuria, polydypsia, cold/heat intolerance  Heme:  No petechiae, ecchymosis, easy bruisability  Skin:  No rash, tattoos, scars, edema      Vital Signs Last 24 Hrs  T(C): 36.4 (27 Jun 2018 06:07), Max: 36.7 (26 Jun 2018 12:48)  T(F): 97.6 (27 Jun 2018 06:07), Max: 98 (26 Jun 2018 12:48)  HR: 79 (27 Jun 2018 06:07) (73 - 83)  BP: 140/67 (27 Jun 2018 06:07) (124/63 - 140/67)  BP(mean): --  RR: 18 (27 Jun 2018 06:07) (17 - 18)  SpO2: 96% (27 Jun 2018 06:07) (96% - 100%)    PHYSICAL EXAM:    Constitutional: NAD  HEENT: EOMI, throat clear  Neck: No LAD, supple  Respiratory: CTA and P  Cardiovascular: S1 and S2, RRR, no M  Gastrointestinal: BS+, soft, NT/ND, neg HSM,  Extremities: No peripheral edema, neg clubbing, cyanosis  Vascular: 2+ peripheral pulses  Neurological: A/O x 3, no focal deficits  Psychiatric: Normal mood, normal affect  Skin: No rashes      LABS:                        10.7   6.13  )-----------( 171      ( 27 Jun 2018 06:00 )             33.4     06-27    137  |  89<L>  |  91<H>  ----------------------------<  102<H>  Test not performed SPECIMEN GROSSLY HEMOLYZED   |  35<H>  |  3.29<H>    Ca    9.4      27 Jun 2018 06:00  Mg     2.1     06-26      PT/INR - ( 27 Jun 2018 06:00 )   PT: 26.7 SEC;   INR: 2.28                RADIOLOGY & ADDITIONAL TESTS:

## 2018-06-28 LAB
APTT BLD: 35.4 SEC — SIGNIFICANT CHANGE UP (ref 27.5–37.4)
BUN SERPL-MCNC: 98 MG/DL — HIGH (ref 7–23)
BUN SERPL-MCNC: 98 MG/DL — HIGH (ref 7–23)
CALCIUM SERPL-MCNC: 9.2 MG/DL — SIGNIFICANT CHANGE UP (ref 8.4–10.5)
CALCIUM SERPL-MCNC: 9.6 MG/DL — SIGNIFICANT CHANGE UP (ref 8.4–10.5)
CHLORIDE SERPL-SCNC: 89 MMOL/L — LOW (ref 98–107)
CHLORIDE SERPL-SCNC: 89 MMOL/L — LOW (ref 98–107)
CO2 SERPL-SCNC: 35 MMOL/L — HIGH (ref 22–31)
CO2 SERPL-SCNC: 36 MMOL/L — HIGH (ref 22–31)
CREAT SERPL-MCNC: 3.29 MG/DL — HIGH (ref 0.5–1.3)
CREAT SERPL-MCNC: 3.35 MG/DL — HIGH (ref 0.5–1.3)
GLUCOSE BLDC GLUCOMTR-MCNC: 117 MG/DL — HIGH (ref 70–99)
GLUCOSE BLDC GLUCOMTR-MCNC: 181 MG/DL — HIGH (ref 70–99)
GLUCOSE BLDC GLUCOMTR-MCNC: 213 MG/DL — HIGH (ref 70–99)
GLUCOSE BLDC GLUCOMTR-MCNC: 225 MG/DL — HIGH (ref 70–99)
GLUCOSE SERPL-MCNC: 113 MG/DL — HIGH (ref 70–99)
GLUCOSE SERPL-MCNC: 216 MG/DL — HIGH (ref 70–99)
HCT VFR BLD CALC: 33.7 % — LOW (ref 34.5–45)
HGB BLD-MCNC: 10.7 G/DL — LOW (ref 11.5–15.5)
INR BLD: 2.61 — HIGH (ref 0.88–1.17)
MCHC RBC-ENTMCNC: 28.8 PG — SIGNIFICANT CHANGE UP (ref 27–34)
MCHC RBC-ENTMCNC: 31.8 % — LOW (ref 32–36)
MCV RBC AUTO: 90.8 FL — SIGNIFICANT CHANGE UP (ref 80–100)
NRBC # FLD: 0 — SIGNIFICANT CHANGE UP
PLATELET # BLD AUTO: 176 K/UL — SIGNIFICANT CHANGE UP (ref 150–400)
PMV BLD: 12.5 FL — SIGNIFICANT CHANGE UP (ref 7–13)
POTASSIUM SERPL-MCNC: 2.9 MMOL/L — CRITICAL LOW (ref 3.5–5.3)
POTASSIUM SERPL-MCNC: 3.5 MMOL/L — SIGNIFICANT CHANGE UP (ref 3.5–5.3)
POTASSIUM SERPL-SCNC: 2.9 MMOL/L — CRITICAL LOW (ref 3.5–5.3)
POTASSIUM SERPL-SCNC: 3.5 MMOL/L — SIGNIFICANT CHANGE UP (ref 3.5–5.3)
PROTHROM AB SERPL-ACNC: 29.5 SEC — HIGH (ref 9.8–13.1)
RBC # BLD: 3.71 M/UL — LOW (ref 3.8–5.2)
RBC # FLD: 15.7 % — HIGH (ref 10.3–14.5)
SODIUM SERPL-SCNC: 138 MMOL/L — SIGNIFICANT CHANGE UP (ref 135–145)
SODIUM SERPL-SCNC: 139 MMOL/L — SIGNIFICANT CHANGE UP (ref 135–145)
WBC # BLD: 6.38 K/UL — SIGNIFICANT CHANGE UP (ref 3.8–10.5)
WBC # FLD AUTO: 6.38 K/UL — SIGNIFICANT CHANGE UP (ref 3.8–10.5)

## 2018-06-28 PROCEDURE — 99232 SBSQ HOSP IP/OBS MODERATE 35: CPT

## 2018-06-28 RX ORDER — POTASSIUM CHLORIDE 20 MEQ
40 PACKET (EA) ORAL EVERY 4 HOURS
Qty: 0 | Refills: 0 | Status: COMPLETED | OUTPATIENT
Start: 2018-06-28 | End: 2018-06-28

## 2018-06-28 RX ORDER — WARFARIN SODIUM 2.5 MG/1
4 TABLET ORAL ONCE
Qty: 0 | Refills: 0 | Status: COMPLETED | OUTPATIENT
Start: 2018-06-28 | End: 2018-06-28

## 2018-06-28 RX ADMIN — NYSTATIN CREAM 1 APPLICATION(S): 100000 CREAM TOPICAL at 17:07

## 2018-06-28 RX ADMIN — Medication 40 MILLIEQUIVALENT(S): at 11:45

## 2018-06-28 RX ADMIN — CILOSTAZOL 100 MILLIGRAM(S): 100 TABLET ORAL at 17:07

## 2018-06-28 RX ADMIN — Medication 25 MILLIGRAM(S): at 06:30

## 2018-06-28 RX ADMIN — Medication 1: at 17:56

## 2018-06-28 RX ADMIN — PANTOPRAZOLE SODIUM 40 MILLIGRAM(S): 20 TABLET, DELAYED RELEASE ORAL at 17:07

## 2018-06-28 RX ADMIN — NYSTATIN CREAM 1 APPLICATION(S): 100000 CREAM TOPICAL at 06:30

## 2018-06-28 RX ADMIN — Medication 40 MILLIEQUIVALENT(S): at 10:15

## 2018-06-28 RX ADMIN — INSULIN GLARGINE 15 UNIT(S): 100 INJECTION, SOLUTION SUBCUTANEOUS at 22:26

## 2018-06-28 RX ADMIN — PANTOPRAZOLE SODIUM 40 MILLIGRAM(S): 20 TABLET, DELAYED RELEASE ORAL at 06:32

## 2018-06-28 RX ADMIN — Medication 100 MILLIGRAM(S): at 11:46

## 2018-06-28 RX ADMIN — CILOSTAZOL 100 MILLIGRAM(S): 100 TABLET ORAL at 06:30

## 2018-06-28 RX ADMIN — Medication 25 MILLIGRAM(S): at 17:07

## 2018-06-28 RX ADMIN — WARFARIN SODIUM 4 MILLIGRAM(S): 2.5 TABLET ORAL at 17:07

## 2018-06-28 RX ADMIN — Medication 100 MICROGRAM(S): at 06:30

## 2018-06-28 RX ADMIN — SIMVASTATIN 20 MILLIGRAM(S): 20 TABLET, FILM COATED ORAL at 22:26

## 2018-06-28 RX ADMIN — LATANOPROST 1 DROP(S): 0.05 SOLUTION/ DROPS OPHTHALMIC; TOPICAL at 22:25

## 2018-06-28 RX ADMIN — Medication 2: at 13:25

## 2018-06-28 RX ADMIN — Medication 3 MILLIGRAM(S): at 22:25

## 2018-06-28 RX ADMIN — Medication 100 MILLIGRAM(S): at 06:30

## 2018-06-28 RX ADMIN — Medication 81 MILLIGRAM(S): at 11:46

## 2018-06-28 NOTE — PROVIDER CONTACT NOTE (CRITICAL VALUE NOTIFICATION) - ACTION/TREATMENT ORDERED:
Give PO K supplement as ordered, will continue to monitor
followed heparin nomogram as per protocol.
follow nomogram

## 2018-06-28 NOTE — PROGRESS NOTE ADULT - SUBJECTIVE AND OBJECTIVE BOX
Patient is seen and examined. Denies chest pain, SOB, palpitations or dizziness.      PAST MEDICAL & SURGICAL HISTORY:  Personal history of PE (pulmonary embolism)  Glaucoma  PVD (peripheral vascular disease)  Hypothyroid  HTN (hypertension)  HLD (hyperlipidemia)  CHF (congestive heart failure)  DM (diabetes mellitus)  Afib  Elective surgery: abdominal abcess removals  History of partial thyroidectomy      MEDICATIONS  (STANDING):  allopurinol 100 milliGRAM(s) Oral daily  aspirin enteric coated 81 milliGRAM(s) Oral daily  cilostazol 100 milliGRAM(s) Oral two times a day  dextrose 5%. 1000 milliLiter(s) (50 mL/Hr) IV Continuous <Continuous>  dextrose 50% Injectable 12.5 Gram(s) IV Push once  dextrose 50% Injectable 25 Gram(s) IV Push once  dextrose 50% Injectable 25 Gram(s) IV Push once  docusate sodium 100 milliGRAM(s) Oral three times a day  hydrALAZINE 25 milliGRAM(s) Oral every 12 hours  insulin glargine Injectable (LANTUS) 15 Unit(s) SubCutaneous at bedtime  insulin lispro (HumaLOG) corrective regimen sliding scale   SubCutaneous three times a day before meals  insulin lispro (HumaLOG) corrective regimen sliding scale   SubCutaneous at bedtime  latanoprost 0.005% Ophthalmic Solution 1 Drop(s) Both EYES at bedtime  levothyroxine 100 MICROGram(s) Oral daily  nystatin Powder 1 Application(s) Topical two times a day  pantoprazole    Tablet 40 milliGRAM(s) Oral two times a day  polyethylene glycol 3350 17 Gram(s) Oral two times a day  senna 2 Tablet(s) Oral at bedtime  simvastatin 20 milliGRAM(s) Oral at bedtime  warfarin 4 milliGRAM(s) Oral once    MEDICATIONS  (PRN):  dextrose 40% Gel 15 Gram(s) Oral once PRN Blood Glucose LESS THAN 70 milliGRAM(s)/deciliter  glucagon  Injectable 1 milliGRAM(s) IntraMuscular once PRN Glucose LESS THAN 70 milligrams/deciliter  melatonin 3 milliGRAM(s) Oral at bedtime PRN Insomnia      Vital Signs Last 24 Hrs  T(C): 36.5 (28 Jun 2018 12:45), Max: 36.7 (28 Jun 2018 06:26)  T(F): 97.7 (28 Jun 2018 12:45), Max: 98 (28 Jun 2018 06:26)  HR: 81 (28 Jun 2018 12:45) (71 - 81)  BP: 117/57 (28 Jun 2018 12:45) (117/57 - 137/68)  BP(mean): --  RR: 18 (28 Jun 2018 12:45) (18 - 18)  SpO2: 98% (28 Jun 2018 12:45) (96% - 98%)    INTERPRETATION OF TELEMETRY: Atrial fibrillation with VR 70s-80s      LABS:                        10.7   6.38  )-----------( 176      ( 28 Jun 2018 07:00 )             33.7     06-28    138  |  89<L>  |  98<H>  ----------------------------<  216<H>  3.5   |  36<H>  |  3.29<H>    Ca    9.6      28 Jun 2018 12:03          PT/INR - ( 28 Jun 2018 07:00 )   PT: 29.5 SEC;   INR: 2.61          PTT - ( 28 Jun 2018 07:00 )  PTT:35.4 SEC    I&O's Summary    27 Jun 2018 07:01  -  28 Jun 2018 07:00  --------------------------------------------------------  IN: 240 mL / OUT: 150 mL / NET: 90 mL    28 Jun 2018 07:01  -  28 Jun 2018 14:06  --------------------------------------------------------  IN: 360 mL / OUT: 0 mL / NET: 360 mL      BNP  RADIOLOGY & ADDITIONAL STUDIES:      PHYSICAL EXAM:    GENERAL: In no apparent distress, well nourished, and hydrated.  HEAD:  Atraumatic, Normocephalic  HEART: Irregular rate and rhythm; No murmurs, rubs, or gallops.  PULMONARY: Clear to auscultation and percussion.  No rales, wheezing, or rhonchi bilaterally.  ABDOMEN: Soft, Nontender, Nondistended; Bowel sounds present  EXTREMITIES:  2+ Peripheral Pulses, No clubbing, cyanosis, or edema

## 2018-06-28 NOTE — PROGRESS NOTE ADULT - SUBJECTIVE AND OBJECTIVE BOX
INTERVAL HPI/OVERNIGHT EVENTS: I feel fine.   Vital Signs Last 24 Hrs  T(C): 36.5 (28 Jun 2018 12:45), Max: 36.7 (28 Jun 2018 06:26)  T(F): 97.7 (28 Jun 2018 12:45), Max: 98 (28 Jun 2018 06:26)  HR: 75 (28 Jun 2018 16:43) (75 - 81)  BP: 133/61 (28 Jun 2018 16:43) (117/57 - 133/61)  BP(mean): --  RR: 18 (28 Jun 2018 12:45) (18 - 18)  SpO2: 98% (28 Jun 2018 12:45) (96% - 98%)  I&O's Summary    27 Jun 2018 07:01  -  28 Jun 2018 07:00  --------------------------------------------------------  IN: 240 mL / OUT: 150 mL / NET: 90 mL    28 Jun 2018 07:01  -  28 Jun 2018 17:38  --------------------------------------------------------  IN: 360 mL / OUT: 0 mL / NET: 360 mL      MEDICATIONS  (STANDING):  allopurinol 100 milliGRAM(s) Oral daily  aspirin enteric coated 81 milliGRAM(s) Oral daily  cilostazol 100 milliGRAM(s) Oral two times a day  dextrose 5%. 1000 milliLiter(s) (50 mL/Hr) IV Continuous <Continuous>  dextrose 50% Injectable 12.5 Gram(s) IV Push once  dextrose 50% Injectable 25 Gram(s) IV Push once  dextrose 50% Injectable 25 Gram(s) IV Push once  docusate sodium 100 milliGRAM(s) Oral three times a day  hydrALAZINE 25 milliGRAM(s) Oral every 12 hours  insulin glargine Injectable (LANTUS) 15 Unit(s) SubCutaneous at bedtime  insulin lispro (HumaLOG) corrective regimen sliding scale   SubCutaneous three times a day before meals  insulin lispro (HumaLOG) corrective regimen sliding scale   SubCutaneous at bedtime  latanoprost 0.005% Ophthalmic Solution 1 Drop(s) Both EYES at bedtime  levothyroxine 100 MICROGram(s) Oral daily  nystatin Powder 1 Application(s) Topical two times a day  pantoprazole    Tablet 40 milliGRAM(s) Oral two times a day  polyethylene glycol 3350 17 Gram(s) Oral two times a day  senna 2 Tablet(s) Oral at bedtime  simvastatin 20 milliGRAM(s) Oral at bedtime    MEDICATIONS  (PRN):  dextrose 40% Gel 15 Gram(s) Oral once PRN Blood Glucose LESS THAN 70 milliGRAM(s)/deciliter  glucagon  Injectable 1 milliGRAM(s) IntraMuscular once PRN Glucose LESS THAN 70 milligrams/deciliter  melatonin 3 milliGRAM(s) Oral at bedtime PRN Insomnia    LABS:                        10.7   6.38  )-----------( 176      ( 28 Jun 2018 07:00 )             33.7     06-28    138  |  89<L>  |  98<H>  ----------------------------<  216<H>  3.5   |  36<H>  |  3.29<H>    Ca    9.6      28 Jun 2018 12:03      PT/INR - ( 28 Jun 2018 07:00 )   PT: 29.5 SEC;   INR: 2.61          PTT - ( 28 Jun 2018 07:00 )  PTT:35.4 SEC    CAPILLARY BLOOD GLUCOSE      POCT Blood Glucose.: 181 mg/dL (28 Jun 2018 17:29)  POCT Blood Glucose.: 213 mg/dL (28 Jun 2018 12:44)  POCT Blood Glucose.: 117 mg/dL (28 Jun 2018 08:32)  POCT Blood Glucose.: 191 mg/dL (27 Jun 2018 21:39)          REVIEW OF SYSTEMS:  CONSTITUTIONAL: No fever, weight loss, or fatigue  EYES: No eye pain, visual disturbances, or discharge  ENMT:  No difficulty hearing, tinnitus, vertigo; No sinus or throat pain  NECK: No pain or stiffness  BREASTS: No pain, masses, or nipple discharge  RESPIRATORY: No cough, wheezing, chills or hemoptysis; No shortness of breath  CARDIOVASCULAR: No chest pain, palpitations, dizziness, or leg swelling  GASTROINTESTINAL: No abdominal or epigastric pain. No nausea, vomiting, or hematemesis; No diarrhea or constipation. No melena or hematochezia.  GENITOURINARY: No dysuria, frequency, hematuria, or incontinence  NEUROLOGICAL: No headaches, memory loss, loss of strength, numbness, or tremors  SKIN: No itching, burning, rashes, or lesions   LYMPH NODES: No enlarged glands  ENDOCRINE: No heat or cold intolerance; No hair loss  MUSCULOSKELETAL: No joint pain or swelling; No muscle, back, or extremity pain    Consultant(s) Notes Reviewed:  [x ] YES  [ ] NO    PHYSICAL EXAM:  GENERAL: NAD, well-groomed, well-developed,not in any distress ,  HEAD:  Atraumatic, Normocephalic  EYES: EOMI, PERRLA, conjunctiva and sclera clear  ENMT: No tonsillar erythema, exudates, or enlargement; Moist mucous membranes, Good dentition, No lesions  NECK: Supple, No JVD, Normal thyroid  NERVOUS SYSTEM:  Alert & Oriented X3, No focal deficit   CHEST/LUNG: Good air entry bilateral with no  rales, rhonchi, wheezing, or rubs  HEART: Regular rate and rhythm; No murmurs, rubs, or gallops  ABDOMEN: Soft, Nontender, Nondistended; Bowel sounds present  EXTREMITIES:  2+ Peripheral Pulses, No clubbing, cyanosis, or edema  SKIN: No rashes or lesions    Care Discussed with Consultants/Other Providers [ x] YES  [ ] NO

## 2018-06-28 NOTE — PROGRESS NOTE ADULT - SUBJECTIVE AND OBJECTIVE BOX
INTERVAL HPI/OVERNIGHT EVENTS:    denies n/v/d/c, abdominal pain, melena or brbpr     MEDICATIONS  (STANDING):  allopurinol 100 milliGRAM(s) Oral daily  aspirin enteric coated 81 milliGRAM(s) Oral daily  cilostazol 100 milliGRAM(s) Oral two times a day  dextrose 5%. 1000 milliLiter(s) (50 mL/Hr) IV Continuous <Continuous>  dextrose 50% Injectable 12.5 Gram(s) IV Push once  dextrose 50% Injectable 25 Gram(s) IV Push once  dextrose 50% Injectable 25 Gram(s) IV Push once  docusate sodium 100 milliGRAM(s) Oral three times a day  hydrALAZINE 25 milliGRAM(s) Oral every 12 hours  insulin glargine Injectable (LANTUS) 15 Unit(s) SubCutaneous at bedtime  insulin lispro (HumaLOG) corrective regimen sliding scale   SubCutaneous three times a day before meals  insulin lispro (HumaLOG) corrective regimen sliding scale   SubCutaneous at bedtime  latanoprost 0.005% Ophthalmic Solution 1 Drop(s) Both EYES at bedtime  levothyroxine 100 MICROGram(s) Oral daily  nystatin Powder 1 Application(s) Topical two times a day  pantoprazole    Tablet 40 milliGRAM(s) Oral two times a day  polyethylene glycol 3350 17 Gram(s) Oral two times a day  potassium chloride    Tablet ER 40 milliEquivalent(s) Oral every 4 hours  senna 2 Tablet(s) Oral at bedtime  simvastatin 20 milliGRAM(s) Oral at bedtime  warfarin 4 milliGRAM(s) Oral once    MEDICATIONS  (PRN):  dextrose 40% Gel 15 Gram(s) Oral once PRN Blood Glucose LESS THAN 70 milliGRAM(s)/deciliter  glucagon  Injectable 1 milliGRAM(s) IntraMuscular once PRN Glucose LESS THAN 70 milligrams/deciliter  melatonin 3 milliGRAM(s) Oral at bedtime PRN Insomnia      Allergies    No Known Allergies    Intolerances        Review of Systems:    General:  No wt loss, fevers, chills, night sweats, fatigue   Eyes:  Good vision, no reported pain  ENT:  No sore throat, pain, runny nose, dysphagia  CV:  No pain, palpitations, hypo/hypertension  Resp:  No dyspnea, cough, tachypnea, wheezing  GI:  No pain, No nausea, No vomiting, No diarrhea, No constipation, No weight loss, No fever, No pruritis, No rectal bleeding, No melena, No dysphagia  :  No pain, bleeding, incontinence, nocturia  Muscle:  No pain, weakness  Neuro:  No weakness, tingling, memory problems  Psych:  No fatigue, insomnia, mood problems, depression  Endocrine:  No polyuria, polydypsia, cold/heat intolerance  Heme:  No petechiae, ecchymosis, easy bruisability  Skin:  No rash, tattoos, scars, edema      Vital Signs Last 24 Hrs  T(C): 36.7 (28 Jun 2018 06:26), Max: 36.7 (28 Jun 2018 06:26)  T(F): 98 (28 Jun 2018 06:26), Max: 98 (28 Jun 2018 06:26)  HR: 75 (28 Jun 2018 06:26) (68 - 76)  BP: 119/60 (28 Jun 2018 06:26) (113/55 - 137/68)  BP(mean): --  RR: 18 (28 Jun 2018 06:26) (17 - 18)  SpO2: 96% (28 Jun 2018 06:26) (96% - 99%)    PHYSICAL EXAM:    Constitutional: NAD  HEENT: EOMI, throat clear  Neck: No LAD, supple  Respiratory: CTA and P  Cardiovascular: S1 and S2, RRR, no M  Gastrointestinal: BS+, soft, NT/ND, neg HSM,  Extremities: No peripheral edema, neg clubbing, cyanosis  Vascular: 2+ peripheral pulses  Neurological: A/O x 3, no focal deficits  Psychiatric: Normal mood, normal affect  Skin: No rashes      LABS:                        10.7   6.38  )-----------( 176      ( 28 Jun 2018 07:00 )             33.7     06-28    139  |  89<L>  |  98<H>  ----------------------------<  113<H>  2.9<LL>   |  35<H>  |  3.35<H>    Ca    9.2      28 Jun 2018 07:00      PT/INR - ( 28 Jun 2018 07:00 )   PT: 29.5 SEC;   INR: 2.61          PTT - ( 28 Jun 2018 07:00 )  PTT:35.4 SEC      RADIOLOGY & ADDITIONAL TESTS:

## 2018-06-28 NOTE — PROGRESS NOTE ADULT - SUBJECTIVE AND OBJECTIVE BOX
S: No CP or SOB    MEDICATIONS  (STANDING):  allopurinol 100 milliGRAM(s) Oral daily  aspirin enteric coated 81 milliGRAM(s) Oral daily  cilostazol 100 milliGRAM(s) Oral two times a day  docusate sodium 100 milliGRAM(s) Oral three times a day  hydrALAZINE 25 milliGRAM(s) Oral every 12 hours  insulin glargine Injectable (LANTUS) 15 Unit(s) SubCutaneous at bedtime  insulin lispro (HumaLOG) corrective regimen sliding scale   SubCutaneous three times a day before meals  insulin lispro (HumaLOG) corrective regimen sliding scale   SubCutaneous at bedtime  latanoprost 0.005% Ophthalmic Solution 1 Drop(s) Both EYES at bedtime  levothyroxine 100 MICROGram(s) Oral daily  nystatin Powder 1 Application(s) Topical two times a day  pantoprazole    Tablet 40 milliGRAM(s) Oral two times a day  polyethylene glycol 3350 17 Gram(s) Oral two times a day  senna 2 Tablet(s) Oral at bedtime  simvastatin 20 milliGRAM(s) Oral at bedtime  warfarin 4 milliGRAM(s) Oral once    MEDICATIONS  (PRN):  dextrose 40% Gel 15 Gram(s) Oral once PRN Blood Glucose LESS THAN 70 milliGRAM(s)/deciliter  glucagon  Injectable 1 milliGRAM(s) IntraMuscular once PRN Glucose LESS THAN 70 milligrams/deciliter  melatonin 3 milliGRAM(s) Oral at bedtime PRN Insomnia    LABS:                        10.7   6.38  )-----------( 176      ( 28 Jun 2018 07:00 )             33.7    139  |  89<L>  |  98<H>  ----------------------------<  113<H>  2.9<LL>   |  35<H>  |  3.35<H>    Ca    9.2      28 Jun 2018 07:00    Creatinine Trend: 3.35<--, 3.29<--, 3.57<--, 2.84<--, 2.38<--, 2.36<--   PT/INR - ( 28 Jun 2018 07:00 )   PT: 29.5 SEC;   INR: 2.61     PTT - ( 28 Jun 2018 07:00 )  PTT:35.4 SEC    PHYSICAL EXAM  Vital Signs Last 24 Hrs  T(C): 36.7 (28 Jun 2018 06:26), Max: 36.7 (28 Jun 2018 06:26)  T(F): 98 (28 Jun 2018 06:26), Max: 98 (28 Jun 2018 06:26)  HR: 75 (28 Jun 2018 06:26) (68 - 76)  BP: 119/60 (28 Jun 2018 06:26) (113/55 - 137/68)  RR: 18 (28 Jun 2018 06:26) (17 - 18)  SpO2: 96% (28 Jun 2018 06:26) (96% - 99%)    Heart: normal S1, S2, IRR, no m/r/g  Lungs: cta b/l  Abd: soft nT, nD  Ext: 2+ edema in LE bilaterally     DATA:    TELEMETRY:  AF 	      ECG: < from: 12 Lead ECG (06.13.18 @ 14:56) >  Atrial fibrillation  Right bundle branch block  Left posterior fascicular block  *** Bifascicular block ***  Cannot rule out Inferior infarct , age undetermined  T wave abnormality, consider lateral ischemia  Abnormal ECG    < end of copied text >    < from: Transthoracic Echocardiogram (06.18.18 @ 10:33) >  CONCLUSIONS:  1. Mitral annular calcification, otherwise normal mitral  valve. Mild mitral regurgitation.  2. Severely dilated left atrium.  LA volume index = 69  cc/m2.  3. Normal left ventricular internal dimensions and wall  thicknesses.  4. Endocardium not well visualized; grossly normal left  ventricular systolic function.  5. Moderate right atrial enlargement.  6. Right ventricular enlargement with decreased right  ventricular systolic function.  7. Estimated right ventricular systolic pressure equals 53  mm Hg, assuming right atrial pressure equals 10 mm Hg,  consistent with moderate pulmonary hypertension.  8. Normal tricuspid valve.  Moderate-severe tricuspid  regurgitation.  *** No previous Echo exam.  ------------------------------------------------------------------------  Confirmed on  6/18/2018 - 12:27:29 by Lance Ferris M.D.    < end of copied text >      ASSESSMENT/PLAN: 	89 year old Female with history of Afib on AC, HTN, CKD admitted with syncope and volume overload c/w acute on chronic diastolic and Right sided CHF, with chronic bifasicular block and bradycardia, with pauses <3 seconds    --Family declined PPM  --Plan for OP event monitor per EP  --Pt  family does not want invasive cv procedures at this time, therefore conservative cardiac care and medical management recommended at this time, will avoid AVN blockers  --Lasix held for NIKHIL  --Today with Hypokalemia, repleted, f/u repeat BMP  --Need to D/W renal regarding when to resume PO lasix and Supple K]  --DC planning to [facility] pending consultants f/U  --On Coumadin, goal INR 2-3  --given elevated BP, avoid AVN blockers, c/w Hydralazine 25 BID.  (Pts daughter in law who is a physician prefers Hydralazine over ARB)      Kristina Conteh PA-C

## 2018-06-28 NOTE — PROGRESS NOTE ADULT - SUBJECTIVE AND OBJECTIVE BOX
Pt seen and examined at bedside  Pt feeling well, eating most of her meals. Denies SOB or PFEIFFER.   No urinary complaints.     Allergies:  No Known Allergies    Hospital Medications:   MEDICATIONS  (STANDING):  allopurinol 100 milliGRAM(s) Oral daily  aspirin enteric coated 81 milliGRAM(s) Oral daily  cilostazol 100 milliGRAM(s) Oral two times a day  dextrose 5%. 1000 milliLiter(s) (50 mL/Hr) IV Continuous <Continuous>  dextrose 50% Injectable 12.5 Gram(s) IV Push once  dextrose 50% Injectable 25 Gram(s) IV Push once  dextrose 50% Injectable 25 Gram(s) IV Push once  docusate sodium 100 milliGRAM(s) Oral three times a day  hydrALAZINE 25 milliGRAM(s) Oral every 12 hours  insulin glargine Injectable (LANTUS) 15 Unit(s) SubCutaneous at bedtime  insulin lispro (HumaLOG) corrective regimen sliding scale   SubCutaneous three times a day before meals  insulin lispro (HumaLOG) corrective regimen sliding scale   SubCutaneous at bedtime  latanoprost 0.005% Ophthalmic Solution 1 Drop(s) Both EYES at bedtime  levothyroxine 100 MICROGram(s) Oral daily  nystatin Powder 1 Application(s) Topical two times a day  pantoprazole    Tablet 40 milliGRAM(s) Oral two times a day  polyethylene glycol 3350 17 Gram(s) Oral two times a day  senna 2 Tablet(s) Oral at bedtime  simvastatin 20 milliGRAM(s) Oral at bedtime  warfarin 4 milliGRAM(s) Oral once    VITALS:  T(F): 97.7 (06-28-18 @ 12:45), Max: 98 (06-28-18 @ 06:26)  HR: 81 (06-28-18 @ 12:45)  BP: 117/57 (06-28-18 @ 12:45)  RR: 18 (06-28-18 @ 12:45)  SpO2: 98% (06-28-18 @ 12:45)  Wt(kg): --    06-27 @ 07:01  -  06-28 @ 07:00  --------------------------------------------------------  IN: 240 mL / OUT: 150 mL / NET: 90 mL    06-28 @ 07:01  -  06-28 @ 15:22  --------------------------------------------------------  IN: 360 mL / OUT: 0 mL / NET: 360 mL    PHYSICAL EXAM:  Constitutional: NAD  HEENT: anicteric sclera, oropharynx clear, MMM  Neck: No JVD  Respiratory: CTAB, no wheezes, rales or rhonchi  Cardiovascular: S1, S2, RRR  Gastrointestinal: BS+, soft, NT/ND  Extremities: No cyanosis or clubbing. No peripheral edema  Neurological: A/O x 3, no focal deficits  Psychiatric: Normal mood, normal affect  : No CVA tenderness. No michaud.   Skin: No rashes    LABS:  06-28    138  |  89<L>  |  98<H>  ----------------------------<  216<H>  3.5   |  36<H>  |  3.29<H>    Ca    9.6      28 Jun 2018 12:03      Creatinine Trend: 3.29 <--, 3.35 <--, 3.29 <--, 3.57 <--, 2.84 <--, 2.38 <--, 2.36 <--, 2.19 <--                        10.7   6.38  )-----------( 176      ( 28 Jun 2018 07:00 )             33.7     Urine Studies:      RADIOLOGY & ADDITIONAL STUDIES:

## 2018-06-29 ENCOUNTER — TRANSCRIPTION ENCOUNTER (OUTPATIENT)
Age: 83
End: 2018-06-29

## 2018-06-29 VITALS
RESPIRATION RATE: 18 BRPM | HEART RATE: 75 BPM | OXYGEN SATURATION: 95 % | DIASTOLIC BLOOD PRESSURE: 52 MMHG | SYSTOLIC BLOOD PRESSURE: 120 MMHG

## 2018-06-29 LAB
APTT BLD: 34.7 SEC — SIGNIFICANT CHANGE UP (ref 27.5–37.4)
BUN SERPL-MCNC: 97 MG/DL — HIGH (ref 7–23)
CALCIUM SERPL-MCNC: 9.5 MG/DL — SIGNIFICANT CHANGE UP (ref 8.4–10.5)
CHLORIDE SERPL-SCNC: 92 MMOL/L — LOW (ref 98–107)
CO2 SERPL-SCNC: 34 MMOL/L — HIGH (ref 22–31)
CREAT SERPL-MCNC: 3.23 MG/DL — HIGH (ref 0.5–1.3)
GLUCOSE BLDC GLUCOMTR-MCNC: 101 MG/DL — HIGH (ref 70–99)
GLUCOSE BLDC GLUCOMTR-MCNC: 174 MG/DL — HIGH (ref 70–99)
GLUCOSE SERPL-MCNC: 112 MG/DL — HIGH (ref 70–99)
HCT VFR BLD CALC: 31.9 % — LOW (ref 34.5–45)
HGB BLD-MCNC: 10.8 G/DL — LOW (ref 11.5–15.5)
INR BLD: 2.49 — HIGH (ref 0.88–1.17)
MAGNESIUM SERPL-MCNC: 2.3 MG/DL — SIGNIFICANT CHANGE UP (ref 1.6–2.6)
MCHC RBC-ENTMCNC: 29.3 PG — SIGNIFICANT CHANGE UP (ref 27–34)
MCHC RBC-ENTMCNC: 33.9 % — SIGNIFICANT CHANGE UP (ref 32–36)
MCV RBC AUTO: 86.7 FL — SIGNIFICANT CHANGE UP (ref 80–100)
NRBC # FLD: 0 — SIGNIFICANT CHANGE UP
PLATELET # BLD AUTO: 159 K/UL — SIGNIFICANT CHANGE UP (ref 150–400)
PMV BLD: 13 FL — SIGNIFICANT CHANGE UP (ref 7–13)
POTASSIUM SERPL-MCNC: 3.3 MMOL/L — LOW (ref 3.5–5.3)
POTASSIUM SERPL-SCNC: 3.3 MMOL/L — LOW (ref 3.5–5.3)
PROTHROM AB SERPL-ACNC: 29.2 SEC — HIGH (ref 9.8–13.1)
RBC # BLD: 3.68 M/UL — LOW (ref 3.8–5.2)
RBC # FLD: 15.9 % — HIGH (ref 10.3–14.5)
SODIUM SERPL-SCNC: 139 MMOL/L — SIGNIFICANT CHANGE UP (ref 135–145)
WBC # BLD: 6.79 K/UL — SIGNIFICANT CHANGE UP (ref 3.8–10.5)
WBC # FLD AUTO: 6.79 K/UL — SIGNIFICANT CHANGE UP (ref 3.8–10.5)

## 2018-06-29 PROCEDURE — 99232 SBSQ HOSP IP/OBS MODERATE 35: CPT

## 2018-06-29 RX ORDER — WARFARIN SODIUM 2.5 MG/1
1 TABLET ORAL
Qty: 10 | Refills: 0 | OUTPATIENT
Start: 2018-06-29 | End: 2018-07-08

## 2018-06-29 RX ORDER — CARVEDILOL PHOSPHATE 80 MG/1
1 CAPSULE, EXTENDED RELEASE ORAL
Qty: 0 | Refills: 0 | COMMUNITY

## 2018-06-29 RX ORDER — SENNA PLUS 8.6 MG/1
2 TABLET ORAL
Qty: 0 | Refills: 0 | DISCHARGE
Start: 2018-06-29

## 2018-06-29 RX ORDER — NYSTATIN CREAM 100000 [USP'U]/G
1 CREAM TOPICAL
Qty: 0 | Refills: 0 | DISCHARGE
Start: 2018-06-29

## 2018-06-29 RX ORDER — WARFARIN SODIUM 2.5 MG/1
1 TABLET ORAL
Qty: 0 | Refills: 0 | COMMUNITY

## 2018-06-29 RX ORDER — PANTOPRAZOLE SODIUM 20 MG/1
40 TABLET, DELAYED RELEASE ORAL
Qty: 0 | Refills: 0 | Status: DISCONTINUED | OUTPATIENT
Start: 2018-06-29 | End: 2018-06-29

## 2018-06-29 RX ORDER — POTASSIUM CHLORIDE 20 MEQ
20 PACKET (EA) ORAL ONCE
Qty: 0 | Refills: 0 | Status: COMPLETED | OUTPATIENT
Start: 2018-06-29 | End: 2018-06-29

## 2018-06-29 RX ORDER — HYDRALAZINE HCL 50 MG
1 TABLET ORAL
Qty: 60 | Refills: 0
Start: 2018-06-29 | End: 2018-07-28

## 2018-06-29 RX ORDER — FUROSEMIDE 40 MG
40 TABLET ORAL
Qty: 0 | Refills: 0 | Status: DISCONTINUED | OUTPATIENT
Start: 2018-06-29 | End: 2018-06-29

## 2018-06-29 RX ORDER — WARFARIN SODIUM 2.5 MG/1
1 TABLET ORAL
Qty: 30 | Refills: 0
Start: 2018-06-29 | End: 2018-07-28

## 2018-06-29 RX ORDER — POLYETHYLENE GLYCOL 3350 17 G/17G
17 POWDER, FOR SOLUTION ORAL
Qty: 0 | Refills: 0 | DISCHARGE
Start: 2018-06-29

## 2018-06-29 RX ORDER — DICLOFENAC SODIUM 30 MG/G
0 GEL TOPICAL
Qty: 0 | Refills: 0 | COMMUNITY

## 2018-06-29 RX ORDER — FUROSEMIDE 40 MG
1 TABLET ORAL
Qty: 60 | Refills: 0
Start: 2018-06-29 | End: 2018-07-28

## 2018-06-29 RX ORDER — DOCUSATE SODIUM 100 MG
1 CAPSULE ORAL
Qty: 0 | Refills: 0 | DISCHARGE
Start: 2018-06-29

## 2018-06-29 RX ORDER — FUROSEMIDE 40 MG
1 TABLET ORAL
Qty: 0 | Refills: 0 | COMMUNITY

## 2018-06-29 RX ORDER — WARFARIN SODIUM 2.5 MG/1
5 TABLET ORAL ONCE
Qty: 0 | Refills: 0 | Status: COMPLETED | OUTPATIENT
Start: 2018-06-29 | End: 2018-06-29

## 2018-06-29 RX ORDER — PANTOPRAZOLE SODIUM 20 MG/1
1 TABLET, DELAYED RELEASE ORAL
Qty: 0 | Refills: 0 | DISCHARGE
Start: 2018-06-29

## 2018-06-29 RX ADMIN — Medication 40 MILLIGRAM(S): at 17:08

## 2018-06-29 RX ADMIN — WARFARIN SODIUM 5 MILLIGRAM(S): 2.5 TABLET ORAL at 17:08

## 2018-06-29 RX ADMIN — Medication 100 MILLIGRAM(S): at 13:10

## 2018-06-29 RX ADMIN — Medication 100 MICROGRAM(S): at 06:18

## 2018-06-29 RX ADMIN — PANTOPRAZOLE SODIUM 40 MILLIGRAM(S): 20 TABLET, DELAYED RELEASE ORAL at 06:18

## 2018-06-29 RX ADMIN — CILOSTAZOL 100 MILLIGRAM(S): 100 TABLET ORAL at 06:19

## 2018-06-29 RX ADMIN — Medication 20 MILLIEQUIVALENT(S): at 10:11

## 2018-06-29 RX ADMIN — Medication 20 MILLIEQUIVALENT(S): at 17:08

## 2018-06-29 RX ADMIN — Medication 25 MILLIGRAM(S): at 06:18

## 2018-06-29 RX ADMIN — Medication 1: at 13:10

## 2018-06-29 RX ADMIN — Medication 25 MILLIGRAM(S): at 17:09

## 2018-06-29 RX ADMIN — CILOSTAZOL 100 MILLIGRAM(S): 100 TABLET ORAL at 17:09

## 2018-06-29 RX ADMIN — NYSTATIN CREAM 1 APPLICATION(S): 100000 CREAM TOPICAL at 17:09

## 2018-06-29 RX ADMIN — NYSTATIN CREAM 1 APPLICATION(S): 100000 CREAM TOPICAL at 06:18

## 2018-06-29 RX ADMIN — Medication 81 MILLIGRAM(S): at 13:10

## 2018-06-29 NOTE — DISCHARGE NOTE ADULT - CARE PLAN
Principal Discharge DX:	Atrial fibrillation  Goal:	To restore or maintain a normal heart rate and rhythm, to prevent blood clots, and decrease the risks of stroke CVA/TIA.  Assessment and plan of treatment:	Please take your medications as prescribed.  Continue to take warfarin (Coumadin) as prescribed. Goal INR level is 2-3. Please follow up with your physician to monitor your INR levels at first weekly. You have an appointment with the cardiology clinic on 7/05/2018  Secondary Diagnosis:	Bifascicular block  Assessment and plan of treatment:	Cardionet will be delivered a week after discharge. You have an appointment with Dr. Perdomo on on 8/19/2018 at 11 am at [facility] outpatient EP clinic.  Secondary Diagnosis:	Diastolic heart failure  Goal:	To relieve and prevent worsening symptoms associated with congestive heart failure, to improve quality of life, and to treat underlying conditions such as coronary heart disease, high blood pressure, or diabetes, and to maintain euvolemia.  Secondary Diagnosis:	CKD (chronic kidney disease), stage IV Principal Discharge DX:	Atrial fibrillation  Goal:	To restore or maintain a normal heart rate and rhythm, to prevent blood clots, and decrease the risks of stroke CVA/TIA.  Assessment and plan of treatment:	Please take your medications as prescribed.  Continue to take warfarin (Coumadin) as prescribed. Goal INR level is 2-3. Please follow up with your physician to monitor your INR levels at first weekly. You have an appointment with the cardiology clinic on 7/05/2018  Secondary Diagnosis:	Bifascicular block  Assessment and plan of treatment:	Cardionet will be delivered a week after discharge. You have an appointment with Dr. Perdomo on on 8/19/2018 at 11 am at [facility] outpatient EP clinic.  Secondary Diagnosis:	Diastolic heart failure  Goal:	To relieve and prevent worsening symptoms associated with congestive heart failure, to improve quality of life, and to treat underlying conditions such as coronary heart disease, high blood pressure, or diabetes, and to maintain euvolemia.  Assessment and plan of treatment:	To relieve and prevent worsening symptoms associated with congestive heart failure, to improve quality of life, and to treat underlying conditions such as coronary heart disease, high blood pressure, or diabetes, and to maintain euvolemia.  Secondary Diagnosis:	CKD (chronic kidney disease), stage IV  Goal:	To prevent shortness of breath, fluid overload, and electrolytes imbalance, and to slow down worsening kidney disease.  Assessment and plan of treatment:	Continue blood pressure, cholesterol and diabetic medications. Goal of hemoglobin A1C (HgbA1C) < 7%.  Avoid nephrotoxic drugs such as nonsteroidal anti-inflammatory agents (NSAIDs).   Please follow up with your nephrologist to monitor your kidney function, continue with low protein and potassium diet. Principal Discharge DX:	Atrial fibrillation  Goal:	To restore or maintain a normal heart rate and rhythm, to prevent blood clots, and decrease the risks of stroke CVA/TIA.  Assessment and plan of treatment:	Please take your medications as prescribed.  Continue to take warfarin (Coumadin) as prescribed. Goal INR level is 2-3. Please follow up with your physician to monitor your INR levels at first weekly. You have an appointment with the cardiology clinic on 7/05/2018   Secondary Diagnosis:	Bifascicular block  Assessment and plan of treatment:	Cardionet will be delivered a week after discharge. You have an appointment with Dr. Perdomo on on 8/19/2018 at 11 am at [facility] outpatient EP clinic.  follow up cardiologist Dr Crump in one week  Secondary Diagnosis:	Diastolic heart failure  Goal:	To relieve and prevent worsening symptoms associated with congestive heart failure, to improve quality of life, and to treat underlying conditions such as coronary heart disease, high blood pressure, or diabetes, and to maintain euvolemia.  Assessment and plan of treatment:	To relieve and prevent worsening symptoms associated with congestive heart failure, to improve quality of life, and to treat underlying conditions such as coronary heart disease, high blood pressure, or diabetes, and to maintain euvolemia.  low salt, low fat, low cholesterol  continue lasix, hydralazine, aspirin  Secondary Diagnosis:	CKD (chronic kidney disease), stage IV  Goal:	To prevent shortness of breath, fluid overload, and electrolytes imbalance, and to slow down worsening kidney disease.  Assessment and plan of treatment:	Continue blood pressure, cholesterol and diabetic medications. Goal of hemoglobin A1C (HgbA1C) < 7%.  Avoid nephrotoxic drugs such as nonsteroidal anti-inflammatory agents (NSAIDs).   Please follow up with your nephrologist to monitor your kidney function, continue with low protein and potassium diet.  follow up nephrologist in one week  Secondary Diagnosis:	DM (diabetes mellitus)  Assessment and plan of treatment:	1800 calorie diabetic diet/ low salt, low fat , low cholesterol   continue levemir  Secondary Diagnosis:	Glaucoma  Assessment and plan of treatment:	continue eye drops  Secondary Diagnosis:	Hypothyroid  Assessment and plan of treatment:	continue levothyroxine

## 2018-06-29 NOTE — DISCHARGE NOTE ADULT - CARE PROVIDER_API CALL
Cameron Perdomo), Cardiac Electrophysiology; Cardiology; Internal Medicine  [address] [place] [address]  Phone: [phone]  Fax: [phone] Cameron Perdomo), Cardiac Electrophysiology; Cardiology; Internal Medicine  [id number] [place] [address]  Phone: [phone]  Fax: [phone]    Joe Crump), Cardiovascular Disease  [address] [address]  Phone: [phone]  Fax: [phone]    Gianni Garcia (DO), Gastroenterology; Internal Medicine  [address] [place] [address]  Phone: [phone]  Fax: [phone]

## 2018-06-29 NOTE — PROGRESS NOTE ADULT - SUBJECTIVE AND OBJECTIVE BOX
[facility] - Radha / Florin FELDMAN /Nate/ GASTON Castillo/ GASTON Tellez/ Shahid Segovia / BABAR Njeru  ---------------------------------------------------------------------------------------------------------------  Patient seen and examined bedside    Subjective and Objective: No overnight events. denied sob/V/D. No complaints today. feeling better  daughter bedside    Allergies: No Known Allergies      Hospital Medications:   MEDICATIONS  (STANDING):  allopurinol 100 milliGRAM(s) Oral daily  aspirin enteric coated 81 milliGRAM(s) Oral daily  cilostazol 100 milliGRAM(s) Oral two times a day  dextrose 5%. 1000 milliLiter(s) (50 mL/Hr) IV Continuous <Continuous>  dextrose 50% Injectable 12.5 Gram(s) IV Push once  dextrose 50% Injectable 25 Gram(s) IV Push once  dextrose 50% Injectable 25 Gram(s) IV Push once  docusate sodium 100 milliGRAM(s) Oral three times a day  furosemide    Tablet 40 milliGRAM(s) Oral two times a day  hydrALAZINE 25 milliGRAM(s) Oral every 12 hours  insulin glargine Injectable (LANTUS) 15 Unit(s) SubCutaneous at bedtime  insulin lispro (HumaLOG) corrective regimen sliding scale   SubCutaneous three times a day before meals  insulin lispro (HumaLOG) corrective regimen sliding scale   SubCutaneous at bedtime  latanoprost 0.005% Ophthalmic Solution 1 Drop(s) Both EYES at bedtime  levothyroxine 100 MICROGram(s) Oral daily  nystatin Powder 1 Application(s) Topical two times a day  pantoprazole    Tablet 40 milliGRAM(s) Oral before breakfast  polyethylene glycol 3350 17 Gram(s) Oral two times a day  senna 2 Tablet(s) Oral at bedtime  simvastatin 20 milliGRAM(s) Oral at bedtime  warfarin 5 milliGRAM(s) Oral once      VITALS:  T(F): 98.6 (06-29-18 @ 06:16), Max: 98.6 (06-29-18 @ 06:16)  HR: 74 (06-29-18 @ 12:18)  BP: 122/63 (06-29-18 @ 12:18)  RR: 18 (06-29-18 @ 12:18)  SpO2: 92% (06-29-18 @ 12:18)  Wt(kg): --    06-28 @ 07:01  -  06-29 @ 07:00  --------------------------------------------------------  IN: 360 mL / OUT: 0 mL / NET: 360 mL    06-29 @ 07:01  -  06-29 @ 15:02  --------------------------------------------------------  IN: 240 mL / OUT: 0 mL / NET: 240 mL    PHYSICAL EXAM:  Constitutional: NAD  HEENT: anicteric sclera, oropharynx clear  Neck: No JVD  Respiratory: dec bibasilar BS, no wheezes, rales or rhonchi  Cardiovascular: S1, S2, RRR  Gastrointestinal: BS+, soft, NT/ND  Extremities: No cyanosis or clubbing. +peripheral edema, better  Neurological: A/O x 3, no focal deficits  Psychiatric: Normal mood, normal affect  : No CVA tenderness. No michaud.   Skin: No rashes    LABS:  06-29    139  |  92<L>  |  97<H>  ----------------------------<  112<H>  3.3<L>   |  34<H>  |  3.23<H>    Ca    9.5      29 Jun 2018 06:00  Mg     2.3     06-29      Creatinine Trend: 3.23 <--, 3.29 <--, 3.35 <--, 3.29 <--, 3.57 <--, 2.84 <--, 2.38 <--, 2.36 <--                        10.8   6.79  )-----------( 159      ( 29 Jun 2018 06:00 )             31.9     Urine Studies:        RADIOLOGY & ADDITIONAL STUDIES:

## 2018-06-29 NOTE — DISCHARGE NOTE ADULT - MEDICATION SUMMARY - MEDICATIONS TO TAKE
I will START or STAY ON the medications listed below when I get home from the hospital:    Aspirin Enteric Coated 81 mg oral delayed release tablet  -- 1 tab(s) by mouth once a day  -- Indication: For CAD prophylaxis    Coumadin 3 mg oral tablet  -- 1 tab(s) by mouth once a day   -- Do not take this drug if you are pregnant.  It is very important that you take or use this exactly as directed.  Do not skip doses or discontinue unless directed by your doctor.  Obtain medical advice before taking any non-prescription drugs as some may affect the action of this medication.    -- Indication: For Atrial fibrillation    Levemir FlexTouch 100 units/mL subcutaneous solution  -- Inject 16 units at bedtime   -- Indication: For DM (diabetes mellitus)    allopurinol 100 mg oral tablet  -- 1 tab(s) by mouth once a day  -- Indication: For Gout    simvastatin 20 mg oral tablet  -- 1 tab(s) by mouth once a day (at bedtime)  -- Indication: For Hyperlipidemia    nystatin 100,000 units/g topical powder  -- 1 application on skin 2 times a day  -- Indication: For oral thrush    furosemide 40 mg oral tablet  -- 1 tab(s) by mouth 2 times a day  -- Indication: For CHF (congestive heart failure)    polyethylene glycol 3350 oral powder for reconstitution  -- 17 gram(s) by mouth 2 times a day as needed for constipation  -- Indication: For Constipation    senna oral tablet  -- 2 tab(s) by mouth once a day (at bedtime)  -- Indication: For Constipation    docusate sodium 100 mg oral capsule  -- 1 cap(s) by mouth 3 times a day as needed for constipation   -- Indication: For Constipation    cilostazol 100 mg oral tablet  -- 1 tab(s) by mouth 2 times a day  -- Indication: For PVD    latanoprost 0.005% ophthalmic solution  -- 1 drop(s) to each affected eye once a day (in the evening)  -- Indication: For Glaucoma    dorzolamide-timolol 2.23%-0.68% ophthalmic solution  -- 1 drop(s) to each affected eye 3 times a day  -- Indication: For Glaucoma    brimonidine 0.2% ophthalmic solution  -- 1 drop(s) to each affected eye 3 times a day  -- Indication: For Glaucoma    pantoprazole 40 mg oral delayed release tablet  -- 1 tab(s) by mouth once a day (before a meal)  -- Indication: For GERD    levothyroxine 100 mcg (0.1 mg) oral tablet  -- 1 tab(s) by mouth once a day  -- Indication: For Hypothyroid    hydrALAZINE 25 mg oral tablet  -- 1 tab(s) by mouth every 12 hours  -- Indication: For Hypertension

## 2018-06-29 NOTE — PROGRESS NOTE ADULT - ASSESSMENT
88 y/o F with h/o a. fib on coumadin, CHF, DM, HTN, HLD, DM, hypothyroidism, PE, presents to the ED for syncope. Admit to telemetry.      Problem/Plan - 1:  ·  Problem: Syncope, unspecified syncope type.  Plan: EP following and holding off PPM.   Orthostatic negative .     TTE noted ...< from: Transthoracic Echocardiogram (06.18.18 @ 10:33) >  CONCLUSIONS:  1. Mitral annular calcification, otherwise normal mitral  valve. Mild mitral regurgitation.  2. Severely dilated left atrium.  LA volume index = 69  cc/m2.  3. Normal left ventricular internal dimensions and wall  thicknesses.  4. Endocardium not well visualized; grossly normal left  ventricular systolic function.  5. Moderate right atrial enlargement.  6. Right ventricular enlargement with decreased right  ventricular systolic function.  7. Estimated right ventricular systolic pressure equals 53  mm Hg, assuming right atrial pressure equals 10 mm Hg,  consistent with moderate pulmonary hypertension.  8. Normal tricuspid valve.  Moderate-severe tricuspid  regurgitation.  *** No previous Echo exam.    < end of copied text >    Cardiology and EP helping.      Problem/Plan - 2:  ·  Problem: CHF (congestive heart failure)likely Diastolic .  Plan: Hypervolumic .   Strict I and O, daily weights.   TTE noted.  Start lasix 80 mg IV BID and Zaroxolyn  .     Problem/Plan - 3:  ·  Problem: DM (diabetes mellitus).  Plan: Sugars in acceptable range.   Holding  PO medication, continue with lantus and start sliding scale.   High  hemoglobin a1c.   Low carb diet.      Problem/Plan - 4:  ·  Problem: HLD (hyperlipidemia).  Plan:   Continue with current medications.   Low salt, low cholesterol diet.      Problem/Plan - 5:  ·  Problem: HTN (hypertension).  Plan: BP readings fine.   Continue with current medications.   Low salt,low cholesterol, DASH diet.      Problem/Plan - 6:  Problem: Atrial fibrillation, unspecified type. Plan: [id number] score of 5.On Heparin till INR Therapeutic.   EP helping.      Problem/Plan - 7:  ·  Problem: Glaucoma.  Plan: Continue with current medications.      Problem/Plan - 8:  ·  Problem: Hypothyroid.  Plan: Continue with synthroid . TSH okay .      Problem/Plan - 9:  ·  Problem: Need for prophylactic measure.  Plan: Pt is on coumadin.
88 y/o F with h/o a. fib on coumadin, CHF, DM, HTN, HLD, DM, hypothyroidism, PE, presents to the ED for syncope. Admit to telemetry.      Problem/Plan - 1:  ·  Problem: Syncope, unspecified syncope type.  Plan: EP following and family decided against  PPM .   Orthostatic negative .     TTE noted ...< from: Transthoracic Echocardiogram (06.18.18 @ 10:33) >  CONCLUSIONS:  1. Mitral annular calcification, otherwise normal mitral  valve. Mild mitral regurgitation.  2. Severely dilated left atrium.  LA volume index = 69  cc/m2.  3. Normal left ventricular internal dimensions and wall  thicknesses.  4. Endocardium not well visualized; grossly normal left  ventricular systolic function.  5. Moderate right atrial enlargement.  6. Right ventricular enlargement with decreased right  ventricular systolic function.  7. Estimated right ventricular systolic pressure equals 53  mm Hg, assuming right atrial pressure equals 10 mm Hg,  consistent with moderate pulmonary hypertension.  8. Normal tricuspid valve.  Moderate-severe tricuspid  regurgitation.  *** No previous Echo exam.    < end of copied text >    Cardiology and EP helping.      Problem/Plan - 2:  ·  Problem: CHF (congestive heart failure)likely Diastolic .  Plan: Improving.   Strict I and O, daily weights.   TTE noted.  Start lasix 80 mg IV BID and Zaroxolyn  .     Problem/Plan - 3:  ·  Problem: DM (diabetes mellitus).  Plan: Sugars in acceptable range.   Holding  PO medication, continue with lantus and start sliding scale.   High  hemoglobin a1c.   Low carb diet.      Problem/Plan - 4:  ·  Problem: HLD (hyperlipidemia).  Plan:   Continue with current medications.   Low salt, low cholesterol diet.      Problem/Plan - 5:  ·  Problem: HTN (hypertension).  Plan: BP readings fine.   Continue with current medications.   Low salt,low cholesterol, DASH diet.      Problem/Plan - 6:  Problem: Atrial fibrillation, unspecified type. Plan: [id number] score of 5.Adjusting Coumadin dose as INR  .   EP helping.      Problem/Plan - 7:  ·  Problem: Glaucoma.  Plan: Continue with current medications.      Problem/Plan - 8:  ·  Problem: Hypothyroid.  Plan: Continue with synthroid . TSH okay .      Problem/Plan - 9:  ·  Problem: CKD with NIKHIL .  Plan: Renal helping . Likely from Over diuresis .     Problem/Plan - 10:  ·  Problem: Need for prophylactic measure.  Plan: Pt is on coumadin.
88 y/o F with h/o a. fib on coumadin, CHF, DM, HTN, HLD, DM, hypothyroidism, PE, presents to the ED for syncope. Admit to telemetry.      Problem/Plan - 1:  ·  Problem: Syncope, unspecified syncope type.  Plan: EP following and holding off PPM.   Orthostatic negative .     TTE noted ...< from: Transthoracic Echocardiogram (06.18.18 @ 10:33) >  CONCLUSIONS:  1. Mitral annular calcification, otherwise normal mitral  valve. Mild mitral regurgitation.  2. Severely dilated left atrium.  LA volume index = 69  cc/m2.  3. Normal left ventricular internal dimensions and wall  thicknesses.  4. Endocardium not well visualized; grossly normal left  ventricular systolic function.  5. Moderate right atrial enlargement.  6. Right ventricular enlargement with decreased right  ventricular systolic function.  7. Estimated right ventricular systolic pressure equals 53  mm Hg, assuming right atrial pressure equals 10 mm Hg,  consistent with moderate pulmonary hypertension.  8. Normal tricuspid valve.  Moderate-severe tricuspid  regurgitation.  *** No previous Echo exam.    < end of copied text >    Cardiology and EP helping. Event monitor outpt.      Problem/Plan - 2:  ·  Problem: CHF (congestive heart failure)likely Diastolic .  Plan: Hypervolumic .   Strict I and O, daily weights.   TTE noted.  Start lasix 80 mg IV BID and Zaroxolyn  .     Problem/Plan - 3:  ·  Problem: DM (diabetes mellitus).  Plan: Sugars in acceptable range.   Holding  PO medication, continue with lantus and start sliding scale.   High  hemoglobin a1c.   Low carb diet.      Problem/Plan - 4:  ·  Problem: HLD (hyperlipidemia).  Plan:   Continue with current medications.   Low salt, low cholesterol diet.      Problem/Plan - 5:  ·  Problem: HTN (hypertension).  Plan: BP readings fine.   Continue with current medications.   Low salt,low cholesterol, DASH diet.      Problem/Plan - 6:  Problem: Atrial fibrillation, unspecified type. Plan: [id number] score of 5.On Heparin till INR Therapeutic.   EP helping.      Problem/Plan - 7:  ·  Problem: Glaucoma.  Plan: Continue with current medications.      Problem/Plan - 8:  ·  Problem: Hypothyroid.  Plan: Continue with synthroid . TSH okay .      Problem/Plan - 9:  ·  Problem: Need for prophylactic measure.  Plan: Pt is on coumadin.
88 y/o F with h/o a. fib on coumadin, CHF, DM, HTN, HLD, DM, hypothyroidism, PE, presents to the ED for syncope. Admit to telemetry.      Problem/Plan - 1:  ·  Problem: Syncope, unspecified syncope type.  Plan: EP following and outpt event monitor VS PPM .   Orthostatic negative .     TTE noted ...< from: Transthoracic Echocardiogram (06.18.18 @ 10:33) >  CONCLUSIONS:  1. Mitral annular calcification, otherwise normal mitral  valve. Mild mitral regurgitation.  2. Severely dilated left atrium.  LA volume index = 69  cc/m2.  3. Normal left ventricular internal dimensions and wall  thicknesses.  4. Endocardium not well visualized; grossly normal left  ventricular systolic function.  5. Moderate right atrial enlargement.  6. Right ventricular enlargement with decreased right  ventricular systolic function.  7. Estimated right ventricular systolic pressure equals 53  mm Hg, assuming right atrial pressure equals 10 mm Hg,  consistent with moderate pulmonary hypertension.  8. Normal tricuspid valve.  Moderate-severe tricuspid  regurgitation.  *** No previous Echo exam.    < end of copied text >    Cardiology and EP helping.      Problem/Plan - 2:  ·  Problem: CHF (congestive heart failure)likely Diastolic .  Plan: Improving.   Strict I and O, daily weights.   TTE noted.  Start lasix 80 mg IV BID and Zaroxolyn  .     Problem/Plan - 3:  ·  Problem: DM (diabetes mellitus).  Plan: Sugars in acceptable range.   Holding  PO medication, continue with lantus and start sliding scale.   High  hemoglobin a1c.   Low carb diet.      Problem/Plan - 4:  ·  Problem: HLD (hyperlipidemia).  Plan:   Continue with current medications.   Low salt, low cholesterol diet.      Problem/Plan - 5:  ·  Problem: HTN (hypertension).  Plan: BP readings fine.   Continue with current medications.   Low salt,low cholesterol, DASH diet.      Problem/Plan - 6:  Problem: Atrial fibrillation, unspecified type. Plan: [id number] score of 5.Adjusting Coumadin dose as INR  .   EP helping.      Problem/Plan - 7:  ·  Problem: Glaucoma.  Plan: Continue with current medications.      Problem/Plan - 8:  ·  Problem: Hypothyroid.  Plan: Continue with synthroid . TSH okay .      Problem/Plan - 9:  ·  Problem: Need for prophylactic measure.  Plan: Pt is on coumadin.
88 y/o F with h/o a. fib on coumadin, CHF, DM, HTN, HLD, DM, hypothyroidism, PE, presents to the ED for syncope. Admit to telemetry.      Problem/Plan - 1:  ·  Problem: Syncope, unspecified syncope type.  Plan: Work Up in progress.   Orthostatic negative .     TTE pending.   Cardiology and EP helping.      Problem/Plan - 2:  ·  Problem: CHF (congestive heart failure).  Plan: Improving.   Strict I and O, daily weights.   TTE ordered.   Start lasix 40 mg IV BID.      Problem/Plan - 3:  ·  Problem: DM (diabetes mellitus).  Plan: Sugars in acceptable range.   Holding  PO medication, continue with lantus and start sliding scale.   High  hemoglobin a1c.   Low carb diet.      Problem/Plan - 4:  ·  Problem: HLD (hyperlipidemia).  Plan:   Continue with current medications.   Low salt, low cholesterol diet.      Problem/Plan - 5:  ·  Problem: HTN (hypertension).  Plan: BP readings fine.   Continue with current medications.   Low salt,low cholesterol, DASH diet.      Problem/Plan - 6:  Problem: Atrial fibrillation, unspecified type. Plan: [id number] score of 5. Patient is on coumadin. Last INR on 13th so ordered stat and for AM.   Continue with current medications.     Problem/Plan - 7:  ·  Problem: Glaucoma.  Plan: Continue with current medications.      Problem/Plan - 8:  ·  Problem: Hypothyroid.  Plan: Continue with synthyroid. TSH okay .      Problem/Plan - 9:  ·  Problem: Need for prophylactic measure.  Plan: Pt is on coumadin.
88 y/o F with h/o a. fib on coumadin, CHF, DM, HTN, HLD, DM, hypothyroidism, PE, presents to the ED for syncope. Admit to telemetry.      Problem/Plan - 1:  ·  Problem: Syncope, unspecified syncope type.  Plan: Work Up in progress.   Orthostatic negative .     TTE pending.   Cardiology and EP helping.      Problem/Plan - 2:  ·  Problem: CHF (congestive heart failure).  Plan: Improving.   Strict I and O, daily weights.   TTE ordered.   Start lasix 40 mg IV BID.      Problem/Plan - 3:  ·  Problem: DM (diabetes mellitus).  Plan: Sugars in acceptable range.   Holding  PO medication, continue with lantus and start sliding scale.   High  hemoglobin a1c.   Low carb diet.      Problem/Plan - 4:  ·  Problem: HLD (hyperlipidemia).  Plan:   Continue with current medications.   Low salt, low cholesterol diet.      Problem/Plan - 5:  ·  Problem: HTN (hypertension).  Plan: BP readings fine.   Continue with current medications.   Low salt,low cholesterol, DASH diet.      Problem/Plan - 6:  Problem: Atrial fibrillation, unspecified type. Plan: [id number] score of 5. Patient is on coumadin. Last INR on 13th so ordered stat and for AM. D/W Cardiology attending . Dr Crump and will restart.    Continue with current medications.     Problem/Plan - 7:  ·  Problem: Glaucoma.  Plan: Continue with current medications.      Problem/Plan - 8:  ·  Problem: Hypothyroid.  Plan: Continue with synthyroid. TSH okay .      Problem/Plan - 9:  ·  Problem: Need for prophylactic measure.  Plan: Pt is on coumadin.
88 y/o F with h/o a. fib on coumadin, CHF, DM, HTN, HLD, DM, hypothyroidism, PE, presents to the ED for syncope. Admit to telemetry.      Problem/Plan - 1:  ·  Problem: Syncope, unspecified syncope type.  Plan: Work Up in progress.   Orthostatic negative .    Trend CE. TTE ordered.   Cardiology and EP helping.      Problem/Plan - 2:  ·  Problem: CHF (congestive heart failure).  Plan: Slightly better clinically.   Strict I and O, daily weights.   TTE ordered.   Start lasix 40 mg IV BID.      Problem/Plan - 3:  ·  Problem: DM (diabetes mellitus).  Plan: Sugars in acceptable range.   Holding  PO medication, continue with lantus and start sliding scale.   Check hemoglobin a1c.   Low carb diet.      Problem/Plan - 4:  ·  Problem: HLD (hyperlipidemia).  Plan:   Continue with current medications.   Low salt, low cholesterol diet.      Problem/Plan - 5:  ·  Problem: HTN (hypertension).  Plan: BP readings fine.   Continue with current medications.   Low salt,low cholesterol, DASH diet.      Problem/Plan - 6:  Problem: Atrial fibrillation, unspecified type. Plan: [id number] score of 5. Patient is on coumadin. INR level is therapeutic.   Continue with current medications.     Problem/Plan - 7:  ·  Problem: Glaucoma.  Plan: Continue with current medications.      Problem/Plan - 8:  ·  Problem: Hypothyroid.  Plan: Continue with synthyroid.      Problem/Plan - 9:  ·  Problem: Need for prophylactic measure.  Plan: Pt is on coumadin.
89y Female with DM, HTN, CKD III/IV, Afib on Coumadin, admitted after episode of syncope, volume overload 2/2 decompensated heart failure.
89y Female with DM, HTN, CKD III/IV, Afib on Coumadin, admitted after episode of syncope, volume overload 2/2 decompensated heart failure.     labs, chart reviewed
89y Female with DM, HTN, CKD III/IV, Afib on Coumadin, admitted after episode of syncope, volume overload 2/2 decompensated heart failure.     labs, chart reviewed    d/w daughter bedside at length, asked reg kidney affect from invasive cardiac work . pt at intermediate risk for BESS w/cardiac cath, risk explained to her including the possibility of worsening RFT post cath and if no recovery of RF, may need RRT/dialysis. she verbalized understanding. recommended to her to decide based on risks and benefits of invasive cardiac work up vs conservative Mx. also, told her, no renal objection for PPM insertion or any tests not involving iv contrast.  all her questions answered to her satisfaction
89y Female with DM, HTN, CKD III/IV, Afib on Coumadin, admitted after episode of syncope, volume overload 2/2 decompensated heart failure.     labs, chart reviewed    d/w daughter yesterday and now w/son and  bedside at length, asked reg kidney affect from invasive cardiac work . pt at intermediate risk for BESS w/cardiac cath, risk explained to her including the possibility of worsening RFT post cath and if no recovery of RF, may need RRT/dialysis. she verbalized understanding. recommended to her to decide based on risks and benefits of invasive cardiac work up vs conservative Mx. also, told her, no renal objection for PPM insertion or any tests not involving iv contrast.  all her questions answered to her satisfaction
89 y/o F with h/o a. fib on coumadin, CHF, DM, HTN, HLD, DM, hypothyroidism, PE, presents to the ED for syncope. Admit to telemetry.      Problem/Plan - 1:  ·  Problem: Syncope, unspecified syncope type.  Plan:   Orthostatic negative .     TTE noted ...< from: Transthoracic Echocardiogram (06.18.18 @ 10:33) >  CONCLUSIONS:  1. Mitral annular calcification, otherwise normal mitral  valve. Mild mitral regurgitation.  2. Severely dilated left atrium.  LA volume index = 69  cc/m2.  3. Normal left ventricular internal dimensions and wall  thicknesses.  4. Endocardium not well visualized; grossly normal left  ventricular systolic function.  5. Moderate right atrial enlargement.  6. Right ventricular enlargement with decreased right  ventricular systolic function.  7. Estimated right ventricular systolic pressure equals 53  mm Hg, assuming right atrial pressure equals 10 mm Hg,  consistent with moderate pulmonary hypertension.  8. Normal tricuspid valve.  Moderate-severe tricuspid  regurgitation.  *** No previous Echo exam.    < end of copied text >    Cardiology and EP helping.      Problem/Plan - 2:  ·  Problem: CHF (congestive heart failure).  Plan: Hypervolumic .   Strict I and O, daily weights.   TTE ordered.   Start lasix 80 mg IV BID and Zaroxolyn  .     Problem/Plan - 3:  ·  Problem: DM (diabetes mellitus).  Plan: Sugars in acceptable range.   Holding  PO medication, continue with lantus and start sliding scale.   High  hemoglobin a1c.   Low carb diet.      Problem/Plan - 4:  ·  Problem: HLD (hyperlipidemia).  Plan:   Continue with current medications.   Low salt, low cholesterol diet.      Problem/Plan - 5:  ·  Problem: HTN (hypertension).  Plan: BP readings fine.   Continue with current medications.   Low salt,low cholesterol, DASH diet.      Problem/Plan - 6:  Problem: Atrial fibrillation, unspecified type. Plan: [id number] score of 5.On Heparin till INR Therapeutic.   EP helping.      Problem/Plan - 7:  ·  Problem: Glaucoma.  Plan: Continue with current medications.      Problem/Plan - 8:  ·  Problem: Hypothyroid.  Plan: Continue with synthroid . TSH okay .      Problem/Plan - 9:  ·  Problem: Need for prophylactic measure.  Plan: Pt is on coumadin.
89 y/o F with h/o a. fib on coumadin, CHF, DM, HTN, HLD, DM, hypothyroidism, PE, presents to the ED for syncope. Admit to telemetry.      Problem/Plan - 1:  ·  Problem: Syncope, unspecified syncope type.  Plan:   Orthostatic negative .     TTE noted ...< from: Transthoracic Echocardiogram (06.18.18 @ 10:33) >  CONCLUSIONS:  1. Mitral annular calcification, otherwise normal mitral  valve. Mild mitral regurgitation.  2. Severely dilated left atrium.  LA volume index = 69  cc/m2.  3. Normal left ventricular internal dimensions and wall  thicknesses.  4. Endocardium not well visualized; grossly normal left  ventricular systolic function.  5. Moderate right atrial enlargement.  6. Right ventricular enlargement with decreased right  ventricular systolic function.  7. Estimated right ventricular systolic pressure equals 53  mm Hg, assuming right atrial pressure equals 10 mm Hg,  consistent with moderate pulmonary hypertension.  8. Normal tricuspid valve.  Moderate-severe tricuspid  regurgitation.  *** No previous Echo exam.    < end of copied text >    Cardiology and EP helping.      Problem/Plan - 2:  ·  Problem: CHF (congestive heart failure)likely Diastolic .  Plan: Hypervolumic .   Strict I and O, daily weights.   TTE noted.  Start lasix 80 mg IV BID and Zaroxolyn  .     Problem/Plan - 3:  ·  Problem: DM (diabetes mellitus).  Plan: Sugars in acceptable range.   Holding  PO medication, continue with lantus and start sliding scale.   High  hemoglobin a1c.   Low carb diet.      Problem/Plan - 4:  ·  Problem: HLD (hyperlipidemia).  Plan:   Continue with current medications.   Low salt, low cholesterol diet.      Problem/Plan - 5:  ·  Problem: HTN (hypertension).  Plan: BP readings fine.   Continue with current medications.   Low salt,low cholesterol, DASH diet.      Problem/Plan - 6:  Problem: Atrial fibrillation, unspecified type. Plan: [id number] score of 5.On Heparin till INR Therapeutic.   EP helping.      Problem/Plan - 7:  ·  Problem: Glaucoma.  Plan: Continue with current medications.      Problem/Plan - 8:  ·  Problem: Hypothyroid.  Plan: Continue with synthroid . TSH okay .      Problem/Plan - 9:  ·  Problem: Need for prophylactic measure.  Plan: Pt is on coumadin.
89 y/o F with h/o a. fib on coumadin, CHF, DM, HTN, HLD, DM, hypothyroidism, PE, presents to the ED for syncope. Admit to telemetry.      Problem/Plan - 1:  ·  Problem: Syncope, unspecified syncope type.  Plan: EP following and Daughter HCP considering  PPM .   Orthostatic negative .     TTE noted ...< from: Transthoracic Echocardiogram (06.18.18 @ 10:33) >  CONCLUSIONS:  1. Mitral annular calcification, otherwise normal mitral  valve. Mild mitral regurgitation.  2. Severely dilated left atrium.  LA volume index = 69  cc/m2.  3. Normal left ventricular internal dimensions and wall  thicknesses.  4. Endocardium not well visualized; grossly normal left  ventricular systolic function.  5. Moderate right atrial enlargement.  6. Right ventricular enlargement with decreased right  ventricular systolic function.  7. Estimated right ventricular systolic pressure equals 53  mm Hg, assuming right atrial pressure equals 10 mm Hg,  consistent with moderate pulmonary hypertension.  8. Normal tricuspid valve.  Moderate-severe tricuspid  regurgitation.  *** No previous Echo exam.    < end of copied text >    Cardiology and EP helping.      Problem/Plan - 2:  ·  Problem: CHF (congestive heart failure)likely Diastolic .  Plan: Improving.   Strict I and O, daily weights.   TTE noted.  Start lasix 80 mg IV BID and Zaroxolyn  .     Problem/Plan - 3:  ·  Problem: DM (diabetes mellitus).  Plan: Sugars in acceptable range.   Holding  PO medication, continue with lantus and start sliding scale.   High  hemoglobin a1c.   Low carb diet.      Problem/Plan - 4:  ·  Problem: HLD (hyperlipidemia).  Plan:   Continue with current medications.   Low salt, low cholesterol diet.      Problem/Plan - 5:  ·  Problem: HTN (hypertension).  Plan: BP readings fine.   Continue with current medications.   Low salt,low cholesterol, DASH diet.      Problem/Plan - 6:  Problem: Atrial fibrillation, unspecified type. Plan: [id number] score of 5.Adjusting Coumadin dose as INR  .   EP helping.      Problem/Plan - 7:  ·  Problem: Glaucoma.  Plan: Continue with current medications.      Problem/Plan - 8:  ·  Problem: Hypothyroid.  Plan: Continue with synthroid . TSH okay .      Problem/Plan - 9:  ·  Problem: Need for prophylactic measure.  Plan: Pt is on coumadin.
89 y/o F with h/o a. fib on coumadin, CHF, DM, HTN, HLD, DM, hypothyroidism, PE, presents to the ED for syncope. Admit to telemetry.      Problem/Plan - 1:  ·  Problem: Syncope, unspecified syncope type.  Plan: EP following and family decided against  PPM .   Orthostatic negative .     TTE noted ...< from: Transthoracic Echocardiogram (06.18.18 @ 10:33) >  CONCLUSIONS:  1. Mitral annular calcification, otherwise normal mitral  valve. Mild mitral regurgitation.  2. Severely dilated left atrium.  LA volume index = 69  cc/m2.  3. Normal left ventricular internal dimensions and wall  thicknesses.  4. Endocardium not well visualized; grossly normal left  ventricular systolic function.  5. Moderate right atrial enlargement.  6. Right ventricular enlargement with decreased right  ventricular systolic function.  7. Estimated right ventricular systolic pressure equals 53  mm Hg, assuming right atrial pressure equals 10 mm Hg,  consistent with moderate pulmonary hypertension.  8. Normal tricuspid valve.  Moderate-severe tricuspid  regurgitation.  *** No previous Echo exam.    < end of copied text >    Cardiology and EP helping.      Problem/Plan - 2:  ·  Problem: CHF (congestive heart failure)likely Diastolic .  Plan: Improving.   Strict I and O, daily weights.   TTE noted.  Holding Diuretics.      Problem/Plan - 3:  ·  Problem: DM (diabetes mellitus).  Plan: Sugars in acceptable range.   Holding  PO medication, continue with lantus and start sliding scale.   High  hemoglobin a1c.   Low carb diet.      Problem/Plan - 4:  ·  Problem: HLD (hyperlipidemia).  Plan:   Continue with current medications.   Low salt, low cholesterol diet.      Problem/Plan - 5:  ·  Problem: HTN (hypertension).  Plan: BP readings fine.   Continue with current medications.   Low salt,low cholesterol, DASH diet.      Problem/Plan - 6:  Problem: Atrial fibrillation, unspecified type. Plan: [id number] score of 5. INR therapeutic now.   EP helping.      Problem/Plan - 7:  ·  Problem: Glaucoma.  Plan: Continue with current medications.      Problem/Plan - 8:  ·  Problem: Hypothyroid.  Plan: Continue with synthroid . TSH okay .      Problem/Plan - 9:  ·  Problem: CKD with NIKHIL .  Plan: Creatinine trending down. Renal helping . Likely from Over diuresis .     Problem/Plan - 10:  ·  Problem: Need for prophylactic measure.  Plan: Pt is on coumadin.
89 y/o F with h/o a. fib on coumadin, CHF, DM, HTN, HLD, DM, hypothyroidism, PE, presents to the ED for syncope. Admit to telemetry.      Problem/Plan - 1:  ·  Problem: Syncope, unspecified syncope type.  Plan: EP following and family decided against  PPM .   Orthostatic negative .     TTE noted ...< from: Transthoracic Echocardiogram (06.18.18 @ 10:33) >  CONCLUSIONS:  1. Mitral annular calcification, otherwise normal mitral  valve. Mild mitral regurgitation.  2. Severely dilated left atrium.  LA volume index = 69  cc/m2.  3. Normal left ventricular internal dimensions and wall  thicknesses.  4. Endocardium not well visualized; grossly normal left  ventricular systolic function.  5. Moderate right atrial enlargement.  6. Right ventricular enlargement with decreased right  ventricular systolic function.  7. Estimated right ventricular systolic pressure equals 53  mm Hg, assuming right atrial pressure equals 10 mm Hg,  consistent with moderate pulmonary hypertension.  8. Normal tricuspid valve.  Moderate-severe tricuspid  regurgitation.  *** No previous Echo exam.    < end of copied text >    Cardiology and EP helping.      Problem/Plan - 2:  ·  Problem: CHF (congestive heart failure)likely Diastolic .  Plan: Improving.   Strict I and O, daily weights.   TTE noted.  Holding Diuretics.      Problem/Plan - 3:  ·  Problem: DM (diabetes mellitus).  Plan: Sugars in acceptable range.   Holding  PO medication, continue with lantus and start sliding scale.   High  hemoglobin a1c.   Low carb diet.      Problem/Plan - 4:  ·  Problem: HLD (hyperlipidemia).  Plan:   Continue with current medications.   Low salt, low cholesterol diet.      Problem/Plan - 5:  ·  Problem: HTN (hypertension).  Plan: BP readings fine.   Continue with current medications.   Low salt,low cholesterol, DASH diet.      Problem/Plan - 6:  Problem: Atrial fibrillation, unspecified type. Plan: [id number] score of 5.Adjusting Coumadin dose as INR  .   EP helping.      Problem/Plan - 7:  ·  Problem: Glaucoma.  Plan: Continue with current medications.      Problem/Plan - 8:  ·  Problem: Hypothyroid.  Plan: Continue with synthroid . TSH okay .      Problem/Plan - 9:  ·  Problem: CKD with NIKHIL .  Plan: Creatinine trending down. Renal helping . Likely from Over diuresis .     Problem/Plan - 10:  ·  Problem: Need for prophylactic measure.  Plan: Pt is on coumadin.
89 y/o F with h/o a. fib on coumadin, CHF, DM, HTN, HLD, DM, hypothyroidism, PE, presents to the ED for syncope. Admit to telemetry.      Problem/Plan - 1:  ·  Problem: Syncope, unspecified syncope type.  Plan: EP following and family decided against  PPM .   Orthostatic negative .     TTE noted ...< from: Transthoracic Echocardiogram (06.18.18 @ 10:33) >  CONCLUSIONS:  1. Mitral annular calcification, otherwise normal mitral  valve. Mild mitral regurgitation.  2. Severely dilated left atrium.  LA volume index = 69  cc/m2.  3. Normal left ventricular internal dimensions and wall  thicknesses.  4. Endocardium not well visualized; grossly normal left  ventricular systolic function.  5. Moderate right atrial enlargement.  6. Right ventricular enlargement with decreased right  ventricular systolic function.  7. Estimated right ventricular systolic pressure equals 53  mm Hg, assuming right atrial pressure equals 10 mm Hg,  consistent with moderate pulmonary hypertension.  8. Normal tricuspid valve.  Moderate-severe tricuspid  regurgitation.  *** No previous Echo exam.    < end of copied text >    Cardiology and EP helping.      Problem/Plan - 2:  ·  Problem: CHF (congestive heart failure)likely Diastolic .  Plan: Improving.   Strict I and O, daily weights.   TTE noted.  Holding Diuretics.      Problem/Plan - 3:  ·  Problem: DM (diabetes mellitus).  Plan: Sugars in acceptable range.   Holding  PO medication, continue with lantus and start sliding scale.   High  hemoglobin a1c.   Low carb diet.      Problem/Plan - 4:  ·  Problem: HLD (hyperlipidemia).  Plan:   Continue with current medications.   Low salt, low cholesterol diet.      Problem/Plan - 5:  ·  Problem: HTN (hypertension).  Plan: BP readings fine.   Continue with current medications.   Low salt,low cholesterol, DASH diet.      Problem/Plan - 6:  Problem: Atrial fibrillation, unspecified type. Plan: [id number] score of 5. INR therapeutic now.   EP helping.      Problem/Plan - 7:  ·  Problem: Glaucoma.  Plan: Continue with current medications.      Problem/Plan - 8:  ·  Problem: Hypothyroid.  Plan: Continue with synthroid . TSH okay .      Problem/Plan - 9:  ·  Problem: CKD with NIKHIL .  Plan: Creatinine trending down. Renal helping . Likely from Over diuresis .     Problem/Plan - 10:  ·  Problem: Need for prophylactic measure.  Plan: Pt is on coumadin.
89 y/o F with h/o a. fib on coumadin, CHF, DM, HTN, HLD, DM, hypothyroidism, PE, presents to the ED for syncope. Admit to telemetry.      Problem/Plan - 1:  ·  Problem: Syncope, unspecified syncope type.  Plan: EP following and holding off PPM.   Orthostatic negative .     TTE noted ...< from: Transthoracic Echocardiogram (06.18.18 @ 10:33) >  CONCLUSIONS:  1. Mitral annular calcification, otherwise normal mitral  valve. Mild mitral regurgitation.  2. Severely dilated left atrium.  LA volume index = 69  cc/m2.  3. Normal left ventricular internal dimensions and wall  thicknesses.  4. Endocardium not well visualized; grossly normal left  ventricular systolic function.  5. Moderate right atrial enlargement.  6. Right ventricular enlargement with decreased right  ventricular systolic function.  7. Estimated right ventricular systolic pressure equals 53  mm Hg, assuming right atrial pressure equals 10 mm Hg,  consistent with moderate pulmonary hypertension.  8. Normal tricuspid valve.  Moderate-severe tricuspid  regurgitation.  *** No previous Echo exam.    < end of copied text >    Cardiology and EP helping. Event monitor outpt.      Problem/Plan - 2:  ·  Problem: CHF (congestive heart failure)likely Diastolic .  Plan: Improving.   Strict I and O, daily weights.   TTE noted.  Start lasix 80 mg IV BID and Zaroxolyn  .     Problem/Plan - 3:  ·  Problem: DM (diabetes mellitus).  Plan: Sugars in acceptable range.   Holding  PO medication, continue with lantus and start sliding scale.   High  hemoglobin a1c.   Low carb diet.      Problem/Plan - 4:  ·  Problem: HLD (hyperlipidemia).  Plan:   Continue with current medications.   Low salt, low cholesterol diet.      Problem/Plan - 5:  ·  Problem: HTN (hypertension).  Plan: BP readings fine.   Continue with current medications.   Low salt,low cholesterol, DASH diet.      Problem/Plan - 6:  Problem: Atrial fibrillation, unspecified type. Plan: [id number] score of 5.On Heparin till INR Therapeutic.   EP helping.      Problem/Plan - 7:  ·  Problem: Glaucoma.  Plan: Continue with current medications.      Problem/Plan - 8:  ·  Problem: Hypothyroid.  Plan: Continue with synthroid . TSH okay .      Problem/Plan - 9:  ·  Problem: Need for prophylactic measure.  Plan: Pt is on coumadin.
89 y/o F with h/o a. fib on coumadin, CHF, DM, HTN, HLD, DM, hypothyroidism, PE, presents to the ED for syncope. Admit to telemetry.      Problem/Plan - 1:  ·  Problem: Syncope, unspecified syncope type.  Plan: Work Up in progress.   Orthostatic negative .     TTE pending.   Cardiology and EP helping.      Problem/Plan - 2:  ·  Problem: CHF (congestive heart failure).  Plan: Hypervolumic .   Strict I and O, daily weights.   TTE ordered.   Start lasix 80 mg IV BID.      Problem/Plan - 3:  ·  Problem: DM (diabetes mellitus).  Plan: Sugars in acceptable range.   Holding  PO medication, continue with lantus and start sliding scale.   High  hemoglobin a1c.   Low carb diet.      Problem/Plan - 4:  ·  Problem: HLD (hyperlipidemia).  Plan:   Continue with current medications.   Low salt, low cholesterol diet.      Problem/Plan - 5:  ·  Problem: HTN (hypertension).  Plan: BP readings fine.   Continue with current medications.   Low salt,low cholesterol, DASH diet.      Problem/Plan - 6:  Problem: Atrial fibrillation, unspecified type. Plan: [id number] score of 5.On Heparin till INR Therapeutic.   Continue with current medications.     Problem/Plan - 7:  ·  Problem: Glaucoma.  Plan: Continue with current medications.      Problem/Plan - 8:  ·  Problem: Hypothyroid.  Plan: Continue with synthroid . TSH okay .      Problem/Plan - 9:  ·  Problem: Need for prophylactic measure.  Plan: Pt is on coumadin.
89 yo F with a PMH of chronic afib/flutter (on coumadin), ?CHF, DM, HTN, and hypothyroidism who presented w/ syncope in the setting of chronic bifascicular block and Afib with slow V rates; undergoing treatment for ADHF, NYHA Class II-III, [id number] Vasc 6:    - continue diuresis; hypervolemia persists  - continue warfarin   - Neph input appreciated  - will expedite 2D TTE  - keep K+ 4-4.5 & Mg 2-2.5  - will d/w family & EP attending for likely micra PPM this week
89 yo woman w/ hx of chronic afib/flutter (on coumadin), CHF? (unknown EF), DM, HTN, hypothyroidism who presented for syncope in the setting of chronic bifascicular block and slow Afib (HR 50s this morning).  HR improved w/ no recurrent syncope   Euvolemic. TTE w/ preserved LV function   Multiple discussions w/ family.  No PPM placement at this point.  May D/C from EP standpoint. Cardionet ordered.  Has outpatient EP appt at [facility] on 7/5/18 and 8/19/2018     RECS  - cont holding AV jimy blockers  - Continue anticoagulation with Warfarin
89y Female with DM, HTN, CKD III/IV, Afib on Coumadin, admitted after episode of syncope, volume overload 2/2 decompensated heart failure.     labs, chart reviewed    d/w daughter yesterday and now w/son and  bedside at length, asked reg kidney affect from invasive cardiac work . pt at intermediate risk for BESS w/cardiac cath, risk explained to her including the possibility of worsening RFT post cath and if no recovery of RF, may need RRT/dialysis. she verbalized understanding. recommended to her to decide based on risks and benefits of invasive cardiac work up vs conservative Mx. also, told her, no renal objection for PPM insertion or any tests not involving iv contrast.  all her questions answered to her satisfaction
89y Female with DM, HTN, CKD III/IV, Afib on Coumadin, admitted after episode of syncope, volume overload 2/2 decompensated heart failure.     labs, chart reviewed  d/c plan as per cardiology. ok to d/c renal stand point to rehab. monitor BMP at NH

## 2018-06-29 NOTE — DISCHARGE NOTE ADULT - HOSPITAL COURSE
89 y/o F with h/o a. fib on coumadin, CHF, DM, HTN, HLD, DM, hypothyroidism presents to the ED for syncope witnessed by the son. As per son, patient was going to the car and as she was getting into the car, pt syncopized as she was in the seat for about 45 seconds.   Pt was admitted to telemetry unit, found to be in  chronic bifascicular block and slow Afib (HR 40s).  Also in ADHF .  Pt was diuresed with IV Lasix for several days with persistent hypervolemia so Lasix increased. Pt  with elevated Troponin with negative CPK, likely secondary to ADHF and CKD. Heparin gtt started for Afib with Coumadin bridge. TTE with preserved LV function. Patient and family refused PPM placement or any invasive procedures. Pt with reported melena, however fecal occult negative. Pt with CKD, with rising BUN/Cr as Lasix was continued, volume status improved, and Lasix changed to oral dosing. As creatinine was rising, Lasix was then held. After multiple discussions with pt and family about PPM, it was decided not to proceed and a cardionet was set up to be mailed to pt a week after discharge. 88 y/o F with h/o a. fib on coumadin, CHF, DM, HTN, HLD, DM, hypothyroidism presents to the ED for syncope witnessed by the son. As per son, patient was going to the car and as she was getting into the car, pt syncopized as she was in the seat for about 45 seconds.   Pt was admitted to telemetry unit, found to be in  chronic bifascicular block and slow Afib (HR 40s).  Also in ADHF .  Pt was diuresed with IV Lasix for several days with persistent hypervolemia so Lasix increased. Pt  with elevated Troponin with negative CPK, likely secondary to ADHF and CKD. Heparin gtt started for Afib with Coumadin bridge. TTE with preserved LV function. Patient and family refused PPM placement or any invasive procedures. Pt with reported melena, however fecal occult negative. Pt with CKD, with rising BUN/Cr as Lasix was continued, volume status improved, and Lasix changed to oral dosing. As creatinine was rising, Lasix was then held. After multiple discussions with pt and family about PPM, it was decided not to proceed and a cardionet was set up to be mailed to pt a week after discharge. lasix restarted as per Dr Crump. Patient stable for discharge rehab as per Dr Crump, Dr Sheikh SIMS, Dr Perdomo, EP, renal.

## 2018-06-29 NOTE — DISCHARGE NOTE ADULT - SECONDARY DIAGNOSIS.
Bifascicular block Diastolic heart failure CKD (chronic kidney disease), stage IV DM (diabetes mellitus) Glaucoma Hypothyroid

## 2018-06-29 NOTE — PROGRESS NOTE ADULT - SUBJECTIVE AND OBJECTIVE BOX
INTERVAL HPI/OVERNIGHT EVENTS: No new concerns.  Vital Signs Last 24 Hrs  T(C): 37 (29 Jun 2018 06:16), Max: 37 (29 Jun 2018 06:16)  T(F): 98.6 (29 Jun 2018 06:16), Max: 98.6 (29 Jun 2018 06:16)  HR: 74 (29 Jun 2018 12:18) (74 - 82)  BP: 122/63 (29 Jun 2018 12:18) (122/63 - 147/67)  BP(mean): --  RR: 18 (29 Jun 2018 12:18) (18 - 18)  SpO2: 92% (29 Jun 2018 12:18) (92% - 100%)  I&O's Summary    28 Jun 2018 07:01  -  29 Jun 2018 07:00  --------------------------------------------------------  IN: 360 mL / OUT: 0 mL / NET: 360 mL    29 Jun 2018 07:01  -  29 Jun 2018 13:43  --------------------------------------------------------  IN: 240 mL / OUT: 0 mL / NET: 240 mL      MEDICATIONS  (STANDING):  allopurinol 100 milliGRAM(s) Oral daily  aspirin enteric coated 81 milliGRAM(s) Oral daily  cilostazol 100 milliGRAM(s) Oral two times a day  dextrose 5%. 1000 milliLiter(s) (50 mL/Hr) IV Continuous <Continuous>  dextrose 50% Injectable 12.5 Gram(s) IV Push once  dextrose 50% Injectable 25 Gram(s) IV Push once  dextrose 50% Injectable 25 Gram(s) IV Push once  docusate sodium 100 milliGRAM(s) Oral three times a day  furosemide    Tablet 40 milliGRAM(s) Oral two times a day  hydrALAZINE 25 milliGRAM(s) Oral every 12 hours  insulin glargine Injectable (LANTUS) 15 Unit(s) SubCutaneous at bedtime  insulin lispro (HumaLOG) corrective regimen sliding scale   SubCutaneous three times a day before meals  insulin lispro (HumaLOG) corrective regimen sliding scale   SubCutaneous at bedtime  latanoprost 0.005% Ophthalmic Solution 1 Drop(s) Both EYES at bedtime  levothyroxine 100 MICROGram(s) Oral daily  nystatin Powder 1 Application(s) Topical two times a day  pantoprazole    Tablet 40 milliGRAM(s) Oral before breakfast  polyethylene glycol 3350 17 Gram(s) Oral two times a day  senna 2 Tablet(s) Oral at bedtime  simvastatin 20 milliGRAM(s) Oral at bedtime  warfarin 5 milliGRAM(s) Oral once    MEDICATIONS  (PRN):  dextrose 40% Gel 15 Gram(s) Oral once PRN Blood Glucose LESS THAN 70 milliGRAM(s)/deciliter  glucagon  Injectable 1 milliGRAM(s) IntraMuscular once PRN Glucose LESS THAN 70 milligrams/deciliter  melatonin 3 milliGRAM(s) Oral at bedtime PRN Insomnia    LABS:                        10.8   6.79  )-----------( 159      ( 29 Jun 2018 06:00 )             31.9     06-29    139  |  92<L>  |  97<H>  ----------------------------<  112<H>  3.3<L>   |  34<H>  |  3.23<H>    Ca    9.5      29 Jun 2018 06:00  Mg     2.3     06-29      PT/INR - ( 29 Jun 2018 06:00 )   PT: 29.2 SEC;   INR: 2.49          PTT - ( 29 Jun 2018 06:00 )  PTT:34.7 SEC    CAPILLARY BLOOD GLUCOSE      POCT Blood Glucose.: 174 mg/dL (29 Jun 2018 12:30)  POCT Blood Glucose.: 101 mg/dL (29 Jun 2018 08:53)  POCT Blood Glucose.: 225 mg/dL (28 Jun 2018 22:09)  POCT Blood Glucose.: 181 mg/dL (28 Jun 2018 17:29)          REVIEW OF SYSTEMS:  CONSTITUTIONAL: No fever, weight loss, or fatigue  EYES: No eye pain, visual disturbances, or discharge  ENMT:  No difficulty hearing, tinnitus, vertigo; No sinus or throat pain  NECK: No pain or stiffness  BREASTS: No pain, masses, or nipple discharge  RESPIRATORY: No cough, wheezing, chills or hemoptysis; No shortness of breath  CARDIOVASCULAR: No chest pain, palpitations, dizziness, or leg swelling  GASTROINTESTINAL: No abdominal or epigastric pain. No nausea, vomiting, or hematemesis; No diarrhea or constipation. No melena or hematochezia.  GENITOURINARY: No dysuria, frequency, hematuria, or incontinence  NEUROLOGICAL: No headaches, memory loss, loss of strength, numbness, or tremors  SKIN: No itching, burning, rashes, or lesions   LYMPH NODES: No enlarged glands  ENDOCRINE: No heat or cold intolerance; No hair loss  MUSCULOSKELETAL: No joint pain or swelling; No muscle, back, or extremity pain  PSYCHIATRIC: No depression, anxiety, mood swings, or difficulty sleeping  HEME/LYMPH: No easy bruising, or bleeding gums  ALLERY AND IMMUNOLOGIC: No hives or eczema    RADIOLOGY & ADDITIONAL TESTS:    Consultant(s) Notes Reviewed:  [x ] YES  [ ] NO    PHYSICAL EXAM:  GENERAL: NAD, well-groomed, well-developed, not in any distress ,  HEAD:  Atraumatic, Normocephalic  EYES: EOMI, PERRLA, conjunctiva and sclera clear  ENMT: No tonsillar erythema, exudates, or enlargement; Moist mucous membranes, Good dentition, No lesions  NECK: Supple, No JVD, Normal thyroid  NERVOUS SYSTEM:  Alert & Oriented X3, No focal deficit   CHEST/LUNG: Good air entry bilateral with no  rales, rhonchi, wheezing, or rubs  HEART: Regular rate and rhythm; No murmurs, rubs, or gallops  ABDOMEN: Soft, Nontender, Nondistended; Bowel sounds present  EXTREMITIES:  2+ Peripheral Pulses, No clubbing, cyanosis,  ,less edema     Care Discussed with Consultants/Other Providers [ x] YES  [ ] NO

## 2018-06-29 NOTE — PROGRESS NOTE ADULT - SUBJECTIVE AND OBJECTIVE BOX
S: No CP or SOB     OOB in chair     MEDICATIONS  (STANDING):  allopurinol 100 milliGRAM(s) Oral daily  aspirin enteric coated 81 milliGRAM(s) Oral daily  cilostazol 100 milliGRAM(s) Oral two times a day  dextrose 5%. 1000 milliLiter(s) (50 mL/Hr) IV Continuous <Continuous>  dextrose 50% Injectable 12.5 Gram(s) IV Push once  dextrose 50% Injectable 25 Gram(s) IV Push once  dextrose 50% Injectable 25 Gram(s) IV Push once  docusate sodium 100 milliGRAM(s) Oral three times a day  furosemide    Tablet 40 milliGRAM(s) Oral two times a day  hydrALAZINE 25 milliGRAM(s) Oral every 12 hours  insulin glargine Injectable (LANTUS) 15 Unit(s) SubCutaneous at bedtime  insulin lispro (HumaLOG) corrective regimen sliding scale   SubCutaneous three times a day before meals  insulin lispro (HumaLOG) corrective regimen sliding scale   SubCutaneous at bedtime  latanoprost 0.005% Ophthalmic Solution 1 Drop(s) Both EYES at bedtime  levothyroxine 100 MICROGram(s) Oral daily  nystatin Powder 1 Application(s) Topical two times a day  pantoprazole    Tablet 40 milliGRAM(s) Oral before breakfast  polyethylene glycol 3350 17 Gram(s) Oral two times a day  senna 2 Tablet(s) Oral at bedtime  simvastatin 20 milliGRAM(s) Oral at bedtime  warfarin 5 milliGRAM(s) Oral once    MEDICATIONS  (PRN):  dextrose 40% Gel 15 Gram(s) Oral once PRN Blood Glucose LESS THAN 70 milliGRAM(s)/deciliter  glucagon  Injectable 1 milliGRAM(s) IntraMuscular once PRN Glucose LESS THAN 70 milligrams/deciliter  melatonin 3 milliGRAM(s) Oral at bedtime PRN Insomnia      LABS:                        10.8   6.79  )-----------( 159      ( 29 Jun 2018 06:00 )             31.9     Hemoglobin: 10.8 g/dL (06-29 @ 06:00)  Hemoglobin: 10.7 g/dL (06-28 @ 07:00)  Hemoglobin: 10.7 g/dL (06-27 @ 06:00)  Hemoglobin: 10.9 g/dL (06-26 @ 06:02)  Hemoglobin: 10.9 g/dL (06-26 @ 06:02)    06-29    139  |  92<L>  |  97<H>  ----------------------------<  112<H>  3.3<L>   |  34<H>  |  3.23<H>    Ca    9.5      29 Jun 2018 06:00  Mg     2.3     06-29      Creatinine Trend: 3.23<--, 3.29<--, 3.35<--, 3.29<--, 3.57<--, 2.84<--   PT/INR - ( 29 Jun 2018 06:00 )   PT: 29.2 SEC;   INR: 2.49          PTT - ( 29 Jun 2018 06:00 )  PTT:34.7 SEC        PHYSICAL EXAM  Vital Signs Last 24 Hrs  T(C): 37 (29 Jun 2018 06:16), Max: 37 (29 Jun 2018 06:16)  T(F): 98.6 (29 Jun 2018 06:16), Max: 98.6 (29 Jun 2018 06:16)  HR: 74 (29 Jun 2018 12:18) (74 - 82)  BP: 122/63 (29 Jun 2018 12:18) (122/63 - 147/67)  BP(mean): --  RR: 18 (29 Jun 2018 12:18) (18 - 18)  SpO2: 92% (29 Jun 2018 12:18) (92% - 100%)    Heart: normal S1, S2, IRR, no m/r/g  Lungs: cta b/l  Abd: soft nT, nD  Ext: + edema in LE bilaterally     DATA:    TELEMETRY:  AF 	      ECG: < from: 12 Lead ECG (06.13.18 @ 14:56) >  Atrial fibrillation  Right bundle branch block  Left posterior fascicular block  *** Bifascicular block ***  Cannot rule out Inferior infarct , age undetermined  T wave abnormality, consider lateral ischemia  Abnormal ECG    < end of copied text >    < from: Transthoracic Echocardiogram (06.18.18 @ 10:33) >  CONCLUSIONS:  1. Mitral annular calcification, otherwise normal mitral  valve. Mild mitral regurgitation.  2. Severely dilated left atrium.  LA volume index = 69  cc/m2.  3. Normal left ventricular internal dimensions and wall  thicknesses.  4. Endocardium not well visualized; grossly normal left  ventricular systolic function.  5. Moderate right atrial enlargement.  6. Right ventricular enlargement with decreased right  ventricular systolic function.  7. Estimated right ventricular systolic pressure equals 53  mm Hg, assuming right atrial pressure equals 10 mm Hg,  consistent with moderate pulmonary hypertension.  8. Normal tricuspid valve.  Moderate-severe tricuspid  regurgitation.  *** No previous Echo exam.  ------------------------------------------------------------------------  Confirmed on  6/18/2018 - 12:27:29 by Lance Ferris M.D.    < end of copied text >      ASSESSMENT/PLAN: 	89 year old Female with history of Afib on AC, HTN, CKD admitted with syncope and volume overload c/w acute on chronic diastolic and Right sided CHF, with chronic bifasicular block and bradycardia, with pauses <3 seconds    --Family declined PPM  --Plan for OP event monitor per EP  --Pt  family does not want invasive cv procedures at this time, therefore conservative cardiac care and medical management recommended at this time, will avoid AVN blockers  --Lasix held for NIKHIL  --Today with Hypokalemia, supplemented   --f/u renal --restart Lasix as outpt   --DC planning to [facility] pending consultants f/U  --On Coumadin, goal INR 2-3  --given elevated BP, avoid AVN blockers, c/w Hydralazine 25 BID.  (Pts daughter in law who is a physician prefers Hydralazine over ARB)  --

## 2018-06-29 NOTE — PROGRESS NOTE ADULT - PROBLEM SELECTOR PLAN 4
on AC per cardio
on AC per cardio  Plan for PPM placement

## 2018-06-29 NOTE — DISCHARGE NOTE ADULT - ADDITIONAL INSTRUCTIONS
CHECK INR AND CREATININE LABS DAILY  1800 calorie diabetic diet/ low salt, low fat , low cholesterol

## 2018-06-29 NOTE — DISCHARGE NOTE ADULT - PATIENT PORTAL LINK FT
You can access the [facility] Portal, offered by [facility], by registering with the following website: [email or web address]

## 2018-06-29 NOTE — PROGRESS NOTE ADULT - PROVIDER SPECIALTY LIST ADULT
Cardiology
Electrophysiology
Gastroenterology
Internal Medicine
Nephrology
Cardiology
Cardiology
Nephrology
Internal Medicine
Gastroenterology

## 2018-06-29 NOTE — DISCHARGE NOTE ADULT - PLAN OF CARE
To restore or maintain a normal heart rate and rhythm, to prevent blood clots, and decrease the risks of stroke CVA/TIA. Please take your medications as prescribed.  Continue to take warfarin (Coumadin) as prescribed. Goal INR level is 2-3. Please follow up with your physician to monitor your INR levels at first weekly. You have an appointment with the cardiology clinic on 7/05/2018 Cardionet will be delivered a week after discharge. You have an appointment with Dr. Perdomo on on 8/19/2018 at 11 am at [facility] outpatient EP clinic. To relieve and prevent worsening symptoms associated with congestive heart failure, to improve quality of life, and to treat underlying conditions such as coronary heart disease, high blood pressure, or diabetes, and to maintain euvolemia. Continue blood pressure, cholesterol and diabetic medications. Goal of hemoglobin A1C (HgbA1C) < 7%.  Avoid nephrotoxic drugs such as nonsteroidal anti-inflammatory agents (NSAIDs).   Please follow up with your nephrologist to monitor your kidney function, continue with low protein and potassium diet. To prevent shortness of breath, fluid overload, and electrolytes imbalance, and to slow down worsening kidney disease. Please take your medications as prescribed.  Continue to take warfarin (Coumadin) as prescribed. Goal INR level is 2-3. Please follow up with your physician to monitor your INR levels at first weekly. You have an appointment with the cardiology clinic on 7/05/2018  Cardionet will be delivered a week after discharge. You have an appointment with Dr. Perdomo on on 8/19/2018 at 11 am at [facility] outpatient EP clinic.  follow up cardiologist Dr Crump in one week To relieve and prevent worsening symptoms associated with congestive heart failure, to improve quality of life, and to treat underlying conditions such as coronary heart disease, high blood pressure, or diabetes, and to maintain euvolemia.  low salt, low fat, low cholesterol  continue lasix, hydralazine, aspirin Continue blood pressure, cholesterol and diabetic medications. Goal of hemoglobin A1C (HgbA1C) < 7%.  Avoid nephrotoxic drugs such as nonsteroidal anti-inflammatory agents (NSAIDs).   Please follow up with your nephrologist to monitor your kidney function, continue with low protein and potassium diet.  follow up nephrologist in one week 1800 calorie diabetic diet/ low salt, low fat , low cholesterol   continue levemir continue eye drops continue levothyroxine

## 2018-06-29 NOTE — PROGRESS NOTE ADULT - PROBLEM SELECTOR PROBLEM 2
Atrial fibrillation, unspecified type
Hypertension

## 2018-06-29 NOTE — PROGRESS NOTE ADULT - SUBJECTIVE AND OBJECTIVE BOX
Patient seen and examined at bedside.  Doing better.  Breathing improved.      Medications:  allopurinol 100 milliGRAM(s) Oral daily  aspirin enteric coated 81 milliGRAM(s) Oral daily  brimonidine 0.2% Ophthalmic Solution 1 Drop(s) Both EYES three times a day  carvedilol 25 milliGRAM(s) Oral every 12 hours  cilostazol 100 milliGRAM(s) Oral two times a day  dextrose 40% Gel 15 Gram(s) Oral once PRN  dextrose 5%. 1000 milliLiter(s) IV Continuous <Continuous>  dextrose 50% Injectable 12.5 Gram(s) IV Push once  dextrose 50% Injectable 25 Gram(s) IV Push once  dextrose 50% Injectable 25 Gram(s) IV Push once  dorzolamide 2%/timolol 0.5% Ophthalmic Solution 1 Drop(s) Both EYES two times a day  furosemide   Injectable 40 milliGRAM(s) IV Push two times a day  glucagon  Injectable 1 milliGRAM(s) IntraMuscular once PRN  insulin glargine Injectable (LANTUS) 15 Unit(s) SubCutaneous at bedtime  insulin lispro (HumaLOG) corrective regimen sliding scale   SubCutaneous three times a day before meals  insulin lispro (HumaLOG) corrective regimen sliding scale   SubCutaneous at bedtime  latanoprost 0.005% Ophthalmic Solution 1 Drop(s) Both EYES at bedtime  levothyroxine 100 MICROGram(s) Oral daily  simvastatin 20 milliGRAM(s) Oral at bedtime      PAST MEDICAL & SURGICAL HISTORY:  Personal history of PE (pulmonary embolism)  Glaucoma  PVD (peripheral vascular disease)  Hypothyroid  HTN (hypertension)  HLD (hyperlipidemia)  CHF (congestive heart failure)  DM (diabetes mellitus)  Afib  Elective surgery: abdominal abcess removals  History of partial thyroidectomy        Vitals:  T(F): 97.4 (06-15), Max: 97.6 (06-14)  HR: 61 (06-15) (58 - 67)  BP: 101/55 (06-15) (101/55 - 131/65)  RR: 18 (06-15)  SpO2: 100% (06-15)  I&O's Summary    14 Jun 2018 07:01  -  15 Jun 2018 07:00  --------------------------------------------------------  IN: 200 mL / OUT: 300 mL / NET: -100 mL        Physical Exam:  GENERAL: No acute distress, well-developed  HEAD:  Atraumatic, Normocephalic  ENT: EOMI, PERRLA, conjunctiva and sclera clear, Neck supple, JVP 8 cm H20; improved    CHEST/LUNG: Mild crackles w/ no wheeze/rhonchi; improved   BACK: No spinal tenderness  HEART: Irreg rhythm, S1 variable, unable to appreciate S2; III/VI systolic ejection murmur;   ABDOMEN: Soft, Nontender, Nondistended; Bowel sounds present  EXTREMITIES:  no BLE pitting edema; improved   PSYCH: Nl behavior, nl affect  NEUROLOGY: AAOx3, non-focal, cranial nerves intact  SKIN: Normal color, No rashes or lesions                          9.2    4.66  )-----------( 158      ( 15 Sonny 2018 07:00 )             30.5     06-15    143  |  103  |  65<H>  ----------------------------<  80  4.7   |  33<H>  |  2.59<H>    Ca    9.5      15 Sonny 2018 07:00  Phos  4.5     06-15  Mg     2.3     06-15    TPro  7.4  /  Alb  3.8  /  TBili  0.5  /  DBili  x   /  AST  20  /  ALT  20  /  AlkPhos  83  06-13    PT/INR - ( 13 Jun 2018 15:20 )   PT: 32.9 SEC;   INR: 2.80          PTT - ( 13 Jun 2018 15:20 )  PTT:37.3 SEC  CARDIAC MARKERS ( 14 Jun 2018 09:20 )  x     / x     / 37 u/L / 3.00 ng/mL / x          Serum Pro-Brain Natriuretic Peptide: 3380 pg/mL (06-14 @ 06:15)  Serum Pro-Brain Natriuretic Peptide: 2943 pg/mL (06-13 @ 17:19)    Interpretation of Telemetry:  afib, HR 60s-70s    Assessment     88 yo woman w/ hx of chronic afib/flutter (on coumadin), CHF? (unknown EF), DM, HTN, hypothyroidism who presented for syncope in the setting of chronic bifascicular block and slow Afib (HR 50s this morning).  HR improved w/ no recurrent syncope   Euvolemic. TTE w/ preserved LV function   Multiple discussions w/ family.  refusing PPM placement at this point.  Cardianet ordered; will be mailed out this week   has outpatient appts w/ Dr. Perdomo for 7/5/2018 and 8/19/2018     RECS  - cont holding AV jimy blockers; resume rest of home meds   - no EP objections for discharge     A Aubrey CLIFFORD   [place] [facility]

## 2018-06-29 NOTE — PROGRESS NOTE ADULT - PROBLEM SELECTOR PLAN 3
c/w lasix to 80mg bid and metalozone  10  daily  low NA in diet, fluid restriction  Pt and family declining PPM placement at this time.
c/w lasix to 80mg bid and metalozone  10  daily  low NA in diet, fluid restriction  f/u ECHO. PPM plan as per cardio
c/w lasix to 80mg bid and metalozone  10  daily  low NA in diet, fluid restriction. Replete K to goal 4.0.   Improving volume status   Pt and family declining PPM placement at this time.
diuretics as above  low NA in diet, fluid restriction. Replete K to goal 4.0.   Improving volume status   Pt and family declining PPM placement at this time.
diuretics as above  low NA in diet, fluid restriction. Replete K to goal 4.0.   Improving volume status   Pt and family declining PPM placement at this time.
diuretics as above  low NA in diet, fluid restriction. Replete K to goal 4.0. agree w/repleting w/Kcl  Improving volume status   Pt and family declining PPM placement at this time.
diuretics as above  low NA in diet, fluid restriction. Replete K to goal 4.0. agree w/repleting w/Kcl  Improving volume status   Pt and family declining PPM placement at this time.
inc lasix to 80mg bid. If still remains inadequate, consider adding metolazone 5mg 1/2 he before AM lasix dose tomorrow.  strict I/O. low NA in diet, fluid restriction  f/u ECHO. PPM plan as per cardio
inc lasix to 80mg bid. If still remains inadequate, add  metalozone  10    strict I/O. low NA in diet, fluid restri  f/u ECHO. PPM plan as per cardio
diuretics as above  low NA in diet, fluid restriction. Replete K to goal 4.0.   Improved volume status   Pt and family declined PPM placement

## 2018-06-29 NOTE — PROGRESS NOTE ADULT - PROBLEM SELECTOR PROBLEM 1
R/O Stefany
CKD (chronic kidney disease), stage IV

## 2018-06-29 NOTE — PROGRESS NOTE ADULT - ATTENDING COMMENTS
Agree with above assessment and plan as outlined above.    - cont medical management    Joe Crump MD, FACC
Patient seen and examined, agree with above assessment and plan as transcribed above.    - EP f/u for possible PPM    Joe Crump MD, FACC
Patient seen and examined, agree with above assessment and plan as transcribed above.    - Family declined PPM  - D/C to subacute rehab    Joe Crump MD, FACC
Patient seen and examined, agree with above assessment and plan as transcribed above.    - outpatient event monitor  - D/C planning     Joe Crump MD, FACC
Patient seen and examined, agree with above assessment and plan as transcribed above.    - start Heparin gtt  - coumadin INR 2-3  - PPM can be implanted once INR therapeutic if ok with EP    Joe Crump MD, FACC
Patient seen and examined.  Agree with above.   -appears euvolemic  -hold lasix; follow up renal    Trisha Banks MD
Patient seen and examined.  Agree with above.   -continue with IV diuresis  -stool guiac negative   -continue with ac  -pt. and pt. family want conservative cv care at this time    Trisha Banks MD
Patient seen and examined.  Agree with above.   -continue with iv lasix  -volume status improving, anticipate changing to oral lasix tomorrow    Trisha Banks MD
Patient seen and examined.  Agree with above.   -hold lasix given rising cr  -follow up renal    Trisha Banks MD
Patient seen and examined.  Agree with above.   -still grossly volume overloaded  -lasix increased today  -follow up ep    Trisha Banks MD
Patient seen and examined.  Agree with above.   -volume status improving  -follow up renal    Trisha Banks MD
Patient seen and examined.  Agree with above.   -volume status is improving  -continue with diuresis    Trisha Banks MD
Patient seen and examined.  Agree with above.   -hold beta blockers  -continue with IV diuresis  -check stool guiac - gi albaal    Trisha Banks MD
Advanced care planning was discussed with patient and family.  Advanced care planning forms were reviewed and discussed.  Risks, benefits and alternatives of gastroenterologic procedures were discussed in detail and all questions were answered.    25 minutes spent.

## 2018-06-29 NOTE — PROGRESS NOTE ADULT - SUBJECTIVE AND OBJECTIVE BOX
INTERVAL HPI/OVERNIGHT EVENTS:    denies n/v/d/c, abdominal pain, melena or brbpr     MEDICATIONS  (STANDING):  allopurinol 100 milliGRAM(s) Oral daily  aspirin enteric coated 81 milliGRAM(s) Oral daily  cilostazol 100 milliGRAM(s) Oral two times a day  dextrose 5%. 1000 milliLiter(s) (50 mL/Hr) IV Continuous <Continuous>  dextrose 50% Injectable 12.5 Gram(s) IV Push once  dextrose 50% Injectable 25 Gram(s) IV Push once  dextrose 50% Injectable 25 Gram(s) IV Push once  docusate sodium 100 milliGRAM(s) Oral three times a day  hydrALAZINE 25 milliGRAM(s) Oral every 12 hours  insulin glargine Injectable (LANTUS) 15 Unit(s) SubCutaneous at bedtime  insulin lispro (HumaLOG) corrective regimen sliding scale   SubCutaneous three times a day before meals  insulin lispro (HumaLOG) corrective regimen sliding scale   SubCutaneous at bedtime  latanoprost 0.005% Ophthalmic Solution 1 Drop(s) Both EYES at bedtime  levothyroxine 100 MICROGram(s) Oral daily  nystatin Powder 1 Application(s) Topical two times a day  pantoprazole    Tablet 40 milliGRAM(s) Oral two times a day  polyethylene glycol 3350 17 Gram(s) Oral two times a day  senna 2 Tablet(s) Oral at bedtime  simvastatin 20 milliGRAM(s) Oral at bedtime  warfarin 5 milliGRAM(s) Oral once    MEDICATIONS  (PRN):  dextrose 40% Gel 15 Gram(s) Oral once PRN Blood Glucose LESS THAN 70 milliGRAM(s)/deciliter  glucagon  Injectable 1 milliGRAM(s) IntraMuscular once PRN Glucose LESS THAN 70 milligrams/deciliter  melatonin 3 milliGRAM(s) Oral at bedtime PRN Insomnia      Allergies    No Known Allergies    Intolerances        Review of Systems:    General:  No wt loss, fevers, chills, night sweats, fatigue   Eyes:  Good vision, no reported pain  ENT:  No sore throat, pain, runny nose, dysphagia  CV:  No pain, palpitations, hypo/hypertension  Resp:  No dyspnea, cough, tachypnea, wheezing  GI:  No pain, No nausea, No vomiting, No diarrhea, No constipation, No weight loss, No fever, No pruritis, No rectal bleeding, No melena, No dysphagia  :  No pain, bleeding, incontinence, nocturia  Muscle:  No pain, weakness  Neuro:  No weakness, tingling, memory problems  Psych:  No fatigue, insomnia, mood problems, depression  Endocrine:  No polyuria, polydypsia, cold/heat intolerance  Heme:  No petechiae, ecchymosis, easy bruisability  Skin:  No rash, tattoos, scars, edema      Vital Signs Last 24 Hrs  T(C): 37 (29 Jun 2018 06:16), Max: 37 (29 Jun 2018 06:16)  T(F): 98.6 (29 Jun 2018 06:16), Max: 98.6 (29 Jun 2018 06:16)  HR: 82 (29 Jun 2018 06:16) (75 - 82)  BP: 124/58 (29 Jun 2018 06:16) (117/57 - 147/67)  BP(mean): --  RR: 18 (29 Jun 2018 06:16) (18 - 18)  SpO2: 100% (29 Jun 2018 06:16) (98% - 100%)    PHYSICAL EXAM:    Constitutional: NAD  HEENT: EOMI, throat clear  Neck: No LAD, supple  Respiratory: CTA and P  Cardiovascular: S1 and S2, RRR, no M  Gastrointestinal: BS+, soft, NT/ND, neg HSM,  Extremities: No peripheral edema, neg clubbing, cyanosis  Vascular: 2+ peripheral pulses  Neurological: A/O x 3, no focal deficits  Psychiatric: Normal mood, normal affect  Skin: No rashes      LABS:                        10.8   6.79  )-----------( 159      ( 29 Jun 2018 06:00 )             31.9     06-29    139  |  92<L>  |  97<H>  ----------------------------<  112<H>  3.3<L>   |  34<H>  |  3.23<H>    Ca    9.5      29 Jun 2018 06:00  Mg     2.3     06-29      PT/INR - ( 29 Jun 2018 06:00 )   PT: 29.2 SEC;   INR: 2.49          PTT - ( 29 Jun 2018 06:00 )  PTT:34.7 SEC      RADIOLOGY & ADDITIONAL TESTS:

## 2018-06-29 NOTE — PROGRESS NOTE ADULT - PROBLEM SELECTOR PLAN 1
- occult negative  - favor related to iron use  - trend cbc   - transfuse to keep Hgb >7.5  - hgb remains stable  - cont protonix 40mg BID  - simethicone q6h for gas   - occult negative  - no gi objection to a/c given stability in Hgb, negative occult and resolution of black stools; favor related to home iron use
- occult negative  - related to iron use  - trend cbc   - transfuse to keep Hgb >7.5  - hgb remains stable  - cont protonix 40mg PO QD  - simethicone q6h PRN for gas   - stools now brown   - occult negative  - no gi objection to a/c given stability in Hgb, negative occult and resolution of black stools; favor related to home iron use
- occult negative  - related to iron use; having brown stools now  - hgb remains stable  - cont protonix 40mg PO QD given on a/c for afib  - simethicone q6h PRN for gas   - no gi objection to a/c given stability in Hgb, negative occult and resolution of black stools; favor related to home iron use
- favor related to iron use  - trend cbc   - transfuse to keep Hgb >7.5  - hgb stable  - cont protonix 40mg BID  - simethicone q6h for gas   - occult pending  - no gi objection to a/c given stability in Hgb and resolution of black stools; favor related to home iron use
- occult negative  - favor related to iron use  - trend cbc   - transfuse to keep Hgb >7.5  - hgb remains stable  - cont protonix 40mg BID  - simethicone q6h for gas   - occult negative  - no gi objection to a/c given stability in Hgb, negative occult and resolution of black stools; favor related to home iron use
- occult negative  - favor related to iron use  - trend cbc   - transfuse to keep Hgb >7.5  - hgb remains stable  - cont protonix 40mg BID  - simethicone q6h for gas   - stools now brown   - occult negative  - no gi objection to a/c given stability in Hgb, negative occult and resolution of black stools; favor related to home iron use
- occult negative  - related to iron use; having brown stools now  - hgb remains stable  - cont protonix 40mg PO QD given on a/c for afib  - simethicone q6h PRN for gas   - no gi objection to a/c given stability in Hgb, negative occult and resolution of black stools; favor related to home iron use  - no gi objection to dc planning per primary team
- unclear if true melena or 2/2 PO iron use   - trend cbc   - transfuse to keep Hgb >7.5  - cont protonix 40mg BID  - simethicone q6h for gas   - awaiting occult  - may need endoscopic evaluation   - tolerating po intake
Cr relatively stable, slightly higher, fluctuations expected. Electrolytes acceptable and improving volume status.   Inaccurate document urine output.  On lasix 80mg IV BID and metolazone, diuresis as per cards team.  check vbg- if bicarb continues to rise then will consider diamox
Cr relatively stable, slightly higher, fluctuations expected. Electrolytes acceptable and improving volume status.   On lasix 80mg IV BID and metolazone, diuresis as per cards team.  check vbg- if bicarb continues to rise then will consider diamox
Cr relatively stable.   Electrolytes acceptable.   Continue with IV diuresis at this time, strict I/O.  Not well documented UOP.   ECHO pending. May need cardiac cath in near future, if pts and her family agreeable.
Cr relatively stable. Electrolytes acceptable and improving volume status.   Inaccurate document urine output.  Continue diuresis as per cards team.
Cr relatively stable. Electrolytes acceptable and improving volume status.   Inaccurate document urine output.  On lasix 80mg IBP BID and metolazone, diuresis as per cards team.
Cr relatively stable. Electrolytes acceptable. hypervolemic  Continue with IV diuresis at this time, strict I/O.  ECHO pending.   monitor BMP daily
Cr relatively stable. Electrolytes acceptable. hypervolemic  Continue with IV diuresis at this time, strict I/O.  ECHO pending. no invasive cardiac work up per pt and family  monitor BMP daily
Cr relatively stable. Electrolytes acceptable. remains very hypervolemic w/inadequate u/o, 250ml past 24 hrs noted  c/w lasix to 80mg bid and metolazone 10mg daily  incomplete u/o documentation 2/2 urine incontinence. monitor daily weights.   monitor BMP daily
Cr relatively stable. Electrolytes acceptable. remains very hypervolemic w/inadequate u/o, 250ml past 24 hrs noted  inc lasix to 80mg bid.   strict I/O.  monitor BMP daily
Cr trending up, 2.4>2.8. Electrolytes acceptable and improving volume status.   consider dec lasix 80mg IV BID to 40mg ivp bid or 80mg po bid  c/w metolazone  check vbg- if bicarb continues to rise then will consider adding diamox  rpt BMP in AM  avoid ACEi/ARB/NSAIDS/nephrotoxics/iv contrast
NIKHIL on CKD, suspect related to overdiuresis. Improved cr today.   Electrolytes acceptable and clinically euvolemic.   Recommend holding diuretics for another day, can resume at lower dose if cr continues to improve.   Encourage increase PO intake.   Allopurinol dose appropriate for renal function.   avoid ACEi/ARB/NSAIDS/nephrotoxics/iv contrast
NIKHIL on CKD, suspect related to overdiuresis. Stable cr today.   Electrolytes acceptable and clinically euvolemic.   Recommend holding diuretics for another day, can resume at lower dose if cr continues to improve tomorrow.   Encourage increase PO intake. Would avoid IVF at time time, given recent diuresis for fluid overload.   Allopurinol dose appropriate for renal function.   avoid ACEi/ARB/NSAIDS/nephrotoxics/iv contrast
Significant rise in cr, NIKHIL onCKD, suspect related to overdiuresis.   Electrolytes acceptable and clinically euvolemic.   Recommend holding diuretics for now. Encourage increase PO intake.   avoid ACEi/ARB/NSAIDS/nephrotoxics/iv contrast
Cr relatively stable. Electrolytes acceptable. remains very hypervolemic w/inadequate u/o, 250ml past 24 hrs noted  inc lasix to 80mg bid.   strict I/O.  monitor BMP daily
NIKHIL on CKD, likely related to overdiuresis. Stable cr today, 3.2  hypervolemia improved. K low s/p kcl 20meq pox1, redose w/another 20meq po. magnesium adequate  ok to resume lasix tonight at 40mg po bid  Allopurinol dose appropriate for renal function.   avoid ACEi/ARB/NSAIDS/nephrotoxics/iv contrast

## 2018-06-29 NOTE — DISCHARGE NOTE ADULT - NS AS ACTIVITY OBS
Stairs allowed/Walking-Indoors allowed/Do not make important decisions/Do not drive or operate machinery/No Heavy lifting/straining/Showering allowed

## 2018-06-29 NOTE — PROGRESS NOTE ADULT - PROBLEM SELECTOR PROBLEM 4
Atrial fibrillation, unspecified type

## 2018-06-29 NOTE — PROGRESS NOTE ADULT - NSHPATTENDINGPLANDISCUSS_GEN_ALL_CORE
pt
pt, daughter bedside, RN
pt
pt, daughter
pt, RN, SON,  and PA
pt, daughter bedside at length, Dr Guru Banks
pt, son bedside

## 2018-06-29 NOTE — DISCHARGE NOTE ADULT - MEDICATION SUMMARY - MEDICATIONS TO STOP TAKING
I will STOP taking the medications listed below when I get home from the hospital:    carvedilol 25 mg oral tablet  -- 1 tab(s) by mouth 2 times a day    glipiZIDE 5 mg oral tablet  -- 1 tab(s) by mouth 2 times a day

## 2018-06-29 NOTE — PROGRESS NOTE ADULT - PROBLEM SELECTOR PLAN 2
- rate control  - no gi objection to continuing a/c at this time  - gi ppx with protonix bid
- rate control  - no gi objection to continuing a/c at this time  - gi ppx with protonix qd for discharge while on a/c
- rate control  - no gi objection to continuing a/c at this time  - gi ppx with protonix qd for discharge while on a/c
- rate control  - no gi objection to continuing a/c at this time  - gi ppx with protonix bid
- rate control  - no gi objection to continuing a/c at this time  - gi ppx with protonix bid may change to qd for discharge
- rate control  - no gi objection to continuing a/c at this time  - gi ppx with protonix bid may change to qd for discharge
- rate control  - no gi objection to continuing a/c at this time  - gi ppx with protonix qd for discharge while on a/c
BP controlled on current regimen. Getting diuresis.   BB changed to hydralazine due to bradycardia.
BP controlled on current regimen. Getting diuresis.  Hold off on starting ACE/ARB for now.
BP controlled on current regimen. Getting diuresis.  Hold off on starting ACEi/ARB for now.
BP controlled on current regimen. Getting diuresis.  Hold off on starting ACEi/ARB for now.
BP controlled on current regimen. Getting diuresis. c/w BB  Hold off on starting ACEi/ARB for now.
BP controlled on current regimen. Getting diuresis. c/w BB  Hold off on starting ACEi/ARB for now.
BP controlled on current regimen. Hold diuresis due to NIKHIL.   BB changed to hydralazine due to bradycardia.
BP controlled on current regimen. Getting diuresis. c/w BB  Hold off on starting ACEi/ARB for now.
BP controlled on current regimen.   BB changed to hydralazine due to bradycardia.

## 2018-07-05 ENCOUNTER — NON-APPOINTMENT (OUTPATIENT)
Age: 83
End: 2018-07-05

## 2018-07-05 ENCOUNTER — APPOINTMENT (OUTPATIENT)
Dept: ELECTROPHYSIOLOGY | Facility: CLINIC | Age: 83
End: 2018-07-05
Payer: MEDICARE

## 2018-07-05 VITALS
DIASTOLIC BLOOD PRESSURE: 64 MMHG | RESPIRATION RATE: 16 BRPM | HEART RATE: 84 BPM | OXYGEN SATURATION: 98 % | SYSTOLIC BLOOD PRESSURE: 104 MMHG

## 2018-07-05 DIAGNOSIS — I45.2 BIFASCICULAR BLOCK: ICD-10-CM

## 2018-07-05 DIAGNOSIS — R00.1 BRADYCARDIA, UNSPECIFIED: ICD-10-CM

## 2018-07-05 DIAGNOSIS — R55 SYNCOPE AND COLLAPSE: ICD-10-CM

## 2018-07-05 PROCEDURE — 99215 OFFICE O/P EST HI 40 MIN: CPT

## 2018-07-05 PROCEDURE — 93000 ELECTROCARDIOGRAM COMPLETE: CPT

## 2018-07-09 PROBLEM — R00.1 BRADYCARDIA: Status: ACTIVE | Noted: 2018-07-09

## 2018-07-09 PROBLEM — R55 SYNCOPE: Status: ACTIVE | Noted: 2018-07-09

## 2018-07-09 PROBLEM — I45.2 RBBB (RIGHT BUNDLE BRANCH BLOCK WITH LEFT ANTERIOR FASCICULAR BLOCK): Status: ACTIVE | Noted: 2018-07-09

## 2018-07-09 RX ORDER — FUROSEMIDE 40 MG/1
40 TABLET ORAL TWICE DAILY
Refills: 0 | Status: ACTIVE | COMMUNITY

## 2018-07-09 RX ORDER — OFLOXACIN 3 MG/ML
0.3 SOLUTION/ DROPS OPHTHALMIC
Qty: 5 | Refills: 0 | Status: DISCONTINUED | COMMUNITY
Start: 2017-06-23 | End: 2018-07-09

## 2018-07-09 RX ORDER — LEVOTHYROXINE SODIUM 0.1 MG/1
100 TABLET ORAL DAILY
Refills: 0 | Status: ACTIVE | COMMUNITY

## 2018-07-09 RX ORDER — WARFARIN 3 MG/1
3 TABLET ORAL DAILY
Refills: 0 | Status: ACTIVE | COMMUNITY
Start: 2017-06-14

## 2018-07-09 RX ORDER — SIMVASTATIN 20 MG/1
20 TABLET, FILM COATED ORAL DAILY
Qty: 90 | Refills: 3 | Status: ACTIVE | COMMUNITY
Start: 2018-05-31

## 2018-07-09 RX ORDER — VALSARTAN 40 MG/1
TABLET, COATED ORAL
Refills: 0 | Status: DISCONTINUED | COMMUNITY
End: 2018-07-09

## 2018-07-09 RX ORDER — ACETAMINOPHEN/DIPHENHYDRAMINE 500MG-25MG
81 TABLET ORAL
Qty: 30 | Refills: 0 | Status: DISCONTINUED | COMMUNITY
Start: 2018-02-28

## 2018-07-09 RX ORDER — INSULIN DETEMIR 100 [IU]/ML
100 INJECTION, SOLUTION SUBCUTANEOUS
Qty: 15 | Refills: 0 | Status: DISCONTINUED | COMMUNITY
Start: 2017-08-28 | End: 2018-07-09

## 2018-07-09 RX ORDER — HYDROCORTISONE 1 %
12 CREAM (GRAM) TOPICAL
Refills: 0 | Status: DISCONTINUED | COMMUNITY
End: 2018-07-09

## 2018-07-09 RX ORDER — LOVASTATIN 40 MG/1
40 TABLET ORAL
Qty: 90 | Refills: 0 | Status: DISCONTINUED | COMMUNITY
Start: 2016-11-16 | End: 2018-07-09

## 2018-07-09 RX ORDER — ATORVASTATIN CALCIUM 40 MG/1
40 TABLET, FILM COATED ORAL
Qty: 90 | Refills: 0 | Status: DISCONTINUED | COMMUNITY
Start: 2018-02-28

## 2018-07-09 RX ORDER — AMLODIPINE BESYLATE 10 MG/1
10 TABLET ORAL
Qty: 90 | Refills: 0 | Status: DISCONTINUED | COMMUNITY
Start: 2017-12-29

## 2018-07-09 RX ORDER — HYDRALAZINE HYDROCHLORIDE 25 MG/1
25 TABLET ORAL TWICE DAILY
Refills: 0 | Status: ACTIVE | COMMUNITY
Start: 2018-04-13

## 2018-07-09 RX ORDER — CARVEDILOL 25 MG/1
25 TABLET, FILM COATED ORAL
Qty: 60 | Refills: 0 | Status: DISCONTINUED | COMMUNITY
Start: 2018-02-28

## 2018-07-09 RX ORDER — CILOSTAZOL 100 MG/1
100 TABLET ORAL TWICE DAILY
Refills: 0 | Status: ACTIVE | COMMUNITY

## 2018-07-09 RX ORDER — NEPAFENAC 3 MG/ML
0.3 SUSPENSION/ DROPS OPHTHALMIC
Qty: 2 | Refills: 0 | Status: DISCONTINUED | COMMUNITY
Start: 2017-06-23 | End: 2018-07-09

## 2018-07-09 RX ORDER — SPIRONOLACTONE 25 MG/1
25 TABLET ORAL
Qty: 36 | Refills: 0 | Status: DISCONTINUED | COMMUNITY
Start: 2018-02-27

## 2018-07-09 RX ORDER — VALSARTAN 160 MG/1
160 TABLET, COATED ORAL
Qty: 90 | Refills: 0 | Status: DISCONTINUED | COMMUNITY
Start: 2017-04-19 | End: 2018-07-09

## 2018-07-09 RX ORDER — METOPROLOL SUCCINATE 100 MG/1
100 TABLET, EXTENDED RELEASE ORAL
Qty: 90 | Refills: 0 | Status: DISCONTINUED | COMMUNITY
Start: 2017-08-20 | End: 2018-07-09

## 2018-07-09 RX ORDER — ASPIRIN 81 MG/1
81 TABLET ORAL DAILY
Refills: 0 | Status: ACTIVE | COMMUNITY
Start: 2018-07-09

## 2018-07-09 RX ORDER — METOPROLOL TARTRATE 100 MG/1
100 TABLET, FILM COATED ORAL
Refills: 0 | Status: DISCONTINUED | COMMUNITY
End: 2018-07-09

## 2018-07-09 RX ORDER — CLOBETASOL PROPIONATE 0.5 MG/G
0.05 CREAM TOPICAL
Refills: 0 | Status: DISCONTINUED | COMMUNITY
End: 2018-07-09

## 2018-07-09 RX ORDER — GLIPIZIDE 5 MG/1
5 TABLET ORAL
Refills: 0 | Status: DISCONTINUED | COMMUNITY
End: 2018-07-09

## 2018-07-09 RX ORDER — FERRIC CITRATE 210 MG/1
1 GM TABLET, COATED ORAL
Qty: 180 | Refills: 0 | Status: DISCONTINUED | COMMUNITY
Start: 2018-04-03

## 2018-07-09 RX ORDER — CLOTRIMAZOLE AND BETAMETHASONE DIPROPIONATE 10; .5 MG/G; MG/G
1-0.05 CREAM TOPICAL
Qty: 45 | Refills: 0 | Status: DISCONTINUED | COMMUNITY
Start: 2017-01-19 | End: 2018-07-09

## 2018-07-09 RX ORDER — AMMONIUM LACTATE 12 %
12 CREAM (GRAM) TOPICAL
Qty: 385 | Refills: 0 | Status: DISCONTINUED | COMMUNITY
Start: 2018-01-25

## 2018-07-09 RX ORDER — HYDRALAZINE HYDROCHLORIDE 50 MG/1
50 TABLET ORAL
Qty: 90 | Refills: 0 | Status: DISCONTINUED | COMMUNITY
Start: 2018-04-25

## 2018-07-09 RX ORDER — AMLODIPINE BESYLATE 5 MG/1
5 TABLET ORAL
Refills: 0 | Status: DISCONTINUED | COMMUNITY
End: 2018-07-09

## 2018-07-09 RX ORDER — ALLOPURINOL 100 MG/1
100 TABLET ORAL DAILY
Refills: 0 | Status: ACTIVE | COMMUNITY

## 2018-07-09 RX ORDER — CYCLOPENTOLATE HYDROCHLORIDE 10 MG/ML
1 SOLUTION OPHTHALMIC
Qty: 2 | Refills: 0 | Status: DISCONTINUED | COMMUNITY
Start: 2017-08-21 | End: 2018-07-09

## 2018-07-09 RX ORDER — GLIPIZIDE 5 MG/1
5 TABLET ORAL
Qty: 180 | Refills: 0 | Status: DISCONTINUED | COMMUNITY
Start: 2017-04-22 | End: 2018-07-09

## 2018-07-09 RX ORDER — FUROSEMIDE 40 MG/1
40 TABLET ORAL
Qty: 90 | Refills: 0 | Status: DISCONTINUED | COMMUNITY
Start: 2018-04-25

## 2018-07-09 RX ORDER — CHLORHEXIDINE GLUCONATE 4 %
325 (65 FE) LIQUID (ML) TOPICAL
Qty: 30 | Refills: 0 | Status: DISCONTINUED | COMMUNITY
Start: 2018-03-26

## 2018-07-09 RX ORDER — INSULIN ZINC,PORK PURIFIED 100/ML
VIAL (ML) SUBCUTANEOUS
Refills: 0 | Status: DISCONTINUED | COMMUNITY
End: 2018-07-09

## 2018-07-09 RX ORDER — DIFLUPREDNATE 0.5 MG/ML
0.05 EMULSION OPHTHALMIC
Refills: 0 | Status: DISCONTINUED | COMMUNITY
End: 2018-07-09

## 2018-07-09 RX ORDER — FUROSEMIDE 80 MG/1
80 TABLET ORAL
Qty: 90 | Refills: 0 | Status: DISCONTINUED | COMMUNITY
Start: 2018-05-29

## 2018-07-09 RX ORDER — METOLAZONE 5 MG/1
5 TABLET ORAL
Qty: 90 | Refills: 0 | Status: DISCONTINUED | COMMUNITY
Start: 2017-08-17 | End: 2018-07-09

## 2018-07-12 NOTE — ED PROVIDER NOTE - CHIEF COMPLAINT
The patient is a 89y Female complaining of Bactrim Pregnancy And Lactation Text: This medication is Pregnancy Category D and is known to cause fetal risk.  It is also excreted in breast milk. Minocycline Counseling: Patient advised regarding possible photosensitivity and discoloration of the teeth, skin, lips, tongue and gums.  Patient instructed to avoid sunlight, if possible.  When exposed to sunlight, patients should wear protective clothing, sunglasses, and sunscreen.  The patient was instructed to call the office immediately if the following severe adverse effects occur:  hearing changes, easy bruising/bleeding, severe headache, or vision changes.  The patient verbalized understanding of the proper use and possible adverse effects of minocycline.  All of the patient's questions and concerns were addressed. Topical Clindamycin Pregnancy And Lactation Text: This medication is Pregnancy Category B and is considered safe during pregnancy. It is unknown if it is excreted in breast milk. Use Enhanced Medication Counseling?: No Minocycline Pregnancy And Lactation Text: This medication is Pregnancy Category D and not consider safe during pregnancy. It is also excreted in breast milk. Dapsone Pregnancy And Lactation Text: This medication is Pregnancy Category C and is not considered safe during pregnancy or breast feeding. Detail Level: Detailed Topical Retinoid counseling:  Patient advised to apply a pea-sized amount only at bedtime and wait 30 minutes after washing their face before applying.  If too drying, patient may add a non-comedogenic moisturizer. The patient verbalized understanding of the proper use and possible adverse effects of retinoids.  All of the patient's questions and concerns were addressed. Spironolactone Pregnancy And Lactation Text: This medication can cause feminization of the male fetus and should be avoided during pregnancy. The active metabolite is also found in breast milk. Doxycycline Counseling:  Patient counseled regarding possible photosensitivity and increased risk for sunburn.  Patient instructed to avoid sunlight, if possible.  When exposed to sunlight, patients should wear protective clothing, sunglasses, and sunscreen.  The patient was instructed to call the office immediately if the following severe adverse effects occur:  hearing changes, easy bruising/bleeding, severe headache, or vision changes.  The patient verbalized understanding of the proper use and possible adverse effects of doxycycline.  All of the patient's questions and concerns were addressed. Azithromycin Counseling:  I discussed with the patient the risks of azithromycin including but not limited to GI upset, allergic reaction, drug rash, diarrhea, and yeast infections. Isotretinoin Counseling: Patient should get monthly blood tests, not donate blood, not drive at night if vision affected, not share medication, and not undergo elective surgery for 6 months after tx completed. Side effects reviewed, pt to contact office should one occur. Isotretinoin Pregnancy And Lactation Text: This medication is Pregnancy Category X and is considered extremely dangerous during pregnancy. It is unknown if it is excreted in breast milk. Tetracycline Counseling: Patient counseled regarding possible photosensitivity and increased risk for sunburn.  Patient instructed to avoid sunlight, if possible.  When exposed to sunlight, patients should wear protective clothing, sunglasses, and sunscreen.  The patient was instructed to call the office immediately if the following severe adverse effects occur:  hearing changes, easy bruising/bleeding, severe headache, or vision changes.  The patient verbalized understanding of the proper use and possible adverse effects of tetracycline.  All of the patient's questions and concerns were addressed. Patient understands to avoid pregnancy while on therapy due to potential birth defects. Tazorac Pregnancy And Lactation Text: This medication is not safe during pregnancy. It is unknown if this medication is excreted in breast milk. Dapsone Counseling: I discussed with the patient the risks of dapsone including but not limited to hemolytic anemia, agranulocytosis, rashes, methemoglobinemia, kidney failure, peripheral neuropathy, headaches, GI upset, and liver toxicity.  Patients who start dapsone require monitoring including baseline LFTs and weekly CBCs for the first month, then every month thereafter.  The patient verbalized understanding of the proper use and possible adverse effects of dapsone.  All of the patient's questions and concerns were addressed. Bactrim Counseling:  I discussed with the patient the risks of sulfa antibiotics including but not limited to GI upset, allergic reaction, drug rash, diarrhea, dizziness, photosensitivity, and yeast infections.  Rarely, more serious reactions can occur including but not limited to aplastic anemia, agranulocytosis, methemoglobinemia, blood dyscrasias, liver or kidney failure, lung infiltrates or desquamative/blistering drug rashes. Erythromycin Pregnancy And Lactation Text: This medication is Pregnancy Category B and is considered safe during pregnancy. It is also excreted in breast milk. Birth Control Pills Pregnancy And Lactation Text: This medication should be avoided if pregnant and for the first 30 days post-partum. Azithromycin Pregnancy And Lactation Text: This medication is considered safe during pregnancy and is also secreted in breast milk. Erythromycin Counseling:  I discussed with the patient the risks of erythromycin including but not limited to GI upset, allergic reaction, drug rash, diarrhea, increase in liver enzymes, and yeast infections. High Dose Vitamin A Pregnancy And Lactation Text: High dose vitamin A therapy is contraindicated during pregnancy and breast feeding. High Dose Vitamin A Counseling: Side effects reviewed, pt to contact office should one occur. Doxycycline Pregnancy And Lactation Text: This medication is Pregnancy Category D and not consider safe during pregnancy. It is also excreted in breast milk but is considered safe for shorter treatment courses. Topical Clindamycin Counseling: Patient counseled that this medication may cause skin irritation or allergic reactions.  In the event of skin irritation, the patient was advised to reduce the amount of the drug applied or use it less frequently.   The patient verbalized understanding of the proper use and possible adverse effects of clindamycin.  All of the patient's questions and concerns were addressed. Birth Control Pills Counseling: Birth Control Pill Counseling: I discussed with the patient the potential side effects of OCPs including but not limited to increased risk of stroke, heart attack, thrombophlebitis, deep venous thrombosis, hepatic adenomas, breast changes, GI upset, headaches, and depression.  The patient verbalized understanding of the proper use and possible adverse effects of OCPs. All of the patient's questions and concerns were addressed. Tazorac Counseling:  Patient advised that medication is irritating and drying.  Patient may need to apply sparingly and wash off after an hour before eventually leaving it on overnight.  The patient verbalized understanding of the proper use and possible adverse effects of tazorac.  All of the patient's questions and concerns were addressed. Topical Retinoid Pregnancy And Lactation Text: This medication is Pregnancy Category C. It is unknown if this medication is excreted in breast milk. Benzoyl Peroxide Counseling: Patient counseled that medicine may cause skin irritation and bleach clothing.  In the event of skin irritation, the patient was advised to reduce the amount of the drug applied or use it less frequently.   The patient verbalized understanding of the proper use and possible adverse effects of benzoyl peroxide.  All of the patient's questions and concerns were addressed. Topical Sulfur Applications Counseling: Topical Sulfur Counseling: Patient counseled that this medication may cause skin irritation or allergic reactions.  In the event of skin irritation, the patient was advised to reduce the amount of the drug applied or use it less frequently.   The patient verbalized understanding of the proper use and possible adverse effects of topical sulfur application.  All of the patient's questions and concerns were addressed. Topical Sulfur Applications Pregnancy And Lactation Text: This medication is Pregnancy Category C and has an unknown safety profile during pregnancy. It is unknown if this topical medication is excreted in breast milk. Benzoyl Peroxide Pregnancy And Lactation Text: This medication is Pregnancy Category C. It is unknown if benzoyl peroxide is excreted in breast milk. Spironolactone Counseling: Patient advised regarding risks of diarrhea, abdominal pain, hyperkalemia, birth defects (for female patients), liver toxicity and renal toxicity. The patient may need blood work to monitor liver and kidney function and potassium levels while on therapy. The patient verbalized understanding of the proper use and possible adverse effects of spironolactone.  All of the patient's questions and concerns were addressed.

## 2018-07-19 ENCOUNTER — APPOINTMENT (OUTPATIENT)
Dept: ELECTROPHYSIOLOGY | Facility: CLINIC | Age: 83
End: 2018-07-19
Payer: MEDICARE

## 2018-07-19 VITALS
WEIGHT: 206 LBS | SYSTOLIC BLOOD PRESSURE: 176 MMHG | HEART RATE: 86 BPM | BODY MASS INDEX: 31.22 KG/M2 | HEIGHT: 68 IN | DIASTOLIC BLOOD PRESSURE: 72 MMHG | TEMPERATURE: 97.4 F | RESPIRATION RATE: 20 BRPM | OXYGEN SATURATION: 98 %

## 2018-07-19 VITALS — SYSTOLIC BLOOD PRESSURE: 179 MMHG | DIASTOLIC BLOOD PRESSURE: 88 MMHG

## 2018-07-19 DIAGNOSIS — I49.5 SICK SINUS SYNDROME: ICD-10-CM

## 2018-07-19 DIAGNOSIS — I48.2 CHRONIC ATRIAL FIBRILLATION: ICD-10-CM

## 2018-07-19 DIAGNOSIS — I50.22 CHRONIC SYSTOLIC (CONGESTIVE) HEART FAILURE: ICD-10-CM

## 2018-07-19 DIAGNOSIS — I10 ESSENTIAL (PRIMARY) HYPERTENSION: ICD-10-CM

## 2018-07-19 PROBLEM — I50.9 HEART FAILURE, UNSPECIFIED: Chronic | Status: ACTIVE | Noted: 2017-08-10

## 2018-07-19 PROBLEM — I73.9 PERIPHERAL VASCULAR DISEASE, UNSPECIFIED: Chronic | Status: ACTIVE | Noted: 2017-08-10

## 2018-07-19 PROBLEM — H40.9 UNSPECIFIED GLAUCOMA: Chronic | Status: ACTIVE | Noted: 2017-08-10

## 2018-07-19 PROBLEM — E11.9 TYPE 2 DIABETES MELLITUS WITHOUT COMPLICATIONS: Chronic | Status: ACTIVE | Noted: 2017-08-10

## 2018-07-19 PROBLEM — Z86.711 PERSONAL HISTORY OF PULMONARY EMBOLISM: Chronic | Status: ACTIVE | Noted: 2017-08-10

## 2018-07-19 PROBLEM — E03.9 HYPOTHYROIDISM, UNSPECIFIED: Chronic | Status: ACTIVE | Noted: 2017-08-10

## 2018-07-19 PROBLEM — I48.91 UNSPECIFIED ATRIAL FIBRILLATION: Chronic | Status: ACTIVE | Noted: 2017-08-10

## 2018-07-19 PROBLEM — E78.5 HYPERLIPIDEMIA, UNSPECIFIED: Chronic | Status: ACTIVE | Noted: 2017-08-10

## 2018-07-19 PROCEDURE — 99215 OFFICE O/P EST HI 40 MIN: CPT

## 2018-07-19 PROCEDURE — 93000 ELECTROCARDIOGRAM COMPLETE: CPT

## 2018-07-19 RX ORDER — DORZOLAMIDE HYDROCHLORIDE TIMOLOL MALEATE 20; 5 MG/ML; MG/ML
22.3-6.8 SOLUTION/ DROPS OPHTHALMIC
Qty: 10 | Refills: 0 | Status: DISCONTINUED | COMMUNITY
Start: 2016-11-09 | End: 2018-07-19

## 2018-07-20 PROBLEM — I49.5 TACHY-BRADY SYNDROME: Status: ACTIVE | Noted: 2018-07-09

## 2018-07-20 PROBLEM — I50.22 CHRONIC SYSTOLIC CHF (CONGESTIVE HEART FAILURE): Status: ACTIVE | Noted: 2018-07-09

## 2018-07-22 ENCOUNTER — NON-APPOINTMENT (OUTPATIENT)
Age: 83
End: 2018-07-22

## 2018-07-27 PROBLEM — Z86.73 HISTORY OF STROKE: Status: RESOLVED | Noted: 2017-12-11 | Resolved: 2018-07-27

## 2018-10-15 ENCOUNTER — APPOINTMENT (OUTPATIENT)
Dept: ELECTROPHYSIOLOGY | Facility: CLINIC | Age: 83
End: 2018-10-15

## 2018-10-16 NOTE — DIETITIAN INITIAL EVALUATION ADULT. - PROBLEM SELECTOR PLAN 5
Yes Routine blood pressure check.  Continue with current medications.   Low salt,low cholesterol, DASH diet

## 2018-12-04 NOTE — PROGRESS NOTE ADULT - SUBJECTIVE AND OBJECTIVE BOX
Patient Instructions by Marisel Souza APN at 03/05/18 04:27 PM     Author:  Marisel Souza APN Service:  (none) Author Type:  Nurse Practitioner     Filed:  03/05/18 04:28 PM Encounter Date:  3/5/2018 Status:  Signed     :  Marisel Souza APN (Nurse Practitioner)            1. Diabetes    We discussed the importance of taking medications on a regular basis to avoid long term complications.    - labs are due today.  -Restart metformin 500mg bid  -Increase Lantus to 15 units this week, 20 units next week.  -Continue Victoza 0.6mg qam     -Check BG at least 2-3 times/day  -Pt advised to call me if BG < 70 or > 300 and not coming down.  -Call or bring in meter for download 3 weeks after starting Victoza.    2. HTN - stable  -Continue lisinopril, furosemide, and Coreg    3. Lipids - stable  -Continue pravastatin    4. Ophtho - no retinopathy  -Annual dilated eye exam due now    5. Nephropathy  -Continue lisinopril    6.  Depression  -Patient has declined a referral to behavioral health    Labs due now:  A1c, microalbumin, CMP    Follow up dependent on labs        Revision History        User Key Date/Time User Provider Type Action    > [N/A] 03/05/18 04:28 PM Marisel Souaz APN Nurse Practitioner Sign             Subjective: No chest pain or sob   	  MEDICATIONS:  MEDICATIONS  (STANDING):  allopurinol 100 milliGRAM(s) Oral daily  aspirin enteric coated 81 milliGRAM(s) Oral daily  brimonidine 0.2% Ophthalmic Solution 1 Drop(s) Both EYES three times a day  carvedilol 25 milliGRAM(s) Oral every 12 hours  cilostazol 100 milliGRAM(s) Oral two times a day  dextrose 5%. 1000 milliLiter(s) (50 mL/Hr) IV Continuous <Continuous>  dextrose 50% Injectable 12.5 Gram(s) IV Push once  dextrose 50% Injectable 25 Gram(s) IV Push once  dextrose 50% Injectable 25 Gram(s) IV Push once  dorzolamide 2%/timolol 0.5% Ophthalmic Solution 1 Drop(s) Both EYES two times a day  furosemide   Injectable 40 milliGRAM(s) IV Push two times a day  insulin glargine Injectable (LANTUS) 15 Unit(s) SubCutaneous at bedtime  insulin lispro (HumaLOG) corrective regimen sliding scale   SubCutaneous three times a day before meals  insulin lispro (HumaLOG) corrective regimen sliding scale   SubCutaneous at bedtime  latanoprost 0.005% Ophthalmic Solution 1 Drop(s) Both EYES at bedtime  levothyroxine 100 MICROGram(s) Oral daily  simvastatin 20 milliGRAM(s) Oral at bedtime      LABS:	 	    CARDIAC MARKERS:  CARDIAC MARKERS ( 14 Jun 2018 09:20 )  x     / x     / 37 u/L / 3.00 ng/mL / x                                    9.2    4.66  )-----------( 158      ( 15 Sonny 2018 07:00 )             30.5     06-15    143  |  103  |  65<H>  ----------------------------<  80  4.7   |  33<H>  |  2.59<H>    Ca    9.5      15 Sonny 2018 07:00  Phos  4.5     06-15  Mg     2.3     06-15    TPro  7.4  /  Alb  3.8  /  TBili  0.5  /  DBili  x   /  AST  20  /  ALT  20  /  AlkPhos  83  06-13    proBNP:   Lipid Profile:   HgA1c:   TSH:       PHYSICAL EXAM:  T(C): 36.3 (06-15-18 @ 07:54), Max: 36.4 (06-14-18 @ 13:32)  HR: 61 (06-15-18 @ 07:54) (58 - 67)  BP: 101/55 (06-15-18 @ 07:54) (101/55 - 131/65)  RR: 18 (06-15-18 @ 07:54) (18 - 18)  SpO2: 100% (06-15-18 @ 07:54) (100% - 100%)  Wt(kg): --  I&O's Summary    14 Jun 2018 07:01  -  15 Sonny 2018 07:00  --------------------------------------------------------  IN: 200 mL / OUT: 300 mL / NET: -100 mL      Heart: normal S1, S2, RRR, no m/r/g  Lungs: cta b/l  Abd: soft nT, nD  Ext: 2+ edema in LE bilaterally       TELEMETRY:  	    ECG: < from: 12 Lead ECG (06.13.18 @ 14:56) >  Atrial fibrillation  Right bundle branch block  Left posterior fascicular block  *** Bifascicular block ***  Cannot rule out Inferior infarct , age undetermined  T wave abnormality, consider lateral ischemia  Abnormal ECG    < end of copied text >   	  RADIOLOGY:   DIAGNOSTIC TESTING:  [ ] Echocardiogram:  [ ]  Catheterization:  [ ] Stress Test:    OTHER: 	      ASSESSMENT/PLAN: 	89y Female with history of Afib on ac, HTN, CKD admitted with syncope and volume overload.   -Pt. still volume overloaded   -continue with IV diuresis to keep O > I  -check TTE  -EPS follow up for syncope and bifascicular block  -Pt. with elevated Troponin with negative CPK, likely secondary to ADHF and CKD.  Of note, I had a long discussion with the patient and the patient's hcp / daughter.  They want conservative cardiac care at this time and do not want invasive cardiac procedures (i.e. cath).   -They will discuss with family full [facility]    Trisha Banks MD Subjective: No chest pain or sob   	  MEDICATIONS:  MEDICATIONS  (STANDING):  allopurinol 100 milliGRAM(s) Oral daily  aspirin enteric coated 81 milliGRAM(s) Oral daily  brimonidine 0.2% Ophthalmic Solution 1 Drop(s) Both EYES three times a day  carvedilol 25 milliGRAM(s) Oral every 12 hours  cilostazol 100 milliGRAM(s) Oral two times a day  dextrose 5%. 1000 milliLiter(s) (50 mL/Hr) IV Continuous <Continuous>  dextrose 50% Injectable 12.5 Gram(s) IV Push once  dextrose 50% Injectable 25 Gram(s) IV Push once  dextrose 50% Injectable 25 Gram(s) IV Push once  dorzolamide 2%/timolol 0.5% Ophthalmic Solution 1 Drop(s) Both EYES two times a day  furosemide   Injectable 40 milliGRAM(s) IV Push two times a day  insulin glargine Injectable (LANTUS) 15 Unit(s) SubCutaneous at bedtime  insulin lispro (HumaLOG) corrective regimen sliding scale   SubCutaneous three times a day before meals  insulin lispro (HumaLOG) corrective regimen sliding scale   SubCutaneous at bedtime  latanoprost 0.005% Ophthalmic Solution 1 Drop(s) Both EYES at bedtime  levothyroxine 100 MICROGram(s) Oral daily  simvastatin 20 milliGRAM(s) Oral at bedtime      LABS:	 	    CARDIAC MARKERS:  CARDIAC MARKERS ( 14 Jun 2018 09:20 )  x     / x     / 37 u/L / 3.00 ng/mL / x                                    9.2    4.66  )-----------( 158      ( 15 Sonny 2018 07:00 )             30.5     06-15    143  |  103  |  65<H>  ----------------------------<  80  4.7   |  33<H>  |  2.59<H>    Ca    9.5      15 Sonny 2018 07:00  Phos  4.5     06-15  Mg     2.3     06-15    TPro  7.4  /  Alb  3.8  /  TBili  0.5  /  DBili  x   /  AST  20  /  ALT  20  /  AlkPhos  83  06-13    proBNP:   Lipid Profile:   HgA1c:   TSH:       PHYSICAL EXAM:  T(C): 36.3 (06-15-18 @ 07:54), Max: 36.4 (06-14-18 @ 13:32)  HR: 61 (06-15-18 @ 07:54) (58 - 67)  BP: 101/55 (06-15-18 @ 07:54) (101/55 - 131/65)  RR: 18 (06-15-18 @ 07:54) (18 - 18)  SpO2: 100% (06-15-18 @ 07:54) (100% - 100%)  Wt(kg): --  I&O's Summary    14 Jun 2018 07:01  -  15 Sonny 2018 07:00  --------------------------------------------------------  IN: 200 mL / OUT: 300 mL / NET: -100 mL      Heart: normal S1, S2, IRR, no m/r/g  Lungs: cta b/l  Abd: soft nT, nD  Ext: 2+ edema in LE bilaterally       TELEMETRY:  	    ECG: < from: 12 Lead ECG (06.13.18 @ 14:56) >  Atrial fibrillation  Right bundle branch block  Left posterior fascicular block  *** Bifascicular block ***  Cannot rule out Inferior infarct , age undetermined  T wave abnormality, consider lateral ischemia  Abnormal ECG    < end of copied text >   	  RADIOLOGY:   DIAGNOSTIC TESTING:  [ ] Echocardiogram:  [ ]  Catheterization:  [ ] Stress Test:    OTHER: 	      ASSESSMENT/PLAN: 	89y Female with history of Afib on ac, HTN, CKD admitted with syncope and volume overload.   -Pt. still volume overloaded   -continue with IV diuresis to keep O > I  -check TTE  -EPS follow up for syncope and bifascicular block  -Pt. with elevated Troponin with negative CPK, likely secondary to ADHF and CKD.  Of note, I had a long discussion with the patient and the patient's hcp / daughter.  They want conservative cardiac care at this time and do not want invasive cardiac procedures (i.e. cath).   -They will discuss with family full [facility]    Trisha Banks MD

## 2019-08-10 ENCOUNTER — INPATIENT (INPATIENT)
Facility: HOSPITAL | Age: 84
LOS: 4 days | Discharge: HOME HEALTH | End: 2019-08-15
Attending: INTERNAL MEDICINE | Admitting: INTERNAL MEDICINE
Payer: MEDICARE

## 2019-08-10 VITALS
SYSTOLIC BLOOD PRESSURE: 160 MMHG | DIASTOLIC BLOOD PRESSURE: 113 MMHG | RESPIRATION RATE: 18 BRPM | TEMPERATURE: 98 F | HEART RATE: 98 BPM | OXYGEN SATURATION: 100 %

## 2019-08-10 DIAGNOSIS — I50.9 HEART FAILURE, UNSPECIFIED: ICD-10-CM

## 2019-08-10 DIAGNOSIS — E89.0 POSTPROCEDURAL HYPOTHYROIDISM: Chronic | ICD-10-CM

## 2019-08-10 DIAGNOSIS — E11.9 TYPE 2 DIABETES MELLITUS WITHOUT COMPLICATIONS: ICD-10-CM

## 2019-08-10 DIAGNOSIS — I10 ESSENTIAL (PRIMARY) HYPERTENSION: ICD-10-CM

## 2019-08-10 DIAGNOSIS — Z29.9 ENCOUNTER FOR PROPHYLACTIC MEASURES, UNSPECIFIED: ICD-10-CM

## 2019-08-10 DIAGNOSIS — Z41.9 ENCOUNTER FOR PROCEDURE FOR PURPOSES OTHER THAN REMEDYING HEALTH STATE, UNSPECIFIED: Chronic | ICD-10-CM

## 2019-08-10 DIAGNOSIS — I48.91 UNSPECIFIED ATRIAL FIBRILLATION: ICD-10-CM

## 2019-08-10 LAB
ALBUMIN SERPL ELPH-MCNC: 4 G/DL — SIGNIFICANT CHANGE UP (ref 3.3–5)
ALP SERPL-CCNC: 79 U/L — SIGNIFICANT CHANGE UP (ref 40–120)
ALT FLD-CCNC: 28 U/L — SIGNIFICANT CHANGE UP (ref 4–33)
ANION GAP SERPL CALC-SCNC: 14 MMO/L — SIGNIFICANT CHANGE UP (ref 7–14)
ANION GAP SERPL CALC-SCNC: 17 MMO/L — HIGH (ref 7–14)
APPEARANCE UR: CLEAR — SIGNIFICANT CHANGE UP
APTT BLD: 31.1 SEC — SIGNIFICANT CHANGE UP (ref 27.5–36.3)
AST SERPL-CCNC: 49 U/L — HIGH (ref 4–32)
B PERT DNA SPEC QL NAA+PROBE: NOT DETECTED — SIGNIFICANT CHANGE UP
BACTERIA # UR AUTO: NEGATIVE — SIGNIFICANT CHANGE UP
BASE EXCESS BLDV CALC-SCNC: 4.4 MMOL/L — SIGNIFICANT CHANGE UP
BILIRUB SERPL-MCNC: 0.9 MG/DL — SIGNIFICANT CHANGE UP (ref 0.2–1.2)
BILIRUB UR-MCNC: NEGATIVE — SIGNIFICANT CHANGE UP
BLOOD GAS VENOUS - CREATININE: SIGNIFICANT CHANGE UP MG/DL (ref 0.5–1.3)
BLOOD UR QL VISUAL: NEGATIVE — SIGNIFICANT CHANGE UP
BUN SERPL-MCNC: 31 MG/DL — HIGH (ref 7–23)
BUN SERPL-MCNC: 32 MG/DL — HIGH (ref 7–23)
C PNEUM DNA SPEC QL NAA+PROBE: NOT DETECTED — SIGNIFICANT CHANGE UP
CALCIUM SERPL-MCNC: 10.5 MG/DL — SIGNIFICANT CHANGE UP (ref 8.4–10.5)
CALCIUM SERPL-MCNC: 10.6 MG/DL — HIGH (ref 8.4–10.5)
CHLORIDE BLDV-SCNC: 110 MMOL/L — HIGH (ref 96–108)
CHLORIDE SERPL-SCNC: 101 MMOL/L — SIGNIFICANT CHANGE UP (ref 98–107)
CHLORIDE SERPL-SCNC: 101 MMOL/L — SIGNIFICANT CHANGE UP (ref 98–107)
CK MB BLD-MCNC: 3.86 NG/ML — SIGNIFICANT CHANGE UP (ref 1–4.7)
CK MB BLD-MCNC: SIGNIFICANT CHANGE UP (ref 0–2.5)
CK SERPL-CCNC: 55 U/L — SIGNIFICANT CHANGE UP (ref 25–170)
CO2 SERPL-SCNC: 25 MMOL/L — SIGNIFICANT CHANGE UP (ref 22–31)
CO2 SERPL-SCNC: 25 MMOL/L — SIGNIFICANT CHANGE UP (ref 22–31)
COLOR SPEC: SIGNIFICANT CHANGE UP
CREAT SERPL-MCNC: 1.7 MG/DL — HIGH (ref 0.5–1.3)
CREAT SERPL-MCNC: 1.71 MG/DL — HIGH (ref 0.5–1.3)
FLUAV H1 2009 PAND RNA SPEC QL NAA+PROBE: NOT DETECTED — SIGNIFICANT CHANGE UP
FLUAV H1 RNA SPEC QL NAA+PROBE: NOT DETECTED — SIGNIFICANT CHANGE UP
FLUAV H3 RNA SPEC QL NAA+PROBE: NOT DETECTED — SIGNIFICANT CHANGE UP
FLUAV SUBTYP SPEC NAA+PROBE: NOT DETECTED — SIGNIFICANT CHANGE UP
FLUBV RNA SPEC QL NAA+PROBE: NOT DETECTED — SIGNIFICANT CHANGE UP
GAS PNL BLDV: 137 MMOL/L — SIGNIFICANT CHANGE UP (ref 136–146)
GLUCOSE BLDC GLUCOMTR-MCNC: 206 MG/DL — HIGH (ref 70–99)
GLUCOSE BLDV-MCNC: 132 MG/DL — HIGH (ref 70–99)
GLUCOSE SERPL-MCNC: 116 MG/DL — HIGH (ref 70–99)
GLUCOSE SERPL-MCNC: 137 MG/DL — HIGH (ref 70–99)
GLUCOSE UR-MCNC: NEGATIVE — SIGNIFICANT CHANGE UP
HADV DNA SPEC QL NAA+PROBE: NOT DETECTED — SIGNIFICANT CHANGE UP
HCO3 BLDV-SCNC: 28 MMOL/L — HIGH (ref 20–27)
HCOV PNL SPEC NAA+PROBE: SIGNIFICANT CHANGE UP
HCT VFR BLD CALC: 32.3 % — LOW (ref 34.5–45)
HCT VFR BLD CALC: 32.4 % — LOW (ref 34.5–45)
HCT VFR BLDV CALC: 31.6 % — LOW (ref 34.5–45)
HGB BLD-MCNC: 10.2 G/DL — LOW (ref 11.5–15.5)
HGB BLD-MCNC: 10.2 G/DL — LOW (ref 11.5–15.5)
HGB BLDV-MCNC: 10.2 G/DL — LOW (ref 11.5–15.5)
HMPV RNA SPEC QL NAA+PROBE: NOT DETECTED — SIGNIFICANT CHANGE UP
HPIV1 RNA SPEC QL NAA+PROBE: NOT DETECTED — SIGNIFICANT CHANGE UP
HPIV2 RNA SPEC QL NAA+PROBE: NOT DETECTED — SIGNIFICANT CHANGE UP
HPIV3 RNA SPEC QL NAA+PROBE: NOT DETECTED — SIGNIFICANT CHANGE UP
HPIV4 RNA SPEC QL NAA+PROBE: NOT DETECTED — SIGNIFICANT CHANGE UP
HYALINE CASTS # UR AUTO: NEGATIVE — SIGNIFICANT CHANGE UP
INR BLD: 3.08 — HIGH (ref 0.88–1.17)
KETONES UR-MCNC: NEGATIVE — SIGNIFICANT CHANGE UP
LACTATE BLDV-MCNC: 1.2 MMOL/L — SIGNIFICANT CHANGE UP (ref 0.5–2)
LEUKOCYTE ESTERASE UR-ACNC: NEGATIVE — SIGNIFICANT CHANGE UP
MAGNESIUM SERPL-MCNC: 2 MG/DL — SIGNIFICANT CHANGE UP (ref 1.6–2.6)
MAGNESIUM SERPL-MCNC: 2.1 MG/DL — SIGNIFICANT CHANGE UP (ref 1.6–2.6)
MCHC RBC-ENTMCNC: 30.4 PG — SIGNIFICANT CHANGE UP (ref 27–34)
MCHC RBC-ENTMCNC: 30.5 PG — SIGNIFICANT CHANGE UP (ref 27–34)
MCHC RBC-ENTMCNC: 31.5 % — LOW (ref 32–36)
MCHC RBC-ENTMCNC: 31.6 % — LOW (ref 32–36)
MCV RBC AUTO: 96.4 FL — SIGNIFICANT CHANGE UP (ref 80–100)
MCV RBC AUTO: 97 FL — SIGNIFICANT CHANGE UP (ref 80–100)
NITRITE UR-MCNC: NEGATIVE — SIGNIFICANT CHANGE UP
NRBC # FLD: 0 K/UL — SIGNIFICANT CHANGE UP (ref 0–0)
NRBC # FLD: 0 K/UL — SIGNIFICANT CHANGE UP (ref 0–0)
NT-PROBNP SERPL-SCNC: 5095 PG/ML — SIGNIFICANT CHANGE UP
PCO2 BLDV: 49 MMHG — SIGNIFICANT CHANGE UP (ref 41–51)
PH BLDV: 7.39 PH — SIGNIFICANT CHANGE UP (ref 7.32–7.43)
PH UR: 7.5 — SIGNIFICANT CHANGE UP (ref 5–8)
PLATELET # BLD AUTO: 172 K/UL — SIGNIFICANT CHANGE UP (ref 150–400)
PLATELET # BLD AUTO: 184 K/UL — SIGNIFICANT CHANGE UP (ref 150–400)
PMV BLD: 11.9 FL — SIGNIFICANT CHANGE UP (ref 7–13)
PMV BLD: 12.2 FL — SIGNIFICANT CHANGE UP (ref 7–13)
PO2 BLDV: 60 MMHG — HIGH (ref 35–40)
POTASSIUM BLDV-SCNC: 5.3 MMOL/L — HIGH (ref 3.4–4.5)
POTASSIUM SERPL-MCNC: 3.8 MMOL/L — SIGNIFICANT CHANGE UP (ref 3.5–5.3)
POTASSIUM SERPL-MCNC: 5.5 MMOL/L — HIGH (ref 3.5–5.3)
POTASSIUM SERPL-SCNC: 3.8 MMOL/L — SIGNIFICANT CHANGE UP (ref 3.5–5.3)
POTASSIUM SERPL-SCNC: 5.5 MMOL/L — HIGH (ref 3.5–5.3)
PROT SERPL-MCNC: 8 G/DL — SIGNIFICANT CHANGE UP (ref 6–8.3)
PROT UR-MCNC: 20 — SIGNIFICANT CHANGE UP
PROTHROM AB SERPL-ACNC: 36.4 SEC — HIGH (ref 9.8–13.1)
RBC # BLD: 3.34 M/UL — LOW (ref 3.8–5.2)
RBC # BLD: 3.35 M/UL — LOW (ref 3.8–5.2)
RBC # FLD: 15.8 % — HIGH (ref 10.3–14.5)
RBC # FLD: 15.8 % — HIGH (ref 10.3–14.5)
RBC CASTS # UR COMP ASSIST: SIGNIFICANT CHANGE UP (ref 0–?)
RSV RNA SPEC QL NAA+PROBE: NOT DETECTED — SIGNIFICANT CHANGE UP
RV+EV RNA SPEC QL NAA+PROBE: NOT DETECTED — SIGNIFICANT CHANGE UP
SAO2 % BLDV: 91 % — HIGH (ref 60–85)
SODIUM SERPL-SCNC: 140 MMOL/L — SIGNIFICANT CHANGE UP (ref 135–145)
SODIUM SERPL-SCNC: 143 MMOL/L — SIGNIFICANT CHANGE UP (ref 135–145)
SP GR SPEC: 1.01 — SIGNIFICANT CHANGE UP (ref 1–1.04)
SQUAMOUS # UR AUTO: SIGNIFICANT CHANGE UP
TROPONIN T, HIGH SENSITIVITY: 102 NG/L — CRITICAL HIGH (ref ?–14)
TROPONIN T, HIGH SENSITIVITY: 94 NG/L — CRITICAL HIGH (ref ?–14)
TROPONIN T, HIGH SENSITIVITY: 99 NG/L — CRITICAL HIGH (ref ?–14)
UROBILINOGEN FLD QL: NORMAL — SIGNIFICANT CHANGE UP
WBC # BLD: 5.11 K/UL — SIGNIFICANT CHANGE UP (ref 3.8–10.5)
WBC # BLD: 5.31 K/UL — SIGNIFICANT CHANGE UP (ref 3.8–10.5)
WBC # FLD AUTO: 5.11 K/UL — SIGNIFICANT CHANGE UP (ref 3.8–10.5)
WBC # FLD AUTO: 5.31 K/UL — SIGNIFICANT CHANGE UP (ref 3.8–10.5)
WBC UR QL: SIGNIFICANT CHANGE UP (ref 0–?)

## 2019-08-10 PROCEDURE — 71045 X-RAY EXAM CHEST 1 VIEW: CPT | Mod: 26

## 2019-08-10 RX ORDER — LATANOPROST 0.05 MG/ML
1 SOLUTION/ DROPS OPHTHALMIC; TOPICAL AT BEDTIME
Refills: 0 | Status: DISCONTINUED | OUTPATIENT
Start: 2019-08-10 | End: 2019-08-15

## 2019-08-10 RX ORDER — HYDRALAZINE HCL 50 MG
75 TABLET ORAL THREE TIMES A DAY
Refills: 0 | Status: DISCONTINUED | OUTPATIENT
Start: 2019-08-10 | End: 2019-08-15

## 2019-08-10 RX ORDER — ASPIRIN/CALCIUM CARB/MAGNESIUM 324 MG
162 TABLET ORAL ONCE
Refills: 0 | Status: COMPLETED | OUTPATIENT
Start: 2019-08-10 | End: 2019-08-10

## 2019-08-10 RX ORDER — INSULIN LISPRO 100/ML
VIAL (ML) SUBCUTANEOUS
Refills: 0 | Status: DISCONTINUED | OUTPATIENT
Start: 2019-08-10 | End: 2019-08-15

## 2019-08-10 RX ORDER — DEXTROSE 50 % IN WATER 50 %
25 SYRINGE (ML) INTRAVENOUS ONCE
Refills: 0 | Status: DISCONTINUED | OUTPATIENT
Start: 2019-08-10 | End: 2019-08-15

## 2019-08-10 RX ORDER — SODIUM CHLORIDE 9 MG/ML
1000 INJECTION, SOLUTION INTRAVENOUS
Refills: 0 | Status: DISCONTINUED | OUTPATIENT
Start: 2019-08-10 | End: 2019-08-15

## 2019-08-10 RX ORDER — SENNA PLUS 8.6 MG/1
2 TABLET ORAL AT BEDTIME
Refills: 0 | Status: DISCONTINUED | OUTPATIENT
Start: 2019-08-10 | End: 2019-08-15

## 2019-08-10 RX ORDER — INSULIN GLARGINE 100 [IU]/ML
16 INJECTION, SOLUTION SUBCUTANEOUS AT BEDTIME
Refills: 0 | Status: DISCONTINUED | OUTPATIENT
Start: 2019-08-10 | End: 2019-08-14

## 2019-08-10 RX ORDER — LABETALOL HCL 100 MG
10 TABLET ORAL ONCE
Refills: 0 | Status: COMPLETED | OUTPATIENT
Start: 2019-08-10 | End: 2019-08-10

## 2019-08-10 RX ORDER — LEVOTHYROXINE SODIUM 125 MCG
75 TABLET ORAL DAILY
Refills: 0 | Status: DISCONTINUED | OUTPATIENT
Start: 2019-08-10 | End: 2019-08-15

## 2019-08-10 RX ORDER — DEXTROSE 50 % IN WATER 50 %
12.5 SYRINGE (ML) INTRAVENOUS ONCE
Refills: 0 | Status: DISCONTINUED | OUTPATIENT
Start: 2019-08-10 | End: 2019-08-15

## 2019-08-10 RX ORDER — SIMVASTATIN 20 MG/1
20 TABLET, FILM COATED ORAL AT BEDTIME
Refills: 0 | Status: DISCONTINUED | OUTPATIENT
Start: 2019-08-10 | End: 2019-08-15

## 2019-08-10 RX ORDER — LANOLIN ALCOHOL/MO/W.PET/CERES
3 CREAM (GRAM) TOPICAL AT BEDTIME
Refills: 0 | Status: DISCONTINUED | OUTPATIENT
Start: 2019-08-10 | End: 2019-08-15

## 2019-08-10 RX ORDER — HYDRALAZINE HCL 50 MG
100 TABLET ORAL ONCE
Refills: 0 | Status: COMPLETED | OUTPATIENT
Start: 2019-08-10 | End: 2019-08-10

## 2019-08-10 RX ORDER — ASPIRIN/CALCIUM CARB/MAGNESIUM 324 MG
1 TABLET ORAL
Qty: 0 | Refills: 0 | DISCHARGE

## 2019-08-10 RX ORDER — WARFARIN SODIUM 2.5 MG/1
5 TABLET ORAL ONCE
Refills: 0 | Status: COMPLETED | OUTPATIENT
Start: 2019-08-10 | End: 2019-08-10

## 2019-08-10 RX ORDER — PANTOPRAZOLE SODIUM 20 MG/1
40 TABLET, DELAYED RELEASE ORAL
Refills: 0 | Status: DISCONTINUED | OUTPATIENT
Start: 2019-08-10 | End: 2019-08-15

## 2019-08-10 RX ORDER — BRIMONIDINE TARTRATE 2 MG/MG
1 SOLUTION/ DROPS OPHTHALMIC THREE TIMES A DAY
Refills: 0 | Status: DISCONTINUED | OUTPATIENT
Start: 2019-08-10 | End: 2019-08-15

## 2019-08-10 RX ORDER — DORZOLAMIDE HYDROCHLORIDE TIMOLOL MALEATE 20; 5 MG/ML; MG/ML
1 SOLUTION/ DROPS OPHTHALMIC
Refills: 0 | Status: DISCONTINUED | OUTPATIENT
Start: 2019-08-10 | End: 2019-08-15

## 2019-08-10 RX ORDER — CILOSTAZOL 100 MG/1
100 TABLET ORAL
Refills: 0 | Status: DISCONTINUED | OUTPATIENT
Start: 2019-08-10 | End: 2019-08-15

## 2019-08-10 RX ORDER — FUROSEMIDE 40 MG
80 TABLET ORAL
Refills: 0 | Status: DISCONTINUED | OUTPATIENT
Start: 2019-08-11 | End: 2019-08-12

## 2019-08-10 RX ORDER — LEVOTHYROXINE SODIUM 125 MCG
1 TABLET ORAL
Qty: 0 | Refills: 0 | DISCHARGE

## 2019-08-10 RX ORDER — GLUCAGON INJECTION, SOLUTION 0.5 MG/.1ML
1 INJECTION, SOLUTION SUBCUTANEOUS ONCE
Refills: 0 | Status: DISCONTINUED | OUTPATIENT
Start: 2019-08-10 | End: 2019-08-15

## 2019-08-10 RX ORDER — INSULIN LISPRO 100/ML
VIAL (ML) SUBCUTANEOUS AT BEDTIME
Refills: 0 | Status: DISCONTINUED | OUTPATIENT
Start: 2019-08-10 | End: 2019-08-15

## 2019-08-10 RX ORDER — DEXTROSE 50 % IN WATER 50 %
15 SYRINGE (ML) INTRAVENOUS ONCE
Refills: 0 | Status: DISCONTINUED | OUTPATIENT
Start: 2019-08-10 | End: 2019-08-15

## 2019-08-10 RX ORDER — ALLOPURINOL 300 MG
100 TABLET ORAL DAILY
Refills: 0 | Status: DISCONTINUED | OUTPATIENT
Start: 2019-08-10 | End: 2019-08-15

## 2019-08-10 RX ORDER — FUROSEMIDE 40 MG
80 TABLET ORAL ONCE
Refills: 0 | Status: COMPLETED | OUTPATIENT
Start: 2019-08-10 | End: 2019-08-10

## 2019-08-10 RX ADMIN — INSULIN GLARGINE 16 UNIT(S): 100 INJECTION, SOLUTION SUBCUTANEOUS at 22:21

## 2019-08-10 RX ADMIN — Medication 10 MILLIGRAM(S): at 17:29

## 2019-08-10 RX ADMIN — Medication 162 MILLIGRAM(S): at 14:15

## 2019-08-10 RX ADMIN — BRIMONIDINE TARTRATE 1 DROP(S): 2 SOLUTION/ DROPS OPHTHALMIC at 21:33

## 2019-08-10 RX ADMIN — Medication 10 MILLIGRAM(S): at 21:35

## 2019-08-10 RX ADMIN — LATANOPROST 1 DROP(S): 0.05 SOLUTION/ DROPS OPHTHALMIC; TOPICAL at 21:33

## 2019-08-10 RX ADMIN — SIMVASTATIN 20 MILLIGRAM(S): 20 TABLET, FILM COATED ORAL at 21:15

## 2019-08-10 RX ADMIN — DORZOLAMIDE HYDROCHLORIDE TIMOLOL MALEATE 1 DROP(S): 20; 5 SOLUTION/ DROPS OPHTHALMIC at 22:21

## 2019-08-10 RX ADMIN — Medication 80 MILLIGRAM(S): at 16:23

## 2019-08-10 RX ADMIN — SENNA PLUS 2 TABLET(S): 8.6 TABLET ORAL at 21:15

## 2019-08-10 RX ADMIN — CILOSTAZOL 100 MILLIGRAM(S): 100 TABLET ORAL at 21:15

## 2019-08-10 RX ADMIN — Medication 75 MILLIGRAM(S): at 21:19

## 2019-08-10 RX ADMIN — Medication 100 MILLIGRAM(S): at 17:26

## 2019-08-10 RX ADMIN — WARFARIN SODIUM 5 MILLIGRAM(S): 2.5 TABLET ORAL at 19:31

## 2019-08-10 NOTE — H&P ADULT - HISTORY OF PRESENT ILLNESS
89 y/o F w/ PMHx of afib on coumadin, CHF, HTN, HLD, DM, Hypothyroidism, and previous PE, presents w/ worsening shortness of breath and chest pressure x2 days. Patient reports a heaviness on her chest which has been worsening the past 2 days. Patient also reports worsening shortness of breath. States she has been compliant with her medications but despite this has noted that she has been gaining weight for the past few days (is unable to give me a number). Orthopnea has also been getting worse for which she has had to elevate the head of her bed. Additionally, reports having a cough for 3 days and states her  was sick before her with cough and chills.

## 2019-08-10 NOTE — ED PROVIDER NOTE - PHYSICAL EXAMINATION
*GEN:   in no acute distress, AOx3  *EYES:   pupils equally round and reactive to light, extra-occular movements intact  *HEENT:   airway patent, moist mucosal membranes, full ROM neck  *CV:   regular rate and rhythm  *RESP:   coarse breath sounds bilaterally  *ABD:   soft, non-tender  *:   no cva/flank tenderness  *EXTREM:   bilat LE pitting edema, no MSK tenderness, full ROM throughout  *SKIN:   dry, intact  *NEURO:   AOx3, no focal weakness or loss of sensation

## 2019-08-10 NOTE — H&P ADULT - ATTENDING COMMENTS
Seen and examined . I feel better. Agree with above A/P . Cardiology and nephrology help appreciated. Seen and examined . I feel better. Agree with above A/P . Cardiology and nephrology help appreciated. BP still high so IV labetalol 10mg x1 .

## 2019-08-10 NOTE — CONSULT NOTE ADULT - SUBJECTIVE AND OBJECTIVE BOX
[facility] - Radha / Florin S /Nate/ S Jonathan/ GASTON Tellez/ Shahid Segovia / BABAR Njeru  ---------------------------------------------------------------------------------------------------------------  Patient seen and examined in ER    89F w/ pmh a. fib on coumadin, CHF, DM, HTN, HLD, DM, hypothyroidism, PE , p/w sob, chest pressure x 2 days. +cough, congestion. Pt well known to me from prior [facility] hospitalizations. Pt has known h/o CKD, follows w/my associate Dr Segovia, last seen last mth in office, notes reviwed- noted to be hypervolemic, was rec again to inc lasix to 80mg qd from alternating 40mg qod/80qod. Previous admission to [facility] in June 2018 for bradycardia with bifasicular block , at that time she was seen by EP, recommended for PPM which she declined  I evaluated pt at that time for NIKHIL, CKD and vol Mx. Family bedside reports pt non compliant w/bp meds, inc LE swelling and sob for past few days. Pt has difficulty chewing solids, hence mostly dependent on liquid diet/soups. Pt denies cp, palpitations, lightheadedness, numbness, cough, n/v/c, abdominal pain, urinary sxs. Nephrology consulted for volume, BP, CKD management. Denied recent NSAID use/Abx use/iv contrast studies. Pt states sob now improved. was given laix 80mg ivp x1 in ER.    PAST MEDICAL & SURGICAL HISTORY:  Personal history of PE (pulmonary embolism)  Glaucoma  PVD (peripheral vascular disease)  Hypothyroid  HTN (hypertension)  HLD (hyperlipidemia)  CHF (congestive heart failure)  DM (diabetes mellitus)  Afib  Elective surgery: abdominal abcess removals  History of partial thyroidectomy      Allergies: No Known Allergies    Home Medications Reviewed  Hospital Medications:   MEDICATIONS  (STANDING):  allopurinol 100 milliGRAM(s) Oral daily  brimonidine 0.2% Ophthalmic Solution 1 Drop(s) Both EYES three times a day  cilostazol 100 milliGRAM(s) Oral two times a day  dextrose 5%. 1000 milliLiter(s) (50 mL/Hr) IV Continuous <Continuous>  dextrose 50% Injectable 12.5 Gram(s) IV Push once  dextrose 50% Injectable 25 Gram(s) IV Push once  dextrose 50% Injectable 25 Gram(s) IV Push once  dorzolamide 2%/timolol 0.5% Ophthalmic Solution 1 Drop(s) Both EYES <User Schedule>  hydrALAZINE 75 milliGRAM(s) Oral three times a day  insulin glargine Injectable (LANTUS) 16 Unit(s) SubCutaneous at bedtime  insulin lispro (HumaLOG) corrective regimen sliding scale   SubCutaneous three times a day before meals  insulin lispro (HumaLOG) corrective regimen sliding scale   SubCutaneous at bedtime  latanoprost 0.005% Ophthalmic Solution 1 Drop(s) Both EYES at bedtime  levothyroxine 75 MICROGram(s) Oral daily  pantoprazole    Tablet 40 milliGRAM(s) Oral before breakfast  senna 2 Tablet(s) Oral at bedtime  simvastatin 20 milliGRAM(s) Oral at bedtime  warfarin 5 milliGRAM(s) Oral once    SOCIAL HISTORY:  Denies ETOh,Smoking, illicit drug use  FAMILY HISTORY:      REVIEW OF SYSTEMS:  CONSTITUTIONAL: +generalized weakness, fevers or chills  EYES/ENT: No visual changes;  No vertigo or throat pain   NECK: No pain or stiffness  RESPIRATORY: No cough, wheezing, hemoptysis; +shortness of breath  CARDIOVASCULAR: No chest pain or palpitations.  GASTROINTESTINAL: No abdominal or epigastric pain. No nausea, vomiting, or hematemesis; No diarrhea or constipation. No melena or hematochezia.  GENITOURINARY: No dysuria, frequency, foamy urine, urinary urgency, incontinence or hematuria  NEUROLOGICAL: No numbness or weakness  SKIN: No itching, burning, rashes, or lesions   VASCULAR: +bilateral lower extremity edema.   All other review of systems is negative unless indicated above.    VITALS:  T(F): 97 (08-10-19 @ 16:24), Max: 98.2 (08-10-19 @ 13:15)  HR: 79 (08-10-19 @ 17:54)  BP: 180/86 (08-10-19 @ 17:54)  RR: 20 (08-10-19 @ 17:54)  SpO2: 96% (08-10-19 @ 17:54)  Wt(kg): --      PHYSICAL EXAM:  Constitutional: NAD, obese  HEENT: anicteric sclera, oropharynx clear, MMM  Neck: No JVD  Respiratory: bibasilar crepts, no wheezes  Cardiovascular: S1, S2, RRR  Gastrointestinal: BS+, soft, NT/ND  Extremities: No cyanosis or clubbing. 1+ peripheral edema b/l  Neurological: A/O x 2, no focal deficits  Psychiatric: Normal mood, normal affect  : No CVA tenderness. No michaud.       LABS:  08-10    140  |  101  |  32<H>  ----------------------------<  137<H>  5.5<H>   |  25  |  1.71<H>    Ca    10.5      10 Aug 2019 13:46  Mg     2.1     08-10    TPro  8.0  /  Alb  4.0  /  TBili  0.9  /  DBili      /  AST  49<H>  /  ALT  28  /  AlkPhos  79  08-10    Creatinine Trend: 1.71 <--                        10.2   5.11  )-----------( 184      ( 10 Aug 2019 13:46 )             32.3     Urine Studies:        RADIOLOGY & ADDITIONAL STUDIES:

## 2019-08-10 NOTE — CONSULT NOTE ADULT - SUBJECTIVE AND OBJECTIVE BOX
HISTORY OF PRESENT ILLNESS: HPI:      89F w/ pmh a. fib on coumadin, CHF, DM, HTN, HLD, DM, hypothyroidism, PE - p/w sob, chest pressure x 2 days. +cough, congestion, lightheadedness. Last ischemic work up 2013 negative for ischemia or infarct. Echo in June 2018 with normal lv sys function, no wall abnormalities, EF 61%. Previous admission to [facility] in June 2018 for bradycardia with bifasicular block , at that time she was seen by EP, recommended for PPM which she declined. Denies cp, palpitations, lightheadedness, numbness, cough, n/v/c, abdominal pain, le weakness. Cardiology consulted for chest pain, sob, management of fluid volume overload.       PAST MEDICAL & SURGICAL HISTORY:  Personal history of PE (pulmonary embolism)  Glaucoma  PVD (peripheral vascular disease)  Hypothyroid  HTN (hypertension)  HLD (hyperlipidemia)  CHF (congestive heart failure)  DM (diabetes mellitus)  Afib  Elective surgery: abdominal abcess removals  History of partial thyroidectomy    MEDICATIONS:  MEDICATIONS  (STANDING):  dextrose 5%. 1000 milliLiter(s) (50 mL/Hr) IV Continuous <Continuous>  dextrose 50% Injectable 12.5 Gram(s) IV Push once  dextrose 50% Injectable 25 Gram(s) IV Push once  dextrose 50% Injectable 25 Gram(s) IV Push once  insulin glargine Injectable (LANTUS) 16 Unit(s) SubCutaneous at bedtime  insulin lispro (HumaLOG) corrective regimen sliding scale   SubCutaneous three times a day before meals  insulin lispro (HumaLOG) corrective regimen sliding scale   SubCutaneous at bedtime    Allergies    No Known Allergies    Intolerances    FAMILY HISTORY:  No pertinent family history in first degree relatives    Non-contributary for premature coronary disease or sudden cardiac death    SOCIAL HISTORY:    [X ] Non-smoker  [ ] Smoker  [ ] Alcohol      REVIEW OF SYSTEMS:  [X ]chest pain  [  X]shortness of breath  [  ]palpitations  [  ]syncope  [ ]near syncope [ ]upper extremity weakness   [ ] lower extremity weakness  [  ]diplopia  [  ]altered mental status   [  ]fevers  [ ]chills [ ]nausea  [ ]vomitting  [  ]dysphagia    [ ]abdominal pain  [ ]melena  [ ]BRBPR    [  ]epistaxis  [  ]rash    [ ]lower extremity edema      [X ] All others negative	  [ ] Unable to obtain    LABS:	 	    CARDIAC MARKERS:                       10.2   5.11  )-----------( 184      ( 10 Aug 2019 13:46 )             32.3     Hb Trend: 10.2<--    08-10    140  |  101  |  32<H>  ----------------------------<  137<H>  5.5<H>   |  25  |  1.71<H>    Ca    10.5      10 Aug 2019 13:46  Mg     2.1     08-10    TPro  8.0  /  Alb  4.0  /  TBili  0.9  /  DBili  x   /  AST  49<H>  /  ALT  28  /  AlkPhos  79  08-10    Creatinine Trend: 1.71<--    Coags:  PT/INR - ( 10 Aug 2019 13:46 )   PT: 36.4 SEC;   INR: 3.08          PTT - ( 10 Aug 2019 13:46 )  PTT:31.1 SEC    proBNP: Serum Pro-Brain Natriuretic Peptide: 5095 pg/mL (08-10 @ 13:46)    Lipid Profile:   HgA1c:   TSH:     PHYSICAL EXAM:  T(C): 36.1 (08-10-19 @ 16:24), Max: 36.8 (08-10-19 @ 13:15)  HR: 97 (08-10-19 @ 16:24) (83 - 98)  BP: 216/92 (08-10-19 @ 16:24) (160/113 - 216/92)  RR: 20 (08-10-19 @ 16:24) (18 - 22)  SpO2: 98% (08-10-19 @ 16:24) (98% - 100%)  Wt(kg): --  I&O's Summary      Gen: Appears well in NAD  HEENT:  (-)icterus (-)pallor  CV: N S1 S2 1/6 SUZI (+)2 Pulses B/l  Resp:  diminished BS B/L, normal effort  GI: (+) BS Soft, NT, ND  Lymph:  (-)Edema, (-)obvious lymphadenopathy  Skin: Warm to touch, Normal turgor  Psych: Appropriate mood and affect    TELEMETRY: 	 Afib     ECG:  	    RADIOLOGY:         CXR:   PENDING     < from: Transthoracic Echocardiogram (06.18.18 @ 10:33) >  CONCLUSIONS:  1. Mitral annular calcification, otherwise normal mitral  valve. Mild mitral regurgitation.  2. Severely dilated left atrium.  LA volume index = 69  cc/m2.  3. Normal left ventricular internal dimensions and wall  thicknesses.  4. Endocardium not well visualized; grossly normal left  ventricular systolic function.  5. Moderate right atrial enlargement.  6. Right ventricular enlargement with decreased right  ventricular systolic function.  7. Estimated right ventricular systolic pressure equals 53  mm Hg, assuming right atrial pressure equals 10 mm Hg,  consistent with moderate pulmonary hypertension.  8. Normal tricuspid valve.  Moderate-severe tricuspid  regurgitation.  *** No previous Echo exam.  ------------------------------------------------------------------------  Confirmed on  6/18/2018 - 12:27:29 by Lance Ferris M.D.  ------------------------------------------------------------------------    < end of copied text >    < from: Nuclear Stress Test-Pharmacologic (12.06.13 @ 10:30) >  IMPRESSIONS:Normal Study  * The left ventricle was normal in size.  * Tracer uptake was homogeneous throughout the left  ventricle.  * Normal study; no evidence for myocardial infarction or  ischemia.  * Gated wall motion analysis was performed, and shows  normal wall motion.  ------------------------------------------------------------------------  Confirmed on  12/6/2013 - 13:14:20 by Beto Gutierrez M.D.  ------------------------------------------------------------------------    < end of copied text >    ASSESSMENT/PLAN: 	    89 year old Female with history of Afib on AC, HTN, CKD admitted with chest pain, sob and volume overload c/w acute on chronic diastolic and Right sided CHF, with chronic bifasicular block afib. Cardiology consulted for chest pain, sob and management of volume overload.     -- no cp, sob improved   -- elevated trop 94 -- > 102, check CK  -- check echo to eval lv function, structural heart   -- cont iv lasix for diuresis  -- cont coumadin for goal INR 2-3 for hx of Afib   -- keep O>I, trend Cr, replete lytes prn   -- CKD, Cr. 1.7, prev seen by nephrology, consider consult   -- would repeat labs since earlier sample hemolyzed   -- further cardiac work up pending above HISTORY OF PRESENT ILLNESS: HPI:      89F w/ pmh a. fib on coumadin, CHF, DM, HTN, HLD, DM, hypothyroidism, PE - p/w sob, chest pressure x 2 days. +cough, congestion, lightheadedness. Last ischemic work up 2013 negative for ischemia or infarct. Echo in June 2018 with normal lv sys function, no wall abnormalities, EF 61%. Previous admission to [facility] in June 2018 for bradycardia with bifasicular block , at that time she was seen by EP, recommended for PPM which she declined. Denies cp, palpitations, lightheadedness, numbness, cough, n/v/c, abdominal pain, le weakness. Cardiology consulted for chest pain, sob, management of fluid volume overload.       PAST MEDICAL & SURGICAL HISTORY:  Personal history of PE (pulmonary embolism)  Glaucoma  PVD (peripheral vascular disease)  Hypothyroid  HTN (hypertension)  HLD (hyperlipidemia)  CHF (congestive heart failure)  DM (diabetes mellitus)  Afib  Elective surgery: abdominal abcess removals  History of partial thyroidectomy    MEDICATIONS:  MEDICATIONS  (STANDING):  dextrose 5%. 1000 milliLiter(s) (50 mL/Hr) IV Continuous <Continuous>  dextrose 50% Injectable 12.5 Gram(s) IV Push once  dextrose 50% Injectable 25 Gram(s) IV Push once  dextrose 50% Injectable 25 Gram(s) IV Push once  insulin glargine Injectable (LANTUS) 16 Unit(s) SubCutaneous at bedtime  insulin lispro (HumaLOG) corrective regimen sliding scale   SubCutaneous three times a day before meals  insulin lispro (HumaLOG) corrective regimen sliding scale   SubCutaneous at bedtime    Allergies    No Known Allergies    Intolerances    FAMILY HISTORY:  No pertinent family history in first degree relatives    Non-contributary for premature coronary disease or sudden cardiac death    SOCIAL HISTORY:    [X ] Non-smoker  [ ] Smoker  [ ] Alcohol      REVIEW OF SYSTEMS:  [X ]chest pain  [  X]shortness of breath  [  ]palpitations  [  ]syncope  [ ]near syncope [ ]upper extremity weakness   [ ] lower extremity weakness  [  ]diplopia  [  ]altered mental status   [  ]fevers  [ ]chills [ ]nausea  [ ]vomitting  [  ]dysphagia    [ ]abdominal pain  [ ]melena  [ ]BRBPR    [  ]epistaxis  [  ]rash    [ ]lower extremity edema      [X ] All others negative	  [ ] Unable to obtain    LABS:	 	    CARDIAC MARKERS:                       10.2   5.11  )-----------( 184      ( 10 Aug 2019 13:46 )             32.3     Hb Trend: 10.2<--    08-10    140  |  101  |  32<H>  ----------------------------<  137<H>  5.5<H>   |  25  |  1.71<H>    Ca    10.5      10 Aug 2019 13:46  Mg     2.1     08-10    TPro  8.0  /  Alb  4.0  /  TBili  0.9  /  DBili  x   /  AST  49<H>  /  ALT  28  /  AlkPhos  79  08-10    Creatinine Trend: 1.71<--    Coags:  PT/INR - ( 10 Aug 2019 13:46 )   PT: 36.4 SEC;   INR: 3.08          PTT - ( 10 Aug 2019 13:46 )  PTT:31.1 SEC    proBNP: Serum Pro-Brain Natriuretic Peptide: 5095 pg/mL (08-10 @ 13:46)    Lipid Profile:   HgA1c:   TSH:     PHYSICAL EXAM:  T(C): 36.1 (08-10-19 @ 16:24), Max: 36.8 (08-10-19 @ 13:15)  HR: 97 (08-10-19 @ 16:24) (83 - 98)  BP: 216/92 (08-10-19 @ 16:24) (160/113 - 216/92)  RR: 20 (08-10-19 @ 16:24) (18 - 22)  SpO2: 98% (08-10-19 @ 16:24) (98% - 100%)  Wt(kg): --  I&O's Summary      Gen: Appears well in NAD  HEENT:  (-)icterus (-)pallor  CV: N S1 S2 1/6 SUZI (+)2 Pulses B/l  Resp:  diminished BS B/L, normal effort  GI: (+) BS Soft, NT, ND  Lymph:  (-)Edema, (-)obvious lymphadenopathy  Skin: Warm to touch, Normal turgor  Psych: Appropriate mood and affect    TELEMETRY: 	 Afib     ECG:  	    RADIOLOGY:         CXR:   PENDING     < from: Transthoracic Echocardiogram (06.18.18 @ 10:33) >  CONCLUSIONS:  1. Mitral annular calcification, otherwise normal mitral  valve. Mild mitral regurgitation.  2. Severely dilated left atrium.  LA volume index = 69  cc/m2.  3. Normal left ventricular internal dimensions and wall  thicknesses.  4. Endocardium not well visualized; grossly normal left  ventricular systolic function.  5. Moderate right atrial enlargement.  6. Right ventricular enlargement with decreased right  ventricular systolic function.  7. Estimated right ventricular systolic pressure equals 53  mm Hg, assuming right atrial pressure equals 10 mm Hg,  consistent with moderate pulmonary hypertension.  8. Normal tricuspid valve.  Moderate-severe tricuspid  regurgitation.  *** No previous Echo exam.  ------------------------------------------------------------------------  Confirmed on  6/18/2018 - 12:27:29 by Lance Ferris M.D.  ------------------------------------------------------------------------    < end of copied text >    < from: Nuclear Stress Test-Pharmacologic (12.06.13 @ 10:30) >  IMPRESSIONS:Normal Study  * The left ventricle was normal in size.  * Tracer uptake was homogeneous throughout the left  ventricle.  * Normal study; no evidence for myocardial infarction or  ischemia.  * Gated wall motion analysis was performed, and shows  normal wall motion.  ------------------------------------------------------------------------  Confirmed on  12/6/2013 - 13:14:20 by Beto Gutierrez M.D.  ------------------------------------------------------------------------    < end of copied text >    ASSESSMENT/PLAN: 	    89 year old Female with history of Afib on AC, HTN, CKD admitted with chest pain, sob and volume overload c/w acute on chronic diastolic and Right sided CHF, with chronic bifasicular block afib. Cardiology consulted for chest pain, sob and management of volume overload.     -- no cp, sob improved   -- elevated trop 94 -- > 102, check CK  -- check echo to eval lv function, structural heart   -- cont iv lasix for diuresis, -- keep O>I, trend Cr, replete lytes prn   -- cont coumadin for goal INR 2-3 for hx of Afib   -- CKD, Cr. 1.7, prev seen by nephrology, consider consult   -- would repeat labs since earlier sample hemolyzed   -- further cardiac work up pending above

## 2019-08-10 NOTE — H&P ADULT - NSICDXPASTSURGICALHX_GEN_ALL_CORE_FT
PAST SURGICAL HISTORY:  Elective surgery abdominal abcess removals    History of partial thyroidectomy

## 2019-08-10 NOTE — ED ADULT NURSE NOTE - OBJECTIVE STATEMENT
Pt to bed 5. Alert and oriented x 3. Pt brought in by family for SOB and b/l leg swelling. Pt hooked up to CM. IVL placed. Bloods drawn. Skin intact. Pt ambulatory with assistance. Will continue to monitor.

## 2019-08-10 NOTE — ED PROVIDER NOTE - CLINICAL SUMMARY MEDICAL DECISION MAKING FREE TEXT BOX
89F w/ sob, chest pain - concern for pna, acs, chf, - will check cardiac workup, reassess, likely admit

## 2019-08-10 NOTE — H&P ADULT - ASSESSMENT
89 y/o F w/ PMHx of afib on coumadin, CHF, HTN, HLD, DM, Hypothyroidism, and previous PE, presents w/ worsening shortness of breath and chest pressure x2 days.     CHF exacerbation

## 2019-08-10 NOTE — H&P ADULT - NSICDXPASTMEDICALHX_GEN_ALL_CORE_FT
PAST MEDICAL HISTORY:  Afib     CHF (congestive heart failure)     DM (diabetes mellitus)     Glaucoma     HLD (hyperlipidemia)     HTN (hypertension)     Hypothyroid     Personal history of PE (pulmonary embolism)     PVD (peripheral vascular disease)

## 2019-08-10 NOTE — CONSULT NOTE ADULT - ATTENDING COMMENTS
Agree with above.   continue with iv diuresis to keep O > I  check tte  monitor tele and cr    Trisha Banks MD

## 2019-08-10 NOTE — CONSULT NOTE ADULT - ASSESSMENT
89F w/ pmh a. fib on coumadin, CHF, DM, HTN, HLD, DM, hypothyroidism, PE , p/w sob, chest pressure x 2 days. Being admited for fluid overload, HTN urgency. Nephrology consulted for volume, BP, CKD management.     CKD 4, b/l Cr RANGE 1.9- 2.4. 7/18/19 was 1.9  K 5.5 noted-hemolyzed  Volume overload s/p lasix 80mg ivp  Hypercalcemia- avoid ca tabs/vit D suplements  HTN urgency  per cardiology- Last ischemic work up 2013 negative for ischemia or infarct. Echo in June 2018 with normal lv sys function, no wall abnormalities, EF 61%.     labs, rad, chart reviewed  give Labetalol 10mg ivp x1 now and resume home BP meds-inc Hydralazine 75>100mg q8h, 1st dose now.. d/w ER  agree w/ lasix 80mg iv bid w/cardiology  monitor daily weights and U/O,  c/w low Na diet and fluid restriction 1.2L/day  monitor BMP daily and u/o   check vit D25OH level, phos level in AM  dose all meds for eGFR<15ml/min.   avoid ACEi/ARB for now/NSAIDs/Nephrotoxics.  Thanks for consulting. will closely follow up.

## 2019-08-10 NOTE — ED PROVIDER NOTE - OBJECTIVE STATEMENT
89F w/ pmh a. fib on coumadin, CHF, DM, HTN, HLD, DM, hypothyroidism, PE - p/w sob, chest pressure x 2 days. +cough, congestion, lightheadedness. Increased peripheral edema bilaterally, as well as weight (doesn't remember #s). Has been taking meds as prescribed.    pt admitted to dr. garsia 6/13-6/29/19 for CHF exacerbation, dc on increased lasix 80mg daily.

## 2019-08-10 NOTE — ED PROVIDER NOTE - PROGRESS NOTE DETAILS
Malik Sainz PGY3: paged tele pa - admit: Idalmis martinez [id number]  to dr. garsia  chf exacerbation  callback: [id number]

## 2019-08-10 NOTE — ED PROVIDER NOTE - ATTENDING CONTRIBUTION TO CARE
Gong: I have seen and examined the patient face to face, have reviewed and addended the HPI, PE and a/p as necessary.     89 yo F afib on coumadin, CHF with recent admission on 6/13/19-6/29/19 for exacerbation, HTN HLD, DM hypothyroidism, and previous PE, a/w increasing shortness of breath and bilateral peripheral edema worsening.  Pt reports good compliance with meds.  Pt reports having been discharged on lasix 80mg PO daily, has recently changed to 80mg / 40mg alternating days.  Pt also notes cough with congestions and lightheadedness.  Noted to have chest pressure over the last 2 days with shortness of breath.  No fever/chills, No photophobia/eye pain/changes in vision, No ear pain/sore throat/dysphagia, No abdominal pain, No N/V/D, no dysuria/frequency/discharge, No neck/back pain, no rash, no changes in neurological status/function.     EKG RBB with Left post fascicular block Rate 97 unchanged from 6/13/19    GEN - A+O x3; tachypneic  CARD -s1s2, RRR, no M,G,R;   PULM - bilateral crackles   ABD -  +BS, ND, NT, soft, no guarding, no rebound, no masses;   BACK - no CVA tenderness, Normal  spine;   EXT - symmetric pulses, 2+ dp, capillary refill < 2 seconds, no clubbing, no cyanosis, 3+ edema noted.    NEURO - no focal neuro deficits, no slurred speech    89 yo F a/w possible acute chf exacerbation noted to have increasing shortness of breath with associated chest discomfort over the past 2 days.  EKG unchanged, obtain trops to rule out acs, most likely acute chf exacerbation, will treat with lasix, chest xray, asa 162.  Follow up INR, less likely PE given on coumadin, will check inr to ensure therapeutic, and PE consistent with CHF exacerbation.  No signs of fever, chills to suggest pneumonia.  Will monitor on tele.  Likely requires admission.

## 2019-08-10 NOTE — ED ADULT TRIAGE NOTE - CHIEF COMPLAINT QUOTE
c/o midsternal Chest Pressure and SOB since last night, Hx of A fib on warfarin CHF, HTN. pt endorses cough and a runny nose.

## 2019-08-10 NOTE — ED ADULT NURSE NOTE - NSIMPLEMENTINTERV_GEN_ALL_ED
Implemented All Fall with Harm Risk Interventions:  [place] to call system. Call bell, personal items and telephone within reach. Instruct patient to call for assistance. Room bathroom lighting operational. Non-slip footwear when patient is off stretcher. Physically safe environment: no spills, clutter or unnecessary equipment. Stretcher in lowest position, wheels locked, appropriate side rails in place. Provide visual cue, wrist band, yellow gown, etc. Monitor gait and stability. Monitor for mental status changes and reorient to person, place, and time. Review medications for side effects contributing to fall risk. Reinforce activity limits and safety measures with patient and family. Provide visual clues: red socks.

## 2019-08-10 NOTE — H&P ADULT - NSHPLABSRESULTS_GEN_ALL_CORE
10.2   5.11  )-----------( 184      ( 10 Aug 2019 13:46 )             32.3   08-10    140  |  101  |  32<H>  ----------------------------<  137<H>  5.5<H>   |  25  |  1.71<H>    Ca    10.5      10 Aug 2019 13:46  Mg     2.1     08-10    TPro  8.0  /  Alb  4.0  /  TBili  0.9  /  DBili  x   /  AST  49<H>  /  ALT  28  /  AlkPhos  79  08-10    EKG: RBBB w/ L post fascicular block @97BPM unchanged since last EKG 6/13/19

## 2019-08-10 NOTE — H&P ADULT - PROBLEM SELECTOR PLAN 1
Admit to telemetry, serial cardiac enzymes, serial EKGs  Check CBC, CMP, TSH, FLP, HgA1C, BNP  IV lasix 80 BID  RVP ordered  Strict I&Os, monitor daily weights, 1500 cc fluid restriction, sodium restriction  Cards consult

## 2019-08-11 LAB
ANION GAP SERPL CALC-SCNC: 13 MMO/L — SIGNIFICANT CHANGE UP (ref 7–14)
ANION GAP SERPL CALC-SCNC: 9 MMO/L — SIGNIFICANT CHANGE UP (ref 7–14)
APTT BLD: 38.4 SEC — HIGH (ref 27.5–36.3)
BASOPHILS # BLD AUTO: 0.02 K/UL — SIGNIFICANT CHANGE UP (ref 0–0.2)
BASOPHILS NFR BLD AUTO: 0.4 % — SIGNIFICANT CHANGE UP (ref 0–2)
BUN SERPL-MCNC: 32 MG/DL — HIGH (ref 7–23)
BUN SERPL-MCNC: 36 MG/DL — HIGH (ref 7–23)
CALCIUM SERPL-MCNC: 9.6 MG/DL — SIGNIFICANT CHANGE UP (ref 8.4–10.5)
CALCIUM SERPL-MCNC: 9.7 MG/DL — SIGNIFICANT CHANGE UP (ref 8.4–10.5)
CHLORIDE SERPL-SCNC: 103 MMOL/L — SIGNIFICANT CHANGE UP (ref 98–107)
CHLORIDE SERPL-SCNC: 104 MMOL/L — SIGNIFICANT CHANGE UP (ref 98–107)
CHOLEST SERPL-MCNC: 159 MG/DL — SIGNIFICANT CHANGE UP (ref 120–199)
CO2 SERPL-SCNC: 26 MMOL/L — SIGNIFICANT CHANGE UP (ref 22–31)
CO2 SERPL-SCNC: 30 MMOL/L — SIGNIFICANT CHANGE UP (ref 22–31)
CREAT SERPL-MCNC: 1.76 MG/DL — HIGH (ref 0.5–1.3)
CREAT SERPL-MCNC: 1.95 MG/DL — HIGH (ref 0.5–1.3)
EOSINOPHIL # BLD AUTO: 0.2 K/UL — SIGNIFICANT CHANGE UP (ref 0–0.5)
EOSINOPHIL NFR BLD AUTO: 3.9 % — SIGNIFICANT CHANGE UP (ref 0–6)
GLUCOSE BLDC GLUCOMTR-MCNC: 132 MG/DL — HIGH (ref 70–99)
GLUCOSE BLDC GLUCOMTR-MCNC: 148 MG/DL — HIGH (ref 70–99)
GLUCOSE BLDC GLUCOMTR-MCNC: 160 MG/DL — HIGH (ref 70–99)
GLUCOSE BLDC GLUCOMTR-MCNC: 92 MG/DL — SIGNIFICANT CHANGE UP (ref 70–99)
GLUCOSE SERPL-MCNC: 156 MG/DL — HIGH (ref 70–99)
GLUCOSE SERPL-MCNC: 85 MG/DL — SIGNIFICANT CHANGE UP (ref 70–99)
HBA1C BLD-MCNC: 6.3 % — HIGH (ref 4–5.6)
HCT VFR BLD CALC: 32.8 % — LOW (ref 34.5–45)
HDLC SERPL-MCNC: 74 MG/DL — HIGH (ref 45–65)
HGB BLD-MCNC: 10.3 G/DL — LOW (ref 11.5–15.5)
IMM GRANULOCYTES NFR BLD AUTO: 0.2 % — SIGNIFICANT CHANGE UP (ref 0–1.5)
INR BLD: 3.49 — HIGH (ref 0.88–1.17)
LIPID PNL WITH DIRECT LDL SERPL: 74 MG/DL — SIGNIFICANT CHANGE UP
LYMPHOCYTES # BLD AUTO: 1.13 K/UL — SIGNIFICANT CHANGE UP (ref 1–3.3)
LYMPHOCYTES # BLD AUTO: 22.2 % — SIGNIFICANT CHANGE UP (ref 13–44)
MAGNESIUM SERPL-MCNC: 2 MG/DL — SIGNIFICANT CHANGE UP (ref 1.6–2.6)
MAGNESIUM SERPL-MCNC: 2.1 MG/DL — SIGNIFICANT CHANGE UP (ref 1.6–2.6)
MCHC RBC-ENTMCNC: 30.5 PG — SIGNIFICANT CHANGE UP (ref 27–34)
MCHC RBC-ENTMCNC: 31.4 % — LOW (ref 32–36)
MCV RBC AUTO: 97 FL — SIGNIFICANT CHANGE UP (ref 80–100)
MONOCYTES # BLD AUTO: 1.02 K/UL — HIGH (ref 0–0.9)
MONOCYTES NFR BLD AUTO: 20.1 % — HIGH (ref 2–14)
NEUTROPHILS # BLD AUTO: 2.7 K/UL — SIGNIFICANT CHANGE UP (ref 1.8–7.4)
NEUTROPHILS NFR BLD AUTO: 53.2 % — SIGNIFICANT CHANGE UP (ref 43–77)
NRBC # FLD: 0 K/UL — SIGNIFICANT CHANGE UP (ref 0–0)
PHOSPHATE SERPL-MCNC: 3.2 MG/DL — SIGNIFICANT CHANGE UP (ref 2.5–4.5)
PLATELET # BLD AUTO: 177 K/UL — SIGNIFICANT CHANGE UP (ref 150–400)
PMV BLD: 12.2 FL — SIGNIFICANT CHANGE UP (ref 7–13)
POTASSIUM SERPL-MCNC: 3.8 MMOL/L — SIGNIFICANT CHANGE UP (ref 3.5–5.3)
POTASSIUM SERPL-MCNC: 3.8 MMOL/L — SIGNIFICANT CHANGE UP (ref 3.5–5.3)
POTASSIUM SERPL-SCNC: 3.8 MMOL/L — SIGNIFICANT CHANGE UP (ref 3.5–5.3)
POTASSIUM SERPL-SCNC: 3.8 MMOL/L — SIGNIFICANT CHANGE UP (ref 3.5–5.3)
PROTHROM AB SERPL-ACNC: 41.3 SEC — HIGH (ref 9.8–13.1)
RBC # BLD: 3.38 M/UL — LOW (ref 3.8–5.2)
RBC # FLD: 15.9 % — HIGH (ref 10.3–14.5)
SODIUM SERPL-SCNC: 142 MMOL/L — SIGNIFICANT CHANGE UP (ref 135–145)
SODIUM SERPL-SCNC: 143 MMOL/L — SIGNIFICANT CHANGE UP (ref 135–145)
TRIGL SERPL-MCNC: 58 MG/DL — SIGNIFICANT CHANGE UP (ref 10–149)
TSH SERPL-MCNC: 1.68 UIU/ML — SIGNIFICANT CHANGE UP (ref 0.27–4.2)
WBC # BLD: 5.08 K/UL — SIGNIFICANT CHANGE UP (ref 3.8–10.5)
WBC # FLD AUTO: 5.08 K/UL — SIGNIFICANT CHANGE UP (ref 3.8–10.5)

## 2019-08-11 RX ORDER — POTASSIUM CHLORIDE 20 MEQ
20 PACKET (EA) ORAL ONCE
Refills: 0 | Status: COMPLETED | OUTPATIENT
Start: 2019-08-11 | End: 2019-08-11

## 2019-08-11 RX ADMIN — Medication 3 MILLIGRAM(S): at 22:07

## 2019-08-11 RX ADMIN — INSULIN GLARGINE 16 UNIT(S): 100 INJECTION, SOLUTION SUBCUTANEOUS at 22:08

## 2019-08-11 RX ADMIN — Medication 20 MILLIEQUIVALENT(S): at 14:06

## 2019-08-11 RX ADMIN — PANTOPRAZOLE SODIUM 40 MILLIGRAM(S): 20 TABLET, DELAYED RELEASE ORAL at 07:07

## 2019-08-11 RX ADMIN — BRIMONIDINE TARTRATE 1 DROP(S): 2 SOLUTION/ DROPS OPHTHALMIC at 22:07

## 2019-08-11 RX ADMIN — SIMVASTATIN 20 MILLIGRAM(S): 20 TABLET, FILM COATED ORAL at 22:07

## 2019-08-11 RX ADMIN — CILOSTAZOL 100 MILLIGRAM(S): 100 TABLET ORAL at 07:07

## 2019-08-11 RX ADMIN — BRIMONIDINE TARTRATE 1 DROP(S): 2 SOLUTION/ DROPS OPHTHALMIC at 14:07

## 2019-08-11 RX ADMIN — DORZOLAMIDE HYDROCHLORIDE TIMOLOL MALEATE 1 DROP(S): 20; 5 SOLUTION/ DROPS OPHTHALMIC at 07:08

## 2019-08-11 RX ADMIN — Medication 75 MILLIGRAM(S): at 07:08

## 2019-08-11 RX ADMIN — Medication 75 MILLIGRAM(S): at 14:07

## 2019-08-11 RX ADMIN — Medication 80 MILLIGRAM(S): at 07:07

## 2019-08-11 RX ADMIN — LATANOPROST 1 DROP(S): 0.05 SOLUTION/ DROPS OPHTHALMIC; TOPICAL at 22:07

## 2019-08-11 RX ADMIN — Medication 75 MICROGRAM(S): at 07:07

## 2019-08-11 RX ADMIN — Medication 75 MILLIGRAM(S): at 22:07

## 2019-08-11 RX ADMIN — CILOSTAZOL 100 MILLIGRAM(S): 100 TABLET ORAL at 18:32

## 2019-08-11 RX ADMIN — DORZOLAMIDE HYDROCHLORIDE TIMOLOL MALEATE 1 DROP(S): 20; 5 SOLUTION/ DROPS OPHTHALMIC at 22:07

## 2019-08-11 RX ADMIN — BRIMONIDINE TARTRATE 1 DROP(S): 2 SOLUTION/ DROPS OPHTHALMIC at 07:08

## 2019-08-11 RX ADMIN — Medication 80 MILLIGRAM(S): at 18:32

## 2019-08-11 RX ADMIN — Medication 100 MILLIGRAM(S): at 14:06

## 2019-08-11 RX ADMIN — DORZOLAMIDE HYDROCHLORIDE TIMOLOL MALEATE 1 DROP(S): 20; 5 SOLUTION/ DROPS OPHTHALMIC at 14:08

## 2019-08-11 NOTE — PROGRESS NOTE ADULT - ASSESSMENT
89 y/o F w/ PMHx of afib on coumadin, CHF, HTN, HLD, DM, Hypothyroidism, and previous PE, presents w/ worsening shortness of breath and chest pressure x2 days.     CHF exacerbation     Problem/Plan - 1:  ·  Problem: CHF exacerbation.  Plan: Improving.   IV lasix 80 BID  RVP ordered  Strict I&Os, monitor daily weights, 1500 cc fluid restriction, sodium restriction  Cards helping.      Problem/Plan - 2:  ·  Problem: HTN (hypertension).  Plan: Continue home meds  Monitor BP daily  Sodium restriction.      Problem/Plan - 3:  ·  Problem: DM (diabetes mellitus).  Plan: Insulin sliding scale  Diabetic diet.      Problem/Plan - 4:  ·  Problem: Afib.  Plan: Adjusting Coumadin per INR.   INR daily.      Problem/Plan - 5:  ·  Problem: Need for prophylactic measure.  Plan: On warfarin.

## 2019-08-11 NOTE — PHYSICAL THERAPY INITIAL EVALUATION ADULT - PERTINENT HX OF CURRENT PROBLEM, REHAB EVAL
89 y/o F with PMHx of afib on coumadin, CHF, HTN, HLD, DM, Hypothyroidism, and previous PE, presents with worsening shortness of breath and chest pressure x2 days. 89 year old female with PMHx of afib on coumadin, CHF, HTN, HLD, DM, Hypothyroidism, and previous PE, presents with worsening shortness of breath and chest pressure x2 days.

## 2019-08-11 NOTE — PROGRESS NOTE ADULT - ASSESSMENT
89F w/ pmh a. fib on coumadin, CHF, DM, HTN, HLD, DM, hypothyroidism, PE , p/w sob, chest pressure x 2 days. Being admited for fluid overload, HTN urgency. Nephrology following for volume, BP, CKD management.     CKD 4, b/l Cr RANGE 1.9- 2.4. 7/18/19 was 1.9   K wnl. Cr stable at 1.7   Volume overload- improving  Hypercalcemia- vit D25OH level-pending, phos level wnl. avoid ca tabs/vit D suplements. Ca 10.6>9.7 better  s/p HTN urgency-BP better controlled today. s/p ivp Labetalolx2, hydralazine x1 8/10  per cardiology- Last ischemic work up 2013 negative for ischemia or infarct. Echo in June 2018 with normal lv sys function, no wall abnormalities, EF 61%.   DM-Mx per medicine  Afib on AC- per cardio    labs, rad, chart reviewed  c/w lasix 80mg iv bid w/cardiology  c/w Hydralazine 75mg q8h  agree w/kcl repletion. goal K 4. mag adequate at 2.1  monitor daily weights and U/O,  c/w low Na diet and fluid restriction 1.2L/day  monitor BMP daily and u/o   dose all meds for eGFR<15ml/min.   avoid ACEi/ARB for now/NSAIDs/Nephrotoxics.  will closely follow up.

## 2019-08-11 NOTE — PROGRESS NOTE ADULT - ATTENDING COMMENTS
[facility]  Office [phone]  Ans Serv [phone]  [facility] [phone]

## 2019-08-11 NOTE — PHYSICAL THERAPY INITIAL EVALUATION ADULT - ADDITIONAL COMMENTS
Pt. lives in a pvt home with their spouse. Pt. has steps to negotiate at home. Pt. was previously ambulating  with a cane independently. Patient has a home health aide during the week for 7 hours (3 days a week); patient reports she struggles to with ADLs without aide, however, reports her son helps

## 2019-08-12 LAB
24R-OH-CALCIDIOL SERPL-MCNC: 72.2 NG/ML — SIGNIFICANT CHANGE UP (ref 30–80)
ANION GAP SERPL CALC-SCNC: 13 MMO/L — SIGNIFICANT CHANGE UP (ref 7–14)
ANION GAP SERPL CALC-SCNC: 14 MMO/L — SIGNIFICANT CHANGE UP (ref 7–14)
APTT BLD: 39.6 SEC — HIGH (ref 27.5–36.3)
BASOPHILS # BLD AUTO: 0.01 K/UL — SIGNIFICANT CHANGE UP (ref 0–0.2)
BASOPHILS NFR BLD AUTO: 0.2 % — SIGNIFICANT CHANGE UP (ref 0–2)
BUN SERPL-MCNC: 38 MG/DL — HIGH (ref 7–23)
BUN SERPL-MCNC: 42 MG/DL — HIGH (ref 7–23)
CALCIUM SERPL-MCNC: 9.8 MG/DL — SIGNIFICANT CHANGE UP (ref 8.4–10.5)
CALCIUM SERPL-MCNC: 9.9 MG/DL — SIGNIFICANT CHANGE UP (ref 8.4–10.5)
CHLORIDE SERPL-SCNC: 102 MMOL/L — SIGNIFICANT CHANGE UP (ref 98–107)
CHLORIDE SERPL-SCNC: 103 MMOL/L — SIGNIFICANT CHANGE UP (ref 98–107)
CO2 SERPL-SCNC: 27 MMOL/L — SIGNIFICANT CHANGE UP (ref 22–31)
CO2 SERPL-SCNC: 29 MMOL/L — SIGNIFICANT CHANGE UP (ref 22–31)
CREAT SERPL-MCNC: 2 MG/DL — HIGH (ref 0.5–1.3)
CREAT SERPL-MCNC: 2.24 MG/DL — HIGH (ref 0.5–1.3)
EOSINOPHIL # BLD AUTO: 0.33 K/UL — SIGNIFICANT CHANGE UP (ref 0–0.5)
EOSINOPHIL NFR BLD AUTO: 6.7 % — HIGH (ref 0–6)
GLUCOSE BLDC GLUCOMTR-MCNC: 103 MG/DL — HIGH (ref 70–99)
GLUCOSE BLDC GLUCOMTR-MCNC: 139 MG/DL — HIGH (ref 70–99)
GLUCOSE BLDC GLUCOMTR-MCNC: 205 MG/DL — HIGH (ref 70–99)
GLUCOSE BLDC GLUCOMTR-MCNC: 71 MG/DL — SIGNIFICANT CHANGE UP (ref 70–99)
GLUCOSE BLDC GLUCOMTR-MCNC: 94 MG/DL — SIGNIFICANT CHANGE UP (ref 70–99)
GLUCOSE SERPL-MCNC: 117 MG/DL — HIGH (ref 70–99)
GLUCOSE SERPL-MCNC: 88 MG/DL — SIGNIFICANT CHANGE UP (ref 70–99)
HCT VFR BLD CALC: 31.6 % — LOW (ref 34.5–45)
HGB BLD-MCNC: 10 G/DL — LOW (ref 11.5–15.5)
IMM GRANULOCYTES NFR BLD AUTO: 0.4 % — SIGNIFICANT CHANGE UP (ref 0–1.5)
INR BLD: 3.32 — HIGH (ref 0.88–1.17)
LYMPHOCYTES # BLD AUTO: 1.05 K/UL — SIGNIFICANT CHANGE UP (ref 1–3.3)
LYMPHOCYTES # BLD AUTO: 21.2 % — SIGNIFICANT CHANGE UP (ref 13–44)
MAGNESIUM SERPL-MCNC: 2 MG/DL — SIGNIFICANT CHANGE UP (ref 1.6–2.6)
MAGNESIUM SERPL-MCNC: 2.1 MG/DL — SIGNIFICANT CHANGE UP (ref 1.6–2.6)
MCHC RBC-ENTMCNC: 30.4 PG — SIGNIFICANT CHANGE UP (ref 27–34)
MCHC RBC-ENTMCNC: 31.6 % — LOW (ref 32–36)
MCV RBC AUTO: 96 FL — SIGNIFICANT CHANGE UP (ref 80–100)
MONOCYTES # BLD AUTO: 0.9 K/UL — SIGNIFICANT CHANGE UP (ref 0–0.9)
MONOCYTES NFR BLD AUTO: 18.2 % — HIGH (ref 2–14)
NEUTROPHILS # BLD AUTO: 2.64 K/UL — SIGNIFICANT CHANGE UP (ref 1.8–7.4)
NEUTROPHILS NFR BLD AUTO: 53.3 % — SIGNIFICANT CHANGE UP (ref 43–77)
NRBC # FLD: 0 K/UL — SIGNIFICANT CHANGE UP (ref 0–0)
PHOSPHATE SERPL-MCNC: 3 MG/DL — SIGNIFICANT CHANGE UP (ref 2.5–4.5)
PLATELET # BLD AUTO: 186 K/UL — SIGNIFICANT CHANGE UP (ref 150–400)
PMV BLD: 12 FL — SIGNIFICANT CHANGE UP (ref 7–13)
POTASSIUM SERPL-MCNC: 3.5 MMOL/L — SIGNIFICANT CHANGE UP (ref 3.5–5.3)
POTASSIUM SERPL-MCNC: 3.7 MMOL/L — SIGNIFICANT CHANGE UP (ref 3.5–5.3)
POTASSIUM SERPL-SCNC: 3.5 MMOL/L — SIGNIFICANT CHANGE UP (ref 3.5–5.3)
POTASSIUM SERPL-SCNC: 3.7 MMOL/L — SIGNIFICANT CHANGE UP (ref 3.5–5.3)
PROTHROM AB SERPL-ACNC: 38.2 SEC — HIGH (ref 9.8–13.1)
RBC # BLD: 3.29 M/UL — LOW (ref 3.8–5.2)
RBC # FLD: 15.6 % — HIGH (ref 10.3–14.5)
SODIUM SERPL-SCNC: 143 MMOL/L — SIGNIFICANT CHANGE UP (ref 135–145)
SODIUM SERPL-SCNC: 145 MMOL/L — SIGNIFICANT CHANGE UP (ref 135–145)
WBC # BLD: 4.95 K/UL — SIGNIFICANT CHANGE UP (ref 3.8–10.5)
WBC # FLD AUTO: 4.95 K/UL — SIGNIFICANT CHANGE UP (ref 3.8–10.5)

## 2019-08-12 RX ORDER — POTASSIUM CHLORIDE 20 MEQ
40 PACKET (EA) ORAL ONCE
Refills: 0 | Status: COMPLETED | OUTPATIENT
Start: 2019-08-12 | End: 2019-08-12

## 2019-08-12 RX ORDER — POTASSIUM CHLORIDE 20 MEQ
20 PACKET (EA) ORAL ONCE
Refills: 0 | Status: COMPLETED | OUTPATIENT
Start: 2019-08-12 | End: 2019-08-12

## 2019-08-12 RX ORDER — FUROSEMIDE 40 MG
80 TABLET ORAL DAILY
Refills: 0 | Status: DISCONTINUED | OUTPATIENT
Start: 2019-08-13 | End: 2019-08-13

## 2019-08-12 RX ADMIN — CILOSTAZOL 100 MILLIGRAM(S): 100 TABLET ORAL at 05:09

## 2019-08-12 RX ADMIN — BRIMONIDINE TARTRATE 1 DROP(S): 2 SOLUTION/ DROPS OPHTHALMIC at 22:16

## 2019-08-12 RX ADMIN — Medication 20 MILLIEQUIVALENT(S): at 09:45

## 2019-08-12 RX ADMIN — BRIMONIDINE TARTRATE 1 DROP(S): 2 SOLUTION/ DROPS OPHTHALMIC at 05:09

## 2019-08-12 RX ADMIN — Medication 75 MILLIGRAM(S): at 13:15

## 2019-08-12 RX ADMIN — DORZOLAMIDE HYDROCHLORIDE TIMOLOL MALEATE 1 DROP(S): 20; 5 SOLUTION/ DROPS OPHTHALMIC at 05:11

## 2019-08-12 RX ADMIN — Medication 80 MILLIGRAM(S): at 05:09

## 2019-08-12 RX ADMIN — Medication 75 MILLIGRAM(S): at 22:15

## 2019-08-12 RX ADMIN — Medication 75 MILLIGRAM(S): at 05:10

## 2019-08-12 RX ADMIN — INSULIN GLARGINE 16 UNIT(S): 100 INJECTION, SOLUTION SUBCUTANEOUS at 22:15

## 2019-08-12 RX ADMIN — PANTOPRAZOLE SODIUM 40 MILLIGRAM(S): 20 TABLET, DELAYED RELEASE ORAL at 05:10

## 2019-08-12 RX ADMIN — Medication 75 MICROGRAM(S): at 05:10

## 2019-08-12 RX ADMIN — BRIMONIDINE TARTRATE 1 DROP(S): 2 SOLUTION/ DROPS OPHTHALMIC at 13:17

## 2019-08-12 RX ADMIN — SIMVASTATIN 20 MILLIGRAM(S): 20 TABLET, FILM COATED ORAL at 22:15

## 2019-08-12 RX ADMIN — SENNA PLUS 2 TABLET(S): 8.6 TABLET ORAL at 22:16

## 2019-08-12 RX ADMIN — LATANOPROST 1 DROP(S): 0.05 SOLUTION/ DROPS OPHTHALMIC; TOPICAL at 22:16

## 2019-08-12 RX ADMIN — CILOSTAZOL 100 MILLIGRAM(S): 100 TABLET ORAL at 17:18

## 2019-08-12 RX ADMIN — Medication 100 MILLIGRAM(S): at 13:15

## 2019-08-12 RX ADMIN — Medication 40 MILLIEQUIVALENT(S): at 22:15

## 2019-08-12 RX ADMIN — DORZOLAMIDE HYDROCHLORIDE TIMOLOL MALEATE 1 DROP(S): 20; 5 SOLUTION/ DROPS OPHTHALMIC at 13:18

## 2019-08-12 RX ADMIN — DORZOLAMIDE HYDROCHLORIDE TIMOLOL MALEATE 1 DROP(S): 20; 5 SOLUTION/ DROPS OPHTHALMIC at 22:16

## 2019-08-12 NOTE — PROGRESS NOTE ADULT - ATTENDING COMMENTS
Patient seen and examined.  Agree with above.   -continue with IV diuresis  -pt. and family want conservative cv care at this time  -no further cardiac workup anticipated after patient becomes euvolemic      Trisha Banks MD

## 2019-08-12 NOTE — PROGRESS NOTE ADULT - ATTENDING COMMENTS
[facility]  Office [phone]  Ans Serv [phone]  [facility] -[phone]

## 2019-08-12 NOTE — PROGRESS NOTE ADULT - ASSESSMENT
89 y/o F w/ PMHx of afib on coumadin, CHF, HTN, HLD, DM, Hypothyroidism, and previous PE, presents w/ worsening shortness of breath and chest pressure x2 days.     CHF exacerbation     Problem/Plan - 1:  ·  Problem: CHF exacerbation.  Plan: Improving.   IV lasix 80 changing to daily   RVP ordered  Strict I&Os, monitor daily weights, 1500 cc fluid restriction, sodium restriction  Cards helping.      Problem/Plan - 2:  ·  Problem: HTN (hypertension).  Plan: Continue home meds  Monitor BP daily  Sodium restriction.      Problem/Plan - 3:  ·  Problem: DM (diabetes mellitus).  Plan: Insulin sliding scale  Diabetic diet.      Problem/Plan - 4:  ·  Problem: Afib.  Plan: Adjusting Coumadin per INR.   INR daily.      Problem/Plan - 5:  ·  Problem: CKD stage 3 with NIKHIL .  Plan: Renal helping.

## 2019-08-12 NOTE — PROGRESS NOTE ADULT - ASSESSMENT
89F w/ pmh a. fib on coumadin, CHF, DM, HTN, HLD, DM, hypothyroidism, PE , p/w sob, chest pressure x 2 days. Being admited for fluid overload, HTN urgency. Nephrology following for volume, BP, CKD management.     CKD 4, b/l Cr RANGE 1.9- 2.4. 7/18/19 was 1.9   K wnl. Cr now trending up 1.7 >1.9>2 today, likely 2/2 hemodynamic instability  Volume overload- much better  Hypercalcemia- vit D25OH level-pending, phos level wnl. avoid ca tabs/vit D suplements. Ca 10.6>9.7 better  s/p HTN urgency-BP better controlled. s/p ivp Labetalolx2, hydralazine x1 8/10  per cardiology- Last ischemic work up 2013 negative for ischemia or infarct. Echo in June 2018 with normal lv sys function, no wall abnormalities, EF 61%.   DM-Mx per medicine  Afib on AC- per cardio    labs, chart reviewed  rec dec lasix 80mg iv bid > qd if ok w/cardiology  c/w Hydralazine 75mg q8h  agree w/kcl repletion. goal K 4. mag adequate at 2.1  monitor daily weights and U/O  c/w low Na diet and fluid restriction 1.2L/day  monitor BMP daily and u/o   dose all meds for eGFR<15ml/min.   avoid ACEi/ARB for now/NSAIDs/Nephrotoxics.  will closely follow up.

## 2019-08-13 DIAGNOSIS — I50.23 ACUTE ON CHRONIC SYSTOLIC (CONGESTIVE) HEART FAILURE: ICD-10-CM

## 2019-08-13 DIAGNOSIS — N18.4 CHRONIC KIDNEY DISEASE, STAGE 4 (SEVERE): ICD-10-CM

## 2019-08-13 DIAGNOSIS — I10 ESSENTIAL (PRIMARY) HYPERTENSION: ICD-10-CM

## 2019-08-13 LAB
ANION GAP SERPL CALC-SCNC: 12 MMO/L — SIGNIFICANT CHANGE UP (ref 7–14)
BUN SERPL-MCNC: 40 MG/DL — HIGH (ref 7–23)
CALCIUM SERPL-MCNC: 9.8 MG/DL — SIGNIFICANT CHANGE UP (ref 8.4–10.5)
CHLORIDE SERPL-SCNC: 106 MMOL/L — SIGNIFICANT CHANGE UP (ref 98–107)
CO2 SERPL-SCNC: 27 MMOL/L — SIGNIFICANT CHANGE UP (ref 22–31)
CREAT SERPL-MCNC: 2.09 MG/DL — HIGH (ref 0.5–1.3)
GLUCOSE BLDC GLUCOMTR-MCNC: 107 MG/DL — HIGH (ref 70–99)
GLUCOSE BLDC GLUCOMTR-MCNC: 131 MG/DL — HIGH (ref 70–99)
GLUCOSE BLDC GLUCOMTR-MCNC: 146 MG/DL — HIGH (ref 70–99)
GLUCOSE BLDC GLUCOMTR-MCNC: 92 MG/DL — SIGNIFICANT CHANGE UP (ref 70–99)
GLUCOSE SERPL-MCNC: 117 MG/DL — HIGH (ref 70–99)
HCT VFR BLD CALC: 31.5 % — LOW (ref 34.5–45)
HGB BLD-MCNC: 10 G/DL — LOW (ref 11.5–15.5)
INR BLD: 2.3 — HIGH (ref 0.88–1.17)
MAGNESIUM SERPL-MCNC: 2 MG/DL — SIGNIFICANT CHANGE UP (ref 1.6–2.6)
MCHC RBC-ENTMCNC: 30.4 PG — SIGNIFICANT CHANGE UP (ref 27–34)
MCHC RBC-ENTMCNC: 31.7 % — LOW (ref 32–36)
MCV RBC AUTO: 95.7 FL — SIGNIFICANT CHANGE UP (ref 80–100)
NRBC # FLD: 0 K/UL — SIGNIFICANT CHANGE UP (ref 0–0)
PHOSPHATE SERPL-MCNC: 2.8 MG/DL — SIGNIFICANT CHANGE UP (ref 2.5–4.5)
PLATELET # BLD AUTO: 188 K/UL — SIGNIFICANT CHANGE UP (ref 150–400)
PMV BLD: 12.4 FL — SIGNIFICANT CHANGE UP (ref 7–13)
POTASSIUM SERPL-MCNC: 3.9 MMOL/L — SIGNIFICANT CHANGE UP (ref 3.5–5.3)
POTASSIUM SERPL-SCNC: 3.9 MMOL/L — SIGNIFICANT CHANGE UP (ref 3.5–5.3)
PROTHROM AB SERPL-ACNC: 26.9 SEC — HIGH (ref 9.8–13.1)
RBC # BLD: 3.29 M/UL — LOW (ref 3.8–5.2)
RBC # FLD: 15.8 % — HIGH (ref 10.3–14.5)
SODIUM SERPL-SCNC: 145 MMOL/L — SIGNIFICANT CHANGE UP (ref 135–145)
WBC # BLD: 5.56 K/UL — SIGNIFICANT CHANGE UP (ref 3.8–10.5)
WBC # FLD AUTO: 5.56 K/UL — SIGNIFICANT CHANGE UP (ref 3.8–10.5)

## 2019-08-13 PROCEDURE — 93306 TTE W/DOPPLER COMPLETE: CPT | Mod: 26

## 2019-08-13 RX ORDER — FUROSEMIDE 40 MG
80 TABLET ORAL
Refills: 0 | Status: DISCONTINUED | OUTPATIENT
Start: 2019-08-13 | End: 2019-08-15

## 2019-08-13 RX ORDER — WARFARIN SODIUM 2.5 MG/1
5 TABLET ORAL ONCE
Refills: 0 | Status: COMPLETED | OUTPATIENT
Start: 2019-08-13 | End: 2019-08-13

## 2019-08-13 RX ADMIN — Medication 80 MILLIGRAM(S): at 06:25

## 2019-08-13 RX ADMIN — LATANOPROST 1 DROP(S): 0.05 SOLUTION/ DROPS OPHTHALMIC; TOPICAL at 21:47

## 2019-08-13 RX ADMIN — CILOSTAZOL 100 MILLIGRAM(S): 100 TABLET ORAL at 06:26

## 2019-08-13 RX ADMIN — Medication 80 MILLIGRAM(S): at 17:38

## 2019-08-13 RX ADMIN — SIMVASTATIN 20 MILLIGRAM(S): 20 TABLET, FILM COATED ORAL at 21:48

## 2019-08-13 RX ADMIN — WARFARIN SODIUM 5 MILLIGRAM(S): 2.5 TABLET ORAL at 13:25

## 2019-08-13 RX ADMIN — DORZOLAMIDE HYDROCHLORIDE TIMOLOL MALEATE 1 DROP(S): 20; 5 SOLUTION/ DROPS OPHTHALMIC at 06:25

## 2019-08-13 RX ADMIN — Medication 100 MILLIGRAM(S): at 13:25

## 2019-08-13 RX ADMIN — BRIMONIDINE TARTRATE 1 DROP(S): 2 SOLUTION/ DROPS OPHTHALMIC at 21:47

## 2019-08-13 RX ADMIN — Medication 75 MILLIGRAM(S): at 06:25

## 2019-08-13 RX ADMIN — DORZOLAMIDE HYDROCHLORIDE TIMOLOL MALEATE 1 DROP(S): 20; 5 SOLUTION/ DROPS OPHTHALMIC at 20:06

## 2019-08-13 RX ADMIN — CILOSTAZOL 100 MILLIGRAM(S): 100 TABLET ORAL at 17:37

## 2019-08-13 RX ADMIN — DORZOLAMIDE HYDROCHLORIDE TIMOLOL MALEATE 1 DROP(S): 20; 5 SOLUTION/ DROPS OPHTHALMIC at 13:26

## 2019-08-13 RX ADMIN — PANTOPRAZOLE SODIUM 40 MILLIGRAM(S): 20 TABLET, DELAYED RELEASE ORAL at 06:25

## 2019-08-13 RX ADMIN — BRIMONIDINE TARTRATE 1 DROP(S): 2 SOLUTION/ DROPS OPHTHALMIC at 06:24

## 2019-08-13 RX ADMIN — Medication 75 MILLIGRAM(S): at 21:48

## 2019-08-13 RX ADMIN — Medication 3 MILLIGRAM(S): at 21:48

## 2019-08-13 RX ADMIN — INSULIN GLARGINE 16 UNIT(S): 100 INJECTION, SOLUTION SUBCUTANEOUS at 21:48

## 2019-08-13 RX ADMIN — Medication 75 MICROGRAM(S): at 06:25

## 2019-08-13 RX ADMIN — Medication 75 MILLIGRAM(S): at 13:25

## 2019-08-13 RX ADMIN — SENNA PLUS 2 TABLET(S): 8.6 TABLET ORAL at 21:48

## 2019-08-13 RX ADMIN — BRIMONIDINE TARTRATE 1 DROP(S): 2 SOLUTION/ DROPS OPHTHALMIC at 13:26

## 2019-08-13 NOTE — PROGRESS NOTE ADULT - ATTENDING COMMENTS
Patient seen and examined.  Agree with above.  -continue with iv diuresis  -pt. with history of sss however pt. and family do not want ppm or invasive cv procedures at this time    Trisha Banks MD

## 2019-08-13 NOTE — PROGRESS NOTE ADULT - ASSESSMENT
89 y/o F w/ PMHx of afib on coumadin, CHF, HTN, HLD, DM, Hypothyroidism, and previous PE, presents w/ worsening shortness of breath and chest pressure x2 days.     CHF exacerbation     Problem/Plan - 1:  ·  Problem: Acute On Chronic Diastolic CHF exacerbation.  Plan: Improving.   IV lasix 80 BID   TTE ..< from: Transthoracic Echocardiogram (08.13.19 @ 11:13) >  CONCLUSIONS:  1. Mitral annular calcification and calcified mitral  leaflets with normal diastolic opening. Mild mitral  regurgitation.  2. Calcified trileaflet aortic valve with grossly  moderately decreased opening. Peak transaortic valve  gradient equals 30 mm Hg, mean transaortic valve gradient  equals 15 mm Hg.  3. Normal left ventricular internal dimensions and wall  thicknesses.  4. Normal left ventricular systolic function. No segmental  wall motion abnormalities.  5. Increased E/e'  is consistent with elevated left  ventricular filling pressure.  6. Moderate right atrial enlargement.  7. Estimated pulmonary artery systolic pressure equals 51  mm Hg, assuming right atrial pressure equals 10  mm Hg,  consistent with moderate pulmonary hypertension.    < end of copied text >    Strict I&Os, monitor daily weights, 1500 cc fluid restriction, sodium restriction  Cards helping.      Problem/Plan - 2:  ·  Problem: HTN (hypertension).  Plan: Continue home meds  Monitor BP daily  Sodium restriction.      Problem/Plan - 3:  ·  Problem: DM (diabetes mellitus).  Plan: Insulin sliding scale  Diabetic diet.      Problem/Plan - 4:  ·  Problem: Afib.  Plan: Adjusting Coumadin per INR.   INR daily.      Problem/Plan - 5:  ·  Problem: CKD stage 3 with NIKHIL .  Plan: Renal helping.

## 2019-08-14 LAB
ANION GAP SERPL CALC-SCNC: 13 MMO/L — SIGNIFICANT CHANGE UP (ref 7–14)
BUN SERPL-MCNC: 38 MG/DL — HIGH (ref 7–23)
CALCIUM SERPL-MCNC: 9.8 MG/DL — SIGNIFICANT CHANGE UP (ref 8.4–10.5)
CHLORIDE SERPL-SCNC: 105 MMOL/L — SIGNIFICANT CHANGE UP (ref 98–107)
CO2 SERPL-SCNC: 28 MMOL/L — SIGNIFICANT CHANGE UP (ref 22–31)
CREAT SERPL-MCNC: 2.09 MG/DL — HIGH (ref 0.5–1.3)
GLUCOSE BLDC GLUCOMTR-MCNC: 128 MG/DL — HIGH (ref 70–99)
GLUCOSE BLDC GLUCOMTR-MCNC: 139 MG/DL — HIGH (ref 70–99)
GLUCOSE BLDC GLUCOMTR-MCNC: 184 MG/DL — HIGH (ref 70–99)
GLUCOSE BLDC GLUCOMTR-MCNC: 189 MG/DL — HIGH (ref 70–99)
GLUCOSE BLDC GLUCOMTR-MCNC: 47 MG/DL — LOW (ref 70–99)
GLUCOSE BLDC GLUCOMTR-MCNC: 48 MG/DL — LOW (ref 70–99)
GLUCOSE BLDC GLUCOMTR-MCNC: 64 MG/DL — LOW (ref 70–99)
GLUCOSE SERPL-MCNC: 47 MG/DL — LOW (ref 70–99)
HCT VFR BLD CALC: 32.8 % — LOW (ref 34.5–45)
HGB BLD-MCNC: 10.3 G/DL — LOW (ref 11.5–15.5)
INR BLD: 2.1 — HIGH (ref 0.88–1.17)
MAGNESIUM SERPL-MCNC: 2 MG/DL — SIGNIFICANT CHANGE UP (ref 1.6–2.6)
MCHC RBC-ENTMCNC: 30.2 PG — SIGNIFICANT CHANGE UP (ref 27–34)
MCHC RBC-ENTMCNC: 31.4 % — LOW (ref 32–36)
MCV RBC AUTO: 96.2 FL — SIGNIFICANT CHANGE UP (ref 80–100)
NRBC # FLD: 0 K/UL — SIGNIFICANT CHANGE UP (ref 0–0)
PHOSPHATE SERPL-MCNC: 2.9 MG/DL — SIGNIFICANT CHANGE UP (ref 2.5–4.5)
PLATELET # BLD AUTO: 194 K/UL — SIGNIFICANT CHANGE UP (ref 150–400)
PMV BLD: 12.2 FL — SIGNIFICANT CHANGE UP (ref 7–13)
POTASSIUM SERPL-MCNC: 3.4 MMOL/L — LOW (ref 3.5–5.3)
POTASSIUM SERPL-SCNC: 3.4 MMOL/L — LOW (ref 3.5–5.3)
PROTHROM AB SERPL-ACNC: 23.8 SEC — HIGH (ref 9.8–13.1)
RBC # BLD: 3.41 M/UL — LOW (ref 3.8–5.2)
RBC # FLD: 15.7 % — HIGH (ref 10.3–14.5)
SODIUM SERPL-SCNC: 146 MMOL/L — HIGH (ref 135–145)
WBC # BLD: 5.24 K/UL — SIGNIFICANT CHANGE UP (ref 3.8–10.5)
WBC # FLD AUTO: 5.24 K/UL — SIGNIFICANT CHANGE UP (ref 3.8–10.5)

## 2019-08-14 RX ORDER — WARFARIN SODIUM 2.5 MG/1
5 TABLET ORAL ONCE
Refills: 0 | Status: COMPLETED | OUTPATIENT
Start: 2019-08-14 | End: 2019-08-14

## 2019-08-14 RX ORDER — INSULIN GLARGINE 100 [IU]/ML
6 INJECTION, SOLUTION SUBCUTANEOUS AT BEDTIME
Refills: 0 | Status: DISCONTINUED | OUTPATIENT
Start: 2019-08-14 | End: 2019-08-15

## 2019-08-14 RX ORDER — WARFARIN SODIUM 2.5 MG/1
5 TABLET ORAL ONCE
Refills: 0 | Status: DISCONTINUED | OUTPATIENT
Start: 2019-08-14 | End: 2019-08-14

## 2019-08-14 RX ORDER — POTASSIUM CHLORIDE 20 MEQ
40 PACKET (EA) ORAL ONCE
Refills: 0 | Status: COMPLETED | OUTPATIENT
Start: 2019-08-14 | End: 2019-08-14

## 2019-08-14 RX ADMIN — Medication 80 MILLIGRAM(S): at 18:35

## 2019-08-14 RX ADMIN — DORZOLAMIDE HYDROCHLORIDE TIMOLOL MALEATE 1 DROP(S): 20; 5 SOLUTION/ DROPS OPHTHALMIC at 06:23

## 2019-08-14 RX ADMIN — BRIMONIDINE TARTRATE 1 DROP(S): 2 SOLUTION/ DROPS OPHTHALMIC at 13:17

## 2019-08-14 RX ADMIN — Medication 40 MILLIEQUIVALENT(S): at 13:17

## 2019-08-14 RX ADMIN — CILOSTAZOL 100 MILLIGRAM(S): 100 TABLET ORAL at 06:22

## 2019-08-14 RX ADMIN — Medication 75 MILLIGRAM(S): at 22:10

## 2019-08-14 RX ADMIN — BRIMONIDINE TARTRATE 1 DROP(S): 2 SOLUTION/ DROPS OPHTHALMIC at 22:09

## 2019-08-14 RX ADMIN — DORZOLAMIDE HYDROCHLORIDE TIMOLOL MALEATE 1 DROP(S): 20; 5 SOLUTION/ DROPS OPHTHALMIC at 22:09

## 2019-08-14 RX ADMIN — CILOSTAZOL 100 MILLIGRAM(S): 100 TABLET ORAL at 18:35

## 2019-08-14 RX ADMIN — BRIMONIDINE TARTRATE 1 DROP(S): 2 SOLUTION/ DROPS OPHTHALMIC at 06:23

## 2019-08-14 RX ADMIN — DORZOLAMIDE HYDROCHLORIDE TIMOLOL MALEATE 1 DROP(S): 20; 5 SOLUTION/ DROPS OPHTHALMIC at 13:17

## 2019-08-14 RX ADMIN — PANTOPRAZOLE SODIUM 40 MILLIGRAM(S): 20 TABLET, DELAYED RELEASE ORAL at 06:22

## 2019-08-14 RX ADMIN — SENNA PLUS 2 TABLET(S): 8.6 TABLET ORAL at 22:10

## 2019-08-14 RX ADMIN — Medication 75 MILLIGRAM(S): at 13:17

## 2019-08-14 RX ADMIN — Medication 75 MILLIGRAM(S): at 06:23

## 2019-08-14 RX ADMIN — LATANOPROST 1 DROP(S): 0.05 SOLUTION/ DROPS OPHTHALMIC; TOPICAL at 22:09

## 2019-08-14 RX ADMIN — WARFARIN SODIUM 5 MILLIGRAM(S): 2.5 TABLET ORAL at 09:08

## 2019-08-14 RX ADMIN — SIMVASTATIN 20 MILLIGRAM(S): 20 TABLET, FILM COATED ORAL at 22:10

## 2019-08-14 RX ADMIN — INSULIN GLARGINE 6 UNIT(S): 100 INJECTION, SOLUTION SUBCUTANEOUS at 22:11

## 2019-08-14 RX ADMIN — Medication 80 MILLIGRAM(S): at 06:23

## 2019-08-14 RX ADMIN — Medication 3 MILLIGRAM(S): at 22:10

## 2019-08-14 RX ADMIN — Medication 75 MICROGRAM(S): at 06:22

## 2019-08-14 RX ADMIN — Medication 100 MILLIGRAM(S): at 13:16

## 2019-08-14 NOTE — PROGRESS NOTE ADULT - ASSESSMENT
89 y/o F w/ PMHx of afib on coumadin, CHF, HTN, HLD, DM, Hypothyroidism, and previous PE, presents w/ worsening shortness of breath and chest pressure x2 days.     CHF exacerbation     Problem/Plan - 1:  ·  Problem: Acute On Chronic Diastolic CHF exacerbation.  Plan: Improving.   IV lasix 80 BID   TTE ..< from: Transthoracic Echocardiogram (08.13.19 @ 11:13) >  CONCLUSIONS:  1. Mitral annular calcification and calcified mitral  leaflets with normal diastolic opening. Mild mitral  regurgitation.  2. Calcified trileaflet aortic valve with grossly  moderately decreased opening. Peak transaortic valve  gradient equals 30 mm Hg, mean transaortic valve gradient  equals 15 mm Hg.  3. Normal left ventricular internal dimensions and wall  thicknesses.  4. Normal left ventricular systolic function. No segmental  wall motion abnormalities.  5. Increased E/e'  is consistent with elevated left  ventricular filling pressure.  6. Moderate right atrial enlargement.  7. Estimated pulmonary artery systolic pressure equals 51  mm Hg, assuming right atrial pressure equals 10  mm Hg,  consistent with moderate pulmonary hypertension.    < end of copied text >    Strict I&Os, monitor daily weights, 1500 cc fluid restriction, sodium restriction  Cards helping.      Problem/Plan - 2:  ·  Problem: HTN (hypertension).  Plan: Continue home meds  Monitor BP daily  Sodium restriction.      Problem/Plan - 3:  ·  Problem: DM (diabetes mellitus).  Plan: Insulin sliding scale  Diabetic diet.      Problem/Plan - 4:  ·  Problem: Afib.  Plan: Adjusting Coumadin per INR.   INR daily.      Problem/Plan - 5:  ·  Problem: CKD stage 3 with NIKHIL .  Plan: Renal helping. 89 y/o F w/ PMHx of afib on coumadin, CHF, HTN, HLD, DM, Hypothyroidism, and previous PE, presents w/ worsening shortness of breath and chest pressure x2 days.     CHF exacerbation     Problem/Plan - 1:  ·  Problem: Acute On Chronic Diastolic CHF exacerbation.  Plan: Improving.   IV lasix 80 BID   TTE ..< from: Transthoracic Echocardiogram (08.13.19 @ 11:13) >  CONCLUSIONS:  1. Mitral annular calcification and calcified mitral  leaflets with normal diastolic opening. Mild mitral  regurgitation.  2. Calcified trileaflet aortic valve with grossly  moderately decreased opening. Peak transaortic valve  gradient equals 30 mm Hg, mean transaortic valve gradient  equals 15 mm Hg.  3. Normal left ventricular internal dimensions and wall  thicknesses.  4. Normal left ventricular systolic function. No segmental  wall motion abnormalities.  5. Increased E/e'  is consistent with elevated left  ventricular filling pressure.  6. Moderate right atrial enlargement.  7. Estimated pulmonary artery systolic pressure equals 51  mm Hg, assuming right atrial pressure equals 10  mm Hg,  consistent with moderate pulmonary hypertension.    < end of copied text >    Strict I&Os, monitor daily weights, 1500 cc fluid restriction, sodium restriction  Cards helping.      Problem/Plan - 2:  ·  Problem: HTN (hypertension).  Plan: Continue home meds  Monitor BP daily  Sodium restriction.      Problem/Plan - 3:  ·  Problem: DM (diabetes mellitus).  Plan: Insulin sliding scale. Sugars was low so reduced Lantus .   Diabetic diet.      Problem/Plan - 4:  ·  Problem: Afib.  Plan: Adjusting Coumadin per INR.   INR daily.      Problem/Plan - 5:  ·  Problem: CKD stage 3 with NIKHIL .  Plan: Renal helping.

## 2019-08-14 NOTE — PROGRESS NOTE ADULT - ASSESSMENT
89F w/ pmh a. fib on coumadin, CHF, DM, HTN, HLD, DM, hypothyroidism, PE , p/w sob, chest pressure x 2 days. Being admited for fluid overload, HTN urgency. Nephrology following for volume, BP, CKD management.     CKD 4, b/l Cr RANGE 1.9- 2.4. 7/18/19 was 1.9   K wnl. Cr now trending up 1.7 >1.9>>>2 plauteued today, likely 2/2 hemodynamic instability  Volume overload- improving  Hypercalcemia- vit D25OH level-pending, phos level wnl. avoid ca tabs/vit D suplements. Ca 10.6>9.7 better  s/p HTN urgency on admission-BP better controlled now.   per cardiology- Last ischemic work up 2013 negative for ischemia or infarct. Echo in June 2018 with normal lv sys function, no wall abnormalities, EF 61%.   DM-Mx per medicine  Afib on AC- per cardio    labs, chart reviewed  c/w lasix 80mg iv bid for now. u/o 2350ml past 24hrs, good  f/u w/ cardiology  c/w Hydralazine 75mg q8h  agree w/kcl repletion. goal K 4. mag adequate at 2.0  monitor daily weights and U/O  c/w low Na diet and fluid restriction 1.2L/day  monitor BMP daily and u/o   dose all meds for eGFR<15ml/min.   avoid ACEi/ARB for now/NSAIDs/Nephrotoxics.  will closely follow up.

## 2019-08-14 NOTE — PROGRESS NOTE ADULT - ATTENDING COMMENTS
Patient seen and examined.  Agree with above.   -volume status has improved  -continue with iv diuresis - anticipate transitioning to oral lasix tomorrow    Trisha Banks MD

## 2019-08-14 NOTE — PROGRESS NOTE ADULT - ATTENDING COMMENTS
[facility]  Office [phone]  Ans Serv [phone]  [facility] -[phone]

## 2019-08-15 ENCOUNTER — TRANSCRIPTION ENCOUNTER (OUTPATIENT)
Age: 84
End: 2019-08-15

## 2019-08-15 VITALS
HEART RATE: 66 BPM | OXYGEN SATURATION: 98 % | TEMPERATURE: 98 F | RESPIRATION RATE: 18 BRPM | DIASTOLIC BLOOD PRESSURE: 86 MMHG | SYSTOLIC BLOOD PRESSURE: 159 MMHG

## 2019-08-15 LAB
ANION GAP SERPL CALC-SCNC: 11 MMO/L — SIGNIFICANT CHANGE UP (ref 7–14)
BUN SERPL-MCNC: 42 MG/DL — HIGH (ref 7–23)
CALCIUM SERPL-MCNC: 9.5 MG/DL — SIGNIFICANT CHANGE UP (ref 8.4–10.5)
CHLORIDE SERPL-SCNC: 104 MMOL/L — SIGNIFICANT CHANGE UP (ref 98–107)
CO2 SERPL-SCNC: 30 MMOL/L — SIGNIFICANT CHANGE UP (ref 22–31)
CREAT SERPL-MCNC: 1.95 MG/DL — HIGH (ref 0.5–1.3)
GLUCOSE BLDC GLUCOMTR-MCNC: 128 MG/DL — HIGH (ref 70–99)
GLUCOSE BLDC GLUCOMTR-MCNC: 223 MG/DL — HIGH (ref 70–99)
GLUCOSE BLDC GLUCOMTR-MCNC: 81 MG/DL — SIGNIFICANT CHANGE UP (ref 70–99)
GLUCOSE SERPL-MCNC: 89 MG/DL — SIGNIFICANT CHANGE UP (ref 70–99)
HCT VFR BLD CALC: 30.8 % — LOW (ref 34.5–45)
HGB BLD-MCNC: 9.5 G/DL — LOW (ref 11.5–15.5)
INR BLD: 2.54 — HIGH (ref 0.88–1.17)
MAGNESIUM SERPL-MCNC: 1.9 MG/DL — SIGNIFICANT CHANGE UP (ref 1.6–2.6)
MCHC RBC-ENTMCNC: 29.8 PG — SIGNIFICANT CHANGE UP (ref 27–34)
MCHC RBC-ENTMCNC: 30.8 % — LOW (ref 32–36)
MCV RBC AUTO: 96.6 FL — SIGNIFICANT CHANGE UP (ref 80–100)
NRBC # FLD: 0 K/UL — SIGNIFICANT CHANGE UP (ref 0–0)
PLATELET # BLD AUTO: 183 K/UL — SIGNIFICANT CHANGE UP (ref 150–400)
PMV BLD: 11.8 FL — SIGNIFICANT CHANGE UP (ref 7–13)
POTASSIUM SERPL-MCNC: 3.1 MMOL/L — LOW (ref 3.5–5.3)
POTASSIUM SERPL-SCNC: 3.1 MMOL/L — LOW (ref 3.5–5.3)
PROTHROM AB SERPL-ACNC: 29 SEC — HIGH (ref 9.8–13.1)
RBC # BLD: 3.19 M/UL — LOW (ref 3.8–5.2)
RBC # FLD: 15.3 % — HIGH (ref 10.3–14.5)
SODIUM SERPL-SCNC: 145 MMOL/L — SIGNIFICANT CHANGE UP (ref 135–145)
WBC # BLD: 7.05 K/UL — SIGNIFICANT CHANGE UP (ref 3.8–10.5)
WBC # FLD AUTO: 7.05 K/UL — SIGNIFICANT CHANGE UP (ref 3.8–10.5)

## 2019-08-15 RX ORDER — POTASSIUM CHLORIDE 20 MEQ
40 PACKET (EA) ORAL EVERY 4 HOURS
Refills: 0 | Status: COMPLETED | OUTPATIENT
Start: 2019-08-15 | End: 2019-08-15

## 2019-08-15 RX ORDER — FUROSEMIDE 40 MG
1 TABLET ORAL
Qty: 30 | Refills: 0
Start: 2019-08-15 | End: 2019-09-13

## 2019-08-15 RX ORDER — SPIRONOLACTONE 25 MG/1
25 TABLET, FILM COATED ORAL DAILY
Refills: 0 | Status: DISCONTINUED | OUTPATIENT
Start: 2019-08-15 | End: 2019-08-15

## 2019-08-15 RX ORDER — WARFARIN SODIUM 2.5 MG/1
1 TABLET ORAL
Qty: 0 | Refills: 0 | DISCHARGE

## 2019-08-15 RX ORDER — FUROSEMIDE 40 MG
80 TABLET ORAL DAILY
Refills: 0 | Status: DISCONTINUED | OUTPATIENT
Start: 2019-08-16 | End: 2019-08-15

## 2019-08-15 RX ORDER — WARFARIN SODIUM 2.5 MG/1
3 TABLET ORAL ONCE
Refills: 0 | Status: COMPLETED | OUTPATIENT
Start: 2019-08-15 | End: 2019-08-15

## 2019-08-15 RX ORDER — POTASSIUM CHLORIDE 20 MEQ
40 PACKET (EA) ORAL ONCE
Refills: 0 | Status: DISCONTINUED | OUTPATIENT
Start: 2019-08-15 | End: 2019-08-15

## 2019-08-15 RX ORDER — WARFARIN SODIUM 2.5 MG/1
1 TABLET ORAL
Qty: 14 | Refills: 0
Start: 2019-08-15 | End: 2019-08-28

## 2019-08-15 RX ORDER — SPIRONOLACTONE 25 MG/1
1 TABLET, FILM COATED ORAL
Qty: 30 | Refills: 0
Start: 2019-08-15 | End: 2019-09-13

## 2019-08-15 RX ORDER — FUROSEMIDE 40 MG
1 TABLET ORAL
Qty: 0 | Refills: 0 | DISCHARGE

## 2019-08-15 RX ADMIN — Medication 40 MILLIEQUIVALENT(S): at 12:50

## 2019-08-15 RX ADMIN — Medication 100 MILLIGRAM(S): at 12:50

## 2019-08-15 RX ADMIN — SPIRONOLACTONE 25 MILLIGRAM(S): 25 TABLET, FILM COATED ORAL at 17:32

## 2019-08-15 RX ADMIN — Medication 40 MILLIEQUIVALENT(S): at 17:32

## 2019-08-15 RX ADMIN — Medication 2: at 12:50

## 2019-08-15 RX ADMIN — Medication 75 MICROGRAM(S): at 06:00

## 2019-08-15 RX ADMIN — WARFARIN SODIUM 3 MILLIGRAM(S): 2.5 TABLET ORAL at 09:29

## 2019-08-15 RX ADMIN — CILOSTAZOL 100 MILLIGRAM(S): 100 TABLET ORAL at 17:32

## 2019-08-15 RX ADMIN — Medication 75 MILLIGRAM(S): at 06:00

## 2019-08-15 RX ADMIN — CILOSTAZOL 100 MILLIGRAM(S): 100 TABLET ORAL at 06:00

## 2019-08-15 RX ADMIN — BRIMONIDINE TARTRATE 1 DROP(S): 2 SOLUTION/ DROPS OPHTHALMIC at 06:00

## 2019-08-15 RX ADMIN — Medication 75 MILLIGRAM(S): at 15:53

## 2019-08-15 RX ADMIN — PANTOPRAZOLE SODIUM 40 MILLIGRAM(S): 20 TABLET, DELAYED RELEASE ORAL at 07:10

## 2019-08-15 RX ADMIN — Medication 80 MILLIGRAM(S): at 06:00

## 2019-08-15 NOTE — PROGRESS NOTE ADULT - PROBLEM SELECTOR PROBLEM 1
CKD (chronic kidney disease) stage 4, GFR 15-29 ml/min
CKD (chronic kidney disease) stage 4, GFR 15-29 ml/min

## 2019-08-15 NOTE — DISCHARGE NOTE PROVIDER - CARE PROVIDER_API CALL
Trisha Banks (MD)  Cardiovascular Disease; Internal Medicine; Interventional Cardiology  [address] [facility] [address]  [place] [address]  Phone: [phone]  Fax: [phone]  Follow Up Time: Yazmin Vaz)  Cardiovascular Disease; Internal Medicine  [address] [facility] [address] [facility], [address]  [place] [address]  Phone: [phone]  Fax: [phone]  Follow Up Time:

## 2019-08-15 NOTE — PROGRESS NOTE ADULT - ASSESSMENT
89 y/o F w/ PMHx of afib on coumadin, CHF, HTN, HLD, DM, Hypothyroidism, and previous PE, presents w/ worsening shortness of breath and chest pressure x2 days.     CHF exacerbation     Problem/Plan - 1:  ·  Problem: Acute On Chronic Diastolic CHF exacerbation.  Plan: Improving.   IV lasix 80 BID . nOw PO lasix   TTE ..< from: Transthoracic Echocardiogram (08.13.19 @ 11:13) >  CONCLUSIONS:  1. Mitral annular calcification and calcified mitral  leaflets with normal diastolic opening. Mild mitral  regurgitation.  2. Calcified trileaflet aortic valve with grossly  moderately decreased opening. Peak transaortic valve  gradient equals 30 mm Hg, mean transaortic valve gradient  equals 15 mm Hg.  3. Normal left ventricular internal dimensions and wall  thicknesses.  4. Normal left ventricular systolic function. No segmental  wall motion abnormalities.  5. Increased E/e'  is consistent with elevated left  ventricular filling pressure.  6. Moderate right atrial enlargement.  7. Estimated pulmonary artery systolic pressure equals 51  mm Hg, assuming right atrial pressure equals 10  mm Hg,  consistent with moderate pulmonary hypertension.    < end of copied text >    Strict I&Os, monitor daily weights, 1500 cc fluid restriction, sodium restriction  Cards helping.      Problem/Plan - 2:  ·  Problem: HTN (hypertension).  Plan: Continue home meds  Monitor BP daily  Sodium restriction.      Problem/Plan - 3:  ·  Problem: DM (diabetes mellitus).  Plan: Insulin sliding scale. Sugars better.   Diabetic diet.      Problem/Plan - 4:  ·  Problem: Afib.  Plan: Adjusting Coumadin per INR.   INR daily.      Problem/Plan - 5:  ·  Problem: CKD stage 3 with NIKHIL .  Plan: Renal helping.     DC planning home.

## 2019-08-15 NOTE — PROGRESS NOTE ADULT - ATTENDING COMMENTS
Agree with above  avoid avn blocking agents  no further cardiac workup needed at this time  pt. and family want conservative cv care at this time    Trisha Banks MD

## 2019-08-15 NOTE — PROGRESS NOTE ADULT - SUBJECTIVE AND OBJECTIVE BOX
[facility] - Radha / Florin FELDMAN /Nate/ GASTON Castillo/ GASTON Tellez/ Shahid Segovia / BABAR Njeru  ---------------------------------------------------------------------------------------------------------------    Patient seen and examined bedside    Subjective and Objective: No overnight events, sob resolved. No complaints today. feeling better    Allergies: No Known Allergies      Hospital Medications:   MEDICATIONS  (STANDING):  allopurinol 100 milliGRAM(s) Oral daily  brimonidine 0.2% Ophthalmic Solution 1 Drop(s) Both EYES three times a day  cilostazol 100 milliGRAM(s) Oral two times a day  dextrose 5%. 1000 milliLiter(s) (50 mL/Hr) IV Continuous <Continuous>  dextrose 50% Injectable 12.5 Gram(s) IV Push once  dextrose 50% Injectable 25 Gram(s) IV Push once  dextrose 50% Injectable 25 Gram(s) IV Push once  dorzolamide 2%/timolol 0.5% Ophthalmic Solution 1 Drop(s) Both EYES <User Schedule>  furosemide   Injectable 80 milliGRAM(s) IV Push two times a day  hydrALAZINE 75 milliGRAM(s) Oral three times a day  insulin glargine Injectable (LANTUS) 6 Unit(s) SubCutaneous at bedtime  insulin lispro (HumaLOG) corrective regimen sliding scale   SubCutaneous three times a day before meals  insulin lispro (HumaLOG) corrective regimen sliding scale   SubCutaneous at bedtime  latanoprost 0.005% Ophthalmic Solution 1 Drop(s) Both EYES at bedtime  levothyroxine 75 MICROGram(s) Oral daily  melatonin 3 milliGRAM(s) Oral at bedtime  pantoprazole    Tablet 40 milliGRAM(s) Oral before breakfast  senna 2 Tablet(s) Oral at bedtime  simvastatin 20 milliGRAM(s) Oral at bedtime    VITALS:  T(F): 98 (19 @ 13:08), Max: 98.6 (08-13-19 @ 21:37)  HR: 65 (19 @ 13:08)  BP: 147/73 (19 @ 13:08)  RR: 18 (19 @ 13:08)  SpO2: 98% (19 @ 13:08)  Wt(kg): --     @ 07:01  -   @ 07:00  --------------------------------------------------------  IN: 600 mL / OUT: 2350 mL / NET: -1750 mL      PHYSICAL EXAM:  Constitutional: NAD  HEENT: anicteric sclera, oropharynx clear  Neck: No JVD  Respiratory: dec bibasilar BS, no wheezes, rales or rhonchi  Cardiovascular: S1, S2, RRR  Gastrointestinal: BS+, soft, NT/ND  Extremities: No cyanosis or clubbing. trace peripheral edema  Neurological: A/O x 2, no focal deficits  Psychiatric: Normal mood, normal affect  : No michaud.   Skin: No rashes    LABS:      146<H>  |  105  |  38<H>  ----------------------------<  47<L>  3.4<L>   |  28  |  2.09<H>    Ca    9.8      14 Aug 2019 06:00  Phos  2.9       Mg     2.0           Creatinine Trend: 2.09 <--, 2.09 <--, 2.24 <--, 2.00 <--, 1.95 <--, 1.76 <--, 1.70 <--, 1.71 <--                        10.3   5.24  )-----------( 194      ( 14 Aug 2019 06:00 )             32.8     Urine Studies:  Urinalysis Basic - ( 10 Aug 2019 22:30 )    Color: LIGHT YELLOW / Appearance: CLEAR / S.008 / pH: 7.5  Gluc: NEGATIVE / Ketone: NEGATIVE  / Bili: NEGATIVE / Urobili: NORMAL   Blood: NEGATIVE / Protein: 20 / Nitrite: NEGATIVE   Leuk Esterase: NEGATIVE / RBC: 0-2 / WBC 0-2   Sq Epi: OCC / Non Sq Epi:  / Bacteria: NEGATIVE          RADIOLOGY & ADDITIONAL STUDIES:
[facility] [place] - Radha / Florin FELDMAN /Nate/ GASTON Castillo/ GASTON Tellez/ Shahid Segovia / BABAR Njeru  ---------------------------------------------------------------------------------------------------------------    Patient seen and examined bedside    Subjective and Objective: No overnight events, sob resolved. No complaints today. feeling better    Allergies: No Known Allergies      Hospital Medications:   MEDICATIONS  (STANDING):  allopurinol 100 milliGRAM(s) Oral daily  brimonidine 0.2% Ophthalmic Solution 1 Drop(s) Both EYES three times a day  cilostazol 100 milliGRAM(s) Oral two times a day  dextrose 5%. 1000 milliLiter(s) (50 mL/Hr) IV Continuous <Continuous>  dextrose 50% Injectable 12.5 Gram(s) IV Push once  dextrose 50% Injectable 25 Gram(s) IV Push once  dextrose 50% Injectable 25 Gram(s) IV Push once  dorzolamide 2%/timolol 0.5% Ophthalmic Solution 1 Drop(s) Both EYES <User Schedule>  furosemide   Injectable 80 milliGRAM(s) IV Push two times a day  hydrALAZINE 75 milliGRAM(s) Oral three times a day  insulin glargine Injectable (LANTUS) 16 Unit(s) SubCutaneous at bedtime  insulin lispro (HumaLOG) corrective regimen sliding scale   SubCutaneous three times a day before meals  insulin lispro (HumaLOG) corrective regimen sliding scale   SubCutaneous at bedtime  latanoprost 0.005% Ophthalmic Solution 1 Drop(s) Both EYES at bedtime  levothyroxine 75 MICROGram(s) Oral daily  melatonin 3 milliGRAM(s) Oral at bedtime  pantoprazole    Tablet 40 milliGRAM(s) Oral before breakfast  senna 2 Tablet(s) Oral at bedtime  simvastatin 20 milliGRAM(s) Oral at bedtime    VITALS:  T(F): 97.6 (19 @ 13:00), Max: 97.7 (19 @ 19:58)  HR: 60 (19 @ 13:00)  BP: 165/75 (19 @ 13:00)  RR: 18 (19 @ 13:00)  SpO2: 97% (19 @ 13:00)  Wt(kg): --     @ 07:01  -   @ 07:00  --------------------------------------------------------  IN: 340 mL / OUT: 1500 mL / NET: -1160 mL      Height (cm): 165.1 ( @ 09:17)    PHYSICAL EXAM:  Constitutional: NAD  HEENT: anicteric sclera, oropharynx clear  Neck: No JVD  Respiratory: CTAB, no wheezes, rales or rhonchi  Cardiovascular: S1, S2, RRR  Gastrointestinal: BS+, soft, NT/ND  Extremities: No cyanosis or clubbing. + peripheral edema, better  Neurological: A/O x 2, no focal deficits  Psychiatric: Normal mood, normal affect  : No michaud.     LABS:      143  |  102  |  38<H>  ----------------------------<  88  3.7   |  27  |  2.00<H>    Ca    9.9      12 Aug 2019 05:55  Phos  3.0       Mg     2.1           Creatinine Trend: 2.00 <--, 1.95 <--, 1.76 <--, 1.70 <--, 1.71 <--                        10.0   4.95  )-----------( 186      ( 12 Aug 2019 05:55 )             31.6     Urine Studies:  Urinalysis Basic - ( 10 Aug 2019 22:30 )    Color: LIGHT YELLOW / Appearance: CLEAR / S.008 / pH: 7.5  Gluc: NEGATIVE / Ketone: NEGATIVE  / Bili: NEGATIVE / Urobili: NORMAL   Blood: NEGATIVE / Protein: 20 / Nitrite: NEGATIVE   Leuk Esterase: NEGATIVE / RBC: 0-2 / WBC 0-2   Sq Epi: OCC / Non Sq Epi:  / Bacteria: NEGATIVE          RADIOLOGY & ADDITIONAL STUDIES:
INTERVAL HPI/OVERNIGHT EVENTS:  I feel better.   Vital Signs Last 24 Hrs  T(C): 36.3 (11 Aug 2019 06:59), Max: 36.8 (10 Aug 2019 13:15)  T(F): 97.4 (11 Aug 2019 06:59), Max: 98.2 (10 Aug 2019 13:15)  HR: 62 (11 Aug 2019 08:45) (62 - 98)  BP: 135/74 (11 Aug 2019 08:45) (135/74 - 216/92)  BP(mean): --  RR: 18 (11 Aug 2019 08:45) (18 - 22)  SpO2: 100% (11 Aug 2019 08:45) (96% - 100%)  I&O's Summary    10 Aug 2019 07:01  -  11 Aug 2019 07:00  --------------------------------------------------------  IN: 400 mL / OUT: 990 mL / NET: -590 mL      MEDICATIONS  (STANDING):  allopurinol 100 milliGRAM(s) Oral daily  brimonidine 0.2% Ophthalmic Solution 1 Drop(s) Both EYES three times a day  cilostazol 100 milliGRAM(s) Oral two times a day  dextrose 5%. 1000 milliLiter(s) (50 mL/Hr) IV Continuous <Continuous>  dextrose 50% Injectable 12.5 Gram(s) IV Push once  dextrose 50% Injectable 25 Gram(s) IV Push once  dextrose 50% Injectable 25 Gram(s) IV Push once  dorzolamide 2%/timolol 0.5% Ophthalmic Solution 1 Drop(s) Both EYES <User Schedule>  furosemide   Injectable 80 milliGRAM(s) IV Push two times a day  hydrALAZINE 75 milliGRAM(s) Oral three times a day  insulin glargine Injectable (LANTUS) 16 Unit(s) SubCutaneous at bedtime  insulin lispro (HumaLOG) corrective regimen sliding scale   SubCutaneous three times a day before meals  insulin lispro (HumaLOG) corrective regimen sliding scale   SubCutaneous at bedtime  latanoprost 0.005% Ophthalmic Solution 1 Drop(s) Both EYES at bedtime  levothyroxine 75 MICROGram(s) Oral daily  melatonin 3 milliGRAM(s) Oral at bedtime  pantoprazole    Tablet 40 milliGRAM(s) Oral before breakfast  potassium chloride   Powder 20 milliEquivalent(s) Oral once  senna 2 Tablet(s) Oral at bedtime  simvastatin 20 milliGRAM(s) Oral at bedtime    MEDICATIONS  (PRN):  dextrose 40% Gel 15 Gram(s) Oral once PRN Blood Glucose LESS THAN 70 milliGRAM(s)/deciliter  glucagon  Injectable 1 milliGRAM(s) IntraMuscular once PRN Glucose LESS THAN 70 milligrams/deciliter    LABS:                        10.3   5.08  )-----------( 177      ( 11 Aug 2019 05:30 )             32.8     08-11    143  |  104  |  32<H>  ----------------------------<  85  3.8   |  26  |  1.76<H>    Ca    9.7      11 Aug 2019 05:30  Phos  3.2     11  Mg     2.1     0811    TPro  8.0  /  Alb  4.0  /  TBili  0.9  /  DBili  x   /  AST  49<H>  /  ALT  28  /  AlkPhos  79  08-10    PT/INR - ( 11 Aug 2019 05:30 )   PT: 41.3 SEC;   INR: 3.49          PTT - ( 11 Aug 2019 05:30 )  PTT:38.4 SEC  Urinalysis Basic - ( 10 Aug 2019 22:30 )    Color: LIGHT YELLOW / Appearance: CLEAR / S.008 / pH: 7.5  Gluc: NEGATIVE / Ketone: NEGATIVE  / Bili: NEGATIVE / Urobili: NORMAL   Blood: NEGATIVE / Protein: 20 / Nitrite: NEGATIVE   Leuk Esterase: NEGATIVE / RBC: 0-2 / WBC 0-2   Sq Epi: OCC / Non Sq Epi: x / Bacteria: NEGATIVE      CAPILLARY BLOOD GLUCOSE      POCT Blood Glucose.: 132 mg/dL (11 Aug 2019 12:44)  POCT Blood Glucose.: 92 mg/dL (11 Aug 2019 08:48)  POCT Blood Glucose.: 206 mg/dL (10 Aug 2019 22:00)  POCT Blood Glucose.: 123 mg/dL (10 Aug 2019 13:20)        Urinalysis Basic - ( 10 Aug 2019 22:30 )    Color: LIGHT YELLOW / Appearance: CLEAR / S.008 / pH: 7.5  Gluc: NEGATIVE / Ketone: NEGATIVE  / Bili: NEGATIVE / Urobili: NORMAL   Blood: NEGATIVE / Protein: 20 / Nitrite: NEGATIVE   Leuk Esterase: NEGATIVE / RBC: 0-2 / WBC 0-2   Sq Epi: OCC / Non Sq Epi: x / Bacteria: NEGATIVE      REVIEW OF SYSTEMS:  CONSTITUTIONAL: No fever, weight loss, or fatigue  EYES: No eye pain, visual disturbances, or discharge  ENMT:  No difficulty hearing, tinnitus, vertigo; No sinus or throat pain  NECK: No pain or stiffness  BREASTS: No pain, masses, or nipple discharge  RESPIRATORY: No cough, wheezing, chills or hemoptysis; No shortness of breath  CARDIOVASCULAR: No chest pain, palpitations, dizziness, or leg swelling  GASTROINTESTINAL: No abdominal or epigastric pain. No nausea, vomiting, or hematemesis; No diarrhea or constipation. No melena or hematochezia.  GENITOURINARY: No dysuria, frequency, hematuria, or incontinence  NEUROLOGICAL: No headaches, memory loss, loss of strength, numbness, or tremors    Consultant(s) Notes Reviewed:  [x ] YES  [ ] NO    PHYSICAL EXAM:  GENERAL: NAD, well-groomed, well-developed,not in any distress ,  HEAD:  Atraumatic, Normocephalic  NECK: Supple, No JVD, Normal thyroid  NERVOUS SYSTEM:  Alert & Oriented X3, No focal deficit   CHEST/LUNG: Good air entry bilateral with no  rales, rhonchi, wheezing, or rubs  HEART: Regular rate and rhythm; No murmurs, rubs, or gallops  ABDOMEN: Soft, Nontender, Nondistended; Bowel sounds present  EXTREMITIES:  2+ Peripheral Pulses, No clubbing, cyanosis, or edema    Care Discussed with Consultants/Other Providers [ x] YES  [ ] NO
INTERVAL HPI/OVERNIGHT EVENTS: Feeling better.   Vital Signs Last 24 Hrs  T(C): 36.9 (14 Aug 2019 21:02), Max: 37.2 (14 Aug 2019 21:01)  T(F): 98.5 (14 Aug 2019 21:02), Max: 99 (14 Aug 2019 21:01)  HR: 57 (14 Aug 2019 21:02) (57 - 84)  BP: 135/63 (14 Aug 2019 21:02) (133/64 - 178/83)  BP(mean): --  RR: 18 (14 Aug 2019 21:02) (17 - 19)  SpO2: 98% (14 Aug 2019 21:02) (97% - 99%)  I&O's Summary    13 Aug 2019 07:01  -  14 Aug 2019 07:00  --------------------------------------------------------  IN: 600 mL / OUT: 2350 mL / NET: -1750 mL    14 Aug 2019 07:01  -  14 Aug 2019 21:17  --------------------------------------------------------  IN: 0 mL / OUT: 500 mL / NET: -500 mL      MEDICATIONS  (STANDING):  allopurinol 100 milliGRAM(s) Oral daily  brimonidine 0.2% Ophthalmic Solution 1 Drop(s) Both EYES three times a day  cilostazol 100 milliGRAM(s) Oral two times a day  dextrose 5%. 1000 milliLiter(s) (50 mL/Hr) IV Continuous <Continuous>  dextrose 50% Injectable 12.5 Gram(s) IV Push once  dextrose 50% Injectable 25 Gram(s) IV Push once  dextrose 50% Injectable 25 Gram(s) IV Push once  dorzolamide 2%/timolol 0.5% Ophthalmic Solution 1 Drop(s) Both EYES <User Schedule>  furosemide   Injectable 80 milliGRAM(s) IV Push two times a day  hydrALAZINE 75 milliGRAM(s) Oral three times a day  insulin glargine Injectable (LANTUS) 6 Unit(s) SubCutaneous at bedtime  insulin lispro (HumaLOG) corrective regimen sliding scale   SubCutaneous three times a day before meals  insulin lispro (HumaLOG) corrective regimen sliding scale   SubCutaneous at bedtime  latanoprost 0.005% Ophthalmic Solution 1 Drop(s) Both EYES at bedtime  levothyroxine 75 MICROGram(s) Oral daily  melatonin 3 milliGRAM(s) Oral at bedtime  pantoprazole    Tablet 40 milliGRAM(s) Oral before breakfast  senna 2 Tablet(s) Oral at bedtime  simvastatin 20 milliGRAM(s) Oral at bedtime    MEDICATIONS  (PRN):  dextrose 40% Gel 15 Gram(s) Oral once PRN Blood Glucose LESS THAN 70 milliGRAM(s)/deciliter  glucagon  Injectable 1 milliGRAM(s) IntraMuscular once PRN Glucose LESS THAN 70 milligrams/deciliter    LABS:                        10.3   5.24  )-----------( 194      ( 14 Aug 2019 06:00 )             32.8     08-14    146<H>  |  105  |  38<H>  ----------------------------<  47<L>  3.4<L>   |  28  |  2.09<H>    Ca    9.8      14 Aug 2019 06:00  Phos  2.9     08-14  Mg     2.0     08-14      PT/INR - ( 14 Aug 2019 06:00 )   PT: 23.8 SEC;   INR: 2.10              CAPILLARY BLOOD GLUCOSE      POCT Blood Glucose.: 128 mg/dL (14 Aug 2019 17:49)  POCT Blood Glucose.: 189 mg/dL (14 Aug 2019 12:34)  POCT Blood Glucose.: 184 mg/dL (14 Aug 2019 10:06)  POCT Blood Glucose.: 64 mg/dL (14 Aug 2019 09:28)  POCT Blood Glucose.: 48 mg/dL (14 Aug 2019 09:02)  POCT Blood Glucose.: 47 mg/dL (14 Aug 2019 08:58)  POCT Blood Glucose.: 107 mg/dL (13 Aug 2019 21:43)          REVIEW OF SYSTEMS:  CONSTITUTIONAL: No fever, weight loss, or fatigue  EYES: No eye pain, visual disturbances, or discharge  ENMT:  No difficulty hearing, tinnitus, vertigo; No sinus or throat pain  NECK: No pain or stiffness  RESPIRATORY: No cough, wheezing, chills or hemoptysis; No shortness of breath  CARDIOVASCULAR: No chest pain, palpitations, dizziness, or leg swelling  GASTROINTESTINAL: No abdominal or epigastric pain. No nausea, vomiting, or hematemesis; No diarrhea or constipation. No melena or hematochezia.  GENITOURINARY: No dysuria, frequency, hematuria, or incontinence  NEUROLOGICAL: No headaches, memory loss, loss of strength, numbness, or tremors    Consultant(s) Notes Reviewed:  [x ] YES  [ ] NO    PHYSICAL EXAM:  GENERAL: NAD, well-groomed, well-developed, not in any distress ,  HEAD:  Atraumatic, Normocephalic  EYES: EOMI, PERRLA, conjunctiva and sclera clear  NECK: Supple, No JVD, Normal thyroid  NERVOUS SYSTEM:  Alert & Oriented X3, No focal deficit   CHEST/LUNG: Good air entry bilateral with no  rales, rhonchi, wheezing, or rubs  HEART: Regular rate and rhythm; No murmurs, rubs, or gallops  ABDOMEN: Soft, Nontender, Nondistended; Bowel sounds present  EXTREMITIES:  2+ Peripheral Pulses, No clubbing, cyanosis, but less edema    Care Discussed with Consultants/Other Providers [ x] YES  [ ] NO
INTERVAL HPI/OVERNIGHT EVENTS: I feel fine. I want to go home.   Vital Signs Last 24 Hrs  T(C): 36.9 (15 Aug 2019 12:49), Max: 37.3 (15 Aug 2019 03:35)  T(F): 98.5 (15 Aug 2019 12:49), Max: 99.2 (15 Aug 2019 03:35)  HR: 68 (15 Aug 2019 12:49) (57 - 84)  BP: 137/62 (15 Aug 2019 12:49) (135/63 - 178/83)  BP(mean): --  RR: 18 (15 Aug 2019 12:49) (18 - 19)  SpO2: 99% (15 Aug 2019 12:49) (98% - 100%)  I&O's Summary    14 Aug 2019 07:01  -  15 Aug 2019 07:00  --------------------------------------------------------  IN: 350 mL / OUT: 1800 mL / NET: -1450 mL      MEDICATIONS  (STANDING):  allopurinol 100 milliGRAM(s) Oral daily  brimonidine 0.2% Ophthalmic Solution 1 Drop(s) Both EYES three times a day  cilostazol 100 milliGRAM(s) Oral two times a day  dextrose 5%. 1000 milliLiter(s) (50 mL/Hr) IV Continuous <Continuous>  dextrose 50% Injectable 12.5 Gram(s) IV Push once  dextrose 50% Injectable 25 Gram(s) IV Push once  dextrose 50% Injectable 25 Gram(s) IV Push once  dorzolamide 2%/timolol 0.5% Ophthalmic Solution 1 Drop(s) Both EYES <User Schedule>  furosemide   Injectable 80 milliGRAM(s) IV Push two times a day  hydrALAZINE 75 milliGRAM(s) Oral three times a day  insulin glargine Injectable (LANTUS) 6 Unit(s) SubCutaneous at bedtime  insulin lispro (HumaLOG) corrective regimen sliding scale   SubCutaneous three times a day before meals  insulin lispro (HumaLOG) corrective regimen sliding scale   SubCutaneous at bedtime  latanoprost 0.005% Ophthalmic Solution 1 Drop(s) Both EYES at bedtime  levothyroxine 75 MICROGram(s) Oral daily  melatonin 3 milliGRAM(s) Oral at bedtime  pantoprazole    Tablet 40 milliGRAM(s) Oral before breakfast  potassium chloride    Tablet ER 40 milliEquivalent(s) Oral every 4 hours  senna 2 Tablet(s) Oral at bedtime  simvastatin 20 milliGRAM(s) Oral at bedtime  spironolactone 25 milliGRAM(s) Oral daily    MEDICATIONS  (PRN):  dextrose 40% Gel 15 Gram(s) Oral once PRN Blood Glucose LESS THAN 70 milliGRAM(s)/deciliter  glucagon  Injectable 1 milliGRAM(s) IntraMuscular once PRN Glucose LESS THAN 70 milligrams/deciliter    LABS:                        9.5    7.05  )-----------( 183      ( 15 Aug 2019 07:30 )             30.8     08-15    145  |  104  |  42<H>  ----------------------------<  89  3.1<L>   |  30  |  1.95<H>    Ca    9.5      15 Aug 2019 07:30  Phos  2.9     08-14  Mg     1.9     08-15      PT/INR - ( 15 Aug 2019 07:30 )   PT: 29.0 SEC;   INR: 2.54              CAPILLARY BLOOD GLUCOSE      POCT Blood Glucose.: 223 mg/dL (15 Aug 2019 12:18)  POCT Blood Glucose.: 81 mg/dL (15 Aug 2019 08:50)  POCT Blood Glucose.: 139 mg/dL (14 Aug 2019 21:29)  POCT Blood Glucose.: 128 mg/dL (14 Aug 2019 17:49)          REVIEW OF SYSTEMS:  CONSTITUTIONAL: No fever, weight loss, or fatigue  EYES: No eye pain, visual disturbances, or discharge  ENMT:  No difficulty hearing, tinnitus, vertigo; No sinus or throat pain  NECK: No pain or stiffness  RESPIRATORY: No cough, wheezing, chills or hemoptysis; No shortness of breath  CARDIOVASCULAR: No chest pain, palpitations, dizziness, or leg swelling  GASTROINTESTINAL: No abdominal or epigastric pain. No nausea, vomiting, or hematemesis; No diarrhea or constipation. No melena or hematochezia.  GENITOURINARY: No dysuria, frequency, hematuria, or incontinence  NEUROLOGICAL: No headaches, memory loss, loss of strength, numbness, or tremors  Consultant(s) Notes Reviewed:  [x ] YES  [ ] NO    PHYSICAL EXAM:  GENERAL: NAD, well-groomed, well-developed,not in any distress ,  HEAD:  Atraumatic, Normocephalic  EYES: EOMI, PERRLA, conjunctiva and sclera clear  ENMT: No tonsillar erythema, exudates, or enlargement; Moist mucous membranes, Good dentition, No lesions  NECK: Supple, No JVD, Normal thyroid  NERVOUS SYSTEM:  Alert & Oriented X3, No focal deficit   CHEST/LUNG: Good air entry bilateral with no  rales, rhonchi, wheezing, or rubs  HEART: Regular rate and rhythm; No murmurs, rubs, or gallops  ABDOMEN: Soft, Nontender, Nondistended; Bowel sounds present  EXTREMITIES:  2+ Peripheral Pulses, No clubbing, cyanosis, but less  edema    Care Discussed with Consultants/Other Providers [ x] YES  [ ] NO
INTERVAL HPI/OVERNIGHT EVENTS: Seen and examined . I feel better.   Vital Signs Last 24 Hrs  T(C): 36.6 (13 Aug 2019 17:36), Max: 36.7 (13 Aug 2019 05:29)  T(F): 97.9 (13 Aug 2019 17:36), Max: 98 (13 Aug 2019 05:29)  HR: 65 (13 Aug 2019 17:36) (65 - 86)  BP: 153/86 (13 Aug 2019 17:36) (150/86 - 175/74)  BP(mean): --  RR: 17 (13 Aug 2019 17:36) (17 - 18)  SpO2: 99% (13 Aug 2019 17:36) (97% - 100%)  I&O's Summary    12 Aug 2019 07:01  -  13 Aug 2019 07:00  --------------------------------------------------------  IN: 300 mL / OUT: 600 mL / NET: -300 mL    13 Aug 2019 07:01  -  13 Aug 2019 19:48  --------------------------------------------------------  IN: 500 mL / OUT: 1850 mL / NET: -1350 mL      MEDICATIONS  (STANDING):  allopurinol 100 milliGRAM(s) Oral daily  brimonidine 0.2% Ophthalmic Solution 1 Drop(s) Both EYES three times a day  cilostazol 100 milliGRAM(s) Oral two times a day  dextrose 5%. 1000 milliLiter(s) (50 mL/Hr) IV Continuous <Continuous>  dextrose 50% Injectable 12.5 Gram(s) IV Push once  dextrose 50% Injectable 25 Gram(s) IV Push once  dextrose 50% Injectable 25 Gram(s) IV Push once  dorzolamide 2%/timolol 0.5% Ophthalmic Solution 1 Drop(s) Both EYES <User Schedule>  furosemide   Injectable 80 milliGRAM(s) IV Push two times a day  hydrALAZINE 75 milliGRAM(s) Oral three times a day  insulin glargine Injectable (LANTUS) 16 Unit(s) SubCutaneous at bedtime  insulin lispro (HumaLOG) corrective regimen sliding scale   SubCutaneous three times a day before meals  insulin lispro (HumaLOG) corrective regimen sliding scale   SubCutaneous at bedtime  latanoprost 0.005% Ophthalmic Solution 1 Drop(s) Both EYES at bedtime  levothyroxine 75 MICROGram(s) Oral daily  melatonin 3 milliGRAM(s) Oral at bedtime  pantoprazole    Tablet 40 milliGRAM(s) Oral before breakfast  senna 2 Tablet(s) Oral at bedtime  simvastatin 20 milliGRAM(s) Oral at bedtime    MEDICATIONS  (PRN):  dextrose 40% Gel 15 Gram(s) Oral once PRN Blood Glucose LESS THAN 70 milliGRAM(s)/deciliter  glucagon  Injectable 1 milliGRAM(s) IntraMuscular once PRN Glucose LESS THAN 70 milligrams/deciliter    LABS:                        10.0   5.56  )-----------( 188      ( 13 Aug 2019 06:30 )             31.5     08-13    145  |  106  |  40<H>  ----------------------------<  117<H>  3.9   |  27  |  2.09<H>    Ca    9.8      13 Aug 2019 06:30  Phos  2.8     08-13  Mg     2.0     08-13      PT/INR - ( 13 Aug 2019 06:30 )   PT: 26.9 SEC;   INR: 2.30          PTT - ( 12 Aug 2019 05:55 )  PTT:39.6 SEC    CAPILLARY BLOOD GLUCOSE      POCT Blood Glucose.: 146 mg/dL (13 Aug 2019 17:50)  POCT Blood Glucose.: 131 mg/dL (13 Aug 2019 13:09)  POCT Blood Glucose.: 92 mg/dL (13 Aug 2019 08:34)  POCT Blood Glucose.: 205 mg/dL (12 Aug 2019 21:53)          REVIEW OF SYSTEMS:  CONSTITUTIONAL: No fever, weight loss, or fatigue  EYES: No eye pain, visual disturbances, or discharge  ENMT:  No difficulty hearing, tinnitus, vertigo; No sinus or throat pain  NECK: No pain or stiffness  RESPIRATORY: No cough, wheezing, chills or hemoptysis; But  shortness of breath  CARDIOVASCULAR: No chest pain, palpitations, dizziness, or leg swelling  GASTROINTESTINAL: No abdominal or epigastric pain. No nausea, vomiting, or hematemesis; No diarrhea or constipation. No melena or hematochezia.  GENITOURINARY: No dysuria, frequency, hematuria, or incontinence  NEUROLOGICAL: No headaches, memory loss, loss of strength, numbness, or tremors  SKIN: No itching, burning, rashes, or lesions     Consultant(s) Notes Reviewed:  [x ] YES  [ ] NO    PHYSICAL EXAM:  GENERAL: NAD, well-groomed, well-developed,not in any distress ,  HEAD:  Atraumatic, Normocephalic  EYES: EOMI, PERRLA, conjunctiva and sclera clear  ENMT: No tonsillar erythema, exudates, or enlargement; Moist mucous membranes, Good dentition, No lesions  NECK: Supple, No JVD, Normal thyroid  NERVOUS SYSTEM:  Alert & Oriented X3, No focal deficit   CHEST/LUNG: Good air entry bilateral with basal   rales,  HEART: Regular rate and rhythm; No murmurs, rubs, or gallops  ABDOMEN: Soft, Nontender, Nondistended; Bowel sounds present  EXTREMITIES:  2+  edema    Care Discussed with Consultants/Other Providers [ x] YES  [ ] NO
INTERVAL HPI/OVERNIGHT EVENTS: i feel better . family in room.   Vital Signs Last 24 Hrs  T(C): 36.4 (12 Aug 2019 13:00), Max: 36.5 (11 Aug 2019 19:58)  T(F): 97.6 (12 Aug 2019 13:00), Max: 97.7 (11 Aug 2019 19:58)  HR: 60 (12 Aug 2019 13:00) (60 - 74)  BP: 165/75 (12 Aug 2019 13:00) (138/73 - 176/92)  BP(mean): --  RR: 18 (12 Aug 2019 13:00) (17 - 18)  SpO2: 97% (12 Aug 2019 13:00) (97% - 100%)  I&O's Summary    11 Aug 2019 07:  -  12 Aug 2019 07:00  --------------------------------------------------------  IN: 340 mL / OUT: 1500 mL / NET: -1160 mL    12 Aug 2019 07:01  -  12 Aug 2019 16:45  --------------------------------------------------------  IN: 300 mL / OUT: 600 mL / NET: -300 mL      MEDICATIONS  (STANDING):  allopurinol 100 milliGRAM(s) Oral daily  brimonidine 0.2% Ophthalmic Solution 1 Drop(s) Both EYES three times a day  cilostazol 100 milliGRAM(s) Oral two times a day  dextrose 5%. 1000 milliLiter(s) (50 mL/Hr) IV Continuous <Continuous>  dextrose 50% Injectable 12.5 Gram(s) IV Push once  dextrose 50% Injectable 25 Gram(s) IV Push once  dextrose 50% Injectable 25 Gram(s) IV Push once  dorzolamide 2%/timolol 0.5% Ophthalmic Solution 1 Drop(s) Both EYES <User Schedule>  hydrALAZINE 75 milliGRAM(s) Oral three times a day  insulin glargine Injectable (LANTUS) 16 Unit(s) SubCutaneous at bedtime  insulin lispro (HumaLOG) corrective regimen sliding scale   SubCutaneous three times a day before meals  insulin lispro (HumaLOG) corrective regimen sliding scale   SubCutaneous at bedtime  latanoprost 0.005% Ophthalmic Solution 1 Drop(s) Both EYES at bedtime  levothyroxine 75 MICROGram(s) Oral daily  melatonin 3 milliGRAM(s) Oral at bedtime  pantoprazole    Tablet 40 milliGRAM(s) Oral before breakfast  senna 2 Tablet(s) Oral at bedtime  simvastatin 20 milliGRAM(s) Oral at bedtime    MEDICATIONS  (PRN):  dextrose 40% Gel 15 Gram(s) Oral once PRN Blood Glucose LESS THAN 70 milliGRAM(s)/deciliter  glucagon  Injectable 1 milliGRAM(s) IntraMuscular once PRN Glucose LESS THAN 70 milligrams/deciliter    LABS:                        10.0   4.95  )-----------( 186      ( 12 Aug 2019 05:55 )             31.6     08-12    143  |  102  |  38<H>  ----------------------------<  88  3.7   |  27  |  2.00<H>    Ca    9.9      12 Aug 2019 05:55  Phos  3.0     08-12  Mg     2.1     08-12      PT/INR - ( 12 Aug 2019 05:55 )   PT: 38.2 SEC;   INR: 3.32          PTT - ( 12 Aug 2019 05:55 )  PTT:39.6 SEC  Urinalysis Basic - ( 10 Aug 2019 22:30 )    Color: LIGHT YELLOW / Appearance: CLEAR / S.008 / pH: 7.5  Gluc: NEGATIVE / Ketone: NEGATIVE  / Bili: NEGATIVE / Urobili: NORMAL   Blood: NEGATIVE / Protein: 20 / Nitrite: NEGATIVE   Leuk Esterase: NEGATIVE / RBC: 0-2 / WBC 0-2   Sq Epi: OCC / Non Sq Epi: x / Bacteria: NEGATIVE      CAPILLARY BLOOD GLUCOSE      POCT Blood Glucose.: 103 mg/dL (12 Aug 2019 12:30)  POCT Blood Glucose.: 94 mg/dL (12 Aug 2019 09:33)  POCT Blood Glucose.: 71 mg/dL (12 Aug 2019 09:09)  POCT Blood Glucose.: 160 mg/dL (11 Aug 2019 21:53)  POCT Blood Glucose.: 148 mg/dL (11 Aug 2019 17:49)        Urinalysis Basic - ( 10 Aug 2019 22:30 )    Color: LIGHT YELLOW / Appearance: CLEAR / S.008 / pH: 7.5  Gluc: NEGATIVE / Ketone: NEGATIVE  / Bili: NEGATIVE / Urobili: NORMAL   Blood: NEGATIVE / Protein: 20 / Nitrite: NEGATIVE   Leuk Esterase: NEGATIVE / RBC: 0-2 / WBC 0-2   Sq Epi: OCC / Non Sq Epi: x / Bacteria: NEGATIVE      REVIEW OF SYSTEMS:  CONSTITUTIONAL: No fever, weight loss, or fatigue  EYES: No eye pain, visual disturbances, or discharge  RESPIRATORY: No cough, wheezing, chills or hemoptysis; No shortness of breath  CARDIOVASCULAR: No chest pain, palpitations, dizziness, or leg swelling  GASTROINTESTINAL: No abdominal or epigastric pain. No nausea, vomiting, or hematemesis; No diarrhea or constipation. No melena or hematochezia.  GENITOURINARY: No dysuria, frequency, hematuria, or incontinence  NEUROLOGICAL: No headaches, memory loss, loss of strength, numbness, or tremors    Consultant(s) Notes Reviewed:  [x ] YES  [ ] NO    PHYSICAL EXAM:  GENERAL: NAD, well-groomed, well-developed,not in any distress ,  HEAD:  Atraumatic, Normocephalic  EYES: EOMI, PERRLA, conjunctiva and sclera clear  ENMT: No tonsillar erythema, exudates, or enlargement; Moist mucous membranes, Good dentition, No lesions  NECK: Supple, No JVD, Normal thyroid  NERVOUS SYSTEM:  Alert & Oriented X3, No focal deficit   CHEST/LUNG: Good air entry bilateral with rt basal  rales,   HEART: Regular rate and rhythm; No murmurs, rubs, or gallops  ABDOMEN: Soft, Nontender, Nondistended; Bowel sounds present  EXTREMITIES:  2+ Peripheral Pulses, No clubbing, cyanosis, but less  edema    Care Discussed with Consultants/Other Providers [ x] YES  [ ] NO
[facility] - Radha / Florin FELDMAN /Nate/ GASTON Castillo/ GASTON Tellez/ Shahid Segovia / BABAR Njeru  ---------------------------------------------------------------------------------------------------------------    Patient seen and examined bedside    Subjective and Objective: No overnight events, sob better. No complaints today. feeling better    Allergies: No Known Allergies      Hospital Medications:   MEDICATIONS  (STANDING):  allopurinol 100 milliGRAM(s) Oral daily  brimonidine 0.2% Ophthalmic Solution 1 Drop(s) Both EYES three times a day  cilostazol 100 milliGRAM(s) Oral two times a day  dextrose 5%. 1000 milliLiter(s) (50 mL/Hr) IV Continuous <Continuous>  dextrose 50% Injectable 12.5 Gram(s) IV Push once  dextrose 50% Injectable 25 Gram(s) IV Push once  dextrose 50% Injectable 25 Gram(s) IV Push once  dorzolamide 2%/timolol 0.5% Ophthalmic Solution 1 Drop(s) Both EYES <User Schedule>  furosemide   Injectable 80 milliGRAM(s) IV Push two times a day  hydrALAZINE 75 milliGRAM(s) Oral three times a day  insulin glargine Injectable (LANTUS) 16 Unit(s) SubCutaneous at bedtime  insulin lispro (HumaLOG) corrective regimen sliding scale   SubCutaneous three times a day before meals  insulin lispro (HumaLOG) corrective regimen sliding scale   SubCutaneous at bedtime  latanoprost 0.005% Ophthalmic Solution 1 Drop(s) Both EYES at bedtime  levothyroxine 75 MICROGram(s) Oral daily  melatonin 3 milliGRAM(s) Oral at bedtime  pantoprazole    Tablet 40 milliGRAM(s) Oral before breakfast  senna 2 Tablet(s) Oral at bedtime  simvastatin 20 milliGRAM(s) Oral at bedtime      REVIEW OF SYSTEMS:  CONSTITUTIONAL: No weakness, fevers or chills  EYES/ENT: No visual changes;  No vertigo or throat pain   NECK: No pain or stiffness  RESPIRATORY: No cough, wheezing, hemoptysis; No shortness of breath  CARDIOVASCULAR: No chest pain or palpitations.  GASTROINTESTINAL: No abdominal or epigastric pain. No nausea, vomiting, or hematemesis; No diarrhea or constipation. No melena or hematochezia.  GENITOURINARY: No dysuria, frequency, foamy urine, urinary urgency, incontinence or hematuria  NEUROLOGICAL: No numbness or weakness  SKIN: No itching, burning, rashes, or lesions   VASCULAR: No bilateral lower extremity edema.   All other review of systems is negative unless indicated above.    VITALS:  T(F): 97.4 (19 @ 06:59), Max: 97.7 (08-10-19 @ 20:45)  HR: 60 (19 @ 14:02)  BP: 138/71 (19 @ 14:02)  RR: 18 (19 @ 14:02)  SpO2: 100% (19 @ 14:02)  Wt(kg): --    08-10 @ 07:  -   @ 07:00  --------------------------------------------------------  IN: 400 mL / OUT: 990 mL / NET: -590 mL     @ 07:01  -   @ 15:05  --------------------------------------------------------  IN: 0 mL / OUT: 800 mL / NET: -800 mL      PHYSICAL EXAM:  Constitutional: NAD  HEENT: anicteric sclera, oropharynx clear  Neck: No JVD  Respiratory: CTAB, no wheezes, rales or rhonchi  Cardiovascular: S1, S2, RRR  Gastrointestinal: BS+, soft, NT/ND  Extremities: No cyanosis or clubbing. +peripheral edema  Neurological: A/O x 2, no focal deficits  Psychiatric: Normal mood, normal affect  : No CVA tenderness. No michaud.       LABS:      143  |  104  |  32<H>  ----------------------------<  85  3.8   |  26  |  1.76<H>    Ca    9.7      11 Aug 2019 05:30  Phos  3.2       Mg     2.1     11    TPro  8.0  /  Alb  4.0  /  TBili  0.9  /  DBili      /  AST  49<H>  /  ALT  28  /  AlkPhos  79  08-10    Creatinine Trend: 1.76 <--, 1.70 <--, 1.71 <--                        10.3   5.08  )-----------( 177      ( 11 Aug 2019 05:30 )             32.8     Urine Studies:  Urinalysis Basic - ( 10 Aug 2019 22:30 )    Color: LIGHT YELLOW / Appearance: CLEAR / S.008 / pH: 7.5  Gluc: NEGATIVE / Ketone: NEGATIVE  / Bili: NEGATIVE / Urobili: NORMAL   Blood: NEGATIVE / Protein: 20 / Nitrite: NEGATIVE   Leuk Esterase: NEGATIVE / RBC: 0-2 / WBC 0-2   Sq Epi: OCC / Non Sq Epi:  / Bacteria: NEGATIVE          RADIOLOGY & ADDITIONAL STUDIES:
Pt seen and examined at bedside  feels fairly well  dyspnea improving, still slightl dyspnea. no chest pain,dizziness  urinating well  leg edema improving      Allergies:  No Known Allergies    Hospital Medications:   MEDICATIONS  (STANDING):  allopurinol 100 milliGRAM(s) Oral daily  brimonidine 0.2% Ophthalmic Solution 1 Drop(s) Both EYES three times a day  cilostazol 100 milliGRAM(s) Oral two times a day  dextrose 5%. 1000 milliLiter(s) (50 mL/Hr) IV Continuous <Continuous>  dextrose 50% Injectable 12.5 Gram(s) IV Push once  dextrose 50% Injectable 25 Gram(s) IV Push once  dextrose 50% Injectable 25 Gram(s) IV Push once  dorzolamide 2%/timolol 0.5% Ophthalmic Solution 1 Drop(s) Both EYES <User Schedule>  furosemide   Injectable 80 milliGRAM(s) IV Push daily  hydrALAZINE 75 milliGRAM(s) Oral three times a day  insulin glargine Injectable (LANTUS) 16 Unit(s) SubCutaneous at bedtime  insulin lispro (HumaLOG) corrective regimen sliding scale   SubCutaneous three times a day before meals  insulin lispro (HumaLOG) corrective regimen sliding scale   SubCutaneous at bedtime  latanoprost 0.005% Ophthalmic Solution 1 Drop(s) Both EYES at bedtime  levothyroxine 75 MICROGram(s) Oral daily  melatonin 3 milliGRAM(s) Oral at bedtime  pantoprazole    Tablet 40 milliGRAM(s) Oral before breakfast  senna 2 Tablet(s) Oral at bedtime  simvastatin 20 milliGRAM(s) Oral at bedtime         VITALS:  T(F): 97.9 (19 @ 09:35), Max: 98 (19 @ 05:29)  HR: 81 (19 @ 09:35)  BP: 162/78 (19 @ 09:35)  RR: 17 (19 @ 09:35)  SpO2: 100% (19 @ 09:35)  Wt(kg): --     @ 07:01  -   @ 07:00  --------------------------------------------------------  IN: 300 mL / OUT: 600 mL / NET: -300 mL     @ 07:01  -   @ 10:24  --------------------------------------------------------  IN: 0 mL / OUT: 1400 mL / NET: -1400 mL        Weight (kg): 85 ( @ 07:29)  PHYSICAL EXAM:  Constitutional: NAD, sitting comfortably in bed  HEENT: anicteric sclera, oropharynx clear, MMM  Neck: No JVD  Respiratory: bibasilar crepts both bases, up to lower 3rd  Cardiovascular: S1, S2, RRR  Gastrointestinal: BS+, soft, NT/ND  Extremities: No cyanosis or clubbing. 2+ peripheral edema  Neurological: A/O x 3, no focal deficits  Psychiatric: Normal mood, normal affect  : No CVA tenderness. No michaud.   Skin: No rashes       LABS:      145  |  106  |  40<H>  ----------------------------<  117<H>  3.9   |  27  |  2.09<H>    Ca    9.8      13 Aug 2019 06:30  Phos  2.8       Mg     2.0           Creatinine Trend: 2.09 <--, 2.24 <--, 2.00 <--, 1.95 <--, 1.76 <--, 1.70 <--, 1.71 <--                        10.0   5.56  )-----------( 188      ( 13 Aug 2019 06:30 )             31.5     Urine Studies:  Urinalysis Basic - ( 10 Aug 2019 22:30 )    Color: LIGHT YELLOW / Appearance: CLEAR / S.008 / pH: 7.5  Gluc: NEGATIVE / Ketone: NEGATIVE  / Bili: NEGATIVE / Urobili: NORMAL   Blood: NEGATIVE / Protein: 20 / Nitrite: NEGATIVE   Leuk Esterase: NEGATIVE / RBC: 0-2 / WBC 0-2   Sq Epi: OCC / Non Sq Epi:  / Bacteria: NEGATIVE        RADIOLOGY & ADDITIONAL STUDIES:
S:  Denies SOB. LE edema improving. Denies chest pain.  Review of systems otherwise (-)      MEDICATIONS  (STANDING):  allopurinol 100 milliGRAM(s) Oral daily  brimonidine 0.2% Ophthalmic Solution 1 Drop(s) Both EYES three times a day  cilostazol 100 milliGRAM(s) Oral two times a day  dextrose 5%. 1000 milliLiter(s) (50 mL/Hr) IV Continuous <Continuous>  dextrose 50% Injectable 12.5 Gram(s) IV Push once  dextrose 50% Injectable 25 Gram(s) IV Push once  dextrose 50% Injectable 25 Gram(s) IV Push once  dorzolamide 2%/timolol 0.5% Ophthalmic Solution 1 Drop(s) Both EYES <User Schedule>  furosemide   Injectable 80 milliGRAM(s) IV Push two times a day  hydrALAZINE 75 milliGRAM(s) Oral three times a day  insulin glargine Injectable (LANTUS) 6 Unit(s) SubCutaneous at bedtime  insulin lispro (HumaLOG) corrective regimen sliding scale   SubCutaneous three times a day before meals  insulin lispro (HumaLOG) corrective regimen sliding scale   SubCutaneous at bedtime  latanoprost 0.005% Ophthalmic Solution 1 Drop(s) Both EYES at bedtime  levothyroxine 75 MICROGram(s) Oral daily  melatonin 3 milliGRAM(s) Oral at bedtime  pantoprazole    Tablet 40 milliGRAM(s) Oral before breakfast  senna 2 Tablet(s) Oral at bedtime  simvastatin 20 milliGRAM(s) Oral at bedtime    MEDICATIONS  (PRN):  dextrose 40% Gel 15 Gram(s) Oral once PRN Blood Glucose LESS THAN 70 milliGRAM(s)/deciliter  glucagon  Injectable 1 milliGRAM(s) IntraMuscular once PRN Glucose LESS THAN 70 milligrams/deciliter      LABS:                            10.3   5.24  )-----------( 194      ( 14 Aug 2019 06:00 )             32.8     Hemoglobin: 10.3 g/dL (08-14 @ 06:00)  Hemoglobin: 10.0 g/dL (08-13 @ 06:30)  Hemoglobin: 10.0 g/dL (08-12 @ 05:55)  Hemoglobin: 10.3 g/dL (08-11 @ 05:30)  Hemoglobin: 10.2 g/dL (08-10 @ 17:43)    08-14    146<H>  |  105  |  38<H>  ----------------------------<  47<L>  3.4<L>   |  28  |  2.09<H>    Ca    9.8      14 Aug 2019 06:00  Phos  2.9     08-14  Mg     2.0     08-14      Creatinine Trend: 2.09<--, 2.09<--, 2.24<--, 2.00<--, 1.95<--, 1.76<--   PT/INR - ( 14 Aug 2019 06:00 )   PT: 23.8 SEC;   INR: 2.10         PHYSICAL EXAM  Vital Signs Last 24 Hrs  T(C): 36.7 (14 Aug 2019 13:08), Max: 37 (13 Aug 2019 21:37)  T(F): 98 (14 Aug 2019 13:08), Max: 98.6 (13 Aug 2019 21:37)  HR: 65 (14 Aug 2019 13:08) (62 - 69)  BP: 147/73 (14 Aug 2019 13:08) (133/64 - 154/65)  BP(mean): --  RR: 18 (14 Aug 2019 13:08) (17 - 18)  SpO2: 98% (14 Aug 2019 13:08) (97% - 99%)      Gen: Appears well in NAD  HEENT:  (-)icterus (-)pallor  CV: N S1 S2 1/6 SUZI (+)2 Pulses B/l  Resp:  Clear to auscultation B/L, normal effort  GI: (+) BS Soft, NT, ND  Lymph:  (+) Trace Edema, (-)obvious lymphadenopathy  Skin: Warm to touch, Normal turgor      TELEMETRY: Afib/Aflutter 35-60s    DATA:  < from: Transthoracic Echocardiogram (06.18.18 @ 10:33) >  CONCLUSIONS:  1. Mitral annular calcification, otherwise normal mitral  valve. Mild mitral regurgitation.  2. Severely dilated left atrium.  LA volume index = 69  cc/m2.  3. Normal left ventricular internal dimensions and wall  thicknesses.  4. Endocardium not well visualized; grossly normal left  ventricular systolic function.  5. Moderate right atrial enlargement.  6. Right ventricular enlargement with decreased right  ventricular systolic function.  7. Estimated right ventricular systolic pressure equals 53  mm Hg, assuming right atrial pressure equals 10 mm Hg,  consistent with moderate pulmonary hypertension.  8. Normal tricuspid valve.  Moderate-severe tricuspid  regurgitation.  *** No previous Echo exam.  ------------------------------------------------------------------------  Confirmed on  6/18/2018 - 12:27:29 by Lance Ferris M.D.  ------------------------------------------------------------------------  < end of copied text >    < from: Transthoracic Echocardiogram (08.13.19 @ 11:13) >  CONCLUSIONS:  1. Mitral annular calcification and calcified mitral  leaflets with normal diastolic opening. Mild mitral  regurgitation.  2. Calcified trileaflet aortic valve with grossly  moderately decreased opening. Peak transaortic valve  gradient equals 30 mm Hg, mean transaortic valve gradient  equals 15 mm Hg.  3. Normal left ventricular internal dimensions and wall  thicknesses.  4. Normal left ventricular systolic function. No segmental  wall motion abnormalities.  5. Increased E/e'  is consistent with elevated left  ventricular filling pressure.  6. Moderate right atrial enlargement.  7. Estimated pulmonary artery systolic pressure equals 51  mm Hg, assuming right atrial pressure equals 10  mm Hg,  consistent with moderate pulmonary hypertension.    < end of copied text >        ASSESSMENT/PLAN: 	    89 year old Female with history of Afib on AC, HTN, CKD, known bifascicular block and bradycardia, family declined PPM 1 year ago, prefer conservative management, admitted with chest pain, sob and volume overload c/w acute on chronic diastolic CHF     -- elevated trop 94 -- > 102, CK negative.  PT and family verbalize they only want conservative medical management of her CHF.  Will not pursue ischemic work up.  -- Repeat TTE noted above - normal LV function, no segmental WMA, moderate pulm HTN  -- cont coumadin for goal INR 2-3 for hx of Afib   -- AF with SSS - avoid AVN blockers - Pt and family continue to decline invasive testing/procedures  -- Continue IV diuresis, keep net negative, strict I/Os, daily weights  -- Renal f/u appreciated, monitor creatinine    Zi Wiley PA-C  [facility]  Pager: [phone]
S:  Denies SOB. LE edema improving. Denies chest pain.  Review of systems otherwise (-)      allopurinol 100 milliGRAM(s) Oral daily  brimonidine 0.2% Ophthalmic Solution 1 Drop(s) Both EYES three times a day  cilostazol 100 milliGRAM(s) Oral two times a day  dextrose 40% Gel 15 Gram(s) Oral once PRN  dextrose 5%. 1000 milliLiter(s) IV Continuous <Continuous>  dextrose 50% Injectable 12.5 Gram(s) IV Push once  dextrose 50% Injectable 25 Gram(s) IV Push once  dextrose 50% Injectable 25 Gram(s) IV Push once  dorzolamide 2%/timolol 0.5% Ophthalmic Solution 1 Drop(s) Both EYES <User Schedule>  furosemide   Injectable 80 milliGRAM(s) IV Push two times a day  glucagon  Injectable 1 milliGRAM(s) IntraMuscular once PRN  hydrALAZINE 75 milliGRAM(s) Oral three times a day  insulin glargine Injectable (LANTUS) 6 Unit(s) SubCutaneous at bedtime  insulin lispro (HumaLOG) corrective regimen sliding scale   SubCutaneous three times a day before meals  insulin lispro (HumaLOG) corrective regimen sliding scale   SubCutaneous at bedtime  latanoprost 0.005% Ophthalmic Solution 1 Drop(s) Both EYES at bedtime  levothyroxine 75 MICROGram(s) Oral daily  melatonin 3 milliGRAM(s) Oral at bedtime  pantoprazole    Tablet 40 milliGRAM(s) Oral before breakfast  potassium chloride    Tablet ER 40 milliEquivalent(s) Oral every 4 hours  senna 2 Tablet(s) Oral at bedtime  simvastatin 20 milliGRAM(s) Oral at bedtime  spironolactone 25 milliGRAM(s) Oral daily                        9.5    7.05  )-----------( 183      ( 15 Aug 2019 07:30 )             30.8     Hemoglobin: 9.5 g/dL (08-15 @ 07:30)  Hemoglobin: 10.3 g/dL (08-14 @ 06:00)  Hemoglobin: 10.0 g/dL (08-13 @ 06:30)  Hemoglobin: 10.0 g/dL (08-12 @ 05:55)  Hemoglobin: 10.3 g/dL (08-11 @ 05:30)      08-15    145  |  104  |  42<H>  ----------------------------<  89  3.1<L>   |  30  |  1.95<H>    Ca    9.5      15 Aug 2019 07:30  Phos  2.9     08-14  Mg     1.9     08-15    Creatinine Trend: 1.95<--, 2.09<--, 2.09<--, 2.24<--, 2.00<--, 1.95<--    COAGS: PT/INR - ( 15 Aug 2019 07:30 )   PT: 29.0 SEC;   INR: 2.54       T(C): 36.9 (08-15-19 @ 12:49), Max: 37.3 (08-15-19 @ 03:35)  HR: 68 (08-15-19 @ 12:49) (57 - 84)  BP: 137/62 (08-15-19 @ 12:49) (135/63 - 178/83)  RR: 18 (08-15-19 @ 12:49) (18 - 19)  SpO2: 99% (08-15-19 @ 12:49) (98% - 100%)  Wt(kg): --    I&O's Summary    14 Aug 2019 07:01  -  15 Aug 2019 07:00  --------------------------------------------------------  IN: 350 mL / OUT: 1800 mL / NET: -1450 mL      Gen: Appears well in NAD  HEENT:  (-)icterus (-)pallor  CV: N S1 S2 1/6 SUZI (+)2 Pulses B/l  Resp:  Clear to auscultation B/L, normal effort  GI: (+) BS Soft, NT, ND  Lymph:  (+) Trace Edema, (-)obvious lymphadenopathy  Skin: Warm to touch, Normal turgor      TELEMETRY: Afib/Aflutter 35-60s    DATA:  < from: Transthoracic Echocardiogram (06.18.18 @ 10:33) >  CONCLUSIONS:  1. Mitral annular calcification, otherwise normal mitral  valve. Mild mitral regurgitation.  2. Severely dilated left atrium.  LA volume index = 69  cc/m2.  3. Normal left ventricular internal dimensions and wall  thicknesses.  4. Endocardium not well visualized; grossly normal left  ventricular systolic function.  5. Moderate right atrial enlargement.  6. Right ventricular enlargement with decreased right  ventricular systolic function.  7. Estimated right ventricular systolic pressure equals 53  mm Hg, assuming right atrial pressure equals 10 mm Hg,  consistent with moderate pulmonary hypertension.  8. Normal tricuspid valve.  Moderate-severe tricuspid  regurgitation.  *** No previous Echo exam.  ------------------------------------------------------------------------  Confirmed on  6/18/2018 - 12:27:29 by Lance Ferris M.D.  ------------------------------------------------------------------------  < end of copied text >    < from: Transthoracic Echocardiogram (08.13.19 @ 11:13) >  CONCLUSIONS:  1. Mitral annular calcification and calcified mitral  leaflets with normal diastolic opening. Mild mitral  regurgitation.  2. Calcified trileaflet aortic valve with grossly  moderately decreased opening. Peak transaortic valve  gradient equals 30 mm Hg, mean transaortic valve gradient  equals 15 mm Hg.  3. Normal left ventricular internal dimensions and wall  thicknesses.  4. Normal left ventricular systolic function. No segmental  wall motion abnormalities.  5. Increased E/e'  is consistent with elevated left  ventricular filling pressure.  6. Moderate right atrial enlargement.  7. Estimated pulmonary artery systolic pressure equals 51  mm Hg, assuming right atrial pressure equals 10  mm Hg,  consistent with moderate pulmonary hypertension.    < end of copied text >        ASSESSMENT/PLAN: 	    89 year old Female with history of Afib on AC, HTN, CKD, known bifascicular block and bradycardia, family declined PPM 1 year ago, prefer conservative management, admitted with chest pain, sob and volume overload c/w acute on chronic diastolic CHF     -- elevated trop 94 -- > 102, CK negative.  PT and family verbalize they only want conservative medical management of her CHF.    -- Repeat TTE noted above - normal LV function, no segmental WMA, moderate pulm HTN  -- cont coumadin for goal INR 2-3 for hx of Afib   -- AF with SSS - avoid AVN blockers - Pt and family continue to decline invasive testing/procedures, will not pursue ischemic work up.  -- Continue IV diuresis, keep net negative, strict I/Os, daily weights  -- Renal f/u appreciated, lasix will be changed to po on d/c, aldactone per renal, discussed with PA   -- on d/c pt to follow in our office on 8/30/19 at 10:45 am with Dr. Berman     [address] [place] [address]   Tel :  [phone]
S:  Patient denies chest pain or shortness of breath.   Review of systems otherwise (-)    	  MEDICATIONS:  MEDICATIONS  (STANDING):  allopurinol 100 milliGRAM(s) Oral daily  brimonidine 0.2% Ophthalmic Solution 1 Drop(s) Both EYES three times a day  cilostazol 100 milliGRAM(s) Oral two times a day  dextrose 5%. 1000 milliLiter(s) (50 mL/Hr) IV Continuous <Continuous>  dextrose 50% Injectable 12.5 Gram(s) IV Push once  dextrose 50% Injectable 25 Gram(s) IV Push once  dextrose 50% Injectable 25 Gram(s) IV Push once  dorzolamide 2%/timolol 0.5% Ophthalmic Solution 1 Drop(s) Both EYES <User Schedule>  furosemide   Injectable 80 milliGRAM(s) IV Push two times a day  hydrALAZINE 75 milliGRAM(s) Oral three times a day  insulin glargine Injectable (LANTUS) 16 Unit(s) SubCutaneous at bedtime  insulin lispro (HumaLOG) corrective regimen sliding scale   SubCutaneous three times a day before meals  insulin lispro (HumaLOG) corrective regimen sliding scale   SubCutaneous at bedtime  latanoprost 0.005% Ophthalmic Solution 1 Drop(s) Both EYES at bedtime  levothyroxine 75 MICROGram(s) Oral daily  melatonin 3 milliGRAM(s) Oral at bedtime  pantoprazole    Tablet 40 milliGRAM(s) Oral before breakfast  senna 2 Tablet(s) Oral at bedtime  simvastatin 20 milliGRAM(s) Oral at bedtime    LABS:	 	    CARDIAC MARKERS:  CARDIAC MARKERS ( 10 Aug 2019 17:43 )  x     / x     / 55 u/L / 3.86 ng/mL / x                            10.3   5.08  )-----------( 177      ( 11 Aug 2019 05:30 )             32.8     Hemoglobin: 10.3 g/dL (08-11 @ 05:30)  Hemoglobin: 10.2 g/dL (08-10 @ 17:43)  Hemoglobin: 10.2 g/dL (08-10 @ 13:46)    08-11    143  |  104  |  32<H>  ----------------------------<  85  3.8   |  26  |  1.76<H>    Ca    9.7      11 Aug 2019 05:30  Phos  3.2     08-11  Mg     2.1     08-11    TPro  8.0  /  Alb  4.0  /  TBili  0.9  /  DBili  x   /  AST  49<H>  /  ALT  28  /  AlkPhos  79  08-10    Creatinine Trend: 1.76<--, 1.70<--, 1.71<--    COAGS:   PT/INR - ( 11 Aug 2019 05:30 )   PT: 41.3 SEC;   INR: 3.49        PTT - ( 11 Aug 2019 05:30 )  PTT:38.4 SEC    proBNP: Serum Pro-Brain Natriuretic Peptide: 5095 pg/mL (08-10 @ 13:46)    Lipid Profile:   HgA1c: Hemoglobin A1C, Whole Blood: 6.3 % (08-11 @ 05:30)    TSH: Thyroid Stimulating Hormone, Serum: 1.68 uIU/mL (08-11 @ 05:30)      PHYSICAL EXAM:  T(C): 36.3 (08-11-19 @ 06:59), Max: 36.8 (08-10-19 @ 13:15)  HR: 62 (08-11-19 @ 08:45) (62 - 98)  BP: 135/74 (08-11-19 @ 08:45) (135/74 - 216/92)  RR: 18 (08-11-19 @ 08:45) (18 - 22)  SpO2: 100% (08-11-19 @ 08:45) (96% - 100%)  Wt(kg): --  I&O's Summary    10 Aug 2019 07:01  -  11 Aug 2019 07:00  --------------------------------------------------------  IN: 400 mL / OUT: 990 mL / NET: -590 mL    Gen: Appears well in NAD  HEENT:  (-)icterus (-)pallor  CV: N S1 S2 1/6 SUZI (+)2 Pulses B/l  Resp:  Clear to ausculatation B/L, normal effort  GI: (+) BS Soft, NT, ND  Lymph:  (-)Edema, (-)obvious lymphadenopathy  Skin: Warm to touch, Normal turgor  Psych: Appropriate mood and affect      TELEMETRY: 	  Afib 50's, no events overnight     DATA:  < from: Transthoracic Echocardiogram (06.18.18 @ 10:33) >  CONCLUSIONS:  1. Mitral annular calcification, otherwise normal mitral  valve. Mild mitral regurgitation.  2. Severely dilated left atrium.  LA volume index = 69  cc/m2.  3. Normal left ventricular internal dimensions and wall  thicknesses.  4. Endocardium not well visualized; grossly normal left  ventricular systolic function.  5. Moderate right atrial enlargement.  6. Right ventricular enlargement with decreased right  ventricular systolic function.  7. Estimated right ventricular systolic pressure equals 53  mm Hg, assuming right atrial pressure equals 10 mm Hg,  consistent with moderate pulmonary hypertension.  8. Normal tricuspid valve.  Moderate-severe tricuspid  regurgitation.  *** No previous Echo exam.  ------------------------------------------------------------------------  Confirmed on  6/18/2018 - 12:27:29 by Lance Ferris M.D.  ------------------------------------------------------------------------    < end of copied text >      ASSESSMENT/PLAN: 	    89 year old Female with history of Afib on AC, HTN, CKD admitted with chest pain, sob and volume overload c/w acute on chronic diastolic and Right sided CHF, with chronic bifasicular block afib. Cardiology consulted for chest pain, sob and management of volume overload.     -- no cp, sob improved   -- elevated trop 94 -- > 102, CK negative, doubt ACS   -- check echo to eval lv function, structural heart   -- cont iv lasix for diuresis, -- keep O>I, trend Cr, replete lytes prn   -- cont coumadin for goal INR 2-3 for hx of Afib   -- hx of CKD, nephrology note appreciated   -- further cardiac work up pending above
S:  Patient denies chest pain or shortness of breath.   Review of systems otherwise (-)      MEDICATIONS  (STANDING):  allopurinol 100 milliGRAM(s) Oral daily  brimonidine 0.2% Ophthalmic Solution 1 Drop(s) Both EYES three times a day  cilostazol 100 milliGRAM(s) Oral two times a day  dextrose 5%. 1000 milliLiter(s) (50 mL/Hr) IV Continuous <Continuous>  dextrose 50% Injectable 12.5 Gram(s) IV Push once  dextrose 50% Injectable 25 Gram(s) IV Push once  dextrose 50% Injectable 25 Gram(s) IV Push once  dorzolamide 2%/timolol 0.5% Ophthalmic Solution 1 Drop(s) Both EYES <User Schedule>  furosemide   Injectable 80 milliGRAM(s) IV Push two times a day  hydrALAZINE 75 milliGRAM(s) Oral three times a day  insulin glargine Injectable (LANTUS) 16 Unit(s) SubCutaneous at bedtime  insulin lispro (HumaLOG) corrective regimen sliding scale   SubCutaneous three times a day before meals  insulin lispro (HumaLOG) corrective regimen sliding scale   SubCutaneous at bedtime  latanoprost 0.005% Ophthalmic Solution 1 Drop(s) Both EYES at bedtime  levothyroxine 75 MICROGram(s) Oral daily  melatonin 3 milliGRAM(s) Oral at bedtime  pantoprazole    Tablet 40 milliGRAM(s) Oral before breakfast  senna 2 Tablet(s) Oral at bedtime  simvastatin 20 milliGRAM(s) Oral at bedtime    MEDICATIONS  (PRN):  dextrose 40% Gel 15 Gram(s) Oral once PRN Blood Glucose LESS THAN 70 milliGRAM(s)/deciliter  glucagon  Injectable 1 milliGRAM(s) IntraMuscular once PRN Glucose LESS THAN 70 milligrams/deciliter      LABS:                     10.0   5.56  )-----------( 188      ( 13 Aug 2019 06:30 )             31.5     145  |  106  |  40<H>  ----------------------------<  117<H>  3.9   |  27  |  2.09<H>    Ca    9.8      13 Aug 2019 06:30  Phos  2.8     08-13  Mg     2.0     08-13      Creatinine Trend: 2.09<--, 2.24<--, 2.00<--, 1.95<--, 1.76<--, 1.70<--   PT/INR - ( 13 Aug 2019 06:30 )   PT: 26.9 SEC;   INR: 2.30       CARDIAC MARKERS ( 10 Aug 2019 17:43 )  x     / x     / 55 u/L / 3.86 ng/mL / x        PHYSICAL EXAM  Vital Signs Last 24 Hrs  T(C): 36.6 (13 Aug 2019 09:35), Max: 36.7 (13 Aug 2019 05:29)  T(F): 97.9 (13 Aug 2019 09:35), Max: 98 (13 Aug 2019 05:29)  HR: 81 (13 Aug 2019 09:35) (60 - 81)  BP: 162/78 (13 Aug 2019 09:35) (150/86 - 165/75)  RR: 17 (13 Aug 2019 09:35) (17 - 18)  SpO2: 100% (13 Aug 2019 09:35) (97% - 100%)    Gen: Appears well in NAD  HEENT:  (-)icterus (-)pallor  CV: N S1 S2 1/6 SUZI (+)2 Pulses B/l  Resp:  Clear to auscultation B/L, normal effort  GI: (+) BS Soft, NT, ND  Lymph:  (2+)Edema, (-)obvious lymphadenopathy  Skin: Warm to touch, Normal turgor      TELEMETRY: 	  Afib/AFL 37-60    DATA:  < from: Transthoracic Echocardiogram (06.18.18 @ 10:33) >  CONCLUSIONS:  1. Mitral annular calcification, otherwise normal mitral  valve. Mild mitral regurgitation.  2. Severely dilated left atrium.  LA volume index = 69  cc/m2.  3. Normal left ventricular internal dimensions and wall  thicknesses.  4. Endocardium not well visualized; grossly normal left  ventricular systolic function.  5. Moderate right atrial enlargement.  6. Right ventricular enlargement with decreased right  ventricular systolic function.  7. Estimated right ventricular systolic pressure equals 53  mm Hg, assuming right atrial pressure equals 10 mm Hg,  consistent with moderate pulmonary hypertension.  8. Normal tricuspid valve.  Moderate-severe tricuspid  regurgitation.  *** No previous Echo exam.  ------------------------------------------------------------------------  Confirmed on  6/18/2018 - 12:27:29 by Lance Ferris M.D.  ------------------------------------------------------------------------  < end of copied text >      ASSESSMENT/PLAN: 	    89 year old Female with history of Afib on AC, HTN, CKD, known bifascicular block and bradycardia, family declined PPM 1 year ago, prefer conservative management, admitted with chest pain, sob and volume overload c/w acute on chronic diastolic CHF     -- elevated trop 94 -- > 102, CK negative.  PT and family verbalize they only want conservative medical management of her CHF.  Will not pursue ischemic work up.  -- can repeat echo to re eval LV function  -- cont IV lasix, Renal following and has decreased dose -- keep O>I, trend Cr, replete lytes prn   -- cont coumadin for goal INR 2-3 for hx of Afib   --AF with SSS, avoid AVN blockers, Pt and family cont to decline invasive testing/procedures
S:  Patient denies chest pain or shortness of breath.   Review of systems otherwise (-)    MEDICATIONS  (STANDING):  allopurinol 100 milliGRAM(s) Oral daily  brimonidine 0.2% Ophthalmic Solution 1 Drop(s) Both EYES three times a day  cilostazol 100 milliGRAM(s) Oral two times a day  dextrose 5%. 1000 milliLiter(s) (50 mL/Hr) IV Continuous <Continuous>  dextrose 50% Injectable 12.5 Gram(s) IV Push once  dextrose 50% Injectable 25 Gram(s) IV Push once  dextrose 50% Injectable 25 Gram(s) IV Push once  dorzolamide 2%/timolol 0.5% Ophthalmic Solution 1 Drop(s) Both EYES <User Schedule>  furosemide   Injectable 80 milliGRAM(s) IV Push two times a day  hydrALAZINE 75 milliGRAM(s) Oral three times a day  insulin glargine Injectable (LANTUS) 16 Unit(s) SubCutaneous at bedtime  insulin lispro (HumaLOG) corrective regimen sliding scale   SubCutaneous three times a day before meals  insulin lispro (HumaLOG) corrective regimen sliding scale   SubCutaneous at bedtime  latanoprost 0.005% Ophthalmic Solution 1 Drop(s) Both EYES at bedtime  levothyroxine 75 MICROGram(s) Oral daily  melatonin 3 milliGRAM(s) Oral at bedtime  pantoprazole    Tablet 40 milliGRAM(s) Oral before breakfast  senna 2 Tablet(s) Oral at bedtime  simvastatin 20 milliGRAM(s) Oral at bedtime    MEDICATIONS  (PRN):  dextrose 40% Gel 15 Gram(s) Oral once PRN Blood Glucose LESS THAN 70 milliGRAM(s)/deciliter  glucagon  Injectable 1 milliGRAM(s) IntraMuscular once PRN Glucose LESS THAN 70 milligrams/deciliter      LABS:                       10.0   4.95  )-----------( 186      ( 12 Aug 2019 05:55 )             31.6       143  |  102  |  38<H>  ----------------------------<  88  3.7   |  27  |  2.00<H>    Ca    9.9      12 Aug 2019 05:55  Phos  3.0     08-12  Mg     2.1     08-12    Creatinine Trend: 2.00<--, 1.95<--, 1.76<--, 1.70<--, 1.71<--   PT/INR - ( 12 Aug 2019 05:55 )   PT: 38.2 SEC;   INR: 3.32       PTT - ( 12 Aug 2019 05:55 )  PTT:39.6 SEC  CARDIAC MARKERS ( 10 Aug 2019 17:43 )  x     / x     / 55 u/L / 3.86 ng/mL / x          PHYSICAL EXAM  Vital Signs Last 24 Hrs  T(C): 36.4 (12 Aug 2019 13:00), Max: 36.5 (11 Aug 2019 19:58)  T(F): 97.6 (12 Aug 2019 13:00), Max: 97.7 (11 Aug 2019 19:58)  HR: 60 (12 Aug 2019 13:00) (60 - 74)  BP: 165/75 (12 Aug 2019 13:00) (138/71 - 176/92)  RR: 18 (12 Aug 2019 13:00) (17 - 18)  SpO2: 97% (12 Aug 2019 13:00) (97% - 100%)    Gen: Appears well in NAD  HEENT:  (-)icterus (-)pallor  CV: N S1 S2 1/6 SUZI (+)2 Pulses B/l  Resp:  Clear to auscultation B/L, normal effort  GI: (+) BS Soft, NT, ND  Lymph:  (2+)Edema, (-)obvious lymphadenopathy  Skin: Warm to touch, Normal turgor      TELEMETRY: 	  Afib/AFL 37-60    DATA:  < from: Transthoracic Echocardiogram (06.18.18 @ 10:33) >  CONCLUSIONS:  1. Mitral annular calcification, otherwise normal mitral  valve. Mild mitral regurgitation.  2. Severely dilated left atrium.  LA volume index = 69  cc/m2.  3. Normal left ventricular internal dimensions and wall  thicknesses.  4. Endocardium not well visualized; grossly normal left  ventricular systolic function.  5. Moderate right atrial enlargement.  6. Right ventricular enlargement with decreased right  ventricular systolic function.  7. Estimated right ventricular systolic pressure equals 53  mm Hg, assuming right atrial pressure equals 10 mm Hg,  consistent with moderate pulmonary hypertension.  8. Normal tricuspid valve.  Moderate-severe tricuspid  regurgitation.  *** No previous Echo exam.  ------------------------------------------------------------------------  Confirmed on  6/18/2018 - 12:27:29 by Lance Ferris M.D.  ------------------------------------------------------------------------    < end of copied text >          ASSESSMENT/PLAN: 	    89 year old Female with history of Afib on AC, HTN, CKD, known bifascicular block and bradycardia, family declined PPM 1 year ago, prefer conservative management, admitted with chest pain, sob and volume overload c/w acute on chronic diastolic CHF     -- elevated trop 94 -- > 102, CK negative.  PT and family verbalize they only want conservative medical management of her CHF.  Will not pursue ischemic work up.  -- can repeat echo to re eval LV function  -- cont IV lasix -- keep O>I, trend Cr, replete lytes prn   -- cont coumadin for goal INR 2-3 for hx of Afib   -- hx of CKD, nephrology note appreciated   -- further cardiac work up pending above
Pt seen and examined at bedside  feels fairly well  denies any compl  dyspnea improved  leg swelling much improved  diuresing well      Allergies:  No Known Allergies    Hospital Medications:   MEDICATIONS  (STANDING):  allopurinol 100 milliGRAM(s) Oral daily  brimonidine 0.2% Ophthalmic Solution 1 Drop(s) Both EYES three times a day  cilostazol 100 milliGRAM(s) Oral two times a day  dextrose 5%. 1000 milliLiter(s) (50 mL/Hr) IV Continuous <Continuous>  dextrose 50% Injectable 12.5 Gram(s) IV Push once  dextrose 50% Injectable 25 Gram(s) IV Push once  dextrose 50% Injectable 25 Gram(s) IV Push once  dorzolamide 2%/timolol 0.5% Ophthalmic Solution 1 Drop(s) Both EYES <User Schedule>  furosemide   Injectable 80 milliGRAM(s) IV Push two times a day  hydrALAZINE 75 milliGRAM(s) Oral three times a day  insulin glargine Injectable (LANTUS) 6 Unit(s) SubCutaneous at bedtime  insulin lispro (HumaLOG) corrective regimen sliding scale   SubCutaneous three times a day before meals  insulin lispro (HumaLOG) corrective regimen sliding scale   SubCutaneous at bedtime  latanoprost 0.005% Ophthalmic Solution 1 Drop(s) Both EYES at bedtime  levothyroxine 75 MICROGram(s) Oral daily  melatonin 3 milliGRAM(s) Oral at bedtime  pantoprazole    Tablet 40 milliGRAM(s) Oral before breakfast  potassium chloride    Tablet ER 40 milliEquivalent(s) Oral once  senna 2 Tablet(s) Oral at bedtime  simvastatin 20 milliGRAM(s) Oral at bedtime         VITALS:  T(F): 98 (08-15-19 @ 05:56), Max: 99.2 (08-15-19 @ 03:35)  HR: 66 (08-15-19 @ 05:56)  BP: 152/50 (08-15-19 @ 05:56)  RR: 18 (08-15-19 @ 05:56)  SpO2: 100% (08-15-19 @ 05:56)  Wt(kg): --     @ 07:01  -  08-15 @ 07:00  --------------------------------------------------------  IN: 350 mL / OUT: 1800 mL / NET: -1450 mL        PHYSICAL EXAM:  Constitutional: NAD, lying semiprone in bed comfortably  HEENT: anicteric sclera, oropharynx clear, MMM  Neck: No JVD  Respiratory: bibasilar crepts, no weezing  Cardiovascular: S1, S2, irreg  Gastrointestinal: BS+, soft, NT/ND  Extremities: No cyanosis or clubbing. No peripheral edema  Neurological: A/O x 3, no focal deficits  Psychiatric: Normal mood, normal affect  : No CVA tenderness. No michaud.   Skin: No rashes       LABS:  08-15    145  |  104  |  42<H>  ----------------------------<  89  3.1<L>   |  30  |  1.95<H>    Ca    9.5      15 Aug 2019 07:30  Phos  2.9     08-14  Mg     1.9     15      Creatinine Trend: 1.95 <--, 2.09 <--, 2.09 <--, 2.24 <--, 2.00 <--, 1.95 <--, 1.76 <--, 1.70 <--, 1.71 <--                        9.5    7.05  )-----------( 183      ( 15 Aug 2019 07:30 )             30.8     Urine Studies:  Urinalysis Basic - ( 10 Aug 2019 22:30 )    Color: LIGHT YELLOW / Appearance: CLEAR / S.008 / pH: 7.5  Gluc: NEGATIVE / Ketone: NEGATIVE  / Bili: NEGATIVE / Urobili: NORMAL   Blood: NEGATIVE / Protein: 20 / Nitrite: NEGATIVE   Leuk Esterase: NEGATIVE / RBC: 0-2 / WBC 0-2   Sq Epi: OCC / Non Sq Epi:  / Bacteria: NEGATIVE        RADIOLOGY & ADDITIONAL STUDIES:

## 2019-08-15 NOTE — DISCHARGE NOTE PROVIDER - NSDCCPCAREPLAN_GEN_ALL_CORE_FT
PRINCIPAL DISCHARGE DIAGNOSIS  Diagnosis: Acute on chronic diastolic heart failure  Assessment and Plan of Treatment: Continue Lasix as prescribed. Low salt diet, fluid restriction to 1500 ml daily, monitor your fluid intake and weight daily, exercise as tolerated 30 minutes daily. Follow up with Dr. Banks (Cardiologist) within 1 to 2 weeks; call [phone] for appointment.      SECONDARY DISCHARGE DIAGNOSES  Diagnosis: Hypertension, unspecified type  Assessment and Plan of Treatment: Improved. Continue Hydralazine as prescribed. Follow up with your primary care physician and Dr. Banks (Cardiologist) within 1 to 2 weeks; call [phone] for appointment.    Diagnosis: Afib  Assessment and Plan of Treatment: Continue Coumadin as prescribed. Follow up with your primary care physician within 1-2 days to check your INR level and adjust dose of Coumadin accordingly. INR goal in between 2.0-3.0.  Low fat diet, reduce caffeine intake, and exercise at least 30 minutes daily. PRINCIPAL DISCHARGE DIAGNOSIS  Diagnosis: Acute on chronic diastolic heart failure  Assessment and Plan of Treatment: Continue Lasix 80mg daily and Aldactone 25mg daily. Low salt diet, fluid restriction to 1500 ml daily, monitor your fluid intake and weight daily, exercise as tolerated 30 minutes daily. Follow up with Dr. Berman on 8/30/19 at 10:45 am, office located at [address] [place] [address]; call [phone] with questions.      SECONDARY DISCHARGE DIAGNOSES  Diagnosis: Hypertension, unspecified type  Assessment and Plan of Treatment: Improved. Continue Hydralazine as prescribed. Follow up with your primary care physician and Dr. Bansk (Cardiologist) within 1 to 2 weeks; call [phone] for appointment.    Diagnosis: Afib  Assessment and Plan of Treatment: Continue Coumadin as prescribed. Follow up with your primary care physician within 1-2 days to check your INR level and adjust dose of Coumadin accordingly. INR goal in between 2.0-3.0.  Low fat diet, reduce caffeine intake, and exercise at least 30 minutes daily. PRINCIPAL DISCHARGE DIAGNOSIS  Diagnosis: Acute on chronic diastolic heart failure  Assessment and Plan of Treatment: Continue Lasix 80mg daily and Aldactone 25mg daily. Low salt diet, fluid restriction to 1500 ml daily, monitor your fluid intake and weight daily, exercise as tolerated 30 minutes daily. Follow up with Dr. Berman on 8/30/19 at 10:45 am, office located at [address] [place] [address]; call [phone] with questions.      SECONDARY DISCHARGE DIAGNOSES  Diagnosis: Hypertension, unspecified type  Assessment and Plan of Treatment: Improved. Continue Hydralazine as prescribed. Follow up with your primary care physician and Dr. Berman within 1 to 2 weeks; call [phone] for appointment.    Diagnosis: Afib  Assessment and Plan of Treatment: Continue Coumadin 3mg daily. Follow up with your primary care physician within 2-3 days to check your INR level and adjust dose of Coumadin accordingly. INR goal in between 2.0-3.0.  Low fat diet, reduce caffeine intake, and exercise at least 30 minutes daily.

## 2019-08-15 NOTE — PROGRESS NOTE ADULT - PROBLEM SELECTOR PLAN 2
over all much improved   still has bibasilr rales,appears comfortable  diuresing well  consider changing iv lasix to Po  consider adding Aldactone 25 od, ok to use despite ckd.monitor bmp frequently
hypervolemia improving  still nat [facility]  will inc i/v lasix bid today, re-evaluate in AM

## 2019-08-15 NOTE — CHART NOTE - NSCHARTNOTEFT_GEN_A_CORE
Per Cardiology, verify with Renal what dose of PO lasix to switch patient to on discharge as Aldactone was started. Per Dr. Tellez (Renal), discharge home on Lasix 80mg po daily and Aldactone 25mg po daily. Per Dr. Momin, discharge on Coumadin 3mg and patient to f/u with her PCP to check INR in 2-3 days and adjust dose of Coumadin accordingly for INR goal between 2.0-3.0.

## 2019-08-15 NOTE — PROGRESS NOTE ADULT - PROBLEM SELECTOR PLAN 1
Cr slightly dec, 2.24 to 1.95  low K, received 1 dose of 40 meq po kcl, rpt annother dose
Cr v.slightly dec, 2.2 to 2.0  lytes ok  diuresing we;;

## 2019-08-15 NOTE — DISCHARGE NOTE NURSING/CASE MANAGEMENT/SOCIAL WORK - NSDCDPATPORTLINK_GEN_ALL_CORE
You can access the [facility] Portal, offered by [facility], by registering with the following website: [email or web address]

## 2019-08-15 NOTE — DISCHARGE NOTE PROVIDER - HOSPITAL COURSE
89 y/o F with PMHx of Afib on coumadin, CHF, HTN, HLD, DM, Hypothyroidism, and previous PE, presents with worsening shortness of breath and chest pressure x2 days, admitted to telemetry for CHF exacerbation and Hypertensive urgency.        1. Acute on chronic diastolic CHF    - now on po Lasix, s/p IV lasix 80 BID with improvement in fluid status.    - Echo: EF 63%. Mild mitral regurgitation. Normal left ventricular systolic function. No segmental wall motion abnormalities. Elevated left ventricular filling pressure. Moderate right atrial enlargement. Moderate pulmonary hypertension.    - Cardiology following: pt and family want conservative care for her CHF at this time. No further cardiac workup    - RVP neg        2. Hypertensive urgency     -BP improved    -continue Hydralazine and Lasix        3. Atrial fibrillation    -Coumadin was initially held for supratherapeutic INR.    -INR now therapeutic; Continue Coumadin    -No AVNB for bradycardia (patient was offered a pacemaker last year and refused per Cardiology) 89 y/o F with PMHx of Afib on coumadin, CHF, HTN, HLD, DM, Hypothyroidism, and previous PE, presents with worsening shortness of breath and chest pressure x2 days, admitted to telemetry for CHF exacerbation and Hypertensive urgency.        1. Acute on chronic diastolic CHF    - now on po Lasix, s/p IV lasix 80 BID with improvement in fluid status.    - Echo: EF 63%. Mild mitral regurgitation. Normal left ventricular systolic function. No segmental wall motion abnormalities. Elevated left ventricular filling pressure. Moderate right atrial enlargement. Moderate pulmonary hypertension.    - Cardiology following: pt and family want conservative care for her CHF at this time. No further cardiac workup    - RVP neg        2. Hypertensive urgency     -BP improved    -continue Hydralazine and Lasix        3. Atrial fibrillation    -Coumadin was initially held for supratherapeutic INR.    -INR now therapeutic; Continue Coumadin    -No AVNB for bradycardia (patient was offered a pacemaker last year and refused per Cardiology)        PT recommended rehab, but patient prefers home PT (set up by CM). 89 y/o F with PMHx of Afib on coumadin, CHF, HTN, HLD, DM, Hypothyroidism, and previous PE, presents with worsening shortness of breath and chest pressure x2 days, admitted to telemetry for CHF exacerbation and Hypertensive urgency.        1. Acute on chronic diastolic CHF    - now on po Lasix 80mg daily and added Aldactone 25mg daily, s/p IV lasix 80 BID with improvement in fluid status.    - Echo: EF 63%. Mild mitral regurgitation. Normal left ventricular systolic function. No segmental wall motion abnormalities. Elevated left ventricular filling pressure. Moderate right atrial enlargement. Moderate pulmonary hypertension.    - Cardiology following: pt and family want conservative care for her CHF at this time. No further cardiac workup    - Renal following: kidney function stable    - RVP neg        2. Hypertensive urgency     -BP improved    -continue Hydralazine and Lasix        3. Atrial fibrillation    -Coumadin was initially held for supratherapeutic INR.    -INR now therapeutic; Continue Coumadin    -No AVNB for bradycardia (patient was offered a pacemaker last year and refused per Cardiology)        PT recommended rehab, but patient prefers home PT (set up by CM).        Case discussed with Dr. Momin, Cardiology, and Renal and patient is medically stable for discharge home with home PT. 89 y/o F with PMHx of Afib on coumadin, CHF, HTN, HLD, DM, Hypothyroidism, and previous PE, presents with worsening shortness of breath and chest pressure x2 days, admitted to telemetry for CHF exacerbation and Hypertensive urgency.        1. Acute on chronic diastolic CHF    - now on po Lasix 80mg daily and added Aldactone 25mg daily, s/p IV lasix 80 BID with improvement in fluid status.    - Echo: EF 63%. Mild mitral regurgitation. Normal left ventricular systolic function. No segmental wall motion abnormalities. Elevated left ventricular filling pressure. Moderate right atrial enlargement. Moderate pulmonary hypertension.    - Cardiology following: pt and family want conservative care for her CHF at this time. No further cardiac workup    - Renal following: kidney function stable    - RVP neg        2. Hypertensive urgency     -BP improved    -continue Hydralazine and Lasix        3. Atrial fibrillation    -Coumadin was initially held for supratherapeutic INR.    -INR now therapeutic; Continue Coumadin 3mg daily on d/c per Dr. Momin and recheck INR within 2-3 days.    -No AVNB for bradycardia (patient was offered a pacemaker last year and refused per Cardiology)        PT recommended rehab, but patient prefers home PT (set up by CM).        Case discussed with Dr. Momin, Cardiology, and Renal and patient is medically stable for discharge home with home PT. Reviewed discharge medications with daughter, Catherine; All new medications requiring new prescription sent to pharmacy of patients choice. Catherine unsure if patient requires refills of home medications. Agreed to send only new  medications to pharmacy and hold off on refills of home medications at this time.

## 2019-09-16 ENCOUNTER — INPATIENT (INPATIENT)
Facility: HOSPITAL | Age: 84
LOS: 6 days | Discharge: HOME HEALTH | End: 2019-09-23
Attending: INTERNAL MEDICINE | Admitting: INTERNAL MEDICINE
Payer: MEDICARE

## 2019-09-16 VITALS
RESPIRATION RATE: 20 BRPM | DIASTOLIC BLOOD PRESSURE: 105 MMHG | TEMPERATURE: 98 F | SYSTOLIC BLOOD PRESSURE: 191 MMHG | HEART RATE: 116 BPM | OXYGEN SATURATION: 99 %

## 2019-09-16 DIAGNOSIS — K92.2 GASTROINTESTINAL HEMORRHAGE, UNSPECIFIED: ICD-10-CM

## 2019-09-16 DIAGNOSIS — Z41.9 ENCOUNTER FOR PROCEDURE FOR PURPOSES OTHER THAN REMEDYING HEALTH STATE, UNSPECIFIED: Chronic | ICD-10-CM

## 2019-09-16 DIAGNOSIS — E89.0 POSTPROCEDURAL HYPOTHYROIDISM: Chronic | ICD-10-CM

## 2019-09-16 LAB
ALBUMIN SERPL ELPH-MCNC: 4.3 G/DL — SIGNIFICANT CHANGE UP (ref 3.3–5)
ALP SERPL-CCNC: 62 U/L — SIGNIFICANT CHANGE UP (ref 40–120)
ALT FLD-CCNC: 11 U/L — SIGNIFICANT CHANGE UP (ref 4–33)
ANION GAP SERPL CALC-SCNC: 13 MMO/L — SIGNIFICANT CHANGE UP (ref 7–14)
APTT BLD: 31.7 SEC — SIGNIFICANT CHANGE UP (ref 27.5–36.3)
AST SERPL-CCNC: 14 U/L — SIGNIFICANT CHANGE UP (ref 4–32)
BASOPHILS # BLD AUTO: 0.02 K/UL — SIGNIFICANT CHANGE UP (ref 0–0.2)
BASOPHILS NFR BLD AUTO: 0.3 % — SIGNIFICANT CHANGE UP (ref 0–2)
BILIRUB SERPL-MCNC: 0.7 MG/DL — SIGNIFICANT CHANGE UP (ref 0.2–1.2)
BLD GP AB SCN SERPL QL: NEGATIVE — SIGNIFICANT CHANGE UP
BUN SERPL-MCNC: 50 MG/DL — HIGH (ref 7–23)
CALCIUM SERPL-MCNC: 10.7 MG/DL — HIGH (ref 8.4–10.5)
CHLORIDE SERPL-SCNC: 101 MMOL/L — SIGNIFICANT CHANGE UP (ref 98–107)
CO2 SERPL-SCNC: 26 MMOL/L — SIGNIFICANT CHANGE UP (ref 22–31)
CREAT SERPL-MCNC: 2.3 MG/DL — HIGH (ref 0.5–1.3)
EOSINOPHIL # BLD AUTO: 0.07 K/UL — SIGNIFICANT CHANGE UP (ref 0–0.5)
EOSINOPHIL NFR BLD AUTO: 1.1 % — SIGNIFICANT CHANGE UP (ref 0–6)
GLUCOSE SERPL-MCNC: 150 MG/DL — HIGH (ref 70–99)
HCT VFR BLD CALC: 31.7 % — LOW (ref 34.5–45)
HGB BLD-MCNC: 10 G/DL — LOW (ref 11.5–15.5)
IMM GRANULOCYTES NFR BLD AUTO: 0.2 % — SIGNIFICANT CHANGE UP (ref 0–1.5)
INR BLD: 1.89 — HIGH (ref 0.88–1.17)
LYMPHOCYTES # BLD AUTO: 1.97 K/UL — SIGNIFICANT CHANGE UP (ref 1–3.3)
LYMPHOCYTES # BLD AUTO: 31.1 % — SIGNIFICANT CHANGE UP (ref 13–44)
MCHC RBC-ENTMCNC: 30 PG — SIGNIFICANT CHANGE UP (ref 27–34)
MCHC RBC-ENTMCNC: 31.5 % — LOW (ref 32–36)
MCV RBC AUTO: 95.2 FL — SIGNIFICANT CHANGE UP (ref 80–100)
MONOCYTES # BLD AUTO: 0.82 K/UL — SIGNIFICANT CHANGE UP (ref 0–0.9)
MONOCYTES NFR BLD AUTO: 13 % — SIGNIFICANT CHANGE UP (ref 2–14)
NEUTROPHILS # BLD AUTO: 3.44 K/UL — SIGNIFICANT CHANGE UP (ref 1.8–7.4)
NEUTROPHILS NFR BLD AUTO: 54.3 % — SIGNIFICANT CHANGE UP (ref 43–77)
NRBC # FLD: 0 K/UL — SIGNIFICANT CHANGE UP (ref 0–0)
OB PNL STL: POSITIVE — SIGNIFICANT CHANGE UP
PLATELET # BLD AUTO: 177 K/UL — SIGNIFICANT CHANGE UP (ref 150–400)
PMV BLD: 12.2 FL — SIGNIFICANT CHANGE UP (ref 7–13)
POTASSIUM SERPL-MCNC: 4.2 MMOL/L — SIGNIFICANT CHANGE UP (ref 3.5–5.3)
POTASSIUM SERPL-SCNC: 4.2 MMOL/L — SIGNIFICANT CHANGE UP (ref 3.5–5.3)
PROT SERPL-MCNC: 8.2 G/DL — SIGNIFICANT CHANGE UP (ref 6–8.3)
PROTHROM AB SERPL-ACNC: 21.4 SEC — HIGH (ref 9.8–13.1)
RBC # BLD: 3.33 M/UL — LOW (ref 3.8–5.2)
RBC # FLD: 16 % — HIGH (ref 10.3–14.5)
RH IG SCN BLD-IMP: NEGATIVE — SIGNIFICANT CHANGE UP
SODIUM SERPL-SCNC: 140 MMOL/L — SIGNIFICANT CHANGE UP (ref 135–145)
WBC # BLD: 6.33 K/UL — SIGNIFICANT CHANGE UP (ref 3.8–10.5)
WBC # FLD AUTO: 6.33 K/UL — SIGNIFICANT CHANGE UP (ref 3.8–10.5)

## 2019-09-16 PROCEDURE — 99223 1ST HOSP IP/OBS HIGH 75: CPT

## 2019-09-16 RX ORDER — SODIUM CHLORIDE 9 MG/ML
1000 INJECTION INTRAMUSCULAR; INTRAVENOUS; SUBCUTANEOUS ONCE
Refills: 0 | Status: COMPLETED | OUTPATIENT
Start: 2019-09-16 | End: 2019-09-16

## 2019-09-16 RX ORDER — HYDRALAZINE HCL 50 MG
25 TABLET ORAL ONCE
Refills: 0 | Status: DISCONTINUED | OUTPATIENT
Start: 2019-09-16 | End: 2019-09-16

## 2019-09-16 RX ORDER — HYDRALAZINE HCL 50 MG
50 TABLET ORAL ONCE
Refills: 0 | Status: COMPLETED | OUTPATIENT
Start: 2019-09-16 | End: 2019-09-16

## 2019-09-16 RX ADMIN — SODIUM CHLORIDE 1000 MILLILITER(S): 9 INJECTION INTRAMUSCULAR; INTRAVENOUS; SUBCUTANEOUS at 21:56

## 2019-09-16 RX ADMIN — Medication 50 MILLIGRAM(S): at 23:37

## 2019-09-16 NOTE — ED PROVIDER NOTE - GASTROINTESTINAL, MLM
Abdomen soft, non-tender, no guarding. rectal exam - with dr quigley Abdomen soft, non-tender, no guarding. rectal exam - with dr quigley no external hemorrhoids, + gross blood on exam

## 2019-09-16 NOTE — ED PROVIDER NOTE - ATTENDING CONTRIBUTION TO CARE
see above note see above note     89yo F with afib on coumadin (with recent increased dose last week), DM, HTN, CHF, cva with 3 episodes of bright red blood per rectum mixed with stool. no abd pain. had diarrhea 2 weeks ago but since resolved. denies dizziness, lightheadeness, chest pain, sob. no nausea, no vomiting. no history of gi bleed   pcp- [place]  meds: coumadin, allopurinol, lasix, hydralazine, insulin, synthroid, simvasatatin, spironolactone

## 2019-09-16 NOTE — ED ADULT NURSE NOTE - OBJECTIVE STATEMENT
pt brought into room 10 A&OX4 amb with assistance female presents to the ed today for BRBPR patient had one episode last night where she saw the toilet water red. patient denies lightheadedness/ dizziness but endorsing shortness of breathe. 100 % on room air. patient family at bedside. patient on coumadin pt increased dose last week. hx: DM, HTN, CHF, cva with no residual weakness. 18g iv placed in right ac labs drawn and sent.

## 2019-09-16 NOTE — ED PROVIDER NOTE - OBJECTIVE STATEMENT
89yo F with afib on coumadin (with recent increased dose last week), DM, HTN, CHF, cva with 3 episodes of bright red blood per rectum mixed with stool. no abd pain. had diarrhea 2 weeks ago but since resolved. denies dizziness, lightheadeness, chest pain, sob. no nausea, no vomiting. no history of gi bleed   pcp- [facility]  meds: coumadin, allopurinol, lasix, hydralazine, insulin, synthroid, simvasatatin, spironolactone attyo F with afib on coumadin (with recent increased dose last week), DM, HTN, CHF, cva with 3 episodes of bright red blood per rectum mixed with stool. no abd pain. had diarrhea 2 weeks ago but since resolved. denies dizziness, lightheadeness, chest pain, sob. no nausea, no vomiting. no history of gi bleed   pcp- [facility]  meds: coumadin, allopurinol, lasix, hydralazine, insulin, synthroid, simvasatatin, spironolactone

## 2019-09-17 DIAGNOSIS — N18.9 CHRONIC KIDNEY DISEASE, UNSPECIFIED: ICD-10-CM

## 2019-09-17 DIAGNOSIS — I48.91 UNSPECIFIED ATRIAL FIBRILLATION: ICD-10-CM

## 2019-09-17 DIAGNOSIS — I16.0 HYPERTENSIVE URGENCY: ICD-10-CM

## 2019-09-17 DIAGNOSIS — N17.9 ACUTE KIDNEY FAILURE, UNSPECIFIED: ICD-10-CM

## 2019-09-17 DIAGNOSIS — D64.9 ANEMIA, UNSPECIFIED: ICD-10-CM

## 2019-09-17 DIAGNOSIS — I50.32 CHRONIC DIASTOLIC (CONGESTIVE) HEART FAILURE: ICD-10-CM

## 2019-09-17 DIAGNOSIS — R19.7 DIARRHEA, UNSPECIFIED: ICD-10-CM

## 2019-09-17 DIAGNOSIS — K92.2 GASTROINTESTINAL HEMORRHAGE, UNSPECIFIED: ICD-10-CM

## 2019-09-17 DIAGNOSIS — E11.69 TYPE 2 DIABETES MELLITUS WITH OTHER SPECIFIED COMPLICATION: ICD-10-CM

## 2019-09-17 DIAGNOSIS — E78.5 HYPERLIPIDEMIA, UNSPECIFIED: ICD-10-CM

## 2019-09-17 DIAGNOSIS — E03.9 HYPOTHYROIDISM, UNSPECIFIED: ICD-10-CM

## 2019-09-17 DIAGNOSIS — K62.5 HEMORRHAGE OF ANUS AND RECTUM: ICD-10-CM

## 2019-09-17 DIAGNOSIS — Z71.89 OTHER SPECIFIED COUNSELING: ICD-10-CM

## 2019-09-17 DIAGNOSIS — I73.9 PERIPHERAL VASCULAR DISEASE, UNSPECIFIED: ICD-10-CM

## 2019-09-17 DIAGNOSIS — Z29.9 ENCOUNTER FOR PROPHYLACTIC MEASURES, UNSPECIFIED: ICD-10-CM

## 2019-09-17 LAB
ALBUMIN SERPL ELPH-MCNC: 3.8 G/DL — SIGNIFICANT CHANGE UP (ref 3.3–5)
ALP SERPL-CCNC: 56 U/L — SIGNIFICANT CHANGE UP (ref 40–120)
ALT FLD-CCNC: 8 U/L — SIGNIFICANT CHANGE UP (ref 4–33)
ANION GAP SERPL CALC-SCNC: 11 MMO/L — SIGNIFICANT CHANGE UP (ref 7–14)
APTT BLD: 29.8 SEC — SIGNIFICANT CHANGE UP (ref 27.5–36.3)
AST SERPL-CCNC: 14 U/L — SIGNIFICANT CHANGE UP (ref 4–32)
BILIRUB SERPL-MCNC: 0.7 MG/DL — SIGNIFICANT CHANGE UP (ref 0.2–1.2)
BUN SERPL-MCNC: 45 MG/DL — HIGH (ref 7–23)
CALCIUM SERPL-MCNC: 10 MG/DL — SIGNIFICANT CHANGE UP (ref 8.4–10.5)
CHLORIDE SERPL-SCNC: 105 MMOL/L — SIGNIFICANT CHANGE UP (ref 98–107)
CK MB BLD-MCNC: 3.16 NG/ML — SIGNIFICANT CHANGE UP (ref 1–4.7)
CK MB BLD-MCNC: 3.71 NG/ML — SIGNIFICANT CHANGE UP (ref 1–4.7)
CK MB BLD-MCNC: SIGNIFICANT CHANGE UP (ref 0–2.5)
CK SERPL-CCNC: 44 U/L — SIGNIFICANT CHANGE UP (ref 25–170)
CK SERPL-CCNC: 44 U/L — SIGNIFICANT CHANGE UP (ref 25–170)
CO2 SERPL-SCNC: 23 MMOL/L — SIGNIFICANT CHANGE UP (ref 22–31)
CREAT SERPL-MCNC: 1.94 MG/DL — HIGH (ref 0.5–1.3)
GLUCOSE BLDC GLUCOMTR-MCNC: 103 MG/DL — HIGH (ref 70–99)
GLUCOSE BLDC GLUCOMTR-MCNC: 115 MG/DL — HIGH (ref 70–99)
GLUCOSE BLDC GLUCOMTR-MCNC: 132 MG/DL — HIGH (ref 70–99)
GLUCOSE BLDC GLUCOMTR-MCNC: 133 MG/DL — HIGH (ref 70–99)
GLUCOSE SERPL-MCNC: 141 MG/DL — HIGH (ref 70–99)
HCT VFR BLD CALC: 26.1 % — LOW (ref 34.5–45)
HCT VFR BLD CALC: 29.8 % — LOW (ref 34.5–45)
HGB BLD-MCNC: 8.2 G/DL — LOW (ref 11.5–15.5)
HGB BLD-MCNC: 9.1 G/DL — LOW (ref 11.5–15.5)
INR BLD: 1.91 — HIGH (ref 0.88–1.17)
MAGNESIUM SERPL-MCNC: 2.1 MG/DL — SIGNIFICANT CHANGE UP (ref 1.6–2.6)
MCHC RBC-ENTMCNC: 29.3 PG — SIGNIFICANT CHANGE UP (ref 27–34)
MCHC RBC-ENTMCNC: 30 PG — SIGNIFICANT CHANGE UP (ref 27–34)
MCHC RBC-ENTMCNC: 30.5 % — LOW (ref 32–36)
MCHC RBC-ENTMCNC: 31.4 % — LOW (ref 32–36)
MCV RBC AUTO: 95.6 FL — SIGNIFICANT CHANGE UP (ref 80–100)
MCV RBC AUTO: 95.8 FL — SIGNIFICANT CHANGE UP (ref 80–100)
NRBC # FLD: 0 K/UL — SIGNIFICANT CHANGE UP (ref 0–0)
NRBC # FLD: 0 K/UL — SIGNIFICANT CHANGE UP (ref 0–0)
PHOSPHATE SERPL-MCNC: 2.6 MG/DL — SIGNIFICANT CHANGE UP (ref 2.5–4.5)
PLATELET # BLD AUTO: 135 K/UL — LOW (ref 150–400)
PLATELET # BLD AUTO: 146 K/UL — LOW (ref 150–400)
PMV BLD: 11.2 FL — SIGNIFICANT CHANGE UP (ref 7–13)
PMV BLD: 11.5 FL — SIGNIFICANT CHANGE UP (ref 7–13)
POTASSIUM SERPL-MCNC: 4 MMOL/L — SIGNIFICANT CHANGE UP (ref 3.5–5.3)
POTASSIUM SERPL-SCNC: 4 MMOL/L — SIGNIFICANT CHANGE UP (ref 3.5–5.3)
PROT SERPL-MCNC: 7 G/DL — SIGNIFICANT CHANGE UP (ref 6–8.3)
PROTHROM AB SERPL-ACNC: 22.2 SEC — HIGH (ref 9.8–13.1)
RBC # BLD: 2.73 M/UL — LOW (ref 3.8–5.2)
RBC # BLD: 3.11 M/UL — LOW (ref 3.8–5.2)
RBC # FLD: 15.9 % — HIGH (ref 10.3–14.5)
RBC # FLD: 15.9 % — HIGH (ref 10.3–14.5)
RH IG SCN BLD-IMP: NEGATIVE — SIGNIFICANT CHANGE UP
SODIUM SERPL-SCNC: 139 MMOL/L — SIGNIFICANT CHANGE UP (ref 135–145)
TROPONIN T, HIGH SENSITIVITY: 110 NG/L — CRITICAL HIGH (ref ?–14)
TROPONIN T, HIGH SENSITIVITY: 112 NG/L — CRITICAL HIGH (ref ?–14)
WBC # BLD: 4.49 K/UL — SIGNIFICANT CHANGE UP (ref 3.8–10.5)
WBC # BLD: 6.34 K/UL — SIGNIFICANT CHANGE UP (ref 3.8–10.5)
WBC # FLD AUTO: 4.49 K/UL — SIGNIFICANT CHANGE UP (ref 3.8–10.5)
WBC # FLD AUTO: 6.34 K/UL — SIGNIFICANT CHANGE UP (ref 3.8–10.5)

## 2019-09-17 PROCEDURE — 71045 X-RAY EXAM CHEST 1 VIEW: CPT | Mod: 26

## 2019-09-17 RX ORDER — SOD,AMMONIUM,POTASSIUM LACTATE
1 CREAM (GRAM) TOPICAL
Refills: 0 | Status: DISCONTINUED | OUTPATIENT
Start: 2019-09-17 | End: 2019-09-23

## 2019-09-17 RX ORDER — GLUCAGON INJECTION, SOLUTION 0.5 MG/.1ML
1 INJECTION, SOLUTION SUBCUTANEOUS ONCE
Refills: 0 | Status: DISCONTINUED | OUTPATIENT
Start: 2019-09-17 | End: 2019-09-23

## 2019-09-17 RX ORDER — DORZOLAMIDE HYDROCHLORIDE TIMOLOL MALEATE 20; 5 MG/ML; MG/ML
1 SOLUTION/ DROPS OPHTHALMIC
Qty: 0 | Refills: 0 | DISCHARGE

## 2019-09-17 RX ORDER — SODIUM CHLORIDE 9 MG/ML
1000 INJECTION, SOLUTION INTRAVENOUS
Refills: 0 | Status: DISCONTINUED | OUTPATIENT
Start: 2019-09-17 | End: 2019-09-23

## 2019-09-17 RX ORDER — INSULIN LISPRO 100/ML
VIAL (ML) SUBCUTANEOUS EVERY 6 HOURS
Refills: 0 | Status: DISCONTINUED | OUTPATIENT
Start: 2019-09-17 | End: 2019-09-17

## 2019-09-17 RX ORDER — INFLUENZA VIRUS VACCINE 15; 15; 15; 15 UG/.5ML; UG/.5ML; UG/.5ML; UG/.5ML
0.5 SUSPENSION INTRAMUSCULAR ONCE
Refills: 0 | Status: DISCONTINUED | OUTPATIENT
Start: 2019-09-17 | End: 2019-09-23

## 2019-09-17 RX ORDER — PANTOPRAZOLE SODIUM 20 MG/1
80 TABLET, DELAYED RELEASE ORAL ONCE
Refills: 0 | Status: DISCONTINUED | OUTPATIENT
Start: 2019-09-17 | End: 2019-09-17

## 2019-09-17 RX ORDER — BRIMONIDINE TARTRATE 2 MG/MG
1 SOLUTION/ DROPS OPHTHALMIC
Qty: 0 | Refills: 0 | DISCHARGE

## 2019-09-17 RX ORDER — INSULIN LISPRO 100/ML
VIAL (ML) SUBCUTANEOUS AT BEDTIME
Refills: 0 | Status: DISCONTINUED | OUTPATIENT
Start: 2019-09-17 | End: 2019-09-23

## 2019-09-17 RX ORDER — LEVOTHYROXINE SODIUM 125 MCG
75 TABLET ORAL DAILY
Refills: 0 | Status: DISCONTINUED | OUTPATIENT
Start: 2019-09-17 | End: 2019-09-23

## 2019-09-17 RX ORDER — HYDRALAZINE HCL 50 MG
5 TABLET ORAL ONCE
Refills: 0 | Status: COMPLETED | OUTPATIENT
Start: 2019-09-17 | End: 2019-09-17

## 2019-09-17 RX ORDER — SOD,AMMONIUM,POTASSIUM LACTATE
1 CREAM (GRAM) TOPICAL
Refills: 0 | Status: DISCONTINUED | OUTPATIENT
Start: 2019-09-17 | End: 2019-09-17

## 2019-09-17 RX ORDER — DEXTROSE 50 % IN WATER 50 %
15 SYRINGE (ML) INTRAVENOUS ONCE
Refills: 0 | Status: DISCONTINUED | OUTPATIENT
Start: 2019-09-17 | End: 2019-09-23

## 2019-09-17 RX ORDER — LATANOPROST 0.05 MG/ML
1 SOLUTION/ DROPS OPHTHALMIC; TOPICAL AT BEDTIME
Refills: 0 | Status: DISCONTINUED | OUTPATIENT
Start: 2019-09-17 | End: 2019-09-23

## 2019-09-17 RX ORDER — PANTOPRAZOLE SODIUM 20 MG/1
40 TABLET, DELAYED RELEASE ORAL EVERY 12 HOURS
Refills: 0 | Status: DISCONTINUED | OUTPATIENT
Start: 2019-09-17 | End: 2019-09-21

## 2019-09-17 RX ORDER — WARFARIN SODIUM 2.5 MG/1
1 TABLET ORAL
Qty: 0 | Refills: 0 | DISCHARGE

## 2019-09-17 RX ORDER — DEXTROSE 50 % IN WATER 50 %
25 SYRINGE (ML) INTRAVENOUS ONCE
Refills: 0 | Status: DISCONTINUED | OUTPATIENT
Start: 2019-09-17 | End: 2019-09-23

## 2019-09-17 RX ORDER — DEXTROSE 50 % IN WATER 50 %
12.5 SYRINGE (ML) INTRAVENOUS ONCE
Refills: 0 | Status: DISCONTINUED | OUTPATIENT
Start: 2019-09-17 | End: 2019-09-23

## 2019-09-17 RX ORDER — SIMVASTATIN 20 MG/1
20 TABLET, FILM COATED ORAL AT BEDTIME
Refills: 0 | Status: DISCONTINUED | OUTPATIENT
Start: 2019-09-17 | End: 2019-09-23

## 2019-09-17 RX ORDER — HYDRALAZINE HCL 50 MG
25 TABLET ORAL DAILY
Refills: 0 | Status: DISCONTINUED | OUTPATIENT
Start: 2019-09-17 | End: 2019-09-21

## 2019-09-17 RX ORDER — INSULIN LISPRO 100/ML
VIAL (ML) SUBCUTANEOUS
Refills: 0 | Status: DISCONTINUED | OUTPATIENT
Start: 2019-09-17 | End: 2019-09-23

## 2019-09-17 RX ORDER — ALLOPURINOL 300 MG
100 TABLET ORAL DAILY
Refills: 0 | Status: DISCONTINUED | OUTPATIENT
Start: 2019-09-17 | End: 2019-09-23

## 2019-09-17 RX ORDER — HYDROCORTISONE 1 %
1 OINTMENT (GRAM) TOPICAL AT BEDTIME
Refills: 0 | Status: DISCONTINUED | OUTPATIENT
Start: 2019-09-17 | End: 2019-09-22

## 2019-09-17 RX ADMIN — Medication 5 MILLIGRAM(S): at 00:27

## 2019-09-17 RX ADMIN — PANTOPRAZOLE SODIUM 40 MILLIGRAM(S): 20 TABLET, DELAYED RELEASE ORAL at 05:37

## 2019-09-17 RX ADMIN — SIMVASTATIN 20 MILLIGRAM(S): 20 TABLET, FILM COATED ORAL at 22:43

## 2019-09-17 RX ADMIN — Medication 100 MILLIGRAM(S): at 19:16

## 2019-09-17 RX ADMIN — LATANOPROST 1 DROP(S): 0.05 SOLUTION/ DROPS OPHTHALMIC; TOPICAL at 22:44

## 2019-09-17 RX ADMIN — Medication 1 SUPPOSITORY(S): at 22:43

## 2019-09-17 RX ADMIN — PANTOPRAZOLE SODIUM 40 MILLIGRAM(S): 20 TABLET, DELAYED RELEASE ORAL at 19:16

## 2019-09-17 RX ADMIN — Medication 75 MICROGRAM(S): at 07:45

## 2019-09-17 NOTE — CONSULT NOTE ADULT - ATTENDING COMMENTS
Dr Solomon Catherine  Nephrology  Office [phone]  Ans Serv [phone]  [facility] [phone]
Patient seen and examined.  Agree with above.   -AC on hold due to GIB  -follow up GI to see if and when ac can safely be restarted  -avoid avn blocking agents  -pt. and family want conservative cv care    Trisha Banks MD

## 2019-09-17 NOTE — CONSULT NOTE ADULT - PROBLEM SELECTOR RECOMMENDATION 9
Pt with stable CKD IV, at baseline cr.  Electrolytes and volume status acceptable.  CP controlled.   Avoid contrast, NSAID. No indication for IVF at this time.

## 2019-09-17 NOTE — H&P ADULT - PROBLEM SELECTOR PLAN 1
Pt with 4 episodes of BRBPR, 3 at home and 1 in ED. Hgb on admission 10, at baseline. Concern for lower GI bleed given episodes of BRBPR, such as diverticular bleed vs. angiodysplasia vs. malignancy vs. less likely colitis (infectious, inflammatory, or ischemic). Can be brisk upper GI bleed, but given pt's BPs overnight, less of a suspicion.  - Will do IV protonix 40 bid for now given possibility, albeit low, of an upper GI bleed  - GI consult in AM  - Keep NPO for now pending GI consult  - Repeat stat CBC following episode of BRBPR in ED. Will monitor CBC q8hrs for now. Transfuse pRBCs to keep Hgb > 7.  - INR on admission 1.89. Hold coumadin for now given recent episodes of GIB. As pt currently without serious or life-threatening bleed, will withhold giving any reversal agent given pt's high thrombotic risk, but if pt continues to have recurrent episodes of GIB requiring transfusion or causing HD instability, will give IV vit K +/- Kcentra. Monitor INR daily.  - Hold cilostazol for now in setting of recent GIB  - Keep active type and screen and 2 large bore IVs  - Surgery consult if pt continues to have recurrent episodes of GIB  - Will defer CT A/P for now, given that pt cannot get CT with contrast due to CKD and utility of study without contrast to detect GIB is poor Pt with 4 episodes of BRBPR, 3 at home and 1 in ED. Hgb on admission 10, at baseline. Concern for lower GI bleed given episodes of BRBPR, such as diverticular bleed vs. angiodysplasia vs. malignancy vs. less likely colitis (infectious, inflammatory, or ischemic). Can be brisk upper GI bleed, but given pt's BPs overnight, less of a suspicion.  - Will do IV protonix 40 bid for now given possibility, albeit low, of an upper GI bleed  - GI consult in AM  - Keep NPO for now pending GI consult  - Repeat stat CBC following episode of BRBPR in ED. Will monitor CBC q8hrs for now. Transfuse pRBCs to keep Hgb > 7.  - INR on admission 1.89. Hold coumadin for now given recent episodes of GIB. As pt currently without serious or life-threatening bleed, will withhold any reversal agent at this time given pt's high thrombotic risk, but if pt continues to have recurrent episodes of GIB requiring transfusion or causing HD instability, will give IV vit K +/- Kcentra. Monitor INR daily.  - Hold cilostazol for now in setting of recent GIB  - Keep active type and screen and 2 large bore IVs  - Surgery consult if pt continues to have episodes of BRBPR  - Will defer CT A/P for now, given that pt cannot get CT with contrast due to CKD and utility of study without contrast to localize GIB is poor

## 2019-09-17 NOTE — H&P ADULT - PROBLEM SELECTOR PLAN 9
Cr 2.3 on admission vs. ~1.7 at baseline. NIKHIL likely prerenal 2/2 blood loss from GIB. Can also be exacerbated by hypertensive urgency.   - Monitor Cr and UOP  - Avoid NSAIDs, ACEi/ARBs, contrast, nephrotoxic agents  - Renally dose meds

## 2019-09-17 NOTE — H&P ADULT - NSHPREVIEWOFSYSTEMS_GEN_ALL_CORE
Constitutional: No weakness, fevers, chills, or weight loss  Eyes: No visual changes, double vision, or eye pain  Ears, Nose, Mouth, Throat: No runny nose, sinus pain, ear pain, tinnitus, sore throat, dysphagia, or odynophagia  Cardiovascular: No chest pain, palpitations, or LE edema  Respiratory: No cough, wheezing, hemoptysis, or shortness of breath  Gastrointestinal: No abdominal pain, dysphagia, anorexia, nausea/vomiting, diarrhea/constipation, hematemesis, BRBPR, or melena  Genitourinary: No dysuria, frequency, urgency, or hematuria  Musculoskeletal: No joint pain, joint swelling, or decreased ROM  Skin: No pruritus, rashes, lesions, or wounds  Neurologic:  No seizures, headache, paresthesias, numbness, or limb weakness  Psychiatric: No depression, anxiety, difficulty concentrating, anhedonia, or lack of energy  Endocrine: No heat/cold intolerance, mood swings, sweats, polydipsia, or polyuria  Hematologic/lymphatic: No purpura, petechia, or prolonged or excessive bleeding after dental extraction / injury  Allergic/Immunologic: No anaphylaxis or allergic response to materials, foods, animals    Positives and pertinent negatives noted and all other systems negative. 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 +R eye with some loss of vision (not new).   Ears, Nose, Mouth, Throat: No runny nose, sinus pain, ear pain, tinnitus, sore throat, dysphagia, or odynophagia  Cardiovascular: No chest pain or palpitations. +Chronic LE edema b/l (at baseline).  Respiratory: No cough, wheezing, hemoptysis, or shortness of breath  Gastrointestinal: +BRBPR. No abdominal pain, nausea/vomiting, diarrhea/constipation, hematemesis, or melena.  Genitourinary: No dysuria, frequency, urgency, or hematuria  Musculoskeletal: No joint pain, joint swelling, or decreased ROM  Skin: No pruritus or rashes  Neurologic: No syncope, seizures, headache, paresthesias, or numbness  Psychiatric: No depression, anxiety, or agitation  Endocrine: No heat/cold intolerance, mood swings, sweats, polydipsia, or polyuria  Hematologic/lymphatic: No purpura, petechia, or prolonged or excessive bleeding after dental extraction / injury  Allergic/Immunologic: No anaphylaxis or allergic response to materials, foods, animals    Positives and pertinent negatives noted and all other systems negative. 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 +R eye with some loss of vision (not new).   Ears, Nose, Mouth, Throat: No runny nose, sinus pain, ear pain, tinnitus, sore throat, dysphagia, or odynophagia  Cardiovascular: No chest pain or palpitations. +Chronic LE edema b/l (at baseline).  Respiratory: No cough, wheezing, hemoptysis, or shortness of breath  Gastrointestinal: +BRBPR. No abdominal pain, nausea/vomiting, diarrhea/constipation, hematemesis, or melena.  Genitourinary: No dysuria, frequency, urgency, or hematuria  Musculoskeletal: No joint pain, joint swelling, or decreased ROM  Skin: Psoriasis of LE b/l  Neurologic: No syncope, seizures, headache, paresthesias, or numbness  Psychiatric: No depression, anxiety, or agitation  Endocrine: No heat/cold intolerance, mood swings, sweats, polydipsia, or polyuria  Hematologic/lymphatic: No purpura, petechia, or prolonged or excessive bleeding after dental extraction / injury  Allergic/Immunologic: No anaphylaxis or allergic response to materials, foods, animals    Positives and pertinent negatives noted and all other systems negative.

## 2019-09-17 NOTE — CONSULT NOTE ADULT - ASSESSMENT
89y Female with afib/aflutter (on coumadin), diastolic CHF (with EF 63% on TTE 8/13/19), HTN, CKD stage 3-4, DM2 (on insulin), PVD, h/o CVA, PE, hypothyroidism, and recent admission at [facility] (8/10-8/15/19) for acute decompensated heart failure presents BRBPR.
89 yo woman with history of afib/aflutter (on coumadin), diastolic CHF (with EF 63% on TTE 8/13/19), HTN, HLD, CKD stage 3-4, DM2 (on insulin), PVD, CVA, PE, hypothyroidism, and recent admission at [facility] (8/10-8/15/19) for acute decompensated heart failure presents with 2 days of rectal [id number] yo woman with history of afib/aflutter (on coumadin), diastolic CHF (with EF 63% on TTE 8/13/19), HTN, HLD, CKD stage 3-4, DM2 (on insulin), PVD, CVA, PE, hypothyroidism, and recent admission at [facility] (8/10-8/15/19) for acute decompensated heart failure presents with 2 days of rectal bleeding.

## 2019-09-17 NOTE — H&P ADULT - NSICDXPASTMEDICALHX_GEN_ALL_CORE_FT
PAST MEDICAL HISTORY:  Afib     CHF (congestive heart failure)     DM (diabetes mellitus)     Glaucoma     HLD (hyperlipidemia)     HTN (hypertension)     Hypothyroid     Personal history of PE (pulmonary embolism)     PVD (peripheral vascular disease) PAST MEDICAL HISTORY:  Afib     CHF (congestive heart failure)     CKD (chronic kidney disease)     CVA (cerebrovascular accident)     DM (diabetes mellitus)     Glaucoma     HLD (hyperlipidemia)     HTN (hypertension)     Hypothyroid     Personal history of PE (pulmonary embolism)     PVD (peripheral vascular disease)

## 2019-09-17 NOTE — H&P ADULT - PROBLEM SELECTOR PROBLEM 5
Type 2 diabetes mellitus with other specified complication, with long-term current use of insulin Diastolic CHF, chronic

## 2019-09-17 NOTE — H&P ADULT - ASSESSMENT
89 yo woman with history of afib/aflutter (on coumadin), diastolic CHF (with EF 63% on TTE 8/13/19), HTN, HLD, CKD stage 3-4, DM2 (on insulin), PVD, CVA, PE, hypothyroidism, and recent admission at [facility] (8/10-8/15/19) for acute decompensated heart failure presents with 2 days of rectal bleeding, admitted with GI bleed, currently HD stable. 89 yo woman with history of afib/aflutter (on coumadin), diastolic CHF (with EF 63% on TTE 8/13/19), HTN, HLD, CKD stage 3-4, DM2 (on insulin), PVD, CVA, PE, hypothyroidism, and recent admission at [facility] (8/10-8/15/19) for acute decompensated heart failure presents with 2 days of rectal bleeding, admitted with GI bleed.

## 2019-09-17 NOTE — CONSULT NOTE ADULT - PROBLEM SELECTOR RECOMMENDATION 9
- ddx: hemorrhoidal vs diverticular bleed vs rapid transit ugib  - h/h stable; overall hemodynamically stable, no urgent need for endoscopic eval  - would hold coumadin for now while we trend her h/h and continue monitor for further episodes of bleeding  - consider checking NM Bleeding scan if continues to bleed and if (+) then IR eval for embolization   - protonix 40mg IVP BID until rapid UGIB ruled out   - anusol suppository qhs   - regular/low fiber diet   - will continue to monitor h/h and if bleeding resolves and h/h remains stable will plan to defer endoscopic eval as discussed with family given their hesitancy for anesthesia - ddx: hemorrhoidal vs diverticular bleed vs rapid transit ugib  - trend h/h  - overall hemodynamically stable, no urgent need for endoscopic eval  - would hold coumadin for now while we trend her h/h and continue monitor for further episodes of bleeding  - consider checking NM Bleeding scan if continues to bleed and if (+) then IR eval for embolization   - protonix 40mg IVP BID until rapid UGIB ruled out   - anusol suppository qhs   - regular/low fiber diet   - will continue to monitor h/h and if bleeding resolves and h/h remains stable will plan to defer endoscopic eval as discussed with family given their hesitancy for anesthesia

## 2019-09-17 NOTE — H&P ADULT - NSHPPHYSICALEXAM_GEN_ALL_CORE
Vital Signs Last 24 Hrs  T(C): 36.8 (17 Sep 2019 00:34), Max: 36.8 (16 Sep 2019 18:49)  T(F): 98.2 (17 Sep 2019 00:34), Max: 98.3 (16 Sep 2019 18:49)  HR: 79 (17 Sep 2019 00:34) (79 - 116)  BP: 154/61 (17 Sep 2019 00:34) (154/61 - 250/106)  BP(mean): --  RR: 16 (17 Sep 2019 00:34) (16 - 20)  SpO2: 100% (17 Sep 2019 00:34) (99% - 100%)    PHYSICAL EXAM:  General: Awake and alert.  No acute distress.  HEENT: Normocephalic, atraumatic.  PERRL.  EOMI.  No scleral icterus.  Moist MM.  No oropharyngeal exudates.    Neck: Supple.  Full range of motion.  No JVD.  No carotid bruits.  No thyromegaly.  Trachea midline.  No lymphadenopathy.   Heart: RRR.  Normal S1 and S2.  No murmurs, rubs, or gallops.  No LE edema b/l.   Lungs: Good inspiratory effort.  Nonlabored breathing.  CTAB.  No wheezes, crackles, or rhonchi.    Abdomen: BS+, soft, NT/ND.  No organomegaly.  Skin: Warm and dry.  No rashes.  Extremities: No clubbing or cyanosis.  2+ peripheral pulses b/l.  Musculoskeletal: No joint deformities.  No spinal or paraspinal tenderness.  Neuro: A&Ox3.  CN II-XII intact.  5/5 motor strength in UE and LE b/l.  Tactile sensation intact in UE and LE b/l.  Cerebellar function intact as assessed by finger-to-nose test. Vital Signs Last 24 Hrs  T(C): 36.8 (17 Sep 2019 00:34), Max: 36.8 (16 Sep 2019 18:49)  T(F): 98.2 (17 Sep 2019 00:34), Max: 98.3 (16 Sep 2019 18:49)  HR: 79 (17 Sep 2019 00:34) (79 - 116)  BP: 154/61 (17 Sep 2019 00:34) (154/61 - 250/106)  BP(mean): --  RR: 16 (17 Sep 2019 00:34) (16 - 20)  SpO2: 100% (17 Sep 2019 00:34) (99% - 100%)    PHYSICAL EXAM:  General: Awake and alert.  No acute distress.  Head: Normocephalic, atraumatic.    Eyes: L eye with near complete loss of vision, R eye with blurry vision.  EOMI.  No scleral icterus.  Mild conjunctival pallor.  Mouth: Moist MM.  No oropharyngeal exudates.    Neck: Supple.  Full range of motion.  No JVD.  Trachea midline.  No lymphadenopathy.   Heart: Irregular rhythm.  Normal S1 and S2.  Grade 3/6 murmur.  2+ pitting LE edema b/l (per pt and son, at baseline).  Lungs: Good inspiratory effort.  Nonlabored breathing.  CTAB.  No wheezes, crackles, or rhonchi.    Abdomen: BS+, soft, NT/ND.   Rectal: No gross blood.  No external hemorrhoids.  Small perineal skin tear without any drainage.   Skin: Warm and dry.  No rashes.  Extremities: No clubbing or cyanosis.  2+ radial pulses b/l and 1+ DP pulses b/l.  Musculoskeletal: No joint deformities.  No spinal or paraspinal tenderness.  Neuro: A&Ox3.  CN II-XII intact.  5/5 motor strength in UE and LE b/l.  Tactile sensation intact in UE and LE b/l.  No focal deficits. Vital Signs Last 24 Hrs  T(C): 36.8 (17 Sep 2019 00:34), Max: 36.8 (16 Sep 2019 18:49)  T(F): 98.2 (17 Sep 2019 00:34), Max: 98.3 (16 Sep 2019 18:49)  HR: 79 (17 Sep 2019 00:34) (79 - 116)  BP: 154/61 (17 Sep 2019 00:34) (154/61 - 250/106)  BP(mean): --  RR: 16 (17 Sep 2019 00:34) (16 - 20)  SpO2: 100% (17 Sep 2019 00:34) (99% - 100%)    PHYSICAL EXAM:  General: Awake and alert.  No acute distress.  Head: Normocephalic, atraumatic.    Eyes: L eye with near complete loss of vision, R eye with blurry vision.  EOMI.  No scleral icterus.  Mild conjunctival pallor.  Mouth: Moist MM.  No oropharyngeal exudates.    Neck: Supple.  Full range of motion.  No JVD.  Trachea midline.  No lymphadenopathy.   Heart: Irregular rhythm.  Normal S1 and S2.  Grade 3/6 murmur.  2+ pitting LE edema b/l (per pt and son, at baseline).  Lungs: Good inspiratory effort.  Nonlabored breathing.  CTAB.  No wheezes, crackles, or rhonchi.    Abdomen: BS+, soft, NT/ND.   Rectal: No gross blood.  No external hemorrhoids.  Small perineal skin tear without any drainage.   Skin: Warm and dry.  Psoriatic skin changes in b/l heels.  Extremities: No clubbing or cyanosis.  2+ radial pulses b/l and 1+ DP pulses b/l.  Musculoskeletal: No joint deformities.  No spinal or paraspinal tenderness.  Neuro: A&Ox3.  CN II-XII intact.  5/5 motor strength in UE and LE b/l.  Tactile sensation intact in UE and LE b/l.  No focal deficits.

## 2019-09-17 NOTE — H&P ADULT - PROBLEM SELECTOR PROBLEM 6
PVD (peripheral vascular disease) Type 2 diabetes mellitus with other specified complication, with long-term current use of insulin

## 2019-09-17 NOTE — CHART NOTE - NSCHARTNOTEFT_GEN_A_CORE
Seen and examined . GI and cardiology helping. D/W Family and want to hold off Colonoscopy unless really indicated.

## 2019-09-17 NOTE — H&P ADULT - NSHPSOCIALHISTORY_GEN_ALL_CORE
Tobacco: denies  EtOH: denies  Illicit drugs: denies  Lives with Tobacco: denies  EtOH: denies  Illicit drugs: denies  Lives with 89-year-old  and son. At baseline, ambulates with a cane.

## 2019-09-17 NOTE — CONSULT NOTE ADULT - PROBLEM SELECTOR RECOMMENDATION 2
- acute on chronic   - bacid tid   - check GI PCR and Ova/Parasites with next bowel movement; if negative can start imodium prn   - low fiber diet
Low Hg, GI consulted for possible scope.   +FOBT.   Transfuse prbc for Hg <7.0, consider adding lasix if transfusing.

## 2019-09-17 NOTE — CONSULT NOTE ADULT - SUBJECTIVE AND OBJECTIVE BOX
Chief Complaint:  Patient is a 89y old  Female who presents with a chief complaint of Rectal bleeding (17 Sep 2019 11:40)    CKD (chronic kidney disease)  CVA (cerebrovascular accident)  Personal history of PE (pulmonary embolism)  Glaucoma  PVD (peripheral vascular disease)  Hypothyroid  HTN (hypertension)  HLD (hyperlipidemia)  CHF (congestive heart failure)  DM (diabetes mellitus)  Afib  Elective surgery  History of partial thyroidectomy     HPI:  89 yo woman with history of afib/aflutter (on coumadin), diastolic CHF (with EF 63% on TTE 8/13/19), HTN, HLD, CKD stage 3-4, DM2 (on insulin), PVD, CVA, PE, hypothyroidism, and recent admission at [facility] (8/10-8/15/19) for acute decompensated heart failure presents with 2 days of rectal bleeding. Pt first noticed fresh blood mixed with her stools when she had a bowel movement on Sunday night. Pt had 2 additional similar episodes on Monday, with the toilet paper soaked with bright red blood when pt wiped herself after her bowel movements, prompting pt to call one of her children to take her to the ED. Pt did not look inside the toilet bowel but saw no clots on the toilet paper. Denies any associated abdominal pain. No CP, SOB, palpitations, lightheadedness, dizziness, syncope, falls, melena, N/V, F/C, or urinary symptoms. Pt states that since discharge from her last hospital admission, she had been feeling well (confirmed by pt's son at the bedside) even though she developed loose-stool diarrhea 2 days after her discharge that continued until 1 week ago. No further occurrences of diarrhea for the past 1 week. Pt's son reports that pt's coumadin was recently adjusted post-discharge due to subtherapeutic INR, with pt taking coumadin 4 mg Monday through Friday and 3 mg on Saturday and Sunday vs. 3 mg daily on discharge. Pt's last dose of coumadin was 3 mg on Sunday night. The patient also endorses she has been with intermittent episodes of diarrhea over the last month. She reports no changes in her diet. She has no associated abd pain, nausea or vomiting. She notes brbpr with wiping and in the toilet with her most recent bowel movements over the past 2 days; never melena. She had 2 episodes while in the ED and reports one on the floor, however, per the PCA who helped her to the restroom, states the stool was nonbloody. She takes iron and earlier in the month had been straining, however, the past 3 days she has not been straining and is with more soft/loose stools.     Of note, pt states that she might have had a colonoscopy a few years ago at a different hospital for routine screening, though she does not recall the results. Denies ever having an EGD or rectal bleed in the past.     In the ED,  T 97.7 - 98.3, /105 -> 250/106, HR , RR 16-20, O2 sats % RA. Hgb 10, FOBT positive. Pt had 1 episode of BRBPR in the ED, though without any visualized clots. (17 Sep 2019 01:57)      No Known Allergies      allopurinol 100 milliGRAM(s) Oral daily  ammonium lactate  5% Lotion 1 Application(s) Topical two times a day  dextrose 40% Gel 15 Gram(s) Oral once PRN  dextrose 5%. 1000 milliLiter(s) IV Continuous <Continuous>  dextrose 50% Injectable 12.5 Gram(s) IV Push once  dextrose 50% Injectable 25 Gram(s) IV Push once  dextrose 50% Injectable 25 Gram(s) IV Push once  glucagon  Injectable 1 milliGRAM(s) IntraMuscular once PRN  hydrALAZINE 25 milliGRAM(s) Oral daily  hydrocortisone hemorrhoidal Suppository 1 Suppository(s) Rectal at bedtime  influenza   Vaccine 0.5 milliLiter(s) IntraMuscular once  insulin lispro (HumaLOG) corrective regimen sliding scale   SubCutaneous every 6 hours  latanoprost 0.005% Ophthalmic Solution 1 Drop(s) Both EYES at bedtime  levothyroxine 75 MICROGram(s) Oral daily  pantoprazole  Injectable 40 milliGRAM(s) IV Push every 12 hours  simvastatin 20 milliGRAM(s) Oral at bedtime        FAMILY HISTORY:  No pertinent family history in first degree relatives        Review of Systems:    General:  No wt loss, fevers, chills, night sweats, fatigue  Eyes:  Good vision, no reported pain  ENT:  No sore throat, pain, runny nose, dysphagia  CV:  No pain, palpitations, no lightheadedness  Resp:  No dyspnea, cough, tachypnea, wheezing  GI: denies n/v/d/c, abdominal pain, melena (+) brbpr   :  No pain, bleeding, incontinence, nocturia  Muscle:  No pain, weakness  Neuro:  No weakness, tingling, memory problems  Psych:  No fatigue, insomnia, mood problems, depression  Endocrine:  No polyuria, polydypsia, cold/heat intolerance  Heme:  No petechiae, ecchymosis, easy bruisability  Skin:  No rash, tattoos, scars, edema    Relevant Family History:   n/c    Relevant Social History: n/c      Physical Exam:    Vital Signs:  Vital Signs Last 24 Hrs  T(C): 36.8 (17 Sep 2019 09:28), Max: 37.1 (17 Sep 2019 02:20)  T(F): 98.3 (17 Sep 2019 09:28), Max: 98.8 (17 Sep 2019 02:20)  HR: 83 (17 Sep 2019 09:28) (79 - 116)  BP: 154/77 (17 Sep 2019 09:28) (149/76 - 250/106)  BP(mean): --  RR: 18 (17 Sep 2019 09:28) (16 - 20)  SpO2: 100% (17 Sep 2019 09:28) (99% - 100%)  Daily Height in cm: 172.72 (17 Sep 2019 02:20)    Daily     General:  Appears stated age, well-groomed, nad  HEENT:  NC/AT,  conjunctivae clear and pink, no thyromegaly, nodules, adenopathy, no JVD  Chest:  Full & symmetric excursion, no increased effort, breath sounds clear  Cardiovascular:  Regular rhythm, S1, S2, no murmur/rub/S3/S4, no abdominal bruit, no edema  Abdomen:  Soft, non-tender, non-distended, normoactive bowel sounds,  no masses ,no hepatosplenomeagaly, no signs of chronic liver disease  Extremities:  no cyanosis,clubbing or edema  Skin:  No rash/erythema/ecchymoses/petechiae/wounds/abscess/warm/dry  Neuro/Psych:  A&Ox3  , no asterixis, no tremor, no encephalopathy    Laboratory:                            8.2    4.49  )-----------( 135      ( 17 Sep 2019 10:10 )             26.1     09-17    139  |  105  |  45<H>  ----------------------------<  141<H>  4.0   |  23  |  1.94<H>    Ca    10.0      17 Sep 2019 02:45  Phos  2.6     09-17  Mg     2.1     09-17    TPro  7.0  /  Alb  3.8  /  TBili  0.7  /  DBili  x   /  AST  14  /  ALT  8   /  AlkPhos  56  09-17    LIVER FUNCTIONS - ( 17 Sep 2019 02:45 )  Alb: 3.8 g/dL / Pro: 7.0 g/dL / ALK PHOS: 56 u/L / ALT: 8 u/L / AST: 14 u/L / GGT: x           PT/INR - ( 17 Sep 2019 02:45 )   PT: 22.2 SEC;   INR: 1.91          PTT - ( 17 Sep 2019 02:45 )  PTT:29.8 SEC      Imaging:

## 2019-09-17 NOTE — H&P ADULT - PROBLEM SELECTOR PLAN 5
Pt euvolemic on exam. No S/S of acute decompensated heart failure.  - Hold lasix and spironolactone for now in setting of recent GIB. Will restart if clinically indicated.  - Strict I&Os  - Daily weights

## 2019-09-17 NOTE — H&P ADULT - PROBLEM SELECTOR PLAN 2
Chronic, with baseline Hgb ~10. Hgb on admission 10 despite episodes of BRBPR.  - Repeat stat CBC following episode of BRBPR in ED. Will monitor CBC q8hrs for now. Transfuse pRBCs to keep Hgb > 7.

## 2019-09-17 NOTE — H&P ADULT - HISTORY OF PRESENT ILLNESS
89 yo woman with history of afib/aflutter (on coumadin), diastolic CHF (with EF 63% on TTE 8/13/19), HTN, HLD, CKD stage 3-4, DM2 (on insulin), PVD, CVA, PE, any hypothyroidism presents with 2 days of rectal bleeding. 89 yo woman with history of afib/aflutter (on coumadin), diastolic CHF (with EF 63% on TTE 8/13/19), HTN, HLD, CKD stage 3-4, DM2 (on insulin), PVD, CVA, PE, hypothyroidism, and recent admission at [facility] (8/10-8/15/19) for acute decompensated heart failure presents with 2 days of rectal bleeding. Pt first noticed fresh blood mixed with her stools when she had a bowel movement on Sunday night. Pt had 2 additional similar episodes on Monday, with the toilet paper soaked with bright red blood when pt wiped herself after her bowel movements, prompting pt to call one of her children to take her to the ED. Pt did not look inside the toilet bowel but saw no clots on the toilet paper. Denies any associated abdominal pain. No CP, SOB, palpitations, lightheadedness, dizziness, syncope, falls, melena, N/V, F/C, or urinary symptoms. Pt states that since discharge from her last hospital admission, she had been feeling well (confirmed by pt's son at the bedside) even though she developed loose-stool diarrhea 2 days after her discharge that continued until 1 week ago. No further occurrences of diarrhea for the past 1 week. Pt's son reports that pt's coumadin was recently adjusted post-discharge due to subtherapeutic INR, with pt taking coumadin 4 mg Monday through Friday and 3 mg on Saturday and Sunday vs. 3 mg daily on discharge. Pt's last dose of coumadin was 3 mg on Sunday night.     Of note, pt states that she might have had a colonoscopy a few years ago at a different hospital for routine screening, though she does not recall the results. Denies ever having an EGD or rectal bleed in the past.     In the ED,  T 97.7 - 98.3, /105 -> 250/106, HR , RR 16-20, O2 sats % RA. Hgb 10, FOBT positive. Pt had 1 episode of BRBPR in the ED, though without any visualized clots.

## 2019-09-17 NOTE — CONSULT NOTE ADULT - SUBJECTIVE AND OBJECTIVE BOX
HISTORY OF PRESENT ILLNESS: HPI:    89 yo woman with history of afib/aflutter (on coumadin), diastolic CHF (with EF 63% on TTE 8/13/19), HTN, HLD, CKD stage 3-4, DM2 (on insulin), PVD, CVA, PE, hypothyroidism, and recent admission at [facility] (8/10-8/15/19) for acute decompensated heart failure presents with 2 days of multiple episodes of rectal bleeding. Last ischemic work up 2013 negative for ischemia or infarct. Echo in June 2018 with normal lv sys function, no wall abnormalities, EF 61%. Previous admission to [facility] in June 2018 for bradycardia with bifasicular block , at that time she was seen by EP, recommended for PPM which she declined. Denies cp, sob, guerrero, palpitations, cough, dizziness, lightheadedness, numbness, n/v/c, abdominal pain, le weakness. Cardiology consulted for management of HTN, hx of Atrial fibrillation on AC.        PAST MEDICAL & SURGICAL HISTORY:  CKD (chronic kidney disease)  CVA (cerebrovascular accident)  Personal history of PE (pulmonary embolism)  Glaucoma  PVD (peripheral vascular disease)  Hypothyroid  HTN (hypertension)  HLD (hyperlipidemia)  CHF (congestive heart failure)  DM (diabetes mellitus)  Afib  Elective surgery: abdominal abcess removals  History of partial thyroidectomy    MEDICATIONS:  MEDICATIONS  (STANDING):  allopurinol 100 milliGRAM(s) Oral daily  ammonium lactate  5% Lotion 1 Application(s) Topical two times a day  dextrose 5%. 1000 milliLiter(s) (50 mL/Hr) IV Continuous <Continuous>  dextrose 50% Injectable 12.5 Gram(s) IV Push once  dextrose 50% Injectable 25 Gram(s) IV Push once  dextrose 50% Injectable 25 Gram(s) IV Push once  hydrALAZINE 25 milliGRAM(s) Oral daily  hydrocortisone hemorrhoidal Suppository 1 Suppository(s) Rectal at bedtime  influenza   Vaccine 0.5 milliLiter(s) IntraMuscular once  insulin lispro (HumaLOG) corrective regimen sliding scale   SubCutaneous every 6 hours  latanoprost 0.005% Ophthalmic Solution 1 Drop(s) Both EYES at bedtime  levothyroxine 75 MICROGram(s) Oral daily  pantoprazole  Injectable 40 milliGRAM(s) IV Push every 12 hours  simvastatin 20 milliGRAM(s) Oral at bedtime    Allergies    No Known Allergies    Intolerances    FAMILY HISTORY:  No pertinent family history in first degree relatives    Non-contributary for premature coronary disease or sudden cardiac death    SOCIAL HISTORY:    [X ] Non-smoker  [ ] Smoker  [ ] Alcohol      REVIEW OF SYSTEMS:  [ ]chest pain  [  ]shortness of breath  [  ]palpitations  [  ]syncope  [ ]near syncope [ ]upper extremity weakness   [ ] lower extremity weakness  [  ]diplopia  [  ]altered mental status   [  ]fevers  [ ]chills [ ]nausea  [ ]vomitting  [  ]dysphagia    [ ]abdominal pain  [ ]melena  [X ]BRBPR    [  ]epistaxis  [  ]rash    [ ]lower extremity edema      [ X] All others negative	  [ ] Unable to obtain    LABS:	 	    CARDIAC MARKERS:  CARDIAC MARKERS ( 17 Sep 2019 02:45 )  x     / x     / 44 u/L / 3.71 ng/mL / x      CARDIAC MARKERS ( 16 Sep 2019 21:44 )  x     / x     / 44 u/L / 3.16 ng/mL / x                             8.2    4.49  )-----------( 135      ( 17 Sep 2019 10:10 )             26.1     Hb Trend: 8.2<--, 9.1<--, 10.0<--    09-17    139  |  105  |  45<H>  ----------------------------<  141<H>  4.0   |  23  |  1.94<H>    Ca    10.0      17 Sep 2019 02:45  Phos  2.6     09-17  Mg     2.1     09-17    TPro  7.0  /  Alb  3.8  /  TBili  0.7  /  DBili  x   /  AST  14  /  ALT  8   /  AlkPhos  56  09-17    Creatinine Trend: 1.94<--, 2.30<--    Coags:  PT/INR - ( 17 Sep 2019 02:45 )   PT: 22.2 SEC;   INR: 1.91          PTT - ( 17 Sep 2019 02:45 )  PTT:29.8 SEC    proBNP:   Lipid Profile:   HgA1c:   TSH:       PHYSICAL EXAM:  T(C): 36.8 (09-17-19 @ 09:28), Max: 37.1 (09-17-19 @ 02:20)  HR: 83 (09-17-19 @ 09:28) (79 - 116)  BP: 154/77 (09-17-19 @ 09:28) (149/76 - 250/106)  RR: 18 (09-17-19 @ 09:28) (16 - 20)  SpO2: 100% (09-17-19 @ 09:28) (99% - 100%)  Wt(kg): --  I&O's Summary      Gen: Appears well in NAD  HEENT:  (-)icterus (-)pallor  CV: N S1 S2 1/6 SUZI (+)2 Pulses B/l  Resp:  Clear to ausculatation B/L, normal effort  GI: (+) BS Soft, NT, ND  Lymph:  (-)Edema, (-)obvious lymphadenopathy  Skin: Warm to touch, Normal turgor  Psych: Appropriate mood and affect      TELEMETRY: 	 not on telemetry      ECG:    NSR     RADIOLOGY:         CXR:   < from: Xray Chest 1 View- PORTABLE-Urgent (09.17.19 @ 04:27) >  IMPRESSION:  Enlarged cardiac silhouette.    Left midlung platelike atelectasis.    HELDER DAWSON M.D., ATTENDING RADIOLOGIST  This document has been electronically signed. Sep 17 2019  8:52AM        < end of copied text >    < from: Transthoracic Echocardiogram (08.13.19 @ 11:13) >  CONCLUSIONS:  1. Mitral annular calcification and calcified mitral  leaflets with normal diastolic opening. Mild mitral  regurgitation.  2. Calcified trileaflet aortic valve with grossly  moderately decreased opening. Peak transaortic valve  gradient equals 30 mm Hg, mean transaortic valve gradient  equals 15 mm Hg.  3. Normal left ventricular internal dimensions and wall  thicknesses.  4. Normal left ventricular systolic function. No segmental  wall motion abnormalities.  5. Increased E/e'  is consistent with elevated left  ventricular filling pressure.  6. Moderate right atrial enlargement.  7. Estimated pulmonary artery systolic pressure equals 51  mm Hg, assuming right atrial pressure equals 10  mm Hg,  consistent with moderate pulmonary hypertension.  ------------------------------------------------------------------------  Confirmed on  8/13/2019 - 15:07:49 by RIKKI Azevedo  ------------------------------------------------------------------------    < end of copied text >      ASSESSMENT/PLAN: 	    89 year old Female with history of Afib on AC, HTN, CKD admitted with chest pain, sob and volume overload c/w acute on chronic diastolic and Right sided CHF, with chronic bifasicular block afib. Cardiology consulted for management of HTN, hx of Atrial fibrillation on AC - now on hold for GIB.     -- no anginal symptoms or evidence of heart fail   -- elevated trop 110 -- > 112, neg CPK, doub ACS  -- elevation of trop most likely in setting of acute bleed  -- recent echo as noted   -- AF with SSS - avoid AVN blockers - Pt and family continue to decline invasive testing/procedures, will not pursue ischemic work up   -- hx of afib, would cont AC with heparin/coumadin bridge when able from GI standpoint, goal INR 2-3   -- CKD, Cr. 1.94 improving, prev seen by nephrology, consider f/u    -- further cardiac work up pending above   -- on d/c pt to follow with Dr. Berman HISTORY OF PRESENT ILLNESS: HPI:    89 yo woman with history of afib/aflutter (on coumadin), diastolic CHF (with EF 63% on TTE 8/13/19), HTN, HLD, CKD stage 3-4, DM2 (on insulin), PVD, CVA, PE, hypothyroidism, and recent admission at [facility] (8/10-8/15/19) for acute decompensated heart failure presents with 2 days of multiple episodes of rectal bleeding. Last ischemic work up 2013 negative for ischemia or infarct. Echo in June 2018 with normal lv sys function, no wall abnormalities, EF 61%. Previous admission to [facility] in June 2018 for bradycardia with bifasicular block , at that time she was seen by EP, recommended for PPM which she declined. Denies cp, sob, guerrero, palpitations, cough, dizziness, lightheadedness, numbness, n/v/c, abdominal pain, le weakness. Cardiology consulted for management of HTN, hx of Atrial fibrillation on AC.        PAST MEDICAL & SURGICAL HISTORY:  CKD (chronic kidney disease)  CVA (cerebrovascular accident)  Personal history of PE (pulmonary embolism)  Glaucoma  PVD (peripheral vascular disease)  Hypothyroid  HTN (hypertension)  HLD (hyperlipidemia)  CHF (congestive heart failure)  DM (diabetes mellitus)  Afib  Elective surgery: abdominal abcess removals  History of partial thyroidectomy    MEDICATIONS:  MEDICATIONS  (STANDING):  allopurinol 100 milliGRAM(s) Oral daily  ammonium lactate  5% Lotion 1 Application(s) Topical two times a day  dextrose 5%. 1000 milliLiter(s) (50 mL/Hr) IV Continuous <Continuous>  dextrose 50% Injectable 12.5 Gram(s) IV Push once  dextrose 50% Injectable 25 Gram(s) IV Push once  dextrose 50% Injectable 25 Gram(s) IV Push once  hydrALAZINE 25 milliGRAM(s) Oral daily  hydrocortisone hemorrhoidal Suppository 1 Suppository(s) Rectal at bedtime  influenza   Vaccine 0.5 milliLiter(s) IntraMuscular once  insulin lispro (HumaLOG) corrective regimen sliding scale   SubCutaneous every 6 hours  latanoprost 0.005% Ophthalmic Solution 1 Drop(s) Both EYES at bedtime  levothyroxine 75 MICROGram(s) Oral daily  pantoprazole  Injectable 40 milliGRAM(s) IV Push every 12 hours  simvastatin 20 milliGRAM(s) Oral at bedtime    Allergies    No Known Allergies    Intolerances    FAMILY HISTORY:  No pertinent family history in first degree relatives    Non-contributary for premature coronary disease or sudden cardiac death    SOCIAL HISTORY:    [X ] Non-smoker  [ ] Smoker  [ ] Alcohol      REVIEW OF SYSTEMS:  [ ]chest pain  [  ]shortness of breath  [  ]palpitations  [  ]syncope  [ ]near syncope [ ]upper extremity weakness   [ ] lower extremity weakness  [  ]diplopia  [  ]altered mental status   [  ]fevers  [ ]chills [ ]nausea  [ ]vomitting  [  ]dysphagia    [ ]abdominal pain  [ ]melena  [X ]BRBPR    [  ]epistaxis  [  ]rash    [ ]lower extremity edema      [ X] All others negative	  [ ] Unable to obtain    LABS:	 	    CARDIAC MARKERS:  CARDIAC MARKERS ( 17 Sep 2019 02:45 )  x     / x     / 44 u/L / 3.71 ng/mL / x      CARDIAC MARKERS ( 16 Sep 2019 21:44 )  x     / x     / 44 u/L / 3.16 ng/mL / x                             8.2    4.49  )-----------( 135      ( 17 Sep 2019 10:10 )             26.1     Hb Trend: 8.2<--, 9.1<--, 10.0<--    09-17    139  |  105  |  45<H>  ----------------------------<  141<H>  4.0   |  23  |  1.94<H>    Ca    10.0      17 Sep 2019 02:45  Phos  2.6     09-17  Mg     2.1     09-17    TPro  7.0  /  Alb  3.8  /  TBili  0.7  /  DBili  x   /  AST  14  /  ALT  8   /  AlkPhos  56  09-17    Creatinine Trend: 1.94<--, 2.30<--    Coags:  PT/INR - ( 17 Sep 2019 02:45 )   PT: 22.2 SEC;   INR: 1.91          PTT - ( 17 Sep 2019 02:45 )  PTT:29.8 SEC    proBNP:   Lipid Profile:   HgA1c:   TSH:       PHYSICAL EXAM:  T(C): 36.8 (09-17-19 @ 09:28), Max: 37.1 (09-17-19 @ 02:20)  HR: 83 (09-17-19 @ 09:28) (79 - 116)  BP: 154/77 (09-17-19 @ 09:28) (149/76 - 250/106)  RR: 18 (09-17-19 @ 09:28) (16 - 20)  SpO2: 100% (09-17-19 @ 09:28) (99% - 100%)  Wt(kg): --  I&O's Summary      Gen: Appears well in NAD  HEENT:  (-)icterus (-)pallor  CV: N S1 S2 1/6 SUZI (+)2 Pulses B/l  Resp:  Clear to ausculatation B/L, normal effort  GI: (+) BS Soft, NT, ND  Lymph:  (-)Edema, (-)obvious lymphadenopathy  Skin: Warm to touch, Normal turgor  Psych: Appropriate mood and affect      TELEMETRY: 	 not on telemetry      ECG:    NSR     RADIOLOGY:         CXR:   < from: Xray Chest 1 View- PORTABLE-Urgent (09.17.19 @ 04:27) >  IMPRESSION:  Enlarged cardiac silhouette.    Left midlung platelike atelectasis.    HELDER DAWSON M.D., ATTENDING RADIOLOGIST  This document has been electronically signed. Sep 17 2019  8:52AM        < end of copied text >    < from: Transthoracic Echocardiogram (08.13.19 @ 11:13) >  CONCLUSIONS:  1. Mitral annular calcification and calcified mitral  leaflets with normal diastolic opening. Mild mitral  regurgitation.  2. Calcified trileaflet aortic valve with grossly  moderately decreased opening. Peak transaortic valve  gradient equals 30 mm Hg, mean transaortic valve gradient  equals 15 mm Hg.  3. Normal left ventricular internal dimensions and wall  thicknesses.  4. Normal left ventricular systolic function. No segmental  wall motion abnormalities.  5. Increased E/e'  is consistent with elevated left  ventricular filling pressure.  6. Moderate right atrial enlargement.  7. Estimated pulmonary artery systolic pressure equals 51  mm Hg, assuming right atrial pressure equals 10  mm Hg,  consistent with moderate pulmonary hypertension.  ------------------------------------------------------------------------  Confirmed on  8/13/2019 - 15:07:49 by RIKKI Azevedo  ------------------------------------------------------------------------    < end of copied text >      ASSESSMENT/PLAN: 	    89 year old Female with history of Afib on AC, HTN, CKD admitted with chest pain, sob and volume overload c/w acute on chronic diastolic and Right sided CHF, with chronic bifasicular block afib. Cardiology consulted for management of HTN, hx of Atrial fibrillation on AC - now on hold for GIB.     -- no anginal symptoms or evidence of heart fail   -- elevated trop 110 -- > 112, neg CPK, doub ACS  -- elevation of trop most likely in setting of acute bleed  -- recent echo as noted   -- BP stable cont current medications   -- AF with SSS - avoid AVN blockers, Pt and family continue to decline invasive testing/procedures, will not pursue ischemic work up   -- hx of afib, would cont AC with heparin/coumadin bridge when able from GI standpoint, goal INR 2-3   -- CKD, Cr. 1.94 improving, prev seen by nephrology, consider f/u    -- further cardiac work up pending above   -- on d/c pt to follow with Dr. Berman

## 2019-09-17 NOTE — H&P ADULT - PROBLEM SELECTOR PLAN 4
[id number] score 9. On coumadin for AC.   - INR on admission 1.89. Hold coumadin for now given recent episodes of GIB. As pt currently without serious or life-threatening bleed, will withhold giving any reversal agent given pt's high thrombotic risk, but if pt continues to have recurrent episodes of GIB requiring transfusion or causing HD instability, will give IV vit K +/- Kcentra. Monitor INR daily.  - Pt with history of SSS, with pt and family having declined invasive testing/procedures in the past. Avoid AV jimy blockers (BB or CCB).  - Cardiology consult in AM [id number] score 9. On coumadin for AC.   - INR on admission 1.89. Hold coumadin for now given recent episodes of GIB. As pt currently without serious or life-threatening bleed, will withhold any reversal agent at this time given pt's high thrombotic risk, but if pt continues to have recurrent episodes of GIB requiring transfusion or causing HD instability, will give IV vit K +/- Kcentra. Monitor INR daily.  - Pt with history of SSS, with pt and family having declined invasive testing/procedures in the past. Avoid AV jimy blockers (BB or CCB).  - Cardiology consult in AM given pt's high thrombotic risk

## 2019-09-17 NOTE — CONSULT NOTE ADULT - SUBJECTIVE AND OBJECTIVE BOX
[facility] Consult Note Nephrology - CONSULTATION NOTE - [phone] - Dr Catherine / Dr Garcia / Dr Tellez / Dr Sullivan / Dr Castillo / Dr Grijalva / Dr Segovia / Dr Luna    Patient is a 89y Female with afib/aflutter (on coumadin), diastolic CHF (with EF 63% on TTE 8/13/19), HTN, CKD stage 3-4, DM2 (on insulin), PVD, h/o CVA, PE, hypothyroidism, and recent admission at [facility] (8/10-8/15/19) for acute decompensated heart failure presents BRBPR. Pt first noticed fresh blood mixed with her stools when she had a bowel movement on Sunday night. Pt had 2 additional similar episodes on Monday, with the toilet paper soaked with bright red blood when pt wiped herself after her bowel movements, prompting pt to call one of her children to take her to the ED. Denies any associated abdominal pain. No CP, SOB, palpitations, lightheadedness, dizziness, syncope, falls, melena, N/V, F/C, or urinary symptoms. Pt states that since discharge from her last hospital admission, she had been feeling well (confirmed by pt's son at the bedside) even though she developed loose-stool diarrhea 2 days after her discharge that continued until 1 week ago. No further occurrences of diarrhea for the past 1 week. Pt's son reports that pt's coumadin was recently adjusted post-discharge due to subtherapeutic INR, with pt taking coumadin 4 mg Monday through Friday and 3 mg on Saturday and Sunday vs. 3 mg daily on discharge. Pt's last dose of coumadin was 3 mg on Sunday night.   Renal consult for CKD IV management. No urinary complaints.       PAST MEDICAL & SURGICAL HISTORY:  CKD (chronic kidney disease)  CVA (cerebrovascular accident)  Personal history of PE (pulmonary embolism)  Glaucoma  PVD (peripheral vascular disease)  Hypothyroid  HTN (hypertension)  HLD (hyperlipidemia)  CHF (congestive heart failure)  DM (diabetes mellitus)  Afib  Elective surgery: abdominal abcess removals  History of partial thyroidectomy    Allergies:  No Known Allergies    Home Medications Reviewed  Hospital Medications:   MEDICATIONS  (STANDING):  allopurinol 100 milliGRAM(s) Oral daily  ammonium lactate  5% Lotion 1 Application(s) Topical two times a day  dextrose 5%. 1000 milliLiter(s) (50 mL/Hr) IV Continuous <Continuous>  dextrose 50% Injectable 12.5 Gram(s) IV Push once  dextrose 50% Injectable 25 Gram(s) IV Push once  dextrose 50% Injectable 25 Gram(s) IV Push once  hydrALAZINE 25 milliGRAM(s) Oral daily  hydrocortisone hemorrhoidal Suppository 1 Suppository(s) Rectal at bedtime  influenza   Vaccine 0.5 milliLiter(s) IntraMuscular once  insulin lispro (HumaLOG) corrective regimen sliding scale   SubCutaneous every 6 hours  latanoprost 0.005% Ophthalmic Solution 1 Drop(s) Both EYES at bedtime  levothyroxine 75 MICROGram(s) Oral daily  pantoprazole  Injectable 40 milliGRAM(s) IV Push every 12 hours  simvastatin 20 milliGRAM(s) Oral at bedtime    SOCIAL HISTORY:  Denies ETOh,Smoking,   FAMILY HISTORY:  No pertinent family history in first degree relatives    REVIEW OF SYSTEMS:  CONSTITUTIONAL: No weakness, fevers or chills  EYES/ENT: No visual changes;  No vertigo or throat pain   NECK: No pain or stiffness  RESPIRATORY: No cough, wheezing, hemoptysis; No shortness of breath  CARDIOVASCULAR: No chest pain or palpitations.  GASTROINTESTINAL: No abdominal or epigastric pain. No nausea, vomiting, or hematemesis; No diarrhea or constipation. +hematochezia.  GENITOURINARY: No dysuria, frequency, foamy urine, urinary urgency, incontinence or hematuria  NEUROLOGICAL: No numbness or weakness  SKIN: No itching, burning, rashes, or lesions   VASCULAR: No bilateral lower extremity edema.   All other review of systems is negative unless indicated above.    VITALS:  T(F): 98.3 (09-17-19 @ 09:28), Max: 98.8 (09-17-19 @ 02:20)  HR: 83 (09-17-19 @ 09:28)  BP: 154/77 (09-17-19 @ 09:28)  RR: 18 (09-17-19 @ 09:28)  SpO2: 100% (09-17-19 @ 09:28)  Wt(kg): --    Height (cm): 172.7 (09-17 @ 02:20)  Weight (kg): 78.6 (09-17 @ 02:20)  BMI (kg/m2): 26.4 (09-17 @ 02:20)  BSA (m2): 1.92 (09-17 @ 02:20)  PHYSICAL EXAM:  Constitutional: NAD  HEENT: anicteric sclera, oropharynx clear, MMM  Neck: No JVD  Respiratory: CTAB, no wheezes, rales or rhonchi  Cardiovascular: S1, S2, RRR  Gastrointestinal: BS+, soft, NT/ND  Extremities: No cyanosis or clubbing. No peripheral edema  Neurological: A/O x 3, no focal deficits  Psychiatric: Normal mood, normal affect  : No CVA tenderness. No michaud.   Skin: No rashes    LABS:  09-17    139  |  105  |  45<H>  ----------------------------<  141<H>  4.0   |  23  |  1.94<H>    Ca    10.0      17 Sep 2019 02:45  Phos  2.6     09-17  Mg     2.1     09-17    TPro  7.0  /  Alb  3.8  /  TBili  0.7  /  DBili      /  AST  14  /  ALT  8   /  AlkPhos  56  09-17    Creatinine Trend: 1.94 <--, 2.30 <--                        8.2    4.49  )-----------( 135      ( 17 Sep 2019 10:10 )             26.1     Urine Studies:      RADIOLOGY & ADDITIONAL STUDIES:  < from: Xray Chest 1 View- PORTABLE-Urgent (09.17.19 @ 04:27) >  IMPRESSION:  Enlarged cardiac silhouette.    Left midlung platelike atelectasis.    < end of copied text >

## 2019-09-17 NOTE — H&P ADULT - PROBLEM SELECTOR PLAN 10
- DVT ppx: Hold coumadin in setting of recent GIB  - Diet: NPO for now pending GI consult  - Hyperlipidemia: C/w simvastatin 20 mg qhs  - Prolonged QTc: QTc 492 ms, though with RBBB. Avoid QT prolonging agents. Monitor QTc with serial EKGs. - DVT ppx: Hold coumadin in setting of recent GIB  - Diet: NPO for now pending GI consult  - Hyperlipidemia: C/w simvastatin 20 mg qhs  - Gout: C/w allopurinol  - Prolonged QTc: QTc 492 ms, though with RBBB. Avoid QT prolonging agents. Monitor QTc with serial EKGs. - DVT ppx: Hold coumadin in setting of recent GIB  - Diet: NPO for now pending GI consult  - Hyperlipidemia: C/w simvastatin 20 mg qhs  - Gout: C/w allopurinol  - HsTrop 110 -> 112. No acute EKG changes and pt without complaints of CP. Monitor for CP and obtain serial EKGs if having CP.   - Prolonged QTc: QTc 492 ms, though with RBBB. Avoid QT prolonging agents. Monitor QTc with serial EKGs.

## 2019-09-17 NOTE — H&P ADULT - NSHPLABSRESULTS_GEN_ALL_CORE
EKG personally reviewed.  Afib at 96 bpm. RBBB with QRS ~122 ms. Nonspecific T wave changes (not new).  QTc 492 ms.

## 2019-09-17 NOTE — H&P ADULT - PROBLEM SELECTOR PLAN 3
BPs elevated to 250/106 in the ED, now improved to 182/73 s/p PO hydralazine 50 mg x1 and IV hydralazine 5 mg x1 in the ED. Pt with history of episodes of hypertensive urgency, and per pt, SBPs sometimes as high as 180s-190s at home. Goal SBPs over next 24 hours ~160-180.   - Pt unsure of home dose of PO hydralazine, as pt thinks the dose was recently lowered from 75 mg to 50 mg tid. Will need to confirm pt's home dose of PO hydralazine.   - Will resume PO hydralazine but at 25 mg daily with hold parameter of SBP < 160 in setting of recent GIB. Will give as needed doses of BP meds if SBP remains > 180.   - Avoid AV jimy blockers (BB or CCB) to lower BP, as pt with history of SSS  - Hold lasix and spironolactone for now BPs elevated to 250/106 in the ED, now improved to 182/73 s/p PO hydralazine 50 mg x1 and IV hydralazine 5 mg x1 in the ED. Pt with history of episodes of hypertensive urgency, and per pt, SBPs sometimes as high as 180s-190s at home. Goal SBPs over next 24 hours ~160-180.   - Pt unsure of home dose of PO hydralazine, as pt thinks the dose was recently lowered from 75 mg to 50 mg tid. Will need to confirm pt's home dose of PO hydralazine.   - Will resume PO hydralazine but at 25 mg daily with hold parameter of SBP < 160 in setting of recent GIB. Will give as needed doses of BP meds if SBP remains > 180.   - Avoid AV jimy blockers (BB or CCB) to lower BP, as pt with history of SSS  - Hold lasix and spironolactone for now in setting of recent GIB. Will restart if clinically indicated.

## 2019-09-17 NOTE — H&P ADULT - PROBLEM SELECTOR PLAN 6
Takes Levemir 16u at bedtime for FSG > 150; otherwise, takes 8u. HgbA1c 6.3% in 8/2019.  - Start low-dose ISS and FSG q6hrs while pt is NPO  - Hold basal insulin for now given NPO status, pt's age, and recent HgbA1c. Will resume basal insulin if FSG poorly controlled and based on correction needed.

## 2019-09-18 LAB
ALBUMIN SERPL ELPH-MCNC: 3.4 G/DL — SIGNIFICANT CHANGE UP (ref 3.3–5)
ALP SERPL-CCNC: 47 U/L — SIGNIFICANT CHANGE UP (ref 40–120)
ALT FLD-CCNC: 7 U/L — SIGNIFICANT CHANGE UP (ref 4–33)
ANION GAP SERPL CALC-SCNC: 12 MMO/L — SIGNIFICANT CHANGE UP (ref 7–14)
AST SERPL-CCNC: 13 U/L — SIGNIFICANT CHANGE UP (ref 4–32)
BASOPHILS # BLD AUTO: 0.01 K/UL — SIGNIFICANT CHANGE UP (ref 0–0.2)
BASOPHILS NFR BLD AUTO: 0.2 % — SIGNIFICANT CHANGE UP (ref 0–2)
BILIRUB SERPL-MCNC: 0.6 MG/DL — SIGNIFICANT CHANGE UP (ref 0.2–1.2)
BUN SERPL-MCNC: 38 MG/DL — HIGH (ref 7–23)
CALCIUM SERPL-MCNC: 9.5 MG/DL — SIGNIFICANT CHANGE UP (ref 8.4–10.5)
CHLORIDE SERPL-SCNC: 107 MMOL/L — SIGNIFICANT CHANGE UP (ref 98–107)
CO2 SERPL-SCNC: 26 MMOL/L — SIGNIFICANT CHANGE UP (ref 22–31)
CREAT SERPL-MCNC: 1.84 MG/DL — HIGH (ref 0.5–1.3)
EOSINOPHIL # BLD AUTO: 0.15 K/UL — SIGNIFICANT CHANGE UP (ref 0–0.5)
EOSINOPHIL NFR BLD AUTO: 2.9 % — SIGNIFICANT CHANGE UP (ref 0–6)
GI PCR PANEL, STOOL: SIGNIFICANT CHANGE UP
GLUCOSE BLDC GLUCOMTR-MCNC: 101 MG/DL — HIGH (ref 70–99)
GLUCOSE BLDC GLUCOMTR-MCNC: 113 MG/DL — HIGH (ref 70–99)
GLUCOSE BLDC GLUCOMTR-MCNC: 150 MG/DL — HIGH (ref 70–99)
GLUCOSE BLDC GLUCOMTR-MCNC: 186 MG/DL — HIGH (ref 70–99)
GLUCOSE SERPL-MCNC: 109 MG/DL — HIGH (ref 70–99)
HCT VFR BLD CALC: 24.6 % — LOW (ref 34.5–45)
HGB BLD-MCNC: 7.6 G/DL — LOW (ref 11.5–15.5)
IMM GRANULOCYTES NFR BLD AUTO: 0.2 % — SIGNIFICANT CHANGE UP (ref 0–1.5)
LYMPHOCYTES # BLD AUTO: 2 K/UL — SIGNIFICANT CHANGE UP (ref 1–3.3)
LYMPHOCYTES # BLD AUTO: 39.1 % — SIGNIFICANT CHANGE UP (ref 13–44)
MCHC RBC-ENTMCNC: 30 PG — SIGNIFICANT CHANGE UP (ref 27–34)
MCHC RBC-ENTMCNC: 30.9 % — LOW (ref 32–36)
MCV RBC AUTO: 97.2 FL — SIGNIFICANT CHANGE UP (ref 80–100)
MONOCYTES # BLD AUTO: 0.74 K/UL — SIGNIFICANT CHANGE UP (ref 0–0.9)
MONOCYTES NFR BLD AUTO: 14.5 % — HIGH (ref 2–14)
NEUTROPHILS # BLD AUTO: 2.21 K/UL — SIGNIFICANT CHANGE UP (ref 1.8–7.4)
NEUTROPHILS NFR BLD AUTO: 43.1 % — SIGNIFICANT CHANGE UP (ref 43–77)
NRBC # FLD: 0 K/UL — SIGNIFICANT CHANGE UP (ref 0–0)
PLATELET # BLD AUTO: 138 K/UL — LOW (ref 150–400)
PMV BLD: 11.6 FL — SIGNIFICANT CHANGE UP (ref 7–13)
POTASSIUM SERPL-MCNC: 4.1 MMOL/L — SIGNIFICANT CHANGE UP (ref 3.5–5.3)
POTASSIUM SERPL-SCNC: 4.1 MMOL/L — SIGNIFICANT CHANGE UP (ref 3.5–5.3)
PROT SERPL-MCNC: 6.4 G/DL — SIGNIFICANT CHANGE UP (ref 6–8.3)
RBC # BLD: 2.53 M/UL — LOW (ref 3.8–5.2)
RBC # FLD: 16.3 % — HIGH (ref 10.3–14.5)
SODIUM SERPL-SCNC: 145 MMOL/L — SIGNIFICANT CHANGE UP (ref 135–145)
SPECIMEN SOURCE: SIGNIFICANT CHANGE UP
WBC # BLD: 5.12 K/UL — SIGNIFICANT CHANGE UP (ref 3.8–10.5)
WBC # FLD AUTO: 5.12 K/UL — SIGNIFICANT CHANGE UP (ref 3.8–10.5)

## 2019-09-18 RX ORDER — SOD SULF/SODIUM/NAHCO3/KCL/PEG
1 SOLUTION, RECONSTITUTED, ORAL ORAL EVERY 4 HOURS
Refills: 0 | Status: COMPLETED | OUTPATIENT
Start: 2019-09-18 | End: 2019-09-18

## 2019-09-18 RX ADMIN — SIMVASTATIN 20 MILLIGRAM(S): 20 TABLET, FILM COATED ORAL at 22:12

## 2019-09-18 RX ADMIN — Medication 1 LITER(S): at 22:13

## 2019-09-18 RX ADMIN — PANTOPRAZOLE SODIUM 40 MILLIGRAM(S): 20 TABLET, DELAYED RELEASE ORAL at 18:49

## 2019-09-18 RX ADMIN — Medication 1 APPLICATION(S): at 06:16

## 2019-09-18 RX ADMIN — Medication 1: at 13:18

## 2019-09-18 RX ADMIN — Medication 10 MILLIGRAM(S): at 19:00

## 2019-09-18 RX ADMIN — Medication 100 MILLIGRAM(S): at 13:19

## 2019-09-18 RX ADMIN — Medication 1 LITER(S): at 19:00

## 2019-09-18 RX ADMIN — PANTOPRAZOLE SODIUM 40 MILLIGRAM(S): 20 TABLET, DELAYED RELEASE ORAL at 06:12

## 2019-09-18 RX ADMIN — Medication 1 APPLICATION(S): at 18:48

## 2019-09-18 RX ADMIN — Medication 1 SUPPOSITORY(S): at 22:13

## 2019-09-18 RX ADMIN — LATANOPROST 1 DROP(S): 0.05 SOLUTION/ DROPS OPHTHALMIC; TOPICAL at 22:12

## 2019-09-18 RX ADMIN — Medication 75 MICROGRAM(S): at 06:13

## 2019-09-18 RX ADMIN — Medication 10 MILLIGRAM(S): at 22:13

## 2019-09-18 RX ADMIN — Medication 25 MILLIGRAM(S): at 06:12

## 2019-09-18 NOTE — PHYSICAL THERAPY INITIAL EVALUATION ADULT - PATIENT PROFILE REVIEW, REHAB EVAL
PT orders received: out of bed with assistance. Consult with RN Shantell MONTEZ, pt may participate in PT evaluation./yes

## 2019-09-18 NOTE — PHYSICAL THERAPY INITIAL EVALUATION ADULT - PHYSICAL ASSIST/NONPHYSICAL ASSIST: SIT/STAND, REHAB EVAL
From: Edel Stapleton  To: RICHARD Garcia  Sent: 7/16/2018 6:24 PM CDT  Subject: Lisinopril refill    I changed my yearly exam appointment from 7/16 to 8/2    As of today I am out of Lisinopril. I tried to refill my RX the other day and was told no refills were left and that they would check with my NP. I have no messages regarding this and I'm completely out of pills.    Marlen Stapleton   1 person assist/verbal cues/nonverbal cues (demo/gestures)

## 2019-09-18 NOTE — PHYSICAL THERAPY INITIAL EVALUATION ADULT - PERTINENT HX OF CURRENT PROBLEM, REHAB EVAL
Patient is a 89 year old female admitted to [facility] on 9/16 with 2 days of rectal bleeding. PMH: afib/aflutter (on coumadin), diastolic CHF (with EF 63% on TTE 8/13/19), HTN, HLD, CKD stage 3-4, DM2 (on insulin), PVD, CVA, PE, hypothyroidism, and recent admission at [facility] (8/10-8/15/19) for acute decompensated heart failure.

## 2019-09-18 NOTE — PROVIDER CONTACT NOTE (OTHER) - SITUATION
Pt and pts family wish for DNR to be withdrawn for Colonoscopy. Pt and family wish that she be resusitated in the event that something happens during Colonoscopy procedure

## 2019-09-18 NOTE — PROVIDER CONTACT NOTE (OTHER) - SITUATION
Pt has had two bloody bowel movements, pt is due for dulcolax and another dose of moviprep.  Should medication be held?

## 2019-09-18 NOTE — PHYSICAL THERAPY INITIAL EVALUATION ADULT - ADDITIONAL COMMENTS
Patient reports she lives with her  in an apartment (within  a private house). Pt reports 5-6 steps to enter home however has ramp. Patient reports she was previously independent in all ADLs and ambulated with a cane prior to admission.     Patient was left semi-supine in bed as found, all lines/tubes intact and call hanson within reach, KLEVER mckeon Patient reports she lives with her  in an apartment (within a private house). Pt reports 5-6 steps to enter home however has ramp. Patient reports she was previously independent in all ADLs and ambulated with a cane prior to admission.     Patient was left semi-supine in bed as found, all lines/tubes intact and call hanson within reach, KLEVER mckeon

## 2019-09-19 LAB
APTT BLD: 28.5 SEC — SIGNIFICANT CHANGE UP (ref 27.5–36.3)
GLUCOSE BLDC GLUCOMTR-MCNC: 111 MG/DL — HIGH (ref 70–99)
GLUCOSE BLDC GLUCOMTR-MCNC: 113 MG/DL — HIGH (ref 70–99)
GLUCOSE BLDC GLUCOMTR-MCNC: 116 MG/DL — HIGH (ref 70–99)
GLUCOSE BLDC GLUCOMTR-MCNC: 141 MG/DL — HIGH (ref 70–99)
HCT VFR BLD CALC: 27.4 % — LOW (ref 34.5–45)
HCT VFR BLD CALC: 29.4 % — LOW (ref 34.5–45)
HGB BLD-MCNC: 8.8 G/DL — LOW (ref 11.5–15.5)
HGB BLD-MCNC: 9.2 G/DL — LOW (ref 11.5–15.5)
INR BLD: 1.47 — HIGH (ref 0.88–1.17)
MCHC RBC-ENTMCNC: 29.7 PG — SIGNIFICANT CHANGE UP (ref 27–34)
MCHC RBC-ENTMCNC: 30.1 PG — SIGNIFICANT CHANGE UP (ref 27–34)
MCHC RBC-ENTMCNC: 31.3 % — LOW (ref 32–36)
MCHC RBC-ENTMCNC: 32.1 % — SIGNIFICANT CHANGE UP (ref 32–36)
MCV RBC AUTO: 93.8 FL — SIGNIFICANT CHANGE UP (ref 80–100)
MCV RBC AUTO: 94.8 FL — SIGNIFICANT CHANGE UP (ref 80–100)
NRBC # FLD: 0 K/UL — SIGNIFICANT CHANGE UP (ref 0–0)
NRBC # FLD: 0 K/UL — SIGNIFICANT CHANGE UP (ref 0–0)
PLATELET # BLD AUTO: 136 K/UL — LOW (ref 150–400)
PLATELET # BLD AUTO: 140 K/UL — LOW (ref 150–400)
PMV BLD: 11.2 FL — SIGNIFICANT CHANGE UP (ref 7–13)
PMV BLD: 11.4 FL — SIGNIFICANT CHANGE UP (ref 7–13)
PROTHROM AB SERPL-ACNC: 16.9 SEC — HIGH (ref 9.8–13.1)
RBC # BLD: 2.92 M/UL — LOW (ref 3.8–5.2)
RBC # BLD: 3.1 M/UL — LOW (ref 3.8–5.2)
RBC # FLD: 15.9 % — HIGH (ref 10.3–14.5)
RBC # FLD: 16 % — HIGH (ref 10.3–14.5)
WBC # BLD: 5.76 K/UL — SIGNIFICANT CHANGE UP (ref 3.8–10.5)
WBC # BLD: 6.04 K/UL — SIGNIFICANT CHANGE UP (ref 3.8–10.5)
WBC # FLD AUTO: 5.76 K/UL — SIGNIFICANT CHANGE UP (ref 3.8–10.5)
WBC # FLD AUTO: 6.04 K/UL — SIGNIFICANT CHANGE UP (ref 3.8–10.5)

## 2019-09-19 RX ORDER — ALPRAZOLAM 0.25 MG
0.25 TABLET ORAL DAILY
Refills: 0 | Status: DISCONTINUED | OUTPATIENT
Start: 2019-09-19 | End: 2019-09-19

## 2019-09-19 RX ORDER — SODIUM CHLORIDE 9 MG/ML
1000 INJECTION INTRAMUSCULAR; INTRAVENOUS; SUBCUTANEOUS
Refills: 0 | Status: DISCONTINUED | OUTPATIENT
Start: 2019-09-19 | End: 2019-09-19

## 2019-09-19 RX ADMIN — Medication 1 APPLICATION(S): at 05:15

## 2019-09-19 RX ADMIN — Medication 1 APPLICATION(S): at 17:40

## 2019-09-19 RX ADMIN — Medication 1 SUPPOSITORY(S): at 22:42

## 2019-09-19 RX ADMIN — SODIUM CHLORIDE 36 MILLILITER(S): 9 INJECTION INTRAMUSCULAR; INTRAVENOUS; SUBCUTANEOUS at 11:14

## 2019-09-19 RX ADMIN — PANTOPRAZOLE SODIUM 40 MILLIGRAM(S): 20 TABLET, DELAYED RELEASE ORAL at 17:40

## 2019-09-19 RX ADMIN — Medication 75 MICROGRAM(S): at 05:15

## 2019-09-19 RX ADMIN — PANTOPRAZOLE SODIUM 40 MILLIGRAM(S): 20 TABLET, DELAYED RELEASE ORAL at 05:12

## 2019-09-19 RX ADMIN — SIMVASTATIN 20 MILLIGRAM(S): 20 TABLET, FILM COATED ORAL at 22:42

## 2019-09-19 RX ADMIN — LATANOPROST 1 DROP(S): 0.05 SOLUTION/ DROPS OPHTHALMIC; TOPICAL at 22:42

## 2019-09-19 NOTE — CHART NOTE - NSCHARTNOTEFT_GEN_A_CORE
Notified by RN that patient had 3 episode of bright red rectal bleeding. Patient is on bowel prep for colonoscopy in AM. Patient is hemodynamically stable. Post transfusion CBC done-                         9.2    5.76  )-----------( 136      ( 19 Sep 2019 00:20 ). Will continue to monitor.             29.4

## 2019-09-20 LAB
ALBUMIN SERPL ELPH-MCNC: 3.3 G/DL — SIGNIFICANT CHANGE UP (ref 3.3–5)
ALP SERPL-CCNC: 48 U/L — SIGNIFICANT CHANGE UP (ref 40–120)
ALT FLD-CCNC: 12 U/L — SIGNIFICANT CHANGE UP (ref 4–33)
ANION GAP SERPL CALC-SCNC: 10 MMO/L — SIGNIFICANT CHANGE UP (ref 7–14)
AST SERPL-CCNC: 14 U/L — SIGNIFICANT CHANGE UP (ref 4–32)
BILIRUB SERPL-MCNC: 0.9 MG/DL — SIGNIFICANT CHANGE UP (ref 0.2–1.2)
BLD GP AB SCN SERPL QL: NEGATIVE — SIGNIFICANT CHANGE UP
BUN SERPL-MCNC: 29 MG/DL — HIGH (ref 7–23)
CALCIUM SERPL-MCNC: 9.5 MG/DL — SIGNIFICANT CHANGE UP (ref 8.4–10.5)
CHLORIDE SERPL-SCNC: 111 MMOL/L — HIGH (ref 98–107)
CO2 SERPL-SCNC: 23 MMOL/L — SIGNIFICANT CHANGE UP (ref 22–31)
CREAT SERPL-MCNC: 1.74 MG/DL — HIGH (ref 0.5–1.3)
GLUCOSE BLDC GLUCOMTR-MCNC: 147 MG/DL — HIGH (ref 70–99)
GLUCOSE BLDC GLUCOMTR-MCNC: 161 MG/DL — HIGH (ref 70–99)
GLUCOSE BLDC GLUCOMTR-MCNC: 95 MG/DL — SIGNIFICANT CHANGE UP (ref 70–99)
GLUCOSE BLDC GLUCOMTR-MCNC: 98 MG/DL — SIGNIFICANT CHANGE UP (ref 70–99)
GLUCOSE SERPL-MCNC: 90 MG/DL — SIGNIFICANT CHANGE UP (ref 70–99)
HCT VFR BLD CALC: 24.7 % — LOW (ref 34.5–45)
HGB BLD-MCNC: 7.8 G/DL — LOW (ref 11.5–15.5)
INR BLD: 1.45 — HIGH (ref 0.88–1.17)
MCHC RBC-ENTMCNC: 30.1 PG — SIGNIFICANT CHANGE UP (ref 27–34)
MCHC RBC-ENTMCNC: 31.6 % — LOW (ref 32–36)
MCV RBC AUTO: 95.4 FL — SIGNIFICANT CHANGE UP (ref 80–100)
NRBC # FLD: 0 K/UL — SIGNIFICANT CHANGE UP (ref 0–0)
PLATELET # BLD AUTO: 136 K/UL — LOW (ref 150–400)
PMV BLD: 12.5 FL — SIGNIFICANT CHANGE UP (ref 7–13)
POTASSIUM SERPL-MCNC: 4.1 MMOL/L — SIGNIFICANT CHANGE UP (ref 3.5–5.3)
POTASSIUM SERPL-SCNC: 4.1 MMOL/L — SIGNIFICANT CHANGE UP (ref 3.5–5.3)
PROT SERPL-MCNC: 6.1 G/DL — SIGNIFICANT CHANGE UP (ref 6–8.3)
PROTHROM AB SERPL-ACNC: 16.3 SEC — HIGH (ref 9.8–13.1)
RBC # BLD: 2.59 M/UL — LOW (ref 3.8–5.2)
RBC # FLD: 16.3 % — HIGH (ref 10.3–14.5)
RH IG SCN BLD-IMP: NEGATIVE — SIGNIFICANT CHANGE UP
SODIUM SERPL-SCNC: 144 MMOL/L — SIGNIFICANT CHANGE UP (ref 135–145)
WBC # BLD: 5.7 K/UL — SIGNIFICANT CHANGE UP (ref 3.8–10.5)
WBC # FLD AUTO: 5.7 K/UL — SIGNIFICANT CHANGE UP (ref 3.8–10.5)

## 2019-09-20 RX ADMIN — Medication 1: at 13:00

## 2019-09-20 RX ADMIN — Medication 1 SUPPOSITORY(S): at 22:12

## 2019-09-20 RX ADMIN — PANTOPRAZOLE SODIUM 40 MILLIGRAM(S): 20 TABLET, DELAYED RELEASE ORAL at 06:22

## 2019-09-20 RX ADMIN — Medication 75 MICROGRAM(S): at 06:22

## 2019-09-20 RX ADMIN — PANTOPRAZOLE SODIUM 40 MILLIGRAM(S): 20 TABLET, DELAYED RELEASE ORAL at 17:41

## 2019-09-20 RX ADMIN — Medication 1 APPLICATION(S): at 06:21

## 2019-09-20 RX ADMIN — SIMVASTATIN 20 MILLIGRAM(S): 20 TABLET, FILM COATED ORAL at 22:12

## 2019-09-20 RX ADMIN — LATANOPROST 1 DROP(S): 0.05 SOLUTION/ DROPS OPHTHALMIC; TOPICAL at 22:12

## 2019-09-20 RX ADMIN — Medication 1 APPLICATION(S): at 17:40

## 2019-09-20 RX ADMIN — Medication 100 MILLIGRAM(S): at 13:00

## 2019-09-21 ENCOUNTER — TRANSCRIPTION ENCOUNTER (OUTPATIENT)
Age: 84
End: 2019-09-21

## 2019-09-21 LAB
ANION GAP SERPL CALC-SCNC: 9 MMO/L — SIGNIFICANT CHANGE UP (ref 7–14)
BUN SERPL-MCNC: 30 MG/DL — HIGH (ref 7–23)
CALCIUM SERPL-MCNC: 9.3 MG/DL — SIGNIFICANT CHANGE UP (ref 8.4–10.5)
CHLORIDE SERPL-SCNC: 109 MMOL/L — HIGH (ref 98–107)
CO2 SERPL-SCNC: 24 MMOL/L — SIGNIFICANT CHANGE UP (ref 22–31)
CREAT SERPL-MCNC: 1.69 MG/DL — HIGH (ref 0.5–1.3)
GLUCOSE BLDC GLUCOMTR-MCNC: 106 MG/DL — HIGH (ref 70–99)
GLUCOSE BLDC GLUCOMTR-MCNC: 122 MG/DL — HIGH (ref 70–99)
GLUCOSE BLDC GLUCOMTR-MCNC: 143 MG/DL — HIGH (ref 70–99)
GLUCOSE BLDC GLUCOMTR-MCNC: 199 MG/DL — HIGH (ref 70–99)
GLUCOSE SERPL-MCNC: 111 MG/DL — HIGH (ref 70–99)
HCT VFR BLD CALC: 27.9 % — LOW (ref 34.5–45)
HGB BLD-MCNC: 8.9 G/DL — LOW (ref 11.5–15.5)
MAGNESIUM SERPL-MCNC: 1.9 MG/DL — SIGNIFICANT CHANGE UP (ref 1.6–2.6)
MCHC RBC-ENTMCNC: 29.1 PG — SIGNIFICANT CHANGE UP (ref 27–34)
MCHC RBC-ENTMCNC: 31.9 % — LOW (ref 32–36)
MCV RBC AUTO: 91.2 FL — SIGNIFICANT CHANGE UP (ref 80–100)
NRBC # FLD: 0 K/UL — SIGNIFICANT CHANGE UP (ref 0–0)
PLATELET # BLD AUTO: 133 K/UL — LOW (ref 150–400)
PMV BLD: 11.6 FL — SIGNIFICANT CHANGE UP (ref 7–13)
POTASSIUM SERPL-MCNC: 4.3 MMOL/L — SIGNIFICANT CHANGE UP (ref 3.5–5.3)
POTASSIUM SERPL-SCNC: 4.3 MMOL/L — SIGNIFICANT CHANGE UP (ref 3.5–5.3)
RBC # BLD: 3.06 M/UL — LOW (ref 3.8–5.2)
RBC # FLD: 19.9 % — HIGH (ref 10.3–14.5)
SODIUM SERPL-SCNC: 142 MMOL/L — SIGNIFICANT CHANGE UP (ref 135–145)
WBC # BLD: 5.74 K/UL — SIGNIFICANT CHANGE UP (ref 3.8–10.5)
WBC # FLD AUTO: 5.74 K/UL — SIGNIFICANT CHANGE UP (ref 3.8–10.5)

## 2019-09-21 RX ORDER — WARFARIN SODIUM 2.5 MG/1
1 TABLET ORAL
Qty: 0 | Refills: 0 | DISCHARGE

## 2019-09-21 RX ORDER — SOD,AMMONIUM,POTASSIUM LACTATE
1 CREAM (GRAM) TOPICAL
Qty: 1 | Refills: 0
Start: 2019-09-21

## 2019-09-21 RX ORDER — FUROSEMIDE 40 MG
40 TABLET ORAL
Refills: 0 | Status: DISCONTINUED | OUTPATIENT
Start: 2019-09-21 | End: 2019-09-23

## 2019-09-21 RX ORDER — AMLODIPINE BESYLATE 2.5 MG/1
5 TABLET ORAL DAILY
Refills: 0 | Status: DISCONTINUED | OUTPATIENT
Start: 2019-09-21 | End: 2019-09-21

## 2019-09-21 RX ORDER — SPIRONOLACTONE 25 MG/1
25 TABLET, FILM COATED ORAL DAILY
Refills: 0 | Status: DISCONTINUED | OUTPATIENT
Start: 2019-09-21 | End: 2019-09-23

## 2019-09-21 RX ORDER — PANTOPRAZOLE SODIUM 20 MG/1
40 TABLET, DELAYED RELEASE ORAL
Refills: 0 | Status: DISCONTINUED | OUTPATIENT
Start: 2019-09-21 | End: 2019-09-23

## 2019-09-21 RX ORDER — SPIRONOLACTONE 25 MG/1
25 TABLET, FILM COATED ORAL DAILY
Refills: 0 | Status: DISCONTINUED | OUTPATIENT
Start: 2019-09-21 | End: 2019-09-21

## 2019-09-21 RX ORDER — HYDRALAZINE HCL 50 MG
50 TABLET ORAL THREE TIMES A DAY
Refills: 0 | Status: DISCONTINUED | OUTPATIENT
Start: 2019-09-21 | End: 2019-09-21

## 2019-09-21 RX ORDER — HYDRALAZINE HCL 50 MG
75 TABLET ORAL THREE TIMES A DAY
Refills: 0 | Status: DISCONTINUED | OUTPATIENT
Start: 2019-09-21 | End: 2019-09-23

## 2019-09-21 RX ORDER — HYDROCORTISONE 1 %
1 OINTMENT (GRAM) TOPICAL
Qty: 1 | Refills: 0
Start: 2019-09-21

## 2019-09-21 RX ADMIN — Medication 1 APPLICATION(S): at 17:33

## 2019-09-21 RX ADMIN — LATANOPROST 1 DROP(S): 0.05 SOLUTION/ DROPS OPHTHALMIC; TOPICAL at 21:12

## 2019-09-21 RX ADMIN — PANTOPRAZOLE SODIUM 40 MILLIGRAM(S): 20 TABLET, DELAYED RELEASE ORAL at 06:11

## 2019-09-21 RX ADMIN — Medication 100 MILLIGRAM(S): at 12:30

## 2019-09-21 RX ADMIN — SIMVASTATIN 20 MILLIGRAM(S): 20 TABLET, FILM COATED ORAL at 21:12

## 2019-09-21 RX ADMIN — Medication 75 MILLIGRAM(S): at 21:12

## 2019-09-21 RX ADMIN — Medication 1 SUPPOSITORY(S): at 21:12

## 2019-09-21 RX ADMIN — Medication 1 APPLICATION(S): at 06:10

## 2019-09-21 RX ADMIN — Medication 1: at 12:30

## 2019-09-21 RX ADMIN — PANTOPRAZOLE SODIUM 40 MILLIGRAM(S): 20 TABLET, DELAYED RELEASE ORAL at 09:13

## 2019-09-21 RX ADMIN — Medication 75 MICROGRAM(S): at 06:11

## 2019-09-21 RX ADMIN — Medication 25 MILLIGRAM(S): at 06:10

## 2019-09-21 RX ADMIN — Medication 40 MILLIGRAM(S): at 21:27

## 2019-09-21 NOTE — DISCHARGE NOTE PROVIDER - CARE PROVIDER_API CALL
[place])  Gastroenterology; Internal Medicine  [address] [place] [address]  Phone: [phone]  Fax: [phone]  Follow Up Time:     Yazmin Vaz)  Cardiovascular Disease; Internal Medicine  [address], [address]  [place] [address]  Phone: [phone]  Fax: [phone]  Follow Up Time:

## 2019-09-21 NOTE — DISCHARGE NOTE PROVIDER - HOSPITAL COURSE
89 F PMHx dCHF (EF 63), A-fib/A-flutter (on Coumadin), SSS, CKD 3-4, CVA, PE, HTN, HLD, DM2, hypothyroidism, PVD p/w BRBPR. FOBT positive. In ED, BP noted to 250/106 in ED. S/P Hydralazine.Of note, recently admitted to [facility] 8/10-8/15 for acute decompensated Continue recommended medication regimen. Monitor for signs/symptoms of fluid overload and electrolyte abnormalities, such as, shortness of breath, cough, swelling, chest discomfort, changes in heart rate, dizziness, fainting, or changes in mental status. Follow-up with your PCP/cardiologist outpatient after you've been discharged from the hospital.        Gastrointestinal hemorrhage    -HOLD Coumadin, Cilostazol     -PPI     -GI Cx     -S/P colonoscopy 9/19 with diverticular bleed    -S/P 2 U PRBC (last 9/20)         Hypertensive urgency.      -HOLD Lasix, Spironolactone in setting of GIB         Afib. [id number] score 9.     -Hx SSS, pt and family declining invasive testing/procedures in the past.    -Avoid AV jimy blockers (BB or CCB).    -Cardiology Cx        Diastolic CHF, chronic.    -Euvolemic       -HOLD Lasix, Spironolactone in setting of GIB         Type 2 diabetes mellitus. A1c 6.3    -ISS         PVD (peripheral vascular disease).      -HOLD Cilostazol         Hypothyroidism.      -Synthroid.         Acute kidney injury superimposed on CKD (baseline ~1.7)    -Likely pre-renal 2/2 GIB, can also be exacerbated by hypertensive urgency.     -S/P IVF    -Improved     . 89 F PMHx dCHF (EF 63), A-fib/A-flutter (on Coumadin), SSS, CKD 3-4, CVA, PE, HTN, HLD, DM2, hypothyroidism, PVD p/w BRBPR. FOBT positive. In ED, BP noted to 250/106 in ED. S/P Hydralazine.Of note, recently admitted to [facility] 8/10-8/15 for acute decompensated Continue recommended medication regimen. Monitor for signs/symptoms of fluid overload and electrolyte abnormalities, such as, shortness of breath, cough, swelling, chest discomfort, changes in heart rate, dizziness, fainting, or changes in mental status. Follow-up with your PCP/cardiologist outpatient after you've been discharged from the hospital.        Gastrointestinal hemorrhage    -HOLD Coumadin, Cilostazol     -PPI     -GI Cx     -S/P colonoscopy 9/19 with diverticular bleed    -S/P 2 U PRBC (last 9/20)     -Stable, F/U outpatient PCP         Hypertensive urgency.      -Lasix, Spironolactone, Hydralazine; Amlodipine added 9/22     -Improved, F/U outpatient PCP         Afib. [id number] score 9.     -Hx SSS, pt and family declining invasive testing/procedures in the past.    -Avoid AV jimy blockers (BB or CCB).    -Cardiology Cx    -Stable, F/U outpatient PCP         Diastolic CHF, chronic.    -Euvolemic       -Lasix, Spironolactone    -Stable, F/U outpatient PCP         Type 2 diabetes mellitus. A1c 6.3    -ISS     -Stable, F/U outpatient PCP        PVD (peripheral vascular disease).      -HOLD Cilostazol     -Stable, F/U outpatient PCP        Hypothyroidism.      -Synthroid.     -Stable, F/U outpatient PCP        Acute kidney injury superimposed on CKD (baseline ~1.7)    -Likely pre-renal 2/2 GIB, can also be exacerbated by hypertensive urgency.     -S/P IVF    -Improved, F/U outpatient PCP         Spoke with medical attending, Dr. Momin 9/22, pt is medically stable for discharge to home 89 F PMHx dCHF (EF 63), A-fib/A-flutter (on Coumadin), SSS, CKD 3-4, CVA, PE, HTN, HLD, DM2, hypothyroidism, PVD p/w BRBPR. FOBT positive. In ED, BP noted to 250/106 in ED. S/P Hydralazine.Of note, recently admitted to [facility] 8/10-8/15 for acute decompensated Continue recommended medication regimen. Monitor for signs/symptoms of fluid overload and electrolyte abnormalities, such as, shortness of breath, cough, swelling, chest discomfort, changes in heart rate, dizziness, fainting, or changes in mental status. Follow-up with your PCP/cardiologist outpatient after you've been discharged from the hospital.        Gastrointestinal hemorrhage    -HOLD Coumadin, Cilostazol     -PPI     -GI Cx     -S/P colonoscopy 9/19 with diverticular bleed    -S/P 2 U PRBC (last 9/20)     -Stable, F/U outpatient PCP         Hypertensive urgency.      -Lasix, Spironolactone, Hydralazine; Amlodipine added 9/22     -Improved, F/U outpatient PCP         Afib. [id number] score 9.     -Hx SSS, pt and family declining invasive testing/procedures in the past.    -Avoid AV jimy blockers (BB or CCB).    -Cardiology Cx    -Stable, F/U outpatient PCP         Diastolic CHF, chronic.    -Euvolemic       -Lasix, Spironolactone    -Stable, F/U outpatient PCP         Type 2 diabetes mellitus. A1c 6.3    -ISS     -Stable, F/U outpatient PCP        PVD (peripheral vascular disease).      -HOLD Cilostazol     -Stable, F/U outpatient PCP        Hypothyroidism.      -Synthroid.     -Stable, F/U outpatient PCP        Acute kidney injury superimposed on CKD (baseline ~1.7)    -Likely pre-renal 2/2 GIB, can also be exacerbated by hypertensive urgency.     -S/P IVF    -Improved, F/U outpatient PCP         Spoke with medical attending, Dr. Momin 9/23, pt is medically stable for discharge to home

## 2019-09-21 NOTE — DISCHARGE NOTE PROVIDER - NSDCCPCAREPLAN_GEN_ALL_CORE_FT
PRINCIPAL DISCHARGE DIAGNOSIS  Diagnosis: GI bleed  Assessment and Plan of Treatment: You were seen by Gastroenterology in-hospital. You had 2 units packed red blood cells with improvement. You had a colonoscopy that showed diverticulosis and hemorrhoids. This is likely the source of your rectal bleeding episodes. Continue Protonix as recommended. HOLD Coumadin given recent bleeding episode. Follow up outpatient Cardiology and Gastroenterology in 1-2 weeks for further management.      SECONDARY DISCHARGE DIAGNOSES  Diagnosis: PVD (peripheral vascular disease)  Assessment and Plan of Treatment: PVD (peripheral vascular disease)    Diagnosis: Hypothyroidism  Assessment and Plan of Treatment: Hypothyroidism    Diagnosis: Type 2 diabetes mellitus with other specified complication, with long-term current use of insulin  Assessment and Plan of Treatment: Type 2 diabetes mellitus with other specified complication, with long-term current use of insulin    Diagnosis: Diastolic CHF, chronic  Assessment and Plan of Treatment: Diastolic CHF, chronic    Diagnosis: Afib  Assessment and Plan of Treatment: Afib    Diagnosis: Hypertensive urgency  Assessment and Plan of Treatment: Hypertensive urgency    Diagnosis: Acute kidney injury superimposed on CKD  Assessment and Plan of Treatment:   Hypertensive urgency.    -HOLD Lasix, Spironolactone in setting of GIB   Afib. [id number] score 9.   -Hx SSS, pt and family declining invasive testing/procedures in the past.  -Avoid AV jimy blockers (BB or CCB).  -Cardiology Cx  Diastolic CHF, chronic.  -Euvolemic     -HOLD Lasix, Spironolactone in setting of GIB   Type 2 diabetes mellitus. A1c 6.3  -ISS   PVD (peripheral vascular disease).    -HOLD Cilostazol   Hypothyroidism.    -Synthroid.   Acute kidney injury superimposed on CKD (baseline ~1.7)  -Likely pre-renal 2/2 GIB, can also be exacerbated by hypertensive urgency.   -S/P IVF  -Improved   . PRINCIPAL DISCHARGE DIAGNOSIS  Diagnosis: GI bleed  Assessment and Plan of Treatment: You were seen by Gastroenterology in-hospital. You had 2 units packed red blood cells with improvement. You had a colonoscopy that showed diverticulosis and hemorrhoids. This is likely the source of your rectal bleeding episodes. Continue Protonix as recommended. HOLD Coumadin given recent bleeding episode. Follow up outpatient Cardiology and Gastroenterology in 1-2 weeks for further management.      SECONDARY DISCHARGE DIAGNOSES  Diagnosis: PVD (peripheral vascular disease)  Assessment and Plan of Treatment: HOLD Cilostazol given recent gastrointestinal bleeding. Follow up outpatient Gastroenterology and PCP in 1-2 weeks for further management.    Diagnosis: Hypothyroidism  Assessment and Plan of Treatment: Continue your thyroid medications as recommended and follow-up with your outpatient provider for continual thyroid function testing and further medication management.      Diagnosis: Type 2 diabetes mellitus with other specified complication, with long-term current use of insulin  Assessment and Plan of Treatment: Continue consistent carbohydrate diet.  Monitor blood glucose levels throughout the day before meals and at bedtime.  Record blood sugars and bring to outpatient providers appointment in order to be reviewed by your doctor for management modifications.  Be aware of diabetes management symptoms including feeling cool and clammy may be related to low glucose levels.  Feeling hot and dry may indicate high glucose levels. If you feel these symptoms, check your blood sugar.  Make regular podiatry appointments in order to have feet checked for wounds and toe nails cut by a doctor to prevent infections, as well as, appointments with an ophthalmologist to monitor your vision.      Diagnosis: Diastolic CHF, chronic  Assessment and Plan of Treatment: Continue Lasix and Spironolactone. Monitor for signs/symptoms of fluid overload and electrolyte abnormalities, such as, shortness of breath, cough, swelling, chest discomfort, changes in heart rate, dizziness, fainting, or changes in mental status. Follow-up with your PCP/cardiologist outpatient after you've been discharged from the hospital.      Diagnosis: Afib  Assessment and Plan of Treatment: HOLD Coumadin given recent bleeding episode. Follow up outpatient Cardiology and Gastroenterology in 1-2 weeks for further management.    Diagnosis: Hypertensive urgency  Assessment and Plan of Treatment: You were noted with elevated blood pressure. Resolved. Continue blood pressure medication regimen as directed. Monitor for any visual changes, headaches or dizziness.  Monitor blood pressure regularly.  Follow up with your PCP for further management for high blood pressure.      Diagnosis: Acute kidney injury superimposed on CKD  Assessment and Plan of Treatment: You were noted with elevated kidney function. You were given some hydration with improvement. Follow up outpatient PCP in 1-2 weeks for repeat BMP and for further management.

## 2019-09-22 ENCOUNTER — TRANSCRIPTION ENCOUNTER (OUTPATIENT)
Age: 84
End: 2019-09-22

## 2019-09-22 LAB
ANION GAP SERPL CALC-SCNC: 17 MMO/L — HIGH (ref 7–14)
BUN SERPL-MCNC: 27 MG/DL — HIGH (ref 7–23)
CALCIUM SERPL-MCNC: 10 MG/DL — SIGNIFICANT CHANGE UP (ref 8.4–10.5)
CHLORIDE SERPL-SCNC: 99 MMOL/L — SIGNIFICANT CHANGE UP (ref 98–107)
CO2 SERPL-SCNC: 25 MMOL/L — SIGNIFICANT CHANGE UP (ref 22–31)
CREAT SERPL-MCNC: 1.46 MG/DL — HIGH (ref 0.5–1.3)
GLUCOSE BLDC GLUCOMTR-MCNC: 104 MG/DL — HIGH (ref 70–99)
GLUCOSE BLDC GLUCOMTR-MCNC: 123 MG/DL — HIGH (ref 70–99)
GLUCOSE BLDC GLUCOMTR-MCNC: 147 MG/DL — HIGH (ref 70–99)
GLUCOSE BLDC GLUCOMTR-MCNC: 213 MG/DL — HIGH (ref 70–99)
GLUCOSE SERPL-MCNC: 122 MG/DL — HIGH (ref 70–99)
HCT VFR BLD CALC: 32.7 % — LOW (ref 34.5–45)
HGB BLD-MCNC: 10.3 G/DL — LOW (ref 11.5–15.5)
MAGNESIUM SERPL-MCNC: 1.8 MG/DL — SIGNIFICANT CHANGE UP (ref 1.6–2.6)
MCHC RBC-ENTMCNC: 28.8 PG — SIGNIFICANT CHANGE UP (ref 27–34)
MCHC RBC-ENTMCNC: 31.5 % — LOW (ref 32–36)
MCV RBC AUTO: 91.3 FL — SIGNIFICANT CHANGE UP (ref 80–100)
NRBC # FLD: 0 K/UL — SIGNIFICANT CHANGE UP (ref 0–0)
PLATELET # BLD AUTO: 154 K/UL — SIGNIFICANT CHANGE UP (ref 150–400)
PMV BLD: 12.5 FL — SIGNIFICANT CHANGE UP (ref 7–13)
POTASSIUM SERPL-MCNC: 3.5 MMOL/L — SIGNIFICANT CHANGE UP (ref 3.5–5.3)
POTASSIUM SERPL-SCNC: 3.5 MMOL/L — SIGNIFICANT CHANGE UP (ref 3.5–5.3)
RBC # BLD: 3.58 M/UL — LOW (ref 3.8–5.2)
RBC # FLD: 19.3 % — HIGH (ref 10.3–14.5)
SODIUM SERPL-SCNC: 141 MMOL/L — SIGNIFICANT CHANGE UP (ref 135–145)
WBC # BLD: 7.43 K/UL — SIGNIFICANT CHANGE UP (ref 3.8–10.5)
WBC # FLD AUTO: 7.43 K/UL — SIGNIFICANT CHANGE UP (ref 3.8–10.5)

## 2019-09-22 RX ORDER — AMLODIPINE BESYLATE 2.5 MG/1
5 TABLET ORAL ONCE
Refills: 0 | Status: COMPLETED | OUTPATIENT
Start: 2019-09-22 | End: 2019-09-22

## 2019-09-22 RX ORDER — HYDROCORTISONE 1 %
1 OINTMENT (GRAM) TOPICAL
Qty: 1 | Refills: 0
Start: 2019-09-22

## 2019-09-22 RX ORDER — INSULIN DETEMIR 100/ML (3)
0 INSULIN PEN (ML) SUBCUTANEOUS
Qty: 0 | Refills: 0 | DISCHARGE

## 2019-09-22 RX ORDER — AMLODIPINE BESYLATE 2.5 MG/1
1 TABLET ORAL
Qty: 30 | Refills: 0
Start: 2019-09-22 | End: 2019-10-21

## 2019-09-22 RX ORDER — MINERAL OIL, PETROLATUM, PHENYLEPHRINE HCL 2.5; 140; 749 MG/G; MG/G; MG/G
1 OINTMENT TOPICAL
Qty: 1 | Refills: 0
Start: 2019-09-22

## 2019-09-22 RX ORDER — CILOSTAZOL 100 MG/1
1 TABLET ORAL
Qty: 0 | Refills: 0 | DISCHARGE

## 2019-09-22 RX ORDER — HYDRALAZINE HCL 50 MG
10 TABLET ORAL ONCE
Refills: 0 | Status: COMPLETED | OUTPATIENT
Start: 2019-09-22 | End: 2019-09-22

## 2019-09-22 RX ORDER — HYDROCORTISONE 1 %
1 OINTMENT (GRAM) TOPICAL DAILY
Refills: 0 | Status: DISCONTINUED | OUTPATIENT
Start: 2019-09-22 | End: 2019-09-23

## 2019-09-22 RX ADMIN — Medication 75 MILLIGRAM(S): at 13:19

## 2019-09-22 RX ADMIN — PANTOPRAZOLE SODIUM 40 MILLIGRAM(S): 20 TABLET, DELAYED RELEASE ORAL at 05:47

## 2019-09-22 RX ADMIN — Medication 2: at 17:38

## 2019-09-22 RX ADMIN — SIMVASTATIN 20 MILLIGRAM(S): 20 TABLET, FILM COATED ORAL at 21:57

## 2019-09-22 RX ADMIN — Medication 40 MILLIGRAM(S): at 05:47

## 2019-09-22 RX ADMIN — Medication 10 MILLIGRAM(S): at 21:57

## 2019-09-22 RX ADMIN — LATANOPROST 1 DROP(S): 0.05 SOLUTION/ DROPS OPHTHALMIC; TOPICAL at 21:58

## 2019-09-22 RX ADMIN — AMLODIPINE BESYLATE 5 MILLIGRAM(S): 2.5 TABLET ORAL at 10:38

## 2019-09-22 RX ADMIN — Medication 1 APPLICATION(S): at 17:37

## 2019-09-22 RX ADMIN — Medication 100 MILLIGRAM(S): at 13:19

## 2019-09-22 RX ADMIN — SPIRONOLACTONE 25 MILLIGRAM(S): 25 TABLET, FILM COATED ORAL at 05:47

## 2019-09-22 RX ADMIN — Medication 1 APPLICATION(S): at 05:48

## 2019-09-22 RX ADMIN — Medication 75 MILLIGRAM(S): at 05:47

## 2019-09-22 RX ADMIN — Medication 75 MILLIGRAM(S): at 19:16

## 2019-09-22 RX ADMIN — Medication 40 MILLIGRAM(S): at 17:37

## 2019-09-22 RX ADMIN — Medication 75 MICROGRAM(S): at 05:47

## 2019-09-22 NOTE — DISCHARGE NOTE NURSING/CASE MANAGEMENT/SOCIAL WORK - NSSCNAMETXT_GEN_ALL_CORE
[facility] [phone] initial visit will be day after discharge home. A nurse will call prior to the home visit.

## 2019-09-22 NOTE — DISCHARGE NOTE NURSING/CASE MANAGEMENT/SOCIAL WORK - NSDCPEPTSTRK_GEN_ALL_CORE
Stroke warning signs and symptoms/Signs and symptoms of stroke/Risk factors for stroke/Stroke support groups for patients, families, and friends/Need for follow up after discharge/Prescribed medications/Stroke education booklet/Call 911 for stroke

## 2019-09-22 NOTE — PROVIDER CONTACT NOTE (OTHER) - ASSESSMENT
Patient supposed to be discharged home. Patient's vitals were assessed for 1700 medications to be administered before being discharged to go home. Blood pressure is 192/93 electronically and 188/86 manual. Patient denies any symptoms of headache.

## 2019-09-22 NOTE — PROVIDER CONTACT NOTE (OTHER) - ACTION/TREATMENT ORDERED:
Administer moviprep and dulcolax in preparation for colonoscopy in AM
DNR will be receded during Colonoscopy procedure
will order 10mg hydralazine IV push.
Ok to administer medications with a small sip of water
Will come to floor to assess
Will give lasix and reassess patient.

## 2019-09-22 NOTE — PROVIDER CONTACT NOTE (OTHER) - RECOMMENDATIONS
Give lasix as ordered and reassess blood pressure. Have two stable blood pressure readings and then reassess if patient can be discharged home.

## 2019-09-23 VITALS
DIASTOLIC BLOOD PRESSURE: 64 MMHG | RESPIRATION RATE: 18 BRPM | HEART RATE: 65 BPM | SYSTOLIC BLOOD PRESSURE: 140 MMHG | TEMPERATURE: 98 F | OXYGEN SATURATION: 100 %

## 2019-09-23 LAB
ANION GAP SERPL CALC-SCNC: 11 MMO/L — SIGNIFICANT CHANGE UP (ref 7–14)
BUN SERPL-MCNC: 35 MG/DL — HIGH (ref 7–23)
CALCIUM SERPL-MCNC: 9.7 MG/DL — SIGNIFICANT CHANGE UP (ref 8.4–10.5)
CHLORIDE SERPL-SCNC: 105 MMOL/L — SIGNIFICANT CHANGE UP (ref 98–107)
CO2 SERPL-SCNC: 26 MMOL/L — SIGNIFICANT CHANGE UP (ref 22–31)
CREAT SERPL-MCNC: 1.86 MG/DL — HIGH (ref 0.5–1.3)
GLUCOSE BLDC GLUCOMTR-MCNC: 129 MG/DL — HIGH (ref 70–99)
GLUCOSE BLDC GLUCOMTR-MCNC: 182 MG/DL — HIGH (ref 70–99)
GLUCOSE SERPL-MCNC: 126 MG/DL — HIGH (ref 70–99)
HCT VFR BLD CALC: 30.7 % — LOW (ref 34.5–45)
HGB BLD-MCNC: 9.6 G/DL — LOW (ref 11.5–15.5)
MAGNESIUM SERPL-MCNC: 1.8 MG/DL — SIGNIFICANT CHANGE UP (ref 1.6–2.6)
MCHC RBC-ENTMCNC: 29.4 PG — SIGNIFICANT CHANGE UP (ref 27–34)
MCHC RBC-ENTMCNC: 31.3 % — LOW (ref 32–36)
MCV RBC AUTO: 93.9 FL — SIGNIFICANT CHANGE UP (ref 80–100)
NRBC # FLD: 0 K/UL — SIGNIFICANT CHANGE UP (ref 0–0)
PLATELET # BLD AUTO: 151 K/UL — SIGNIFICANT CHANGE UP (ref 150–400)
PMV BLD: 12.5 FL — SIGNIFICANT CHANGE UP (ref 7–13)
POTASSIUM SERPL-MCNC: 4.2 MMOL/L — SIGNIFICANT CHANGE UP (ref 3.5–5.3)
POTASSIUM SERPL-SCNC: 4.2 MMOL/L — SIGNIFICANT CHANGE UP (ref 3.5–5.3)
RBC # BLD: 3.27 M/UL — LOW (ref 3.8–5.2)
RBC # FLD: 19.1 % — HIGH (ref 10.3–14.5)
SODIUM SERPL-SCNC: 142 MMOL/L — SIGNIFICANT CHANGE UP (ref 135–145)
WBC # BLD: 6.6 K/UL — SIGNIFICANT CHANGE UP (ref 3.8–10.5)
WBC # FLD AUTO: 6.6 K/UL — SIGNIFICANT CHANGE UP (ref 3.8–10.5)

## 2019-09-23 RX ORDER — PANTOPRAZOLE SODIUM 20 MG/1
1 TABLET, DELAYED RELEASE ORAL
Qty: 30 | Refills: 0
Start: 2019-09-23 | End: 2019-10-22

## 2019-09-23 RX ORDER — AMLODIPINE BESYLATE 2.5 MG/1
5 TABLET ORAL DAILY
Refills: 0 | Status: DISCONTINUED | OUTPATIENT
Start: 2019-09-23 | End: 2019-09-23

## 2019-09-23 RX ORDER — HYDRALAZINE HCL 50 MG
100 TABLET ORAL THREE TIMES A DAY
Refills: 0 | Status: DISCONTINUED | OUTPATIENT
Start: 2019-09-23 | End: 2019-09-23

## 2019-09-23 RX ORDER — HYDRALAZINE HCL 50 MG
1 TABLET ORAL
Qty: 90 | Refills: 0
Start: 2019-09-23 | End: 2019-10-22

## 2019-09-23 RX ORDER — HYDRALAZINE HCL 50 MG
75 TABLET ORAL
Qty: 0 | Refills: 0 | DISCHARGE

## 2019-09-23 RX ORDER — INSULIN DETEMIR 100/ML (3)
16 INSULIN PEN (ML) SUBCUTANEOUS
Qty: 0 | Refills: 0 | DISCHARGE

## 2019-09-23 RX ADMIN — Medication 75 MILLIGRAM(S): at 05:40

## 2019-09-23 RX ADMIN — SPIRONOLACTONE 25 MILLIGRAM(S): 25 TABLET, FILM COATED ORAL at 05:40

## 2019-09-23 RX ADMIN — Medication 75 MICROGRAM(S): at 05:40

## 2019-09-23 RX ADMIN — AMLODIPINE BESYLATE 5 MILLIGRAM(S): 2.5 TABLET ORAL at 12:20

## 2019-09-23 RX ADMIN — Medication 1: at 12:41

## 2019-09-23 RX ADMIN — Medication 1 APPLICATION(S): at 05:42

## 2019-09-23 RX ADMIN — Medication 40 MILLIGRAM(S): at 05:40

## 2019-09-23 RX ADMIN — Medication 100 MILLIGRAM(S): at 12:18

## 2019-09-23 RX ADMIN — PANTOPRAZOLE SODIUM 40 MILLIGRAM(S): 20 TABLET, DELAYED RELEASE ORAL at 05:40

## 2019-09-23 NOTE — PROGRESS NOTE ADULT - REASON FOR ADMISSION
Rectal bleeding

## 2019-09-23 NOTE — PROGRESS NOTE ADULT - PROBLEM SELECTOR PLAN 1
- ddx: hemorrhoidal vs diverticular bleed vs rapid transit ugib  - trend h/h  - hold coumadin for now while we trend her h/h and continue monitor for further episodes of bleeding  - protonix 40mg IVP BID until rapid UGIB ruled out   - anusol suppository qhs   - clear liquid diet with plans for possible EGD/Colonoscopy tomorrow after patient discusses it with her daughter given drop in her h/h; will order bowel prep; npo p mn if agreeable
- s/p Colonoscopy with likely resolved diverticular bleed  - h/h stabilized and bleeding fully resolved  - would continue to hold coumadin given risks of rebleeding outweigh the benefit   - protonix 40mg PO QD  - continue to monitor stools   - diet as tolerated
- s/p Colonoscopy with likely resolving diverticular bleed  - transfuse PRBC x 1 unit today, 9/20  - if patient rebleeds, please check NM Bleeding Scan of abd to assess for location of bleed and if positive, IR eval for embolization   - trend h/h  - would continue to hold coumadin given risks of rebleeding outweigh the benefit   - protonix 40mg PO QD  - continue to monitor stools   - diet as tolerated
Pt with stable CKD IV,  cr 1.7 today, stable  Electrolytes and volume status acceptable.  BP high today- inc hydrALAZINE to 75mg tid (home dose)  lasix on hold. euvolemic clinically  Avoid iv contrast, NSAIDs.
Pt with stable CKD IV, at baseline cr.  Electrolytes and volume status acceptable.  BP controlled.   Avoid contrast, NSAID. No indication for IVF at this time.
Pt with stable CKD IV,  cr rel stable, 1.9 today  Electrolytes and volume status acceptable.  BP better today- c/w hydrALAZINE 1005mg tid, Norvasc, lasix 40mg po bid  euvolemic clinically  Avoid iv contrast, NSAIDs.  daily BMP
Pt with stable CKD IV,  cr 1.7 today, better  Electrolytes and volume status acceptable.  BP controlled.   Avoid iv contrast, NSAIDs.

## 2019-09-23 NOTE — PROGRESS NOTE ADULT - ASSESSMENT
89 yo woman with history of afib/aflutter (on coumadin), diastolic CHF (with EF 63% on TTE 8/13/19), HTN, HLD, CKD stage 3-4, DM2 (on insulin), PVD, CVA, PE, hypothyroidism, and recent admission at [facility] (8/10-8/15/19) for acute decompensated heart failure presents with 2 days of rectal bleeding, admitted with GI bleed.     Problem/Plan - 1:  ·  Problem: Gastrointestinal hemorrhage, unspecified gastrointestinal hemorrhage type.  Plan: Likely secondary to Diverticular bleed. HH stable.   GI helping .S/P   Colonoscopy.      Problem/Plan - 2:  ·  Problem: Chronic Anemia with acute blood loss .  Plan: HH stable.      Problem/Plan - 3:  ·  Problem: Hypertensive urgency.  Plan: BP readings high so adjusting meds.      Problem/Plan - 4:  ·  Problem: Afib.  Plan: [id number] score 9.   - Off AC per GI .      Problem/Plan - 5:  ·  Problem: Diastolic CHF, chronic.  Plan: Cardiology helping. Pt euvolemic on exam. No S/S of acute decompensated heart failure.  - Hold lasix and spironolactone for now in setting of recent GIB. Will restart if clinically indicated.  - Strict I&Os  - Daily weights.      Problem/Plan - 6:  Problem: Type 2 diabetes mellitus with other specified complication, with long-term current use of insulin. Plan: Takes Levemir 16u at bedtime for FSG > 150; otherwise, takes 8u. HgbA1c 6.3% in 8/2019.  - Start low-dose ISS and FSG q6hrs while pt is NPO  - Hold basal insulin for now given NPO status, pt's age, and recent HgbA1c. Will resume basal insulin if FSG poorly controlled and based on correction needed.     Problem/Plan - 7:  ·  Problem: PVD (peripheral vascular disease).  Plan: On cilostazol at home. Palpable LE pulses b/l on exam.  - Hold cilostazol in setting of recent GIB.      Problem/Plan - 8:  ·  Problem: Hypothyroidism.  Plan: No S/S of hyper/hypothyroidism.   - C/w levothyroxine 75 mcg daily.      Problem/Plan - 9:  ·  Problem: Acute kidney injury superimposed on CKD.  Plan: Renal helping.  -Cr 2.3 on admission vs. ~1.7 at baseline. NIKHIL likely prerenal 2/2 blood loss from GIB. Can also be exacerbated by hypertensive urgency.   - Monitor Cr and UOP  - Avoid NSAIDs, ACEi/ARBs, contrast, nephrotoxic agents  - Renally dose meds.      Problem/Plan - 10:  Problem: Need for prophylactic measure. Plan; - DVT ppx: Hold coumadin in setting of recent GIB      DC planning home if HH stable .
89 yo woman with history of afib/aflutter (on coumadin), diastolic CHF (with EF 63% on TTE 8/13/19), HTN, HLD, CKD stage 3-4, DM2 (on insulin), PVD, CVA, PE, hypothyroidism, and recent admission at [facility] (8/10-8/15/19) for acute decompensated heart failure presents with 2 days of rectal bleeding, admitted with GI bleed.     Problem/Plan - 1:  ·  Problem: Gastrointestinal hemorrhage, unspecified gastrointestinal hemorrhage type.  Plan: Likely secondary to Diverticular bleed.  GI helping .S/P   Colonoscopy.      Problem/Plan - 2:  ·  Problem: Chronic Anemia with acute blood loss .  Plan: Drop in Hgb so will transfuse for HGb 8G or less.       Problem/Plan - 3:  ·  Problem: Hypertensive urgency.  Plan: BP readings better .      Problem/Plan - 4:  ·  Problem: Afib.  Plan: [id number] score 9.   - Holding Coumadin.      Problem/Plan - 5:  ·  Problem: Diastolic CHF, chronic.  Plan: Cardiology helping. Pt euvolemic on exam. No S/S of acute decompensated heart failure.  - Hold lasix and spironolactone for now in setting of recent GIB. Will restart if clinically indicated.  - Strict I&Os  - Daily weights.      Problem/Plan - 6:  Problem: Type 2 diabetes mellitus with other specified complication, with long-term current use of insulin. Plan: Takes Levemir 16u at bedtime for FSG > 150; otherwise, takes 8u. HgbA1c 6.3% in 8/2019.  - Start low-dose ISS and FSG q6hrs while pt is NPO  - Hold basal insulin for now given NPO status, pt's age, and recent HgbA1c. Will resume basal insulin if FSG poorly controlled and based on correction needed.     Problem/Plan - 7:  ·  Problem: PVD (peripheral vascular disease).  Plan: On cilostazol at home. Palpable LE pulses b/l on exam.  - Hold cilostazol in setting of recent GIB.      Problem/Plan - 8:  ·  Problem: Hypothyroidism.  Plan: No S/S of hyper/hypothyroidism.   - C/w levothyroxine 75 mcg daily.      Problem/Plan - 9:  ·  Problem: Acute kidney injury superimposed on CKD.  Plan: Renal helping.  -Cr 2.3 on admission vs. ~1.7 at baseline. NIKHIL likely prerenal 2/2 blood loss from GIB. Can also be exacerbated by hypertensive urgency.   - Monitor Cr and UOP  - Avoid NSAIDs, ACEi/ARBs, contrast, nephrotoxic agents  - Renally dose meds.      Problem/Plan - 10:  Problem: Need for prophylactic measure. Plan; - DVT ppx: Hold coumadin in setting of recent GIB      DC planning home if HH stable .
Hematochezia secondary to Gi bleed    Doing well overall   Does not seem to be actively bleeding   Restart diet   CBC tomorrow   Stool softeners   Advanced care planning was discussed with patient and family.  Advanced care planning forms were reviewed and discussed.  Risks, benefits and alternatives of gastroenterologic procedures were discussed in detail and all questions were answered.
89 yo woman with history of afib/aflutter (on coumadin), diastolic CHF (with EF 63% on TTE 8/13/19), HTN, HLD, CKD stage 3-4, DM2 (on insulin), PVD, CVA, PE, hypothyroidism, and recent admission at [facility] (8/10-8/15/19) for acute decompensated heart failure presents with 2 days of rectal bleeding, admitted with GI bleed.     Problem/Plan - 1:  ·  Problem: Gastrointestinal hemorrhage, unspecified gastrointestinal hemorrhage type.  Plan: Likely secondary to Diverticular bleed.  GI helping .S/P   Colonoscopy.      Problem/Plan - 2:  ·  Problem: Chronic Anemia with acute blood loss .  Plan: Drop in Hgb so will transfuse for HGb 8G or less.       Problem/Plan - 3:  ·  Problem: Hypertensive urgency.  Plan: BP readings better .      Problem/Plan - 4:  ·  Problem: Afib.  Plan: [id number] score 9.   - Holding Coumadin.      Problem/Plan - 5:  ·  Problem: Diastolic CHF, chronic.  Plan: Cardiology helping. Pt euvolemic on exam. No S/S of acute decompensated heart failure.  - Hold lasix and spironolactone for now in setting of recent GIB. Will restart if clinically indicated.  - Strict I&Os  - Daily weights.      Problem/Plan - 6:  Problem: Type 2 diabetes mellitus with other specified complication, with long-term current use of insulin. Plan: Takes Levemir 16u at bedtime for FSG > 150; otherwise, takes 8u. HgbA1c 6.3% in 8/2019.  - Start low-dose ISS and FSG q6hrs while pt is NPO  - Hold basal insulin for now given NPO status, pt's age, and recent HgbA1c. Will resume basal insulin if FSG poorly controlled and based on correction needed.     Problem/Plan - 7:  ·  Problem: PVD (peripheral vascular disease).  Plan: On cilostazol at home. Palpable LE pulses b/l on exam.  - Hold cilostazol in setting of recent GIB.      Problem/Plan - 8:  ·  Problem: Hypothyroidism.  Plan: No S/S of hyper/hypothyroidism.   - C/w levothyroxine 75 mcg daily.      Problem/Plan - 9:  ·  Problem: Acute kidney injury superimposed on CKD.  Plan: Renal helping.  -Cr 2.3 on admission vs. ~1.7 at baseline. NIKHIL likely prerenal 2/2 blood loss from GIB. Can also be exacerbated by hypertensive urgency.   - Monitor Cr and UOP  - Avoid NSAIDs, ACEi/ARBs, contrast, nephrotoxic agents  - Renally dose meds.      Problem/Plan - 10:  Problem: Need for prophylactic measure. Plan; - DVT ppx: Hold coumadin in setting of recent GIB      DC planning home tomorrow if HH stable .
89 yo woman with history of afib/aflutter (on coumadin), diastolic CHF (with EF 63% on TTE 8/13/19), HTN, HLD, CKD stage 3-4, DM2 (on insulin), PVD, CVA, PE, hypothyroidism, and recent admission at [facility] (8/10-8/15/19) for acute decompensated heart failure presents with 2 days of rectal [id number] yo woman with history of afib/aflutter (on coumadin), diastolic CHF (with EF 63% on TTE 8/13/19), HTN, HLD, CKD stage 3-4, DM2 (on insulin), PVD, CVA, PE, hypothyroidism, and recent admission at [facility] (8/10-8/15/19) for acute decompensated heart failure presents with 2 days of rectal bleeding.
89 yo woman with history of afib/aflutter (on coumadin), diastolic CHF (with EF 63% on TTE 8/13/19), HTN, HLD, CKD stage 3-4, DM2 (on insulin), PVD, CVA, PE, hypothyroidism, and recent admission at [facility] (8/10-8/15/19) for acute decompensated heart failure presents with 2 days of rectal [id number] yo woman with history of afib/aflutter (on coumadin), diastolic CHF (with EF 63% on TTE 8/13/19), HTN, HLD, CKD stage 3-4, DM2 (on insulin), PVD, CVA, PE, hypothyroidism, and recent admission at [facility] (8/10-8/15/19) for acute decompensated heart failure presents with 2 days of rectal bleeding.
89 yo woman with history of afib/aflutter (on coumadin), diastolic CHF (with EF 63% on TTE 8/13/19), HTN, HLD, CKD stage 3-4, DM2 (on insulin), PVD, CVA, PE, hypothyroidism, and recent admission at [facility] (8/10-8/15/19) for acute decompensated heart failure presents with 2 days of rectal bleeding, admitted with GI bleed.     Problem/Plan - 1:  ·  Problem: Gastrointestinal hemorrhage, unspecified gastrointestinal hemorrhage type.  Plan: GI helping .Drop in Hgb so planning Endoscopy as well Colonoscopy.      Problem/Plan - 2:  ·  Problem: Chronic Anemia with acute blood loss .  Plan: Drop in Hgb so will transfuse for HGb8G or less.       Problem/Plan - 3:  ·  Problem: Hypertensive urgency.  Plan: BP readings better .      Problem/Plan - 4:  ·  Problem: Afib.  Plan: [id number] score 9.   - Holding Coumadin.      Problem/Plan - 5:  ·  Problem: Diastolic CHF, chronic.  Plan: Cardiology helping. Pt euvolemic on exam. No S/S of acute decompensated heart failure.  - Hold lasix and spironolactone for now in setting of recent GIB. Will restart if clinically indicated.  - Strict I&Os  - Daily weights.      Problem/Plan - 6:  Problem: Type 2 diabetes mellitus with other specified complication, with long-term current use of insulin. Plan: Takes Levemir 16u at bedtime for FSG > 150; otherwise, takes 8u. HgbA1c 6.3% in 8/2019.  - Start low-dose ISS and FSG q6hrs while pt is NPO  - Hold basal insulin for now given NPO status, pt's age, and recent HgbA1c. Will resume basal insulin if FSG poorly controlled and based on correction needed.     Problem/Plan - 7:  ·  Problem: PVD (peripheral vascular disease).  Plan: On cilostazol at home. Palpable LE pulses b/l on exam.  - Hold cilostazol in setting of recent GIB.      Problem/Plan - 8:  ·  Problem: Hypothyroidism.  Plan: No S/S of hyper/hypothyroidism.   - C/w levothyroxine 75 mcg daily.      Problem/Plan - 9:  ·  Problem: Acute kidney injury superimposed on CKD.  Plan: Renal helping.  -Cr 2.3 on admission vs. ~1.7 at baseline. NIKHIL likely prerenal 2/2 blood loss from GIB. Can also be exacerbated by hypertensive urgency.   - Monitor Cr and UOP  - Avoid NSAIDs, ACEi/ARBs, contrast, nephrotoxic agents  - Renally dose meds.      Problem/Plan - 10:  Problem: Need for prophylactic measure. Plan; - DVT ppx: Hold coumadin in setting of recent GIB
89y Female with afib/aflutter (on coumadin), diastolic CHF (with EF 63% on TTE 8/13/19), HTN, CKD stage 3-4, DM2 (on insulin), PVD, h/o CVA, PE, hypothyroidism, and recent admission at [facility] (8/10-8/15/19) for acute decompensated heart failure presents BRBPR.
89y Female with afib/aflutter (on coumadin), diastolic CHF (with EF 63% on TTE 8/13/19), HTN, CKD stage 3-4, DM2 (on insulin), PVD, h/o CVA, PE, hypothyroidism, and recent admission at [facility] (8/10-8/15/19) for acute decompensated heart failure presents BRBPR. Renal following for CKD Mx.    labs, chart reviewed
89 yo woman with history of afib/aflutter (on coumadin), diastolic CHF (with EF 63% on TTE 8/13/19), HTN, HLD, CKD stage 3-4, DM2 (on insulin), PVD, CVA, PE, hypothyroidism, and recent admission at [facility] (8/10-8/15/19) for acute decompensated heart failure presents with 2 days of rectal bleeding, admitted with GI bleed.     Problem/Plan - 1:  ·  Problem: Gastrointestinal hemorrhage, unspecified gastrointestinal hemorrhage type.  Plan: Likely secondary to Diverticular bleed.  GI helping .S/P   Colonoscopy.      Problem/Plan - 2:  ·  Problem: Chronic Anemia with acute blood loss .  Plan: Drop in Hgb so will transfuse for HGb 8G or less.       Problem/Plan - 3:  ·  Problem: Hypertensive urgency.  Plan: BP readings high so adjusting meds.      Problem/Plan - 4:  ·  Problem: Afib.  Plan: [id number] score 9.   - Holding Coumadin.      Problem/Plan - 5:  ·  Problem: Diastolic CHF, chronic.  Plan: Cardiology helping. Pt euvolemic on exam. No S/S of acute decompensated heart failure.  - Hold lasix and spironolactone for now in setting of recent GIB. Will restart if clinically indicated.  - Strict I&Os  - Daily weights.      Problem/Plan - 6:  Problem: Type 2 diabetes mellitus with other specified complication, with long-term current use of insulin. Plan: Takes Levemir 16u at bedtime for FSG > 150; otherwise, takes 8u. HgbA1c 6.3% in 8/2019.  - Start low-dose ISS and FSG q6hrs while pt is NPO  - Hold basal insulin for now given NPO status, pt's age, and recent HgbA1c. Will resume basal insulin if FSG poorly controlled and based on correction needed.     Problem/Plan - 7:  ·  Problem: PVD (peripheral vascular disease).  Plan: On cilostazol at home. Palpable LE pulses b/l on exam.  - Hold cilostazol in setting of recent GIB.      Problem/Plan - 8:  ·  Problem: Hypothyroidism.  Plan: No S/S of hyper/hypothyroidism.   - C/w levothyroxine 75 mcg daily.      Problem/Plan - 9:  ·  Problem: Acute kidney injury superimposed on CKD.  Plan: Renal helping.  -Cr 2.3 on admission vs. ~1.7 at baseline. NIKHIL likely prerenal 2/2 blood loss from GIB. Can also be exacerbated by hypertensive urgency.   - Monitor Cr and UOP  - Avoid NSAIDs, ACEi/ARBs, contrast, nephrotoxic agents  - Renally dose meds.      Problem/Plan - 10:  Problem: Need for prophylactic measure. Plan; - DVT ppx: Hold coumadin in setting of recent GIB      DC planning home if HH stable .
89 yo woman with history of afib/aflutter (on coumadin), diastolic CHF (with EF 63% on TTE 8/13/19), HTN, HLD, CKD stage 3-4, DM2 (on insulin), PVD, CVA, PE, hypothyroidism, and recent admission at [facility] (8/10-8/15/19) for acute decompensated heart failure presents with 2 days of rectal bleeding, admitted with GI bleed.     Problem/Plan - 1:  ·  Problem: Gastrointestinal hemorrhage, unspecified gastrointestinal hemorrhage type.  Plan: Likely secondary to Diverticular bleed.  GI helping .S/P   Colonoscopy.      Problem/Plan - 2:  ·  Problem: Chronic Anemia with acute blood loss .  Plan: Drop in Hgb so will transfuse for HGb 8G or less.       Problem/Plan - 3:  ·  Problem: Hypertensive urgency.  Plan: BP readings high so adjusting meds.      Problem/Plan - 4:  ·  Problem: Afib.  Plan: [id number] score 9.   - Off AC per GI .      Problem/Plan - 5:  ·  Problem: Diastolic CHF, chronic.  Plan: Cardiology helping. Pt euvolemic on exam. No S/S of acute decompensated heart failure.  - Hold lasix and spironolactone for now in setting of recent GIB. Will restart if clinically indicated.  - Strict I&Os  - Daily weights.      Problem/Plan - 6:  Problem: Type 2 diabetes mellitus with other specified complication, with long-term current use of insulin. Plan: Takes Levemir 16u at bedtime for FSG > 150; otherwise, takes 8u. HgbA1c 6.3% in 8/2019.  - Start low-dose ISS and FSG q6hrs while pt is NPO  - Hold basal insulin for now given NPO status, pt's age, and recent HgbA1c. Will resume basal insulin if FSG poorly controlled and based on correction needed.     Problem/Plan - 7:  ·  Problem: PVD (peripheral vascular disease).  Plan: On cilostazol at home. Palpable LE pulses b/l on exam.  - Hold cilostazol in setting of recent GIB.      Problem/Plan - 8:  ·  Problem: Hypothyroidism.  Plan: No S/S of hyper/hypothyroidism.   - C/w levothyroxine 75 mcg daily.      Problem/Plan - 9:  ·  Problem: Acute kidney injury superimposed on CKD.  Plan: Renal helping.  -Cr 2.3 on admission vs. ~1.7 at baseline. NIKHIL likely prerenal 2/2 blood loss from GIB. Can also be exacerbated by hypertensive urgency.   - Monitor Cr and UOP  - Avoid NSAIDs, ACEi/ARBs, contrast, nephrotoxic agents  - Renally dose meds.      Problem/Plan - 10:  Problem: Need for prophylactic measure. Plan; - DVT ppx: Hold coumadin in setting of recent GIB      DC planning home if HH stable .
89 yo woman with history of afib/aflutter (on coumadin), diastolic CHF (with EF 63% on TTE 8/13/19), HTN, HLD, CKD stage 3-4, DM2 (on insulin), PVD, CVA, PE, hypothyroidism, and recent admission at [facility] (8/10-8/15/19) for acute decompensated heart failure presents with 2 days of rectal [id number] yo woman with history of afib/aflutter (on coumadin), diastolic CHF (with EF 63% on TTE 8/13/19), HTN, HLD, CKD stage 3-4, DM2 (on insulin), PVD, CVA, PE, hypothyroidism, and recent admission at [facility] (8/10-8/15/19) for acute decompensated heart failure presents with 2 days of rectal bleeding.
Hematochezia secondary to Gi bleed    Doing well overall   Does not seem to be actively bleeding   Restart diet   CBC tomorrow   Stool softeners   Advanced care planning was discussed with patient and family.  Advanced care planning forms were reviewed and discussed.  Risks, benefits and alternatives of gastroenterologic procedures were discussed in detail and all questions were answered.
89y Female with afib/aflutter (on coumadin), diastolic CHF (with EF 63% on TTE 8/13/19), HTN, CKD stage 3-4, DM2 (on insulin), PVD, h/o CVA, PE, hypothyroidism, and recent admission at [facility] (8/10-8/15/19) for acute decompensated heart failure presents BRBPR. Renal following for CKD Mx.    labs, chart reviewed
89y Female with afib/aflutter (on coumadin), diastolic CHF (with EF 63% on TTE 8/13/19), HTN, CKD stage 3-4, DM2 (on insulin), PVD, h/o CVA, PE, hypothyroidism, and recent admission at [facility] (8/10-8/15/19) for acute decompensated heart failure presents BRBPR. Renal following for CKD Mx.    labs, chart reviewed

## 2019-09-23 NOTE — CHART NOTE - NSCHARTNOTEFT_GEN_A_CORE
Pt on Levemir 16 U at home as per pt family and pharmacy. Spoke with attending regarding discharge regimen. Will recommend to continue with 16 U QHS if BG >180 and if <180 will recommend to continue half the dose. Will hold if BG <100.     ADS  [id number] Pt on Levemir 16 U at home as per pt family and pharmacy. Spoke with attending regarding discharge regimen. Will recommend to continue with 16 U QHS if BG >180 and if <180 will recommend to hold dose.     Pt is also on Spironolactone 3x weekly as per pharmacy. Spoke with attending, will resume upon discharge.     ADS  [id number]

## 2019-09-23 NOTE — PROGRESS NOTE ADULT - PROBLEM SELECTOR PLAN 2
Rectal bleeding. s/p Colonoscopy 9/19 with likely resolving diverticular bleed  GI note reviewed- if patient rebleeds, rec NM Bleeding Scan of abd to assess for location of bleed and if positive, IR eval for embolization   - trend h/h  - holding coumadin  - protonix 40mg PO QD as per GI  - diet as tolerated.   plan to Transfuse 1 unit prbc today. give lasix 20mg ivpx1 post transfusion
- acute on chronic   - bacid tid   - stool studies pending
- acute on chronic  - resolved during admission   - bacid tid
- acute on chronic; resolved  - bacid tid   - stool studies pending
Low Hg, GI consulted, no plans for scope if hg remains stable.   +FOBT.   Transfuse prbc for Hg <7.0, consider adding lasix if transfusing.
Rectal bleeding. s/p Colonoscopy 9/19 with likely resolving diverticular bleed  GI note reviewed- if patient rebleeds, rec NM Bleeding Scan of abd to assess for location of bleed and if positive, IR eval for embolization   - trend h/h  - holding coumadin  - protonix 40mg PO QD as per GI  - diet as tolerated.   s/p 1 unit prbc 9/20. no s/o vol overload
Rectal bleeding. s/p Colonoscopy 9/19 with likely resolving diverticular bleed  GI- if patient rebleeds, rec NM Bleeding Scan of abd to assess for location of bleed and if positive, IR eval for embolization   - trend h/h  - holding coumadin  - protonix 40mg PO QD as per GI  s/p 1 unit prbc 9/20. no s/o vol overload

## 2019-09-23 NOTE — PROGRESS NOTE ADULT - SUBJECTIVE AND OBJECTIVE BOX
Subjective:     No further episodes of gi bleeding   Objective:    MEDICATIONS  (STANDING):  allopurinol 100 milliGRAM(s) Oral daily  ammonium lactate 12% Lotion 1 Application(s) Topical two times a day  dextrose 5%. 1000 milliLiter(s) (50 mL/Hr) IV Continuous <Continuous>  dextrose 50% Injectable 12.5 Gram(s) IV Push once  dextrose 50% Injectable 25 Gram(s) IV Push once  dextrose 50% Injectable 25 Gram(s) IV Push once  furosemide    Tablet 40 milliGRAM(s) Oral two times a day  hydrALAZINE 75 milliGRAM(s) Oral three times a day  hydrocortisone hemorrhoidal Suppository 1 Suppository(s) Rectal at bedtime  influenza   Vaccine 0.5 milliLiter(s) IntraMuscular once  insulin lispro (HumaLOG) corrective regimen sliding scale   SubCutaneous three times a day with meals  insulin lispro (HumaLOG) corrective regimen sliding scale   SubCutaneous at bedtime  latanoprost 0.005% Ophthalmic Solution 1 Drop(s) Both EYES at bedtime  levothyroxine 75 MICROGram(s) Oral daily  pantoprazole    Tablet 40 milliGRAM(s) Oral before breakfast  simvastatin 20 milliGRAM(s) Oral at bedtime  spironolactone 25 milliGRAM(s) Oral daily    MEDICATIONS  (PRN):  dextrose 40% Gel 15 Gram(s) Oral once PRN Blood Glucose LESS THAN 70 milliGRAM(s)/deciliter  glucagon  Injectable 1 milliGRAM(s) IntraMuscular once PRN Glucose LESS THAN 70 milligrams/deciliter              Vital Signs Last 24 Hrs  T(C): 36.7 (21 Sep 2019 21:08), Max: 37.1 (21 Sep 2019 06:07)  T(F): 98 (21 Sep 2019 21:08), Max: 98.7 (21 Sep 2019 06:07)  HR: 78 (21 Sep 2019 21:08) (60 - 78)  BP: 174/91 (21 Sep 2019 21:08) (151/82 - 174/91)  BP(mean): --  RR: 16 (21 Sep 2019 21:08) (16 - 18)  SpO2: 100% (21 Sep 2019 21:08) (99% - 100%)      General:  Well developed, well nourished, alert and active, no pallor, NAD.  HEENT:    Normal appearance of conjunctiva, ears, nose, lips, oropharynx, and oral mucosa, anicteric.  Neck:  No masses, no asymmetry.  Lymph Nodes:  No lymphadenopathy.   Cardiovascular:  RRR normal S1/S2, no murmur.  Respiratory:  CTA B/L, normal respiratory effort.   Abdominal:   soft, no masses or tenderness, normoactive BS, NT/ND, no HSM.  Extremities:   No clubbing or cyanosis, normal capillary refill, no edema.   Skin:   No rash, jaundice, lesions, eczema.   Musculoskeletal:  No joint swelling, erythema or tenderness.   Neuro: No focal deficits.   Other:       LABS:                        8.9    5.74  )-----------( 133      ( 21 Sep 2019 07:30 )             27.9     09-21    142  |  109<H>  |  30<H>  ----------------------------<  111<H>  4.3   |  24  |  1.69<H>    Ca    9.3      21 Sep 2019 07:30  Mg     1.9     09-21    TPro  6.1  /  Alb  3.3  /  TBili  0.9  /  DBili  x   /  AST  14  /  ALT  12  /  AlkPhos  48  09-20    PT/INR - ( 20 Sep 2019 06:01 )   PT: 16.3 SEC;   INR: 1.45                RADIOLOGY & ADDITIONAL TESTS:
ANESTHESIA POSTOP CHECK    89y Female POSTOP DAY 1 S/P     Vital Signs Last 24 Hrs  T(C): 36.9 (20 Sep 2019 05:54), Max: 36.9 (20 Sep 2019 05:54)  T(F): 98.4 (20 Sep 2019 05:54), Max: 98.4 (20 Sep 2019 05:54)  HR: 73 (20 Sep 2019 05:54) (73 - 80)  BP: 155/55 (20 Sep 2019 05:54) (144/61 - 160/84)  BP(mean): --  RR: 18 (20 Sep 2019 05:54) (18 - 18)  SpO2: 100% (20 Sep 2019 05:54) (100% - 100%)  I&O's Summary      [x ] NO APPARENT ANESTHESIA COMPLICATIONS
INTERVAL HPI/OVERNIGHT EVENTS:    Hgb noted 7.6 this morning   reports no bm this morning or overnight; last bm was yesterday morning and was red in color  she has no abd pain   no n/v      MEDICATIONS  (STANDING):  allopurinol 100 milliGRAM(s) Oral daily  ammonium lactate 12% Lotion 1 Application(s) Topical two times a day  dextrose 5%. 1000 milliLiter(s) (50 mL/Hr) IV Continuous <Continuous>  dextrose 50% Injectable 12.5 Gram(s) IV Push once  dextrose 50% Injectable 25 Gram(s) IV Push once  dextrose 50% Injectable 25 Gram(s) IV Push once  hydrALAZINE 25 milliGRAM(s) Oral daily  hydrocortisone hemorrhoidal Suppository 1 Suppository(s) Rectal at bedtime  influenza   Vaccine 0.5 milliLiter(s) IntraMuscular once  insulin lispro (HumaLOG) corrective regimen sliding scale   SubCutaneous three times a day with meals  insulin lispro (HumaLOG) corrective regimen sliding scale   SubCutaneous at bedtime  latanoprost 0.005% Ophthalmic Solution 1 Drop(s) Both EYES at bedtime  levothyroxine 75 MICROGram(s) Oral daily  pantoprazole  Injectable 40 milliGRAM(s) IV Push every 12 hours  simvastatin 20 milliGRAM(s) Oral at bedtime    MEDICATIONS  (PRN):  dextrose 40% Gel 15 Gram(s) Oral once PRN Blood Glucose LESS THAN 70 milliGRAM(s)/deciliter  glucagon  Injectable 1 milliGRAM(s) IntraMuscular once PRN Glucose LESS THAN 70 milligrams/deciliter      Allergies    No Known Allergies    Intolerances        Review of Systems:    General:  No wt loss, fevers, chills, night sweats, fatigue   Eyes:  Good vision, no reported pain  ENT:  No sore throat, pain, runny nose, dysphagia  CV:  No pain, palpitations, hypo/hypertension  Resp:  No dyspnea, cough, tachypnea, wheezing  GI:  No pain, No nausea, No vomiting, No diarrhea, No constipation, No weight loss, No fever, No pruritis, No rectal bleeding, No melena, No dysphagia  :  No pain, bleeding, incontinence, nocturia  Muscle:  No pain, weakness  Neuro:  No weakness, tingling, memory problems  Psych:  No fatigue, insomnia, mood problems, depression  Endocrine:  No polyuria, polydypsia, cold/heat intolerance  Heme:  No petechiae, ecchymosis, easy bruisability  Skin:  No rash, tattoos, scars, edema      Vital Signs Last 24 Hrs  T(C): 36.8 (18 Sep 2019 09:50), Max: 36.9 (17 Sep 2019 17:26)  T(F): 98.3 (18 Sep 2019 09:50), Max: 98.5 (17 Sep 2019 17:26)  HR: 78 (18 Sep 2019 09:50) (60 - 85)  BP: 161/54 (18 Sep 2019 09:50) (152/76 - 174/80)  BP(mean): --  RR: 16 (18 Sep 2019 09:50) (16 - 18)  SpO2: 100% (18 Sep 2019 09:50) (99% - 100%)    PHYSICAL EXAM:    Constitutional: NAD  HEENT: EOMI, throat clear  Neck: No LAD, supple  Respiratory: CTA and P  Cardiovascular: S1 and S2, RRR, no M  Gastrointestinal: BS+, soft, NT/ND, neg HSM,  Extremities: No peripheral edema, neg clubbing, cyanosis  Vascular: 2+ peripheral pulses  Neurological: A/O x 3, no focal deficits  Psychiatric: Normal mood, normal affect  Skin: No rashes      LABS:                        7.6    5.12  )-----------( 138      ( 18 Sep 2019 05:38 )             24.6     09-18    145  |  107  |  38<H>  ----------------------------<  109<H>  4.1   |  26  |  1.84<H>    Ca    9.5      18 Sep 2019 05:38  Phos  2.6     09-17  Mg     2.1     09-17    TPro  6.4  /  Alb  3.4  /  TBili  0.6  /  DBili  x   /  AST  13  /  ALT  7   /  AlkPhos  47  09-18    PT/INR - ( 17 Sep 2019 02:45 )   PT: 22.2 SEC;   INR: 1.91          PTT - ( 17 Sep 2019 02:45 )  PTT:29.8 SEC      RADIOLOGY & ADDITIONAL TESTS:
INTERVAL HPI/OVERNIGHT EVENTS:    no bm this morning or overnight   denies n/v/d/c, abdominal pain, melena or brbpr   hgb noted     MEDICATIONS  (STANDING):  allopurinol 100 milliGRAM(s) Oral daily  ammonium lactate 12% Lotion 1 Application(s) Topical two times a day  dextrose 5%. 1000 milliLiter(s) (50 mL/Hr) IV Continuous <Continuous>  dextrose 50% Injectable 12.5 Gram(s) IV Push once  dextrose 50% Injectable 25 Gram(s) IV Push once  dextrose 50% Injectable 25 Gram(s) IV Push once  hydrALAZINE 25 milliGRAM(s) Oral daily  hydrocortisone hemorrhoidal Suppository 1 Suppository(s) Rectal at bedtime  influenza   Vaccine 0.5 milliLiter(s) IntraMuscular once  insulin lispro (HumaLOG) corrective regimen sliding scale   SubCutaneous three times a day with meals  insulin lispro (HumaLOG) corrective regimen sliding scale   SubCutaneous at bedtime  latanoprost 0.005% Ophthalmic Solution 1 Drop(s) Both EYES at bedtime  levothyroxine 75 MICROGram(s) Oral daily  pantoprazole  Injectable 40 milliGRAM(s) IV Push every 12 hours  simvastatin 20 milliGRAM(s) Oral at bedtime    MEDICATIONS  (PRN):  dextrose 40% Gel 15 Gram(s) Oral once PRN Blood Glucose LESS THAN 70 milliGRAM(s)/deciliter  glucagon  Injectable 1 milliGRAM(s) IntraMuscular once PRN Glucose LESS THAN 70 milligrams/deciliter      Allergies    No Known Allergies    Intolerances        Review of Systems:    General:  No wt loss, fevers, chills, night sweats, fatigue   Eyes:  Good vision, no reported pain  ENT:  No sore throat, pain, runny nose, dysphagia  CV:  No pain, palpitations, hypo/hypertension  Resp:  No dyspnea, cough, tachypnea, wheezing  GI:  No pain, No nausea, No vomiting, No diarrhea, No constipation, No weight loss, No fever, No pruritis, No rectal bleeding, No melena, No dysphagia  :  No pain, bleeding, incontinence, nocturia  Muscle:  No pain, weakness  Neuro:  No weakness, tingling, memory problems  Psych:  No fatigue, insomnia, mood problems, depression  Endocrine:  No polyuria, polydypsia, cold/heat intolerance  Heme:  No petechiae, ecchymosis, easy bruisability  Skin:  No rash, tattoos, scars, edema      Vital Signs Last 24 Hrs  T(C): 36.9 (20 Sep 2019 05:54), Max: 36.9 (20 Sep 2019 05:54)  T(F): 98.4 (20 Sep 2019 05:54), Max: 98.4 (20 Sep 2019 05:54)  HR: 73 (20 Sep 2019 05:54) (73 - 80)  BP: 155/55 (20 Sep 2019 05:54) (144/61 - 160/84)  BP(mean): --  RR: 18 (20 Sep 2019 05:54) (18 - 18)  SpO2: 100% (20 Sep 2019 05:54) (100% - 100%)    PHYSICAL EXAM:    Constitutional: NAD  HEENT: EOMI, throat clear  Neck: No LAD, supple  Respiratory: CTA and P  Cardiovascular: S1 and S2, RRR, no M  Gastrointestinal: BS+, soft, NT/ND, neg HSM,  Extremities: No peripheral edema, neg clubbing, cyanosis  Vascular: 2+ peripheral pulses  Neurological: A/O x 3, no focal deficits  Psychiatric: Normal mood, normal affect  Skin: No rashes      LABS:                        7.8    5.70  )-----------( 136      ( 20 Sep 2019 06:01 )             24.7     09-20    144  |  111<H>  |  29<H>  ----------------------------<  90  4.1   |  23  |  1.74<H>    Ca    9.5      20 Sep 2019 06:01    TPro  6.1  /  Alb  3.3  /  TBili  0.9  /  DBili  x   /  AST  14  /  ALT  12  /  AlkPhos  48  09-20    PT/INR - ( 20 Sep 2019 06:01 )   PT: 16.3 SEC;   INR: 1.45          PTT - ( 19 Sep 2019 05:18 )  PTT:28.5 SEC      RADIOLOGY & ADDITIONAL TESTS:    < from: Colonoscopy (09.19.19 @ 10:40) >    [facility]  _______________________________________________________________________________  Patient Name: Idalmis Ventura          Procedure Date: 9/19/2019 10:40 AM  MRN: [id number]                     Account Number: [id number]  YOB: 1928              Admit Type: Inpatient  Room: [place] [address]                         Gender: Female  Attending MD: Los Tapia MD      _______________________________________________________________________________     Procedure:           Colonoscopy  Indications:         Hematochezia  Providers:           Los Tapia MD  Referring MD:        Dr. Momin  Medicines:           Monitored Anesthesia Care  Complications:       No immediate complications.  Procedure:  Pre-Anesthesia Assessment:                       - Prior to the procedure, a History and Physical was                        performed, and patient medications and allergies were                        reviewed. The patient is competent. The risks and                        benefits of the procedure and the sedation options and                        risks were discussed with the patient. All questions                        were answered and informed consent was obtained. Patient            identification and proposed procedure were verified by                        the physician, the nurse and the anesthetist in the                        pre-procedure area in the procedure room. Mental Status                        Examination: alert and oriented. Airway Examination:                        normal oropharyngeal airway and neck mobility.                        Respiratory Examination: clear to auscultation. CV                        Examination: normal. Prophylactic Antibiotics: The                        patient does not require prophylactic antibiotics. Prior                        Anticoagulants: The patient has taken no previous                        anticoagulant or antiplatelet agents. ASA Grade         Assessment: IV - A patient with severe systemic disease                        that is a constant threat to life. After reviewing the                        risks and benefits, the patient was deemed in                        satisfactory condition to undergo the procedure. The                        anesthesia plan was to use monitored anesthesia care                        (MAC). Immediately prior to administration of                        medications, the patient was re-assessed for adequacy to                        receive sedatives. The heart rate, respiratory rate,                        oxygen saturations, blood pressure, adequacy of                        pulmonary ventilation, and response to care were                        monitored throughout the procedure. The physical status                        of the patient was re-assessed after the procedure.                       After I obtained informed consent, the scope was passed                        under direct vision. Throughout the procedure, the                        patient's blood pressure, pulse, and oxygen saturations                        were monitored continuously. The Colonoscope was                        introduced through the anus and advanced to the terminal                        ileum. The colonoscopy was performed without difficulty.                        The patient tolerated the procedure well. The quality of                        the bowel preparation was fair.                                                           Findings:       The perianal and digital rectal examinations were normal.       Many small and large-mouthed diverticula were found in the sigmoid colon        and in the descending colon. Evidence of fresh blood mixed with        coagulated blood (seen from the sigmoid colon to the mid transverse        colon). No active bleeing apprecaited.       A few small-mouthed diverticula were found in the cecum. No blood seen.       No evidence of blood in theTerminal Ileum.       Internal hemorrhoids were found during retroflexion. The hemorrhoids        were small and non-bleeding.                                                                                   Impression:          - Diverticulosis in the sigmoid colon and in the                        descending colon. Likely source of patient's GI bleed.                       - Diverticulosis in the cecum (mild and non-bleeding).                       - Internal hemorrhoids.             -No active bleeding appreciated on exam  Recommendation:      - Return patient to [facility] for [facility] care.                       - High fiber diet.                       -Colace 100 mg BID and Miralax daily (avoid constipation)                   -Continue to hold AC.                       -Consider risk versus benefits of restarting AC                                                                                   Attending Participation:       I personally performed the entire procedure.                                                                                     Los Tapia  ___________________  Los Tapia MD  9/19/2019 11:32:02 AM  This report has been signed electronically.  Number of Addenda: 0    Note Initiated On: 9/19/2019 10:40 AM    < end of copied text >
INTERVAL HPI/OVERNIGHT EVENTS:    no further rectal bleeding overnight or this morning  no abd pain   tolerating PO intake     MEDICATIONS  (STANDING):  allopurinol 100 milliGRAM(s) Oral daily  amLODIPine   Tablet 5 milliGRAM(s) Oral daily  ammonium lactate 12% Lotion 1 Application(s) Topical two times a day  dextrose 5%. 1000 milliLiter(s) (50 mL/Hr) IV Continuous <Continuous>  dextrose 50% Injectable 12.5 Gram(s) IV Push once  dextrose 50% Injectable 25 Gram(s) IV Push once  dextrose 50% Injectable 25 Gram(s) IV Push once  furosemide    Tablet 40 milliGRAM(s) Oral two times a day  hydrALAZINE 100 milliGRAM(s) Oral three times a day  influenza   Vaccine 0.5 milliLiter(s) IntraMuscular once  insulin lispro (HumaLOG) corrective regimen sliding scale   SubCutaneous three times a day with meals  insulin lispro (HumaLOG) corrective regimen sliding scale   SubCutaneous at bedtime  latanoprost 0.005% Ophthalmic Solution 1 Drop(s) Both EYES at bedtime  levothyroxine 75 MICROGram(s) Oral daily  pantoprazole    Tablet 40 milliGRAM(s) Oral before breakfast  simvastatin 20 milliGRAM(s) Oral at bedtime  spironolactone 25 milliGRAM(s) Oral daily    MEDICATIONS  (PRN):  artificial tears (preservative free) Ophthalmic Solution 1 Drop(s) Both EYES daily PRN Eye irritation  dextrose 40% Gel 15 Gram(s) Oral once PRN Blood Glucose LESS THAN 70 milliGRAM(s)/deciliter  glucagon  Injectable 1 milliGRAM(s) IntraMuscular once PRN Glucose LESS THAN 70 milligrams/deciliter      Allergies    No Known Allergies    Intolerances        Review of Systems:    General:  No wt loss, fevers, chills, night sweats, fatigue   Eyes:  Good vision, no reported pain  ENT:  No sore throat, pain, runny nose, dysphagia  CV:  No pain, palpitations, hypo/hypertension  Resp:  No dyspnea, cough, tachypnea, wheezing  GI:  No pain, No nausea, No vomiting, No diarrhea, No constipation, No weight loss, No fever, No pruritis, No rectal bleeding, No melena, No dysphagia  :  No pain, bleeding, incontinence, nocturia  Muscle:  No pain, weakness  Neuro:  No weakness, tingling, memory problems  Psych:  No fatigue, insomnia, mood problems, depression  Endocrine:  No polyuria, polydypsia, cold/heat intolerance  Heme:  No petechiae, ecchymosis, easy bruisability  Skin:  No rash, tattoos, scars, edema      Vital Signs Last 24 Hrs  T(C): 36.9 (23 Sep 2019 05:38), Max: 36.9 (22 Sep 2019 17:19)  T(F): 98.4 (23 Sep 2019 05:38), Max: 98.5 (22 Sep 2019 17:19)  HR: 79 (23 Sep 2019 05:38) (75 - 84)  BP: 148/77 (23 Sep 2019 05:38) (148/77 - 192/93)  BP(mean): --  RR: 18 (23 Sep 2019 05:38) (17 - 18)  SpO2: 100% (23 Sep 2019 05:38) (97% - 100%)    PHYSICAL EXAM:    Constitutional: NAD  HEENT: EOMI, throat clear  Neck: No LAD, supple  Respiratory: CTA and P  Cardiovascular: S1 and S2, RRR, no M  Gastrointestinal: BS+, soft, NT/ND, neg HSM,  Extremities: No peripheral edema, neg clubbing, cyanosis  Vascular: 2+ peripheral pulses  Neurological: A/O x 3, no focal deficits  Psychiatric: Normal mood, normal affect  Skin: No rashes      LABS:                        9.6    6.60  )-----------( 151      ( 23 Sep 2019 06:36 )             30.7     09-23    142  |  105  |  35<H>  ----------------------------<  126<H>  4.2   |  26  |  1.86<H>    Ca    9.7      23 Sep 2019 06:36  Mg     1.8     09-23            RADIOLOGY & ADDITIONAL TESTS:
INTERVAL HPI/OVERNIGHT EVENTS: I feel fine . NO BM.   Vital Signs Last 24 Hrs  T(C): 36.8 (18 Sep 2019 13:29), Max: 36.9 (17 Sep 2019 17:26)  T(F): 98.2 (18 Sep 2019 13:29), Max: 98.5 (17 Sep 2019 17:26)  HR: 85 (18 Sep 2019 13:29) (60 - 85)  BP: 152/76 (18 Sep 2019 13:29) (152/76 - 174/80)  BP(mean): --  RR: 15 (18 Sep 2019 13:29) (15 - 18)  SpO2: 99% (18 Sep 2019 13:29) (99% - 100%)  I&O's Summary    MEDICATIONS  (STANDING):  allopurinol 100 milliGRAM(s) Oral daily  ammonium lactate 12% Lotion 1 Application(s) Topical two times a day  bisacodyl 10 milliGRAM(s) Oral every 4 hours  dextrose 5%. 1000 milliLiter(s) (50 mL/Hr) IV Continuous <Continuous>  dextrose 50% Injectable 12.5 Gram(s) IV Push once  dextrose 50% Injectable 25 Gram(s) IV Push once  dextrose 50% Injectable 25 Gram(s) IV Push once  hydrALAZINE 25 milliGRAM(s) Oral daily  hydrocortisone hemorrhoidal Suppository 1 Suppository(s) Rectal at bedtime  influenza   Vaccine 0.5 milliLiter(s) IntraMuscular once  insulin lispro (HumaLOG) corrective regimen sliding scale   SubCutaneous three times a day with meals  insulin lispro (HumaLOG) corrective regimen sliding scale   SubCutaneous at bedtime  latanoprost 0.005% Ophthalmic Solution 1 Drop(s) Both EYES at bedtime  levothyroxine 75 MICROGram(s) Oral daily  pantoprazole  Injectable 40 milliGRAM(s) IV Push every 12 hours  polyethylene glycol/electrolyte Solution 1 Liter(s) Oral every 4 hours  simvastatin 20 milliGRAM(s) Oral at bedtime    MEDICATIONS  (PRN):  dextrose 40% Gel 15 Gram(s) Oral once PRN Blood Glucose LESS THAN 70 milliGRAM(s)/deciliter  glucagon  Injectable 1 milliGRAM(s) IntraMuscular once PRN Glucose LESS THAN 70 milligrams/deciliter    LABS:                        7.6    5.12  )-----------( 138      ( 18 Sep 2019 05:38 )             24.6     09-18    145  |  107  |  38<H>  ----------------------------<  109<H>  4.1   |  26  |  1.84<H>    Ca    9.5      18 Sep 2019 05:38  Phos  2.6     09-17  Mg     2.1     09-17    TPro  6.4  /  Alb  3.4  /  TBili  0.6  /  DBili  x   /  AST  13  /  ALT  7   /  AlkPhos  47  09-18    PT/INR - ( 17 Sep 2019 02:45 )   PT: 22.2 SEC;   INR: 1.91          PTT - ( 17 Sep 2019 02:45 )  PTT:29.8 SEC    CAPILLARY BLOOD GLUCOSE      POCT Blood Glucose.: 186 mg/dL (18 Sep 2019 12:24)  POCT Blood Glucose.: 101 mg/dL (18 Sep 2019 08:34)  POCT Blood Glucose.: 132 mg/dL (17 Sep 2019 22:54)          REVIEW OF SYSTEMS:  CONSTITUTIONAL: No fever, weight loss, or fatigue  EYES: No eye pain, visual disturbances, or discharge  ENMT:  No difficulty hearing, tinnitus, vertigo; No sinus or throat pain  NECK: No pain or stiffness  RESPIRATORY: No cough, wheezing, chills or hemoptysis; No shortness of breath  CARDIOVASCULAR: No chest pain, palpitations, dizziness, or leg swelling  GASTROINTESTINAL: No abdominal or epigastric pain. No nausea, vomiting, or hematemesis; No diarrhea or constipation. No melena or hematochezia.  GENITOURINARY: No dysuria, frequency, hematuria, or incontinence  NEUROLOGICAL: No headaches, memory loss, loss of strength, numbness, or tremors    Consultant(s) Notes Reviewed:  [x ] YES  [ ] NO    PHYSICAL EXAM:  GENERAL: NAD, well-groomed, well-developed, not in any distress ,  HEAD:  Atraumatic, Normocephalic  NECK: Supple, No JVD, Normal thyroid  NERVOUS SYSTEM:  Alert & Oriented X3, No focal deficit   CHEST/LUNG: Good air entry bilateral with no  rales, rhonchi, wheezing, or rubs  HEART: Regular rate and rhythm; No murmurs, rubs, or gallops  ABDOMEN: Soft, Nontender, Nondistended; Bowel sounds present  EXTREMITIES:  2+ Peripheral Pulses, No clubbing, cyanosis, or edema    Care Discussed with Consultants/Other Providers [ x] YES  [ ] NO
INTERVAL HPI/OVERNIGHT EVENTS: I feel fine.   Vital Signs Last 24 Hrs  T(C): 36.5 (19 Sep 2019 15:31), Max: 36.7 (18 Sep 2019 21:55)  T(F): 97.7 (19 Sep 2019 15:31), Max: 98.1 (18 Sep 2019 21:55)  HR: 74 (19 Sep 2019 15:31) (74 - 92)  BP: 144/61 (19 Sep 2019 15:31) (144/61 - 180/88)  BP(mean): --  RR: 18 (19 Sep 2019 15:31) (18 - 18)  SpO2: 100% (19 Sep 2019 15:31) (100% - 100%)  I&O's Summary    MEDICATIONS  (STANDING):  allopurinol 100 milliGRAM(s) Oral daily  ammonium lactate 12% Lotion 1 Application(s) Topical two times a day  dextrose 5%. 1000 milliLiter(s) (50 mL/Hr) IV Continuous <Continuous>  dextrose 50% Injectable 12.5 Gram(s) IV Push once  dextrose 50% Injectable 25 Gram(s) IV Push once  dextrose 50% Injectable 25 Gram(s) IV Push once  hydrALAZINE 25 milliGRAM(s) Oral daily  hydrocortisone hemorrhoidal Suppository 1 Suppository(s) Rectal at bedtime  influenza   Vaccine 0.5 milliLiter(s) IntraMuscular once  insulin lispro (HumaLOG) corrective regimen sliding scale   SubCutaneous three times a day with meals  insulin lispro (HumaLOG) corrective regimen sliding scale   SubCutaneous at bedtime  latanoprost 0.005% Ophthalmic Solution 1 Drop(s) Both EYES at bedtime  levothyroxine 75 MICROGram(s) Oral daily  pantoprazole  Injectable 40 milliGRAM(s) IV Push every 12 hours  simvastatin 20 milliGRAM(s) Oral at bedtime    MEDICATIONS  (PRN):  dextrose 40% Gel 15 Gram(s) Oral once PRN Blood Glucose LESS THAN 70 milliGRAM(s)/deciliter  glucagon  Injectable 1 milliGRAM(s) IntraMuscular once PRN Glucose LESS THAN 70 milligrams/deciliter    LABS:                        8.8    6.04  )-----------( 140      ( 19 Sep 2019 05:18 )             27.4     09-18    145  |  107  |  38<H>  ----------------------------<  109<H>  4.1   |  26  |  1.84<H>    Ca    9.5      18 Sep 2019 05:38    TPro  6.4  /  Alb  3.4  /  TBili  0.6  /  DBili  x   /  AST  13  /  ALT  7   /  AlkPhos  47  09-18    PT/INR - ( 19 Sep 2019 05:18 )   PT: 16.9 SEC;   INR: 1.47          PTT - ( 19 Sep 2019 05:18 )  PTT:28.5 SEC    CAPILLARY BLOOD GLUCOSE      POCT Blood Glucose.: 116 mg/dL (19 Sep 2019 17:31)  POCT Blood Glucose.: 113 mg/dL (19 Sep 2019 13:38)  POCT Blood Glucose.: 111 mg/dL (19 Sep 2019 05:04)  POCT Blood Glucose.: 113 mg/dL (18 Sep 2019 22:47)          REVIEW OF SYSTEMS:  CONSTITUTIONAL: No fever, weight loss, or fatigue  EYES: No eye pain, visual disturbances, or discharge  ENMT:  No difficulty hearing, tinnitus, vertigo; No sinus or throat pain  NECK: No pain or stiffness  RESPIRATORY: No cough, wheezing, chills or hemoptysis; No shortness of breath  CARDIOVASCULAR: No chest pain, palpitations, dizziness, or leg swelling  GASTROINTESTINAL: No abdominal or epigastric pain. No nausea, vomiting, or hematemesis; No diarrhea or constipation. No melena or hematochezia.  GENITOURINARY: No dysuria, frequency, hematuria, or incontinence  NEUROLOGICAL: No headaches, memory loss, loss of strength, numbness, or tremors    Consultant(s) Notes Reviewed:  [x ] YES  [ ] NO    PHYSICAL EXAM:  GENERAL: NAD, well-groomed, well-developed,not in any distress ,  HEAD:  Atraumatic, Normocephalic  EYES: EOMI, PERRLA, conjunctiva and sclera clear  ENMT: No tonsillar erythema, exudates, or enlargement; Moist mucous membranes, Good dentition, No lesions  NECK: Supple, No JVD, Normal thyroid  NERVOUS SYSTEM:  Alert & Oriented X3, No focal deficit   CHEST/LUNG: Good air entry bilateral with no  rales, rhonchi, wheezing, or rubs  HEART: Regular rate and rhythm; No murmurs, rubs, or gallops  ABDOMEN: Soft, Nontender, Nondistended; Bowel sounds present  EXTREMITIES:  2+ Peripheral Pulses, No clubbing, cyanosis, or edema    Care Discussed with Consultants/Other Providers [ x] YES  [ ] NO
INTERVAL HPI/OVERNIGHT EVENTS: I feel fine.   Vital Signs Last 24 Hrs  T(C): 36.8 (22 Sep 2019 13:18), Max: 36.8 (22 Sep 2019 13:18)  T(F): 98.3 (22 Sep 2019 13:18), Max: 98.3 (22 Sep 2019 13:18)  HR: 75 (22 Sep 2019 13:18) (72 - 89)  BP: 150/68 (22 Sep 2019 13:18) (141/75 - 174/91)  BP(mean): --  RR: 17 (22 Sep 2019 13:18) (16 - 17)  SpO2: 100% (22 Sep 2019 13:18) (99% - 100%)  I&O's Summary    MEDICATIONS  (STANDING):  allopurinol 100 milliGRAM(s) Oral daily  ammonium lactate 12% Lotion 1 Application(s) Topical two times a day  dextrose 5%. 1000 milliLiter(s) (50 mL/Hr) IV Continuous <Continuous>  dextrose 50% Injectable 12.5 Gram(s) IV Push once  dextrose 50% Injectable 25 Gram(s) IV Push once  dextrose 50% Injectable 25 Gram(s) IV Push once  furosemide    Tablet 40 milliGRAM(s) Oral two times a day  hydrALAZINE 75 milliGRAM(s) Oral three times a day  hydrocortisone 2.5% Rectal Cream 1 Application(s) Rectal daily  hydrocortisone hemorrhoidal Suppository 1 Suppository(s) Rectal at bedtime  influenza   Vaccine 0.5 milliLiter(s) IntraMuscular once  insulin lispro (HumaLOG) corrective regimen sliding scale   SubCutaneous three times a day with meals  insulin lispro (HumaLOG) corrective regimen sliding scale   SubCutaneous at bedtime  latanoprost 0.005% Ophthalmic Solution 1 Drop(s) Both EYES at bedtime  levothyroxine 75 MICROGram(s) Oral daily  pantoprazole    Tablet 40 milliGRAM(s) Oral before breakfast  simvastatin 20 milliGRAM(s) Oral at bedtime  spironolactone 25 milliGRAM(s) Oral daily    MEDICATIONS  (PRN):  dextrose 40% Gel 15 Gram(s) Oral once PRN Blood Glucose LESS THAN 70 milliGRAM(s)/deciliter  glucagon  Injectable 1 milliGRAM(s) IntraMuscular once PRN Glucose LESS THAN 70 milligrams/deciliter    LABS:                        10.3   7.43  )-----------( 154      ( 22 Sep 2019 06:40 )             32.7     09-22    141  |  99  |  27<H>  ----------------------------<  122<H>  3.5   |  25  |  1.46<H>    Ca    10.0      22 Sep 2019 06:40  Mg     1.8     09-22          CAPILLARY BLOOD GLUCOSE      POCT Blood Glucose.: 147 mg/dL (22 Sep 2019 12:33)  POCT Blood Glucose.: 123 mg/dL (22 Sep 2019 08:22)  POCT Blood Glucose.: 143 mg/dL (21 Sep 2019 22:23)  POCT Blood Glucose.: 122 mg/dL (21 Sep 2019 17:10)          REVIEW OF SYSTEMS:  CONSTITUTIONAL: No fever, weight loss, or fatigue  EYES: No eye pain, visual disturbances, or discharge  ENMT:  No difficulty hearing, tinnitus, vertigo; No sinus or throat pain  NECK: No pain or stiffness  RESPIRATORY: No cough, wheezing, chills or hemoptysis; No shortness of breath  CARDIOVASCULAR: No chest pain, palpitations, dizziness, or leg swelling  GASTROINTESTINAL: No abdominal or epigastric pain. No nausea, vomiting, or hematemesis; No diarrhea or constipation. No melena or hematochezia.  GENITOURINARY: No dysuria, frequency, hematuria, or incontinence  NEUROLOGICAL: No headaches, memory loss, loss of strength, numbness, or tremors    Consultant(s) Notes Reviewed:  [x ] YES  [ ] NO    PHYSICAL EXAM:  GENERAL: NAD, well-groomed, well-developed,not in any distress ,  HEAD:  Atraumatic, Normocephalic  EYES: EOMI, PERRLA, conjunctiva and sclera clear  ENMT: No tonsillar erythema, exudates, or enlargement; Moist mucous membranes, Good dentition, No lesions  NECK: Supple, No JVD, Normal thyroid  NERVOUS SYSTEM:  Alert & Oriented X3, No focal deficit   CHEST/LUNG: Good air entry bilateral with no  rales, rhonchi, wheezing, or rubs  HEART: Regular rate and rhythm; No murmurs, rubs, or gallops  ABDOMEN: Soft, Nontender, Nondistended; Bowel sounds present  EXTREMITIES:  2+ Peripheral Pulses, No clubbing, cyanosis, or edema  SKIN: No rashes or lesions    Care Discussed with Consultants/Other Providers [ x] YES  [ ] NO
INTERVAL HPI/OVERNIGHT EVENTS: Seen and examined with family in room .   Vital Signs Last 24 Hrs  T(C): 36.4 (21 Sep 2019 16:15), Max: 37.1 (21 Sep 2019 06:07)  T(F): 97.6 (21 Sep 2019 16:15), Max: 98.7 (21 Sep 2019 06:07)  HR: 72 (21 Sep 2019 16:15) (60 - 77)  BP: 162/70 (21 Sep 2019 16:15) (151/82 - 171/68)  BP(mean): --  RR: 16 (21 Sep 2019 16:15) (16 - 18)  SpO2: 100% (21 Sep 2019 16:15) (99% - 100%)  I&O's Summary    MEDICATIONS  (STANDING):  allopurinol 100 milliGRAM(s) Oral daily  ammonium lactate 12% Lotion 1 Application(s) Topical two times a day  dextrose 5%. 1000 milliLiter(s) (50 mL/Hr) IV Continuous <Continuous>  dextrose 50% Injectable 12.5 Gram(s) IV Push once  dextrose 50% Injectable 25 Gram(s) IV Push once  dextrose 50% Injectable 25 Gram(s) IV Push once  hydrALAZINE 75 milliGRAM(s) Oral three times a day  hydrocortisone hemorrhoidal Suppository 1 Suppository(s) Rectal at bedtime  influenza   Vaccine 0.5 milliLiter(s) IntraMuscular once  insulin lispro (HumaLOG) corrective regimen sliding scale   SubCutaneous three times a day with meals  insulin lispro (HumaLOG) corrective regimen sliding scale   SubCutaneous at bedtime  latanoprost 0.005% Ophthalmic Solution 1 Drop(s) Both EYES at bedtime  levothyroxine 75 MICROGram(s) Oral daily  pantoprazole    Tablet 40 milliGRAM(s) Oral before breakfast  simvastatin 20 milliGRAM(s) Oral at bedtime    MEDICATIONS  (PRN):  dextrose 40% Gel 15 Gram(s) Oral once PRN Blood Glucose LESS THAN 70 milliGRAM(s)/deciliter  glucagon  Injectable 1 milliGRAM(s) IntraMuscular once PRN Glucose LESS THAN 70 milligrams/deciliter    LABS:                        8.9    5.74  )-----------( 133      ( 21 Sep 2019 07:30 )             27.9     09-21    142  |  109<H>  |  30<H>  ----------------------------<  111<H>  4.3   |  24  |  1.69<H>    Ca    9.3      21 Sep 2019 07:30  Mg     1.9     09-21    TPro  6.1  /  Alb  3.3  /  TBili  0.9  /  DBili  x   /  AST  14  /  ALT  12  /  AlkPhos  48  09-20    PT/INR - ( 20 Sep 2019 06:01 )   PT: 16.3 SEC;   INR: 1.45              CAPILLARY BLOOD GLUCOSE      POCT Blood Glucose.: 122 mg/dL (21 Sep 2019 17:10)  POCT Blood Glucose.: 199 mg/dL (21 Sep 2019 12:07)  POCT Blood Glucose.: 106 mg/dL (21 Sep 2019 08:34)  POCT Blood Glucose.: 147 mg/dL (20 Sep 2019 22:14)          REVIEW OF SYSTEMS:  CONSTITUTIONAL: No fever, weight loss, or fatigue  EYES: No eye pain, visual disturbances, or discharge  ENMT:  No difficulty hearing, tinnitus, vertigo; No sinus or throat pain  NECK: No pain or stiffness  RESPIRATORY: No cough, wheezing, chills or hemoptysis; No shortness of breath  CARDIOVASCULAR: No chest pain, palpitations, dizziness, or leg swelling  GASTROINTESTINAL: No abdominal or epigastric pain. No nausea, vomiting, or hematemesis; No diarrhea or constipation. No melena or hematochezia.  GENITOURINARY: No dysuria, frequency, hematuria, or incontinence  NEUROLOGICAL: No headaches, memory loss, loss of strength, numbness, or tremors    Consultant(s) Notes Reviewed:  [x ] YES  [ ] NO    PHYSICAL EXAM:  GENERAL: NAD, well-groomed, well-developed,not in any distress ,  HEAD:  Atraumatic, Normocephalic  EYES: EOMI, PERRLA, conjunctiva and sclera clear  ENMT: No tonsillar erythema, exudates, or enlargement; Moist mucous membranes, Good dentition, No lesions  NECK: Supple, No JVD, Normal thyroid  NERVOUS SYSTEM:  Alert & Oriented X3, No focal deficit   CHEST/LUNG: Good air entry bilateral with no  rales, rhonchi, wheezing, or rubs  HEART: Regular rate and rhythm; No murmurs, rubs, or gallops  ABDOMEN: Soft, Nontender, Nondistended; Bowel sounds present  EXTREMITIES:  2+ Peripheral Pulses, No clubbing, cyanosis, or edema    Care Discussed with Consultants/Other Providers [ x] YES  [ ] NO
Nephrology Followup Note - [phone] - Dr Catherine / Dr Garcia / Dr Tellez / Dr Sullivan / Dr Castillo / Dr Grijalva / Dr Segovia / Dr Luna  Pt seen and examined at bedside earlier this morning.  No acute events overnight. No complaints. No BM this morning.   Deng abd pain, good appetite.       Allergies:  No Known Allergies    Hospital Medications:   MEDICATIONS  (STANDING):  allopurinol 100 milliGRAM(s) Oral daily  ammonium lactate 12% Lotion 1 Application(s) Topical two times a day  bisacodyl 10 milliGRAM(s) Oral every 4 hours  dextrose 5%. 1000 milliLiter(s) (50 mL/Hr) IV Continuous <Continuous>  dextrose 50% Injectable 12.5 Gram(s) IV Push once  dextrose 50% Injectable 25 Gram(s) IV Push once  dextrose 50% Injectable 25 Gram(s) IV Push once  hydrALAZINE 25 milliGRAM(s) Oral daily  hydrocortisone hemorrhoidal Suppository 1 Suppository(s) Rectal at bedtime  influenza   Vaccine 0.5 milliLiter(s) IntraMuscular once  insulin lispro (HumaLOG) corrective regimen sliding scale   SubCutaneous three times a day with meals  insulin lispro (HumaLOG) corrective regimen sliding scale   SubCutaneous at bedtime  latanoprost 0.005% Ophthalmic Solution 1 Drop(s) Both EYES at bedtime  levothyroxine 75 MICROGram(s) Oral daily  pantoprazole  Injectable 40 milliGRAM(s) IV Push every 12 hours  polyethylene glycol/electrolyte Solution 1 Liter(s) Oral every 4 hours  simvastatin 20 milliGRAM(s) Oral at bedtime    VITALS:  T(F): 98.2 (09-18-19 @ 13:29), Max: 98.3 (09-18-19 @ 09:50)  HR: 85 (09-18-19 @ 13:29)  BP: 152/76 (09-18-19 @ 13:29)  RR: 15 (09-18-19 @ 13:29)  SpO2: 99% (09-18-19 @ 13:29)  Wt(kg): --      PHYSICAL EXAM:  Constitutional: NAD  HEENT: anicteric sclera, oropharynx clear, MMM  Neck: No JVD  Respiratory: CTAB, no wheezes, rales or rhonchi  Cardiovascular: S1, S2, RRR  Gastrointestinal: BS+, soft, NT/ND  Extremities: No cyanosis or clubbing. No peripheral edema  Neurological: A/O x 3, no focal deficits  Psychiatric: Normal mood, normal affect  : No CVA tenderness. No michaud.   Skin: No rashes    LABS:  09-18    145  |  107  |  38<H>  ----------------------------<  109<H>  4.1   |  26  |  1.84<H>    Ca    9.5      18 Sep 2019 05:38  Phos  2.6     09-17  Mg     2.1     09-17    TPro  6.4  /  Alb  3.4  /  TBili  0.6  /  DBili      /  AST  13  /  ALT  7   /  AlkPhos  47  09-18    Creatinine Trend: 1.84 <--, 1.94 <--, 2.30 <--                        7.6    5.12  )-----------( 138      ( 18 Sep 2019 05:38 )             24.6     Urine Studies:      RADIOLOGY & ADDITIONAL STUDIES:
Patient denies chest pain or shortness of breath.   Review of systems otherwise (-)  	    MEDICATIONS  (STANDING):  allopurinol 100 milliGRAM(s) Oral daily  ammonium lactate 12% Lotion 1 Application(s) Topical two times a day  dextrose 5%. 1000 milliLiter(s) (50 mL/Hr) IV Continuous <Continuous>  dextrose 50% Injectable 12.5 Gram(s) IV Push once  dextrose 50% Injectable 25 Gram(s) IV Push once  dextrose 50% Injectable 25 Gram(s) IV Push once  hydrALAZINE 25 milliGRAM(s) Oral daily  hydrocortisone hemorrhoidal Suppository 1 Suppository(s) Rectal at bedtime  influenza   Vaccine 0.5 milliLiter(s) IntraMuscular once  insulin lispro (HumaLOG) corrective regimen sliding scale   SubCutaneous three times a day with meals  insulin lispro (HumaLOG) corrective regimen sliding scale   SubCutaneous at bedtime  latanoprost 0.005% Ophthalmic Solution 1 Drop(s) Both EYES at bedtime  levothyroxine 75 MICROGram(s) Oral daily  pantoprazole    Tablet 40 milliGRAM(s) Oral before breakfast  simvastatin 20 milliGRAM(s) Oral at bedtime    MEDICATIONS  (PRN):  dextrose 40% Gel 15 Gram(s) Oral once PRN Blood Glucose LESS THAN 70 milliGRAM(s)/deciliter  glucagon  Injectable 1 milliGRAM(s) IntraMuscular once PRN Glucose LESS THAN 70 milligrams/deciliter      LABS:                            8.9    5.74  )-----------( 133      ( 21 Sep 2019 07:30 )             27.9     142  |  109<H>  |  30<H>  ----------------------------<  111<H>  4.3   |  24  |  1.69<H>    Ca    9.3      21 Sep 2019 07:30  Mg     1.9     09-21    TPro  6.1  /  Alb  3.3  /  TBili  0.9  /  DBili  x   /  AST  14  /  ALT  12  /  AlkPhos  48  09-20    Creatinine Trend: 1.69<--, 1.74<--, 1.84<--, 1.94<--, 2.30<--    PHYSICAL EXAM  Vital Signs Last 24 Hrs  T(C): 36.6 (21 Sep 2019 12:17), Max: 37.1 (21 Sep 2019 06:07)  T(F): 97.8 (21 Sep 2019 12:17), Max: 98.7 (21 Sep 2019 06:07)  HR: 60 (21 Sep 2019 12:17) (60 - 77)  BP: 171/68 (21 Sep 2019 12:17) (148/88 - 171/68)  RR: 18 (21 Sep 2019 12:17) (16 - 18)  SpO2: 100% (21 Sep 2019 12:17) (99% - 100%)    Gen: Appears well in NAD  HEENT:  (-)icterus (-)pallor  CV: N S1 S2 1/6 SUZI (+)2 Pulses B/l  Resp:  Clear to ausculatation B/L, normal effort  GI: (+) BS Soft, NT, ND  Lymph:  (-)Edema, (-)obvious lymphadenopathy  Skin: Warm to touch, Normal turgor  Psych: Appropriate mood and affect    TELEMETRY: n/a	      < from: Transthoracic Echocardiogram (08.13.19 @ 11:13) >  CONCLUSIONS:  1. Mitral annular calcification and calcified mitral  leaflets with normal diastolic opening. Mild mitral  regurgitation.  2. Calcified trileaflet aortic valve with grossly  moderately decreased opening. Peak transaortic valve  gradient equals 30 mm Hg, mean transaortic valve gradient  equals 15 mm Hg.  3. Normal left ventricular internal dimensions and wall  thicknesses.  4. Normal left ventricular systolic function. No segmental  wall motion abnormalities.  5. Increased E/e'  is consistent with elevated left  ventricular filling pressure.  6. Moderate right atrial enlargement.  7. Estimated pulmonary artery systolic pressure equals 51  mm Hg, assuming right atrial pressure equals 10  mm Hg,  consistent with moderate pulmonary hypertension.  ------------------------------------------------------------------------  Confirmed on  8/13/2019 - 15:07:49 by RIKKI Azevedo  ------------------------------------------------------------------------    < end of copied text >    ASSESSMENT/PLAN:     89 year old Female with history of Afib on AC, HTN, CKD admitted with chest pain, sob and volume overload c/w acute on chronic diastolic and Right sided CHF, with chronic bifasicular block afib. Cardiology consulted for management of HTN, hx of Atrial fibrillation on AC - now on hold for GIB.     -- no anginal symptoms or evidence of heart fail   -- elevated trop 110 -- > 112, neg CPK, doub ACS  -- elevation of trop most likely in setting of acute bleed  -- recent echo as noted   -- BP stable cont current medications   -- AF with SSS - avoid AVN blockers, Pt and family continue to decline invasive testing/procedures, will not pursue ischemic work up   -- AC on hold given GI bleed  --appreciate GI evaluation - ac contraindicated at this time as risks > benefits   --resume lasix at DC  -- on d/c pt to follow with Dr. Berman
Patient denies chest pain or shortness of breath.   Review of systems otherwise (-)  	    MEDICATIONS  (STANDING):  allopurinol 100 milliGRAM(s) Oral daily  ammonium lactate 12% Lotion 1 Application(s) Topical two times a day  dextrose 5%. 1000 milliLiter(s) (50 mL/Hr) IV Continuous <Continuous>  dextrose 50% Injectable 12.5 Gram(s) IV Push once  dextrose 50% Injectable 25 Gram(s) IV Push once  dextrose 50% Injectable 25 Gram(s) IV Push once  hydrALAZINE 25 milliGRAM(s) Oral daily  hydrocortisone hemorrhoidal Suppository 1 Suppository(s) Rectal at bedtime  influenza   Vaccine 0.5 milliLiter(s) IntraMuscular once  insulin lispro (HumaLOG) corrective regimen sliding scale   SubCutaneous three times a day with meals  insulin lispro (HumaLOG) corrective regimen sliding scale   SubCutaneous at bedtime  latanoprost 0.005% Ophthalmic Solution 1 Drop(s) Both EYES at bedtime  levothyroxine 75 MICROGram(s) Oral daily  pantoprazole  Injectable 40 milliGRAM(s) IV Push every 12 hours  simvastatin 20 milliGRAM(s) Oral at bedtime      LABS:	 	                       7.6    5.12  )-----------( 138      ( 18 Sep 2019 05:38 )             24.6    145  |  107  |  38<H>  ----------------------------<  109<H>  4.1   |  26  |  1.84<H>    Ca    9.5      18 Sep 2019 05:38  Phos  2.6     09-17  Mg     2.1     09-17    TPro  6.4  /  Alb  3.4  /  TBili  0.6  /  DBili  x   /  AST  13  /  ALT  7   /  AlkPhos  47  09-18    Creatinine Trend: 1.84<--, 1.94<--, 2.30<--    PHYSICAL EXAM:  T(C): 36.8 (09-18-19 @ 09:50), Max: 36.9 (09-17-19 @ 17:26)  HR: 78 (09-18-19 @ 09:50) (60 - 85)  BP: 161/54 (09-18-19 @ 09:50) (152/76 - 174/80)  RR: 16 (09-18-19 @ 09:50) (16 - 18)  SpO2: 100% (09-18-19 @ 09:50) (100% - 100%)    Gen: Appears well in NAD  HEENT:  (-)icterus (-)pallor  CV: N S1 S2 1/6 SUZI (+)2 Pulses B/l  Resp:  Clear to ausculatation B/L, normal effort  GI: (+) BS Soft, NT, ND  Lymph:  (-)Edema, (-)obvious lymphadenopathy  Skin: Warm to touch, Normal turgor  Psych: Appropriate mood and affect    TELEMETRY: n/a	      < from: Transthoracic Echocardiogram (08.13.19 @ 11:13) >  CONCLUSIONS:  1. Mitral annular calcification and calcified mitral  leaflets with normal diastolic opening. Mild mitral  regurgitation.  2. Calcified trileaflet aortic valve with grossly  moderately decreased opening. Peak transaortic valve  gradient equals 30 mm Hg, mean transaortic valve gradient  equals 15 mm Hg.  3. Normal left ventricular internal dimensions and wall  thicknesses.  4. Normal left ventricular systolic function. No segmental  wall motion abnormalities.  5. Increased E/e'  is consistent with elevated left  ventricular filling pressure.  6. Moderate right atrial enlargement.  7. Estimated pulmonary artery systolic pressure equals 51  mm Hg, assuming right atrial pressure equals 10  mm Hg,  consistent with moderate pulmonary hypertension.  ------------------------------------------------------------------------  Confirmed on  8/13/2019 - 15:07:49 by RIKKI Azevedo  ------------------------------------------------------------------------    < end of copied text >    ASSESSMENT/PLAN:     89 year old Female with history of Afib on AC, HTN, CKD admitted with chest pain, sob and volume overload c/w acute on chronic diastolic and Right sided CHF, with chronic bifasicular block afib. Cardiology consulted for management of HTN, hx of Atrial fibrillation on AC - now on hold for GIB.     -- no anginal symptoms or evidence of heart fail   -- elevated trop 110 -- > 112, neg CPK, doub ACS  -- elevation of trop most likely in setting of acute bleed  -- recent echo as noted   -- BP stable cont current medications   -- AF with SSS - avoid AVN blockers, Pt and family continue to decline invasive testing/procedures, will not pursue ischemic work up   -- AC on hold given GI bleed, resume if/when ok with GI  -- on d/c pt to follow with Dr. Berman
Patient denies chest pain or shortness of breath.   Review of systems otherwise (-)  	    allopurinol 100 milliGRAM(s) Oral daily  ammonium lactate 12% Lotion 1 Application(s) Topical two times a day  dextrose 40% Gel 15 Gram(s) Oral once PRN  dextrose 5%. 1000 milliLiter(s) IV Continuous <Continuous>  dextrose 50% Injectable 12.5 Gram(s) IV Push once  dextrose 50% Injectable 25 Gram(s) IV Push once  dextrose 50% Injectable 25 Gram(s) IV Push once  glucagon  Injectable 1 milliGRAM(s) IntraMuscular once PRN  hydrALAZINE 25 milliGRAM(s) Oral daily  hydrocortisone hemorrhoidal Suppository 1 Suppository(s) Rectal at bedtime  influenza   Vaccine 0.5 milliLiter(s) IntraMuscular once  insulin lispro (HumaLOG) corrective regimen sliding scale   SubCutaneous three times a day with meals  insulin lispro (HumaLOG) corrective regimen sliding scale   SubCutaneous at bedtime  latanoprost 0.005% Ophthalmic Solution 1 Drop(s) Both EYES at bedtime  levothyroxine 75 MICROGram(s) Oral daily  pantoprazole  Injectable 40 milliGRAM(s) IV Push every 12 hours  simvastatin 20 milliGRAM(s) Oral at bedtime                            7.8    5.70  )-----------( 136      ( 20 Sep 2019 06:01 )             24.7       09-20    144  |  111<H>  |  29<H>  ----------------------------<  90  4.1   |  23  |  1.74<H>    Ca    9.5      20 Sep 2019 06:01    TPro  6.1  /  Alb  3.3  /  TBili  0.9  /  DBili  x   /  AST  14  /  ALT  12  /  AlkPhos  48  09-20            T(C): 36.9 (09-20-19 @ 05:54), Max: 36.9 (09-20-19 @ 05:54)  HR: 73 (09-20-19 @ 05:54) (73 - 80)  BP: 155/55 (09-20-19 @ 05:54) (144/61 - 160/84)  RR: 18 (09-20-19 @ 05:54) (18 - 18)  SpO2: 100% (09-20-19 @ 05:54) (100% - 100%)  Wt(kg): --    I&O's Summary      Gen: Appears well in NAD  HEENT:  (-)icterus (-)pallor  CV: N S1 S2 1/6 SUZI (+)2 Pulses B/l  Resp:  Clear to ausculatation B/L, normal effort  GI: (+) BS Soft, NT, ND  Lymph:  (-)Edema, (-)obvious lymphadenopathy  Skin: Warm to touch, Normal turgor  Psych: Appropriate mood and affect    TELEMETRY: n/a	      < from: Transthoracic Echocardiogram (08.13.19 @ 11:13) >  CONCLUSIONS:  1. Mitral annular calcification and calcified mitral  leaflets with normal diastolic opening. Mild mitral  regurgitation.  2. Calcified trileaflet aortic valve with grossly  moderately decreased opening. Peak transaortic valve  gradient equals 30 mm Hg, mean transaortic valve gradient  equals 15 mm Hg.  3. Normal left ventricular internal dimensions and wall  thicknesses.  4. Normal left ventricular systolic function. No segmental  wall motion abnormalities.  5. Increased E/e'  is consistent with elevated left  ventricular filling pressure.  6. Moderate right atrial enlargement.  7. Estimated pulmonary artery systolic pressure equals 51  mm Hg, assuming right atrial pressure equals 10  mm Hg,  consistent with moderate pulmonary hypertension.  ------------------------------------------------------------------------  Confirmed on  8/13/2019 - 15:07:49 by RIKKI Azevedo  ------------------------------------------------------------------------    < end of copied text >    ASSESSMENT/PLAN:     89 year old Female with history of Afib on AC, HTN, CKD admitted with chest pain, sob and volume overload c/w acute on chronic diastolic and Right sided CHF, with chronic bifasicular block afib. Cardiology consulted for management of HTN, hx of Atrial fibrillation on AC - now on hold for GIB.     -- no anginal symptoms or evidence of heart fail   -- elevated trop 110 -- > 112, neg CPK, doub ACS  -- elevation of trop most likely in setting of acute bleed  -- recent echo as noted   -- BP stable cont current medications   -- AF with SSS - avoid AVN blockers, Pt and family continue to decline invasive testing/procedures, will not pursue ischemic work up   -- AC on hold given GI bleed  --appreciate GI evaluation - ac contraindicated at this time as risks > benefits   -- on d/c pt to follow with Dr. Antonella Banks MD
Patient denies chest pain or shortness of breath.   Review of systems otherwise (-)  	  MEDICATIONS  (STANDING):  allopurinol 100 milliGRAM(s) Oral daily  amLODIPine   Tablet 5 milliGRAM(s) Oral daily  ammonium lactate 12% Lotion 1 Application(s) Topical two times a day  dextrose 5%. 1000 milliLiter(s) (50 mL/Hr) IV Continuous <Continuous>  dextrose 50% Injectable 12.5 Gram(s) IV Push once  dextrose 50% Injectable 25 Gram(s) IV Push once  dextrose 50% Injectable 25 Gram(s) IV Push once  furosemide    Tablet 40 milliGRAM(s) Oral two times a day  hydrALAZINE 100 milliGRAM(s) Oral three times a day  influenza   Vaccine 0.5 milliLiter(s) IntraMuscular once  insulin lispro (HumaLOG) corrective regimen sliding scale   SubCutaneous three times a day with meals  insulin lispro (HumaLOG) corrective regimen sliding scale   SubCutaneous at bedtime  latanoprost 0.005% Ophthalmic Solution 1 Drop(s) Both EYES at bedtime  levothyroxine 75 MICROGram(s) Oral daily  pantoprazole    Tablet 40 milliGRAM(s) Oral before breakfast  simvastatin 20 milliGRAM(s) Oral at bedtime  spironolactone 25 milliGRAM(s) Oral daily    MEDICATIONS  (PRN):  artificial tears (preservative free) Ophthalmic Solution 1 Drop(s) Both EYES daily PRN Eye irritation  dextrose 40% Gel 15 Gram(s) Oral once PRN Blood Glucose LESS THAN 70 milliGRAM(s)/deciliter  glucagon  Injectable 1 milliGRAM(s) IntraMuscular once PRN Glucose LESS THAN 70 milligrams/deciliter      LABS:                      9.6    6.60  )-----------( 151      ( 23 Sep 2019 06:36 )             30.7     142  |  105  |  35<H>  ----------------------------<  126<H>  4.2   |  26  |  1.86<H>    Ca    9.7      23 Sep 2019 06:36  Mg     1.8     09-23    Creatinine Trend: 1.86<--, 1.46<--, 1.69<--, 1.74<--, 1.84<--, 1.94<--     PHYSICAL EXAM  Vital Signs Last 24 Hrs  T(C): 36.9 (23 Sep 2019 05:38), Max: 36.9 (22 Sep 2019 17:19)  T(F): 98.4 (23 Sep 2019 05:38), Max: 98.5 (22 Sep 2019 17:19)  HR: 79 (23 Sep 2019 05:38) (75 - 84)  BP: 148/77 (23 Sep 2019 05:38) (148/77 - 192/93)  RR: 18 (23 Sep 2019 05:38) (17 - 18)  SpO2: 100% (23 Sep 2019 05:38) (97% - 100%)    Gen: Appears well in NAD  HEENT:  (-)icterus (-)pallor  CV: N S1 S2 1/6 SUZI (+)2 Pulses B/l  Resp:  Clear to ausculatation B/L, normal effort  GI: (+) BS Soft, NT, ND  Lymph:  (-)Edema, (-)obvious lymphadenopathy  Skin: Warm to touch, Normal turgor  Psych: Appropriate mood and affect    TELEMETRY: n/a	      < from: Transthoracic Echocardiogram (08.13.19 @ 11:13) >  CONCLUSIONS:  1. Mitral annular calcification and calcified mitral  leaflets with normal diastolic opening. Mild mitral  regurgitation.  2. Calcified trileaflet aortic valve with grossly  moderately decreased opening. Peak transaortic valve  gradient equals 30 mm Hg, mean transaortic valve gradient  equals 15 mm Hg.  3. Normal left ventricular internal dimensions and wall  thicknesses.  4. Normal left ventricular systolic function. No segmental  wall motion abnormalities.  5. Increased E/e'  is consistent with elevated left  ventricular filling pressure.  6. Moderate right atrial enlargement.  7. Estimated pulmonary artery systolic pressure equals 51  mm Hg, assuming right atrial pressure equals 10  mm Hg,  consistent with moderate pulmonary hypertension.  ------------------------------------------------------------------------  Confirmed on  8/13/2019 - 15:07:49 by RIKKI Azevedo  ------------------------------------------------------------------------    < end of copied text >    ASSESSMENT/PLAN:     89 year old Female with history of Afib on AC, HTN, CKD admitted with chest pain, sob and volume overload c/w acute on chronic diastolic and Right sided CHF, with chronic bifasicular block afib. Cardiology consulted for management of HTN, hx of Atrial fibrillation on AC - now on hold for GIB.     -- no anginal symptoms or evidence of heart fail   -- elevated trop 110 -- > 112, neg CPK, doub ACS  -- elevation of trop most likely in setting of acute bleed  -- recent echo as noted   -- BP stable cont current medications   -- AF with SSS - avoid AVN blockers, Pt and family continue to decline invasive testing/procedures, will not pursue ischemic work up   -- AC on hold given GI bleed  --appreciate GI evaluation - ac contraindicated at this time as risks > benefits   --f/u renal regarding diuretics resumed  --BP improved this AM  --on d/c pt to follow with Dr. Berman
[facility] - Radha / Florin FELDMAN /Nate/ GASTON Castillo/ GASTON Tellez/ Shahid Segovia / BABAR Njeru  ---------------------------------------------------------------------------------------------------------------    Patient seen and examined bedside    Subjective and Objective: No overnight events, denied bleeding/sob. No complaints today. feeling better    Allergies: No Known Allergies    Home Medications:  allopurinol 100 mg oral tablet: 1 tab(s) orally once a day (17 Sep 2019 04:44)  cilostazol 100 mg oral tablet: 1 tab(s) orally 2 times a day (17 Sep 2019 04:44)  furosemide 40 mg oral tablet: 1 tab(s) orally 2 times a day (17 Sep 2019 04:44)  hydrALAZINE: 75 milligram(s) orally 3 times a day (17 Sep 2019 04:44)  latanoprost 0.005% ophthalmic solution: 1 drop(s) to each affected eye once a day (in the evening) (17 Sep 2019 04:44)  Levemir FlexTouch 100 units/mL subcutaneous solution: Inject 16 units at bedtime if FSG &gt; 150; otherwise, inject 8 units at bedtime (17 Sep 2019 04:44)  levothyroxine 75 mcg (0.075 mg) oral tablet: 1 tab(s) orally once a day (17 Sep 2019 04:44)  pantoprazole 40 mg oral delayed release tablet: 1 tab(s) orally once a day (before a meal) (17 Sep 2019 04:44)  simvastatin 20 mg oral tablet: 1 tab(s) orally once a day (at bedtime) (17 Sep 2019 04:44)    Hospital Medications:   MEDICATIONS  (STANDING):  allopurinol 100 milliGRAM(s) Oral daily  ammonium lactate 12% Lotion 1 Application(s) Topical two times a day  dextrose 5%. 1000 milliLiter(s) (50 mL/Hr) IV Continuous <Continuous>  dextrose 50% Injectable 12.5 Gram(s) IV Push once  dextrose 50% Injectable 25 Gram(s) IV Push once  dextrose 50% Injectable 25 Gram(s) IV Push once  hydrALAZINE 50 milliGRAM(s) Oral three times a day  hydrocortisone hemorrhoidal Suppository 1 Suppository(s) Rectal at bedtime  influenza   Vaccine 0.5 milliLiter(s) IntraMuscular once  insulin lispro (HumaLOG) corrective regimen sliding scale   SubCutaneous three times a day with meals  insulin lispro (HumaLOG) corrective regimen sliding scale   SubCutaneous at bedtime  latanoprost 0.005% Ophthalmic Solution 1 Drop(s) Both EYES at bedtime  levothyroxine 75 MICROGram(s) Oral daily  pantoprazole    Tablet 40 milliGRAM(s) Oral before breakfast  simvastatin 20 milliGRAM(s) Oral at bedtime      VITALS:  T(F): 97.8 (09-21-19 @ 12:17), Max: 98.7 (09-21-19 @ 06:07)  HR: 60 (09-21-19 @ 12:17)  BP: 171/68 (09-21-19 @ 12:17)  RR: 18 (09-21-19 @ 12:17)  SpO2: 100% (09-21-19 @ 12:17)  Wt(kg): --      PHYSICAL EXAM:  Constitutional: NAD  HEENT: anicteric sclera, oropharynx clear  Neck: No JVD  Respiratory: CTAB, no wheezes, rales or rhonchi  Cardiovascular: S1, S2, RRR  Gastrointestinal: BS+, soft, NT/ND  Extremities: No cyanosis or clubbing. No peripheral edema  Neurological: A/O x 3, no focal deficits  Psychiatric: Normal mood, normal affect  : No michaud.       LABS:  09-21    142  |  109<H>  |  30<H>  ----------------------------<  111<H>  4.3   |  24  |  1.69<H>    Ca    9.3      21 Sep 2019 07:30  Mg     1.9     09-21    TPro  6.1  /  Alb  3.3  /  TBili  0.9  /  DBili      /  AST  14  /  ALT  12  /  AlkPhos  48  09-20    Creatinine Trend: 1.69 <--, 1.74 <--, 1.84 <--, 1.94 <--, 2.30 <--                        8.9    5.74  )-----------( 133      ( 21 Sep 2019 07:30 )             27.9     Urine Studies:        RADIOLOGY & ADDITIONAL STUDIES:
Subjective:     Drop in H/h but last episode of hematochezia was yesterday   Objective:    MEDICATIONS  (STANDING):  allopurinol 100 milliGRAM(s) Oral daily  ammonium lactate 12% Lotion 1 Application(s) Topical two times a day  dextrose 5%. 1000 milliLiter(s) (50 mL/Hr) IV Continuous <Continuous>  dextrose 50% Injectable 12.5 Gram(s) IV Push once  dextrose 50% Injectable 25 Gram(s) IV Push once  dextrose 50% Injectable 25 Gram(s) IV Push once  furosemide    Tablet 40 milliGRAM(s) Oral two times a day  hydrALAZINE 75 milliGRAM(s) Oral three times a day  hydrocortisone hemorrhoidal Suppository 1 Suppository(s) Rectal at bedtime  influenza   Vaccine 0.5 milliLiter(s) IntraMuscular once  insulin lispro (HumaLOG) corrective regimen sliding scale   SubCutaneous three times a day with meals  insulin lispro (HumaLOG) corrective regimen sliding scale   SubCutaneous at bedtime  latanoprost 0.005% Ophthalmic Solution 1 Drop(s) Both EYES at bedtime  levothyroxine 75 MICROGram(s) Oral daily  pantoprazole    Tablet 40 milliGRAM(s) Oral before breakfast  simvastatin 20 milliGRAM(s) Oral at bedtime  spironolactone 25 milliGRAM(s) Oral daily    MEDICATIONS  (PRN):  dextrose 40% Gel 15 Gram(s) Oral once PRN Blood Glucose LESS THAN 70 milliGRAM(s)/deciliter  glucagon  Injectable 1 milliGRAM(s) IntraMuscular once PRN Glucose LESS THAN 70 milligrams/deciliter              Vital Signs Last 24 Hrs  T(C): 36.7 (21 Sep 2019 21:08), Max: 37.1 (21 Sep 2019 06:07)  T(F): 98 (21 Sep 2019 21:08), Max: 98.7 (21 Sep 2019 06:07)  HR: 78 (21 Sep 2019 21:08) (60 - 78)  BP: 174/91 (21 Sep 2019 21:08) (151/82 - 174/91)  BP(mean): --  RR: 16 (21 Sep 2019 21:08) (16 - 18)  SpO2: 100% (21 Sep 2019 21:08) (99% - 100%)      General:  Well developed, well nourished, alert and active, no pallor, NAD.  HEENT:    Normal appearance of conjunctiva, ears, nose, lips, oropharynx, and oral mucosa, anicteric.  Neck:  No masses, no asymmetry.  Lymph Nodes:  No lymphadenopathy.   Cardiovascular:  RRR normal S1/S2, no murmur.  Respiratory:  CTA B/L, normal respiratory effort.   Abdominal:   soft, no masses or tenderness, normoactive BS, NT/ND, no HSM.  Extremities:   No clubbing or cyanosis, normal capillary refill, no edema.   Skin:   No rash, jaundice, lesions, eczema.   Musculoskeletal:  No joint swelling, erythema or tenderness.   Neuro: No focal deficits.   Other:       LABS:                        8.9    5.74  )-----------( 133      ( 21 Sep 2019 07:30 )             27.9     09-21    142  |  109<H>  |  30<H>  ----------------------------<  111<H>  4.3   |  24  |  1.69<H>    Ca    9.3      21 Sep 2019 07:30  Mg     1.9     09-21    TPro  6.1  /  Alb  3.3  /  TBili  0.9  /  DBili  x   /  AST  14  /  ALT  12  /  AlkPhos  48  09-20    PT/INR - ( 20 Sep 2019 06:01 )   PT: 16.3 SEC;   INR: 1.45                RADIOLOGY & ADDITIONAL TESTS:
pt seen and examined, no complaints, ROS - .          allopurinol 100 milliGRAM(s) Oral daily  ammonium lactate 12% Lotion 1 Application(s) Topical two times a day  dextrose 40% Gel 15 Gram(s) Oral once PRN  dextrose 5%. 1000 milliLiter(s) IV Continuous <Continuous>  dextrose 50% Injectable 12.5 Gram(s) IV Push once  dextrose 50% Injectable 25 Gram(s) IV Push once  dextrose 50% Injectable 25 Gram(s) IV Push once  furosemide    Tablet 40 milliGRAM(s) Oral two times a day  glucagon  Injectable 1 milliGRAM(s) IntraMuscular once PRN  hydrALAZINE 75 milliGRAM(s) Oral three times a day  hydrocortisone hemorrhoidal Suppository 1 Suppository(s) Rectal at bedtime  influenza   Vaccine 0.5 milliLiter(s) IntraMuscular once  insulin lispro (HumaLOG) corrective regimen sliding scale   SubCutaneous three times a day with meals  insulin lispro (HumaLOG) corrective regimen sliding scale   SubCutaneous at bedtime  latanoprost 0.005% Ophthalmic Solution 1 Drop(s) Both EYES at bedtime  levothyroxine 75 MICROGram(s) Oral daily  pantoprazole    Tablet 40 milliGRAM(s) Oral before breakfast  simvastatin 20 milliGRAM(s) Oral at bedtime  spironolactone 25 milliGRAM(s) Oral daily                            8.9    5.74  )-----------( 133      ( 21 Sep 2019 07:30 )             27.9       Hemoglobin: 8.9 g/dL (09-21 @ 07:30)  Hemoglobin: 7.8 g/dL (09-20 @ 06:01)  Hemoglobin: 8.8 g/dL (09-19 @ 05:18)  Hemoglobin: 9.2 g/dL (09-19 @ 00:20)  Hemoglobin: 7.6 g/dL (09-18 @ 05:38)      09-21    142  |  109<H>  |  30<H>  ----------------------------<  111<H>  4.3   |  24  |  1.69<H>    Ca    9.3      21 Sep 2019 07:30  Mg     1.9     09-21    TPro  6.1  /  Alb  3.3  /  TBili  0.9  /  DBili  x   /  AST  14  /  ALT  12  /  AlkPhos  48  09-20    Creatinine Trend: 1.69<--, 1.74<--, 1.84<--, 1.94<--, 2.30<--    COAGS:           T(C): 36.7 (09-22-19 @ 01:15), Max: 37.1 (09-21-19 @ 06:07)  HR: 78 (09-22-19 @ 01:15) (60 - 78)  BP: 141/75 (09-22-19 @ 01:15) (141/75 - 174/91)  RR: 16 (09-22-19 @ 01:15) (16 - 18)  SpO2: 100% (09-22-19 @ 01:15) (100% - 100%)  Wt(kg): --    I&O's Summary    Gen: Appears well in NAD  HEENT:  (-)icterus (-)pallor  CV: N S1 S2 1/6 SUZI (+)2 Pulses B/l  Resp:  Clear to ausculatation B/L, normal effort  GI: (+) BS Soft, NT, ND  Lymph:  (-)Edema, (-)obvious lymphadenopathy  Skin: Warm to touch, Normal turgor  Psych: Appropriate mood and affect    TELEMETRY: n/a	      < from: Transthoracic Echocardiogram (08.13.19 @ 11:13) >  CONCLUSIONS:  1. Mitral annular calcification and calcified mitral  leaflets with normal diastolic opening. Mild mitral  regurgitation.  2. Calcified trileaflet aortic valve with grossly  moderately decreased opening. Peak transaortic valve  gradient equals 30 mm Hg, mean transaortic valve gradient  equals 15 mm Hg.  3. Normal left ventricular internal dimensions and wall  thicknesses.  4. Normal left ventricular systolic function. No segmental  wall motion abnormalities.  5. Increased E/e'  is consistent with elevated left  ventricular filling pressure.  6. Moderate right atrial enlargement.  7. Estimated pulmonary artery systolic pressure equals 51  mm Hg, assuming right atrial pressure equals 10  mm Hg,  consistent with moderate pulmonary hypertension.  ------------------------------------------------------------------------  Confirmed on  8/13/2019 - 15:07:49 by David Chávez M.D. [facility]PV[facility]  ------------------------------------------------------------------------    < end of copied text >    ASSESSMENT/PLAN:     89 year old Female with history of Afib on AC, HTN, CKD admitted with chest pain, sob and volume overload c/w acute on chronic diastolic and Right sided CHF, with chronic bifasicular block afib. Cardiology consulted for management of HTN, hx of Atrial fibrillation on AC - now on hold for GIB.     -- no anginal symptoms or evidence of heart fail   -- off A/C at present , GI follow up  , appreciate GI evaluation - ac contraindicated at this time as risks > benefits   -- elevated trop 110 -- > 112, neg CPK, doub ACS  -- elevation of trop most likely in setting of acute bleed  -- recent echo as noted    -- AF with SSS - avoid AVN blockers, Pt and family continue to decline invasive testing/procedures, will not pursue ischemic work up   --resume lasix at DC  -- on d/c pt to follow with Dr. Berman
INTERVAL HPI/OVERNIGHT EVENTS:  Vital Signs Last 24 Hrs  T(C): 36.9 (20 Sep 2019 05:54), Max: 36.9 (20 Sep 2019 05:54)  T(F): 98.4 (20 Sep 2019 05:54), Max: 98.4 (20 Sep 2019 05:54)  HR: 73 (20 Sep 2019 05:54) (73 - 80)  BP: 155/55 (20 Sep 2019 05:54) (144/61 - 160/84)  BP(mean): --  RR: 18 (20 Sep 2019 05:54) (18 - 18)  SpO2: 100% (20 Sep 2019 05:54) (100% - 100%)  I&O's Summary    MEDICATIONS  (STANDING):  allopurinol 100 milliGRAM(s) Oral daily  ammonium lactate 12% Lotion 1 Application(s) Topical two times a day  dextrose 5%. 1000 milliLiter(s) (50 mL/Hr) IV Continuous <Continuous>  dextrose 50% Injectable 12.5 Gram(s) IV Push once  dextrose 50% Injectable 25 Gram(s) IV Push once  dextrose 50% Injectable 25 Gram(s) IV Push once  hydrALAZINE 25 milliGRAM(s) Oral daily  hydrocortisone hemorrhoidal Suppository 1 Suppository(s) Rectal at bedtime  influenza   Vaccine 0.5 milliLiter(s) IntraMuscular once  insulin lispro (HumaLOG) corrective regimen sliding scale   SubCutaneous three times a day with meals  insulin lispro (HumaLOG) corrective regimen sliding scale   SubCutaneous at bedtime  latanoprost 0.005% Ophthalmic Solution 1 Drop(s) Both EYES at bedtime  levothyroxine 75 MICROGram(s) Oral daily  pantoprazole  Injectable 40 milliGRAM(s) IV Push every 12 hours  simvastatin 20 milliGRAM(s) Oral at bedtime    MEDICATIONS  (PRN):  dextrose 40% Gel 15 Gram(s) Oral once PRN Blood Glucose LESS THAN 70 milliGRAM(s)/deciliter  glucagon  Injectable 1 milliGRAM(s) IntraMuscular once PRN Glucose LESS THAN 70 milligrams/deciliter    LABS:                        7.8    5.70  )-----------( 136      ( 20 Sep 2019 06:01 )             24.7     09-20    144  |  111<H>  |  29<H>  ----------------------------<  90  4.1   |  23  |  1.74<H>    Ca    9.5      20 Sep 2019 06:01    TPro  6.1  /  Alb  3.3  /  TBili  0.9  /  DBili  x   /  AST  14  /  ALT  12  /  AlkPhos  48  09-20    PT/INR - ( 20 Sep 2019 06:01 )   PT: 16.3 SEC;   INR: 1.45          PTT - ( 19 Sep 2019 05:18 )  PTT:28.5 SEC    CAPILLARY BLOOD GLUCOSE      POCT Blood Glucose.: 95 mg/dL (20 Sep 2019 08:40)  POCT Blood Glucose.: 141 mg/dL (19 Sep 2019 22:32)  POCT Blood Glucose.: 116 mg/dL (19 Sep 2019 17:31)  POCT Blood Glucose.: 113 mg/dL (19 Sep 2019 13:38)          REVIEW OF SYSTEMS:  CONSTITUTIONAL: No fever, weight loss, or fatigue  EYES: No eye pain, visual disturbances, or discharge  ENMT:  No difficulty hearing, tinnitus, vertigo; No sinus or throat pain  NECK: No pain or stiffness  RESPIRATORY: No cough, wheezing, chills or hemoptysis; No shortness of breath  CARDIOVASCULAR: No chest pain, palpitations, dizziness, or leg swelling  GASTROINTESTINAL: No abdominal or epigastric pain. No nausea, vomiting, or hematemesis; No diarrhea or constipation. No melena or hematochezia.  GENITOURINARY: No dysuria, frequency, hematuria, or incontinence  NEUROLOGICAL: No headaches, memory loss, loss of strength, numbness, or tremors    RADIOLOGY & ADDITIONAL TESTS:    Consultant(s) Notes Reviewed:  [x ] YES  [ ] NO    PHYSICAL EXAM:  GENERAL: NAD, well-groomed, well-developed,not in any distress ,  HEAD:  Atraumatic, Normocephalic  EYES: EOMI, PERRLA, conjunctiva and sclera clear  ENMT: No tonsillar erythema, exudates, or enlargement; Moist mucous membranes, Good dentition, No lesions  NECK: Supple, No JVD, Normal thyroid  NERVOUS SYSTEM:  Alert & Oriented X3, No focal deficit   CHEST/LUNG: Good air entry bilateral with no  rales, rhonchi, wheezing, or rubs  HEART: Regular rate and rhythm; No murmurs, rubs, or gallops  ABDOMEN: Soft, Nontender, Nondistended; Bowel sounds present  EXTREMITIES:  2+ Peripheral Pulses, No clubbing, cyanosis, or edema    Care Discussed with Consultants/Other Providers [ x] YES  [ ] NO
INTERVAL HPI/OVERNIGHT EVENTS: i feel fine.   Vital Signs Last 24 Hrs  T(C): 36.6 (23 Sep 2019 12:15), Max: 36.9 (22 Sep 2019 17:19)  T(F): 97.9 (23 Sep 2019 12:15), Max: 98.5 (22 Sep 2019 17:19)  HR: 65 (23 Sep 2019 12:15) (65 - 84)  BP: 140/64 (23 Sep 2019 12:15) (140/64 - 192/93)  BP(mean): --  RR: 18 (23 Sep 2019 12:15) (18 - 18)  SpO2: 100% (23 Sep 2019 12:15) (97% - 100%)  I&O's Summary    MEDICATIONS  (STANDING):  allopurinol 100 milliGRAM(s) Oral daily  amLODIPine   Tablet 5 milliGRAM(s) Oral daily  ammonium lactate 12% Lotion 1 Application(s) Topical two times a day  dextrose 5%. 1000 milliLiter(s) (50 mL/Hr) IV Continuous <Continuous>  dextrose 50% Injectable 12.5 Gram(s) IV Push once  dextrose 50% Injectable 25 Gram(s) IV Push once  dextrose 50% Injectable 25 Gram(s) IV Push once  furosemide    Tablet 40 milliGRAM(s) Oral two times a day  hydrALAZINE 100 milliGRAM(s) Oral three times a day  influenza   Vaccine 0.5 milliLiter(s) IntraMuscular once  insulin lispro (HumaLOG) corrective regimen sliding scale   SubCutaneous three times a day with meals  insulin lispro (HumaLOG) corrective regimen sliding scale   SubCutaneous at bedtime  latanoprost 0.005% Ophthalmic Solution 1 Drop(s) Both EYES at bedtime  levothyroxine 75 MICROGram(s) Oral daily  pantoprazole    Tablet 40 milliGRAM(s) Oral before breakfast  simvastatin 20 milliGRAM(s) Oral at bedtime  spironolactone 25 milliGRAM(s) Oral daily    MEDICATIONS  (PRN):  artificial tears (preservative free) Ophthalmic Solution 1 Drop(s) Both EYES daily PRN Eye irritation  dextrose 40% Gel 15 Gram(s) Oral once PRN Blood Glucose LESS THAN 70 milliGRAM(s)/deciliter  glucagon  Injectable 1 milliGRAM(s) IntraMuscular once PRN Glucose LESS THAN 70 milligrams/deciliter    LABS:                        9.6    6.60  )-----------( 151      ( 23 Sep 2019 06:36 )             30.7     09-23    142  |  105  |  35<H>  ----------------------------<  126<H>  4.2   |  26  |  1.86<H>    Ca    9.7      23 Sep 2019 06:36  Mg     1.8     09-23          CAPILLARY BLOOD GLUCOSE      POCT Blood Glucose.: 182 mg/dL (23 Sep 2019 12:34)  POCT Blood Glucose.: 129 mg/dL (23 Sep 2019 08:32)  POCT Blood Glucose.: 104 mg/dL (22 Sep 2019 21:31)  POCT Blood Glucose.: 213 mg/dL (22 Sep 2019 17:05)          REVIEW OF SYSTEMS:  CONSTITUTIONAL: No fever, weight loss, or fatigue  EYES: No eye pain, visual disturbances, or discharge  ENMT:  No difficulty hearing, tinnitus, vertigo; No sinus or throat pain  NECK: No pain or stiffness  RESPIRATORY: No cough, wheezing, chills or hemoptysis; No shortness of breath  CARDIOVASCULAR: No chest pain, palpitations, dizziness, or leg swelling  GASTROINTESTINAL: No abdominal or epigastric pain. No nausea, vomiting, or hematemesis; No diarrhea or constipation. No melena or hematochezia.  GENITOURINARY: No dysuria, frequency, hematuria, or incontinence  NEUROLOGICAL: No headaches, memory loss, loss of strength, numbness, or tremors    Consultant(s) Notes Reviewed:  [x ] YES  [ ] NO    PHYSICAL EXAM:  GENERAL: NAD, well-groomed, well-developed,not in any distress ,  HEAD:  Atraumatic, Normocephalic  EYES: EOMI, PERRLA, conjunctiva and sclera clear  ENMT: No tonsillar erythema, exudates, or enlargement; Moist mucous membranes, Good dentition, No lesions  NECK: Supple, No JVD, Normal thyroid  NERVOUS SYSTEM:  Alert & Oriented X3, No focal deficit   CHEST/LUNG: Good air entry bilateral with no  rales, rhonchi, wheezing, or rubs  HEART: Regular rate and rhythm; No murmurs, rubs, or gallops  ABDOMEN: Soft, Nontender, Nondistended; Bowel sounds present  EXTREMITIES:  2+ Peripheral Pulses, No clubbing, cyanosis, or edema  SKIN: No rashes or lesions    Care Discussed with Consultants/Other Providers [ x] YES  [ ] NO
[facility] [place] [facility] - Radha / Florin S /Nate/ GASTON Castillo/ GASTON Tellez/ Shahid Segovia / BABAR Njeru  ---------------------------------------------------------------------------------------------------------------    Patient seen and examined bedside    Subjective and Objective: No overnight events, denied bleeding/sob. No complaints today. feeling better    Allergies: No Known Allergies      Hospital Medications:   MEDICATIONS  (STANDING):  allopurinol 100 milliGRAM(s) Oral daily  amLODIPine   Tablet 5 milliGRAM(s) Oral daily  ammonium lactate 12% Lotion 1 Application(s) Topical two times a day  dextrose 5%. 1000 milliLiter(s) (50 mL/Hr) IV Continuous <Continuous>  dextrose 50% Injectable 12.5 Gram(s) IV Push once  dextrose 50% Injectable 25 Gram(s) IV Push once  dextrose 50% Injectable 25 Gram(s) IV Push once  furosemide    Tablet 40 milliGRAM(s) Oral two times a day  hydrALAZINE 100 milliGRAM(s) Oral three times a day  influenza   Vaccine 0.5 milliLiter(s) IntraMuscular once  insulin lispro (HumaLOG) corrective regimen sliding scale   SubCutaneous three times a day with meals  insulin lispro (HumaLOG) corrective regimen sliding scale   SubCutaneous at bedtime  latanoprost 0.005% Ophthalmic Solution 1 Drop(s) Both EYES at bedtime  levothyroxine 75 MICROGram(s) Oral daily  pantoprazole    Tablet 40 milliGRAM(s) Oral before breakfast  simvastatin 20 milliGRAM(s) Oral at bedtime  spironolactone 25 milliGRAM(s) Oral daily    VITALS:  T(F): 98.4 (09-23-19 @ 05:38), Max: 98.5 (09-22-19 @ 17:19)  HR: 79 (09-23-19 @ 05:38)  BP: 148/77 (09-23-19 @ 05:38)  RR: 18 (09-23-19 @ 05:38)  SpO2: 100% (09-23-19 @ 05:38)  Wt(kg): --      PHYSICAL EXAM:  Constitutional: NAD  HEENT: anicteric sclera, oropharynx clear  Neck: No JVD  Respiratory: CTAB, no wheezes, rales or rhonchi  Cardiovascular: S1, S2, RRR  Gastrointestinal: BS+, soft, NT/ND  Extremities: No cyanosis or clubbing. No peripheral edema  Neurological: A/O x 3, no focal deficits  Psychiatric: Normal mood, normal affect  : No michaud.       LABS:  09-23    142  |  105  |  35<H>  ----------------------------<  126<H>  4.2   |  26  |  1.86<H>    Ca    9.7      23 Sep 2019 06:36  Mg     1.8     09-23      Creatinine Trend: 1.86 <--, 1.46 <--, 1.69 <--, 1.74 <--, 1.84 <--, 1.94 <--, 2.30 <--                        9.6    6.60  )-----------( 151      ( 23 Sep 2019 06:36 )             30.7     Urine Studies:        RADIOLOGY & ADDITIONAL STUDIES:
[facility] [place] [facility] - Radha / Florin S /Nate/ S Jonathan/ S Rosalinda/ Shahid Segovia / BABAR Njeru  ---------------------------------------------------------------------------------------------------------------    Patient seen and examined bedside    Subjective and Objective: No overnight events, denied jason/BRBPR/sob. No new complaints today.  daughter bedside  s/p colonoscopy yesterday    Allergies: No Known Allergies      Hospital Medications:   MEDICATIONS  (STANDING):  allopurinol 100 milliGRAM(s) Oral daily  ammonium lactate 12% Lotion 1 Application(s) Topical two times a day  dextrose 5%. 1000 milliLiter(s) (50 mL/Hr) IV Continuous <Continuous>  dextrose 50% Injectable 12.5 Gram(s) IV Push once  dextrose 50% Injectable 25 Gram(s) IV Push once  dextrose 50% Injectable 25 Gram(s) IV Push once  hydrALAZINE 25 milliGRAM(s) Oral daily  hydrocortisone hemorrhoidal Suppository 1 Suppository(s) Rectal at bedtime  influenza   Vaccine 0.5 milliLiter(s) IntraMuscular once  insulin lispro (HumaLOG) corrective regimen sliding scale   SubCutaneous three times a day with meals  insulin lispro (HumaLOG) corrective regimen sliding scale   SubCutaneous at bedtime  latanoprost 0.005% Ophthalmic Solution 1 Drop(s) Both EYES at bedtime  levothyroxine 75 MICROGram(s) Oral daily  pantoprazole  Injectable 40 milliGRAM(s) IV Push every 12 hours  simvastatin 20 milliGRAM(s) Oral at bedtime      VITALS:  T(F): 98.4 (09-20-19 @ 05:54), Max: 98.4 (09-20-19 @ 05:54)  HR: 73 (09-20-19 @ 05:54)  BP: 155/55 (09-20-19 @ 05:54)  RR: 18 (09-20-19 @ 05:54)  SpO2: 100% (09-20-19 @ 05:54)  Wt(kg): --      PHYSICAL EXAM:  Constitutional: NAD  HEENT: anicteric sclera, oropharynx clear  Neck: No JVD  Respiratory: CTAB, no wheezes, rales or rhonchi  Cardiovascular: S1, S2, RRR  Gastrointestinal: BS+, soft, NT/ND  Extremities: No cyanosis or clubbing. No peripheral edema  Neurological: A/O x 3, no focal deficits  Psychiatric: Normal mood, normal affect  : No CVA tenderness. No michaud.     LABS:  09-20    144  |  111<H>  |  29<H>  ----------------------------<  90  4.1   |  23  |  1.74<H>    Ca    9.5      20 Sep 2019 06:01    TPro  6.1  /  Alb  3.3  /  TBili  0.9  /  DBili      /  AST  14  /  ALT  12  /  AlkPhos  48  09-20    Creatinine Trend: 1.74 <--, 1.84 <--, 1.94 <--, 2.30 <--                        7.8    5.70  )-----------( 136      ( 20 Sep 2019 06:01 )             24.7     Urine Studies:        RADIOLOGY & ADDITIONAL STUDIES:

## 2019-09-23 NOTE — PROGRESS NOTE ADULT - PROBLEM SELECTOR PROBLEM 1
CKD (chronic kidney disease)
CKD (chronic kidney disease)
Rectal bleeding
CKD (chronic kidney disease)
CKD (chronic kidney disease)

## 2019-09-23 NOTE — PROGRESS NOTE ADULT - PROVIDER SPECIALTY LIST ADULT
Anesthesia
Cardiology
Gastroenterology
Internal Medicine
Nephrology
Nephrology
Internal Medicine
Internal Medicine
Nephrology
Gastroenterology
Nephrology

## 2019-09-23 NOTE — PROGRESS NOTE ADULT - ATTENDING COMMENTS
Agree with above  Off ac due to GIB  No further inpatient cardiac workup needed at this time.     Trisha Banks MD
Patient seen and examined.  Agree with above.   -ac on hold due to gib  -resume ac when ok with marquise Banks MD
Patient seen and examined.  Agree with above.   Off ac per GI due to bleeding  Pt. and family want conservative cv care at this time  No further inpatient cardiac workup needed at this time.     Trisha Banks MD
pl call if any q's   Nephrology  Office [phone]  Ans Serv [phone]  cell [phone]
Agree with above  No further inpatient cardiac workup needed at this time.   Off ac due to bleeding risks per GI    Trisha Banks MD
Dr Solomon Catherine  Nephrology  Office [phone]  Ans Serv [phone]  [facility] [phone]
pl call if any q's   Nephrology  Office [phone]  Ans Serv [phone]  cell [phone]
pl call if any q's   Nephrology  Office [phone]  Ans Serv [phone]  cell [phone]

## 2019-09-24 LAB
O+P SPEC CONC: SIGNIFICANT CHANGE UP
SPECIMEN SOURCE: SIGNIFICANT CHANGE UP
TRI STN SPEC: SIGNIFICANT CHANGE UP

## 2020-05-15 PROBLEM — I63.9 CEREBRAL INFARCTION, UNSPECIFIED: Chronic | Status: ACTIVE | Noted: 2019-09-17

## 2020-05-15 PROBLEM — N18.9 CHRONIC KIDNEY DISEASE, UNSPECIFIED: Chronic | Status: ACTIVE | Noted: 2019-09-17

## 2020-05-22 ENCOUNTER — APPOINTMENT (OUTPATIENT)
Dept: VASCULAR SURGERY | Facility: CLINIC | Age: 85
End: 2020-05-22
Payer: MEDICARE

## 2020-05-22 PROCEDURE — 99214 OFFICE O/P EST MOD 30 MIN: CPT

## 2020-05-22 PROCEDURE — 93923 UPR/LXTR ART STDY 3+ LVLS: CPT

## 2020-05-22 NOTE — ASSESSMENT
[FreeTextEntry1] : Patient with severe peripheral vascular disease and left toe ulceration.  At this point I would like to try a short course of conservative management due to the fact that the patient has significant renal insufficiency.  We will follow-up in 2 weeks.  I discussed this with the podiatry service.  If the wound does not heal or improve within 2 weeks then we will schedule the patient for an angiogram.

## 2020-05-22 NOTE — HISTORY OF PRESENT ILLNESS
[FreeTextEntry1] : Patient is a 89-year-old with past medical history significant for atrial fibrillation on Coumadin, diabetes, hypertension presenting with nonhealing wound and ulceration of the left first toe associated with pain.  Patient has been treated with antibiotics for 2 weeks.  This started about a month ago.  The wound is slowly healing.  No fevers or chills.  Patient has chronic renal insufficiency with creatinine of 2.5 based on the patient's son's report.

## 2020-05-22 NOTE — PHYSICAL EXAM
[JVD] : no jugular venous distention  [Normal Breath Sounds] : Normal breath sounds [Normal Heart Sounds] : normal heart sounds [2+] : right 2+ [Ankle Swelling (On Exam)] : not present [] : not present [Varicose Veins Of Lower Extremities] : not present [Skin Ulcer] : ulcer [Abdomen Masses] : No abdominal masses [Alert] : alert [Oriented to Person] : oriented to person [Oriented to Place] : oriented to place

## 2020-05-22 NOTE — CONSULT LETTER
[Consult Letter:] : I had the pleasure of evaluating your patient, [unfilled]. [Dear  ___] : Dear  [unfilled], [Please see my note below.] : Please see my note below. [Consult Closing:] : Thank you very much for allowing me to participate in the care of this patient.  If you have any questions, please do not hesitate to contact me. [Sincerely,] : Sincerely, [FreeTextEntry3] : Arash Rosario M.D., F.NICOLE.GEO.S., R.P.BIBI.I.\par  of Vascular Surgery\par Chief, Vascular Surgery at [facility]\par Chief, Endovascular Surgery at [facility]\par Medical Director of Endovascular Program\par [facility]

## 2020-06-05 ENCOUNTER — APPOINTMENT (OUTPATIENT)
Dept: VASCULAR SURGERY | Facility: CLINIC | Age: 85
End: 2020-06-05

## 2020-07-21 ENCOUNTER — INPATIENT (INPATIENT)
Facility: HOSPITAL | Age: 85
LOS: 21 days | Discharge: HOME HEALTH | End: 2020-08-12
Attending: INTERNAL MEDICINE | Admitting: INTERNAL MEDICINE
Payer: MEDICARE

## 2020-07-21 VITALS
RESPIRATION RATE: 16 BRPM | HEART RATE: 90 BPM | DIASTOLIC BLOOD PRESSURE: 69 MMHG | SYSTOLIC BLOOD PRESSURE: 166 MMHG | OXYGEN SATURATION: 100 % | TEMPERATURE: 98 F

## 2020-07-21 DIAGNOSIS — I73.9 PERIPHERAL VASCULAR DISEASE, UNSPECIFIED: ICD-10-CM

## 2020-07-21 DIAGNOSIS — I96 GANGRENE, NOT ELSEWHERE CLASSIFIED: ICD-10-CM

## 2020-07-21 DIAGNOSIS — D64.9 ANEMIA, UNSPECIFIED: ICD-10-CM

## 2020-07-21 DIAGNOSIS — I50.32 CHRONIC DIASTOLIC (CONGESTIVE) HEART FAILURE: ICD-10-CM

## 2020-07-21 DIAGNOSIS — E11.9 TYPE 2 DIABETES MELLITUS WITHOUT COMPLICATIONS: ICD-10-CM

## 2020-07-21 DIAGNOSIS — I26.99 OTHER PULMONARY EMBOLISM WITHOUT ACUTE COR PULMONALE: ICD-10-CM

## 2020-07-21 DIAGNOSIS — E89.0 POSTPROCEDURAL HYPOTHYROIDISM: Chronic | ICD-10-CM

## 2020-07-21 DIAGNOSIS — Z41.9 ENCOUNTER FOR PROCEDURE FOR PURPOSES OTHER THAN REMEDYING HEALTH STATE, UNSPECIFIED: Chronic | ICD-10-CM

## 2020-07-21 DIAGNOSIS — I48.91 UNSPECIFIED ATRIAL FIBRILLATION: ICD-10-CM

## 2020-07-21 DIAGNOSIS — N18.3 CHRONIC KIDNEY DISEASE, STAGE 3 (MODERATE): ICD-10-CM

## 2020-07-21 LAB
ALBUMIN SERPL ELPH-MCNC: 4 G/DL — SIGNIFICANT CHANGE UP (ref 3.3–5)
ALP SERPL-CCNC: 90 U/L — SIGNIFICANT CHANGE UP (ref 40–120)
ALT FLD-CCNC: 14 U/L — SIGNIFICANT CHANGE UP (ref 4–33)
ANION GAP SERPL CALC-SCNC: 10 MMO/L — SIGNIFICANT CHANGE UP (ref 7–14)
AST SERPL-CCNC: 14 U/L — SIGNIFICANT CHANGE UP (ref 4–32)
BASE EXCESS BLDV CALC-SCNC: 5 MMOL/L — SIGNIFICANT CHANGE UP
BASOPHILS # BLD AUTO: 0.02 K/UL — SIGNIFICANT CHANGE UP (ref 0–0.2)
BASOPHILS NFR BLD AUTO: 0.2 % — SIGNIFICANT CHANGE UP (ref 0–2)
BILIRUB SERPL-MCNC: 0.4 MG/DL — SIGNIFICANT CHANGE UP (ref 0.2–1.2)
BLOOD GAS VENOUS - CREATININE: 2.25 MG/DL — HIGH (ref 0.5–1.3)
BUN SERPL-MCNC: 48 MG/DL — HIGH (ref 7–23)
CALCIUM SERPL-MCNC: 10.7 MG/DL — HIGH (ref 8.4–10.5)
CHLORIDE BLDV-SCNC: 107 MMOL/L — SIGNIFICANT CHANGE UP (ref 96–108)
CHLORIDE SERPL-SCNC: 103 MMOL/L — SIGNIFICANT CHANGE UP (ref 98–107)
CO2 SERPL-SCNC: 28 MMOL/L — SIGNIFICANT CHANGE UP (ref 22–31)
CREAT SERPL-MCNC: 2.02 MG/DL — HIGH (ref 0.5–1.3)
CRP SERPL-MCNC: 45.6 MG/L — HIGH
EOSINOPHIL # BLD AUTO: 0.1 K/UL — SIGNIFICANT CHANGE UP (ref 0–0.5)
EOSINOPHIL NFR BLD AUTO: 1.2 % — SIGNIFICANT CHANGE UP (ref 0–6)
ERYTHROCYTE [SEDIMENTATION RATE] IN BLOOD: 96 MM/HR — HIGH (ref 4–25)
GAS PNL BLDV: 142 MMOL/L — SIGNIFICANT CHANGE UP (ref 136–146)
GLUCOSE BLDC GLUCOMTR-MCNC: 168 MG/DL — HIGH (ref 70–99)
GLUCOSE BLDV-MCNC: 108 MG/DL — HIGH (ref 70–99)
GLUCOSE SERPL-MCNC: 117 MG/DL — HIGH (ref 70–99)
HCO3 BLDV-SCNC: 28 MMOL/L — HIGH (ref 20–27)
HCT VFR BLD CALC: 30 % — LOW (ref 34.5–45)
HCT VFR BLDV CALC: 27.8 % — LOW (ref 34.5–45)
HGB BLD-MCNC: 9 G/DL — LOW (ref 11.5–15.5)
HGB BLDV-MCNC: 9 G/DL — LOW (ref 11.5–15.5)
IMM GRANULOCYTES NFR BLD AUTO: 0.2 % — SIGNIFICANT CHANGE UP (ref 0–1.5)
INR BLD: 3.49 — HIGH (ref 0.88–1.17)
LACTATE BLDV-MCNC: 0.7 MMOL/L — SIGNIFICANT CHANGE UP (ref 0.5–2)
LYMPHOCYTES # BLD AUTO: 1.64 K/UL — SIGNIFICANT CHANGE UP (ref 1–3.3)
LYMPHOCYTES # BLD AUTO: 19.9 % — SIGNIFICANT CHANGE UP (ref 13–44)
MCHC RBC-ENTMCNC: 28.8 PG — SIGNIFICANT CHANGE UP (ref 27–34)
MCHC RBC-ENTMCNC: 30 % — LOW (ref 32–36)
MCV RBC AUTO: 95.8 FL — SIGNIFICANT CHANGE UP (ref 80–100)
MONOCYTES # BLD AUTO: 1.13 K/UL — HIGH (ref 0–0.9)
MONOCYTES NFR BLD AUTO: 13.7 % — SIGNIFICANT CHANGE UP (ref 2–14)
NEUTROPHILS # BLD AUTO: 5.35 K/UL — SIGNIFICANT CHANGE UP (ref 1.8–7.4)
NEUTROPHILS NFR BLD AUTO: 64.8 % — SIGNIFICANT CHANGE UP (ref 43–77)
NRBC # FLD: 0 K/UL — SIGNIFICANT CHANGE UP (ref 0–0)
PCO2 BLDV: 49 MMHG — SIGNIFICANT CHANGE UP (ref 41–51)
PH BLDV: 7.4 PH — SIGNIFICANT CHANGE UP (ref 7.32–7.43)
PLATELET # BLD AUTO: 234 K/UL — SIGNIFICANT CHANGE UP (ref 150–400)
PMV BLD: 10.4 FL — SIGNIFICANT CHANGE UP (ref 7–13)
PO2 BLDV: 42 MMHG — HIGH (ref 35–40)
POTASSIUM BLDV-SCNC: 3.7 MMOL/L — SIGNIFICANT CHANGE UP (ref 3.4–4.5)
POTASSIUM SERPL-MCNC: 4.1 MMOL/L — SIGNIFICANT CHANGE UP (ref 3.5–5.3)
POTASSIUM SERPL-SCNC: 4.1 MMOL/L — SIGNIFICANT CHANGE UP (ref 3.5–5.3)
PROT SERPL-MCNC: 7.9 G/DL — SIGNIFICANT CHANGE UP (ref 6–8.3)
PROTHROM AB SERPL-ACNC: 41.3 SEC — HIGH (ref 9.8–13.1)
RBC # BLD: 3.13 M/UL — LOW (ref 3.8–5.2)
RBC # FLD: 15.1 % — HIGH (ref 10.3–14.5)
SAO2 % BLDV: 74.2 % — SIGNIFICANT CHANGE UP (ref 60–85)
SODIUM SERPL-SCNC: 141 MMOL/L — SIGNIFICANT CHANGE UP (ref 135–145)
WBC # BLD: 8.26 K/UL — SIGNIFICANT CHANGE UP (ref 3.8–10.5)
WBC # FLD AUTO: 8.26 K/UL — SIGNIFICANT CHANGE UP (ref 3.8–10.5)

## 2020-07-21 PROCEDURE — 73630 X-RAY EXAM OF FOOT: CPT | Mod: 26,LT

## 2020-07-21 PROCEDURE — 99283 EMERGENCY DEPT VISIT LOW MDM: CPT

## 2020-07-21 RX ORDER — SODIUM CHLORIDE 9 MG/ML
1000 INJECTION, SOLUTION INTRAVENOUS
Refills: 0 | Status: DISCONTINUED | OUTPATIENT
Start: 2020-07-21 | End: 2020-08-12

## 2020-07-21 RX ORDER — FUROSEMIDE 40 MG
40 TABLET ORAL
Refills: 0 | Status: DISCONTINUED | OUTPATIENT
Start: 2020-07-21 | End: 2020-07-25

## 2020-07-21 RX ORDER — HYDRALAZINE HCL 50 MG
50 TABLET ORAL THREE TIMES A DAY
Refills: 0 | Status: DISCONTINUED | OUTPATIENT
Start: 2020-07-21 | End: 2020-07-23

## 2020-07-21 RX ORDER — INSULIN DETEMIR 100/ML (3)
16 INSULIN PEN (ML) SUBCUTANEOUS
Qty: 0 | Refills: 0 | DISCHARGE

## 2020-07-21 RX ORDER — INSULIN LISPRO 100/ML
VIAL (ML) SUBCUTANEOUS AT BEDTIME
Refills: 0 | Status: DISCONTINUED | OUTPATIENT
Start: 2020-07-21 | End: 2020-08-12

## 2020-07-21 RX ORDER — GLUCAGON INJECTION, SOLUTION 0.5 MG/.1ML
1 INJECTION, SOLUTION SUBCUTANEOUS ONCE
Refills: 0 | Status: DISCONTINUED | OUTPATIENT
Start: 2020-07-21 | End: 2020-08-12

## 2020-07-21 RX ORDER — GABAPENTIN 400 MG/1
100 CAPSULE ORAL THREE TIMES A DAY
Refills: 0 | Status: DISCONTINUED | OUTPATIENT
Start: 2020-07-21 | End: 2020-08-12

## 2020-07-21 RX ORDER — CEFEPIME 1 G/1
1000 INJECTION, POWDER, FOR SOLUTION INTRAMUSCULAR; INTRAVENOUS EVERY 24 HOURS
Refills: 0 | Status: DISCONTINUED | OUTPATIENT
Start: 2020-07-22 | End: 2020-07-29

## 2020-07-21 RX ORDER — LEVOTHYROXINE SODIUM 125 MCG
75 TABLET ORAL DAILY
Refills: 0 | Status: DISCONTINUED | OUTPATIENT
Start: 2020-07-21 | End: 2020-08-12

## 2020-07-21 RX ORDER — VANCOMYCIN HCL 1 G
1000 VIAL (EA) INTRAVENOUS ONCE
Refills: 0 | Status: COMPLETED | OUTPATIENT
Start: 2020-07-21 | End: 2020-07-21

## 2020-07-21 RX ORDER — SODIUM CHLORIDE 9 MG/ML
1000 INJECTION INTRAMUSCULAR; INTRAVENOUS; SUBCUTANEOUS ONCE
Refills: 0 | Status: COMPLETED | OUTPATIENT
Start: 2020-07-21 | End: 2020-07-21

## 2020-07-21 RX ORDER — DEXTROSE 50 % IN WATER 50 %
15 SYRINGE (ML) INTRAVENOUS ONCE
Refills: 0 | Status: DISCONTINUED | OUTPATIENT
Start: 2020-07-21 | End: 2020-08-12

## 2020-07-21 RX ORDER — AMLODIPINE BESYLATE 2.5 MG/1
5 TABLET ORAL DAILY
Refills: 0 | Status: DISCONTINUED | OUTPATIENT
Start: 2020-07-21 | End: 2020-07-22

## 2020-07-21 RX ORDER — ALLOPURINOL 300 MG
100 TABLET ORAL DAILY
Refills: 0 | Status: DISCONTINUED | OUTPATIENT
Start: 2020-07-21 | End: 2020-08-12

## 2020-07-21 RX ORDER — INSULIN LISPRO 100/ML
VIAL (ML) SUBCUTANEOUS
Refills: 0 | Status: DISCONTINUED | OUTPATIENT
Start: 2020-07-21 | End: 2020-08-12

## 2020-07-21 RX ORDER — SIMVASTATIN 20 MG/1
20 TABLET, FILM COATED ORAL AT BEDTIME
Refills: 0 | Status: DISCONTINUED | OUTPATIENT
Start: 2020-07-21 | End: 2020-08-12

## 2020-07-21 RX ORDER — DEXTROSE 50 % IN WATER 50 %
25 SYRINGE (ML) INTRAVENOUS ONCE
Refills: 0 | Status: DISCONTINUED | OUTPATIENT
Start: 2020-07-21 | End: 2020-08-12

## 2020-07-21 RX ORDER — LATANOPROST 0.05 MG/ML
1 SOLUTION/ DROPS OPHTHALMIC; TOPICAL
Qty: 0 | Refills: 0 | DISCHARGE

## 2020-07-21 RX ORDER — SPIRONOLACTONE 25 MG/1
25 TABLET, FILM COATED ORAL DAILY
Refills: 0 | Status: DISCONTINUED | OUTPATIENT
Start: 2020-07-21 | End: 2020-07-25

## 2020-07-21 RX ORDER — DEXTROSE 50 % IN WATER 50 %
12.5 SYRINGE (ML) INTRAVENOUS ONCE
Refills: 0 | Status: DISCONTINUED | OUTPATIENT
Start: 2020-07-21 | End: 2020-08-12

## 2020-07-21 RX ORDER — CEFEPIME 1 G/1
1000 INJECTION, POWDER, FOR SOLUTION INTRAMUSCULAR; INTRAVENOUS ONCE
Refills: 0 | Status: COMPLETED | OUTPATIENT
Start: 2020-07-21 | End: 2020-07-21

## 2020-07-21 RX ORDER — WARFARIN SODIUM 2.5 MG/1
3 TABLET ORAL ONCE
Refills: 0 | Status: COMPLETED | OUTPATIENT
Start: 2020-07-21 | End: 2020-07-21

## 2020-07-21 RX ADMIN — CEFEPIME 1000 MILLIGRAM(S): 1 INJECTION, POWDER, FOR SOLUTION INTRAMUSCULAR; INTRAVENOUS at 17:46

## 2020-07-21 RX ADMIN — Medication 50 MILLIGRAM(S): at 21:45

## 2020-07-21 RX ADMIN — Medication 250 MILLIGRAM(S): at 17:45

## 2020-07-21 RX ADMIN — SIMVASTATIN 20 MILLIGRAM(S): 20 TABLET, FILM COATED ORAL at 21:49

## 2020-07-21 RX ADMIN — WARFARIN SODIUM 3 MILLIGRAM(S): 2.5 TABLET ORAL at 21:49

## 2020-07-21 RX ADMIN — CEFEPIME 100 MILLIGRAM(S): 1 INJECTION, POWDER, FOR SOLUTION INTRAMUSCULAR; INTRAVENOUS at 16:46

## 2020-07-21 RX ADMIN — SODIUM CHLORIDE 1000 MILLILITER(S): 9 INJECTION INTRAMUSCULAR; INTRAVENOUS; SUBCUTANEOUS at 16:46

## 2020-07-21 RX ADMIN — GABAPENTIN 100 MILLIGRAM(S): 400 CAPSULE ORAL at 21:45

## 2020-07-21 NOTE — ED PROVIDER NOTE - PHYSICAL EXAMINATION
GENERAL: A&Ox3, non-toxic appearing, no acute distress  HEENT: NCAT, EOMI, oral mucosa moist, normal conjunctiva  RESP: CTAB, no respiratory distress, no wheezes/rhonchi/rales, speaking in full sentences  CV: no murmurs/rubs/gallops, non-palpable DP or PT pulses  ABDOMEN: soft, non-tender, non-distended, no guarding  MSK: L great toe w/ necrotic distal aspect and skin sloughing w/ surround erythema  NEURO: no focal sensory or motor deficits, MAEx4, SILT  SKIN: warm, normal color, well perfused, no rash  PSYCH: normal affect    -Anthony Mei, PGY2

## 2020-07-21 NOTE — ED PROVIDER NOTE - NS ED ROS FT
GENERAL: no fever, no chills  EYES: no change in vision  HEENT: no trouble swallowing or speaking  CARDIAC: no chest pain, no palpitations   PULMONARY: no cough, no shortness of breath, no wheezing  GI: no abdominal pain, no nausea, no vomiting  : no changes in urination  SKIN: no rashes, +L foot wound  NEURO: no headache, no numbness, no weakness  MSK: no joint pain, no muscle pain, no back pain, no calf pain     -Anthony Mei, PGY2

## 2020-07-21 NOTE — ED ADULT TRIAGE NOTE - CHIEF COMPLAINT QUOTE
Pt sent from wound care center for left great toe cellulitis, abscess, r/o OM. Monster Mitchell [phone].

## 2020-07-21 NOTE — ED PROVIDER NOTE - PMH
Afib    CHF (congestive heart failure)    CKD (chronic kidney disease)    CVA (cerebrovascular accident)    DM (diabetes mellitus)    Glaucoma    HLD (hyperlipidemia)    HTN (hypertension)    Hypothyroid    Personal history of PE (pulmonary embolism)    PVD (peripheral vascular disease)

## 2020-07-21 NOTE — ED PROVIDER NOTE - OBJECTIVE STATEMENT
89 year old female PMH  a-fib on coumadin, diastolic CHF (with EF 63% on TTE 8/13/19), HTN, HLD, CKD stage 3-4, IDDM, PVD, CVA, PE, hypothyroidism, p/w L foot wound. Patient seen today at clinic and sent for cellulitis and abscess, wanting to rule out OM. Patient denies fever, chills, cp, sob, abd pain, n/v, weakness, or numbness/tingling.

## 2020-07-21 NOTE — PHARMACOTHERAPY INTERVENTION NOTE - COMMENTS
Medication history is incomplete. Unable to verify patient's medication list with two sources. Discrepancies noted in provider handoff. Please call spectra [phone] if you have any questions.

## 2020-07-21 NOTE — ED ADULT NURSE NOTE - OBJECTIVE STATEMENT
pt received in room 23, AOx4, ambulatory with cane at baseline. Pt states she was sent from wound care center for worsening wound on left foot first digitalis. Pt has stage 2 on left foot. Pt states she has not seen her toe but the doctor told her it look worse than last week. Pt states she goes weekly for appointments at the wound center. Pt states it painful when the toe is being touched. Pt denies fever, chills, chest pain, SOB. PT has non pitting edema on bilateral lower extremities. On right foot heel pt has cracked skin but no open wound. 20G placed in left AC, labs sent. safety measures in place. will cotinine to monitor.

## 2020-07-21 NOTE — CONSULT NOTE ADULT - SUBJECTIVE AND OBJECTIVE BOX
Podiatry pager #: [phone]/ [phone]    Patient is a 89y old  Female who presents with a chief complaint of L great toe wound     HPI:  89 year old female PMH  a-fib on coumadin, diastolic CHF (with EF 63% on TTE 8/13/19), HTN, HLD, CKD stage 3-4, IDDM, PVD, CVA, PE, hypothyroidism, p/w L foot wound. Patient seen today at clinic and sent for cellulitis and abscess, wanting to rule out OM. Patient denies fever, chills, cp, sob, abd pain, n/v, weakness, or numbness/tingling.    PAST MEDICAL & SURGICAL HISTORY:  CKD (chronic kidney disease)  CVA (cerebrovascular accident)  Personal history of PE (pulmonary embolism)  Glaucoma  PVD (peripheral vascular disease)  Hypothyroid  HTN (hypertension)  HLD (hyperlipidemia)  CHF (congestive heart failure)  DM (diabetes mellitus)  Afib  Elective surgery: abdominal abcess removals  History of partial thyroidectomy      MEDICATIONS  (STANDING):  cefepime   IVPB 1000 milliGRAM(s) IV Intermittent once  sodium chloride 0.9% Bolus 1000 milliLiter(s) IV Bolus once  vancomycin  IVPB 1000 milliGRAM(s) IV Intermittent once    MEDICATIONS  (PRN):      Allergies    No Known Allergies    Intolerances        VITALS:    Vital Signs Last 24 Hrs  T(C): 36.7 (21 Jul 2020 14:12), Max: 36.7 (21 Jul 2020 14:12)  T(F): 98 (21 Jul 2020 14:12), Max: 98 (21 Jul 2020 14:12)  HR: 90 (21 Jul 2020 14:12) (90 - 90)  BP: 166/69 (21 Jul 2020 14:12) (166/69 - 166/69)  BP(mean): --  RR: 16 (21 Jul 2020 14:12) (16 - 16)  SpO2: 100% (21 Jul 2020 14:12) (100% - 100%)    LABS:                          9.0    8.26  )-----------( 234      ( 21 Jul 2020 15:40 )             30.0       07-21    141  |  103  |  48<H>  ----------------------------<  117<H>  4.1   |  28  |  2.02<H>    Ca    10.7<H>      21 Jul 2020 15:40    TPro  7.9  /  Alb  4.0  /  TBili  0.4  /  DBili  x   /  AST  14  /  ALT  14  /  AlkPhos  90  07-21      CAPILLARY BLOOD GLUCOSE              LOWER EXTREMITY PHYSICAL EXAM:    Vasular: DP/PT 0/4, dopplerable monophasic L DP, remainder of DP/PT biphasic B/L, CFT absent L hallux, <3 sec digits x 9, Temperature gradient warm to cool on R, warm to cold at L hallux   Neuro: Epicritic sensation intact to the level of digits B/L.  Musculoskeletal/Ortho: no structural abnormalities  Derm:  - L foot with avulsed hallucal nail, L hallux boggy, necrotic and cold to level of MPJ, no purulence, no open wound, no malodor, no associated cellulitis, etiology 2/2 to PVD

## 2020-07-21 NOTE — ED ADULT NURSE NOTE - CHIEF COMPLAINT QUOTE
Pt sent from wound care center for left great toe cellulitis, abscess, r/o OM. Monster Mitchell [phone].

## 2020-07-21 NOTE — ED PROVIDER NOTE - ATTENDING CONTRIBUTION TO CARE
89 F with PMH HTN, DM, CKD,PVD sent in from wound clinic by Dr. brice for cellulitis of Left great toe w/ r.o osteo. Pt has been followed by wound care clinic for months for her lower extremity wound . She reports having a wound but “doesn’t really look at it” She has a necrotic tip w/ erythema of her lower extremity. She denies chest pain, fever, chills, has diminished feeling in her LE. Cap refil <2seconds.   denies fever, chills, chest pain, SOB, abdominal pain, diarrhea, dysuria, syncope, bleeding, new rash,weakness, numbness, blurred vision    ROS  otherwise negative as per HPI  Gen: Awake, Alert, WD, WN, NAD  Head:  NC/AT  Eyes:  PERRL, EOMI,  no scleral icterus  ENT: OP clear, no exudates, no erythema, uvula midline, , moist mucus membranes  Neck: supple, nontender, no meningismus, no JVD, trachea midline  Cardiac/CV:  S1 S2, RRR, systolic murmur 4/6  Respiratory/Pulm:  CTAB, good air movement, normal resp effort, no wheezes/stridor/retractions/rales/rhonchi  Gastrointestinal/Abdomen:  Soft, nontender, nondistended, +BS, no rebound/guarding  Back:  no CVAT, no MLT  Ext:  warm, well perfused, ulcerated lesion on great toe left foot, w/ black discolored skin  cap refil <2  Skin: intact, no rash  Neuro:  AAOx3, sensation intact decreased in LE , motor 5/5 x 4 extremities,   MDM as above

## 2020-07-21 NOTE — ED PROVIDER NOTE - CLINICAL SUMMARY MEDICAL DECISION MAKING FREE TEXT BOX
Anthony Mei, PGY2: 89 year old female p/w L great toe wound, referred from podiatry office of Dr. Calles to r/o osteo. Patient w/o systemic sx, stable vitals. Plan for labs, XR, podiatry cs, reassess. 89 year old female p/w L great toe wound, referred from podiatry office of Dr. Calles to r/o osteo. Patient w/o systemic sx, stable vitals. Plan for labs, XR, cbc, blood culture, podiatry cs likely admission for

## 2020-07-21 NOTE — H&P ADULT - RS GEN PE MLT RESP DETAILS PC
breath sounds equal/respirations non-labored/good air movement/clear to auscultation bilaterally/airway patent/normal

## 2020-07-21 NOTE — H&P ADULT - NSICDXPASTMEDICALHX_GEN_ALL_CORE_FT
PAST MEDICAL HISTORY:  Afib     CHF (congestive heart failure)     CKD (chronic kidney disease)     CVA (cerebrovascular accident)     DM (diabetes mellitus)     Glaucoma     HLD (hyperlipidemia)     HTN (hypertension)     Hypothyroid     Personal history of PE (pulmonary embolism)     PVD (peripheral vascular disease)

## 2020-07-22 LAB
ANION GAP SERPL CALC-SCNC: 9 MMO/L — SIGNIFICANT CHANGE UP (ref 7–14)
BUN SERPL-MCNC: 46 MG/DL — HIGH (ref 7–23)
CALCIUM SERPL-MCNC: 10 MG/DL — SIGNIFICANT CHANGE UP (ref 8.4–10.5)
CHLORIDE SERPL-SCNC: 105 MMOL/L — SIGNIFICANT CHANGE UP (ref 98–107)
CO2 SERPL-SCNC: 27 MMOL/L — SIGNIFICANT CHANGE UP (ref 22–31)
CREAT SERPL-MCNC: 1.87 MG/DL — HIGH (ref 0.5–1.3)
GLUCOSE BLDC GLUCOMTR-MCNC: 138 MG/DL — HIGH (ref 70–99)
GLUCOSE BLDC GLUCOMTR-MCNC: 144 MG/DL — HIGH (ref 70–99)
GLUCOSE BLDC GLUCOMTR-MCNC: 159 MG/DL — HIGH (ref 70–99)
GLUCOSE BLDC GLUCOMTR-MCNC: 198 MG/DL — HIGH (ref 70–99)
GLUCOSE SERPL-MCNC: 198 MG/DL — HIGH (ref 70–99)
HBA1C BLD-MCNC: 6.1 % — HIGH (ref 4–5.6)
HCT VFR BLD CALC: 26.8 % — LOW (ref 34.5–45)
HGB BLD-MCNC: 8.5 G/DL — LOW (ref 11.5–15.5)
INR BLD: 3.57 — HIGH (ref 0.88–1.17)
MCHC RBC-ENTMCNC: 30.2 PG — SIGNIFICANT CHANGE UP (ref 27–34)
MCHC RBC-ENTMCNC: 31.7 % — LOW (ref 32–36)
MCV RBC AUTO: 95.4 FL — SIGNIFICANT CHANGE UP (ref 80–100)
NRBC # FLD: 0 K/UL — SIGNIFICANT CHANGE UP (ref 0–0)
PLATELET # BLD AUTO: 218 K/UL — SIGNIFICANT CHANGE UP (ref 150–400)
PMV BLD: 11.2 FL — SIGNIFICANT CHANGE UP (ref 7–13)
POTASSIUM SERPL-MCNC: 4.2 MMOL/L — SIGNIFICANT CHANGE UP (ref 3.5–5.3)
POTASSIUM SERPL-SCNC: 4.2 MMOL/L — SIGNIFICANT CHANGE UP (ref 3.5–5.3)
PROTHROM AB SERPL-ACNC: 42.7 SEC — HIGH (ref 9.8–13.1)
RBC # BLD: 2.81 M/UL — LOW (ref 3.8–5.2)
RBC # FLD: 15.1 % — HIGH (ref 10.3–14.5)
SARS-COV-2 IGG SERPL QL IA: NEGATIVE — SIGNIFICANT CHANGE UP
SARS-COV-2 IGM SERPL IA-ACNC: <3.8 AU/ML — SIGNIFICANT CHANGE UP
SARS-COV-2 RNA SPEC QL NAA+PROBE: SIGNIFICANT CHANGE UP
SODIUM SERPL-SCNC: 141 MMOL/L — SIGNIFICANT CHANGE UP (ref 135–145)
WBC # BLD: 6.92 K/UL — SIGNIFICANT CHANGE UP (ref 3.8–10.5)
WBC # FLD AUTO: 6.92 K/UL — SIGNIFICANT CHANGE UP (ref 3.8–10.5)

## 2020-07-22 PROCEDURE — 93923 UPR/LXTR ART STDY 3+ LVLS: CPT | Mod: 26

## 2020-07-22 PROCEDURE — 99223 1ST HOSP IP/OBS HIGH 75: CPT

## 2020-07-22 PROCEDURE — 93306 TTE W/DOPPLER COMPLETE: CPT | Mod: 26

## 2020-07-22 RX ORDER — AMLODIPINE BESYLATE 2.5 MG/1
5 TABLET ORAL ONCE
Refills: 0 | Status: COMPLETED | OUTPATIENT
Start: 2020-07-22 | End: 2020-07-22

## 2020-07-22 RX ORDER — ACETAMINOPHEN 500 MG
650 TABLET ORAL EVERY 6 HOURS
Refills: 0 | Status: DISCONTINUED | OUTPATIENT
Start: 2020-07-22 | End: 2020-08-12

## 2020-07-22 RX ORDER — AMLODIPINE BESYLATE 2.5 MG/1
10 TABLET ORAL DAILY
Refills: 0 | Status: DISCONTINUED | OUTPATIENT
Start: 2020-07-23 | End: 2020-08-12

## 2020-07-22 RX ADMIN — Medication 40 MILLIGRAM(S): at 05:20

## 2020-07-22 RX ADMIN — SPIRONOLACTONE 25 MILLIGRAM(S): 25 TABLET, FILM COATED ORAL at 05:20

## 2020-07-22 RX ADMIN — GABAPENTIN 100 MILLIGRAM(S): 400 CAPSULE ORAL at 05:22

## 2020-07-22 RX ADMIN — SIMVASTATIN 20 MILLIGRAM(S): 20 TABLET, FILM COATED ORAL at 21:15

## 2020-07-22 RX ADMIN — Medication 650 MILLIGRAM(S): at 21:16

## 2020-07-22 RX ADMIN — Medication 50 MILLIGRAM(S): at 21:15

## 2020-07-22 RX ADMIN — GABAPENTIN 100 MILLIGRAM(S): 400 CAPSULE ORAL at 21:23

## 2020-07-22 RX ADMIN — Medication 650 MILLIGRAM(S): at 02:00

## 2020-07-22 RX ADMIN — GABAPENTIN 100 MILLIGRAM(S): 400 CAPSULE ORAL at 13:50

## 2020-07-22 RX ADMIN — Medication 75 MICROGRAM(S): at 05:20

## 2020-07-22 RX ADMIN — Medication 650 MILLIGRAM(S): at 21:46

## 2020-07-22 RX ADMIN — CEFEPIME 100 MILLIGRAM(S): 1 INJECTION, POWDER, FOR SOLUTION INTRAMUSCULAR; INTRAVENOUS at 18:42

## 2020-07-22 RX ADMIN — Medication 50 MILLIGRAM(S): at 05:20

## 2020-07-22 RX ADMIN — Medication 50 MILLIGRAM(S): at 13:48

## 2020-07-22 RX ADMIN — Medication 2: at 18:43

## 2020-07-22 RX ADMIN — Medication 2: at 08:45

## 2020-07-22 RX ADMIN — Medication 40 MILLIGRAM(S): at 18:43

## 2020-07-22 RX ADMIN — Medication 650 MILLIGRAM(S): at 01:19

## 2020-07-22 RX ADMIN — Medication 100 MILLIGRAM(S): at 13:47

## 2020-07-22 RX ADMIN — AMLODIPINE BESYLATE 5 MILLIGRAM(S): 2.5 TABLET ORAL at 05:20

## 2020-07-22 RX ADMIN — AMLODIPINE BESYLATE 5 MILLIGRAM(S): 2.5 TABLET ORAL at 18:43

## 2020-07-22 NOTE — PROGRESS NOTE ADULT - SUBJECTIVE AND OBJECTIVE BOX
INTERVAL HPI/OVERNIGHT EVENTS: No new concerns.   Vital Signs Last 24 Hrs  T(C): 36.2 (22 Jul 2020 13:45), Max: 36.9 (22 Jul 2020 05:17)  T(F): 97.2 (22 Jul 2020 13:45), Max: 98.5 (22 Jul 2020 05:17)  HR: 88 (22 Jul 2020 17:46) (73 - 90)  BP: 171/81 (22 Jul 2020 17:46) (141/75 - 178/81)  BP(mean): 101 (21 Jul 2020 20:43) (101 - 101)  RR: 18 (22 Jul 2020 13:45) (18 - 18)  SpO2: 100% (22 Jul 2020 13:45) (100% - 100%)  I&O's Summary    MEDICATIONS  (STANDING):  allopurinol 100 milliGRAM(s) Oral daily  cefepime   IVPB 1000 milliGRAM(s) IV Intermittent every 24 hours  dextrose 5%. 1000 milliLiter(s) (50 mL/Hr) IV Continuous <Continuous>  dextrose 50% Injectable 12.5 Gram(s) IV Push once  dextrose 50% Injectable 25 Gram(s) IV Push once  dextrose 50% Injectable 25 Gram(s) IV Push once  furosemide    Tablet 40 milliGRAM(s) Oral two times a day  gabapentin 100 milliGRAM(s) Oral three times a day  hydrALAZINE 50 milliGRAM(s) Oral three times a day  insulin lispro (HumaLOG) corrective regimen sliding scale   SubCutaneous three times a day before meals  insulin lispro (HumaLOG) corrective regimen sliding scale   SubCutaneous at bedtime  levothyroxine 75 MICROGram(s) Oral daily  simvastatin 20 milliGRAM(s) Oral at bedtime  spironolactone 25 milliGRAM(s) Oral daily    MEDICATIONS  (PRN):  acetaminophen   Tablet .. 650 milliGRAM(s) Oral every 6 hours PRN Temp greater or equal to 38C (100.4F), Mild Pain (1 - 3), Moderate Pain (4 - 6), Severe Pain (7 - 10)  dextrose 40% Gel 15 Gram(s) Oral once PRN Blood Glucose LESS THAN 70 milliGRAM(s)/deciliter  glucagon  Injectable 1 milliGRAM(s) IntraMuscular once PRN Glucose LESS THAN 70 milligrams/deciliter    LABS:                        8.5    6.92  )-----------( 218      ( 22 Jul 2020 05:30 )             26.8     07-22    141  |  105  |  46<H>  ----------------------------<  198<H>  4.2   |  27  |  1.87<H>    Ca    10.0      22 Jul 2020 05:30    TPro  7.9  /  Alb  4.0  /  TBili  0.4  /  DBili  x   /  AST  14  /  ALT  14  /  AlkPhos  90  07-21    PT/INR - ( 22 Jul 2020 05:30 )   PT: 42.7 SEC;   INR: 3.57              CAPILLARY BLOOD GLUCOSE      POCT Blood Glucose.: 198 mg/dL (22 Jul 2020 18:13)  POCT Blood Glucose.: 144 mg/dL (22 Jul 2020 12:12)  POCT Blood Glucose.: 159 mg/dL (22 Jul 2020 08:21)  POCT Blood Glucose.: 168 mg/dL (21 Jul 2020 22:26)          REVIEW OF SYSTEMS:  CONSTITUTIONAL: No fever, weight loss, or fatigue  EYES: No eye pain, visual disturbances, or discharge  RESPIRATORY: No cough, wheezing, chills or hemoptysis; No shortness of breath  CARDIOVASCULAR: No chest pain, palpitations, dizziness, or leg swelling  GASTROINTESTINAL: No abdominal or epigastric pain. No nausea, vomiting, or hematemesis; No diarrhea or constipation. No melena or hematochezia.  GENITOURINARY: No dysuria, frequency, hematuria, or incontinence  NEUROLOGICAL: No headaches, memory loss, loss of strength, numbness, or tremors      Consultant(s) Notes Reviewed:  [x ] YES  [ ] NO    PHYSICAL EXAM:  GENERAL: NAD, well-groomed, well-developed,not in any distress ,  HEAD:  Atraumatic, Normocephalic  EYES: EOMI, PERRLA, conjunctiva and sclera clear  ENMT: No tonsillar erythema, exudates, or enlargement; Moist mucous membranes, Good dentition, No lesions  NECK: Supple, No JVD, Normal thyroid  NERVOUS SYSTEM:  Alert & Oriented X3, No focal deficit   CHEST/LUNG: Good air entry bilateral with no  rales, rhonchi, wheezing, or rubs  HEART: Regular rate and rhythm; No murmurs, rubs, or gallops  ABDOMEN: Soft, Nontender, Nondistended; Bowel sounds present  EXTREMITIES:  Foot dressed     Care Discussed with Consultants/Other Providers [ x] YES  [ ] NO

## 2020-07-22 NOTE — CONSULT NOTE ADULT - SUBJECTIVE AND OBJECTIVE BOX
HISTORY OF PRESENT ILLNESS: HPI:  Patient is a 89 year old Female well known to our office (Cardiologist - Dr. Breman) with PMH of persistent Afib on coumadin who was not a candidate for left atrial appendage occlusion with Watchman, chronic HFpEF, HTN, HLD, hypothyroidism, CKD, Type 2 DM, CVA, PE who is admitted with a Left foot wound. Cardiology consulted for possible preop clearance and management of Afib and heart failure. Patient denies chest pain, SOB, palpitations, syncope, LE edema, orthopnea, abd pain, n/v, back pain, cough, fever/chills.    PAST MEDICAL & SURGICAL HISTORY:  CKD (chronic kidney disease)  CVA (cerebrovascular accident)  Personal history of PE (pulmonary embolism)  Glaucoma  PVD (peripheral vascular disease)  Hypothyroid  HTN (hypertension)  HLD (hyperlipidemia)  CHF (congestive heart failure)  DM (diabetes mellitus)  Afib  Elective surgery: abdominal abcess removals  History of partial thyroidectomy      MEDICATIONS:  MEDICATIONS  (STANDING):  allopurinol 100 milliGRAM(s) Oral daily  amLODIPine   Tablet 5 milliGRAM(s) Oral daily  cefepime   IVPB 1000 milliGRAM(s) IV Intermittent every 24 hours  dextrose 5%. 1000 milliLiter(s) (50 mL/Hr) IV Continuous <Continuous>  dextrose 50% Injectable 12.5 Gram(s) IV Push once  dextrose 50% Injectable 25 Gram(s) IV Push once  dextrose 50% Injectable 25 Gram(s) IV Push once  furosemide    Tablet 40 milliGRAM(s) Oral two times a day  gabapentin 100 milliGRAM(s) Oral three times a day  hydrALAZINE 50 milliGRAM(s) Oral three times a day  insulin lispro (HumaLOG) corrective regimen sliding scale   SubCutaneous three times a day before meals  insulin lispro (HumaLOG) corrective regimen sliding scale   SubCutaneous at bedtime  levothyroxine 75 MICROGram(s) Oral daily  simvastatin 20 milliGRAM(s) Oral at bedtime  spironolactone 25 milliGRAM(s) Oral daily      Allergies    No Known Allergies    Intolerances        FAMILY HISTORY:  No pertinent family history in first degree relatives    Non-contributary for premature coronary disease or sudden cardiac death    SOCIAL HISTORY:    [x ] Non-smoker  [ ] Smoker  [ ] Alcohol    FLU VACCINE THIS YEAR STARTS IN AUGUST:  [ ] Yes    [ ] No    IF OVER 65 HAVE YOU EVER HAD A PNA VACCINE:  [ ] Yes    [ ] No       [ ] N/A      REVIEW OF SYSTEMS:  [ ]chest pain  [  ]shortness of breath  [  ]palpitations  [  ]syncope  [ ]near syncope [ ]upper extremity weakness   [ ] lower extremity weakness  [  ]diplopia  [  ]altered mental status   [  ]fevers  [ ]chills [ ]nausea  [ ]vomitting  [  ]dysphagia    [ ]abdominal pain  [ ]melena  [ ]BRBPR    [  ]epistaxis  [  ]rash    [ ]lower extremity edema    +left foot wound      [x ] All others negative	  [ ] Unable to obtain      LABS:	 	    CARDIAC MARKERS:                              8.5    6.92  )-----------( 218      ( 22 Jul 2020 05:30 )             26.8     Hb Trend: 8.5<--, 9.0<--    07-22    141  |  105  |  46<H>  ----------------------------<  198<H>  4.2   |  27  |  1.87<H>    Ca    10.0      22 Jul 2020 05:30    TPro  7.9  /  Alb  4.0  /  TBili  0.4  /  DBili  x   /  AST  14  /  ALT  14  /  AlkPhos  90  07-21    Creatinine Trend: 1.87<--, 2.02<--    Coags:  PT/INR - ( 22 Jul 2020 05:30 )   PT: 42.7 SEC;   INR: 3.57              proBNP:   Lipid Profile:   HgA1c:   TSH:         PHYSICAL EXAM:  T(C): 36.2 (07-22-20 @ 13:45), Max: 36.9 (07-22-20 @ 05:17)  HR: 80 (07-22-20 @ 13:45) (73 - 90)  BP: 160/80 (07-22-20 @ 13:45) (141/75 - 178/81)  RR: 18 (07-22-20 @ 13:45) (18 - 18)  SpO2: 100% (07-22-20 @ 13:45) (100% - 100%)  Wt(kg): --   BMI (kg/m2): 25.6 (07-21-20 @ 21:34)  I&O's Summary      Gen: Appears well in NAD  HEENT:  (-)icterus (-)pallor  CV: N S1 S2 3/6 SUZI (+)2 Pulses B/l  Resp:  Clear to auscultation B/L, normal effort  GI: (+) BS Soft, NT, ND  Lymph:  (-)Edema, (-)obvious lymphadenopathy  Skin: +left foot dressing, Warm to touch, Normal turgor  Psych: Appropriate mood and affect    TELEMETRY: None	      ECG: Afib, RBBB/LAFB (unchanged compared to previous Anton EKGs)  	    Echo: Pending    < from: Transthoracic Echocardiogram (08.13.19 @ 11:13) >  CONCLUSIONS:  1. Mitral annular calcification and calcified mitral  leaflets with normal diastolic opening. Mild mitral  regurgitation.  2. Calcified trileaflet aortic valve with grossly  moderately decreased opening. Peak transaortic valve  gradient equals 30 mm Hg, mean transaortic valve gradient  equals 15 mm Hg.  3. Normal left ventricular internal dimensions and wall  thicknesses.  4. Normal left ventricular systolic function. No segmental  wall motion abnormalities.  5. Increased E/e'  is consistent with elevated left  ventricular filling pressure.  6. Moderate right atrial enlargement.  7. Estimated pulmonary artery systolic pressure equals 51  mm Hg, assuming right atrial pressure equals 10  mm Hg,  consistent with moderate pulmonary hypertension.    < end of copied text >      ASSESSMENT/PLAN: Patient is a 89 year old Female well known to our office (Cardiologist - Dr. Berman) with PMH of persistent Afib on coumadin who was not a candidate for left atrial appendage occlusion with Watchman, chronic HFpEF, HTN, HLD, hypothyroidism, CKD, Type 2 DM, CVA, PE who is admitted with a Left foot wound. Cardiology consulted for possible preop clearance and management of Afib and heart failure.    - Appears well compensated from heart failure perspective - continue PO lasix/aldactone if okay with renal  - Continue AC with coumadin for CVA prevention for goal INR 2-3 if no procedures planned, if procedures planned - patient should be bridged with heparin gtt when INR less than 2 due to high stroke risk with very elevated chads-vasc  - Podiatry/Vascular/ID follow up  - F/u LIZBETH/PVR  - Check TTE to eval LV function and eval valvular disease (sig murmur on exam)  - Further recs pending above    Zi Wiley PA-C  Pager: [phone]

## 2020-07-22 NOTE — PROGRESS NOTE ADULT - SUBJECTIVE AND OBJECTIVE BOX
Patient is a 89y old  Female who presents with a chief complaint of left foot big toe pain (21 Jul 2020 20:43)       INTERVAL HPI/OVERNIGHT EVENTS:  Patient seen and evaluated at bedside.  Pt is resting comfortable in NAD. Denies N/V/F/C.    Allergies    No Known Allergies    Intolerances        Vital Signs Last 24 Hrs  T(C): 36.9 (22 Jul 2020 05:17), Max: 36.9 (22 Jul 2020 05:17)  T(F): 98.5 (22 Jul 2020 05:17), Max: 98.5 (22 Jul 2020 05:17)  HR: 85 (22 Jul 2020 05:17) (73 - 90)  BP: 141/75 (22 Jul 2020 05:17) (141/75 - 178/81)  BP(mean): 101 (21 Jul 2020 20:43) (101 - 101)  RR: 18 (22 Jul 2020 05:17) (16 - 18)  SpO2: 100% (22 Jul 2020 05:17) (100% - 100%)    LABS:                        8.5    6.92  )-----------( 218      ( 22 Jul 2020 05:30 )             26.8     07-22    141  |  105  |  46<H>  ----------------------------<  198<H>  4.2   |  27  |  1.87<H>    Ca    10.0      22 Jul 2020 05:30    TPro  7.9  /  Alb  4.0  /  TBili  0.4  /  DBili  x   /  AST  14  /  ALT  14  /  AlkPhos  90  07-21    PT/INR - ( 22 Jul 2020 05:30 )   PT: 42.7 SEC;   INR: 3.57              CAPILLARY BLOOD GLUCOSE      POCT Blood Glucose.: 159 mg/dL (22 Jul 2020 08:21)  POCT Blood Glucose.: 168 mg/dL (21 Jul 2020 22:26)      Lower Extremity Physical Exam:  Vasular: DP/PT 0/4, dopplerable monophasic L DP, remainder of DP/PT biphasic B/L, CFT absent L hallux, <3 sec digits x 9, Temperature gradient warm to cool on R, warm to cold at L hallux   Neuro: Epicritic sensation intact to the level of digits B/L.  Musculoskeletal/Ortho: no structural abnormalities  Derm:  - L foot with avulsed hallucal nail, L hallux dry gangrene, necrotic and cold to level of MPJ, no purulence, no open wound, no malodor, no associated cellulitis, etiology 2/2 to PVD      RADIOLOGY:    < from: Xray Foot AP + Lateral + Oblique, Left (07.21.20 @ 16:57) >  EXAM:  RAD FOOT MIN 3 VIEWS LT        PROCEDURE DATE:  Jul 21 2020         INTERPRETATION:      CLINICAL INDICATION: Left first digit wound. Evaluate for osteomyelitis.    TECHNIQUE: AP, oblique and lateral views of the left foot.    COMPARISON: None available.    FINDINGS:    Generalized osteopenia. No tracking subcutaneous collection or radiopaque foreign body. No cortical erosion or periosteal reaction to suggest osteomyelitis. No acute fracture. No dislocation.     Small marginal osteophytes involving multiple metatarsophalangeal and interphalangeal joints, with otherwise preserved joint spaces. Mild to moderate midfoot joint space narrowing. Extensive vascular calcifications. Small dorsal and plantar calcaneal enthesophytes.      IMPRESSION:  No radiographic evidence for osteomyelitis.                DANIEL MCGARRY M.D., RADIOLGY RESIDENT  This document has been electronically signed.  KAI LAMB M.D., ATTENDING RADIOLOGIST  This document has been electronically signed. Jul 21 2020  6:59PM        < end of copied text >

## 2020-07-22 NOTE — CONSULT NOTE ADULT - SUBJECTIVE AND OBJECTIVE BOX
[facility] - S Radha / Florin S /D Nate/ GASTON Castillo/ GASTON Tellez/ Shahid Segovia / BABAR Luna/ O Rudi  service -[phone], office [phone]  ---------------------------------------------------------------------------------------------------------------      Denied recent NSAID use/Abx use/iv contrast studies.    Patient seen and examined    PAST MEDICAL & SURGICAL HISTORY:  CKD (chronic kidney disease)  CVA (cerebrovascular accident)  Personal history of PE (pulmonary embolism)  Glaucoma  PVD (peripheral vascular disease)  Hypothyroid  HTN (hypertension)  HLD (hyperlipidemia)  CHF (congestive heart failure)  DM (diabetes mellitus)  Afib  Elective surgery: abdominal abcess removals  History of partial thyroidectomy      Allergies: No Known Allergies    Home Medications Reviewed  Hospital Medications:   MEDICATIONS  (STANDING):  allopurinol 100 milliGRAM(s) Oral daily  amLODIPine   Tablet 5 milliGRAM(s) Oral daily  cefepime   IVPB 1000 milliGRAM(s) IV Intermittent every 24 hours  dextrose 5%. 1000 milliLiter(s) (50 mL/Hr) IV Continuous <Continuous>  dextrose 50% Injectable 12.5 Gram(s) IV Push once  dextrose 50% Injectable 25 Gram(s) IV Push once  dextrose 50% Injectable 25 Gram(s) IV Push once  furosemide    Tablet 40 milliGRAM(s) Oral two times a day  gabapentin 100 milliGRAM(s) Oral three times a day  hydrALAZINE 50 milliGRAM(s) Oral three times a day  insulin lispro (HumaLOG) corrective regimen sliding scale   SubCutaneous three times a day before meals  insulin lispro (HumaLOG) corrective regimen sliding scale   SubCutaneous at bedtime  levothyroxine 75 MICROGram(s) Oral daily  simvastatin 20 milliGRAM(s) Oral at bedtime  spironolactone 25 milliGRAM(s) Oral daily    SOCIAL HISTORY:  Denies ETOh,Smoking, illicit drug use  FAMILY HISTORY:  No pertinent family history in first degree relatives      REVIEW OF SYSTEMS:  CONSTITUTIONAL: No weakness, fevers or chills  EYES/ENT: No visual changes;  No vertigo or throat pain   NECK: No pain or stiffness  RESPIRATORY: No cough, wheezing, hemoptysis; No shortness of breath  CARDIOVASCULAR: No chest pain or palpitations.  GASTROINTESTINAL: No abdominal or epigastric pain. No nausea, vomiting, or hematemesis; No diarrhea or constipation. No melena or hematochezia.  GENITOURINARY: No dysuria, frequency, foamy urine, urinary urgency, incontinence or hematuria  NEUROLOGICAL: No numbness or weakness  SKIN: No itching, burning, rashes, or lesions   VASCULAR: No bilateral lower extremity edema.   All other review of systems is negative unless indicated above.    VITALS:  T(F): 97.2 (07-22-20 @ 13:45), Max: 98.5 (07-22-20 @ 05:17)  HR: 80 (07-22-20 @ 13:45)  BP: 160/80 (07-22-20 @ 13:45)  RR: 18 (07-22-20 @ 13:45)  SpO2: 100% (07-22-20 @ 13:45)  Wt(kg): --    Height (cm): 167.6 (07-21 @ 21:34)  Weight (kg): 72 (07-21 @ 21:34)  BMI (kg/m2): 25.6 (07-21 @ 21:34)  BSA (m2): 1.81 (07-21 @ 21:34)    PHYSICAL EXAM:  Constitutional: NAD  HEENT: anicteric sclera, oropharynx clear, MMM  Neck: No JVD  Respiratory: CTAB, no wheezes, rales or rhonchi  Cardiovascular: S1, S2, RRR  Gastrointestinal: BS+, soft, NT/ND  Extremities: No cyanosis or clubbing. No peripheral edema  Neurological: A/O x 3, no focal deficits  Psychiatric: Normal mood, normal affect  : No CVA tenderness. No michaud.       LABS:  07-22    141  |  105  |  46<H>  ----------------------------<  198<H>  4.2   |  27  |  1.87<H>    Ca    10.0      22 Jul 2020 05:30    TPro  7.9  /  Alb  4.0  /  TBili  0.4  /  DBili      /  AST  14  /  ALT  14  /  AlkPhos  90  07-21    Creatinine Trend: 1.87 <--, 2.02 <--                        8.5    6.92  )-----------( 218      ( 22 Jul 2020 05:30 )             26.8     Urine Studies:        RADIOLOGY & ADDITIONAL STUDIES: [facility] - S Radha / Florin S /D Nate/ S Jonathan/ GASTON Tellez/ Shahid Segovia / BABAR Escalerau/ O Rudi  service -[phone], office [phone]  ---------------------------------------------------------------------------------------------------------------  Patient seen and examined bedside    89 year old female [facility]  a-fib on coumadin, diastolic CHF (with EF 63% on TTE 8/13/19), HTN, HLD, CKD stage 3-4, IDDM, PVD, CVA, PE, hypothyroidism, p/w L foot wound. Patient was sent yesterday from podiatrist office for non healing left great toe ulcer, to rule out OM. Renal consulted for NIKHIL/CKD Mx. Pt  is well known to me from prior [facility] hospitalizations, f/u w/my partner Dr Segovia for CKD Mx. Patient denies fever, chills, cp, sob, abd pain, n/v, urinary sxs. Denied recent NSAID use/Abx use/iv contrast studies.    PAST MEDICAL & SURGICAL HISTORY:  CKD (chronic kidney disease)  CVA (cerebrovascular accident)  Personal history of PE (pulmonary embolism)  Glaucoma  PVD (peripheral vascular disease)  Hypothyroid  HTN (hypertension)  HLD (hyperlipidemia)  CHF (congestive heart failure)  DM (diabetes mellitus)  Afib  Elective surgery: abdominal abcess removals  History of partial thyroidectomy      Allergies: No Known Allergies    Home Medications Reviewed  Hospital Medications:   MEDICATIONS  (STANDING):  allopurinol 100 milliGRAM(s) Oral daily  amLODIPine   Tablet 5 milliGRAM(s) Oral daily  cefepime   IVPB 1000 milliGRAM(s) IV Intermittent every 24 hours  dextrose 5%. 1000 milliLiter(s) (50 mL/Hr) IV Continuous <Continuous>  dextrose 50% Injectable 12.5 Gram(s) IV Push once  dextrose 50% Injectable 25 Gram(s) IV Push once  dextrose 50% Injectable 25 Gram(s) IV Push once  furosemide    Tablet 40 milliGRAM(s) Oral two times a day  gabapentin 100 milliGRAM(s) Oral three times a day  hydrALAZINE 50 milliGRAM(s) Oral three times a day  insulin lispro (HumaLOG) corrective regimen sliding scale   SubCutaneous three times a day before meals  insulin lispro (HumaLOG) corrective regimen sliding scale   SubCutaneous at bedtime  levothyroxine 75 MICROGram(s) Oral daily  simvastatin 20 milliGRAM(s) Oral at bedtime  spironolactone 25 milliGRAM(s) Oral daily    SOCIAL HISTORY:  Denies ETOh,Smoking, illicit drug use  FAMILY HISTORY:  No pertinent family history in first degree relatives      REVIEW OF SYSTEMS:  CONSTITUTIONAL: No weakness, fevers or chills  RESPIRATORY: No cough, wheezing, hemoptysis; No shortness of breath  CARDIOVASCULAR: No chest pain or palpitations.  GASTROINTESTINAL: No abdominal or epigastric pain. No nausea, vomiting, or hematemesis; No diarrhea or constipation. No melena or hematochezia.  GENITOURINARY: No dysuria, frequency, foamy urine, urinary urgency, incontinence or hematuria  NEUROLOGICAL: No numbness or weakness  VASCULAR: No bilateral lower extremity edema.   All other review of systems is negative unless indicated above.    VITALS:  T(F): 97.2 (07-22-20 @ 13:45), Max: 98.5 (07-22-20 @ 05:17)  HR: 80 (07-22-20 @ 13:45)  BP: 160/80 (07-22-20 @ 13:45)  RR: 18 (07-22-20 @ 13:45)  SpO2: 100% (07-22-20 @ 13:45)  Wt(kg): --    Height (cm): 167.6 (07-21 @ 21:34)  Weight (kg): 72 (07-21 @ 21:34)  BMI (kg/m2): 25.6 (07-21 @ 21:34)  BSA (m2): 1.81 (07-21 @ 21:34)    PHYSICAL EXAM:  Constitutional: NAD  HEENT: anicteric sclera  Neck: No JVD  Respiratory: CTAB, no wheezes, rales or rhonchi  Cardiovascular: S1, S2, RRR  Gastrointestinal: BS+, soft, NT/ND  Extremities: No peripheral edema. left foot dressing+  Neurological: A/O x 2  Psychiatric: Normal mood, normal affect  : No michaud.       LABS:  07-22    141  |  105  |  46<H>  ----------------------------<  198<H>  4.2   |  27  |  1.87<H>    Ca    10.0      22 Jul 2020 05:30    TPro  7.9  /  Alb  4.0  /  TBili  0.4  /  DBili      /  AST  14  /  ALT  14  /  AlkPhos  90  07-21    Creatinine Trend: 1.87 <--, 2.02 <--                        8.5    6.92  )-----------( 218      ( 22 Jul 2020 05:30 )             26.8     Urine Studies:        RADIOLOGY & ADDITIONAL STUDIES:  < from: Xray Foot AP + Lateral + Oblique, Left (07.21.20 @ 16:57) >  IMPRESSION:  No radiographic evidence for osteomyelitis.    < end of copied text >

## 2020-07-22 NOTE — CONSULT NOTE ADULT - SUBJECTIVE AND OBJECTIVE BOX
89 year old female PMH  a-fib on coumadin, diastolic CHF (with EF 63% on TTE 8/13/19), HTN, HLD, CKD stage 3-4, IDDM, PVD, CVA, PE, hypothyroidism, p/w L foot wound. Patient seen today at clinic and sent for cellulitis and abscess, wanting to rule out OM. Patient denies fever, chills, cp, sob, abd pain, n/v, weakness, or numbness/tingling.  She states that this wound started as a "toe stub"  of the left 1st toe with some bleeding a couple of months ago. In the last month or so it started causing her pain. She ambulates with a cane at home. This has not happened before.    PMH: as above  PSH: thyroidectomy approximately 40 years ago    Social History: never smoker    T(C): 36.2 (07-22-20 @ 13:45), Max: 36.9 (07-22-20 @ 05:17)  HR: 88 (07-22-20 @ 17:46) (73 - 90)  BP: 171/81 (07-22-20 @ 17:46) (141/75 - 178/81)  RR: 18 (07-22-20 @ 13:45) (18 - 18)  SpO2: 100% (07-22-20 @ 13:45) (100% - 100%)  Wt(kg): --  General: No acute distress, alert and oriented x 4  Vascular Exam: palpable popliteal pulses b/l  doppler signal DP and PT b/l  LLE 1st toe with dry gangrene with no surrounding erythema or purulence    LABS:                        8.5    6.92  )-----------( 218      ( 22 Jul 2020 05:30 )             26.8     22 Jul 2020 05:30    141    |  105    |  46     ----------------------------<  198    4.2     |  27     |  1.87     Ca    10.0       22 Jul 2020 05:30      PT/INR - ( 22 Jul 2020 05:30 )   PT: 42.7 SEC;   INR: 3.57          TTE EF: 69%

## 2020-07-22 NOTE — CONSULT NOTE ADULT - ATTENDING COMMENTS
Patient seen and examined.  Agree with above.   Admitted with LLE wound  Check TTE to assess murmur  Follow up vascular  Lifelong ac if no contraindications for cva prevention  Further cardiac workup pending above    Trisha Banks MD
Pt w CLI.    fu noninvasive flow studies  risk of BESS, consult renal.   hold AC, heparin when INR subtherapeutic
[facility]  Office [phone]  Ans Serv [phone]  [facility] - [phone]

## 2020-07-22 NOTE — PROVIDER CONTACT NOTE (OTHER) - ASSESSMENT
Pt denies headache, vision changes, lightheaded or dizziness. No c/o of pain. Pt is calm and alert sitting up in bed.

## 2020-07-22 NOTE — PROGRESS NOTE ADULT - ASSESSMENT
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    - pt seen and evaluated  - afebrile, no leukocytosis  - L foot XR neg for OM or subq gas   - L foot with avulsed hallucal nail, necrotic and cold to level of MPJ, no purulence, no open wounds, no malodor, no associated cellulitis, etiology 2/2 to PVD  - no acute pod sx intervention  - awaiting LIZBETH/PVRs and vasc consult  - pod plan likely L hallux amputation pending vascular recs/ optimization   - discussed w/ attending 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    - pt seen and evaluated  - afebrile, no leukocytosis  - L foot XR neg for OM or subq gas   - L foot with avulsed hallucal nail, necrotic and cold to level of MPJ, no purulence, no open wounds, no malodor, no associated cellulitis, etiology 2/2 to PVD  - cont IV abx   - no acute pod sx intervention  - awaiting LIZBETH/PVRs and vasc consult  - pod plan likely L hallux amputation pending vascular recs/ optimization   - discussed w/ attending

## 2020-07-22 NOTE — PROGRESS NOTE ADULT - ASSESSMENT
89 year old female PMH  a-fib on coumadin, diastolic CHF (with EF 63% on TTE 8/13/19), HTN, HLD, CKD stage 3-4, IDDM, PVD, CVA, PE, hypothyroidism, p/w L foot wound. Patient seen today at clinic and sent for cellulitis and abscess, wanting to rule out OM. Patient denies fever, chills, cp, sob, abd pain, n/v, weakness, or numbness/tingling.     Problem/Plan - 1:  ·  Problem: Toe gangrene.  Plan: Podiatry consult noted. S/P IV Abxs. NO fever or Leucocytosis so likely ischemic . ID and Vascular helping.      Problem/Plan - 2:  ·  Problem: Stage 3 chronic kidney disease.  Plan: Renal consulted. Baseline Creatinine 1.8.      Problem/Plan - 3:  ·  Problem: PVD (peripheral vascular disease).  Plan: LIZBETH/PVD noted.  Vascular consult noted.      Problem/Plan - 4:  ·  Problem: Chronic diastolic congestive heart failure.  Plan: Continuing Lasix . Cardiology helping.      Problem/Plan - 5:  ·  Problem: DM (diabetes mellitus).  Plan: SSI for now .      Problem/Plan - 6:  Problem: Afib. Plan: Holding Coumadin and will start Heparin for INR<2     Problem/Plan - 7:  ·  Problem: Pulmonary embolism.  Plan: On AC. INR noted      Problem/Plan - 8:  ·  Problem: R/O Hypothyroid.  Plan: Synthroid.      Problem/Plan - 9:  ·  Problem: Anemia.  Plan: Chronic .

## 2020-07-23 ENCOUNTER — TRANSCRIPTION ENCOUNTER (OUTPATIENT)
Age: 85
End: 2020-07-23

## 2020-07-23 LAB
-  AMIKACIN: SIGNIFICANT CHANGE UP
-  AMOXICILLIN/CLAVULANIC ACID: SIGNIFICANT CHANGE UP
-  AMPICILLIN/SULBACTAM: SIGNIFICANT CHANGE UP
-  AMPICILLIN/SULBACTAM: SIGNIFICANT CHANGE UP
-  AMPICILLIN: SIGNIFICANT CHANGE UP
-  AMPICILLIN: SIGNIFICANT CHANGE UP
-  AZTREONAM: SIGNIFICANT CHANGE UP
-  CEFAZOLIN: SIGNIFICANT CHANGE UP
-  CEFAZOLIN: SIGNIFICANT CHANGE UP
-  CEFEPIME: SIGNIFICANT CHANGE UP
-  CEFOXITIN: SIGNIFICANT CHANGE UP
-  CEFTRIAXONE: SIGNIFICANT CHANGE UP
-  CIPROFLOXACIN: SIGNIFICANT CHANGE UP
-  CLINDAMYCIN: SIGNIFICANT CHANGE UP
-  DAPTOMYCIN: SIGNIFICANT CHANGE UP
-  ERTAPENEM: SIGNIFICANT CHANGE UP
-  ERYTHROMYCIN: SIGNIFICANT CHANGE UP
-  GENTAMICIN: SIGNIFICANT CHANGE UP
-  GENTAMICIN: SIGNIFICANT CHANGE UP
-  LEVOFLOXACIN: SIGNIFICANT CHANGE UP
-  LINEZOLID: SIGNIFICANT CHANGE UP
-  MEROPENEM: SIGNIFICANT CHANGE UP
-  OXACILLIN: SIGNIFICANT CHANGE UP
-  PENICILLIN: SIGNIFICANT CHANGE UP
-  PIPERACILLIN/TAZOBACTAM: SIGNIFICANT CHANGE UP
-  RIFAMPIN: SIGNIFICANT CHANGE UP
-  TETRACYCLINE: SIGNIFICANT CHANGE UP
-  TETRACYCLINE: SIGNIFICANT CHANGE UP
-  TOBRAMYCIN: SIGNIFICANT CHANGE UP
-  TRIMETHOPRIM/SULFAMETHOXAZOLE: SIGNIFICANT CHANGE UP
-  TRIMETHOPRIM/SULFAMETHOXAZOLE: SIGNIFICANT CHANGE UP
-  VANCOMYCIN: SIGNIFICANT CHANGE UP
-  VANCOMYCIN: SIGNIFICANT CHANGE UP
ANION GAP SERPL CALC-SCNC: 9 MMO/L — SIGNIFICANT CHANGE UP (ref 7–14)
APTT BLD: 40 SEC — HIGH (ref 27–36.3)
BUN SERPL-MCNC: 47 MG/DL — HIGH (ref 7–23)
CALCIUM SERPL-MCNC: 10 MG/DL — SIGNIFICANT CHANGE UP (ref 8.4–10.5)
CHLORIDE SERPL-SCNC: 103 MMOL/L — SIGNIFICANT CHANGE UP (ref 98–107)
CO2 SERPL-SCNC: 28 MMOL/L — SIGNIFICANT CHANGE UP (ref 22–31)
CREAT SERPL-MCNC: 2.09 MG/DL — HIGH (ref 0.5–1.3)
GLUCOSE BLDC GLUCOMTR-MCNC: 138 MG/DL — HIGH (ref 70–99)
GLUCOSE BLDC GLUCOMTR-MCNC: 159 MG/DL — HIGH (ref 70–99)
GLUCOSE BLDC GLUCOMTR-MCNC: 179 MG/DL — HIGH (ref 70–99)
GLUCOSE BLDC GLUCOMTR-MCNC: 183 MG/DL — HIGH (ref 70–99)
GLUCOSE SERPL-MCNC: 143 MG/DL — HIGH (ref 70–99)
HCT VFR BLD CALC: 27.5 % — LOW (ref 34.5–45)
HGB BLD-MCNC: 8.5 G/DL — LOW (ref 11.5–15.5)
INR BLD: 3.15 — HIGH (ref 0.88–1.17)
MCHC RBC-ENTMCNC: 28.7 PG — SIGNIFICANT CHANGE UP (ref 27–34)
MCHC RBC-ENTMCNC: 30.9 % — LOW (ref 32–36)
MCV RBC AUTO: 92.9 FL — SIGNIFICANT CHANGE UP (ref 80–100)
METHOD TYPE: SIGNIFICANT CHANGE UP
NRBC # FLD: 0 K/UL — SIGNIFICANT CHANGE UP (ref 0–0)
PLATELET # BLD AUTO: 242 K/UL — SIGNIFICANT CHANGE UP (ref 150–400)
PMV BLD: 10.6 FL — SIGNIFICANT CHANGE UP (ref 7–13)
POTASSIUM SERPL-MCNC: 3.9 MMOL/L — SIGNIFICANT CHANGE UP (ref 3.5–5.3)
POTASSIUM SERPL-SCNC: 3.9 MMOL/L — SIGNIFICANT CHANGE UP (ref 3.5–5.3)
PROTHROM AB SERPL-ACNC: 37.2 SEC — HIGH (ref 9.8–13.1)
RBC # BLD: 2.96 M/UL — LOW (ref 3.8–5.2)
RBC # FLD: 15 % — HIGH (ref 10.3–14.5)
SODIUM SERPL-SCNC: 140 MMOL/L — SIGNIFICANT CHANGE UP (ref 135–145)
WBC # BLD: 9.36 K/UL — SIGNIFICANT CHANGE UP (ref 3.8–10.5)
WBC # FLD AUTO: 9.36 K/UL — SIGNIFICANT CHANGE UP (ref 3.8–10.5)

## 2020-07-23 RX ORDER — HYDRALAZINE HCL 50 MG
75 TABLET ORAL THREE TIMES A DAY
Refills: 0 | Status: DISCONTINUED | OUTPATIENT
Start: 2020-07-23 | End: 2020-08-12

## 2020-07-23 RX ADMIN — Medication 40 MILLIGRAM(S): at 05:41

## 2020-07-23 RX ADMIN — Medication 650 MILLIGRAM(S): at 21:52

## 2020-07-23 RX ADMIN — Medication 75 MILLIGRAM(S): at 12:56

## 2020-07-23 RX ADMIN — CEFEPIME 100 MILLIGRAM(S): 1 INJECTION, POWDER, FOR SOLUTION INTRAMUSCULAR; INTRAVENOUS at 18:25

## 2020-07-23 RX ADMIN — GABAPENTIN 100 MILLIGRAM(S): 400 CAPSULE ORAL at 21:23

## 2020-07-23 RX ADMIN — Medication 75 MICROGRAM(S): at 05:41

## 2020-07-23 RX ADMIN — Medication 40 MILLIGRAM(S): at 18:22

## 2020-07-23 RX ADMIN — Medication 2: at 12:13

## 2020-07-23 RX ADMIN — Medication 75 MILLIGRAM(S): at 21:21

## 2020-07-23 RX ADMIN — AMLODIPINE BESYLATE 10 MILLIGRAM(S): 2.5 TABLET ORAL at 05:41

## 2020-07-23 RX ADMIN — SPIRONOLACTONE 25 MILLIGRAM(S): 25 TABLET, FILM COATED ORAL at 05:41

## 2020-07-23 RX ADMIN — GABAPENTIN 100 MILLIGRAM(S): 400 CAPSULE ORAL at 12:56

## 2020-07-23 RX ADMIN — SIMVASTATIN 20 MILLIGRAM(S): 20 TABLET, FILM COATED ORAL at 21:22

## 2020-07-23 RX ADMIN — Medication 50 MILLIGRAM(S): at 05:41

## 2020-07-23 RX ADMIN — Medication 650 MILLIGRAM(S): at 21:22

## 2020-07-23 RX ADMIN — Medication 2: at 18:21

## 2020-07-23 RX ADMIN — Medication 100 MILLIGRAM(S): at 12:14

## 2020-07-23 RX ADMIN — GABAPENTIN 100 MILLIGRAM(S): 400 CAPSULE ORAL at 05:42

## 2020-07-23 NOTE — PROGRESS NOTE ADULT - ASSESSMENT
89 year old female PMH  a-fib on coumadin, diastolic CHF (with EF 63% on TTE 8/13/19), HTN, HLD, CKD stage 3-4, IDDM, PVD, CVA, PE, hypothyroidism, p/w L foot wound. Patient seen today at clinic and sent for cellulitis and abscess, wanting to rule out OM. Patient denies fever, chills, cp, sob, abd pain, n/v, weakness, or numbness/tingling.     Problem/Plan - 1:  ·  Problem: Toe gangrene.  Plan: Podiatry consult noted. S/P IV Abxs. NO fever or Leucocytosis so likely ischemic . ID and Vascular helping.      Problem/Plan - 2:  ·  Problem: Stage 3 chronic kidney disease.  Plan: Renal consulted. Baseline Creatinine 1.8.      Problem/Plan - 3:  ·  Problem: PVD (peripheral vascular disease).  Plan: LIZBETH/PVD noted.  Vascular consult noted.      Problem/Plan - 4:  ·  Problem: Chronic diastolic congestive heart failure.  Plan: Continuing Lasix . Cardiology helping.      Problem/Plan - 5:  ·  Problem: DM (diabetes mellitus).  Plan: SSI for now .      Problem/Plan - 6:  Problem: Afib. Plan: Holding Coumadin and will start Heparin for INR<2 for possible procedures/surgery .      Problem/Plan - 7:  ·  Problem: Pulmonary embolism.  Plan: On AC. INR noted      Problem/Plan - 8:  ·  Problem:  Hypothyroid.  Plan: Synthroid.      Problem/Plan - 9:  ·  Problem: Anemia.  Plan: Chronic .      Problem/Plan - 10:  ·  Problem: Uncontrolled Hypertension .  Plan: Adjusting BP meds.

## 2020-07-23 NOTE — DISCHARGE NOTE PROVIDER - NSDCFUADDAPPT_GEN_ALL_CORE_FT
Podiatry Discharge Instructions:  Follow up: Please follow up with Dr. Downey within 1 week of discharge from the hospital, please call [phone] for appointment and discuss that you recently were seen in the hospital.  Wound Care: Please apply betadine to the left big toe, 4x4 gauze, kerlix daily.  Weight bearing: Please weight bear as tolerated in a surgical shoe.  Antibiotics: Please continue as instructed. Dr Berman 8/31 at 11:20AM [phone]     Podiatry Discharge Instructions:  Follow up: Please follow up with Dr. Downey within 1 week of discharge from the hospital, please call [phone] for appointment and discuss that you recently were seen in the hospital.  Wound Care: Please apply betadine to the left big toe, 4x4 gauze, kerlix daily.  Weight bearing: Please weight bear as tolerated in a surgical shoe.  Antibiotics: Please continue as instructed. **You have an appointment with Cardiologist Dr Berman on 8/31/2020 at 11:20AM [phone] call office to confirm/reschedule   ***Please follow up with outpatient Urology within 1-2 week, you may follow up at [facility] [phone]  ***Follow up with your primary care doctor for repeat labs within 1-2 weeks (CBC, BMP, LFTs)- call office of Dr. Magallanes to schedule appointment and state that you recently in the hospital*  **Follow up with your outpatient Nephrologist Dr. Leyva/Radha  **Follow up with outpatient Neurology/Neurosurgery for further management of your spinal stenosis found on CAT scan; and for outpatient EMG/NCV tests.

## 2020-07-23 NOTE — PROGRESS NOTE ADULT - SUBJECTIVE AND OBJECTIVE BOX
INTERVAL HPI/OVERNIGHT EVENTS: No new concerns.   Vital Signs Last 24 Hrs  T(C): 36.6 (23 Jul 2020 05:39), Max: 37 (22 Jul 2020 21:10)  T(F): 97.9 (23 Jul 2020 05:39), Max: 98.6 (22 Jul 2020 21:10)  HR: 81 (23 Jul 2020 05:39) (80 - 91)  BP: 168/65 (23 Jul 2020 05:39) (160/80 - 171/81)  BP(mean): --  RR: 17 (23 Jul 2020 05:39) (17 - 18)  SpO2: 100% (23 Jul 2020 05:39) (100% - 100%)  I&O's Summary    MEDICATIONS  (STANDING):  allopurinol 100 milliGRAM(s) Oral daily  amLODIPine   Tablet 10 milliGRAM(s) Oral daily  cefepime   IVPB 1000 milliGRAM(s) IV Intermittent every 24 hours  dextrose 5%. 1000 milliLiter(s) (50 mL/Hr) IV Continuous <Continuous>  dextrose 50% Injectable 12.5 Gram(s) IV Push once  dextrose 50% Injectable 25 Gram(s) IV Push once  dextrose 50% Injectable 25 Gram(s) IV Push once  furosemide    Tablet 40 milliGRAM(s) Oral two times a day  gabapentin 100 milliGRAM(s) Oral three times a day  hydrALAZINE 50 milliGRAM(s) Oral three times a day  insulin lispro (HumaLOG) corrective regimen sliding scale   SubCutaneous three times a day before meals  insulin lispro (HumaLOG) corrective regimen sliding scale   SubCutaneous at bedtime  levothyroxine 75 MICROGram(s) Oral daily  simvastatin 20 milliGRAM(s) Oral at bedtime  spironolactone 25 milliGRAM(s) Oral daily    MEDICATIONS  (PRN):  acetaminophen   Tablet .. 650 milliGRAM(s) Oral every 6 hours PRN Temp greater or equal to 38C (100.4F), Mild Pain (1 - 3), Moderate Pain (4 - 6), Severe Pain (7 - 10)  dextrose 40% Gel 15 Gram(s) Oral once PRN Blood Glucose LESS THAN 70 milliGRAM(s)/deciliter  glucagon  Injectable 1 milliGRAM(s) IntraMuscular once PRN Glucose LESS THAN 70 milligrams/deciliter    LABS:                        8.5    9.36  )-----------( 242      ( 23 Jul 2020 05:54 )             27.5     07-23    140  |  103  |  47<H>  ----------------------------<  143<H>  3.9   |  28  |  2.09<H>    Ca    10.0      23 Jul 2020 05:54    TPro  7.9  /  Alb  4.0  /  TBili  0.4  /  DBili  x   /  AST  14  /  ALT  14  /  AlkPhos  90  07-21    PT/INR - ( 23 Jul 2020 05:54 )   PT: 37.2 SEC;   INR: 3.15          PTT - ( 23 Jul 2020 05:54 )  PTT:40.0 SEC    CAPILLARY BLOOD GLUCOSE      POCT Blood Glucose.: 138 mg/dL (23 Jul 2020 08:20)  POCT Blood Glucose.: 138 mg/dL (22 Jul 2020 21:38)  POCT Blood Glucose.: 198 mg/dL (22 Jul 2020 18:13)  POCT Blood Glucose.: 144 mg/dL (22 Jul 2020 12:12)          REVIEW OF SYSTEMS:  CONSTITUTIONAL: No fever, weight loss, or fatigue  EYES: No eye pain, visual disturbances, or discharge  ENMT:  No difficulty hearing, tinnitus, vertigo; No sinus or throat pain  NECK: No pain or stiffness  RESPIRATORY: No cough, wheezing, chills or hemoptysis; No shortness of breath  CARDIOVASCULAR: No chest pain, palpitations, dizziness, or leg swelling  GASTROINTESTINAL: No abdominal or epigastric pain. No nausea, vomiting, or hematemesis; No diarrhea or constipation. No melena or hematochezia.  GENITOURINARY: No dysuria, frequency, hematuria, or incontinence  NEUROLOGICAL: No headaches, memory loss, loss of strength, numbness, or tremors      Consultant(s) Notes Reviewed:  [x ] YES  [ ] NO    PHYSICAL EXAM:  GENERAL: NAD, well-groomed, well-developed,not in any distress ,  HEAD:  Atraumatic, Normocephalic  EYES: EOMI, PERRLA, conjunctiva and sclera clear  ENMT: No tonsillar erythema, exudates, or enlargement; Moist mucous membranes, Good dentition, No lesions  NECK: Supple, No JVD, Normal thyroid  NERVOUS SYSTEM:  Alert & Oriented X3, No focal deficit   CHEST/LUNG: Good air entry bilateral with no  rales, rhonchi, wheezing, or rubs  HEART: Regular rate and rhythm; No murmurs, rubs, or gallops  ABDOMEN: Soft, Nontender, Nondistended; Bowel sounds present  EXTREMITIES: left foot dressed     Care Discussed with Consultants/Other Providers [ x] YES  [ ] NO

## 2020-07-23 NOTE — DISCHARGE NOTE PROVIDER - NSDCFUADDINST_GEN_ALL_CORE_FT
Podiatry Discharge Instructions:  Follow up: Please follow up with Dr. Downey within 1 week of discharge from the hospital, please call [phone] for appointment and discuss that you recently were seen in the hospital.  Wound Care: Please apply betadine to the left big toe, 4x4 gauze, kerlix daily.  Weight bearing: Please weight bear as tolerated in a surgical shoe.  Antibiotics: Please continue as instructed. Podiatry Discharge Instructions:  Follow up: Please follow up with Dr. Downey within 1 week of discharge from the hospital, please call [phone] for appointment and discuss that you recently were seen in the hospital.  **Wound Care: Please apply betadine to the left big toe, 4x4 gauze, kerlix daily.  Weight bearing: Please weight bear as tolerated in a surgical shoe.

## 2020-07-23 NOTE — DISCHARGE NOTE PROVIDER - NSDCHHNEEDSERVICE_GEN_ALL_CORE
Other, specify.../Rehabilitation services/Teaching and training/Wound care and assessment/Medication teaching and assessment/Observation and assessment

## 2020-07-23 NOTE — PROGRESS NOTE ADULT - ASSESSMENT
89 year old female PMH  a-fib on coumadin, diastolic CHF (with EF 63% on TTE 8/13/19), HTN, HLD, CKD stage 3-4, IDDM, PVD, CVA, PE, hypothyroidism, p/w L foot wound. Patient was sent yesterday from podiatrist office for non healing left great toe ulcer, to rule out OM. Renal consulted for NIKHIL/CKD Mx.     CKD4: Baseline Creatinine 1.7-1.9   euvolemic. Electrolytes acceptable.   monitor  BMP daily and u/o   Can continue PO diuretics.  dose all meds for eGFR<30ml/min.   avoid ACEi/ARB for now/NSAIDs/Nephrotoxics.  if LE angiogram to evaluate PAD is indicated, pt is at Intermediate risk for BESS, explained to pts daughter bedside including the possibility of worsening RFT post cath and if no recovery of RF, may need RRT/dialysis. she verbalized understanding.   rec mucomyst 1200mg bid -2doses pre and 2 doses post angio and IVF/NS 4-6hrs periangio if no s/o CHF    HTN, controlled-bp suboptimal. c/w current bp meds. pain Mx.   Toe gangrene.  f/u w/ Podiatry, ID and vas sx. on cefepime, as per ID    DM- Mx per medicine      labs, rad, chart reviewed  Thanks for consulting. will closely follow up.

## 2020-07-23 NOTE — PROGRESS NOTE ADULT - SUBJECTIVE AND OBJECTIVE BOX
Patient denies chest pain or shortness of breath.   Review of systems otherwise (-)  	    MEDICATIONS  (STANDING):  allopurinol 100 milliGRAM(s) Oral daily  amLODIPine   Tablet 10 milliGRAM(s) Oral daily  cefepime   IVPB 1000 milliGRAM(s) IV Intermittent every 24 hours  dextrose 5%. 1000 milliLiter(s) (50 mL/Hr) IV Continuous <Continuous>  dextrose 50% Injectable 12.5 Gram(s) IV Push once  dextrose 50% Injectable 25 Gram(s) IV Push once  dextrose 50% Injectable 25 Gram(s) IV Push once  furosemide    Tablet 40 milliGRAM(s) Oral two times a day  gabapentin 100 milliGRAM(s) Oral three times a day  hydrALAZINE 75 milliGRAM(s) Oral three times a day  insulin lispro (HumaLOG) corrective regimen sliding scale   SubCutaneous three times a day before meals  insulin lispro (HumaLOG) corrective regimen sliding scale   SubCutaneous at bedtime  levothyroxine 75 MICROGram(s) Oral daily  simvastatin 20 milliGRAM(s) Oral at bedtime  spironolactone 25 milliGRAM(s) Oral daily      LABS:	 	                          8.5    9.36  )-----------( 242      ( 23 Jul 2020 05:54 )             27.5     Hemoglobin: 8.5 g/dL (07-23 @ 05:54)  Hemoglobin: 8.5 g/dL (07-22 @ 05:30)  Hemoglobin: 9.0 g/dL (07-21 @ 15:40)    07-23    140  |  103  |  47<H>  ----------------------------<  143<H>  3.9   |  28  |  2.09<H>    Ca    10.0      23 Jul 2020 05:54    TPro  7.9  /  Alb  4.0  /  TBili  0.4  /  DBili  x   /  AST  14  /  ALT  14  /  AlkPhos  90  07-21    Creatinine Trend: 2.09<--, 1.87<--, 2.02<--  COAGS:   PT/INR - ( 23 Jul 2020 05:54 )   PT: 37.2 SEC;   INR: 3.15          PTT - ( 23 Jul 2020 05:54 )  PTT:40.0 SEC    proBNP:   Lipid Profile:   HgA1c:   TSH:     PHYSICAL EXAM:  T(C): 36.7 (07-23-20 @ 12:45), Max: 37 (07-22-20 @ 21:10)  HR: 81 (07-23-20 @ 12:45) (80 - 91)  BP: 148/62 (07-23-20 @ 12:45) (148/62 - 171/81)  RR: 18 (07-23-20 @ 12:45) (17 - 18)  SpO2: 100% (07-23-20 @ 12:45) (100% - 100%)  Wt(kg): --  I&O's Summary      Gen: Appears well in NAD  HEENT:  (-)icterus (-)pallor  CV: N S1 S2 3/6 SUZI (+)2 Pulses B/l  Resp:  Clear to ausculatation B/L, normal effort  GI: (+) BS Soft, NT, ND  Lymph:  (-)Edema, (-)obvious lymphadenopathy  Skin: +left foot dressing, Warm to touch, Normal turgor  Psych: Appropriate mood and affect      TELEMETRY: None	      < from: Transthoracic Echocardiogram (07.22.20 @ 16:27) >  CONCLUSIONS:  1. Mitral annular calcification, otherwise normal mitral  valve. Mild-moderate mitral regurgitation.  2. Calcified trileaflet aortic valve with decreased  opening.  Peak transaortic valve gradient [id number] mm Hg,  mean transaortic valve gradient equals 26 mm Hg, estimated  aortic valve area equals 1 sqcm (by continuity equation),  consistent with moderate to severe aortic stenosis.  3. Severely dilated left atrium.  LA volume index = 57  cc/m2.  4. Mild concentric left ventricular hypertrophy.  5. Normal left ventricular systolic function. No segmental  wall motion abnormalities.  6. Severe diastolic dysfunction.  7. Severe right atrial enlargement.  8. Normal right ventricular size and function.  9. Estimated right ventricular systolic pressure equals 53  mm Hg, assuming right atrial pressure equals 10 mm Hg,  consistent with moderate pulmonary hypertension.    < end of copied text >      ASSESSMENT/PLAN: Patient is a 89 year old Female well known to our office (Cardiologist - Dr. Berman) with PMH of persistent Afib on coumadin who was not a candidate for left atrial appendage occlusion with Watchman, chronic HFpEF, HTN, HLD, hypothyroidism, CKD, Type 2 DM, CVA, PE who is admitted with a Left foot wound. Cardiology consulted for possible preop clearance and management of Afib and heart failure.    - Appears well compensated from heart failure perspective - continue PO lasix/aldactone if okay with renal  - Continue AC with coumadin for CVA prevention for goal INR 2-3 if no procedures planned, if procedures planned - patient should be bridged with heparin gtt when INR less than 2 due to high stroke risk with very elevated chads-vasc  - Podiatry/ID follow up  - Abnormal LIZBETH/PVR noted - f/u vascular recs  - TTE noted above with normal LVEF, severe diastolic dysfunction, mild-mod MR, mod pulm HTN, and mod-severe AS  - Patient prefers conservative medical management of her valvular disease and does not want any further invasive workup or surgery  - Given the above, no further inpatient cardiac w/u needed at this time    Zi Wiley PA-C  Pager: [phone] Patient denies chest pain or shortness of breath.   Review of systems otherwise (-)  	    MEDICATIONS  (STANDING):  allopurinol 100 milliGRAM(s) Oral daily  amLODIPine   Tablet 10 milliGRAM(s) Oral daily  cefepime   IVPB 1000 milliGRAM(s) IV Intermittent every 24 hours  dextrose 5%. 1000 milliLiter(s) (50 mL/Hr) IV Continuous <Continuous>  dextrose 50% Injectable 12.5 Gram(s) IV Push once  dextrose 50% Injectable 25 Gram(s) IV Push once  dextrose 50% Injectable 25 Gram(s) IV Push once  furosemide    Tablet 40 milliGRAM(s) Oral two times a day  gabapentin 100 milliGRAM(s) Oral three times a day  hydrALAZINE 75 milliGRAM(s) Oral three times a day  insulin lispro (HumaLOG) corrective regimen sliding scale   SubCutaneous three times a day before meals  insulin lispro (HumaLOG) corrective regimen sliding scale   SubCutaneous at bedtime  levothyroxine 75 MICROGram(s) Oral daily  simvastatin 20 milliGRAM(s) Oral at bedtime  spironolactone 25 milliGRAM(s) Oral daily      LABS:	 	                          8.5    9.36  )-----------( 242      ( 23 Jul 2020 05:54 )             27.5     Hemoglobin: 8.5 g/dL (07-23 @ 05:54)  Hemoglobin: 8.5 g/dL (07-22 @ 05:30)  Hemoglobin: 9.0 g/dL (07-21 @ 15:40)    07-23    140  |  103  |  47<H>  ----------------------------<  143<H>  3.9   |  28  |  2.09<H>    Ca    10.0      23 Jul 2020 05:54    TPro  7.9  /  Alb  4.0  /  TBili  0.4  /  DBili  x   /  AST  14  /  ALT  14  /  AlkPhos  90  07-21    Creatinine Trend: 2.09<--, 1.87<--, 2.02<--  COAGS:   PT/INR - ( 23 Jul 2020 05:54 )   PT: 37.2 SEC;   INR: 3.15          PTT - ( 23 Jul 2020 05:54 )  PTT:40.0 SEC    proBNP:   Lipid Profile:   HgA1c:   TSH:     PHYSICAL EXAM:  T(C): 36.7 (07-23-20 @ 12:45), Max: 37 (07-22-20 @ 21:10)  HR: 81 (07-23-20 @ 12:45) (80 - 91)  BP: 148/62 (07-23-20 @ 12:45) (148/62 - 171/81)  RR: 18 (07-23-20 @ 12:45) (17 - 18)  SpO2: 100% (07-23-20 @ 12:45) (100% - 100%)  Wt(kg): --  I&O's Summary      Gen: Appears well in NAD  HEENT:  (-)icterus (-)pallor  CV: Normal S1, there is still a sharp S2.  Mid-to-late peaking 3/6 systolic murmur at the upper chest, but No pulsus parvus et tardus (+)2 Pulses B/l  Resp:  Clear to ausculatation B/L, normal effort  GI: (+) BS Soft, NT, ND  Lymph:  (-)Edema, (-)obvious lymphadenopathy  Skin: +left foot dressing, Warm to touch, Normal turgor  Psych: Appropriate mood and affect      TELEMETRY: None	      < from: Transthoracic Echocardiogram (07.22.20 @ 16:27) >  CONCLUSIONS:  1. Mitral annular calcification, otherwise normal mitral  valve. Mild-moderate mitral regurgitation.  2. Calcified trileaflet aortic valve with decreased  opening.  Peak transaortic valve gradient armtif98 mm Hg,  mean transaortic valve gradient equals 26 mm Hg, estimated  aortic valve area equals 1 sqcm (by continuity equation),  consistent with moderate to severe aortic stenosis.  3. Severely dilated left atrium.  LA volume index = 57  cc/m2.  4. Mild concentric left ventricular hypertrophy.  5. Normal left ventricular systolic function. No segmental  wall motion abnormalities.  6. Severe diastolic dysfunction.  7. Severe right atrial enlargement.  8. Normal right ventricular size and function.  9. Estimated right ventricular systolic pressure equals 53  mm Hg, assuming right atrial pressure equals 10 mm Hg,  consistent with moderate pulmonary hypertension.    < end of copied text >      ASSESSMENT/PLAN: Patient is a 89 year old Female well known to our office (Cardiologist - Dr. Berman) with PMH of persistent Afib on coumadin who was not a candidate for left atrial appendage occlusion with Watchman, chronic HFpEF, HTN, HLD, hypothyroidism, CKD, Type 2 DM, CVA, PE who is admitted with a Left foot wound. Cardiology consulted for possible preop clearance and management of Afib and heart failure.    - Appears well compensated from heart failure perspective - continue PO lasix/aldactone if okay with renal  - Continue AC with coumadin for CVA prevention for goal INR 2-3 if no procedures planned, if procedures planned - patient should be bridged with heparin gtt when INR less than 2 due to high stroke risk with very elevated chads-vasc  - Podiatry/ID follow up  - Abnormal LIZBETH/PVR noted - f/u vascular recs  - TTE noted above with normal LVEF, severe diastolic dysfunction, mild-mod MR, mod pulm HTN, and mod-severe AS: auscultation favors moderate aortic stenosis.  Yearly Echo for followup.  Patient does not have any 'red flag' symptoms such as syncope, crushing angina, dyspnea on exertion (limited by her foot), so will continue to monitor in the office.  - Patient prefers conservative medical management of her valvular disease and does not want any further invasive workup or surgery  - Given the above, no further inpatient cardiac w/u needed at this time    Zi Wiley PA-C  Pager: [phone]

## 2020-07-23 NOTE — DISCHARGE NOTE PROVIDER - CARE PROVIDERS DIRECT ADDRESSES
,DirectAddress_Unknown,DirectAddress_Unknown ,DirectAddress_Unknown,DirectAddress_Unknown,DirectAddress_Unknown,DirectAddress_Unknown ,DirectAddress_Unknown,DirectAddress_Unknown,DirectAddress_Unknown,[email or web address],DirectAddress_Unknown

## 2020-07-23 NOTE — DISCHARGE NOTE PROVIDER - INSTRUCTIONS
Consistent carb renal with evening snack.   1 nepro can a day Consistent carb renal with evening snack.   1 nepro can a day  no concentrated potassium/phosphorus  no protein restrictions

## 2020-07-23 NOTE — PROGRESS NOTE ADULT - SUBJECTIVE AND OBJECTIVE BOX
Nephrology Followup Note - [phone] - Dr Catherine / Dr Garcia / Dr Tellez / Dr Sullivan / Dr Castillo / Dr Grijalva / Dr Segovia / Dr Luna  Pt seen and examined at bedside  left foot pain better today, No complaints.     Allergies:  No Known Allergies    Hospital Medications:   MEDICATIONS  (STANDING):  allopurinol 100 milliGRAM(s) Oral daily  amLODIPine   Tablet 10 milliGRAM(s) Oral daily  cefepime   IVPB 1000 milliGRAM(s) IV Intermittent every 24 hours  dextrose 5%. 1000 milliLiter(s) (50 mL/Hr) IV Continuous <Continuous>  dextrose 50% Injectable 12.5 Gram(s) IV Push once  dextrose 50% Injectable 25 Gram(s) IV Push once  dextrose 50% Injectable 25 Gram(s) IV Push once  furosemide    Tablet 40 milliGRAM(s) Oral two times a day  gabapentin 100 milliGRAM(s) Oral three times a day  hydrALAZINE 75 milliGRAM(s) Oral three times a day  insulin lispro (HumaLOG) corrective regimen sliding scale   SubCutaneous three times a day before meals  insulin lispro (HumaLOG) corrective regimen sliding scale   SubCutaneous at bedtime  levothyroxine 75 MICROGram(s) Oral daily  simvastatin 20 milliGRAM(s) Oral at bedtime  spironolactone 25 milliGRAM(s) Oral daily      VITALS:  T(F): 97.9 (07-23-20 @ 05:39), Max: 98.6 (07-22-20 @ 21:10)  HR: 81 (07-23-20 @ 05:39)  BP: 168/65 (07-23-20 @ 05:39)  RR: 17 (07-23-20 @ 05:39)  SpO2: 100% (07-23-20 @ 05:39)  Wt(kg): --      PHYSICAL EXAM:  Constitutional: NAD  HEENT: anicteric sclera, oropharynx clear, MMM  Neck: No JVD  Respiratory: CTAB, no wheezes, rales or rhonchi  Cardiovascular: S1, S2, RRR  Gastrointestinal: BS+, soft, NT/ND  Extremities: No cyanosis or clubbing. No peripheral edema  Neurological: A/O x 3, no focal deficits  Psychiatric: Normal mood, normal affect  : No CVA tenderness. No michaud.   Skin: No rashes    LABS:  07-23    140  |  103  |  47<H>  ----------------------------<  143<H>  3.9   |  28  |  2.09<H>    Ca    10.0      23 Jul 2020 05:54    TPro  7.9  /  Alb  4.0  /  TBili  0.4  /  DBili      /  AST  14  /  ALT  14  /  AlkPhos  90  07-21    Creatinine Trend: 2.09 <--, 1.87 <--, 2.02 <--                        8.5    9.36  )-----------( 242      ( 23 Jul 2020 05:54 )             27.5     Urine Studies:      RADIOLOGY & ADDITIONAL STUDIES:

## 2020-07-23 NOTE — DISCHARGE NOTE PROVIDER - NSDCCPCAREPLAN_GEN_ALL_CORE_FT
PRINCIPAL DISCHARGE DIAGNOSIS  Diagnosis: Toe gangrene  Assessment and Plan of Treatment:       SECONDARY DISCHARGE DIAGNOSES  Diagnosis: Chronic diastolic congestive heart failure  Assessment and Plan of Treatment: Echo showed:  Mild-moderate mitral regurgitation, moderate to severe aortic stenosis. Severely dilated left atrium.  LA volume index = 57  cc/m2. Normal left ventricular systolic function. No segmental  wall motion abnormalities. Severe diastolic dysfunction. Severe right atrial enlargement. moderate pulmonary hypertension.    Diagnosis: Pulmonary embolism  Assessment and Plan of Treatment: Pulmonary embolism    Diagnosis: Stage 3 chronic kidney disease  Assessment and Plan of Treatment: Stage 3 chronic kidney disease PRINCIPAL DISCHARGE DIAGNOSIS  Diagnosis: Toe gangrene  Assessment and Plan of Treatment: You were seen by Podiatry, Vascular, Infectious Disease in-hospital. You were deemed a poor surgical candidate. Continue with local wound care. Continue with antibiotics as recommended. Follow up outpatient Vascular and Podiatry for further management.      SECONDARY DISCHARGE DIAGNOSES  Diagnosis: Pulmonary embolism  Assessment and Plan of Treatment: Continue Coumadin as recommended.    Diagnosis: Chronic diastolic congestive heart failure  Assessment and Plan of Treatment: Echo showed:  Mild-moderate mitral regurgitation, moderate to severe aortic stenosis. Severely dilated left atrium.  LA volume index = 57  cc/m2. Normal left ventricular systolic function. No segmental  wall motion abnormalities. Severe diastolic dysfunction. Severe right atrial enlargement. moderate pulmonary hypertension.    Diagnosis: Anemia  Assessment and Plan of Treatment: Follow-up with your outpatient provider if further treatment is warranted. Monitor for signs/symptoms indicating worsening of disease, such as, easy bleeding/bruising, pale skin, fatigue, dizziness, increased heart rate, or chest pain.      Diagnosis: PVD (peripheral vascular disease)  Assessment and Plan of Treatment: PVD (peripheral vascular disease)    Diagnosis: DM (diabetes mellitus)  Assessment and Plan of Treatment: DM (diabetes mellitus)    Diagnosis: Afib  Assessment and Plan of Treatment: Afib    Diagnosis: Stage 3 chronic kidney disease  Assessment and Plan of Treatment: You were noted with worsening renal function during your admission. You were seen by Nephrology in-hospital. Your Lasix and Aldactone were held with improvement in kidney function. PRINCIPAL DISCHARGE DIAGNOSIS  Diagnosis: Toe gangrene  Assessment and Plan of Treatment: You were seen by Podiatry, Vascular, Infectious Disease in-hospital. You were deemed a poor surgical candidate. Continue with local wound care. Continue with antibiotics as recommended. Follow up outpatient Vascular and Podiatry for further management.      SECONDARY DISCHARGE DIAGNOSES  Diagnosis: PVD (peripheral vascular disease)  Assessment and Plan of Treatment: PVD (peripheral vascular disease)    Diagnosis: DM (diabetes mellitus)  Assessment and Plan of Treatment: DM (diabetes mellitus)    Diagnosis: Afib  Assessment and Plan of Treatment: Dose your coumadin.  Please continue your medications as directed and follow-up with your primary provider/cardiologist to further manage your care. Monitor for signs/symptoms of uncontrolled atrial fibrillation, such as, increased heart rate, palpitations, chest pain, dizziness, or shortness of breath - Return to emergency room if these signs/symptoms are present.    Diagnosis: Anemia  Assessment and Plan of Treatment: Follow-up with your outpatient provider if further treatment is warranted. Monitor for signs/symptoms indicating worsening of disease, such as, easy bleeding/bruising, pale skin, fatigue, dizziness, increased heart rate, or chest pain.      Diagnosis: Pulmonary embolism  Assessment and Plan of Treatment: Continue Coumadin as recommended.    Diagnosis: Chronic diastolic congestive heart failure  Assessment and Plan of Treatment: Echo showed:  Mild-moderate mitral regurgitation, moderate to severe aortic stenosis. Severely dilated left atrium.  LA volume index = 57  cc/m2. Normal left ventricular systolic function. No segmental  wall motion abnormalities. Severe diastolic dysfunction. Severe right atrial enlargement. moderate pulmonary hypertension.    Diagnosis: Stage 3 chronic kidney disease  Assessment and Plan of Treatment: You were noted with worsening renal function during your admission. You were seen by Nephrology in-hospital. Your Lasix and Aldactone were held with improvement in kidney function. PRINCIPAL DISCHARGE DIAGNOSIS  Diagnosis: Toe gangrene  Assessment and Plan of Treatment: You were seen by Podiatry, Vascular, Infectious Disease in-hospital. You were deemed a poor surgical candidate. Continue with local wound care. Continue with antibiotics as recommended. Follow up outpatient Vascular and Podiatry for further management.      SECONDARY DISCHARGE DIAGNOSES  Diagnosis: Fecal incontinence  Assessment and Plan of Treatment: You were noted to have fecal incontinence. You were recommended an MRI with IV contrast of your back which was not recommended by the kidney doctors. You had a CT scan on your back which showed ---   Please follow up with your primary care provider in 1 to 2 weeks for further care.    Diagnosis: PVD (peripheral vascular disease)  Assessment and Plan of Treatment: You were found to have vascular disease in your lower extremities. You were seen by vascular surgery and were deemed a poor candidate for surgery. Continue your statin and *Aspirin on discharge. Follow up with vascular surgery in 1 to 2 weeks for further care. Return to ER if you have worsening pain, swelling, redness, coolness, numbness or tingling in your legs.    Diagnosis: DM (diabetes mellitus)  Assessment and Plan of Treatment: *Follow up with endocrinology on discharge within 1 week*  Continue your medication regimen and a consistent carbohydrate diet (Meaning eating the same amount of carbohydrates at the same time each day). Monitor blood glucose levels throughout the day before meals and at bedtime. Record blood sugars and bring to outpatient providers appointment in order to be reviewed by your doctor for management modifications. If your sugars are more than 400 or less than 70 you should contact your PCP immediately. Monitor for signs/symptoms of low blood glucose, such as, dizziness, altered mental status, or cool/clammy skin. In addition, monitor for signs/symptoms of high blood glucose, such as, feeling hot, dry, fatigued, or with increased thirst/urination. Make regular podiatry appointments in order to have feet checked for wounds and uncontrolled toe nail growth to prevent infections, as well as, appointments with an ophthalmologist to monitor your vision.    Diagnosis: Afib  Assessment and Plan of Treatment: You have atrial fibrillation of your heart, which is an issue with the beating of your heart. Continue to take your blood thinner and heart medications as prescribed to restore or maintain a normal heart rate and rhythm, to prevent blood clots, and decrease the risks of stroke CVA/TIA. Continue a low fat diet, reduce any caffeine intake, and exercise at least 30 minutes daily. Go to ER if any new or worsening symptoms such as dizziness, lightheadedness, weakness, arm or leg weakness, numbness, tingling, visual changes, facial droop, difficulty speaking or swallowing, chest pain, shortness of breath, palpitations, abdominal pain, nausea, vomiting, calf pain.   Continue your Coumadin as directed. Repeat INR at rehab for further INR monitoring with goal 2.5 - 3.5.  Keep your intake of vitamin K regular. The highest amount of vitamin K is found in green and leafy vegetables like broccoli, lettuces, cabbage, and spinach. You can eat these foods but keep the portion size the same. Changes in the amount you eat can affect your PT/INR blood test. Contact your doctor before making any major changes in your diet. Limit your alcohol intake.  Go for blood tests as directed. Because your dose is based on the PT/INR blood test, it is very important that you get your blood tested on the scheduled date and time and to keep your health care provider appointments.   Please follow up with your doctor within 3 days of discharge to schedule your next blood test.    Diagnosis: Chronic diastolic congestive heart failure  Assessment and Plan of Treatment: Follow up with Dr. Banks or your cardiologist as an outpatient in 1 to 2 weeks.    Diagnosis: Anemia  Assessment and Plan of Treatment: Follow-up with your outpatient provider if further treatment is warranted. Monitor for signs/symptoms indicating worsening of disease, such as, easy bleeding/bruising, pale skin, fatigue, dizziness, increased heart rate, or chest pain.      Diagnosis: Pulmonary embolism  Assessment and Plan of Treatment: Continue Coumadin as recommended.    Diagnosis: Stage 3 chronic kidney disease  Assessment and Plan of Treatment: You were noted with worsening renal function during your admission. You were seen by Nephrology in-hospital. Your Lasix and Aldactone were held with improvement in kidney function, on discharge --- PRINCIPAL DISCHARGE DIAGNOSIS  Diagnosis: Toe gangrene  Assessment and Plan of Treatment: You were seen by Podiatry, Vascular, Infectious Disease in-hospital. You were deemed a poor surgical candidate. Continue with local wound care. Continue with antibiotics as recommended. Follow up outpatient Vascular and Podiatry for further management.      SECONDARY DISCHARGE DIAGNOSES  Diagnosis: Fecal incontinence  Assessment and Plan of Treatment: You were noted to have fecal incontinence. You were recommended an MRI with IV contrast of your back which was not recommended by the kidney doctors. You had a CT scan on your back which showed ---   Please follow up with your primary care provider in 1 to 2 weeks for further care.    Diagnosis: PVD (peripheral vascular disease)  Assessment and Plan of Treatment: You were found to have vascular disease in your lower extremities. You were seen by vascular surgery and were deemed a poor candidate for surgery. Continue your statin and *Aspirin on discharge. Follow up with vascular surgery in 1 to 2 weeks for further care. Return to ER if you have worsening pain, swelling, redness, coolness, numbness or tingling in your legs.    Diagnosis: DM (diabetes mellitus)  Assessment and Plan of Treatment: *Follow up with endocrinology on discharge within 1 week*  Continue your medication regimen and a consistent carbohydrate diet (Meaning eating the same amount of carbohydrates at the same time each day). Monitor blood glucose levels throughout the day before meals and at bedtime. Record blood sugars and bring to outpatient providers appointment in order to be reviewed by your doctor for management modifications. If your sugars are more than 400 or less than 70 you should contact your PCP immediately. Monitor for signs/symptoms of low blood glucose, such as, dizziness, altered mental status, or cool/clammy skin. In addition, monitor for signs/symptoms of high blood glucose, such as, feeling hot, dry, fatigued, or with increased thirst/urination. Make regular podiatry appointments in order to have feet checked for wounds and uncontrolled toe nail growth to prevent infections, as well as, appointments with an ophthalmologist to monitor your vision.    Diagnosis: Afib  Assessment and Plan of Treatment: You have atrial fibrillation of your heart, which is an issue with the beating of your heart. Continue to take your blood thinner and heart medications as prescribed to restore or maintain a normal heart rate and rhythm, to prevent blood clots, and decrease the risks of stroke CVA/TIA. Continue a low fat diet, reduce any caffeine intake, and exercise at least 30 minutes daily. Go to ER if any new or worsening symptoms such as dizziness, lightheadedness, weakness, arm or leg weakness, numbness, tingling, visual changes, facial droop, difficulty speaking or swallowing, chest pain, shortness of breath, palpitations, abdominal pain, nausea, vomiting, calf pain.   Continue your Coumadin as directed. Repeat INR at rehab for further INR monitoring with goal 2.5 - 3.5.  Keep your intake of vitamin K regular. The highest amount of vitamin K is found in green and leafy vegetables like broccoli, lettuces, cabbage, and spinach. You can eat these foods but keep the portion size the same. Changes in the amount you eat can affect your PT/INR blood test. Contact your doctor before making any major changes in your diet. Limit your alcohol intake.  Go for blood tests as directed. Because your dose is based on the PT/INR blood test, it is very important that you get your blood tested on the scheduled date and time and to keep your health care provider appointments.   Please follow up with your doctor within 3 days of discharge to schedule your next blood test.    Diagnosis: Chronic diastolic congestive heart failure  Assessment and Plan of Treatment: Follow up with Dr Berman on 8/31 at 11:20AM. Call [phone] if you have questions regarding your appointment.    Diagnosis: Anemia  Assessment and Plan of Treatment: Follow-up with your outpatient provider if further treatment is warranted. Monitor for signs/symptoms indicating worsening of disease, such as, easy bleeding/bruising, pale skin, fatigue, dizziness, increased heart rate, or chest pain.      Diagnosis: Pulmonary embolism  Assessment and Plan of Treatment: Continue Coumadin as recommended.    Diagnosis: Stage 3 chronic kidney disease  Assessment and Plan of Treatment: You were noted with worsening renal function during your admission. You were seen by Nephrology in-hospital. Your Lasix and Aldactone were held with improvement in kidney function, on discharge --- PRINCIPAL DISCHARGE DIAGNOSIS  Diagnosis: Toe gangrene  Assessment and Plan of Treatment: You were seen by Podiatry, Vascular, Infectious Disease in-hospital. You were deemed a poor surgical candidate by podiatry. Continue with local wound care as directed. You wound culture showed bacteria growth- You completed a course of antibiotics in the hospital. Continue pain medications as prescribed.   Follow up outpatient Vascular and Podiatry Dr. Downey for further management.  ***Follow up with your primary care doctor for repeat labs within 1-2 weeks (CBC, BMP, LFTs)- call office of Dr. Magallanes to schedule appointment and state that you recently in the hospital*      SECONDARY DISCHARGE DIAGNOSES  Diagnosis: PVD (peripheral vascular disease)  Assessment and Plan of Treatment: You underwent an angiogram on 7/28 and found to have severe vascular disease in your lower extremities. You were seen by vascular surgery and were deemed a poor candidate for surgery. Continue your statin and Aspirin on discharge. Follow up with vascular surgery in 1 to 2 weeks for further care. Return to ER if you have worsening pain, swelling, redness, coolness, numbness or tingling in your legs.    Diagnosis: Stage 3 chronic kidney disease  Assessment and Plan of Treatment: You were noted with worsening renal function during your admission. You were seen by Nephrology in-hospital. Your Lasix and Aldactone were held with improvement in kidney function, creatinine on discharge was 1.6.* You may resume lasix upon discharge with close monitoring of your kidney function. HOLD Aldactone until your follow up with your doctor. In order to prevent further disease progression, continue to follow recommendations made by your primary provider/nephrologist. Continue a diet that is low in sodium and avoid foods that are concentrated in potassium and phosphorus. Continue your medications/supplementations as directed and avoid over-the-counter drugs that are harmful to kidneys, such as, Non-Steroidal Anti-Inflammatory Drugs (NSAIDs). Follow-up as outpatient to monitor your kidney function, as well as, vitamin D, Calcium, potassium, and phosphorus levels.  ****Close follow up with your outpatient Nephrologist Dr. Leyva/Radha- consider adding acei or arb if Serum Creatinine stable as outpatient.    Diagnosis: Fecal incontinence  Assessment and Plan of Treatment: You were noted to have fecal incontinence with urinary incontinence. You were evaluated by the neurologist and were recommended for an MRI lumbar spine with IV contrast of your back which was not recommended by the kidney doctors due to elevated kidney function. You had a CAT scan of your lumbar spine which showed Enlarged calcified fibroid uterus; Degenerative changes involving the sacroiliac joints; Multilevel lumbar spondylosis with severe spinal stenosis at the L3-L4 and L4-L5 levels; and Multilevel foraminal stenosis. As per neurology- no inpatient intervention at this time; recommends for an outpatient Neurology/NeuroSurgery follow up with outpatient EMG/Nerve conduction velocity test.****  **Also recommend follow up with outpatient Urology for further evaluation and management of incontinence, you may follow up at [facility] within 1-2 weeks if you prefer: [phone].    Diagnosis: Afib  Assessment and Plan of Treatment: You have atrial fibrillation of your heart, which is an issue with the beating of your heart. Continue to take your blood thinner and heart medications as prescribed to restore or maintain a normal heart rate and rhythm, to prevent blood clots, and decrease the risks of stroke CVA/TIA. Continue a low fat diet, reduce any caffeine intake, and exercise at least 30 minutes daily. Go to ER if any new or worsening symptoms such as dizziness, lightheadedness, weakness, arm or leg weakness, numbness, tingling, visual changes, facial droop, difficulty speaking or swallowing, chest pain, shortness of breath, palpitations, abdominal pain, nausea, vomiting, calf pain.   **Continue your Coumadin as directed. Repeat INR within 1 week for further INR monitoring with goal 2-5.   Keep your intake of vitamin K regular. The highest amount of vitamin K is found in green and leafy vegetables like broccoli, lettuces, cabbage, and spinach. You can eat these foods but keep the portion size the same. Changes in the amount you eat can affect your PT/INR blood test. Contact your doctor before making any major changes in your diet. Limit your alcohol intake.  Go for blood tests as directed. Because your dose is based on the PT/INR blood test, it is very important that you get your blood tested on the scheduled date and time and to keep your health care provider appointments.  Please follow up with your doctor within 3-5 days of discharge to schedule your next blood test.    Diagnosis: Chronic diastolic congestive heart failure  Assessment and Plan of Treatment: Stable, Continue AC with coumadin. Your echocardiogram showed ejection fraction of 69% with severe aortic stenosis, severe diastolic disfunction. You were seen by the cardiologist in hospital- you and your family preferred conservative care and no further workup of valvular disease.  Your medications were adjusted, please continue as directed. Please continue with prescribed blood thinning agents and participate in any recommended physical/occupational therapy to optimize your recovery. Continue with low salt/fat diet and follow-up with your neurologist/primary provider as outpatient within 2 weeks for repeat labs and continued care.   **Follow up with Dr Berman on 8/31 at 11:20AM. Call [phone] if you have questions regarding your appointment.    Diagnosis: Pulmonary embolism  Assessment and Plan of Treatment: Continue Coumadin as recommended, INR goal 2-3.. Follow up with Cardiologist and/or primary care doctor for further management  **You have an appointment with Cardiologist Dr Berman on 8/31/2020 at 11:20AM [phone] call office to confirm/reschedule    Diagnosis: Hypercalcemia  Assessment and Plan of Treatment: Your calcium levels were noted to be elevated, now normalized. You were evaluated by the endocrinologist and nephrologist in the hospital.   Due to your comorbidities and advance age and borderline calcium PTH, deemed not to be a surgical candidate, so no parathyroid scan at this time. You were started on Sensipar, please continue as prescribed.  *Repeat Calcium levels, BMP in 1 week with your doctor, follow up with outpatient endocrinologist and nephrologist for further recommendations and management    Diagnosis: Hypothyroid  Assessment and Plan of Treatment: Stable Continue your thyroid medications as recommended and follow-up with your outpatient provider for continual thyroid function testing in 4-6 weeks and further medication management.    Diagnosis: DM (diabetes mellitus)  Assessment and Plan of Treatment: A1C 6.1, your blood sugars are well controlled.  Continue your medication regimen and a consistent carbohydrate diet (Meaning eating the same amount of carbohydrates at the same time each day). Monitor blood glucose levels throughout the day before meals and at bedtime. Record blood sugars and bring to outpatient providers appointment in order to be reviewed by your doctor for management modifications. If your sugars are more than 400 or less than 70 you should contact your PCP immediately. Monitor for signs/symptoms of low blood glucose, such as, dizziness, altered mental status, or cool/clammy skin. In addition, monitor for signs/symptoms of high blood glucose, such as, feeling hot, dry, fatigued, or with increased thirst/urination. Make regular podiatry appointments in order to have feet checked for wounds and uncontrolled toe nail growth to prevent infections, as well as, appointments with an ophthalmologist to monitor your vision. **Follow up outpatient endocrinologist for further management    Diagnosis: Anemia  Assessment and Plan of Treatment: Stable blood counts after a blood transfusion, stool occult was negative, no active bleeding.  Your aspirin was initally held, you may resume on discharge. Follow-up with your outpatient provider if further treatment is warranted. Monitor for signs/symptoms indicating worsening of disease, such as, easy bleeding/bruising, pale skin, fatigue, dizziness, increased heart rate, or chest pain. Repeat CBC in 1 week

## 2020-07-23 NOTE — DISCHARGE NOTE PROVIDER - CARE PROVIDER_API CALL
Eden Garcia  INTERNAL MEDICINE  [address] [place] [address]  Phone: [phone]  Fax: [phone]  Follow Up Time:     Yenny Downey  FOOT AND ANKLE SURGERY  [address], [address]  [place] [address]  Phone: [phone]  Fax: [phone]  Follow Up Time: Eden Garcia  INTERNAL MEDICINE  [address] [place] [address]  Phone: [phone]  Fax: [phone]  Follow Up Time:     Yenny Downey  FOOT AND ANKLE SURGERY  [address], [address]  [place] [address]  Phone: [phone]  Fax: [phone]  Follow Up Time:     Curtis Castillo  Endocrinology, Diabetes and Metabolism  [address] [place] [address]  Phone: [phone]  Fax: [phone]  Follow Up Time: 1 week    Trisha Banks  CARDIOVASCULAR DISEASE  [address] [place] [address]  Phone: [phone]  Fax: [phone]  Follow Up Time: 1 week Eden Garcia  INTERNAL MEDICINE  [address] [place] [address]  Phone: [phone]  Fax: [phone]  Established Patient  Follow Up Time: 1 week    Yenny Downey  FOOT AND ANKLE SURGERY  [address] [facility], [address]  [place] [address]  Phone: [phone]  Fax: [phone]  Follow Up Time: 1 week    Curtis Castillo  Endocrinology, Diabetes and Metabolism  [address] [place] [address]  Phone: [phone]  Fax: [phone]  Follow Up Time: Routine    Darrel Magallanes S  MEDICINE  [address] [place] [address]  Phone: [phone]  Fax: [phone]  Established Patient  Follow Up Time: 1 week    Robert Hummel)  Vascular Surgery  [address] [facility], [address] [place] [address]  Phone: [phone]  Fax: [phone]  Follow Up Time:     Angela Choudhary  NEUROLOGY  [address] [place] [address]  Phone: [phone]  Fax: [phone]  Follow Up Time: 2 weeks

## 2020-07-23 NOTE — DISCHARGE NOTE PROVIDER - HOSPITAL COURSE
89 year old female PMH  a-fib on coumadin, diastolic CHF (with EF 63% on TTE 8/13/19), HTN, HLD, CKD stage 3-4, IDDM, PVD, CVA, PE, hypothyroidism, p/w L foot wound. Patient seen today at clinic and sent for cellulitis and abscess, wanting to rule out OM. Patient denies fever, chills, cp, sob, abd pain, n/v, weakness, or numbness/tingling.            Toe gangrene.      Podiatry consult noted. S/P IV Abxs.     ID and Vascular consulted               Stage 3 chronic kidney disease.     Renal consulted. Baseline Creatinine 1.8.             PVD     LIZBETH/PVD noted    Vascular consult noted.         Chronic diastolic congestive heart failure    Continuing Lasix . Cardiology helping.             DM     SSI for now .             Afib.     Holding Coumadin and will start Heparin for INR<2            Pulmonary embolism.      On AC.              R/O Hypothyroid.     Synthroid.              Anemia.  Plan: Chronic . 89 year old female PMH  a-fib on coumadin, diastolic CHF (with EF 63% on TTE 8/13/19), HTN, HLD, CKD stage 3-4, IDDM, PVD, CVA, PE, hypothyroidism, p/w L foot wound. Patient seen today at clinic and sent for cellulitis and abscess, wanting to ruled out OM. 89 F AF/PE (on Coumadin), dHF (EF 63% on TTE 8/2019), HTN, HLD, CKD 3-4 (baseline ~1.8), IDDM, PVD, CVA, hypothyroidism p/w Lt foot wound.     +Toe gangrene - Podiatry consulted, poor surgical candidate. WCx MRSA/ Proteus/ Enterococcus. Continue Vanco/Zosyn     +PVD - S/P angiogram 7/28. Continue ASA, statin     +NIKHIL on CKD - HOLD Lasix, Aldactone. Nephro following     + PE/Afib - INR initially supratherapeutic, Coumadin held. Coumadin continued to be held for aniogram. Resumed 7/28 *** HEPARIN TO COUMADIN BRIDGING **    + HF - Cardio consulted 89 F AF/PE (on Coumadin), dHF (EF 63% on TTE 8/2019), HTN, HLD, CKD 3-4 (baseline ~1.8), IDDM, PVD, CVA, hypothyroidism p/w Lt foot wound.         Toe gangrene:    Vascular surgery, ID and Podiatry consulted, poor surgical candidate due to PVD/unable to heal after surgery per podiatry.     -WCx grew MRSA/ Proteus/ Enterococcus. Completed course of Vanco/Zosyn through 8/4    - outpatient follow up with podiatry for further management in 1 week            Peripheral Vascular Disease:    - S/P angiogram 7/28 showing SFA occlusion with distal SFA/popliteal reconstitution    - Cont statin, ASA started 8/6 per [facility] recs for antiplatelet therapy    - Goal LDL < 70    - outpatient follow up with vascular        NIKHIL on CKD:    - Nephrology consulted, held lasix and Aldactone. Nephro following    - will likely restart lasix 40mg PO daily on DC if creatinine remains stable    - outpatient nephrology follow up        PE/Afib:    - INR initially supratherapeutic, Coumadin held, then held for aniogram.     - Restarted on 7/28 with heparin to coumadin bridge    - Plan for discharge on _____mg coumadin with close INR check within 1 week    - follow up with cardiology as outpatient        Hypercalcemia:    - Endocrine and renal following    - phos wnl. PTH, Vit D elevated, outpt monitoring per renal    - May need parathyroid scan as outpt per renal    - may need sensipar     - plan for further work up and management as outpatient         Severe AS    - As per Cardio conversation with family, continue conservative management         Hypothyroid    - Synthroid.     - TFTs euthyroid        Dispo - home when INR therapeutic             On ___ this case was reviewed with  ____, the patient is medically stable and optimized for discharge. All medications were reviewed and prescriptions were sent to mutually agreed upon pharmacy. 89 F AF/PE (on Coumadin), dHF (EF 63% on TTE 8/2019), HTN, HLD, CKD 3-4 (baseline ~1.8), IDDM, PVD, CVA, hypothyroidism p/w Lt foot wound.         Toe gangrene:    Vascular surgery, ID and Podiatry consulted, poor surgical candidate due to PVD/unable to heal after surgery per podiatry.     -WCx grew MRSA/ Proteus/ Enterococcus. Completed course of Vanco/Zosyn through 8/4    - outpatient follow up with podiatry for further management in 1 week        Peripheral Vascular Disease:    - S/P angiogram 7/28 showing SFA occlusion with distal SFA/popliteal reconstitution    - Cont statin, ASA started 8/6 per [facility] recs for antiplatelet therapy    - Goal LDL < 70    - outpatient follow up with vascular        NIKHIL on CKD:    - Nephrology consulted, held lasix and Aldactone. Nephro following    - will likely restart lasix 40mg PO daily on DC if creatinine remains stable    - outpatient nephrology follow up        PE/Afib:    - INR initially supratherapeutic, Coumadin held, then held for aniogram.     - Restarted on 7/28 with heparin to coumadin bridge    - Plan for discharge on _____mg coumadin with close INR check within 1 week    - follow up with cardiology as outpatient        Hypercalcemia:    - Endocrine and renal following    - phos wnl. PTH, Vit D elevated, outpt monitoring per renal    - May need parathyroid scan as outpt per renal    - may need sensipar     - plan for further work up and management as outpatient         Severe AS    - As per Cardio conversation with family, continue conservative management         Hypothyroid    - Synthroid.     - TFTs euthyroid        Dispo - home when INR therapeutic             On ___ this case was reviewed with Dr. Momin, the patient is medically stable and optimized for discharge. All medications were reviewed and prescriptions were sent to mutually agreed upon pharmacy. 89 F AF/PE (on Coumadin), dHF (EF 63% on TTE 8/2019), HTN, HLD, CKD 3-4 (baseline ~1.8), IDDM, PVD, CVA, hypothyroidism p/w Lt foot wound.         Toe gangrene    Vascular surgery, ID and Podiatry consulted, poor surgical candidate due to PVD/unable to heal after surgery per podiatry. WCx grew MRSA/ Proteus/ Enterococcus. Completed course of Vanco/Zosyn through 8/4. NO fever or Leucocytosis so likely ischemic. Outpatient follow up with podiatry for further management in 1 week        Anemia    -Chronic/ s/p 1/2 unit during admission. Occult stool negative. B12, folate, haptoglobin WNL        Peripheral Vascular Disease:    - S/P angiogram 7/28 showing SFA occlusion with distal SFA/popliteal reconstitution    - Cont statin, ASA started 8/6 per [facility] recs for antiplatelet therapy. Held 2/2 anemia. will restart on d/c    - Pt initially planned for OR for hallux amputation with Podiatry, cancelled given pt poor surgical candidate (likely unable to heal)    - outpatient follow up with vascular and podiatry    -WCx- MRSA/ Proteus/ Enterococcus      - ID consulted- S/P Cefepime, s/p 7day course of Vanco/Zosyn completed 8/4        NIKHIL on CKD:    - Nephrology consulted, held lasix and Aldactone. Cr improving    - will likely restart lasix 40mg PO daily on DC if creatinine remains stable. S/p kayexalate 30gm x 1 for elevated K, now improving    - outpatient nephrology follow up        Chronic PE/Afib:    - INR initially supratherapeutic, Coumadin held, then held for aniogram.     - Restarted on 7/28 with heparin to coumadin bridge    - Plan for discharge on _____mg coumadin with close INR check within 1 week    - follow up with cardiology as outpatient        Hypercalcemia:    - Endocrine and renal following    - phos wnl. PTH, Vit D elevated, outpt monitoring per renal    - May need parathyroid scan as outpt per renal    - may need sensipar     - plan for further work up and management as outpatient         Severe AS    - As per Cardio conversation with family, continue conservative management         Hypothyroid    - Synthroid.     - TFTs euthyroid        Endo consulted for assistance with DM med management. Recs appreciated. FS has remained stable. Patient counseled for compliance with consistent low carb diet and exercise as tolerated outpatient.             Dispo - home when INR therapeutic         Incontinence of bowel    -Resolved. Neuro consulted. MR L spine with and without contrast recommended however as per discussion with attd and nephrology, will hold off as contrast nephrotoxic. CT L spine non con ordereed -             On ___ this case was reviewed with Dr. Momin, the patient is medically stable and optimized for discharge. All medications were reviewed and prescriptions were sent to mutually agreed upon pharmacy. 89 F AF/PE (on Coumadin), dHF (EF 63% on TTE 8/2019), HTN, HLD, CKD 3-4 (baseline ~1.8), IDDM, PVD, CVA, hypothyroidism p/w Lt foot wound.         Toe gangrene    Vascular surgery, ID and Podiatry consulted, poor surgical candidate due to PVD/unable to heal after surgery per podiatry. WCx grew MRSA/ Proteus/ Enterococcus. Completed course of Vanco/Zosyn through 8/4. NO fever or Leucocytosis so likely ischemic. Outpatient follow up with podiatry for further management in 1 week        Anemia    -Chronic/ s/p 1/2 unit during admission. Occult stool negative. B12, folate, haptoglobin WNL        Peripheral Vascular Disease:    - S/P angiogram 7/28 showing SFA occlusion with distal SFA/popliteal reconstitution    - Cont statin, ASA started 8/6 per [facility] recs for antiplatelet therapy. Held 2/2 anemia. will restart on d/c    - Pt initially planned for OR for hallux amputation with Podiatry, cancelled given pt poor surgical candidate (likely unable to heal)    - outpatient follow up with vascular and podiatry    -WCx- MRSA/ Proteus/ Enterococcus      - ID consulted- S/P Cefepime, s/p 7day course of Vanco/Zosyn completed 8/4        NIKHIL on CKD:    - Nephrology consulted, held lasix and Aldactone. Cr improving    - will likely restart lasix 40mg PO daily on DC if creatinine remains stable --- S/p kayexalate 30gm x 1 for elevated K, now improving    - outpatient nephrology follow up        Chronic PE/Afib:    - INR initially supratherapeutic, Coumadin held, then held for aniogram. Restarted on 7/28 with heparin to coumadin bridge. Plan for discharge on _____mg coumadin with close INR check within 1 week    - follow up with cardiology as outpatient        Hypercalcemia:    - Endocrine and renal following. Phos wnl. PTH, Vit D elevated, outpt monitoring per renal. May need parathyroid scan and sensipar as outpt per renal        Severe AS    - As per conversation with family, continue conservative management         Endo consulted for assistance with DM med management. Recs appreciated. FS has remained stable. Patient counseled for compliance with consistent low carb diet and exercise as tolerated outpatient.         Incontinence of bowel    -Resolved. Neuro consulted. MR L spine with and without contrast recommended however as per discussion with attd and nephrology, will hold off as contrast nephrotoxic. CT L spine non con ordereed -         On ___ this case was reviewed with Dr. Momin, the patient is medically stable and optimized for discharge. All medications were reviewed and prescriptions were sent to mutually agreed upon pharmacy. 89 F AF/PE (on Coumadin), dHF (EF 63% on TTE 8/2019), HTN, HLD, CKD 3-4 (baseline ~1.8), IDDM, PVD, CVA, hypothyroidism p/w Lt foot wound. Vascular surgery, ID and Podiatry were consulted, patient was deemed poor surgical candidate due to PVD/unable to heal after surgery. Patient completed course of Antibiotics thru 8/4/2020. S/P LLE angiogram on 7/28 with SFA occulusion, started on statin and aspirin per vascular recs. Patient also evaluated by Nephrologist for NIKHIL on CKD, creatinine now improving, lasix was initially held to resume upon discharge as per nephrology. Found to have anemia, received 1/2 unit PRBC on 8/10, H/H improved, Stool occult was negative, no active bleeding. Patient also with Chronic Afib/PE- INR initially supratherapeutic- coumadin was held, transitioned from heparin gtt bridge to PO Coumadin, INR now therapeutic. Patient to be discharged on Coumadin 5mg upon discharge as per attending recommendations. Echo with Severe AS, Cardiology was consulted- patient and family prefer conservative management, outpatient Cardiology follow up. Hypercalcemia noted, Endo and Nephro consulted, patient was started on sensipar, Ca now improved. Patient was also noted to have incontinence of bowel, Neurology was consulted to r/o neuro etiology; MRI with IVC was recommended however deemed contraindicated by nephro due to IV contrast. A CAT scan was performed who revealed Multilevel lumbar spondylosis with severe spinal stenosis. Results were discussed with Neurology-> no further inpatient neurological intervention, recommend outpatient Neurology/NeuroSx follow for EMG/NCV as well as Urology follow up for incontinence evaluation.     Patient is medically cleared for discharge with close follow with PCP, Podiatry, Vascular, Cardiology, Endo, Nephro, Urology and Neurology.     PT deemed candidate for rehab, however patient and family prefer home with home care.        Hospital Course:     Toe gangrene  Vascular surgery, ID and Podiatry consulted, poor surgical candidate due to PVD/unable to heal after surgery per podiatry. WCx grew MRSA/ Proteus/ Enterococcus. Completed course of Vanco/Zosyn through 8/4. No fever or Leucocytosis so likely ischemic. Outpatient follow up with podiatry Dr. Downey for further management in 1 week        Peripheral Vascular Disease S/P LLE angiogram 7/28 showing SFA occlusion with distal SFA/popliteal reconstitution; Cont statin, ASA started 8/6 per [facility] recs for antiplatelet therapy. Held 2/2 anemia- to resume upon discharge.  Pt initially planned for OR for hallux amputation with Podiatry, cancelled given pt poor surgical candidate (likely unable to heal); outpatient follow up with vascular and podiatry        Anemia, Chronic  Occult stool negative; s/p 1/2 unit on 8/10. B12, folate, haptoglobin WNL, H/H stable, outpatient repeat CBC with PCP        NIKHIL on CKD Nephrology consulted, held lasix and Aldactone. Cr improving; may resume lasix 40mg PO daily on discharge; Hyperkalemia resolved after dose of kayexalate; outpatient nephrology follow up        Chronic PE/Afib INR initially supratherapeutic, Coumadin held, then held for angiogram. S/P heparin gtt bridged to coumadin on 7/28. INR within goal limits. Plan for discharge on 5mg coumadin with close INR check within 1 week; follow up with cardiology as outpatient appt with Dr. Berman on 8/31/2020 at 11:20am, [phone]        Hypercalcemia Endocrine and renal following. Phos wnl. PTH, Vit D elevated, outpatient monitoring per renal; sensipar started. May need parathyroid scan as outpatient with Nephrologist         Severe AS As per conversation with family, continue conservative management         Diabetes Mellitus A1C 6.1. Endo consulted for assistance with DM med management. Recs appreciated. FS are well controlled. Patient counseled for compliance with consistent low carb diet by dietician and exercise as tolerated outpatient. Endocrinology follow up outpatient         Incontinence of bowel Resolving. Neuro consulted. MR L spine with and without contrast recommended however as per discussion with attending and nephrology, will hold off as contrast nephrotoxic. CT L spine which showed Enlarged calcified fibroid uterus; Degenerative changes involving the sacroiliac joints; Multilevel lumbar spondylosis with severe spinal stenosis at the L3-L4 and L4-L5 levels; and Multilevel foraminal stenosis. As per neurology-> no inpatient intervention at this time; recommends for an outpatient Neurology/NeuroSurgery follow up with outpatient EMG/Nerve conduction velocity test. Also recommend follow up with Urology as outpatient for further evaluation and management of incontinence, follow up at [facility] within 1-2 weeks if you prefer, [phone].        Dispo: home with home care/PT        On 8/12/2020, case was discussed with Dr. Momin, patient is medically cleared and optimized for discharge home today. All medications were reviewed with attending (c/w coumadin 5mg qhs, may resume lasix 40mg QD and ASA, sensipar, hold aldactone for now, c/w inpt hydra dose per cards), and sent to mutually agreed upon pharmacy (patient's daughter prefers [facility]). CAT scan results were discussed with Neurology Dr. Choudhary- no further inpatient workup (unless MRI with IVC which is contraindicated by nephro), recommend follow up outpatient with Urology and Neurology/NeuroSx for EMG/NCV. Patient has follow up with Cardiology Dr. Berman on 8/31/2020, Podiatry follow up with Dr. Downey, Vascular, PCP, Endo, and Nephro. Plan of care discussed in length with patient and patient's daughter, CM involved with setting up home care/VNS, RN to provider wound care supplies.

## 2020-07-23 NOTE — DISCHARGE NOTE PROVIDER - NSFOLLOWUPCLINICS_GEN_ALL_ED_FT
[facility]  Neurology Epilepsy  [address], [address] [place] [address]  Phone: [phone]  Fax: [phone]  Follow Up Time: 2 weeks    [facility]  Cardiology  [address] [place] [address]  Phone: [phone]  Fax:     [facility] [place]  Internal Medicine  [address] [place] [address]  Phone: [phone]  Fax: [phone]    [facility]  Urology  [address] [place] [address]  Phone: [phone]  Fax: [phone]  Follow Up Time: 2 weeks    [facility] Endocr[facility]  Endocrinology  [address] [place] [address]  Phone: [phone]  Fax:   Follow Up Time:

## 2020-07-23 NOTE — DISCHARGE NOTE PROVIDER - PROVIDER TOKENS
PROVIDER:[TOKEN:[[id number]]],PROVIDER:[TOKEN:[[id number]]] PROVIDER:[TOKEN:[[id number]]],PROVIDER:[TOKEN:[[id number]]],PROVIDER:[TOKEN:[[id number]],FOLLOWUP:[1 week]],PROVIDER:[TOKEN:[[id number]],FOLLOWUP:[1 week]] PROVIDER:[TOKEN:[[id number]],FOLLOWUP:[1 week],ESTABLISHEDPATIENT:[T]],PROVIDER:[TOKEN:[[id number]],FOLLOWUP:[1 week]],PROVIDER:[TOKEN:[[id number]],FOLLOWUP:[Routine]],PROVIDER:[TOKEN:[[id number]],FOLLOWUP:[1 week],ESTABLISHEDPATIENT:[T]],PROVIDER:[TOKEN:[[id number]]],PROVIDER:[TOKEN:[[id number]],FOLLOWUP:[2 weeks]]

## 2020-07-23 NOTE — DISCHARGE NOTE PROVIDER - NSDCMRMEDTOKEN_GEN_ALL_CORE_FT
allopurinol 100 mg oral tablet: 1 tab(s) orally once a day  amLODIPine 5 mg oral tablet: 1 tab(s) orally once a day  amoxicillin-clavulanate 875 mg-125 mg oral tablet: 1 tab(s) orally every 12 hours for 14 days filled on 7/14/2020  furosemide 40 mg oral tablet: 1 tab(s) orally 2 times a day  gabapentin 100 mg oral capsule: 1 cap(s) orally 3 times a day  hydrALAZINE 50 mg oral tablet: 1 tab(s) orally 3 times a day  Levemir 100 units/mL subcutaneous solution: 10 unit(s) subcutaneous once a day (at bedtime)  levothyroxine 75 mcg (0.075 mg) oral tablet: 1 tab(s) orally once a day  Rayaldee 30 mcg oral capsule, extended release: 1 cap(s) orally once a day (at bedtime)  simvastatin 20 mg oral tablet: 1 tab(s) orally once a day (at bedtime)  spironolactone 25 mg oral tablet: 1 tab(s) orally once a day  warfarin 3 mg oral tablet: 1 tab(s) orally once a day    **has previously filled 4mg tab and 1mg tab in May 2020 for 90 day supply, unclear of patient&#x27;s current dose, most recently filled 3mg tab on 7/17/2020- obtain INR and adjust dose accordingly. acetaminophen 325 mg oral tablet: 2 tab(s) orally every 6 hours, As needed, Temp greater or equal to 38C (100.4F), Mild Pain (1 - 3), Moderate Pain (4 - 6), Severe Pain (7 - 10)  allopurinol 100 mg oral tablet: 1 tab(s) orally once a day  amLODIPine 10 mg oral tablet: 1 tab(s) orally once a day  hold for SBP&lt;100  cinacalcet 60 mg oral tablet: 1 tab(s) orally once a day  monitor calcium levels with your doctor to determine continuation   furosemide 40 mg oral tablet: 1 tab(s) orally once a day  Hold for SBP&lt;110   gabapentin 100 mg oral capsule: 1 cap(s) orally 3 times a day  hold for sedation/lethargy  hydrALAZINE 25 mg oral tablet: 3 tab(s) orally 3 times a day  Hold for SBP&lt;110  latanoprost 0.005% ophthalmic solution: 1 drop(s) to each affected eye once a day (at bedtime)  Levemir 100 units/mL subcutaneous solution: 10 unit(s) subcutaneous once a day (at bedtime)  levothyroxine 75 mcg (0.075 mg) oral tablet: 1 tab(s) orally once a day  oxyCODONE 5 mg oral tablet: 1 tab(s) orally every 8 hours, As Needed -Severe Pain (7 - 10) MDD:3  HOLD for sedation or respiratory rate less than 12, do not drive   polyethylene glycol 3350 oral powder for reconstitution: 17 gram(s) orally once a day  Rayaldee 30 mcg oral capsule, extended release: 1 cap(s) orally once a day (at bedtime)  senna oral tablet: 2 tab(s) orally once a day (at bedtime)  simvastatin 20 mg oral tablet: 1 tab(s) orally once a day (at bedtime)  warfarin 5 mg oral tablet: 1 tab(s) orally once a day (at bedtime)    close monitoring of your INR within 3-5 days  to adjust dose accordingly

## 2020-07-24 LAB
ANION GAP SERPL CALC-SCNC: 12 MMO/L — SIGNIFICANT CHANGE UP (ref 7–14)
BUN SERPL-MCNC: 49 MG/DL — HIGH (ref 7–23)
CALCIUM SERPL-MCNC: 10.1 MG/DL — SIGNIFICANT CHANGE UP (ref 8.4–10.5)
CHLORIDE SERPL-SCNC: 102 MMOL/L — SIGNIFICANT CHANGE UP (ref 98–107)
CO2 SERPL-SCNC: 27 MMOL/L — SIGNIFICANT CHANGE UP (ref 22–31)
CREAT SERPL-MCNC: 1.99 MG/DL — HIGH (ref 0.5–1.3)
GLUCOSE BLDC GLUCOMTR-MCNC: 121 MG/DL — HIGH (ref 70–99)
GLUCOSE BLDC GLUCOMTR-MCNC: 145 MG/DL — HIGH (ref 70–99)
GLUCOSE BLDC GLUCOMTR-MCNC: 152 MG/DL — HIGH (ref 70–99)
GLUCOSE BLDC GLUCOMTR-MCNC: 167 MG/DL — HIGH (ref 70–99)
GLUCOSE SERPL-MCNC: 119 MG/DL — HIGH (ref 70–99)
HCT VFR BLD CALC: 25.7 % — LOW (ref 34.5–45)
HGB BLD-MCNC: 8.5 G/DL — LOW (ref 11.5–15.5)
INR BLD: 2.25 — HIGH (ref 0.88–1.17)
MAGNESIUM SERPL-MCNC: 2 MG/DL — SIGNIFICANT CHANGE UP (ref 1.6–2.6)
MCHC RBC-ENTMCNC: 30.9 PG — SIGNIFICANT CHANGE UP (ref 27–34)
MCHC RBC-ENTMCNC: 33.1 % — SIGNIFICANT CHANGE UP (ref 32–36)
MCV RBC AUTO: 93.5 FL — SIGNIFICANT CHANGE UP (ref 80–100)
NRBC # FLD: 0 K/UL — SIGNIFICANT CHANGE UP (ref 0–0)
PHOSPHATE SERPL-MCNC: 3 MG/DL — SIGNIFICANT CHANGE UP (ref 2.5–4.5)
PLATELET # BLD AUTO: 229 K/UL — SIGNIFICANT CHANGE UP (ref 150–400)
PMV BLD: 11.1 FL — SIGNIFICANT CHANGE UP (ref 7–13)
POTASSIUM SERPL-MCNC: 3.8 MMOL/L — SIGNIFICANT CHANGE UP (ref 3.5–5.3)
POTASSIUM SERPL-SCNC: 3.8 MMOL/L — SIGNIFICANT CHANGE UP (ref 3.5–5.3)
PROTHROM AB SERPL-ACNC: 26.5 SEC — HIGH (ref 9.8–13.1)
RBC # BLD: 2.75 M/UL — LOW (ref 3.8–5.2)
RBC # FLD: 15.1 % — HIGH (ref 10.3–14.5)
SODIUM SERPL-SCNC: 141 MMOL/L — SIGNIFICANT CHANGE UP (ref 135–145)
WBC # BLD: 8.36 K/UL — SIGNIFICANT CHANGE UP (ref 3.8–10.5)
WBC # FLD AUTO: 8.36 K/UL — SIGNIFICANT CHANGE UP (ref 3.8–10.5)

## 2020-07-24 RX ORDER — LATANOPROST 0.05 MG/ML
1 SOLUTION/ DROPS OPHTHALMIC; TOPICAL AT BEDTIME
Refills: 0 | Status: DISCONTINUED | OUTPATIENT
Start: 2020-07-24 | End: 2020-08-12

## 2020-07-24 RX ADMIN — Medication 75 MILLIGRAM(S): at 16:50

## 2020-07-24 RX ADMIN — GABAPENTIN 100 MILLIGRAM(S): 400 CAPSULE ORAL at 21:07

## 2020-07-24 RX ADMIN — Medication 650 MILLIGRAM(S): at 20:05

## 2020-07-24 RX ADMIN — Medication 75 MILLIGRAM(S): at 21:08

## 2020-07-24 RX ADMIN — Medication 2: at 12:35

## 2020-07-24 RX ADMIN — SPIRONOLACTONE 25 MILLIGRAM(S): 25 TABLET, FILM COATED ORAL at 05:43

## 2020-07-24 RX ADMIN — Medication 40 MILLIGRAM(S): at 16:51

## 2020-07-24 RX ADMIN — Medication 650 MILLIGRAM(S): at 20:55

## 2020-07-24 RX ADMIN — Medication 40 MILLIGRAM(S): at 05:43

## 2020-07-24 RX ADMIN — LATANOPROST 1 DROP(S): 0.05 SOLUTION/ DROPS OPHTHALMIC; TOPICAL at 22:11

## 2020-07-24 RX ADMIN — AMLODIPINE BESYLATE 10 MILLIGRAM(S): 2.5 TABLET ORAL at 05:43

## 2020-07-24 RX ADMIN — SIMVASTATIN 20 MILLIGRAM(S): 20 TABLET, FILM COATED ORAL at 21:07

## 2020-07-24 RX ADMIN — CEFEPIME 100 MILLIGRAM(S): 1 INJECTION, POWDER, FOR SOLUTION INTRAMUSCULAR; INTRAVENOUS at 16:50

## 2020-07-24 RX ADMIN — Medication 100 MILLIGRAM(S): at 12:36

## 2020-07-24 RX ADMIN — GABAPENTIN 100 MILLIGRAM(S): 400 CAPSULE ORAL at 12:36

## 2020-07-24 RX ADMIN — Medication 75 MICROGRAM(S): at 05:43

## 2020-07-24 RX ADMIN — GABAPENTIN 100 MILLIGRAM(S): 400 CAPSULE ORAL at 05:43

## 2020-07-24 RX ADMIN — Medication 75 MILLIGRAM(S): at 05:43

## 2020-07-24 NOTE — PROGRESS NOTE ADULT - SUBJECTIVE AND OBJECTIVE BOX
SURGERY DAILY PROGRESS NOTE:       SUBJECTIVE/ROS: Patient examined at bedside. No acute events overnight   Denies nausea, vomiting, chest pain, shortness of breath         MEDICATIONS  (STANDING):  allopurinol 100 milliGRAM(s) Oral daily  amLODIPine   Tablet 10 milliGRAM(s) Oral daily  cefepime   IVPB 1000 milliGRAM(s) IV Intermittent every 24 hours  dextrose 5%. 1000 milliLiter(s) (50 mL/Hr) IV Continuous <Continuous>  dextrose 50% Injectable 12.5 Gram(s) IV Push once  dextrose 50% Injectable 25 Gram(s) IV Push once  dextrose 50% Injectable 25 Gram(s) IV Push once  furosemide    Tablet 40 milliGRAM(s) Oral two times a day  gabapentin 100 milliGRAM(s) Oral three times a day  hydrALAZINE 75 milliGRAM(s) Oral three times a day  insulin lispro (HumaLOG) corrective regimen sliding scale   SubCutaneous three times a day before meals  insulin lispro (HumaLOG) corrective regimen sliding scale   SubCutaneous at bedtime  levothyroxine 75 MICROGram(s) Oral daily  simvastatin 20 milliGRAM(s) Oral at bedtime  spironolactone 25 milliGRAM(s) Oral daily    MEDICATIONS  (PRN):  acetaminophen   Tablet .. 650 milliGRAM(s) Oral every 6 hours PRN Temp greater or equal to 38C (100.4F), Mild Pain (1 - 3), Moderate Pain (4 - 6), Severe Pain (7 - 10)  dextrose 40% Gel 15 Gram(s) Oral once PRN Blood Glucose LESS THAN 70 milliGRAM(s)/deciliter  glucagon  Injectable 1 milliGRAM(s) IntraMuscular once PRN Glucose LESS THAN 70 milligrams/deciliter      OBJECTIVE:    Vital Signs Last 24 Hrs  T(C): 36.7 (24 Jul 2020 05:41), Max: 36.7 (23 Jul 2020 12:45)  T(F): 98.1 (24 Jul 2020 05:41), Max: 98.1 (23 Jul 2020 12:45)  HR: 72 (24 Jul 2020 05:41) (72 - 82)  BP: 141/61 (24 Jul 2020 05:41) (141/61 - 148/62)  BP(mean): --  RR: 18 (24 Jul 2020 05:41) (17 - 18)  SpO2: 98% (24 Jul 2020 05:41) (98% - 100%)        I&O's Detail      Daily     Daily     LABS:                        8.5    9.36  )-----------( 242      ( 23 Jul 2020 05:54 )             27.5     07-23    140  |  103  |  47<H>  ----------------------------<  143<H>  3.9   |  28  |  2.09<H>    Ca    10.0      23 Jul 2020 05:54      PT/INR - ( 23 Jul 2020 05:54 )   PT: 37.2 SEC;   INR: 3.15          PTT - ( 23 Jul 2020 05:54 )  PTT:40.0 SEC                  PHYSICAL EXAM:  General: No acute distress, alert and oriented x 4  Vascular Exam: palpable popliteal pulses b/l  doppler signal DP and PT b/l  LLE 1st toe with dry gangrene with no surrounding erythema or purulence

## 2020-07-24 NOTE — PROGRESS NOTE ADULT - ASSESSMENT
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

## 2020-07-24 NOTE — PROGRESS NOTE ADULT - SUBJECTIVE AND OBJECTIVE BOX
Cardiology Attending    S:  Uneventful overnight.  No chest pain or angina, shortness of breath, orthopnea or change in appetite.  discussed followup plan re: yearly echo for aortic valve stenosis surveillance.	    acetaminophen   Tablet .. 650 milliGRAM(s) Oral every 6 hours PRN  allopurinol 100 milliGRAM(s) Oral daily  amLODIPine   Tablet 10 milliGRAM(s) Oral daily  cefepime   IVPB 1000 milliGRAM(s) IV Intermittent every 24 hours  dextrose 40% Gel 15 Gram(s) Oral once PRN  dextrose 5%. 1000 milliLiter(s) IV Continuous <Continuous>  dextrose 50% Injectable 12.5 Gram(s) IV Push once  dextrose 50% Injectable 25 Gram(s) IV Push once  dextrose 50% Injectable 25 Gram(s) IV Push once  furosemide    Tablet 40 milliGRAM(s) Oral two times a day  gabapentin 100 milliGRAM(s) Oral three times a day  glucagon  Injectable 1 milliGRAM(s) IntraMuscular once PRN  hydrALAZINE 75 milliGRAM(s) Oral three times a day  insulin lispro (HumaLOG) corrective regimen sliding scale   SubCutaneous three times a day before meals  insulin lispro (HumaLOG) corrective regimen sliding scale   SubCutaneous at bedtime  levothyroxine 75 MICROGram(s) Oral daily  simvastatin 20 milliGRAM(s) Oral at bedtime  spironolactone 25 milliGRAM(s) Oral daily                            8.5    8.36  )-----------( 229      ( 24 Jul 2020 07:45 )             25.7       07-24    141  |  102  |  49<H>  ----------------------------<  119<H>  3.8   |  27  |  1.99<H>    Ca    10.1      24 Jul 2020 07:45  Phos  3.0     07-24  Mg     2.0     07-24    T(C): 36.7 (07-24-20 @ 05:41), Max: 36.7 (07-23-20 @ 12:45)  HR: 72 (07-24-20 @ 05:41) (72 - 82)  BP: 141/61 (07-24-20 @ 05:41) (141/61 - 148/62)  RR: 18 (07-24-20 @ 05:41) (17 - 18)  SpO2: 98% (07-24-20 @ 05:41) (98% - 100%)  Wt(kg): --    I&O's Summary      Gen: Appears well in NAD  HEENT:  (-)icterus (-)pallor  CV: Normal S1, there is still a sharp S2.  Mid-to-late peaking 3/6 systolic murmur at the upper chest, but No pulsus parvus et tardus (+)2 Pulses B/l  Resp:  Clear to ausculatation B/L, normal effort  GI: (+) BS Soft, NT, ND  Lymph:  (-)Edema, (-)obvious lymphadenopathy  Skin: +left foot dressing, Warm to touch, Normal turgor  Psych: Appropriate mood and affect      TELEMETRY: None	      < from: Transthoracic Echocardiogram (07.22.20 @ 16:27) >  CONCLUSIONS:  1. Mitral annular calcification, otherwise normal mitral  valve. Mild-moderate mitral regurgitation.  2. Calcified trileaflet aortic valve with decreased  opening.  Peak transaortic valve gradient [id number] mm Hg,  mean transaortic valve gradient equals 26 mm Hg, estimated  aortic valve area equals 1 sqcm (by continuity equation),  consistent with moderate to severe aortic stenosis.  3. Severely dilated left atrium.  LA volume index = 57  cc/m2.  4. Mild concentric left ventricular hypertrophy.  5. Normal left ventricular systolic function. No segmental  wall motion abnormalities.  6. Severe diastolic dysfunction.  7. Severe right atrial enlargement.  8. Normal right ventricular size and function.  9. Estimated right ventricular systolic pressure equals 53  mm Hg, assuming right atrial pressure equals 10 mm Hg,  consistent with moderate pulmonary hypertension.    < end of copied text >      ASSESSMENT/PLAN: Patient is a 89 year old Female well known to our office (Cardiologist - Dr. Berman), who presented with a foot wound.  Consult requested for pre-op cardiovascular risk stratification.  Her known PMH includes persistent Afib on coumadin who was not a candidate for left atrial appendage occlusion with Watchman, chronic HFpEF, HTN, HLD, hypothyroidism, CKD, Type 2 DM, CVA, and hx of PE.    - Echo with borderline moderate-to-severe aortic stenosis, but physical examination re-classifies her to only moderate AS.  Followup by physical examination in the office and yearly transthoracic echo.   Counselled to monitor for 'red flag' symptoms such as syncope, crushing angina, dyspnea on exertion (limited by her foot), which would prompt sooner re-evaluation.   - CHF is compensated, no change in medical management,  - Continue AC with coumadin for CVA prevention for goal INR 2-3 if no procedures planned, if procedures planned - patient should be bridged with heparin gtt when INR less than 2 due to high stroke risk with very elevated chads-vasc  - No further inpatient cardiac workup planned. Will follow and assist as needed.    Simon Jung M.D.  Cardiac Electrophysiology  [phone] Cardiology Attending    S:  Uneventful overnight.  No chest pain or angina, shortness of breath, orthopnea or change in appetite.  discussed followup plan re: yearly echo for aortic valve stenosis surveillance.	    acetaminophen   Tablet .. 650 milliGRAM(s) Oral every 6 hours PRN  allopurinol 100 milliGRAM(s) Oral daily  amLODIPine   Tablet 10 milliGRAM(s) Oral daily  cefepime   IVPB 1000 milliGRAM(s) IV Intermittent every 24 hours  dextrose 40% Gel 15 Gram(s) Oral once PRN  dextrose 5%. 1000 milliLiter(s) IV Continuous <Continuous>  dextrose 50% Injectable 12.5 Gram(s) IV Push once  dextrose 50% Injectable 25 Gram(s) IV Push once  dextrose 50% Injectable 25 Gram(s) IV Push once  furosemide    Tablet 40 milliGRAM(s) Oral two times a day  gabapentin 100 milliGRAM(s) Oral three times a day  glucagon  Injectable 1 milliGRAM(s) IntraMuscular once PRN  hydrALAZINE 75 milliGRAM(s) Oral three times a day  insulin lispro (HumaLOG) corrective regimen sliding scale   SubCutaneous three times a day before meals  insulin lispro (HumaLOG) corrective regimen sliding scale   SubCutaneous at bedtime  levothyroxine 75 MICROGram(s) Oral daily  simvastatin 20 milliGRAM(s) Oral at bedtime  spironolactone 25 milliGRAM(s) Oral daily                            8.5    8.36  )-----------( 229      ( 24 Jul 2020 07:45 )             25.7       07-24    141  |  102  |  49<H>  ----------------------------<  119<H>  3.8   |  27  |  1.99<H>    Ca    10.1      24 Jul 2020 07:45  Phos  3.0     07-24  Mg     2.0     07-24    T(C): 36.7 (07-24-20 @ 05:41), Max: 36.7 (07-23-20 @ 12:45)  HR: 72 (07-24-20 @ 05:41) (72 - 82)  BP: 141/61 (07-24-20 @ 05:41) (141/61 - 148/62)  RR: 18 (07-24-20 @ 05:41) (17 - 18)  SpO2: 98% (07-24-20 @ 05:41) (98% - 100%)  Wt(kg): --    I&O's Summary      Gen: Appears well in NAD  HEENT:  (-)icterus (-)pallor  CV: Normal S1, there is still a sharp S2.  Mid-to-late peaking 3/6 systolic murmur at the upper chest, but No pulsus parvus et tardus (+)2 Pulses B/l  Resp:  Clear to ausculatation B/L, normal effort  GI: (+) BS Soft, NT, ND  Lymph:  (-)Edema, (-)obvious lymphadenopathy  Skin: +left foot dressing, Warm to touch, Normal turgor  Psych: Appropriate mood and affect      TELEMETRY: None	      < from: Transthoracic Echocardiogram (07.22.20 @ 16:27) >  CONCLUSIONS:  1. Mitral annular calcification, otherwise normal mitral  valve. Mild-moderate mitral regurgitation.  2. Calcified trileaflet aortic valve with decreased  opening.  Peak transaortic valve gradient [id number] mm Hg,  mean transaortic valve gradient equals 26 mm Hg, estimated  aortic valve area equals 1 sqcm (by continuity equation),  consistent with moderate to severe aortic stenosis.  3. Severely dilated left atrium.  LA volume index = 57  cc/m2.  4. Mild concentric left ventricular hypertrophy.  5. Normal left ventricular systolic function. No segmental  wall motion abnormalities.  6. Severe diastolic dysfunction.  7. Severe right atrial enlargement.  8. Normal right ventricular size and function.  9. Estimated right ventricular systolic pressure equals 53  mm Hg, assuming right atrial pressure equals 10 mm Hg,  consistent with moderate pulmonary hypertension.    < end of copied text >      ASSESSMENT/PLAN: Patient is a 89 year old Female well known to our office (Cardiologist - Dr. Berman), who presented with a foot wound.  Consult requested for pre-op cardiovascular risk stratification.  Her known PMH includes persistent Afib on coumadin who was not a candidate for left atrial appendage occlusion with Watchman, chronic HFpEF, HTN, HLD, hypothyroidism, CKD, Type 2 DM, CVA, and hx of PE.    - Echo with borderline moderate-to-severe aortic stenosis, but physical examination re-classifies her to only moderate AS.  Followup by physical examination in the office and yearly transthoracic echo.   Counselled to monitor for 'red flag' symptoms such as syncope, crushing angina, dyspnea on exertion (limited by her foot), which would prompt sooner re-evaluation.   - CHF is compensated, no change in medical management,  - Continue AC with coumadin for CVA prevention for goal INR 2-3 if no procedures planned, if procedures planned - patient should be bridged with heparin gtt when INR less than 2 due to high stroke risk with very elevated chads-vasc  - She is stable for LE angio/intervention, and clear to proceed with no further workup.    Simon Jung M.D.  Cardiac Electrophysiology  [phone]

## 2020-07-24 NOTE — PROGRESS NOTE ADULT - ASSESSMENT
89 year old female PMH  a-fib on coumadin, diastolic CHF (with EF 63% on TTE 8/13/19), HTN, HLD, CKD stage 3-4, IDDM, PVD, CVA, PE, hypothyroidism, p/w L foot wound. Patient seen today at clinic and sent for cellulitis and abscess, wanting to rule out OM. Patient denies fever, chills, cp, sob, abd pain, n/v, weakness, or numbness/tingling.     Problem/Plan - 1:  ·  Problem: Toe gangrene.  Plan: Podiatry consult noted. S/P IV Abxs. NO fever or Leucocytosis so likely ischemic . ID and Vascular helping.      Problem/Plan - 2:  ·  Problem: Stage 3 chronic kidney disease.  Plan: Renal consulted. Baseline Creatinine 1.8.      Problem/Plan - 3:  ·  Problem: PVD (peripheral vascular disease).  Plan: LIZBETH/PVD noted.  Vascular consult noted. Awaiting angiogram .     Problem/Plan - 4:  ·  Problem: Chronic diastolic congestive heart failure.  Plan: Continuing Lasix . Cardiology helping.      Problem/Plan - 5:  ·  Problem: DM (diabetes mellitus).  Plan: SSI for now .      Problem/Plan - 6:  Problem: Afib. Plan: Holding Coumadin and will start Heparin for INR<2 for possible procedures/surgery .      Problem/Plan - 7:  ·  Problem: Pulmonary embolism.  Plan: On AC. INR noted      Problem/Plan - 8:  ·  Problem:  Hypothyroid.  Plan: Synthroid.      Problem/Plan - 9:  ·  Problem: Anemia.  Plan: Chronic .      Problem/Plan - 10:  ·  Problem: Uncontrolled Hypertension .  Plan: Adjusting BP meds.

## 2020-07-24 NOTE — PROGRESS NOTE ADULT - SUBJECTIVE AND OBJECTIVE BOX
INTERVAL HPI/OVERNIGHT EVENTS: no new concerns.   Vital Signs Last 24 Hrs  T(C): 37.2 (24 Jul 2020 15:43), Max: 37.2 (24 Jul 2020 15:43)  T(F): 98.9 (24 Jul 2020 15:43), Max: 98.9 (24 Jul 2020 15:43)  HR: 89 (24 Jul 2020 15:43) (72 - 89)  BP: 147/59 (24 Jul 2020 15:43) (141/61 - 147/59)  BP(mean): --  RR: 18 (24 Jul 2020 15:43) (17 - 18)  SpO2: 98% (24 Jul 2020 15:43) (98% - 100%)  I&O's Summary    MEDICATIONS  (STANDING):  allopurinol 100 milliGRAM(s) Oral daily  amLODIPine   Tablet 10 milliGRAM(s) Oral daily  cefepime   IVPB 1000 milliGRAM(s) IV Intermittent every 24 hours  dextrose 5%. 1000 milliLiter(s) (50 mL/Hr) IV Continuous <Continuous>  dextrose 50% Injectable 12.5 Gram(s) IV Push once  dextrose 50% Injectable 25 Gram(s) IV Push once  dextrose 50% Injectable 25 Gram(s) IV Push once  furosemide    Tablet 40 milliGRAM(s) Oral two times a day  gabapentin 100 milliGRAM(s) Oral three times a day  hydrALAZINE 75 milliGRAM(s) Oral three times a day  insulin lispro (HumaLOG) corrective regimen sliding scale   SubCutaneous three times a day before meals  insulin lispro (HumaLOG) corrective regimen sliding scale   SubCutaneous at bedtime  latanoprost 0.005% Ophthalmic Solution 1 Drop(s) Left EYE at bedtime  levothyroxine 75 MICROGram(s) Oral daily  simvastatin 20 milliGRAM(s) Oral at bedtime  spironolactone 25 milliGRAM(s) Oral daily    MEDICATIONS  (PRN):  acetaminophen   Tablet .. 650 milliGRAM(s) Oral every 6 hours PRN Temp greater or equal to 38C (100.4F), Mild Pain (1 - 3), Moderate Pain (4 - 6), Severe Pain (7 - 10)  dextrose 40% Gel 15 Gram(s) Oral once PRN Blood Glucose LESS THAN 70 milliGRAM(s)/deciliter  glucagon  Injectable 1 milliGRAM(s) IntraMuscular once PRN Glucose LESS THAN 70 milligrams/deciliter    LABS:                        8.5    8.36  )-----------( 229      ( 24 Jul 2020 07:45 )             25.7     07-24    141  |  102  |  49<H>  ----------------------------<  119<H>  3.8   |  27  |  1.99<H>    Ca    10.1      24 Jul 2020 07:45  Phos  3.0     07-24  Mg     2.0     07-24      PT/INR - ( 24 Jul 2020 07:45 )   PT: 26.5 SEC;   INR: 2.25          PTT - ( 23 Jul 2020 05:54 )  PTT:40.0 SEC    CAPILLARY BLOOD GLUCOSE      POCT Blood Glucose.: 145 mg/dL (24 Jul 2020 17:12)  POCT Blood Glucose.: 167 mg/dL (24 Jul 2020 12:33)  POCT Blood Glucose.: 121 mg/dL (24 Jul 2020 08:32)  POCT Blood Glucose.: 183 mg/dL (23 Jul 2020 21:36)          REVIEW OF SYSTEMS:  CONSTITUTIONAL: No fever, weight loss, or fatigue  EYES: No eye pain, visual disturbances, or discharge  ENMT:  No difficulty hearing, tinnitus, vertigo; No sinus or throat pain  NECK: No pain or stiffness  RESPIRATORY: No cough, wheezing, chills or hemoptysis; No shortness of breath  CARDIOVASCULAR: No chest pain, palpitations, dizziness, or leg swelling  GASTROINTESTINAL: No abdominal or epigastric pain. No nausea, vomiting, or hematemesis; No diarrhea or constipation. No melena or hematochezia.  GENITOURINARY: No dysuria, frequency, hematuria, or incontinence  NEUROLOGICAL: No headaches, memory loss, loss of strength, numbness, or tremors      Consultant(s) Notes Reviewed:  [x ] YES  [ ] NO    PHYSICAL EXAM:  GENERAL: NAD, well-groomed, well-developed,not in any distress ,  HEAD:  Atraumatic, Normocephalic  EYES: EOMI, PERRLA, conjunctiva and sclera clear  ENMT: No tonsillar erythema, exudates, or enlargement; Moist mucous membranes, Good dentition, No lesions  NECK: Supple, No JVD, Normal thyroid  NERVOUS SYSTEM:  Alert & Oriented X3, No focal deficit   CHEST/LUNG: Good air entry bilateral with no  rales, rhonchi, wheezing, or rubs  HEART: Regular rate and rhythm; No murmurs, rubs, or gallops  ABDOMEN: Soft, Nontender, Nondistended; Bowel sounds present  EXTREMITIES: Foot dressed     Care Discussed with Consultants/Other Providers [ x] YES  [ ] NO

## 2020-07-24 NOTE — PROGRESS NOTE ADULT - ASSESSMENT
89 year old female PMH  a-fib on coumadin, diastolic CHF (with EF 63% on TTE 8/13/19), HTN, HLD, CKD stage 3-4, IDDM, PVD, CVA, PE, hypothyroidism, p/w L foot wound. Patient was sent yesterday from podiatrist office for non healing left great toe ulcer, to rule out OM. Renal following for NIKHIL/CKD Mx.     CKD4: Baseline Creatinine 1.7-1.9   euvolemic. Electrolytes acceptable.   Can continue PO diuretics for now, rec to hold on procedure (angiogram) day if ok w/cardiology  dose all meds for eGFR<30ml/min.   avoid ACEi/ARB for now/NSAIDs/Nephrotoxics.  vas sx note reviewed-plan for LE angiogram next week. pt is at Intermediate risk for BESS, again explained to pts daughter bedside the possibility of worsening RFT post angio and if no recovery of RF, may need RRT/dialysis. she verbalized understanding. No rnal objection to proceed with Informed consent. Pt optimized renal stand point.   rec mucomyst 1200mg bid -2doses pre and 2 doses post angio and IVF/NS 4-6hrs periangio if no s/o CHF  monitor BMP daily     HTN, controlled-bp optimal. c/w current bp meds. avoid ACEi/ARB. pain Mx.   Toe gangrene.  f/u w/ Podiatry, ID and vas sx. on cefepime, as per ID    DM- Mx per medicine      labs, chart reviewed

## 2020-07-24 NOTE — PROGRESS NOTE ADULT - SUBJECTIVE AND OBJECTIVE BOX
[facility] - S Radha / Florin S /D Nate/ S Jonathan/ S Rosalinda/ Shahid Segovia / BABAR Escalerau/ O Rudi  service -[phone], office [phone]  ---------------------------------------------------------------------------------------------------------------    Patient seen and examined bedside    Subjective and Objective: No overnight events, denied cp/sob/V/D/urinary sxs. No new complaints today. +left foot pain    Allergies: No Known Allergies      Hospital Medications:   MEDICATIONS  (STANDING):  allopurinol 100 milliGRAM(s) Oral daily  amLODIPine   Tablet 10 milliGRAM(s) Oral daily  cefepime   IVPB 1000 milliGRAM(s) IV Intermittent every 24 hours  dextrose 5%. 1000 milliLiter(s) (50 mL/Hr) IV Continuous <Continuous>  dextrose 50% Injectable 12.5 Gram(s) IV Push once  dextrose 50% Injectable 25 Gram(s) IV Push once  dextrose 50% Injectable 25 Gram(s) IV Push once  furosemide    Tablet 40 milliGRAM(s) Oral two times a day  gabapentin 100 milliGRAM(s) Oral three times a day  hydrALAZINE 75 milliGRAM(s) Oral three times a day  insulin lispro (HumaLOG) corrective regimen sliding scale   SubCutaneous three times a day before meals  insulin lispro (HumaLOG) corrective regimen sliding scale   SubCutaneous at bedtime  latanoprost 0.005% Ophthalmic Solution 1 Drop(s) Left EYE at bedtime  levothyroxine 75 MICROGram(s) Oral daily  simvastatin 20 milliGRAM(s) Oral at bedtime  spironolactone 25 milliGRAM(s) Oral daily    VITALS:  T(F): 98.9 (07-24-20 @ 15:43), Max: 98.9 (07-24-20 @ 15:43)  HR: 89 (07-24-20 @ 15:43)  BP: 147/59 (07-24-20 @ 15:43)  RR: 18 (07-24-20 @ 15:43)  SpO2: 98% (07-24-20 @ 15:43)  Wt(kg): --    PHYSICAL EXAM:  Constitutional: NAD  HEENT: anicteric sclera  Neck: No JVD  Respiratory: CTAB, no wheezes, rales or rhonchi  Cardiovascular: S1, S2, RRR  Gastrointestinal: BS+, soft, NT/ND  Extremities: No peripheral edema  Neurological: A/O x 2- 3  Psychiatric: Normal mood, normal affect  : No michaud.     LABS:  07-24    141  |  102  |  49<H>  ----------------------------<  119<H>  3.8   |  27  |  1.99<H>    Ca    10.1      24 Jul 2020 07:45  Phos  3.0     07-24  Mg     2.0     07-24      Creatinine Trend: 1.99 <--, 2.09 <--, 1.87 <--, 2.02 <--                        8.5    8.36  )-----------( 229      ( 24 Jul 2020 07:45 )             25.7     Urine Studies:        RADIOLOGY & ADDITIONAL STUDIES:

## 2020-07-24 NOTE — PROGRESS NOTE ADULT - ASSESSMENT
89 year old woman presents with nonhealing dry gangrene of left 1st toe    Recommendations:  - Will plan on angiogram next week  - Please obtain cardiology, nephrology and medical optimization   - appreciate Podiatry recommendations  - hold coumadin and start therapeutic AC with heparin drip when INR subtherapeutic      -d/w Dr. ERIC Crenshaw, Vascular Fellow  -POLINA Grover, R3 89 year old woman presents with nonhealing dry gangrene of left 1st toe    Recommendations:  - Will plan on angiogram next week  - Please obtain cardiology, nephrology and medical optimization   - appreciate Podiatry recommendations  - hold coumadin and start therapeutic AC with heparin drip when INR subtherapeutic  -d/w Dr. ERIC Crenshaw, Vascular Fellow

## 2020-07-24 NOTE — PROGRESS NOTE ADULT - SUBJECTIVE AND OBJECTIVE BOX
Podiatry pager #: [phone] ([place])/ [phone] ([facility])    Patient is a 89y old  Female who presents with a chief complaint of left foot big toe pain (24 Jul 2020 07:46)       INTERVAL HPI/OVERNIGHT EVENTS:  Patient seen and evaluated at bedside.  Pt is resting comfortable in NAD. Denies N/V/F/C.     Allergies    No Known Allergies    Intolerances        Vital Signs Last 24 Hrs  T(C): 36.7 (24 Jul 2020 05:41), Max: 36.7 (23 Jul 2020 12:45)  T(F): 98.1 (24 Jul 2020 05:41), Max: 98.1 (23 Jul 2020 12:45)  HR: 72 (24 Jul 2020 05:41) (72 - 82)  BP: 141/61 (24 Jul 2020 05:41) (141/61 - 148/62)  BP(mean): --  RR: 18 (24 Jul 2020 05:41) (17 - 18)  SpO2: 98% (24 Jul 2020 05:41) (98% - 100%)    LABS:                        8.5    9.36  )-----------( 242      ( 23 Jul 2020 05:54 )             27.5     07-23    140  |  103  |  47<H>  ----------------------------<  143<H>  3.9   |  28  |  2.09<H>    Ca    10.0      23 Jul 2020 05:54      PT/INR - ( 23 Jul 2020 05:54 )   PT: 37.2 SEC;   INR: 3.15          PTT - ( 23 Jul 2020 05:54 )  PTT:40.0 SEC    CAPILLARY BLOOD GLUCOSE      POCT Blood Glucose.: 183 mg/dL (23 Jul 2020 21:36)  POCT Blood Glucose.: 159 mg/dL (23 Jul 2020 17:32)  POCT Blood Glucose.: 179 mg/dL (23 Jul 2020 12:01)      Lower Extremity Physical Exam:  Vasular: DP/PT 0/4, dopplerable monophasic L DP, remainder of DP/PT biphasic B/L, CFT absent L hallux, <3 sec digits x 9, Temperature gradient warm to cool on R, warm to cold at L hallux   Neuro: Epicritic sensation intact to the level of digits B/L.  Musculoskeletal/Ortho: no structural abnormalities  Derm: L foot with avulsed hallucal nail, L hallux stable dry gangrene, necrotic and cold to level of MPJ, no purulence, no open wound, no malodor, no associated cellulitis, etiology PVD

## 2020-07-25 LAB
ANION GAP SERPL CALC-SCNC: 11 MMO/L — SIGNIFICANT CHANGE UP (ref 7–14)
APTT BLD: 45.9 SEC — HIGH (ref 27–36.3)
APTT BLD: 52.5 SEC — HIGH (ref 27–36.3)
BUN SERPL-MCNC: 51 MG/DL — HIGH (ref 7–23)
CALCIUM SERPL-MCNC: 9.8 MG/DL — SIGNIFICANT CHANGE UP (ref 8.4–10.5)
CHLORIDE SERPL-SCNC: 103 MMOL/L — SIGNIFICANT CHANGE UP (ref 98–107)
CO2 SERPL-SCNC: 27 MMOL/L — SIGNIFICANT CHANGE UP (ref 22–31)
CREAT SERPL-MCNC: 2.06 MG/DL — HIGH (ref 0.5–1.3)
GLUCOSE BLDC GLUCOMTR-MCNC: 115 MG/DL — HIGH (ref 70–99)
GLUCOSE BLDC GLUCOMTR-MCNC: 158 MG/DL — HIGH (ref 70–99)
GLUCOSE BLDC GLUCOMTR-MCNC: 186 MG/DL — HIGH (ref 70–99)
GLUCOSE BLDC GLUCOMTR-MCNC: 189 MG/DL — HIGH (ref 70–99)
GLUCOSE SERPL-MCNC: 117 MG/DL — HIGH (ref 70–99)
HCT VFR BLD CALC: 25.9 % — LOW (ref 34.5–45)
HCT VFR BLD CALC: 26.4 % — LOW (ref 34.5–45)
HGB BLD-MCNC: 8.2 G/DL — LOW (ref 11.5–15.5)
HGB BLD-MCNC: 8.4 G/DL — LOW (ref 11.5–15.5)
INR BLD: 1.74 — HIGH (ref 0.88–1.17)
MAGNESIUM SERPL-MCNC: 2 MG/DL — SIGNIFICANT CHANGE UP (ref 1.6–2.6)
MCHC RBC-ENTMCNC: 29.4 PG — SIGNIFICANT CHANGE UP (ref 27–34)
MCHC RBC-ENTMCNC: 29.9 PG — SIGNIFICANT CHANGE UP (ref 27–34)
MCHC RBC-ENTMCNC: 31.7 % — LOW (ref 32–36)
MCHC RBC-ENTMCNC: 31.8 % — LOW (ref 32–36)
MCV RBC AUTO: 92.3 FL — SIGNIFICANT CHANGE UP (ref 80–100)
MCV RBC AUTO: 94.5 FL — SIGNIFICANT CHANGE UP (ref 80–100)
NRBC # FLD: 0 K/UL — SIGNIFICANT CHANGE UP (ref 0–0)
NRBC # FLD: 0 K/UL — SIGNIFICANT CHANGE UP (ref 0–0)
PHOSPHATE SERPL-MCNC: 3.1 MG/DL — SIGNIFICANT CHANGE UP (ref 2.5–4.5)
PLATELET # BLD AUTO: 234 K/UL — SIGNIFICANT CHANGE UP (ref 150–400)
PLATELET # BLD AUTO: 245 K/UL — SIGNIFICANT CHANGE UP (ref 150–400)
PMV BLD: 10.5 FL — SIGNIFICANT CHANGE UP (ref 7–13)
PMV BLD: 11.5 FL — SIGNIFICANT CHANGE UP (ref 7–13)
POTASSIUM SERPL-MCNC: 4.1 MMOL/L — SIGNIFICANT CHANGE UP (ref 3.5–5.3)
POTASSIUM SERPL-SCNC: 4.1 MMOL/L — SIGNIFICANT CHANGE UP (ref 3.5–5.3)
PROTHROM AB SERPL-ACNC: 20.3 SEC — HIGH (ref 9.8–13.1)
RBC # BLD: 2.74 M/UL — LOW (ref 3.8–5.2)
RBC # BLD: 2.86 M/UL — LOW (ref 3.8–5.2)
RBC # FLD: 15 % — HIGH (ref 10.3–14.5)
RBC # FLD: 15.2 % — HIGH (ref 10.3–14.5)
SODIUM SERPL-SCNC: 141 MMOL/L — SIGNIFICANT CHANGE UP (ref 135–145)
WBC # BLD: 8.81 K/UL — SIGNIFICANT CHANGE UP (ref 3.8–10.5)
WBC # BLD: 9 K/UL — SIGNIFICANT CHANGE UP (ref 3.8–10.5)
WBC # FLD AUTO: 8.81 K/UL — SIGNIFICANT CHANGE UP (ref 3.8–10.5)
WBC # FLD AUTO: 9 K/UL — SIGNIFICANT CHANGE UP (ref 3.8–10.5)

## 2020-07-25 RX ORDER — FUROSEMIDE 40 MG
40 TABLET ORAL
Refills: 0 | Status: DISCONTINUED | OUTPATIENT
Start: 2020-07-25 | End: 2020-07-27

## 2020-07-25 RX ORDER — HEPARIN SODIUM 5000 [USP'U]/ML
3000 INJECTION INTRAVENOUS; SUBCUTANEOUS EVERY 6 HOURS
Refills: 0 | Status: DISCONTINUED | OUTPATIENT
Start: 2020-07-25 | End: 2020-07-28

## 2020-07-25 RX ORDER — SPIRONOLACTONE 25 MG/1
25 TABLET, FILM COATED ORAL DAILY
Refills: 0 | Status: DISCONTINUED | OUTPATIENT
Start: 2020-07-26 | End: 2020-07-28

## 2020-07-25 RX ORDER — HEPARIN SODIUM 5000 [USP'U]/ML
6000 INJECTION INTRAVENOUS; SUBCUTANEOUS EVERY 6 HOURS
Refills: 0 | Status: DISCONTINUED | OUTPATIENT
Start: 2020-07-25 | End: 2020-07-28

## 2020-07-25 RX ORDER — HEPARIN SODIUM 5000 [USP'U]/ML
INJECTION INTRAVENOUS; SUBCUTANEOUS
Qty: 25000 | Refills: 0 | Status: DISCONTINUED | OUTPATIENT
Start: 2020-07-25 | End: 2020-07-28

## 2020-07-25 RX ORDER — SENNA PLUS 8.6 MG/1
2 TABLET ORAL AT BEDTIME
Refills: 0 | Status: DISCONTINUED | OUTPATIENT
Start: 2020-07-25 | End: 2020-08-12

## 2020-07-25 RX ADMIN — Medication 650 MILLIGRAM(S): at 21:56

## 2020-07-25 RX ADMIN — GABAPENTIN 100 MILLIGRAM(S): 400 CAPSULE ORAL at 22:21

## 2020-07-25 RX ADMIN — HEPARIN SODIUM 3000 UNIT(S): 5000 INJECTION INTRAVENOUS; SUBCUTANEOUS at 23:18

## 2020-07-25 RX ADMIN — Medication 650 MILLIGRAM(S): at 21:05

## 2020-07-25 RX ADMIN — Medication 40 MILLIGRAM(S): at 17:19

## 2020-07-25 RX ADMIN — Medication 40 MILLIGRAM(S): at 05:53

## 2020-07-25 RX ADMIN — HEPARIN SODIUM 1700 UNIT(S)/HR: 5000 INJECTION INTRAVENOUS; SUBCUTANEOUS at 23:17

## 2020-07-25 RX ADMIN — GABAPENTIN 100 MILLIGRAM(S): 400 CAPSULE ORAL at 05:52

## 2020-07-25 RX ADMIN — CEFEPIME 100 MILLIGRAM(S): 1 INJECTION, POWDER, FOR SOLUTION INTRAMUSCULAR; INTRAVENOUS at 17:22

## 2020-07-25 RX ADMIN — Medication 75 MILLIGRAM(S): at 05:53

## 2020-07-25 RX ADMIN — Medication 75 MILLIGRAM(S): at 12:40

## 2020-07-25 RX ADMIN — Medication 75 MICROGRAM(S): at 05:53

## 2020-07-25 RX ADMIN — Medication 2: at 12:41

## 2020-07-25 RX ADMIN — AMLODIPINE BESYLATE 10 MILLIGRAM(S): 2.5 TABLET ORAL at 05:53

## 2020-07-25 RX ADMIN — HEPARIN SODIUM 1300 UNIT(S)/HR: 5000 INJECTION INTRAVENOUS; SUBCUTANEOUS at 08:48

## 2020-07-25 RX ADMIN — SIMVASTATIN 20 MILLIGRAM(S): 20 TABLET, FILM COATED ORAL at 21:04

## 2020-07-25 RX ADMIN — Medication 2: at 17:19

## 2020-07-25 RX ADMIN — GABAPENTIN 100 MILLIGRAM(S): 400 CAPSULE ORAL at 12:42

## 2020-07-25 RX ADMIN — LATANOPROST 1 DROP(S): 0.05 SOLUTION/ DROPS OPHTHALMIC; TOPICAL at 21:05

## 2020-07-25 RX ADMIN — Medication 100 MILLIGRAM(S): at 12:40

## 2020-07-25 RX ADMIN — SPIRONOLACTONE 25 MILLIGRAM(S): 25 TABLET, FILM COATED ORAL at 05:53

## 2020-07-25 RX ADMIN — Medication 75 MILLIGRAM(S): at 21:04

## 2020-07-25 RX ADMIN — SENNA PLUS 2 TABLET(S): 8.6 TABLET ORAL at 21:05

## 2020-07-25 RX ADMIN — HEPARIN SODIUM 1500 UNIT(S)/HR: 5000 INJECTION INTRAVENOUS; SUBCUTANEOUS at 16:09

## 2020-07-25 NOTE — PROGRESS NOTE ADULT - SUBJECTIVE AND OBJECTIVE BOX
pt seen and examined, no complaints, ROS - .         acetaminophen   Tablet .. 650 milliGRAM(s) Oral every 6 hours PRN  allopurinol 100 milliGRAM(s) Oral daily  amLODIPine   Tablet 10 milliGRAM(s) Oral daily  cefepime   IVPB 1000 milliGRAM(s) IV Intermittent every 24 hours  dextrose 40% Gel 15 Gram(s) Oral once PRN  dextrose 5%. 1000 milliLiter(s) IV Continuous <Continuous>  dextrose 50% Injectable 12.5 Gram(s) IV Push once  dextrose 50% Injectable 25 Gram(s) IV Push once  dextrose 50% Injectable 25 Gram(s) IV Push once  furosemide    Tablet 40 milliGRAM(s) Oral two times a day  gabapentin 100 milliGRAM(s) Oral three times a day  glucagon  Injectable 1 milliGRAM(s) IntraMuscular once PRN  heparin   Injectable 6000 Unit(s) IV Push every 6 hours PRN  heparin   Injectable 3000 Unit(s) IV Push every 6 hours PRN  heparin  Infusion.  Unit(s)/Hr IV Continuous <Continuous>  hydrALAZINE 75 milliGRAM(s) Oral three times a day  insulin lispro (HumaLOG) corrective regimen sliding scale   SubCutaneous three times a day before meals  insulin lispro (HumaLOG) corrective regimen sliding scale   SubCutaneous at bedtime  latanoprost 0.005% Ophthalmic Solution 1 Drop(s) Left EYE at bedtime  levothyroxine 75 MICROGram(s) Oral daily  simvastatin 20 milliGRAM(s) Oral at bedtime  spironolactone 25 milliGRAM(s) Oral daily                            8.2    9.00  )-----------( 234      ( 25 Jul 2020 07:20 )             25.9       Hemoglobin: 8.2 g/dL (07-25 @ 07:20)  Hemoglobin: 8.5 g/dL (07-24 @ 07:45)  Hemoglobin: 8.5 g/dL (07-23 @ 05:54)  Hemoglobin: 8.5 g/dL (07-22 @ 05:30)  Hemoglobin: 9.0 g/dL (07-21 @ 15:40)      07-25    141  |  103  |  51<H>  ----------------------------<  117<H>  4.1   |  27  |  2.06<H>    Ca    9.8      25 Jul 2020 07:20  Phos  3.1     07-25  Mg     2.0     07-25      Creatinine Trend: 2.06<--, 1.99<--, 2.09<--, 1.87<--, 2.02<--    COAGS: PT/INR - ( 25 Jul 2020 07:20 )   PT: 20.3 SEC;   INR: 1.74                    T(C): 36.6 (07-25-20 @ 05:51), Max: 37.7 (07-24-20 @ 20:04)  HR: 70 (07-25-20 @ 05:51) (70 - 89)  BP: 134/52 (07-25-20 @ 05:51) (134/52 - 147/59)  RR: 18 (07-25-20 @ 05:51) (18 - 18)  SpO2: 98% (07-25-20 @ 05:51) (98% - 100%)  Wt(kg): --    I&O's Summary      Gen: Appears well in NAD  HEENT:  (-)icterus (-)pallor  CV: Normal S1, there is still a sharp S2.  Mid-to-late peaking 3/6 systolic murmur at the upper chest, but No pulsus parvus et tardus (+)2 Pulses B/l  Resp:  Clear to ausculatation B/L, normal effort  GI: (+) BS Soft, NT, ND  Lymph:  (-)Edema, (-)obvious lymphadenopathy  Skin: +left foot dressing, Warm to touch, Normal turgor  Psych: Appropriate mood and affect      TELEMETRY: None	      < from: Transthoracic Echocardiogram (07.22.20 @ 16:27) >  CONCLUSIONS:  1. Mitral annular calcification, otherwise normal mitral  valve. Mild-moderate mitral regurgitation.  2. Calcified trileaflet aortic valve with decreased  opening.  Peak transaortic valve gradient [id number] mm Hg,  mean transaortic valve gradient equals 26 mm Hg, estimated  aortic valve area equals 1 sqcm (by continuity equation),  consistent with moderate to severe aortic stenosis.  3. Severely dilated left atrium.  LA volume index = 57  cc/m2.  4. Mild concentric left ventricular hypertrophy.  5. Normal left ventricular systolic function. No segmental  wall motion abnormalities.  6. Severe diastolic dysfunction.  7. Severe right atrial enlargement.  8. Normal right ventricular size and function.  9. Estimated right ventricular systolic pressure equals 53  mm Hg, assuming right atrial pressure equals 10 mm Hg,  consistent with moderate pulmonary hypertension.    < end of copied text >      ASSESSMENT/PLAN: Patient is a 89 year old Female well known to our office (Cardiologist - Dr. Berman), who presented with a foot wound.  Consult requested for pre-op cardiovascular risk stratification.  Her known PMH includes persistent Afib on coumadin who was not a candidate for left atrial appendage occlusion with Watchman, chronic HFpEF, HTN, HLD, hypothyroidism, CKD, Type 2 DM, CVA, and hx of PE.    - Continue AC with coumadin for CVA prevention for goal INR 2-3 if no procedures planned, if procedures planned - patient should be bridged with heparin gtt when INR less than 2 due to high stroke risk with very elevated chads-vasc  - Podiatry/ID follow up  - Abnormal LIZBETH/PVR noted - f/u vascular recs  - TTE noted above with normal LVEF, severe diastolic dysfunction, mild-mod MR, mod pulm HTN, and mod-severe AS: auscultation favors moderate aortic stenosis.  Yearly Echo for followup.  Patient does not have any 'red flag' symptoms such as syncope, crushing angina, dyspnea on exertion (limited by her foot), so will continue to monitor in the office.  - Patient prefers conservative medical management of her valvular disease and does not want any further invasive workup or surgery  - no further inpatient cardiac w/u needed at this time

## 2020-07-25 NOTE — PROGRESS NOTE ADULT - SUBJECTIVE AND OBJECTIVE BOX
INTERVAL HPI/OVERNIGHT EVENTS: I am okay   Vital Signs Last 24 Hrs  T(C): 36.7 (25 Jul 2020 20:23), Max: 36.7 (25 Jul 2020 20:23)  T(F): 98.1 (25 Jul 2020 20:23), Max: 98.1 (25 Jul 2020 20:23)  HR: 86 (25 Jul 2020 20:23) (70 - 86)  BP: 146/62 (25 Jul 2020 20:23) (99/64 - 146/62)  BP(mean): --  RR: 18 (25 Jul 2020 20:23) (18 - 18)  SpO2: 99% (25 Jul 2020 20:23) (98% - 100%)  I&O's Summary    MEDICATIONS  (STANDING):  allopurinol 100 milliGRAM(s) Oral daily  amLODIPine   Tablet 10 milliGRAM(s) Oral daily  cefepime   IVPB 1000 milliGRAM(s) IV Intermittent every 24 hours  dextrose 5%. 1000 milliLiter(s) (50 mL/Hr) IV Continuous <Continuous>  dextrose 50% Injectable 12.5 Gram(s) IV Push once  dextrose 50% Injectable 25 Gram(s) IV Push once  dextrose 50% Injectable 25 Gram(s) IV Push once  furosemide    Tablet 40 milliGRAM(s) Oral two times a day  gabapentin 100 milliGRAM(s) Oral three times a day  heparin  Infusion.  Unit(s)/Hr (13 mL/Hr) IV Continuous <Continuous>  hydrALAZINE 75 milliGRAM(s) Oral three times a day  insulin lispro (HumaLOG) corrective regimen sliding scale   SubCutaneous three times a day before meals  insulin lispro (HumaLOG) corrective regimen sliding scale   SubCutaneous at bedtime  latanoprost 0.005% Ophthalmic Solution 1 Drop(s) Left EYE at bedtime  levothyroxine 75 MICROGram(s) Oral daily  senna 2 Tablet(s) Oral at bedtime  simvastatin 20 milliGRAM(s) Oral at bedtime    MEDICATIONS  (PRN):  acetaminophen   Tablet .. 650 milliGRAM(s) Oral every 6 hours PRN Temp greater or equal to 38C (100.4F), Mild Pain (1 - 3), Moderate Pain (4 - 6), Severe Pain (7 - 10)  dextrose 40% Gel 15 Gram(s) Oral once PRN Blood Glucose LESS THAN 70 milliGRAM(s)/deciliter  glucagon  Injectable 1 milliGRAM(s) IntraMuscular once PRN Glucose LESS THAN 70 milligrams/deciliter  heparin   Injectable 6000 Unit(s) IV Push every 6 hours PRN For aPTT less than 40  heparin   Injectable 3000 Unit(s) IV Push every 6 hours PRN For aPTT between 40 - 57    LABS:                        8.4    8.81  )-----------( 245      ( 25 Jul 2020 15:01 )             26.4     07-25    141  |  103  |  51<H>  ----------------------------<  117<H>  4.1   |  27  |  2.06<H>    Ca    9.8      25 Jul 2020 07:20  Phos  3.1     07-25  Mg     2.0     07-25      PT/INR - ( 25 Jul 2020 07:20 )   PT: 20.3 SEC;   INR: 1.74          PTT - ( 25 Jul 2020 15:01 )  PTT:45.9 SEC    CAPILLARY BLOOD GLUCOSE      POCT Blood Glucose.: 158 mg/dL (25 Jul 2020 21:18)  POCT Blood Glucose.: 186 mg/dL (25 Jul 2020 16:56)  POCT Blood Glucose.: 189 mg/dL (25 Jul 2020 12:08)  POCT Blood Glucose.: 115 mg/dL (25 Jul 2020 08:31)  POCT Blood Glucose.: 152 mg/dL (24 Jul 2020 22:02)          REVIEW OF SYSTEMS:  CONSTITUTIONAL: No fever, weight loss, or fatigue  RESPIRATORY: No cough, wheezing, chills or hemoptysis; No shortness of breath  CARDIOVASCULAR: No chest pain, palpitations, dizziness, or leg swelling  GASTROINTESTINAL: No abdominal or epigastric pain. No nausea, vomiting, or hematemesis; No diarrhea or constipation. No melena or hematochezia.  GENITOURINARY: No dysuria, frequency, hematuria, or incontinence  NEUROLOGICAL: No headaches, memory loss, loss of strength, numbness, or tremors      RADIOLOGY & ADDITIONAL TESTS:    Consultant(s) Notes Reviewed:  [x ] YES  [ ] NO    PHYSICAL EXAM:  GENERAL: NAD, well-groomed, well-developed,not in any distress ,  HEAD:  Atraumatic, Normocephalic  EYES: EOMI, PERRLA, conjunctiva and sclera clear  ENMT: No tonsillar erythema, exudates, or enlargement; Moist mucous membranes, Good dentition, No lesions  NECK: Supple, No JVD, Normal thyroid  NERVOUS SYSTEM:  Alert & Oriented X3, No focal deficit   CHEST/LUNG: Good air entry bilateral with no  rales, rhonchi, wheezing, or rubs  HEART: Regular rate and rhythm; No murmurs, rubs, or gallops  ABDOMEN: Soft, Nontender, Nondistended; Bowel sounds present  EXTREMITIES:  Foot dressed     Care Discussed with Consultants/Other Providers [ x] YES  [ ] NO

## 2020-07-25 NOTE — PROGRESS NOTE ADULT - SUBJECTIVE AND OBJECTIVE BOX
Patient seen and examined  no complaints      No Known Allergies    Hospital Medications:   MEDICATIONS  (STANDING):  allopurinol 100 milliGRAM(s) Oral daily  amLODIPine   Tablet 10 milliGRAM(s) Oral daily  cefepime   IVPB 1000 milliGRAM(s) IV Intermittent every 24 hours  dextrose 5%. 1000 milliLiter(s) (50 mL/Hr) IV Continuous <Continuous>  dextrose 50% Injectable 12.5 Gram(s) IV Push once  dextrose 50% Injectable 25 Gram(s) IV Push once  dextrose 50% Injectable 25 Gram(s) IV Push once  furosemide    Tablet 40 milliGRAM(s) Oral two times a day  gabapentin 100 milliGRAM(s) Oral three times a day  heparin  Infusion.  Unit(s)/Hr (13 mL/Hr) IV Continuous <Continuous>  hydrALAZINE 75 milliGRAM(s) Oral three times a day  insulin lispro (HumaLOG) corrective regimen sliding scale   SubCutaneous three times a day before meals  insulin lispro (HumaLOG) corrective regimen sliding scale   SubCutaneous at bedtime  latanoprost 0.005% Ophthalmic Solution 1 Drop(s) Left EYE at bedtime  levothyroxine 75 MICROGram(s) Oral daily  simvastatin 20 milliGRAM(s) Oral at bedtime  spironolactone 25 milliGRAM(s) Oral daily      VITALS:  T(F): 97.9 (07-25-20 @ 05:51), Max: 99.8 (07-24-20 @ 20:04)  HR: 70 (07-25-20 @ 05:51)  BP: 134/52 (07-25-20 @ 05:51)  RR: 18 (07-25-20 @ 05:51)  SpO2: 98% (07-25-20 @ 05:51)  Wt(kg): --      PHYSICAL EXAM:  Constitutional: NAD  HEENT: anicteric sclera  Neck: No JVD  Respiratory: CTAB, no wheezes, rales or rhonchi  Cardiovascular: S1, S2, RRR  Gastrointestinal: BS+, soft, NT/ND  Extremities: No peripheral edema  Neurological: A/O x 2- 3  Psychiatric: Normal mood, normal affect  : No michaud.     LABS:  07-25    141  |  103  |  51<H>  ----------------------------<  117<H>  4.1   |  27  |  2.06<H>    Ca    9.8      25 Jul 2020 07:20  Phos  3.1     07-25  Mg     2.0     07-25      Creatinine Trend: 2.06 <--, 1.99 <--, 2.09 <--, 1.87 <--, 2.02 <--                        8.2    9.00  )-----------( 234      ( 25 Jul 2020 07:20 )             25.9     Urine Studies:      RADIOLOGY & ADDITIONAL STUDIES:

## 2020-07-25 NOTE — PROGRESS NOTE ADULT - ASSESSMENT
89 year old female PMH  a-fib on coumadin, diastolic CHF (with EF 63% on TTE 8/13/19), HTN, HLD, CKD stage 3-4, IDDM, PVD, CVA, PE, hypothyroidism, p/w L foot wound. Patient was sent yesterday from podiatrist office for non healing left great toe ulcer, to rule out OM. Renal following for NIKHIL/CKD Mx.     CKD4: Baseline Creatinine 1.7-1.9   euvolemic. Electrolytes acceptable.   dose all meds for eGFR<30ml/min.   avoid ACEi/ARB for now/NSAIDs/Nephrotoxics.  vas sx note reviewed-plan for LE angiogram next week. pt is at Intermediate risk for BESS, again explained to pts daughter bedside the possibility of worsening RFT post angio and if no recovery of RF, may need RRT/dialysis. she verbalized understanding. No rnal objection to proceed with Informed consent. Pt optimized renal stand point.   rec mucomyst 1200mg bid -2doses pre and 2 doses post angio and IVF/NS 4-6hrs periangio if no s/o CHF  monitor BMP daily     HTN, controlled-bp optimal. c/w current bp meds. avoid ACEi/ARB. pain Mx.         labs, chart reviewed

## 2020-07-25 NOTE — PROGRESS NOTE ADULT - ASSESSMENT
89 year old female PMH  a-fib on coumadin, diastolic CHF (with EF 63% on TTE 8/13/19), HTN, HLD, CKD stage 3-4, IDDM, PVD, CVA, PE, hypothyroidism, p/w L foot wound. Patient seen today at clinic and sent for cellulitis and abscess, wanting to rule out OM. Patient denies fever, chills, cp, sob, abd pain, n/v, weakness, or numbness/tingling.     Problem/Plan - 1:  ·  Problem: Toe gangrene.  Plan: Podiatry consult noted. S/P IV Abxs. NO fever or Leucocytosis so likely ischemic . ID and Vascular helping.      Problem/Plan - 2:  ·  Problem: Stage 3 chronic kidney disease.  Plan: Renal consulted. Baseline Creatinine 1.8.      Problem/Plan - 3:  ·  Problem: PVD (peripheral vascular disease).  Plan: LIZBETH/PVD noted.  Vascular consult noted. Awaiting angiogram .     Problem/Plan - 4:  ·  Problem: Chronic diastolic congestive heart failure.  Plan: Continuing Lasix . Cardiology helping.      Problem/Plan - 5:  ·  Problem: DM (diabetes mellitus).  Plan: SSI for now .      Problem/Plan - 6:  Problem: Afib. Plan: Holding Coumadin and on Heparin as INR<2 for possible procedures/surgery .      Problem/Plan - 7:  ·  Problem: Pulmonary embolism.  Plan: On AC. INR noted      Problem/Plan - 8:  ·  Problem:  Hypothyroid.  Plan: Synthroid.      Problem/Plan - 9:  ·  Problem: Anemia.  Plan: Chronic .      Problem/Plan - 10:  ·  Problem: Uncontrolled Hypertension .  Plan: Adjusting BP meds.

## 2020-07-26 LAB
ANION GAP SERPL CALC-SCNC: 11 MMO/L — SIGNIFICANT CHANGE UP (ref 7–14)
APTT BLD: 107.1 SEC — HIGH (ref 27–36.3)
APTT BLD: 126.3 SEC — CRITICAL HIGH (ref 27–36.3)
APTT BLD: 56.4 SEC — HIGH (ref 27–36.3)
BUN SERPL-MCNC: 53 MG/DL — HIGH (ref 7–23)
CALCIUM SERPL-MCNC: 9.5 MG/DL — SIGNIFICANT CHANGE UP (ref 8.4–10.5)
CHLORIDE SERPL-SCNC: 102 MMOL/L — SIGNIFICANT CHANGE UP (ref 98–107)
CO2 SERPL-SCNC: 25 MMOL/L — SIGNIFICANT CHANGE UP (ref 22–31)
CREAT SERPL-MCNC: 1.99 MG/DL — HIGH (ref 0.5–1.3)
CULTURE RESULTS: SIGNIFICANT CHANGE UP
GLUCOSE BLDC GLUCOMTR-MCNC: 128 MG/DL — HIGH (ref 70–99)
GLUCOSE BLDC GLUCOMTR-MCNC: 163 MG/DL — HIGH (ref 70–99)
GLUCOSE BLDC GLUCOMTR-MCNC: 163 MG/DL — HIGH (ref 70–99)
GLUCOSE BLDC GLUCOMTR-MCNC: 164 MG/DL — HIGH (ref 70–99)
GLUCOSE SERPL-MCNC: 146 MG/DL — HIGH (ref 70–99)
HCT VFR BLD CALC: 26.3 % — LOW (ref 34.5–45)
HGB BLD-MCNC: 8.1 G/DL — LOW (ref 11.5–15.5)
INR BLD: 1.6 — HIGH (ref 0.88–1.17)
MAGNESIUM SERPL-MCNC: 2.1 MG/DL — SIGNIFICANT CHANGE UP (ref 1.6–2.6)
MCHC RBC-ENTMCNC: 29.2 PG — SIGNIFICANT CHANGE UP (ref 27–34)
MCHC RBC-ENTMCNC: 30.8 % — LOW (ref 32–36)
MCV RBC AUTO: 94.9 FL — SIGNIFICANT CHANGE UP (ref 80–100)
NRBC # FLD: 0 K/UL — SIGNIFICANT CHANGE UP (ref 0–0)
ORGANISM # SPEC MICROSCOPIC CNT: SIGNIFICANT CHANGE UP
PHOSPHATE SERPL-MCNC: 3.4 MG/DL — SIGNIFICANT CHANGE UP (ref 2.5–4.5)
PLATELET # BLD AUTO: 243 K/UL — SIGNIFICANT CHANGE UP (ref 150–400)
PMV BLD: 11.3 FL — SIGNIFICANT CHANGE UP (ref 7–13)
POTASSIUM SERPL-MCNC: 4 MMOL/L — SIGNIFICANT CHANGE UP (ref 3.5–5.3)
POTASSIUM SERPL-SCNC: 4 MMOL/L — SIGNIFICANT CHANGE UP (ref 3.5–5.3)
PROTHROM AB SERPL-ACNC: 18.5 SEC — HIGH (ref 9.8–13.1)
RBC # BLD: 2.77 M/UL — LOW (ref 3.8–5.2)
RBC # FLD: 15 % — HIGH (ref 10.3–14.5)
SODIUM SERPL-SCNC: 138 MMOL/L — SIGNIFICANT CHANGE UP (ref 135–145)
SPECIMEN SOURCE: SIGNIFICANT CHANGE UP
WBC # BLD: 9.88 K/UL — SIGNIFICANT CHANGE UP (ref 3.8–10.5)
WBC # FLD AUTO: 9.88 K/UL — SIGNIFICANT CHANGE UP (ref 3.8–10.5)

## 2020-07-26 RX ORDER — POLYETHYLENE GLYCOL 3350 17 G/17G
17 POWDER, FOR SOLUTION ORAL DAILY
Refills: 0 | Status: DISCONTINUED | OUTPATIENT
Start: 2020-07-26 | End: 2020-07-27

## 2020-07-26 RX ADMIN — HEPARIN SODIUM 3000 UNIT(S): 5000 INJECTION INTRAVENOUS; SUBCUTANEOUS at 21:18

## 2020-07-26 RX ADMIN — Medication 650 MILLIGRAM(S): at 05:01

## 2020-07-26 RX ADMIN — GABAPENTIN 100 MILLIGRAM(S): 400 CAPSULE ORAL at 12:41

## 2020-07-26 RX ADMIN — CEFEPIME 100 MILLIGRAM(S): 1 INJECTION, POWDER, FOR SOLUTION INTRAMUSCULAR; INTRAVENOUS at 17:33

## 2020-07-26 RX ADMIN — HEPARIN SODIUM 1300 UNIT(S)/HR: 5000 INJECTION INTRAVENOUS; SUBCUTANEOUS at 13:51

## 2020-07-26 RX ADMIN — SENNA PLUS 2 TABLET(S): 8.6 TABLET ORAL at 21:19

## 2020-07-26 RX ADMIN — AMLODIPINE BESYLATE 10 MILLIGRAM(S): 2.5 TABLET ORAL at 05:01

## 2020-07-26 RX ADMIN — LATANOPROST 1 DROP(S): 0.05 SOLUTION/ DROPS OPHTHALMIC; TOPICAL at 21:19

## 2020-07-26 RX ADMIN — Medication 100 MILLIGRAM(S): at 12:42

## 2020-07-26 RX ADMIN — HEPARIN SODIUM 1500 UNIT(S)/HR: 5000 INJECTION INTRAVENOUS; SUBCUTANEOUS at 21:17

## 2020-07-26 RX ADMIN — SIMVASTATIN 20 MILLIGRAM(S): 20 TABLET, FILM COATED ORAL at 21:19

## 2020-07-26 RX ADMIN — Medication 75 MILLIGRAM(S): at 05:01

## 2020-07-26 RX ADMIN — POLYETHYLENE GLYCOL 3350 17 GRAM(S): 17 POWDER, FOR SOLUTION ORAL at 21:21

## 2020-07-26 RX ADMIN — Medication 2: at 17:30

## 2020-07-26 RX ADMIN — Medication 650 MILLIGRAM(S): at 05:55

## 2020-07-26 RX ADMIN — Medication 40 MILLIGRAM(S): at 17:30

## 2020-07-26 RX ADMIN — Medication 2: at 12:41

## 2020-07-26 RX ADMIN — Medication 40 MILLIGRAM(S): at 05:01

## 2020-07-26 RX ADMIN — Medication 75 MILLIGRAM(S): at 12:42

## 2020-07-26 RX ADMIN — SPIRONOLACTONE 25 MILLIGRAM(S): 25 TABLET, FILM COATED ORAL at 05:01

## 2020-07-26 RX ADMIN — Medication 650 MILLIGRAM(S): at 22:10

## 2020-07-26 RX ADMIN — GABAPENTIN 100 MILLIGRAM(S): 400 CAPSULE ORAL at 05:01

## 2020-07-26 RX ADMIN — Medication 75 MICROGRAM(S): at 05:01

## 2020-07-26 RX ADMIN — HEPARIN SODIUM 1500 UNIT(S)/HR: 5000 INJECTION INTRAVENOUS; SUBCUTANEOUS at 06:20

## 2020-07-26 RX ADMIN — GABAPENTIN 100 MILLIGRAM(S): 400 CAPSULE ORAL at 21:19

## 2020-07-26 RX ADMIN — Medication 75 MILLIGRAM(S): at 21:19

## 2020-07-26 RX ADMIN — Medication 650 MILLIGRAM(S): at 21:19

## 2020-07-26 NOTE — PROGRESS NOTE ADULT - ASSESSMENT
89 year old female PMH  a-fib on coumadin, diastolic CHF (with EF 63% on TTE 8/13/19), HTN, HLD, CKD stage 3-4, IDDM, PVD, CVA, PE, hypothyroidism, p/w L foot wound. Patient seen today at clinic and sent for cellulitis and abscess, wanting to rule out OM. Patient denies fever, chills, cp, sob, abd pain, n/v, weakness, or numbness/tingling.     Problem/Plan - 1:  ·  Problem: Toe gangrene.  Plan: Podiatry consult noted. S/P IV Abxs. NO fever or Leucocytosis so likely ischemic . ID and Vascular helping.      Problem/Plan - 2:  ·  Problem: Stage 3 chronic kidney disease.  Plan: Renal helping .  Baseline Creatinine 1.8.      Problem/Plan - 3:  ·  Problem: PVD (peripheral vascular disease).  Plan: LIZBETH/PVD noted.  Vascular consult noted. Awaiting angiogram .     Problem/Plan - 4:  ·  Problem: Chronic diastolic congestive heart failure.  Plan: Continuing Lasix . Cardiology helping.      Problem/Plan - 5:  ·  Problem: DM (diabetes mellitus).  Plan: SSI for now .      Problem/Plan - 6:  Problem: Afib. Plan: Holding Coumadin and on Heparin as INR<2 for possible procedures/surgery .      Problem/Plan - 7:  ·  Problem: Pulmonary embolism.  Plan: On AC. INR noted      Problem/Plan - 8:  ·  Problem:  Hypothyroid.  Plan: Synthroid.      Problem/Plan - 9:  ·  Problem: Anemia.  Plan: Chronic .      Problem/Plan - 10:  ·  Problem: Uncontrolled Hypertension .  Plan: Adjusting BP meds.

## 2020-07-26 NOTE — PROGRESS NOTE ADULT - SUBJECTIVE AND OBJECTIVE BOX
INTERVAL HPI/OVERNIGHT EVENTS:No new concerns.   Vital Signs Last 24 Hrs  T(C): 36.3 (26 Jul 2020 04:58), Max: 36.7 (25 Jul 2020 20:23)  T(F): 97.4 (26 Jul 2020 04:58), Max: 98.1 (25 Jul 2020 20:23)  HR: 74 (26 Jul 2020 04:58) (71 - 86)  BP: 135/67 (26 Jul 2020 04:58) (99/64 - 146/62)  BP(mean): --  RR: 18 (26 Jul 2020 04:58) (18 - 18)  SpO2: 98% (26 Jul 2020 04:58) (98% - 100%)  I&O's Summary    MEDICATIONS  (STANDING):  allopurinol 100 milliGRAM(s) Oral daily  amLODIPine   Tablet 10 milliGRAM(s) Oral daily  cefepime   IVPB 1000 milliGRAM(s) IV Intermittent every 24 hours  dextrose 5%. 1000 milliLiter(s) (50 mL/Hr) IV Continuous <Continuous>  dextrose 50% Injectable 12.5 Gram(s) IV Push once  dextrose 50% Injectable 25 Gram(s) IV Push once  dextrose 50% Injectable 25 Gram(s) IV Push once  furosemide    Tablet 40 milliGRAM(s) Oral two times a day  gabapentin 100 milliGRAM(s) Oral three times a day  heparin  Infusion.  Unit(s)/Hr (13 mL/Hr) IV Continuous <Continuous>  hydrALAZINE 75 milliGRAM(s) Oral three times a day  insulin lispro (HumaLOG) corrective regimen sliding scale   SubCutaneous three times a day before meals  insulin lispro (HumaLOG) corrective regimen sliding scale   SubCutaneous at bedtime  latanoprost 0.005% Ophthalmic Solution 1 Drop(s) Left EYE at bedtime  levothyroxine 75 MICROGram(s) Oral daily  senna 2 Tablet(s) Oral at bedtime  simvastatin 20 milliGRAM(s) Oral at bedtime  spironolactone 25 milliGRAM(s) Oral daily    MEDICATIONS  (PRN):  acetaminophen   Tablet .. 650 milliGRAM(s) Oral every 6 hours PRN Temp greater or equal to 38C (100.4F), Mild Pain (1 - 3), Moderate Pain (4 - 6), Severe Pain (7 - 10)  dextrose 40% Gel 15 Gram(s) Oral once PRN Blood Glucose LESS THAN 70 milliGRAM(s)/deciliter  glucagon  Injectable 1 milliGRAM(s) IntraMuscular once PRN Glucose LESS THAN 70 milligrams/deciliter  heparin   Injectable 6000 Unit(s) IV Push every 6 hours PRN For aPTT less than 40  heparin   Injectable 3000 Unit(s) IV Push every 6 hours PRN For aPTT between 40 - 57  polyethylene glycol 3350 17 Gram(s) Oral daily PRN Constipation    LABS:                        8.1    9.88  )-----------( 243      ( 26 Jul 2020 05:10 )             26.3     07-26    138  |  102  |  53<H>  ----------------------------<  146<H>  4.0   |  25  |  1.99<H>    Ca    9.5      26 Jul 2020 05:10  Phos  3.4     07-26  Mg     2.1     07-26      PT/INR - ( 26 Jul 2020 05:10 )   PT: 18.5 SEC;   INR: 1.60          PTT - ( 26 Jul 2020 05:10 )  PTT:126.3 SEC    CAPILLARY BLOOD GLUCOSE      POCT Blood Glucose.: 164 mg/dL (26 Jul 2020 11:44)  POCT Blood Glucose.: 128 mg/dL (26 Jul 2020 08:19)  POCT Blood Glucose.: 158 mg/dL (25 Jul 2020 21:18)  POCT Blood Glucose.: 186 mg/dL (25 Jul 2020 16:56)          REVIEW OF SYSTEMS:  CONSTITUTIONAL: No fever, weight loss, or fatigue  EYES: No eye pain, visual disturbances, or discharge  ENMT:  No difficulty hearing, tinnitus, vertigo; No sinus or throat pain  NECK: No pain or stiffness  RESPIRATORY: No cough, wheezing, chills or hemoptysis; No shortness of breath  CARDIOVASCULAR: No chest pain, palpitations, dizziness, or leg swelling  GASTROINTESTINAL: No abdominal or epigastric pain. No nausea, vomiting, or hematemesis; No diarrhea or constipation. No melena or hematochezia.  GENITOURINARY: No dysuria, frequency, hematuria, or incontinence  NEUROLOGICAL: No headaches, memory loss, loss of strength, numbness, or tremors    Consultant(s) Notes Reviewed:  [x ] YES  [ ] NO    PHYSICAL EXAM:  GENERAL: NAD, well-groomed, well-developed ,not in any distress ,  HEAD:  Atraumatic, Normocephalic  EYES: EOMI, PERRLA, conjunctiva and sclera clear  ENMT: No tonsillar erythema, exudates, or enlargement; Moist mucous membranes, Good dentition, No lesions  NECK: Supple, No JVD, Normal thyroid  NERVOUS SYSTEM:  Alert & Oriented X3, No focal deficit   CHEST/LUNG: Good air entry bilateral with no  rales, rhonchi, wheezing, or rubs  HEART: Regular rate and rhythm; No murmurs, rubs, or gallops  ABDOMEN: Soft, Nontender, Nondistended; Bowel sounds present  EXTREMITIES:  Foot dressed    Care Discussed with Consultants/Other Providers [ x] YES  [ ] NO

## 2020-07-26 NOTE — PROGRESS NOTE ADULT - SUBJECTIVE AND OBJECTIVE BOX
pt seen and examined, no complaints, ROS - .         acetaminophen   Tablet .. 650 milliGRAM(s) Oral every 6 hours PRN  allopurinol 100 milliGRAM(s) Oral daily  amLODIPine   Tablet 10 milliGRAM(s) Oral daily  cefepime   IVPB 1000 milliGRAM(s) IV Intermittent every 24 hours  dextrose 40% Gel 15 Gram(s) Oral once PRN  dextrose 5%. 1000 milliLiter(s) IV Continuous <Continuous>  dextrose 50% Injectable 12.5 Gram(s) IV Push once  dextrose 50% Injectable 25 Gram(s) IV Push once  dextrose 50% Injectable 25 Gram(s) IV Push once  furosemide    Tablet 40 milliGRAM(s) Oral two times a day  gabapentin 100 milliGRAM(s) Oral three times a day  glucagon  Injectable 1 milliGRAM(s) IntraMuscular once PRN  heparin   Injectable 6000 Unit(s) IV Push every 6 hours PRN  heparin   Injectable 3000 Unit(s) IV Push every 6 hours PRN  heparin  Infusion.  Unit(s)/Hr IV Continuous <Continuous>  hydrALAZINE 75 milliGRAM(s) Oral three times a day  insulin lispro (HumaLOG) corrective regimen sliding scale   SubCutaneous three times a day before meals  insulin lispro (HumaLOG) corrective regimen sliding scale   SubCutaneous at bedtime  latanoprost 0.005% Ophthalmic Solution 1 Drop(s) Left EYE at bedtime  levothyroxine 75 MICROGram(s) Oral daily  senna 2 Tablet(s) Oral at bedtime  simvastatin 20 milliGRAM(s) Oral at bedtime  spironolactone 25 milliGRAM(s) Oral daily                            8.1    9.88  )-----------( 243      ( 26 Jul 2020 05:10 )             26.3       Hemoglobin: 8.1 g/dL (07-26 @ 05:10)  Hemoglobin: 8.4 g/dL (07-25 @ 15:01)  Hemoglobin: 8.2 g/dL (07-25 @ 07:20)  Hemoglobin: 8.5 g/dL (07-24 @ 07:45)  Hemoglobin: 8.5 g/dL (07-23 @ 05:54)      07-26    138  |  102  |  53<H>  ----------------------------<  146<H>  4.0   |  25  |  1.99<H>    Ca    9.5      26 Jul 2020 05:10  Phos  3.4     07-26  Mg     2.1     07-26      Creatinine Trend: 1.99<--, 2.06<--, 1.99<--, 2.09<--, 1.87<--, 2.02<--    COAGS: PT/INR - ( 26 Jul 2020 05:10 )   PT: 18.5 SEC;   INR: 1.60          PTT - ( 26 Jul 2020 05:10 )  PTT:126.3 SEC          T(C): 36.3 (07-26-20 @ 04:58), Max: 36.7 (07-25-20 @ 20:23)  HR: 74 (07-26-20 @ 04:58) (71 - 86)  BP: 135/67 (07-26-20 @ 04:58) (99/64 - 146/62)  RR: 18 (07-26-20 @ 04:58) (18 - 18)  SpO2: 98% (07-26-20 @ 04:58) (98% - 100%)  Wt(kg): --    I&O's Summary    Gen: Appears well in NAD  HEENT:  (-)icterus (-)pallor  CV: Normal S1, there is still a sharp S2.  Mid-to-late peaking 3/6 systolic murmur at the upper chest, but No pulsus parvus et tardus (+)2 Pulses B/l  Resp:  Clear to ausculatation B/L, normal effort  GI: (+) BS Soft, NT, ND  Lymph:  (-)Edema, (-)obvious lymphadenopathy  Skin: +left foot dressing, Warm to touch, Normal turgor  Psych: Appropriate mood and affect      TELEMETRY: None	      < from: Transthoracic Echocardiogram (07.22.20 @ 16:27) >  CONCLUSIONS:  1. Mitral annular calcification, otherwise normal mitral  valve. Mild-moderate mitral regurgitation.  2. Calcified trileaflet aortic valve with decreased  opening.  Peak transaortic valve gradient [id number] mm Hg,  mean transaortic valve gradient equals 26 mm Hg, estimated  aortic valve area equals 1 sqcm (by continuity equation),  consistent with moderate to severe aortic stenosis.  3. Severely dilated left atrium.  LA volume index = 57  cc/m2.  4. Mild concentric left ventricular hypertrophy.  5. Normal left ventricular systolic function. No segmental  wall motion abnormalities.  6. Severe diastolic dysfunction.  7. Severe right atrial enlargement.  8. Normal right ventricular size and function.  9. Estimated right ventricular systolic pressure equals 53  mm Hg, assuming right atrial pressure equals 10 mm Hg,  consistent with moderate pulmonary hypertension.    < end of copied text >      ASSESSMENT/PLAN: Patient is a 89 year old Female well known to our office (Cardiologist - Dr. Berman), who presented with a foot wound.  Consult requested for pre-op cardiovascular risk stratification.  Her known PMH includes persistent Afib on coumadin who was not a candidate for left atrial appendage occlusion with Watchman, chronic HFpEF, HTN, HLD, hypothyroidism, CKD, Type 2 DM, CVA, and hx of PE.    - LE angio pending   - Continue AC with coumadin for CVA prevention for goal INR 2-3 if no procedures planned, if procedures planned - patient should be bridged with heparin gtt when INR less than 2 due to high stroke risk with very elevated chads-vasc  - Diuresis with lasix, keep net neg   - Podiatry/ID follow up  - TTE noted above with normal LVEF, severe diastolic dysfunction, mild-mod MR, mod pulm HTN, and mod-severe AS: auscultation favors moderate aortic stenosis.  Yearly Echo for followup.  Patient does not have any 'red flag' symptoms such as syncope, crushing angina, dyspnea on exertion (limited by her foot), so will continue to monitor in the office.  - Patient prefers conservative medical management of her valvular disease and does not want any further invasive workup or surgery  - no further inpatient cardiac w/u needed at this time

## 2020-07-26 NOTE — PROGRESS NOTE ADULT - SUBJECTIVE AND OBJECTIVE BOX
Patient seen and examined  no complaints    No Known Allergies    Hospital Medications:   MEDICATIONS  (STANDING):  allopurinol 100 milliGRAM(s) Oral daily  amLODIPine   Tablet 10 milliGRAM(s) Oral daily  cefepime   IVPB 1000 milliGRAM(s) IV Intermittent every 24 hours  dextrose 5%. 1000 milliLiter(s) (50 mL/Hr) IV Continuous <Continuous>  dextrose 50% Injectable 12.5 Gram(s) IV Push once  dextrose 50% Injectable 25 Gram(s) IV Push once  dextrose 50% Injectable 25 Gram(s) IV Push once  furosemide    Tablet 40 milliGRAM(s) Oral two times a day  gabapentin 100 milliGRAM(s) Oral three times a day  heparin  Infusion.  Unit(s)/Hr (13 mL/Hr) IV Continuous <Continuous>  hydrALAZINE 75 milliGRAM(s) Oral three times a day  insulin lispro (HumaLOG) corrective regimen sliding scale   SubCutaneous three times a day before meals  insulin lispro (HumaLOG) corrective regimen sliding scale   SubCutaneous at bedtime  latanoprost 0.005% Ophthalmic Solution 1 Drop(s) Left EYE at bedtime  levothyroxine 75 MICROGram(s) Oral daily  senna 2 Tablet(s) Oral at bedtime  simvastatin 20 milliGRAM(s) Oral at bedtime  spironolactone 25 milliGRAM(s) Oral daily        VITALS:  T(F): 97.4 (07-26-20 @ 04:58), Max: 98.1 (07-25-20 @ 20:23)  HR: 74 (07-26-20 @ 04:58)  BP: 135/67 (07-26-20 @ 04:58)  RR: 18 (07-26-20 @ 04:58)  SpO2: 98% (07-26-20 @ 04:58)  Wt(kg): --      PHYSICAL EXAM:  Constitutional: NAD  HEENT: anicteric sclera  Neck: No JVD  Respiratory: CTAB, no wheezes, rales or rhonchi  Cardiovascular: S1, S2, RRR  Gastrointestinal: BS+, soft, NT/ND  Extremities: No peripheral edema  Neurological: A/O x 2- 3  Psychiatric: Normal mood, normal affect  : No michaud.     LABS:  07-26    138  |  102  |  53<H>  ----------------------------<  146<H>  4.0   |  25  |  1.99<H>    Ca    9.5      26 Jul 2020 05:10  Phos  3.4     07-26  Mg     2.1     07-26      Creatinine Trend: 1.99 <--, 2.06 <--, 1.99 <--, 2.09 <--, 1.87 <--, 2.02 <--                        8.1    9.88  )-----------( 243      ( 26 Jul 2020 05:10 )             26.3     Urine Studies:      RADIOLOGY & ADDITIONAL STUDIES:

## 2020-07-27 LAB
ANION GAP SERPL CALC-SCNC: 8 MMO/L — SIGNIFICANT CHANGE UP (ref 7–14)
APTT BLD: 88.5 SEC — HIGH (ref 27–36.3)
APTT BLD: 94.4 SEC — HIGH (ref 27–36.3)
BLD GP AB SCN SERPL QL: NEGATIVE — SIGNIFICANT CHANGE UP
BUN SERPL-MCNC: 54 MG/DL — HIGH (ref 7–23)
CALCIUM SERPL-MCNC: 9.6 MG/DL — SIGNIFICANT CHANGE UP (ref 8.4–10.5)
CHLORIDE SERPL-SCNC: 102 MMOL/L — SIGNIFICANT CHANGE UP (ref 98–107)
CO2 SERPL-SCNC: 27 MMOL/L — SIGNIFICANT CHANGE UP (ref 22–31)
CREAT SERPL-MCNC: 1.89 MG/DL — HIGH (ref 0.5–1.3)
CULTURE RESULTS: SIGNIFICANT CHANGE UP
CULTURE RESULTS: SIGNIFICANT CHANGE UP
GLUCOSE BLDC GLUCOMTR-MCNC: 122 MG/DL — HIGH (ref 70–99)
GLUCOSE BLDC GLUCOMTR-MCNC: 130 MG/DL — HIGH (ref 70–99)
GLUCOSE BLDC GLUCOMTR-MCNC: 133 MG/DL — HIGH (ref 70–99)
GLUCOSE BLDC GLUCOMTR-MCNC: 260 MG/DL — HIGH (ref 70–99)
GLUCOSE SERPL-MCNC: 122 MG/DL — HIGH (ref 70–99)
HCT VFR BLD CALC: 25.7 % — LOW (ref 34.5–45)
HGB BLD-MCNC: 7.8 G/DL — LOW (ref 11.5–15.5)
INR BLD: 1.49 — HIGH (ref 0.88–1.17)
MAGNESIUM SERPL-MCNC: 2.1 MG/DL — SIGNIFICANT CHANGE UP (ref 1.6–2.6)
MCHC RBC-ENTMCNC: 28.9 PG — SIGNIFICANT CHANGE UP (ref 27–34)
MCHC RBC-ENTMCNC: 30.4 % — LOW (ref 32–36)
MCV RBC AUTO: 95.2 FL — SIGNIFICANT CHANGE UP (ref 80–100)
NRBC # FLD: 0 K/UL — SIGNIFICANT CHANGE UP (ref 0–0)
PHOSPHATE SERPL-MCNC: 4 MG/DL — SIGNIFICANT CHANGE UP (ref 2.5–4.5)
PLATELET # BLD AUTO: 243 K/UL — SIGNIFICANT CHANGE UP (ref 150–400)
PMV BLD: 10.8 FL — SIGNIFICANT CHANGE UP (ref 7–13)
POTASSIUM SERPL-MCNC: 4.3 MMOL/L — SIGNIFICANT CHANGE UP (ref 3.5–5.3)
POTASSIUM SERPL-SCNC: 4.3 MMOL/L — SIGNIFICANT CHANGE UP (ref 3.5–5.3)
PROTHROM AB SERPL-ACNC: 17.4 SEC — HIGH (ref 9.8–13.1)
RBC # BLD: 2.7 M/UL — LOW (ref 3.8–5.2)
RBC # FLD: 14.9 % — HIGH (ref 10.3–14.5)
RH IG SCN BLD-IMP: NEGATIVE — SIGNIFICANT CHANGE UP
SODIUM SERPL-SCNC: 137 MMOL/L — SIGNIFICANT CHANGE UP (ref 135–145)
SPECIMEN SOURCE: SIGNIFICANT CHANGE UP
SPECIMEN SOURCE: SIGNIFICANT CHANGE UP
WBC # BLD: 9.62 K/UL — SIGNIFICANT CHANGE UP (ref 3.8–10.5)
WBC # FLD AUTO: 9.62 K/UL — SIGNIFICANT CHANGE UP (ref 3.8–10.5)

## 2020-07-27 PROCEDURE — 71045 X-RAY EXAM CHEST 1 VIEW: CPT | Mod: 26

## 2020-07-27 RX ORDER — ACETYLCYSTEINE 200 MG/ML
1200 VIAL (ML) MISCELLANEOUS
Refills: 0 | Status: COMPLETED | OUTPATIENT
Start: 2020-07-27 | End: 2020-07-29

## 2020-07-27 RX ORDER — POLYETHYLENE GLYCOL 3350 17 G/17G
17 POWDER, FOR SOLUTION ORAL DAILY
Refills: 0 | Status: DISCONTINUED | OUTPATIENT
Start: 2020-07-27 | End: 2020-08-12

## 2020-07-27 RX ORDER — SODIUM CHLORIDE 9 MG/ML
1000 INJECTION, SOLUTION INTRAVENOUS
Refills: 0 | Status: DISCONTINUED | OUTPATIENT
Start: 2020-07-27 | End: 2020-07-28

## 2020-07-27 RX ORDER — MORPHINE SULFATE 50 MG/1
1 CAPSULE, EXTENDED RELEASE ORAL ONCE
Refills: 0 | Status: DISCONTINUED | OUTPATIENT
Start: 2020-07-27 | End: 2020-07-27

## 2020-07-27 RX ORDER — HYDROMORPHONE HYDROCHLORIDE 2 MG/ML
0.25 INJECTION INTRAMUSCULAR; INTRAVENOUS; SUBCUTANEOUS ONCE
Refills: 0 | Status: DISCONTINUED | OUTPATIENT
Start: 2020-07-27 | End: 2020-07-27

## 2020-07-27 RX ADMIN — Medication 6: at 13:19

## 2020-07-27 RX ADMIN — MORPHINE SULFATE 1 MILLIGRAM(S): 50 CAPSULE, EXTENDED RELEASE ORAL at 00:36

## 2020-07-27 RX ADMIN — LATANOPROST 1 DROP(S): 0.05 SOLUTION/ DROPS OPHTHALMIC; TOPICAL at 21:36

## 2020-07-27 RX ADMIN — CEFEPIME 100 MILLIGRAM(S): 1 INJECTION, POWDER, FOR SOLUTION INTRAMUSCULAR; INTRAVENOUS at 17:23

## 2020-07-27 RX ADMIN — Medication 40 MILLIGRAM(S): at 17:22

## 2020-07-27 RX ADMIN — GABAPENTIN 100 MILLIGRAM(S): 400 CAPSULE ORAL at 21:36

## 2020-07-27 RX ADMIN — AMLODIPINE BESYLATE 10 MILLIGRAM(S): 2.5 TABLET ORAL at 05:35

## 2020-07-27 RX ADMIN — POLYETHYLENE GLYCOL 3350 17 GRAM(S): 17 POWDER, FOR SOLUTION ORAL at 13:19

## 2020-07-27 RX ADMIN — SPIRONOLACTONE 25 MILLIGRAM(S): 25 TABLET, FILM COATED ORAL at 05:35

## 2020-07-27 RX ADMIN — Medication 40 MILLIGRAM(S): at 05:34

## 2020-07-27 RX ADMIN — SENNA PLUS 2 TABLET(S): 8.6 TABLET ORAL at 21:36

## 2020-07-27 RX ADMIN — Medication 75 MILLIGRAM(S): at 05:34

## 2020-07-27 RX ADMIN — HYDROMORPHONE HYDROCHLORIDE 0.25 MILLIGRAM(S): 2 INJECTION INTRAMUSCULAR; INTRAVENOUS; SUBCUTANEOUS at 16:09

## 2020-07-27 RX ADMIN — MORPHINE SULFATE 1 MILLIGRAM(S): 50 CAPSULE, EXTENDED RELEASE ORAL at 00:51

## 2020-07-27 RX ADMIN — HYDROMORPHONE HYDROCHLORIDE 0.25 MILLIGRAM(S): 2 INJECTION INTRAMUSCULAR; INTRAVENOUS; SUBCUTANEOUS at 15:39

## 2020-07-27 RX ADMIN — SIMVASTATIN 20 MILLIGRAM(S): 20 TABLET, FILM COATED ORAL at 21:36

## 2020-07-27 RX ADMIN — HEPARIN SODIUM 1500 UNIT(S)/HR: 5000 INJECTION INTRAVENOUS; SUBCUTANEOUS at 11:55

## 2020-07-27 RX ADMIN — GABAPENTIN 100 MILLIGRAM(S): 400 CAPSULE ORAL at 05:39

## 2020-07-27 RX ADMIN — Medication 1200 MILLIGRAM(S): at 17:22

## 2020-07-27 RX ADMIN — Medication 75 MICROGRAM(S): at 05:34

## 2020-07-27 RX ADMIN — Medication 100 MILLIGRAM(S): at 13:19

## 2020-07-27 RX ADMIN — Medication 75 MILLIGRAM(S): at 13:19

## 2020-07-27 RX ADMIN — Medication 75 MILLIGRAM(S): at 21:36

## 2020-07-27 RX ADMIN — HEPARIN SODIUM 1500 UNIT(S)/HR: 5000 INJECTION INTRAVENOUS; SUBCUTANEOUS at 04:32

## 2020-07-27 NOTE — PROGRESS NOTE ADULT - ASSESSMENT
89 year old female PMH  a-fib on coumadin, diastolic CHF (with EF 63% on TTE 8/13/19), HTN, HLD, CKD stage 3-4, IDDM, PVD, CVA, PE, hypothyroidism, p/w L foot wound. Patient seen today at clinic and sent for cellulitis and abscess, wanting to rule out OM. Patient denies fever, chills, cp, sob, abd pain, n/v, weakness, or numbness/tingling.     Problem/Plan - 1:  ·  Problem: Toe gangrene.  Plan: Podiatry consult noted. S/P IV Abxs. NO fever or Leucocytosis so likely ischemic . ID and Vascular helping.      Problem/Plan - 2:  ·  Problem: Stage 3 chronic kidney disease.  Plan: Renal helping .  Baseline Creatinine 1.8.      Problem/Plan - 3:  ·  Problem: PVD (peripheral vascular disease).  Plan: LIZBETH/PVD noted.  Vascular consult noted. Awaiting angiogram .     Problem/Plan - 4:  ·  Problem: Chronic diastolic congestive heart failure.  Plan: Continuing Lasix and holding tomorrow.  Cardiology helping.      Problem/Plan - 5:  ·  Problem: DM (diabetes mellitus).  Plan: SSI for now .      Problem/Plan - 6:  Problem: Afib. Plan: Holding Coumadin and on Heparin as INR<2 for possible procedures/surgery .      Problem/Plan - 7:  ·  Problem: Pulmonary embolism.  Plan: On AC. INR noted      Problem/Plan - 8:  ·  Problem:  Hypothyroid.  Plan: Synthroid.      Problem/Plan - 9:  ·  Problem: Anemia.  Plan: Chronic .      Problem/Plan - 10:  ·  Problem: Uncontrolled Hypertension .  Plan: Adjusting BP meds.

## 2020-07-27 NOTE — PROGRESS NOTE ADULT - SUBJECTIVE AND OBJECTIVE BOX
INTERVAL HPI/OVERNIGHT EVENTS: I feel fine.   Vital Signs Last 24 Hrs  T(C): 37.1 (27 Jul 2020 19:18), Max: 37.3 (26 Jul 2020 21:09)  T(F): 98.8 (27 Jul 2020 19:18), Max: 99.1 (26 Jul 2020 21:09)  HR: 81 (27 Jul 2020 17:19) (65 - 89)  BP: 153/79 (27 Jul 2020 17:19) (124/50 - 153/79)  BP(mean): --  RR: 18 (27 Jul 2020 19:18) (18 - 22)  SpO2: 100% (27 Jul 2020 19:18) (96% - 100%)  I&O's Summary    MEDICATIONS  (STANDING):  acetylcysteine  Oral Solution 1200 milliGRAM(s) Oral two times a day  allopurinol 100 milliGRAM(s) Oral daily  amLODIPine   Tablet 10 milliGRAM(s) Oral daily  cefepime   IVPB 1000 milliGRAM(s) IV Intermittent every 24 hours  dextrose 5% + sodium chloride 0.45%. 1000 milliLiter(s) (75 mL/Hr) IV Continuous <Continuous>  dextrose 5%. 1000 milliLiter(s) (50 mL/Hr) IV Continuous <Continuous>  dextrose 50% Injectable 12.5 Gram(s) IV Push once  dextrose 50% Injectable 25 Gram(s) IV Push once  dextrose 50% Injectable 25 Gram(s) IV Push once  furosemide    Tablet 40 milliGRAM(s) Oral two times a day  gabapentin 100 milliGRAM(s) Oral three times a day  heparin  Infusion.  Unit(s)/Hr (13 mL/Hr) IV Continuous <Continuous>  hydrALAZINE 75 milliGRAM(s) Oral three times a day  insulin lispro (HumaLOG) corrective regimen sliding scale   SubCutaneous three times a day before meals  insulin lispro (HumaLOG) corrective regimen sliding scale   SubCutaneous at bedtime  latanoprost 0.005% Ophthalmic Solution 1 Drop(s) Left EYE at bedtime  levothyroxine 75 MICROGram(s) Oral daily  polyethylene glycol 3350 17 Gram(s) Oral daily  senna 2 Tablet(s) Oral at bedtime  simvastatin 20 milliGRAM(s) Oral at bedtime  spironolactone 25 milliGRAM(s) Oral daily    MEDICATIONS  (PRN):  acetaminophen   Tablet .. 650 milliGRAM(s) Oral every 6 hours PRN Temp greater or equal to 38C (100.4F), Mild Pain (1 - 3), Moderate Pain (4 - 6), Severe Pain (7 - 10)  dextrose 40% Gel 15 Gram(s) Oral once PRN Blood Glucose LESS THAN 70 milliGRAM(s)/deciliter  glucagon  Injectable 1 milliGRAM(s) IntraMuscular once PRN Glucose LESS THAN 70 milligrams/deciliter  heparin   Injectable 6000 Unit(s) IV Push every 6 hours PRN For aPTT less than 40  heparin   Injectable 3000 Unit(s) IV Push every 6 hours PRN For aPTT between 40 - 57    LABS:                        7.8    9.62  )-----------( 243      ( 27 Jul 2020 03:10 )             25.7     07-27    137  |  102  |  54<H>  ----------------------------<  122<H>  4.3   |  27  |  1.89<H>    Ca    9.6      27 Jul 2020 03:10  Phos  4.0     07-27  Mg     2.1     07-27      PT/INR - ( 27 Jul 2020 03:10 )   PT: 17.4 SEC;   INR: 1.49          PTT - ( 27 Jul 2020 11:14 )  PTT:88.5 SEC    CAPILLARY BLOOD GLUCOSE      POCT Blood Glucose.: 122 mg/dL (27 Jul 2020 17:14)  POCT Blood Glucose.: 260 mg/dL (27 Jul 2020 12:27)  POCT Blood Glucose.: 130 mg/dL (27 Jul 2020 08:27)  POCT Blood Glucose.: 163 mg/dL (26 Jul 2020 22:12)          REVIEW OF SYSTEMS:  CONSTITUTIONAL: No fever, weight loss, or fatigue  EYES: No eye pain, visual disturbances, or discharge  ENMT:  No difficulty hearing, tinnitus, vertigo; No sinus or throat pain  NECK: No pain or stiffness  RESPIRATORY: No cough, wheezing, chills or hemoptysis; No shortness of breath  CARDIOVASCULAR: No chest pain, palpitations, dizziness, or leg swelling  GASTROINTESTINAL: No abdominal or epigastric pain. No nausea, vomiting, or hematemesis; No diarrhea or constipation. No melena or hematochezia.  GENITOURINARY: No dysuria, frequency, hematuria, or incontinence  NEUROLOGICAL: No headaches, memory loss, loss of strength, numbness, or tremors      Consultant(s) Notes Reviewed:  [x ] YES  [ ] NO    PHYSICAL EXAM:  GENERAL: NAD, well-groomed, well-developed,not in any distress ,  HEAD:  Atraumatic, Normocephalic  EYES: EOMI, PERRLA, conjunctiva and sclera clear  ENMT: No tonsillar erythema, exudates, or enlargement; Moist mucous membranes, Good dentition, No lesions  NECK: Supple, No JVD, Normal thyroid  NERVOUS SYSTEM:  Alert & Oriented X3, No focal deficit   CHEST/LUNG: Good air entry bilateral with no  rales, rhonchi, wheezing, or rubs  HEART: Regular rate and rhythm; No murmurs, rubs, or gallops  ABDOMEN: Soft, Nontender, Nondistended; Bowel sounds present  EXTREMITIES:  foot dressed     Care Discussed with Consultants/Other Providers [ x] YES  [ ] NO

## 2020-07-27 NOTE — CHART NOTE - NSCHARTNOTEFT_GEN_A_CORE
Talked with Dr. Banks regarding nephrology's reccomendations.   Dr. Banks [phone]) agrees with recs to stop AM PO Lasix and start patient on IVF NS samira-angiogram.     DEEP Barajas  Department of Medicine   In House # [id number]

## 2020-07-27 NOTE — PROVIDER CONTACT NOTE (OTHER) - SITUATION
Pt woke up c/o stabbing pain 10/10 to Left great toe (site of gangrene), Tylenol last given at 2119. Dressing intact, no acute sign of bleeding. Heparin drip ongoing.

## 2020-07-27 NOTE — PROGRESS NOTE ADULT - SUBJECTIVE AND OBJECTIVE BOX
[facility] - S Radha / Florin S /D Nate/ S Jonathan/ GASTON Tellez/ Shahid Segovia / BABAR Escalerau/ O Rudi  service -[phone], office [phone]  ---------------------------------------------------------------------------------------------------------------    Patient seen and examined bedside    Subjective and Objective: No overnight events, denied sob. No complaints today. feeling better    Allergies: No Known Allergies      Hospital Medications:   MEDICATIONS  (STANDING):  acetylcysteine  Oral Solution 1200 milliGRAM(s) Oral two times a day  allopurinol 100 milliGRAM(s) Oral daily  amLODIPine   Tablet 10 milliGRAM(s) Oral daily  cefepime   IVPB 1000 milliGRAM(s) IV Intermittent every 24 hours  dextrose 5% + sodium chloride 0.45%. 1000 milliLiter(s) (75 mL/Hr) IV Continuous <Continuous>  dextrose 5%. 1000 milliLiter(s) (50 mL/Hr) IV Continuous <Continuous>  dextrose 50% Injectable 12.5 Gram(s) IV Push once  dextrose 50% Injectable 25 Gram(s) IV Push once  dextrose 50% Injectable 25 Gram(s) IV Push once  furosemide    Tablet 40 milliGRAM(s) Oral two times a day  gabapentin 100 milliGRAM(s) Oral three times a day  heparin  Infusion.  Unit(s)/Hr (13 mL/Hr) IV Continuous <Continuous>  hydrALAZINE 75 milliGRAM(s) Oral three times a day  insulin lispro (HumaLOG) corrective regimen sliding scale   SubCutaneous three times a day before meals  insulin lispro (HumaLOG) corrective regimen sliding scale   SubCutaneous at bedtime  latanoprost 0.005% Ophthalmic Solution 1 Drop(s) Left EYE at bedtime  levothyroxine 75 MICROGram(s) Oral daily  polyethylene glycol 3350 17 Gram(s) Oral daily  senna 2 Tablet(s) Oral at bedtime  simvastatin 20 milliGRAM(s) Oral at bedtime  spironolactone 25 milliGRAM(s) Oral daily      VITALS:  T(F): 97.7 (07-27-20 @ 05:32), Max: 99.1 (07-26-20 @ 21:09)  HR: 81 (07-27-20 @ 17:19)  BP: 153/79 (07-27-20 @ 17:19)  RR: 18 (07-27-20 @ 05:32)  SpO2: 100% (07-27-20 @ 05:32)  Wt(kg): --    PHYSICAL EXAM:  Constitutional: NAD  HEENT: anicteric sclera  Neck: No JVD  Respiratory: CTAB, no wheezes, rales or rhonchi  Cardiovascular: S1, S2, RRR  Gastrointestinal: BS+, soft, NT/ND  Extremities: No peripheral edema  Neurological: A/O x 3  Psychiatric: Normal mood, normal affect  : No michaud.     LABS:  07-27    137  |  102  |  54<H>  ----------------------------<  122<H>  4.3   |  27  |  1.89<H>    Ca    9.6      27 Jul 2020 03:10  Phos  4.0     07-27  Mg     2.1     07-27      Creatinine Trend: 1.89 <--, 1.99 <--, 2.06 <--, 1.99 <--, 2.09 <--, 1.87 <--, 2.02 <--                        7.8    9.62  )-----------( 243      ( 27 Jul 2020 03:10 )             25.7     Urine Studies:        RADIOLOGY & ADDITIONAL STUDIES:

## 2020-07-27 NOTE — PROGRESS NOTE ADULT - ASSESSMENT
89 year old female PMH  a-fib on coumadin, diastolic CHF (with EF 63% on TTE 8/13/19), HTN, HLD, CKD stage 3-4, IDDM, PVD, CVA, PE, hypothyroidism, p/w L foot wound. Patient was sent yesterday from podiatrist office for non healing left great toe ulcer, to rule out OM. Renal following for NIKHIL/CKD Mx.     CKD4: Baseline Creatinine 1.7-1.9   euvolemic. Electrolytes acceptable.  Cr improved and stable at 1.9 today  rec to hold  PO diuretics tomorrow. cardiology note reviewed-no objection to hold  dose all meds for eGFR<30ml/min.   avoid ACEi/ARB for now/NSAIDs/Nephrotoxics.  monitor BMP daily     HTN, controlled-bp optimal. c/w current bp meds. avoid ACEi/ARB. pain Mx.     Toe gangrene, PAD.  f/u w/ Podiatry, ID and vas sx. on cefepime, as per ID    scheduled for LE angiogram tomorrow. pt is at Intermediate risk for BESS,  c/w mucomyst 1200mg bid -2doses pre and 2 doses post angio and IVF/NS 4-6hrs periangio no s/o CHF  optimized renal stand point for LE angiogram/plasty    DM- Mx per medicine      labs, chart reviewed

## 2020-07-27 NOTE — PROGRESS NOTE ADULT - ASSESSMENT
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     PLAN:  - Plan for angiogram this week  - Cardiology optimization appreciated  - C/w heparin gtt  - Podiatry: planning left hallux amp pending angiogram    Please contact Vascular Surgery (p. [phone]) with any questions.     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     PLAN:  - Plan for angiogram tomorrow   - Cardiology optimization appreciated  - C/w heparin gtt  - Podiatry: planning left hallux amp pending angiogram  - Pre-op/consent to be obtained by vascular team   - NPO at midnight   - AM labs: CBC/BMP/Mg/Jonh/T&S/Coags    Please contact Vascular Surgery (p. [phone]) with any questions.     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     PLAN:  - Plan for angiogram tomorrow   - Cardiology optimization appreciated  - Please document medical optimization/clearance for angiogram tomorrow  - C/w heparin gtt  - Podiatry: planning left hallux amp pending angiogram  - Pre-op/consent to be obtained by vascular team   - NPO at midnight   - AM labs: CBC/BMP/Mg/Jonh/T&S/Coags    Please contact Vascular Surgery (p. [phone]) with any questions.     Miryam Duran, PGY2  Vascular Surgery, C Team  Pager [phone]

## 2020-07-27 NOTE — CHART NOTE - NSCHARTNOTEFT_GEN_A_CORE
PRE OPERATIVE NOTE    Pre-op Diagnosis: nonhealing dry gangrene of left 1st toe and noncompressible PAD.  Procedure: Angiogram Possible Angioplasty, Stent  Surgeon: Dr. Hummel                          7.8    9.62  )-----------( 243      ( 27 Jul 2020 03:10 )             25.7     07-27    137  |  102  |  54<H>  ----------------------------<  122<H>  4.3   |  27  |  1.89<H>    Ca    9.6      27 Jul 2020 03:10  Phos  4.0     07-27  Mg     2.1     07-27        PT/INR - ( 27 Jul 2020 03:10 )   PT: 17.4 SEC;   INR: 1.49          PTT - ( 27 Jul 2020 11:14 )  PTT:88.5 SEC    CAPILLARY BLOOD GLUCOSE      POCT Blood Glucose.: 260 mg/dL (27 Jul 2020 12:27)      Type & Screen: x2 Ordered   CXR: Ordered  EKG: Ordered   Echocardiogram:     A/P: 89y Female planned for above procedure  NPO past midnight, except medications  IVF  Pain/nausea control  AM labs  Consent in chart  amLODIPine   Tablet  cefepime   IVPB  furosemide    Tablet  hydrALAZINE  spironolactone

## 2020-07-27 NOTE — CHART NOTE - NSCHARTNOTEFT_GEN_A_CORE
No cardiology objection to holding lasix/aldactone and giving gentle IVF prior to LE angiogram, d/w Dr Darren Conteh PA-C  [phone]

## 2020-07-27 NOTE — PROGRESS NOTE ADULT - SUBJECTIVE AND OBJECTIVE BOX
S: no chest pain or sob; ROS - .    acetaminophen   Tablet .. 650 milliGRAM(s) Oral every 6 hours PRN  acetylcysteine  Oral Solution 1200 milliGRAM(s) Oral two times a day  allopurinol 100 milliGRAM(s) Oral daily  amLODIPine   Tablet 10 milliGRAM(s) Oral daily  cefepime   IVPB 1000 milliGRAM(s) IV Intermittent every 24 hours  dextrose 40% Gel 15 Gram(s) Oral once PRN  dextrose 5%. 1000 milliLiter(s) IV Continuous <Continuous>  dextrose 50% Injectable 12.5 Gram(s) IV Push once  dextrose 50% Injectable 25 Gram(s) IV Push once  dextrose 50% Injectable 25 Gram(s) IV Push once  furosemide    Tablet 40 milliGRAM(s) Oral two times a day  gabapentin 100 milliGRAM(s) Oral three times a day  glucagon  Injectable 1 milliGRAM(s) IntraMuscular once PRN  heparin   Injectable 6000 Unit(s) IV Push every 6 hours PRN  heparin   Injectable 3000 Unit(s) IV Push every 6 hours PRN  heparin  Infusion.  Unit(s)/Hr IV Continuous <Continuous>  hydrALAZINE 75 milliGRAM(s) Oral three times a day  insulin lispro (HumaLOG) corrective regimen sliding scale   SubCutaneous three times a day before meals  insulin lispro (HumaLOG) corrective regimen sliding scale   SubCutaneous at bedtime  latanoprost 0.005% Ophthalmic Solution 1 Drop(s) Left EYE at bedtime  levothyroxine 75 MICROGram(s) Oral daily  polyethylene glycol 3350 17 Gram(s) Oral daily  senna 2 Tablet(s) Oral at bedtime  simvastatin 20 milliGRAM(s) Oral at bedtime  spironolactone 25 milliGRAM(s) Oral daily                            7.8    9.62  )-----------( 243      ( 27 Jul 2020 03:10 )             25.7       07-27    137  |  102  |  54<H>  ----------------------------<  122<H>  4.3   |  27  |  1.89<H>    Ca    9.6      27 Jul 2020 03:10  Phos  4.0     07-27  Mg     2.1     07-27              T(C): 36.5 (07-27-20 @ 05:32), Max: 37.3 (07-26-20 @ 21:09)  HR: 88 (07-27-20 @ 05:32) (65 - 89)  BP: 152/58 (07-27-20 @ 05:32) (142/62 - 152/71)  RR: 18 (07-27-20 @ 05:32) (18 - 22)  SpO2: 100% (07-27-20 @ 05:32) (96% - 100%)  Wt(kg): --    I&O's Summary      Gen: Appears well in NAD  HEENT:  (-)icterus (-)pallor  CV: Normal S1, there is still a sharp S2.  Mid-to-late peaking 3/6 systolic murmur at the upper chest, but No pulsus parvus et tardus (+)2 Pulses B/l  Resp:  Clear to ausculatation B/L, normal effort  GI: (+) BS Soft, NT, ND  Lymph:  (-)Edema, (-)obvious lymphadenopathy  Skin: +left foot dressing, Warm to touch, Normal turgor  Psych: Appropriate mood and affect      TELEMETRY: None	      < from: Transthoracic Echocardiogram (07.22.20 @ 16:27) >  CONCLUSIONS:  1. Mitral annular calcification, otherwise normal mitral  valve. Mild-moderate mitral regurgitation.  2. Calcified trileaflet aortic valve with decreased  opening.  Peak transaortic valve gradient [id number] mm Hg,  mean transaortic valve gradient equals 26 mm Hg, estimated  aortic valve area equals 1 sqcm (by continuity equation),  consistent with moderate to severe aortic stenosis.  3. Severely dilated left atrium.  LA volume index = 57  cc/m2.  4. Mild concentric left ventricular hypertrophy.  5. Normal left ventricular systolic function. No segmental  wall motion abnormalities.  6. Severe diastolic dysfunction.  7. Severe right atrial enlargement.  8. Normal right ventricular size and function.  9. Estimated right ventricular systolic pressure equals 53  mm Hg, assuming right atrial pressure equals 10 mm Hg,  consistent with moderate pulmonary hypertension.    < end of copied text >      ASSESSMENT/PLAN: Patient is a 89 year old Female well known to our office (Cardiologist - Dr. Berman), who presented with a foot wound.  Consult requested for pre-op cardiovascular risk stratification.  Her known PMH includes persistent Afib on coumadin who was not a candidate for left atrial appendage occlusion with Watchman, chronic HFpEF, HTN, HLD, hypothyroidism, CKD, Type 2 DM, CVA, and hx of PE.    - LE angio pending   - Continue AC for cva prevention if no contraindications - on hep gtt currently   - Continue with PO lasix   - Podiatry/ID follow up  - TTE noted above with normal LVEF, severe diastolic dysfunction, mild-mod MR, mod pulm HTN, and mod-severe AS: auscultation favors moderate aortic stenosis.  Yearly Echo for followup.  Patient does not have any 'red flag' symptoms such as syncope, crushing angina, dyspnea on exertion (limited by her foot), so will continue to monitor in the office.  - In addition, the patient and her family prefers conservative medical management of her valvular disease and does not want any further invasive workup or surgery  - no further inpatient cardiac w/u needed at this time  - optimized from cv perspective for planned LE angio    Trisha Banks MD

## 2020-07-27 NOTE — PROGRESS NOTE ADULT - SUBJECTIVE AND OBJECTIVE BOX
VASCULAR SURGERY PROGRESS NOTE    89yFemale    SUBJECTIVE:  Patient seen and examined at bedside. No acute events overnight.     --------------------------------------------------------------------------------------------------  OBJECTIVE:     Vital Signs:  Vital Signs Last 24 Hrs  T(C): 36.5 (27 Jul 2020 05:32), Max: 37.3 (26 Jul 2020 21:09)  T(F): 97.7 (27 Jul 2020 05:32), Max: 99.1 (26 Jul 2020 21:09)  HR: 88 (27 Jul 2020 05:32) (65 - 89)  BP: 152/58 (27 Jul 2020 05:32) (142/62 - 152/71)  BP(mean): --  RR: 18 (27 Jul 2020 05:32) (18 - 22)  SpO2: 100% (27 Jul 2020 05:32) (96% - 100%)    --------------------------------------------------------------------------------------------------  Laboratories:                        7.8    9.62  )-----------( 243      ( 27 Jul 2020 03:10 )             25.7         27 Jul 2020 03:10    137    |  102    |  54     ----------------------------<  122    4.3     |  27     |  1.89     Ca    9.6        27 Jul 2020 03:10  Phos  4.0       27 Jul 2020 03:10  Mg     2.1       27 Jul 2020 03:10      PT/INR - ( 27 Jul 2020 03:10 )   PT: 17.4 SEC;   INR: 1.49          PTT - ( 27 Jul 2020 03:10 )  PTT:94.4 SEC    --------------------------------------------------------------------------------------------------  Physical Exam:  General: No acute distress, resting comfortably  Vascular Exam: palpable popliteal pulses b/l  doppler signal DP and PT b/l  LLE 1st toe with dry gangrene with no surrounding erythema or purulence    --------------------------------------------------------------------------------------------------  Medications:  MEDICATIONS  (STANDING):  allopurinol 100 milliGRAM(s) Oral daily  amLODIPine   Tablet 10 milliGRAM(s) Oral daily  cefepime   IVPB 1000 milliGRAM(s) IV Intermittent every 24 hours  dextrose 5%. 1000 milliLiter(s) (50 mL/Hr) IV Continuous <Continuous>  dextrose 50% Injectable 12.5 Gram(s) IV Push once  dextrose 50% Injectable 25 Gram(s) IV Push once  dextrose 50% Injectable 25 Gram(s) IV Push once  furosemide    Tablet 40 milliGRAM(s) Oral two times a day  gabapentin 100 milliGRAM(s) Oral three times a day  heparin  Infusion.  Unit(s)/Hr (13 mL/Hr) IV Continuous <Continuous>  hydrALAZINE 75 milliGRAM(s) Oral three times a day  insulin lispro (HumaLOG) corrective regimen sliding scale   SubCutaneous three times a day before meals  insulin lispro (HumaLOG) corrective regimen sliding scale   SubCutaneous at bedtime  latanoprost 0.005% Ophthalmic Solution 1 Drop(s) Left EYE at bedtime  levothyroxine 75 MICROGram(s) Oral daily  senna 2 Tablet(s) Oral at bedtime  simvastatin 20 milliGRAM(s) Oral at bedtime  spironolactone 25 milliGRAM(s) Oral daily    MEDICATIONS  (PRN):  acetaminophen   Tablet .. 650 milliGRAM(s) Oral every 6 hours PRN Temp greater or equal to 38C (100.4F), Mild Pain (1 - 3), Moderate Pain (4 - 6), Severe Pain (7 - 10)  dextrose 40% Gel 15 Gram(s) Oral once PRN Blood Glucose LESS THAN 70 milliGRAM(s)/deciliter  glucagon  Injectable 1 milliGRAM(s) IntraMuscular once PRN Glucose LESS THAN 70 milligrams/deciliter  heparin   Injectable 6000 Unit(s) IV Push every 6 hours PRN For aPTT less than 40  heparin   Injectable 3000 Unit(s) IV Push every 6 hours PRN For aPTT between 40 - 57  polyethylene glycol 3350 17 Gram(s) Oral daily PRN Constipation

## 2020-07-28 LAB
ANION GAP SERPL CALC-SCNC: 14 MMO/L — SIGNIFICANT CHANGE UP (ref 7–14)
ANION GAP SERPL CALC-SCNC: 14 MMO/L — SIGNIFICANT CHANGE UP (ref 7–14)
APTT BLD: 80.1 SEC — HIGH (ref 27–36.3)
BUN SERPL-MCNC: 57 MG/DL — HIGH (ref 7–23)
BUN SERPL-MCNC: 57 MG/DL — HIGH (ref 7–23)
CALCIUM SERPL-MCNC: 9.9 MG/DL — SIGNIFICANT CHANGE UP (ref 8.4–10.5)
CALCIUM SERPL-MCNC: 9.9 MG/DL — SIGNIFICANT CHANGE UP (ref 8.4–10.5)
CHLORIDE SERPL-SCNC: 100 MMOL/L — SIGNIFICANT CHANGE UP (ref 98–107)
CHLORIDE SERPL-SCNC: 100 MMOL/L — SIGNIFICANT CHANGE UP (ref 98–107)
CO2 SERPL-SCNC: 24 MMOL/L — SIGNIFICANT CHANGE UP (ref 22–31)
CO2 SERPL-SCNC: 24 MMOL/L — SIGNIFICANT CHANGE UP (ref 22–31)
CREAT SERPL-MCNC: 2.13 MG/DL — HIGH (ref 0.5–1.3)
CREAT SERPL-MCNC: 2.13 MG/DL — HIGH (ref 0.5–1.3)
GLUCOSE BLDC GLUCOMTR-MCNC: 147 MG/DL — HIGH (ref 70–99)
GLUCOSE BLDC GLUCOMTR-MCNC: 155 MG/DL — HIGH (ref 70–99)
GLUCOSE BLDC GLUCOMTR-MCNC: 161 MG/DL — HIGH (ref 70–99)
GLUCOSE BLDC GLUCOMTR-MCNC: 193 MG/DL — HIGH (ref 70–99)
GLUCOSE SERPL-MCNC: 149 MG/DL — HIGH (ref 70–99)
GLUCOSE SERPL-MCNC: 149 MG/DL — HIGH (ref 70–99)
HCT VFR BLD CALC: 27.1 % — LOW (ref 34.5–45)
HGB BLD-MCNC: 8.6 G/DL — LOW (ref 11.5–15.5)
INR BLD: 1.36 — HIGH (ref 0.88–1.17)
MAGNESIUM SERPL-MCNC: 2 MG/DL — SIGNIFICANT CHANGE UP (ref 1.6–2.6)
MCHC RBC-ENTMCNC: 29.6 PG — SIGNIFICANT CHANGE UP (ref 27–34)
MCHC RBC-ENTMCNC: 31.7 % — LOW (ref 32–36)
MCV RBC AUTO: 93.1 FL — SIGNIFICANT CHANGE UP (ref 80–100)
NRBC # FLD: 0 K/UL — SIGNIFICANT CHANGE UP (ref 0–0)
PHOSPHATE SERPL-MCNC: 3.9 MG/DL — SIGNIFICANT CHANGE UP (ref 2.5–4.5)
PLATELET # BLD AUTO: 248 K/UL — SIGNIFICANT CHANGE UP (ref 150–400)
PMV BLD: 11.3 FL — SIGNIFICANT CHANGE UP (ref 7–13)
POTASSIUM SERPL-MCNC: 4.5 MMOL/L — SIGNIFICANT CHANGE UP (ref 3.5–5.3)
POTASSIUM SERPL-MCNC: 4.5 MMOL/L — SIGNIFICANT CHANGE UP (ref 3.5–5.3)
POTASSIUM SERPL-SCNC: 4.5 MMOL/L — SIGNIFICANT CHANGE UP (ref 3.5–5.3)
POTASSIUM SERPL-SCNC: 4.5 MMOL/L — SIGNIFICANT CHANGE UP (ref 3.5–5.3)
PROTHROM AB SERPL-ACNC: 15.6 SEC — HIGH (ref 9.8–13.1)
RBC # BLD: 2.91 M/UL — LOW (ref 3.8–5.2)
RBC # FLD: 15 % — HIGH (ref 10.3–14.5)
SODIUM SERPL-SCNC: 138 MMOL/L — SIGNIFICANT CHANGE UP (ref 135–145)
SODIUM SERPL-SCNC: 138 MMOL/L — SIGNIFICANT CHANGE UP (ref 135–145)
WBC # BLD: 8.21 K/UL — SIGNIFICANT CHANGE UP (ref 3.8–10.5)
WBC # FLD AUTO: 8.21 K/UL — SIGNIFICANT CHANGE UP (ref 3.8–10.5)

## 2020-07-28 PROCEDURE — 75630 X-RAY AORTA LEG ARTERIES: CPT | Mod: 26

## 2020-07-28 PROCEDURE — 76937 US GUIDE VASCULAR ACCESS: CPT | Mod: 26

## 2020-07-28 PROCEDURE — 36247 INS CATH ABD/L-EXT ART 3RD: CPT

## 2020-07-28 RX ORDER — HEPARIN SODIUM 5000 [USP'U]/ML
INJECTION INTRAVENOUS; SUBCUTANEOUS
Qty: 25000 | Refills: 0 | Status: DISCONTINUED | OUTPATIENT
Start: 2020-07-28 | End: 2020-08-11

## 2020-07-28 RX ORDER — WARFARIN SODIUM 2.5 MG/1
3 TABLET ORAL ONCE
Refills: 0 | Status: DISCONTINUED | OUTPATIENT
Start: 2020-07-28 | End: 2020-07-29

## 2020-07-28 RX ORDER — HEPARIN SODIUM 5000 [USP'U]/ML
3000 INJECTION INTRAVENOUS; SUBCUTANEOUS EVERY 6 HOURS
Refills: 0 | Status: DISCONTINUED | OUTPATIENT
Start: 2020-07-28 | End: 2020-08-11

## 2020-07-28 RX ORDER — HEPARIN SODIUM 5000 [USP'U]/ML
6000 INJECTION INTRAVENOUS; SUBCUTANEOUS EVERY 6 HOURS
Refills: 0 | Status: DISCONTINUED | OUTPATIENT
Start: 2020-07-28 | End: 2020-08-11

## 2020-07-28 RX ORDER — SODIUM CHLORIDE 9 MG/ML
1000 INJECTION INTRAMUSCULAR; INTRAVENOUS; SUBCUTANEOUS
Refills: 0 | Status: DISCONTINUED | OUTPATIENT
Start: 2020-07-28 | End: 2020-08-07

## 2020-07-28 RX ADMIN — Medication 2: at 11:01

## 2020-07-28 RX ADMIN — Medication 1200 MILLIGRAM(S): at 18:21

## 2020-07-28 RX ADMIN — Medication 100 MILLIGRAM(S): at 16:24

## 2020-07-28 RX ADMIN — Medication 1200 MILLIGRAM(S): at 05:49

## 2020-07-28 RX ADMIN — SODIUM CHLORIDE 75 MILLILITER(S): 9 INJECTION INTRAMUSCULAR; INTRAVENOUS; SUBCUTANEOUS at 16:24

## 2020-07-28 RX ADMIN — LATANOPROST 1 DROP(S): 0.05 SOLUTION/ DROPS OPHTHALMIC; TOPICAL at 21:14

## 2020-07-28 RX ADMIN — Medication 75 MILLIGRAM(S): at 16:25

## 2020-07-28 RX ADMIN — POLYETHYLENE GLYCOL 3350 17 GRAM(S): 17 POWDER, FOR SOLUTION ORAL at 16:24

## 2020-07-28 RX ADMIN — AMLODIPINE BESYLATE 10 MILLIGRAM(S): 2.5 TABLET ORAL at 05:49

## 2020-07-28 RX ADMIN — Medication 75 MILLIGRAM(S): at 21:14

## 2020-07-28 RX ADMIN — GABAPENTIN 100 MILLIGRAM(S): 400 CAPSULE ORAL at 21:14

## 2020-07-28 RX ADMIN — SPIRONOLACTONE 25 MILLIGRAM(S): 25 TABLET, FILM COATED ORAL at 05:49

## 2020-07-28 RX ADMIN — SIMVASTATIN 20 MILLIGRAM(S): 20 TABLET, FILM COATED ORAL at 21:14

## 2020-07-28 RX ADMIN — Medication 75 MILLIGRAM(S): at 05:49

## 2020-07-28 RX ADMIN — CEFEPIME 100 MILLIGRAM(S): 1 INJECTION, POWDER, FOR SOLUTION INTRAMUSCULAR; INTRAVENOUS at 16:26

## 2020-07-28 RX ADMIN — GABAPENTIN 100 MILLIGRAM(S): 400 CAPSULE ORAL at 16:24

## 2020-07-28 RX ADMIN — SODIUM CHLORIDE 75 MILLILITER(S): 9 INJECTION, SOLUTION INTRAVENOUS at 00:00

## 2020-07-28 RX ADMIN — HEPARIN SODIUM 1500 UNIT(S)/HR: 5000 INJECTION INTRAVENOUS; SUBCUTANEOUS at 07:26

## 2020-07-28 RX ADMIN — SENNA PLUS 2 TABLET(S): 8.6 TABLET ORAL at 21:14

## 2020-07-28 NOTE — PROGRESS NOTE ADULT - SUBJECTIVE AND OBJECTIVE BOX
VASCULAR SURGERY PROGRESS NOTE    S: No acute events overnight. Patient doing well, no acute complaints.    O: Vital Signs  T(C): 36.3 (07-28 @ 05:28), Max: 37.1 (07-27 @ 19:18)  HR: 62 (07-28 @ 05:28) (62 - 85)  BP: 157/65 (07-28 @ 05:28) (124/50 - 157/65)  RR: 18 (07-28 @ 05:28) (16 - 18)  SpO2: 100% (07-28 @ 05:28) (100% - 100%)    General: alert and oriented, NAD  Extremities: LLE 1st toe with dry gangrene with no surrounding erythema or purulence  Vascular Exam: palpable popliteal pulses b/l doppler signal DP and PT b/l                            8.6    8.21  )-----------( 248      ( 28 Jul 2020 06:00 )             27.1   07-28    138  |  100  |  57<H>  ----------------------------<  149<H>  4.5   |  24  |  2.13<H>    Ca    9.9      28 Jul 2020 06:00  Phos  3.9     07-28  Mg     2.0     07-28

## 2020-07-28 NOTE — PROGRESS NOTE ADULT - ASSESSMENT
89 year old female PMH  a-fib on coumadin, diastolic CHF (with EF 63% on TTE 8/13/19), HTN, HLD, CKD stage 3-4, IDDM, PVD, CVA, PE, hypothyroidism, p/w L foot wound. Patient seen today at clinic and sent for cellulitis and abscess, wanting to rule out OM. Patient denies fever, chills, cp, sob, abd pain, n/v, weakness, or numbness/tingling.     Problem/Plan - 1:  ·  Problem: Toe gangrene.  Plan: Podiatry consult noted. S/P IV Abxs. NO fever or Leucocytosis so likely ischemic . ID and Vascular helping.      Problem/Plan - 2:  ·  Problem: Stage 3 chronic kidney disease.  Plan: Renal helping .  Baseline Creatinine 1.8.      Problem/Plan - 3:  ·  Problem: PVD (peripheral vascular disease).  Plan: LIZBETH/PVD noted.  Vascular consult noted. S/P angiogram .     Problem/Plan - 4:  ·  Problem: Chronic diastolic congestive heart failure.  Plan: Continuing Lasix .  Cardiology helping.      Problem/Plan - 5:  ·  Problem: DM (diabetes mellitus).  Plan: SSI for now .      Problem/Plan - 6:  Problem: Afib. Plan: ON AC.      Problem/Plan - 7:  ·  Problem: Pulmonary embolism.  Plan: On AC. INR noted      Problem/Plan - 8:  ·  Problem:  Hypothyroid.  Plan: Synthroid.      Problem/Plan - 9:  ·  Problem: Anemia.  Plan: Chronic .      Problem/Plan - 10:  ·  Problem: Uncontrolled Hypertension .  Plan: Adjusting BP meds.

## 2020-07-28 NOTE — CHART NOTE - NSCHARTNOTEFT_GEN_A_CORE
Nutrition assessment for extended length of stay (7 days). RD attempted to interview with patient today but patient was not available at visits x2 as she went for angiogram procedure. No family present at visits. Diet order remains NPO p MN for angiogram procedure scheduled today. RD will follow up on interview with patient.

## 2020-07-28 NOTE — CHART NOTE - NSCHARTNOTEFT_GEN_A_CORE
89F POD0 from LLE angiogram.    Subjective: Pt not complaining of pain currently. She is tolerating reg diet.     Objective:   Exam:  General: sitting upright in bed  Chest: RRR, nonlabored breathing  Abdomen: S/NT/ND, No hematomas or ecchymoses, R groin puncture site dressing is CDI with no strikethrough  Extremities: 2+ femoral and popliteal pulses B/L; 1+ R PT and DP Pulses; Nonpalpable L PT; L DP not examined through dressing; Dry necrotic L big toe     Vital Signs Last 24 Hrs  T(C): 36.6 (28 Jul 2020 16:05), Max: 37.1 (27 Jul 2020 19:18)  T(F): 97.9 (28 Jul 2020 16:05), Max: 98.8 (27 Jul 2020 19:18)  HR: 66 (28 Jul 2020 16:05) (62 - 74)  BP: 129/48 (28 Jul 2020 16:05) (129/48 - 157/65)  BP(mean): --  RR: 16 (28 Jul 2020 16:05) (16 - 18)  SpO2: 100% (28 Jul 2020 16:05) (100% - 100%)    I&O's Detail  MEDICATIONS  (STANDING):  acetylcysteine  Oral Solution 1200 milliGRAM(s) Oral two times a day  allopurinol 100 milliGRAM(s) Oral daily  amLODIPine   Tablet 10 milliGRAM(s) Oral daily  cefepime   IVPB 1000 milliGRAM(s) IV Intermittent every 24 hours  dextrose 5%. 1000 milliLiter(s) (50 mL/Hr) IV Continuous <Continuous>  dextrose 50% Injectable 12.5 Gram(s) IV Push once  dextrose 50% Injectable 25 Gram(s) IV Push once  dextrose 50% Injectable 25 Gram(s) IV Push once  gabapentin 100 milliGRAM(s) Oral three times a day  hydrALAZINE 75 milliGRAM(s) Oral three times a day  insulin lispro (HumaLOG) corrective regimen sliding scale   SubCutaneous three times a day before meals  insulin lispro (HumaLOG) corrective regimen sliding scale   SubCutaneous at bedtime  latanoprost 0.005% Ophthalmic Solution 1 Drop(s) Left EYE at bedtime  levothyroxine 75 MICROGram(s) Oral daily  polyethylene glycol 3350 17 Gram(s) Oral daily  senna 2 Tablet(s) Oral at bedtime  simvastatin 20 milliGRAM(s) Oral at bedtime  sodium chloride 0.9%. 1000 milliLiter(s) (75 mL/Hr) IV Continuous <Continuous>    MEDICATIONS  (PRN):  acetaminophen   Tablet .. 650 milliGRAM(s) Oral every 6 hours PRN Temp greater or equal to 38C (100.4F), Mild Pain (1 - 3), Moderate Pain (4 - 6), Severe Pain (7 - 10)  dextrose 40% Gel 15 Gram(s) Oral once PRN Blood Glucose LESS THAN 70 milliGRAM(s)/deciliter  glucagon  Injectable 1 milliGRAM(s) IntraMuscular once PRN Glucose LESS THAN 70 milligrams/deciliter      LABS:                        8.6    8.21  )-----------( 248      ( 28 Jul 2020 06:00 )             27.1     07-28    138  |  100  |  57<H>  ----------------------------<  149<H>  4.5   |  24  |  2.13<H>    Ca    9.9      28 Jul 2020 06:00  Phos  3.9     07-28  Mg     2.0     07-28      PT/INR - ( 28 Jul 2020 06:00 )   PT: 15.6 SEC;   INR: 1.36          PTT - ( 28 Jul 2020 06:00 )  PTT:80.1 SEC          A/P: 89F on POD0 from LLE angiogram. Stable after procedure.  - c/w reg diet  - c/w tylenol  - c/w ISS  - Monitor vitals and I&Os  - will f/u on morning rounds

## 2020-07-28 NOTE — PROGRESS NOTE ADULT - SUBJECTIVE AND OBJECTIVE BOX
INTERVAL HPI/OVERNIGHT EVENTS: Getting angio   Vital Signs Last 24 Hrs  T(C): 36.6 (28 Jul 2020 16:05), Max: 37.1 (27 Jul 2020 19:18)  T(F): 97.9 (28 Jul 2020 16:05), Max: 98.8 (27 Jul 2020 19:18)  HR: 66 (28 Jul 2020 16:05) (62 - 74)  BP: 129/48 (28 Jul 2020 16:05) (129/48 - 157/65)  BP(mean): --  RR: 16 (28 Jul 2020 16:05) (16 - 18)  SpO2: 100% (28 Jul 2020 16:05) (100% - 100%)  I&O's Summary    MEDICATIONS  (STANDING):  acetylcysteine  Oral Solution 1200 milliGRAM(s) Oral two times a day  allopurinol 100 milliGRAM(s) Oral daily  amLODIPine   Tablet 10 milliGRAM(s) Oral daily  cefepime   IVPB 1000 milliGRAM(s) IV Intermittent every 24 hours  dextrose 5%. 1000 milliLiter(s) (50 mL/Hr) IV Continuous <Continuous>  dextrose 50% Injectable 12.5 Gram(s) IV Push once  dextrose 50% Injectable 25 Gram(s) IV Push once  dextrose 50% Injectable 25 Gram(s) IV Push once  gabapentin 100 milliGRAM(s) Oral three times a day  hydrALAZINE 75 milliGRAM(s) Oral three times a day  insulin lispro (HumaLOG) corrective regimen sliding scale   SubCutaneous three times a day before meals  insulin lispro (HumaLOG) corrective regimen sliding scale   SubCutaneous at bedtime  latanoprost 0.005% Ophthalmic Solution 1 Drop(s) Left EYE at bedtime  levothyroxine 75 MICROGram(s) Oral daily  polyethylene glycol 3350 17 Gram(s) Oral daily  senna 2 Tablet(s) Oral at bedtime  simvastatin 20 milliGRAM(s) Oral at bedtime  sodium chloride 0.9%. 1000 milliLiter(s) (75 mL/Hr) IV Continuous <Continuous>    MEDICATIONS  (PRN):  acetaminophen   Tablet .. 650 milliGRAM(s) Oral every 6 hours PRN Temp greater or equal to 38C (100.4F), Mild Pain (1 - 3), Moderate Pain (4 - 6), Severe Pain (7 - 10)  dextrose 40% Gel 15 Gram(s) Oral once PRN Blood Glucose LESS THAN 70 milliGRAM(s)/deciliter  glucagon  Injectable 1 milliGRAM(s) IntraMuscular once PRN Glucose LESS THAN 70 milligrams/deciliter    LABS:                        8.6    8.21  )-----------( 248      ( 28 Jul 2020 06:00 )             27.1     07-28    138  |  100  |  57<H>  ----------------------------<  149<H>  4.5   |  24  |  2.13<H>    Ca    9.9      28 Jul 2020 06:00  Phos  3.9     07-28  Mg     2.0     07-28      PT/INR - ( 28 Jul 2020 06:00 )   PT: 15.6 SEC;   INR: 1.36          PTT - ( 28 Jul 2020 06:00 )  PTT:80.1 SEC    CAPILLARY BLOOD GLUCOSE      POCT Blood Glucose.: 147 mg/dL (28 Jul 2020 17:24)  POCT Blood Glucose.: 161 mg/dL (28 Jul 2020 10:51)  POCT Blood Glucose.: 155 mg/dL (28 Jul 2020 07:43)  POCT Blood Glucose.: 133 mg/dL (27 Jul 2020 22:01)          REVIEW OF SYSTEMS:  CONSTITUTIONAL: No fever, weight loss, or fatigue  EYES: No eye pain, visual disturbances, or discharge  ENMT:  No difficulty hearing, tinnitus, vertigo; No sinus or throat pain  NECK: No pain or stiffness  RESPIRATORY: No cough, wheezing, chills or hemoptysis; No shortness of breath  CARDIOVASCULAR: No chest pain, palpitations, dizziness, or leg swelling  GASTROINTESTINAL: No abdominal or epigastric pain. No nausea, vomiting, or hematemesis; No diarrhea or constipation. No melena or hematochezia.  GENITOURINARY: No dysuria, frequency, hematuria, or incontinence  NEUROLOGICAL: No headaches, memory loss, loss of strength, numbness, or tremors    Consultant(s) Notes Reviewed:  [x ] YES  [ ] NO    PHYSICAL EXAM:  GENERAL: NAD, well-groomed, well-developed,not in any distress ,  HEAD:  Atraumatic, Normocephalic  EYES: EOMI, PERRLA, conjunctiva and sclera clear  ENMT: No tonsillar erythema, exudates, or enlargement; Moist mucous membranes, Good dentition, No lesions  NECK: Supple, No JVD, Normal thyroid  NERVOUS SYSTEM:  Alert & Oriented X3, No focal deficit   CHEST/LUNG: Good air entry bilateral with no  rales, rhonchi, wheezing, or rubs  HEART: Regular rate and rhythm; No murmurs, rubs, or gallops  ABDOMEN: Soft, Nontender, Nondistended; Bowel sounds present  EXTREMITIES:  foot dressed     Care Discussed with Consultants/Other Providers [ x] YES  [ ] NO

## 2020-07-28 NOTE — PROVIDER CONTACT NOTE (OTHER) - ASSESSMENT
The patient has been NPO since midnight for an angiogram procedure scheduled today. She has amlodipine, hydralazine, and spironolactone due this morning, and her BP is 157/66, HR 62. Should I hole or give these BP meds

## 2020-07-28 NOTE — PROGRESS NOTE ADULT - ASSESSMENT
ASSESSMENT:   89F PMH CKD now with nonhealing dry gangrene of left 1st toe and noncompressible PAD.     PLAN:  - Plan for angiogram today. Consent signed and in chart.  - Medical and Cardiology optimization appreciated  - C/w heparin gtt  - Podiatry: planning left hallux amp pending angiogram  - Continue NPO prior to procedure  - Appreciate Nephrology recommendations  - Pre-op labs reviewed    Please contact Vascular Surgery (p. [phone]) with any questions.     Vascular Surgery, C Team  Pager [phone]

## 2020-07-28 NOTE — CHART NOTE - NSCHARTNOTEFT_GEN_A_CORE
Patient is s/p LLE angiogram via right femoral access. Patient without any complaints. Site is stable with no hematoma or active bleed noted. No swelling, dressing is clean/intact/dry. Right femoral pulse palpable. Patient has full ROM in right leg. Capillary refill < 2 seconds. Will continue to monitor closely.     PT/INR - ( 28 Jul 2020 06:00 )   PT: 15.6 SEC;   INR: 1.36        PTT - ( 28 Jul 2020 06:00 )  PTT:80.1 SEC    Vital Signs Last 24 Hrs  T(C): 36.9 (28 Jul 2020 21:10), Max: 36.9 (28 Jul 2020 21:10)  T(F): 98.4 (28 Jul 2020 21:10), Max: 98.4 (28 Jul 2020 21:10)  HR: 75 (28 Jul 2020 21:10) (62 - 75)  BP: 125/54 (28 Jul 2020 21:10) (125/54 - 157/65)  BP(mean): --  RR: 18 (28 Jul 2020 21:10) (16 - 18)  SpO2: 100% (28 Jul 2020 21:10) (100% - 100%)    Plan:   -Will restart patient on Heparin ggt and dose coumadin per cath results  -Coagulation labs ordered for the am  -RN made aware of plan   -Will continue to monitor    DEEP Barajas  Department of Medicine   In House # [id number]

## 2020-07-29 LAB
ANION GAP SERPL CALC-SCNC: 14 MMO/L — SIGNIFICANT CHANGE UP (ref 7–14)
APTT BLD: 49.5 SEC — HIGH (ref 27–36.3)
APTT BLD: 64.2 SEC — HIGH (ref 27–36.3)
APTT BLD: 77.1 SEC — HIGH (ref 27–36.3)
BUN SERPL-MCNC: 63 MG/DL — HIGH (ref 7–23)
CALCIUM SERPL-MCNC: 10.1 MG/DL — SIGNIFICANT CHANGE UP (ref 8.4–10.5)
CHLORIDE SERPL-SCNC: 100 MMOL/L — SIGNIFICANT CHANGE UP (ref 98–107)
CO2 SERPL-SCNC: 24 MMOL/L — SIGNIFICANT CHANGE UP (ref 22–31)
CREAT SERPL-MCNC: 2.08 MG/DL — HIGH (ref 0.5–1.3)
GLUCOSE BLDC GLUCOMTR-MCNC: 122 MG/DL — HIGH (ref 70–99)
GLUCOSE BLDC GLUCOMTR-MCNC: 143 MG/DL — HIGH (ref 70–99)
GLUCOSE BLDC GLUCOMTR-MCNC: 145 MG/DL — HIGH (ref 70–99)
GLUCOSE BLDC GLUCOMTR-MCNC: 199 MG/DL — HIGH (ref 70–99)
GLUCOSE SERPL-MCNC: 153 MG/DL — HIGH (ref 70–99)
HCT VFR BLD CALC: 24.7 % — LOW (ref 34.5–45)
HGB BLD-MCNC: 8 G/DL — LOW (ref 11.5–15.5)
INR BLD: 1.35 — HIGH (ref 0.88–1.17)
MAGNESIUM SERPL-MCNC: 2.3 MG/DL — SIGNIFICANT CHANGE UP (ref 1.6–2.6)
MCHC RBC-ENTMCNC: 30.3 PG — SIGNIFICANT CHANGE UP (ref 27–34)
MCHC RBC-ENTMCNC: 32.4 % — SIGNIFICANT CHANGE UP (ref 32–36)
MCV RBC AUTO: 93.6 FL — SIGNIFICANT CHANGE UP (ref 80–100)
NRBC # FLD: 0 K/UL — SIGNIFICANT CHANGE UP (ref 0–0)
PHOSPHATE SERPL-MCNC: 3.8 MG/DL — SIGNIFICANT CHANGE UP (ref 2.5–4.5)
PLATELET # BLD AUTO: 233 K/UL — SIGNIFICANT CHANGE UP (ref 150–400)
PMV BLD: 11.5 FL — SIGNIFICANT CHANGE UP (ref 7–13)
POTASSIUM SERPL-MCNC: 4.3 MMOL/L — SIGNIFICANT CHANGE UP (ref 3.5–5.3)
POTASSIUM SERPL-SCNC: 4.3 MMOL/L — SIGNIFICANT CHANGE UP (ref 3.5–5.3)
PROTHROM AB SERPL-ACNC: 15.7 SEC — HIGH (ref 9.8–13.1)
RBC # BLD: 2.64 M/UL — LOW (ref 3.8–5.2)
RBC # FLD: 15.1 % — HIGH (ref 10.3–14.5)
SODIUM SERPL-SCNC: 138 MMOL/L — SIGNIFICANT CHANGE UP (ref 135–145)
WBC # BLD: 7.63 K/UL — SIGNIFICANT CHANGE UP (ref 3.8–10.5)
WBC # FLD AUTO: 7.63 K/UL — SIGNIFICANT CHANGE UP (ref 3.8–10.5)

## 2020-07-29 PROCEDURE — 99232 SBSQ HOSP IP/OBS MODERATE 35: CPT

## 2020-07-29 RX ORDER — PIPERACILLIN AND TAZOBACTAM 4; .5 G/20ML; G/20ML
3.38 INJECTION, POWDER, LYOPHILIZED, FOR SOLUTION INTRAVENOUS ONCE
Refills: 0 | Status: COMPLETED | OUTPATIENT
Start: 2020-07-29 | End: 2020-07-29

## 2020-07-29 RX ORDER — VANCOMYCIN HCL 1 G
750 VIAL (EA) INTRAVENOUS ONCE
Refills: 0 | Status: COMPLETED | OUTPATIENT
Start: 2020-07-29 | End: 2020-07-30

## 2020-07-29 RX ORDER — PIPERACILLIN AND TAZOBACTAM 4; .5 G/20ML; G/20ML
3.38 INJECTION, POWDER, LYOPHILIZED, FOR SOLUTION INTRAVENOUS EVERY 12 HOURS
Refills: 0 | Status: DISCONTINUED | OUTPATIENT
Start: 2020-07-29 | End: 2020-08-05

## 2020-07-29 RX ORDER — OXYCODONE HYDROCHLORIDE 5 MG/1
5 TABLET ORAL ONCE
Refills: 0 | Status: DISCONTINUED | OUTPATIENT
Start: 2020-07-29 | End: 2020-07-29

## 2020-07-29 RX ADMIN — OXYCODONE HYDROCHLORIDE 5 MILLIGRAM(S): 5 TABLET ORAL at 22:15

## 2020-07-29 RX ADMIN — LATANOPROST 1 DROP(S): 0.05 SOLUTION/ DROPS OPHTHALMIC; TOPICAL at 21:55

## 2020-07-29 RX ADMIN — SIMVASTATIN 20 MILLIGRAM(S): 20 TABLET, FILM COATED ORAL at 21:52

## 2020-07-29 RX ADMIN — GABAPENTIN 100 MILLIGRAM(S): 400 CAPSULE ORAL at 05:11

## 2020-07-29 RX ADMIN — CEFEPIME 100 MILLIGRAM(S): 1 INJECTION, POWDER, FOR SOLUTION INTRAMUSCULAR; INTRAVENOUS at 16:38

## 2020-07-29 RX ADMIN — HEPARIN SODIUM 1500 UNIT(S)/HR: 5000 INJECTION INTRAVENOUS; SUBCUTANEOUS at 22:58

## 2020-07-29 RX ADMIN — PIPERACILLIN AND TAZOBACTAM 200 GRAM(S): 4; .5 INJECTION, POWDER, LYOPHILIZED, FOR SOLUTION INTRAVENOUS at 21:55

## 2020-07-29 RX ADMIN — Medication 650 MILLIGRAM(S): at 03:42

## 2020-07-29 RX ADMIN — HEPARIN SODIUM 3000 UNIT(S): 5000 INJECTION INTRAVENOUS; SUBCUTANEOUS at 07:52

## 2020-07-29 RX ADMIN — Medication 75 MICROGRAM(S): at 05:11

## 2020-07-29 RX ADMIN — Medication 1200 MILLIGRAM(S): at 05:11

## 2020-07-29 RX ADMIN — Medication 100 MILLIGRAM(S): at 12:38

## 2020-07-29 RX ADMIN — POLYETHYLENE GLYCOL 3350 17 GRAM(S): 17 POWDER, FOR SOLUTION ORAL at 12:38

## 2020-07-29 RX ADMIN — Medication 75 MILLIGRAM(S): at 21:52

## 2020-07-29 RX ADMIN — HEPARIN SODIUM 1500 UNIT(S)/HR: 5000 INJECTION INTRAVENOUS; SUBCUTANEOUS at 16:39

## 2020-07-29 RX ADMIN — Medication 75 MILLIGRAM(S): at 05:11

## 2020-07-29 RX ADMIN — Medication 2: at 12:39

## 2020-07-29 RX ADMIN — GABAPENTIN 100 MILLIGRAM(S): 400 CAPSULE ORAL at 15:03

## 2020-07-29 RX ADMIN — Medication 650 MILLIGRAM(S): at 04:42

## 2020-07-29 RX ADMIN — HEPARIN SODIUM 1300 UNIT(S)/HR: 5000 INJECTION INTRAVENOUS; SUBCUTANEOUS at 00:56

## 2020-07-29 RX ADMIN — OXYCODONE HYDROCHLORIDE 5 MILLIGRAM(S): 5 TABLET ORAL at 23:10

## 2020-07-29 RX ADMIN — SENNA PLUS 2 TABLET(S): 8.6 TABLET ORAL at 21:52

## 2020-07-29 RX ADMIN — Medication 75 MILLIGRAM(S): at 15:03

## 2020-07-29 RX ADMIN — GABAPENTIN 100 MILLIGRAM(S): 400 CAPSULE ORAL at 21:52

## 2020-07-29 RX ADMIN — AMLODIPINE BESYLATE 10 MILLIGRAM(S): 2.5 TABLET ORAL at 05:11

## 2020-07-29 RX ADMIN — HEPARIN SODIUM 1500 UNIT(S)/HR: 5000 INJECTION INTRAVENOUS; SUBCUTANEOUS at 07:51

## 2020-07-29 NOTE — DIETITIAN INITIAL EVALUATION ADULT. - OTHER INFO
89 y/o female admitted with dx of L great toe gangrene. Visited with pt to obtain nutrition hx. Pt said that she has been eating "okay." She said that she is a small eater and usually doesn't finish her meals. She thinks she eats approx 70% of each tray. She denies food allergies, nausea/vomiting/diarrhea or issues with chewing/swallowing but does endorse c/o constipation. She attributes this to her "small appetite." A bowel regimen has been ordered for her. Would recommend Nepro 1x daily (425 kcals, 19.1g protein) to optimize her nutrition and help facilitate more regular BMs as it contains fiber. Last BM 7/24. Pt said that she has had a stable wt PTA and follows a diabetic and renal diet. She is not interested in further diet instruction nor written materials at this time. Pt without additional nutrition related issues or concerns at this time. RDN services to remain available as needed.

## 2020-07-29 NOTE — PROGRESS NOTE ADULT - ASSESSMENT
89 year old female PMH  a-fib on coumadin, diastolic CHF (with EF 63% on TTE 8/13/19), HTN, HLD, CKD stage 3-4, IDDM, PVD, CVA, PE, hypothyroidism, p/w L foot wound. Patient was sent yesterday from podiatrist office for non healing left great toe ulcer, to rule out OM. Renal following for NIKHIL/CKD Mx.     CKD4: Baseline Creatinine 1.7-1.9   euvolemic. Electrolytes acceptable.    s/p LE angiogram yesterday, given IVF post procedure.   Now lasix resumed. Maintain I=O.   avoid ACEi/ARB for now/NSAIDs/Nephrotoxics.  monitor BMP daily     HTN, controlled-bp optimal. c/w current bp meds. avoid ACEi/ARB. pain Mx.     Toe gangrene, PAD.  f/u w/ Podiatry, tentative plan for amputation fri.     DM- Mx per medicine      labs, chart reviewed

## 2020-07-29 NOTE — PROGRESS NOTE ADULT - SUBJECTIVE AND OBJECTIVE BOX
Podiatry pager #: [phone] ([place])/ [phone] ([facility])    Patient is a 89y old  Female who presents with a chief complaint of left foot big toe pain (28 Jul 2020 16:14)       INTERVAL HPI/OVERNIGHT EVENTS:  Patient seen and evaluated at bedside.  Pt is resting comfortable in NAD. Denies N/V/F/C.     Allergies    No Known Allergies    Intolerances        Vital Signs Last 24 Hrs  T(C): 36.8 (29 Jul 2020 05:10), Max: 36.9 (28 Jul 2020 21:10)  T(F): 98.2 (29 Jul 2020 05:10), Max: 98.4 (28 Jul 2020 21:10)  HR: 70 (29 Jul 2020 05:10) (66 - 75)  BP: 135/58 (29 Jul 2020 05:10) (125/54 - 135/58)  BP(mean): --  RR: 20 (29 Jul 2020 05:10) (16 - 20)  SpO2: 98% (29 Jul 2020 05:10) (98% - 100%)    LABS:                        8.0    7.63  )-----------( 233      ( 29 Jul 2020 07:00 )             24.7     07-28    138  |  100  |  57<H>  ----------------------------<  149<H>  4.5   |  24  |  2.13<H>    Ca    9.9      28 Jul 2020 06:00  Phos  3.9     07-28  Mg     2.0     07-28      PT/INR - ( 29 Jul 2020 07:00 )   PT: 15.7 SEC;   INR: 1.35          PTT - ( 29 Jul 2020 07:00 )  PTT:49.5 SEC    CAPILLARY BLOOD GLUCOSE      POCT Blood Glucose.: 122 mg/dL (29 Jul 2020 08:28)  POCT Blood Glucose.: 193 mg/dL (28 Jul 2020 22:02)  POCT Blood Glucose.: 147 mg/dL (28 Jul 2020 17:24)  POCT Blood Glucose.: 161 mg/dL (28 Jul 2020 10:51)      Lower Extremity Physical Exam:  Vascular: DP/PT 0/4, dopplerable monophasic L DP, remainder of DP/PT biphasic B/L, CFT absent L hallux, <3 sec digits x 9, Temperature gradient warm to cool on R, warm to cold at L hallux   Neuro: Epicritic sensation intact to the level of digits B/L.  Musculoskeletal/Ortho: no structural abnormalities  Derm: L foot with avulsed hallucal nail, L hallux stable dry gangrene, necrotic and cold to level of MPJ, no purulence, no open wound, no malodor, no associated cellulitis, etiology PVD    RADIOLOGY & ADDITIONAL TESTS:

## 2020-07-29 NOTE — CONSULT NOTE ADULT - SUBJECTIVE AND OBJECTIVE BOX
Patient is a 89y old  Female who presents with a chief complaint of left foot big toe pain (29 Jul 2020 13:23)      HPI:    89 year old female PMH  a-fib on coumadin, diastolic CHF (with EF 63% on TTE 8/13/19), HTN, HLD, CKD stage 3-4, IDDM, PVD, CVA, PE, hypothyroidism, p/w L foot wound. Patient seen today at clinic and sent for cellulitis and abscess, wanting to rule out OM. Patient denies fever, chills, cp, sob, abd pain, n/v, weakness, or numbness/tingling. (21 Jul 2020 20:43)      REVIEW OF SYSTEMS:    CONSTITUTIONAL: No fever, weight loss, or fatigue  EYES: No eye pain, visual disturbances, or discharge  ENMT:  No sore throat  NECK: No pain or stiffness  RESPIRATORY: No cough, wheezing, chills or hemoptysis; No shortness of breath  CARDIOVASCULAR: No chest pain, palpitations, dizziness, or leg swelling  GASTROINTESTINAL: No abdominal or epigastric pain. No nausea, vomiting, or hematemesis; No diarrhea or constipation. No melena or hematochezia.  GENITOURINARY: No dysuria, frequency, hematuria, or incontinence  NEUROLOGICAL: No headaches, memory loss, loss of strength, numbness, or tremors  SKIN: No itching, burning, rashes, or lesions   LYMPH NODES: No enlarged glands  MUSCULOSKELETAL: No joint pain or swelling; No muscle, back, or extremity pain      PAST MEDICAL & SURGICAL HISTORY:  CKD (chronic kidney disease)  CVA (cerebrovascular accident)  Personal history of PE (pulmonary embolism)  Glaucoma  PVD (peripheral vascular disease)  Hypothyroid  HTN (hypertension)  HLD (hyperlipidemia)  CHF (congestive heart failure)  DM (diabetes mellitus)  Afib  Elective surgery: abdominal abcess removals  History of partial thyroidectomy      Allergies    No Known Allergies    Intolerances        FAMILY HISTORY:  No pertinent family history in first degree relatives      SOCIAL HISTORY:        MEDICATIONS  (STANDING):  allopurinol 100 milliGRAM(s) Oral daily  amLODIPine   Tablet 10 milliGRAM(s) Oral daily  cefepime   IVPB 1000 milliGRAM(s) IV Intermittent every 24 hours  dextrose 5%. 1000 milliLiter(s) (50 mL/Hr) IV Continuous <Continuous>  dextrose 50% Injectable 12.5 Gram(s) IV Push once  dextrose 50% Injectable 25 Gram(s) IV Push once  dextrose 50% Injectable 25 Gram(s) IV Push once  gabapentin 100 milliGRAM(s) Oral three times a day  heparin  Infusion.  Unit(s)/Hr (13 mL/Hr) IV Continuous <Continuous>  hydrALAZINE 75 milliGRAM(s) Oral three times a day  insulin lispro (HumaLOG) corrective regimen sliding scale   SubCutaneous three times a day before meals  insulin lispro (HumaLOG) corrective regimen sliding scale   SubCutaneous at bedtime  latanoprost 0.005% Ophthalmic Solution 1 Drop(s) Left EYE at bedtime  levothyroxine 75 MICROGram(s) Oral daily  polyethylene glycol 3350 17 Gram(s) Oral daily  senna 2 Tablet(s) Oral at bedtime  simvastatin 20 milliGRAM(s) Oral at bedtime  sodium chloride 0.9%. 1000 milliLiter(s) (75 mL/Hr) IV Continuous <Continuous>    MEDICATIONS  (PRN):  acetaminophen   Tablet .. 650 milliGRAM(s) Oral every 6 hours PRN Temp greater or equal to 38C (100.4F), Mild Pain (1 - 3), Moderate Pain (4 - 6), Severe Pain (7 - 10)  dextrose 40% Gel 15 Gram(s) Oral once PRN Blood Glucose LESS THAN 70 milliGRAM(s)/deciliter  glucagon  Injectable 1 milliGRAM(s) IntraMuscular once PRN Glucose LESS THAN 70 milligrams/deciliter  heparin   Injectable 6000 Unit(s) IV Push every 6 hours PRN For aPTT less than 40  heparin   Injectable 3000 Unit(s) IV Push every 6 hours PRN For aPTT between 40 - 57      Vital Signs Last 24 Hrs  T(C): 36.7 (29 Jul 2020 15:00), Max: 36.9 (28 Jul 2020 21:10)  T(F): 98 (29 Jul 2020 15:00), Max: 98.4 (28 Jul 2020 21:10)  HR: 70 (29 Jul 2020 15:00) (70 - 75)  BP: 118/47 (29 Jul 2020 15:00) (118/47 - 135/58)  BP(mean): --  RR: 20 (29 Jul 2020 15:00) (18 - 20)  SpO2: 100% (29 Jul 2020 15:00) (98% - 100%)    PHYSICAL EXAM:    GENERAL: NAD, well-groomed  HEAD:  Atraumatic, Normocephalic  EYES: EOMI, PERRLA, conjunctiva and sclera clear  ENMT: No tonsillar erythema, exudates, or enlargement; Moist mucous membranes  NECK: Supple, No JVD  CHEST/LUNG: Clear to percussion bilaterally; No rales, rhonchi, wheezing, or rubs  HEART: Regular rate and rhythm; No murmurs, rubs, or gallops  ABDOMEN: Soft, Nontender, Nondistended; Bowel sounds present  EXTREMITIES:  2+ Peripheral Pulses, No clubbing, cyanosis, or edema  LYMPH: No lymphadenopathy noted  SKIN: No rashes or lesions    LABS:  CBC Full  -  ( 29 Jul 2020 07:00 )  WBC Count : 7.63 K/uL  RBC Count : 2.64 M/uL  Hemoglobin : 8.0 g/dL  Hematocrit : 24.7 %  Platelet Count - Automated : 233 K/uL  Mean Cell Volume : 93.6 fL  Mean Cell Hemoglobin : 30.3 pg  Mean Cell Hemoglobin Concentration : 32.4 %  Auto Neutrophil # : x  Auto Lymphocyte # : x  Auto Monocyte # : x  Auto Eosinophil # : x  Auto Basophil # : x  Auto Neutrophil % : x  Auto Lymphocyte % : x  Auto Monocyte % : x  Auto Eosinophil % : x  Auto Basophil % : x      07-29    138  |  100  |  63<H>  ----------------------------<  153<H>  4.3   |  24  |  2.08<H>    Ca    10.1      29 Jul 2020 14:23  Phos  3.8     07-29  Mg     2.3     07-29              MICROBIOLOGY:    Culture - Abscess with Gram Stain (07.21.20 @ 21:26)    -  Amikacin: S <=16    -  Amoxicillin/Clavulanic Acid: S <=8/4    -  Ampicillin: S <=8 These ampicillin results predict results for amoxicillin    -  Ampicillin: S <=2 Predicts results to ampicillin/sulbactam, amoxacillin-clavulanate and  piperacillin-tazobactam.    -  Ampicillin/Sulbactam: S <=4/2 Enterobacter, Citrobacter, and Serratia may develop resistance during prolonged therapy (3-4 days)    -  Ampicillin/Sulbactam: R <=8/4    -  Aztreonam: S <=4    -  Cefazolin: S <=2 Enterobacter, Citrobacter, and Serratia may develop resistance during prolonged therapy (3-4 days)    -  Cefazolin: R 8    -  Cefepime: S <=2    -  Cefoxitin: S <=8    -  Ceftriaxone: S <=1 Enterobacter, Citrobacter, and Serratia may develop resistance during prolonged therapy    -  Ciprofloxacin: S <=0.25    -  Clindamycin: S <=0.25    -  Daptomycin: S 0.5    -  Ertapenem: S <=0.5    -  Erythromycin: S <=0.25    -  Gentamicin: S <=1 Should not be used as monotherapy    -  Gentamicin: S <=2    -  Levofloxacin: S <=0.5    -  Linezolid: S 2    -  Meropenem: S <=1    -  Oxacillin: R >2    -  Penicillin: R >8    -  Piperacillin/Tazobactam: S <=8    -  RIF- Rifampin: S <=1 Should not be used as monotherapy    -  Tetra/Doxy: R >8    -  Tetra/Doxy: S <=1    -  Tobramycin: S <=2    -  Trimethoprim/Sulfamethoxazole: S <=0.5/9.5    -  Trimethoprim/Sulfamethoxazole: R >2/38    -  Vancomycin: S 2    -  Vancomycin: S 2    Specimen Source: .Abscess L foot    Culture Results:   Numerous Proteus mirabilis  Moderate Enterococcus faecalis  Few Methicillin resistant Staphylococcus aureus    Organism Identification: Proteus mirabilis  Enterococcus faecalis  Methicillin resistant Staphylococcus aureus    Organism: Proteus mirabilis    Organism: Enterococcus faecalis    Organism: Methicillin resistant Staphylococcus aureus    Method Type: ALTA    Method Type: ALTA    Method Type: ALTA      Culture - Blood (07.21.20 @ 16:40)    Specimen Source: .Blood Blood-Peripheral    Culture Results:   No Growth Final    Culture - Blood (07.21.20 @ 16:10)    Specimen Source: .Blood Blood-Peripheral    Culture Results:   No Growth Final      COVID-19  Antibody - for prior infection screening (07.22.20 @ 11:26)    COVID-19 IgG Antibody Index: <3.80: [facility] CMIA  Negative    <= 14.99 AU/mL  Positive    >= 15.00 AU/mL AU/mL    COVID-19 IgG Antibody Interpretation: Negative: This test has not been reviewed by the FDA by the standard review  process. It has been authorized for emergency use by the FDA. [employer] [facility] has validated this test to be accurate.  Negative results do not rule out SARS-CoV-2 infection, particularly in  those who have been in recent contact with the virus. Follow-up testing  with a molecular diagnostic test should be considered to rule out  infection in these individuals.  Results from antibody testing should not be used as thesole basis to  diagnose or exclude SARS-CoV-2 infection, or to inform infection status.  Positive results may rarely be due to past or present infection with  non-SARS-CoV-2 coronavirus strains, such as coronavirus HKU1, NL63, OC43,  or 229E. [facility], through extensive validation  testing, has confirmed that this risk is minimal with this test.    COVID-19 PCR . (07.21.20 @ 19:26)    COVID-19 PCR: NotDetec: Testing is performed using polymerase chain reaction (PCR) or  transcription mediated amplification (TMA). This COVID-19 (SARS-CoV-2)  nucleic acid amplification test was validated by [employer] and is  in use under the FDA Emergency Use Authorization (EUA) for clinical labs  CLIA-certified to perform high complexity testing. Test results should be  correlated with clinical presentation, patient history, and epidemiology.                    RADIOLOGY:    < from: Xray Chest 1 View- PORTABLE-Urgent (07.27.20 @ 14:43) >    FINDINGS:    Stable left midlung linear scarring.   No pleural effusion or pneumothorax.   Heart size cannot be assessed appropriately in this projection.   No acute osseous abnormalities.      IMPRESSION:  Stable left midlung linear scarring.    < end of copied text >          < from: Xray Foot AP + Lateral + Oblique, Left (07.21.20 @ 16:57) >  FINDINGS:    Generalized osteopenia. No tracking subcutaneous collection or radiopaque foreign body. No cortical erosion or periosteal reaction to suggest osteomyelitis. No acute fracture. No dislocation.     Small marginal osteophytes involving multiple metatarsophalangeal and interphalangeal joints, with otherwise preserved joint spaces. Mild to moderate midfoot joint space narrowing. Extensive vascular calcifications. Small dorsal and plantar calcaneal enthesophytes.      IMPRESSION:  No radiographic evidence for osteomyelitis.    < end of copied text > Patient is a 89y old  Female who presents with a chief complaint of left foot big toe pain (29 Jul 2020 13:23)      HPI:    89 year old female PMH  a-fib on coumadin, diastolic CHF (with EF 63% on TTE 8/13/19), HTN, HLD, CKD stage 3-4, IDDM, PVD, CVA, PE, hypothyroidism, p/w L foot wound. Patient seen today at clinic and sent for cellulitis and abscess, wanting to rule out OM. Patient denies fever, chills, cp, sob, abd pain, n/v, weakness, or numbness/tingling. (21 Jul 2020 20:43)    Pt states she has been on multiple PO abx in the past.  She currently denies any purulent discharge, no fever/chills.    ER vss.  No leukocytosis.  Pt seen by podiatry and vascular services.  L foot with avulsed hallux nail, L hallux boggy, necrotic and cold to level of MPJ, no purulence, no open wounds, no malodor, no associated cellulitis on initial exam.  Changes due to PAD.  s/p LLE angiogram  showed SFA occlusion w/ distal SFA/pop recon w/ heavy calcifications, no further vasc intervention.  Pt planned for Left foot partial 1st ray resection.     Wound was cultured and growing MRSA, Enterococcus and Proteus mirabilis.  Pt received vanco x 1, now on cefepime.    ID consulted for further abx recommendations.               REVIEW OF SYSTEMS:    CONSTITUTIONAL: No fever, weight loss, or fatigue  EYES: No eye pain, visual disturbances, or discharge  ENMT:  No sore throat  NECK: No pain or stiffness  RESPIRATORY: No cough, wheezing, chills or hemoptysis; No shortness of breath  CARDIOVASCULAR: No chest pain, palpitations, dizziness, or leg swelling  GASTROINTESTINAL: No abdominal or epigastric pain. No nausea, vomiting, or hematemesis; No diarrhea or constipation. No melena or hematochezia.  GENITOURINARY: No dysuria, frequency, hematuria, or incontinence  NEUROLOGICAL: No headaches, memory loss, loss of strength, numbness, or tremors  SKIN: No itching, burning, rashes, or lesions   LYMPH NODES: No enlarged glands  MUSCULOSKELETAL: No joint pain or swelling; No muscle, back, or extremity pain      PAST MEDICAL & SURGICAL HISTORY:  CKD (chronic kidney disease)  CVA (cerebrovascular accident)  Personal history of PE (pulmonary embolism)  Glaucoma  PVD (peripheral vascular disease)  Hypothyroid  HTN (hypertension)  HLD (hyperlipidemia)  CHF (congestive heart failure)  DM (diabetes mellitus)  Afib  Elective surgery: abdominal abcess removals  History of partial thyroidectomy      Allergies    No Known Allergies    Intolerances        FAMILY HISTORY:  No pertinent family history in first degree relatives      SOCIAL HISTORY:    No smoking, etoh, ivdu    MEDICATIONS  (STANDING):  allopurinol 100 milliGRAM(s) Oral daily  amLODIPine   Tablet 10 milliGRAM(s) Oral daily  cefepime   IVPB 1000 milliGRAM(s) IV Intermittent every 24 hours  dextrose 5%. 1000 milliLiter(s) (50 mL/Hr) IV Continuous <Continuous>  dextrose 50% Injectable 12.5 Gram(s) IV Push once  dextrose 50% Injectable 25 Gram(s) IV Push once  dextrose 50% Injectable 25 Gram(s) IV Push once  gabapentin 100 milliGRAM(s) Oral three times a day  heparin  Infusion.  Unit(s)/Hr (13 mL/Hr) IV Continuous <Continuous>  hydrALAZINE 75 milliGRAM(s) Oral three times a day  insulin lispro (HumaLOG) corrective regimen sliding scale   SubCutaneous three times a day before meals  insulin lispro (HumaLOG) corrective regimen sliding scale   SubCutaneous at bedtime  latanoprost 0.005% Ophthalmic Solution 1 Drop(s) Left EYE at bedtime  levothyroxine 75 MICROGram(s) Oral daily  polyethylene glycol 3350 17 Gram(s) Oral daily  senna 2 Tablet(s) Oral at bedtime  simvastatin 20 milliGRAM(s) Oral at bedtime  sodium chloride 0.9%. 1000 milliLiter(s) (75 mL/Hr) IV Continuous <Continuous>    MEDICATIONS  (PRN):  acetaminophen   Tablet .. 650 milliGRAM(s) Oral every 6 hours PRN Temp greater or equal to 38C (100.4F), Mild Pain (1 - 3), Moderate Pain (4 - 6), Severe Pain (7 - 10)  dextrose 40% Gel 15 Gram(s) Oral once PRN Blood Glucose LESS THAN 70 milliGRAM(s)/deciliter  glucagon  Injectable 1 milliGRAM(s) IntraMuscular once PRN Glucose LESS THAN 70 milligrams/deciliter  heparin   Injectable 6000 Unit(s) IV Push every 6 hours PRN For aPTT less than 40  heparin   Injectable 3000 Unit(s) IV Push every 6 hours PRN For aPTT between 40 - 57      Vital Signs Last 24 Hrs  T(C): 36.7 (29 Jul 2020 15:00), Max: 36.9 (28 Jul 2020 21:10)  T(F): 98 (29 Jul 2020 15:00), Max: 98.4 (28 Jul 2020 21:10)  HR: 70 (29 Jul 2020 15:00) (70 - 75)  BP: 118/47 (29 Jul 2020 15:00) (118/47 - 135/58)  BP(mean): --  RR: 20 (29 Jul 2020 15:00) (18 - 20)  SpO2: 100% (29 Jul 2020 15:00) (98% - 100%)    PHYSICAL EXAM:    GENERAL: NAD, well-groomed  HEAD:  Atraumatic, Normocephalic  EYES: EOMI, PERRLA, conjunctiva and sclera clear  ENMT: No tonsillar erythema, exudates, or enlargement; Moist mucous membranes  NECK: Supple, No JVD  CHEST/LUNG: Clear to percussion bilaterally; No rales, rhonchi, wheezing, or rubs  HEART: Regular rate and rhythm; No murmurs, rubs, or gallops  ABDOMEN: Soft, Nontender, Nondistended; Bowel sounds present  EXTREMITIES:  2+ Peripheral Pulses, No clubbing, cyanosis, or edema  LYMPH: No lymphadenopathy noted  SKIN: L hallux with dry gangrenous changes, no drainage.     LABS:  CBC Full  -  ( 29 Jul 2020 07:00 )  WBC Count : 7.63 K/uL  RBC Count : 2.64 M/uL  Hemoglobin : 8.0 g/dL  Hematocrit : 24.7 %  Platelet Count - Automated : 233 K/uL  Mean Cell Volume : 93.6 fL  Mean Cell Hemoglobin : 30.3 pg  Mean Cell Hemoglobin Concentration : 32.4 %  Auto Neutrophil # : x  Auto Lymphocyte # : x  Auto Monocyte # : x  Auto Eosinophil # : x  Auto Basophil # : x  Auto Neutrophil % : x  Auto Lymphocyte % : x  Auto Monocyte % : x  Auto Eosinophil % : x  Auto Basophil % : x      07-29    138  |  100  |  63<H>  ----------------------------<  153<H>  4.3   |  24  |  2.08<H>    Ca    10.1      29 Jul 2020 14:23  Phos  3.8     07-29  Mg     2.3     07-29              MICROBIOLOGY:    Culture - Abscess with Gram Stain (07.21.20 @ 21:26)    -  Amikacin: S <=16    -  Amoxicillin/Clavulanic Acid: S <=8/4    -  Ampicillin: S <=8 These ampicillin results predict results for amoxicillin    -  Ampicillin: S <=2 Predicts results to ampicillin/sulbactam, amoxacillin-clavulanate and  piperacillin-tazobactam.    -  Ampicillin/Sulbactam: S <=4/2 Enterobacter, Citrobacter, and Serratia may develop resistance during prolonged therapy (3-4 days)    -  Ampicillin/Sulbactam: R <=8/4    -  Aztreonam: S <=4    -  Cefazolin: S <=2 Enterobacter, Citrobacter, and Serratia may develop resistance during prolonged therapy (3-4 days)    -  Cefazolin: R 8    -  Cefepime: S <=2    -  Cefoxitin: S <=8    -  Ceftriaxone: S <=1 Enterobacter, Citrobacter, and Serratia may develop resistance during prolonged therapy    -  Ciprofloxacin: S <=0.25    -  Clindamycin: S <=0.25    -  Daptomycin: S 0.5    -  Ertapenem: S <=0.5    -  Erythromycin: S <=0.25    -  Gentamicin: S <=1 Should not be used as monotherapy    -  Gentamicin: S <=2    -  Levofloxacin: S <=0.5    -  Linezolid: S 2    -  Meropenem: S <=1    -  Oxacillin: R >2    -  Penicillin: R >8    -  Piperacillin/Tazobactam: S <=8    -  RIF- Rifampin: S <=1 Should not be used as monotherapy    -  Tetra/Doxy: R >8    -  Tetra/Doxy: S <=1    -  Tobramycin: S <=2    -  Trimethoprim/Sulfamethoxazole: S <=0.5/9.5    -  Trimethoprim/Sulfamethoxazole: R >2/38    -  Vancomycin: S 2    -  Vancomycin: S 2    Specimen Source: .Abscess L foot    Culture Results:   Numerous Proteus mirabilis  Moderate Enterococcus faecalis  Few Methicillin resistant Staphylococcus aureus    Organism Identification: Proteus mirabilis  Enterococcus faecalis  Methicillin resistant Staphylococcus aureus    Organism: Proteus mirabilis    Organism: Enterococcus faecalis    Organism: Methicillin resistant Staphylococcus aureus    Method Type: ALTA    Method Type: ALTA    Method Type: ALTA      Culture - Blood (07.21.20 @ 16:40)    Specimen Source: .Blood Blood-Peripheral    Culture Results:   No Growth Final    Culture - Blood (07.21.20 @ 16:10)    Specimen Source: .Blood Blood-Peripheral    Culture Results:   No Growth Final      COVID-19  Antibody - for prior infection screening (07.22.20 @ 11:26)    COVID-19 IgG Antibody Index: <3.80: Di[facility] CMIA  Negative    <= 14.99 AU/mL  Positive    >= 15.00 AU/mL AU/mL    COVID-19 IgG Antibody Interpretation: Negative: This test has not been reviewed by the FDA by the standard review  process. It has been authorized for emergency use by the FDA. [employer] has validated this test to be accurate.  Negative results do not rule out SARS-CoV-2 infection, particularly in  those who have been in recent contact with the virus. Follow-up testing  with a molecular diagnostic test should be considered to rule out  infection in these individuals.  Results from antibody testing should not be used as thesole basis to  diagnose or exclude SARS-CoV-2 infection, or to inform infection status.  Positive results may rarely be due to past or present infection with  non-SARS-CoV-2 coronavirus strains, such as coronavirus HKU1, NL63, OC43,  or 229E. [employer], through extensive validation  testing, has confirmed that this risk is minimal with this test.    COVID-19 PCR . (07.21.20 @ 19:26)    COVID-19 PCR: NotDetec: Testing is performed using polymerase chain reaction (PCR) or  transcription mediated amplification (TMA). This COVID-19 (SARS-CoV-2)  nucleic acid amplification test was validated by [employer] and is  in use under the FDA Emergency Use Authorization (EUA) for clinical labs  CLIA-certified to perform high complexity testing. Test results should be  correlated with clinical presentation, patient history, and epidemiology.                    RADIOLOGY:    < from: Xray Chest 1 View- PORTABLE-Urgent (07.27.20 @ 14:43) >    FINDINGS:    Stable left midlung linear scarring.   No pleural effusion or pneumothorax.   Heart size cannot be assessed appropriately in this projection.   No acute osseous abnormalities.      IMPRESSION:  Stable left midlung linear scarring.    < end of copied text >          < from: Xray Foot AP + Lateral + Oblique, Left (07.21.20 @ 16:57) >  FINDINGS:    Generalized osteopenia. No tracking subcutaneous collection or radiopaque foreign body. No cortical erosion or periosteal reaction to suggest osteomyelitis. No acute fracture. No dislocation.     Small marginal osteophytes involving multiple metatarsophalangeal and interphalangeal joints, with otherwise preserved joint spaces. Mild to moderate midfoot joint space narrowing. Extensive vascular calcifications. Small dorsal and plantar calcaneal enthesophytes.      IMPRESSION:  No radiographic evidence for osteomyelitis.    < end of copied text >

## 2020-07-29 NOTE — PROGRESS NOTE ADULT - ASSESSMENT
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    PLAN:  - No further vascular surgery intervention at this time  - Podiatry: planning left hallux amp   - Continue w care per primary team / podiatry  Please reconsult PRN    Please contact Vascular Surgery (p. [phone]) with any questions.     Miryam Duran, PGY2  Vascular Surgery, C Team  Pager [phone]

## 2020-07-29 NOTE — PROGRESS NOTE ADULT - SUBJECTIVE AND OBJECTIVE BOX
Nephrology Followup Note - [phone] - Dr Catherine / Dr Garcia / Dr Tellez / Dr Sullivan / Dr Castillo / Dr Grijalva / Dr Segovia / Dr Luna  Pt seen and examined at bedside  No acute events overnight. No complaints.     Allergies:  No Known Allergies    Hospital Medications:   MEDICATIONS  (STANDING):  allopurinol 100 milliGRAM(s) Oral daily  amLODIPine   Tablet 10 milliGRAM(s) Oral daily  cefepime   IVPB 1000 milliGRAM(s) IV Intermittent every 24 hours  dextrose 5%. 1000 milliLiter(s) (50 mL/Hr) IV Continuous <Continuous>  dextrose 50% Injectable 12.5 Gram(s) IV Push once  dextrose 50% Injectable 25 Gram(s) IV Push once  dextrose 50% Injectable 25 Gram(s) IV Push once  gabapentin 100 milliGRAM(s) Oral three times a day  heparin  Infusion.  Unit(s)/Hr (13 mL/Hr) IV Continuous <Continuous>  hydrALAZINE 75 milliGRAM(s) Oral three times a day  insulin lispro (HumaLOG) corrective regimen sliding scale   SubCutaneous three times a day before meals  insulin lispro (HumaLOG) corrective regimen sliding scale   SubCutaneous at bedtime  latanoprost 0.005% Ophthalmic Solution 1 Drop(s) Left EYE at bedtime  levothyroxine 75 MICROGram(s) Oral daily  polyethylene glycol 3350 17 Gram(s) Oral daily  senna 2 Tablet(s) Oral at bedtime  simvastatin 20 milliGRAM(s) Oral at bedtime  sodium chloride 0.9%. 1000 milliLiter(s) (75 mL/Hr) IV Continuous <Continuous>    VITALS:  T(F): 98.2 (07-29-20 @ 05:10), Max: 98.4 (07-28-20 @ 21:10)  HR: 70 (07-29-20 @ 05:10)  BP: 135/58 (07-29-20 @ 05:10)  RR: 20 (07-29-20 @ 05:10)  SpO2: 98% (07-29-20 @ 05:10)  Wt(kg): --      PHYSICAL EXAM:  Constitutional: NAD  HEENT: anicteric sclera, oropharynx clear, MMM  Neck: No JVD  Respiratory: CTAB, no wheezes, rales or rhonchi  Cardiovascular: S1, S2, RRR  Gastrointestinal: BS+, soft, NT/ND  Extremities: No cyanosis or clubbing. No peripheral edema  Neurological: A/O x 3, no focal deficits  Psychiatric: Normal mood, normal affect  : No CVA tenderness. No michaud.   Skin: No rashes    LABS:  07-28    138  |  100  |  57<H>  ----------------------------<  149<H>  4.5   |  24  |  2.13<H>    Ca    9.9      28 Jul 2020 06:00  Phos  3.9     07-28  Mg     2.0     07-28      Creatinine Trend: 2.13 <--, 1.89 <--, 1.99 <--, 2.06 <--, 1.99 <--, 2.09 <--                        8.0    7.63  )-----------( 233      ( 29 Jul 2020 07:00 )             24.7     Urine Studies:      RADIOLOGY & ADDITIONAL STUDIES:

## 2020-07-29 NOTE — PROGRESS NOTE ADULT - ASSESSMENT
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 Downey for Friday 7/31 at 4pm.   - Please document medical and cardiology clearance for surgery  - Discussed w/ attending

## 2020-07-29 NOTE — DIETITIAN INITIAL EVALUATION ADULT. - PERTINENT MEDS FT
MEDICATIONS  (STANDING):  allopurinol 100 milliGRAM(s) Oral daily  amLODIPine   Tablet 10 milliGRAM(s) Oral daily  cefepime   IVPB 1000 milliGRAM(s) IV Intermittent every 24 hours  dextrose 5%. 1000 milliLiter(s) (50 mL/Hr) IV Continuous <Continuous>  dextrose 50% Injectable 12.5 Gram(s) IV Push once  dextrose 50% Injectable 25 Gram(s) IV Push once  dextrose 50% Injectable 25 Gram(s) IV Push once  gabapentin 100 milliGRAM(s) Oral three times a day  heparin  Infusion.  Unit(s)/Hr (13 mL/Hr) IV Continuous <Continuous>  hydrALAZINE 75 milliGRAM(s) Oral three times a day  insulin lispro (HumaLOG) corrective regimen sliding scale   SubCutaneous three times a day before meals  insulin lispro (HumaLOG) corrective regimen sliding scale   SubCutaneous at bedtime  latanoprost 0.005% Ophthalmic Solution 1 Drop(s) Left EYE at bedtime  levothyroxine 75 MICROGram(s) Oral daily  polyethylene glycol 3350 17 Gram(s) Oral daily  senna 2 Tablet(s) Oral at bedtime  simvastatin 20 milliGRAM(s) Oral at bedtime  sodium chloride 0.9%. 1000 milliLiter(s) (75 mL/Hr) IV Continuous <Continuous>

## 2020-07-29 NOTE — PROGRESS NOTE ADULT - SUBJECTIVE AND OBJECTIVE BOX
INTERVAL HPI/OVERNIGHT EVENTS: no new concerns.   Vital Signs Last 24 Hrs  T(C): 36.9 (29 Jul 2020 20:25), Max: 36.9 (28 Jul 2020 21:10)  T(F): 98.4 (29 Jul 2020 20:25), Max: 98.4 (28 Jul 2020 21:10)  HR: 82 (29 Jul 2020 20:25) (70 - 82)  BP: 146/63 (29 Jul 2020 20:25) (118/47 - 146/63)  BP(mean): --  RR: 18 (29 Jul 2020 20:25) (18 - 20)  SpO2: 99% (29 Jul 2020 20:25) (98% - 100%)  I&O's Summary    MEDICATIONS  (STANDING):  allopurinol 100 milliGRAM(s) Oral daily  amLODIPine   Tablet 10 milliGRAM(s) Oral daily  cefepime   IVPB 1000 milliGRAM(s) IV Intermittent every 24 hours  dextrose 5%. 1000 milliLiter(s) (50 mL/Hr) IV Continuous <Continuous>  dextrose 50% Injectable 12.5 Gram(s) IV Push once  dextrose 50% Injectable 25 Gram(s) IV Push once  dextrose 50% Injectable 25 Gram(s) IV Push once  gabapentin 100 milliGRAM(s) Oral three times a day  heparin  Infusion.  Unit(s)/Hr (13 mL/Hr) IV Continuous <Continuous>  hydrALAZINE 75 milliGRAM(s) Oral three times a day  insulin lispro (HumaLOG) corrective regimen sliding scale   SubCutaneous three times a day before meals  insulin lispro (HumaLOG) corrective regimen sliding scale   SubCutaneous at bedtime  latanoprost 0.005% Ophthalmic Solution 1 Drop(s) Left EYE at bedtime  levothyroxine 75 MICROGram(s) Oral daily  polyethylene glycol 3350 17 Gram(s) Oral daily  senna 2 Tablet(s) Oral at bedtime  simvastatin 20 milliGRAM(s) Oral at bedtime  sodium chloride 0.9%. 1000 milliLiter(s) (75 mL/Hr) IV Continuous <Continuous>    MEDICATIONS  (PRN):  acetaminophen   Tablet .. 650 milliGRAM(s) Oral every 6 hours PRN Temp greater or equal to 38C (100.4F), Mild Pain (1 - 3), Moderate Pain (4 - 6), Severe Pain (7 - 10)  dextrose 40% Gel 15 Gram(s) Oral once PRN Blood Glucose LESS THAN 70 milliGRAM(s)/deciliter  glucagon  Injectable 1 milliGRAM(s) IntraMuscular once PRN Glucose LESS THAN 70 milligrams/deciliter  heparin   Injectable 6000 Unit(s) IV Push every 6 hours PRN For aPTT less than 40  heparin   Injectable 3000 Unit(s) IV Push every 6 hours PRN For aPTT between 40 - 57    LABS:                        8.0    7.63  )-----------( 233      ( 29 Jul 2020 07:00 )             24.7     07-29    138  |  100  |  63<H>  ----------------------------<  153<H>  4.3   |  24  |  2.08<H>    Ca    10.1      29 Jul 2020 14:23  Phos  3.8     07-29  Mg     2.3     07-29      PT/INR - ( 29 Jul 2020 07:00 )   PT: 15.7 SEC;   INR: 1.35          PTT - ( 29 Jul 2020 14:23 )  PTT:77.1 SEC    CAPILLARY BLOOD GLUCOSE      POCT Blood Glucose.: 145 mg/dL (29 Jul 2020 17:21)  POCT Blood Glucose.: 199 mg/dL (29 Jul 2020 12:15)  POCT Blood Glucose.: 122 mg/dL (29 Jul 2020 08:28)  POCT Blood Glucose.: 193 mg/dL (28 Jul 2020 22:02)          REVIEW OF SYSTEMS:  CONSTITUTIONAL: No fever, weight loss, or fatigue  EYES: No eye pain, visual disturbances, or discharge  ENMT:  No difficulty hearing, tinnitus, vertigo; No sinus or throat pain  RESPIRATORY: No cough, wheezing, chills or hemoptysis; No shortness of breath  CARDIOVASCULAR: No chest pain, palpitations, dizziness, or leg swelling  GASTROINTESTINAL: No abdominal or epigastric pain. No nausea, vomiting, or hematemesis; No diarrhea or constipation. No melena or hematochezia.  GENITOURINARY: No dysuria, frequency, hematuria, or incontinence  NEUROLOGICAL: No headaches, memory loss, loss of strength, numbness, or tremors      Consultant(s) Notes Reviewed:  [x ] YES  [ ] NO    PHYSICAL EXAM:  GENERAL: NAD, well-groomed, well-developed,not in any distress ,  HEAD:  Atraumatic, Normocephalic  EYES: EOMI, PERRLA, conjunctiva and sclera clear  ENMT: No tonsillar erythema, exudates, or enlargement; Moist mucous membranes, Good dentition, No lesions  NECK: Supple, No JVD, Normal thyroid  NERVOUS SYSTEM:  Alert & Oriented X3, No focal deficit   CHEST/LUNG: Good air entry bilateral with no  rales, rhonchi, wheezing, or rubs  HEART: Regular rate and rhythm; No murmurs, rubs, or gallops  ABDOMEN: Soft, Nontender, Nondistended; Bowel sounds present  EXTREMITIES: foot dressed     Care Discussed with Consultants/Other Providers [ x] YES  [ ] NO

## 2020-07-29 NOTE — DIETITIAN INITIAL EVALUATION ADULT. - ADD RECOMMEND
1). Suggest Nepro 1x daily (425 kcals, 19.1g protein). 2). Monitor weights, labs, BM's, skin integrity, p.o. intake and edema. 3). Follow pt as per protocol.

## 2020-07-29 NOTE — PROGRESS NOTE ADULT - SUBJECTIVE AND OBJECTIVE BOX
VASCULAR SURGERY PROGRESS NOTE    89yFemale    SUBJECTIVE:  Patient seen and examined at bedside. No acute events overnight.     --------------------------------------------------------------------------------------------------  OBJECTIVE:     Vital Signs:  Vital Signs Last 24 Hrs  T(C): 36.8 (29 Jul 2020 05:10), Max: 36.9 (28 Jul 2020 21:10)  T(F): 98.2 (29 Jul 2020 05:10), Max: 98.4 (28 Jul 2020 21:10)  HR: 70 (29 Jul 2020 05:10) (66 - 75)  BP: 135/58 (29 Jul 2020 05:10) (125/54 - 135/58)  BP(mean): --  RR: 20 (29 Jul 2020 05:10) (16 - 20)  SpO2: 98% (29 Jul 2020 05:10) (98% - 100%)    --------------------------------------------------------------------------------------------------  Laboratories:                        8.0    7.63  )-----------( 233      ( 29 Jul 2020 07:00 )             24.7         28 Jul 2020 06:00    138    |  100    |  57     ----------------------------<  149    4.5     |  24     |  2.13     Ca    9.9        28 Jul 2020 06:00  Phos  3.9       28 Jul 2020 06:00  Mg     2.0       28 Jul 2020 06:00    PT/INR - ( 29 Jul 2020 07:00 )   PT: 15.7 SEC;   INR: 1.35    PTT - ( 29 Jul 2020 07:00 )  PTT:49.5 SEC    --------------------------------------------------------------------------------------------------  Physical Exam:  General: alert and oriented, NAD  Extremities: LLE 1st toe with dry gangrene with no surrounding erythema or purulence  Vascular Exam: palpable popliteal pulses b/l doppler signal DP and PT b/l  --------------------------------------------------------------------------------------------------  Medications:  MEDICATIONS  (STANDING):  allopurinol 100 milliGRAM(s) Oral daily  amLODIPine   Tablet 10 milliGRAM(s) Oral daily  cefepime   IVPB 1000 milliGRAM(s) IV Intermittent every 24 hours  dextrose 5%. 1000 milliLiter(s) (50 mL/Hr) IV Continuous <Continuous>  dextrose 50% Injectable 12.5 Gram(s) IV Push once  dextrose 50% Injectable 25 Gram(s) IV Push once  dextrose 50% Injectable 25 Gram(s) IV Push once  gabapentin 100 milliGRAM(s) Oral three times a day  heparin  Infusion.  Unit(s)/Hr (13 mL/Hr) IV Continuous <Continuous>  hydrALAZINE 75 milliGRAM(s) Oral three times a day  insulin lispro (HumaLOG) corrective regimen sliding scale   SubCutaneous three times a day before meals  insulin lispro (HumaLOG) corrective regimen sliding scale   SubCutaneous at bedtime  latanoprost 0.005% Ophthalmic Solution 1 Drop(s) Left EYE at bedtime  levothyroxine 75 MICROGram(s) Oral daily  polyethylene glycol 3350 17 Gram(s) Oral daily  senna 2 Tablet(s) Oral at bedtime  simvastatin 20 milliGRAM(s) Oral at bedtime  sodium chloride 0.9%. 1000 milliLiter(s) (75 mL/Hr) IV Continuous <Continuous>  warfarin 3 milliGRAM(s) Oral once    MEDICATIONS  (PRN):  acetaminophen   Tablet .. 650 milliGRAM(s) Oral every 6 hours PRN Temp greater or equal to 38C (100.4F), Mild Pain (1 - 3), Moderate Pain (4 - 6), Severe Pain (7 - 10)  dextrose 40% Gel 15 Gram(s) Oral once PRN Blood Glucose LESS THAN 70 milliGRAM(s)/deciliter  glucagon  Injectable 1 milliGRAM(s) IntraMuscular once PRN Glucose LESS THAN 70 milligrams/deciliter  heparin   Injectable 6000 Unit(s) IV Push every 6 hours PRN For aPTT less than 40  heparin   Injectable 3000 Unit(s) IV Push every 6 hours PRN For aPTT between 40 - 57

## 2020-07-29 NOTE — PROGRESS NOTE ADULT - ASSESSMENT
89 year old female PMH  a-fib on coumadin, diastolic CHF (with EF 63% on TTE 8/13/19), HTN, HLD, CKD stage 3-4, IDDM, PVD, CVA, PE, hypothyroidism, p/w L foot wound. Patient seen today at clinic and sent for cellulitis and abscess, wanting to rule out OM. Patient denies fever, chills, cp, sob, abd pain, n/v, weakness, or numbness/tingling.     Problem/Plan - 1:  ·  Problem: Toe gangrene.  Plan: Podiatry consult noted. S/P IV Abxs. NO fever or Leucocytosis so likely ischemic . ID and Vascular helping. Tentatively booked for Left foot partial 1st ray resection with Dr. Downey for Friday 7/31 at 4pm.       Problem/Plan - 2:  ·  Problem: Stage 3 chronic kidney disease.  Plan: Renal helping .  Baseline Creatinine 1.8.      Problem/Plan - 3:  ·  Problem: PVD (peripheral vascular disease).  Plan: LIZBETH/PVD noted.  Vascular consult noted. S/P angiogram .     Problem/Plan - 4:  ·  Problem: Chronic diastolic congestive heart failure.  Plan: Continuing Lasix .  Cardiology helping.      Problem/Plan - 5:  ·  Problem: DM (diabetes mellitus).  Plan: SSI for now .      Problem/Plan - 6:  Problem: Afib. Plan: ON AC.      Problem/Plan - 7:  ·  Problem: Pulmonary embolism.  Plan: On AC. INR noted      Problem/Plan - 8:  ·  Problem:  Hypothyroid.  Plan: Synthroid.      Problem/Plan - 9:  ·  Problem: Anemia.  Plan: Chronic .      Problem/Plan - 10:  ·  Problem: Uncontrolled Hypertension .  Plan: Adjusting BP meds.

## 2020-07-29 NOTE — DIETITIAN INITIAL EVALUATION ADULT. - PERTINENT LABORATORY DATA
07-28 Na 138 mmol/L Glu 149 mg/dL<H> K+ 4.5 mmol/L Cr 2.13 mg/dL<H> BUN 57 mg/dL<H> Phos 3.9 mg/dL  07-29 @ 08:28 POCT 122 mg/dL  07-28 @ 22:02 POCT 193 mg/dL  07-28 @ 17:24 POCT 147 mg/dL  07-28 @ 10:51 POCT 161 mg/dL

## 2020-07-30 LAB
ANION GAP SERPL CALC-SCNC: 9 MMO/L — SIGNIFICANT CHANGE UP (ref 7–14)
APTT BLD: 59.2 SEC — HIGH (ref 27–36.3)
BUN SERPL-MCNC: 68 MG/DL — HIGH (ref 7–23)
CALCIUM SERPL-MCNC: 10 MG/DL — SIGNIFICANT CHANGE UP (ref 8.4–10.5)
CHLORIDE SERPL-SCNC: 103 MMOL/L — SIGNIFICANT CHANGE UP (ref 98–107)
CO2 SERPL-SCNC: 24 MMOL/L — SIGNIFICANT CHANGE UP (ref 22–31)
CREAT SERPL-MCNC: 2.16 MG/DL — HIGH (ref 0.5–1.3)
GLUCOSE BLDC GLUCOMTR-MCNC: 118 MG/DL — HIGH (ref 70–99)
GLUCOSE BLDC GLUCOMTR-MCNC: 158 MG/DL — HIGH (ref 70–99)
GLUCOSE BLDC GLUCOMTR-MCNC: 176 MG/DL — HIGH (ref 70–99)
GLUCOSE BLDC GLUCOMTR-MCNC: 180 MG/DL — HIGH (ref 70–99)
GLUCOSE SERPL-MCNC: 120 MG/DL — HIGH (ref 70–99)
HCT VFR BLD CALC: 24.5 % — LOW (ref 34.5–45)
HGB BLD-MCNC: 7.6 G/DL — LOW (ref 11.5–15.5)
INR BLD: 1.34 — HIGH (ref 0.88–1.17)
MAGNESIUM SERPL-MCNC: 2.2 MG/DL — SIGNIFICANT CHANGE UP (ref 1.6–2.6)
MCHC RBC-ENTMCNC: 29.5 PG — SIGNIFICANT CHANGE UP (ref 27–34)
MCHC RBC-ENTMCNC: 31 % — LOW (ref 32–36)
MCV RBC AUTO: 95 FL — SIGNIFICANT CHANGE UP (ref 80–100)
NRBC # FLD: 0 K/UL — SIGNIFICANT CHANGE UP (ref 0–0)
PHOSPHATE SERPL-MCNC: 4.1 MG/DL — SIGNIFICANT CHANGE UP (ref 2.5–4.5)
PLATELET # BLD AUTO: 237 K/UL — SIGNIFICANT CHANGE UP (ref 150–400)
PMV BLD: 11.6 FL — SIGNIFICANT CHANGE UP (ref 7–13)
POTASSIUM SERPL-MCNC: 4.3 MMOL/L — SIGNIFICANT CHANGE UP (ref 3.5–5.3)
POTASSIUM SERPL-SCNC: 4.3 MMOL/L — SIGNIFICANT CHANGE UP (ref 3.5–5.3)
PROTHROM AB SERPL-ACNC: 15.2 SEC — HIGH (ref 9.8–13.1)
RBC # BLD: 2.58 M/UL — LOW (ref 3.8–5.2)
RBC # FLD: 15.1 % — HIGH (ref 10.3–14.5)
SODIUM SERPL-SCNC: 136 MMOL/L — SIGNIFICANT CHANGE UP (ref 135–145)
VANCOMYCIN FLD-MCNC: 8.2 UG/ML — SIGNIFICANT CHANGE UP
WBC # BLD: 7.35 K/UL — SIGNIFICANT CHANGE UP (ref 3.8–10.5)
WBC # FLD AUTO: 7.35 K/UL — SIGNIFICANT CHANGE UP (ref 3.8–10.5)

## 2020-07-30 RX ORDER — WARFARIN SODIUM 2.5 MG/1
3 TABLET ORAL ONCE
Refills: 0 | Status: COMPLETED | OUTPATIENT
Start: 2020-07-30 | End: 2020-07-30

## 2020-07-30 RX ORDER — VANCOMYCIN HCL 1 G
750 VIAL (EA) INTRAVENOUS ONCE
Refills: 0 | Status: COMPLETED | OUTPATIENT
Start: 2020-07-30 | End: 2020-07-30

## 2020-07-30 RX ADMIN — SENNA PLUS 2 TABLET(S): 8.6 TABLET ORAL at 21:23

## 2020-07-30 RX ADMIN — Medication 75 MILLIGRAM(S): at 05:53

## 2020-07-30 RX ADMIN — Medication 250 MILLIGRAM(S): at 14:23

## 2020-07-30 RX ADMIN — LATANOPROST 1 DROP(S): 0.05 SOLUTION/ DROPS OPHTHALMIC; TOPICAL at 21:23

## 2020-07-30 RX ADMIN — Medication 75 MILLIGRAM(S): at 13:37

## 2020-07-30 RX ADMIN — WARFARIN SODIUM 3 MILLIGRAM(S): 2.5 TABLET ORAL at 18:09

## 2020-07-30 RX ADMIN — Medication 75 MILLIGRAM(S): at 21:23

## 2020-07-30 RX ADMIN — PIPERACILLIN AND TAZOBACTAM 25 GRAM(S): 4; .5 INJECTION, POWDER, LYOPHILIZED, FOR SOLUTION INTRAVENOUS at 09:51

## 2020-07-30 RX ADMIN — AMLODIPINE BESYLATE 10 MILLIGRAM(S): 2.5 TABLET ORAL at 05:53

## 2020-07-30 RX ADMIN — Medication 75 MICROGRAM(S): at 05:53

## 2020-07-30 RX ADMIN — GABAPENTIN 100 MILLIGRAM(S): 400 CAPSULE ORAL at 21:23

## 2020-07-30 RX ADMIN — SIMVASTATIN 20 MILLIGRAM(S): 20 TABLET, FILM COATED ORAL at 21:23

## 2020-07-30 RX ADMIN — PIPERACILLIN AND TAZOBACTAM 25 GRAM(S): 4; .5 INJECTION, POWDER, LYOPHILIZED, FOR SOLUTION INTRAVENOUS at 21:24

## 2020-07-30 RX ADMIN — GABAPENTIN 100 MILLIGRAM(S): 400 CAPSULE ORAL at 05:53

## 2020-07-30 RX ADMIN — Medication 2: at 12:22

## 2020-07-30 RX ADMIN — Medication 250 MILLIGRAM(S): at 01:26

## 2020-07-30 RX ADMIN — GABAPENTIN 100 MILLIGRAM(S): 400 CAPSULE ORAL at 14:23

## 2020-07-30 RX ADMIN — Medication 100 MILLIGRAM(S): at 12:22

## 2020-07-30 RX ADMIN — Medication 2: at 17:34

## 2020-07-30 NOTE — PROGRESS NOTE ADULT - SUBJECTIVE AND OBJECTIVE BOX
S: no chest pain or sob; ROS otherwise negative.      MEDICATIONS  (STANDING):  allopurinol 100 milliGRAM(s) Oral daily  amLODIPine   Tablet 10 milliGRAM(s) Oral daily  dextrose 5%. 1000 milliLiter(s) (50 mL/Hr) IV Continuous <Continuous>  dextrose 50% Injectable 12.5 Gram(s) IV Push once  dextrose 50% Injectable 25 Gram(s) IV Push once  dextrose 50% Injectable 25 Gram(s) IV Push once  gabapentin 100 milliGRAM(s) Oral three times a day  heparin  Infusion.  Unit(s)/Hr (13 mL/Hr) IV Continuous <Continuous>  hydrALAZINE 75 milliGRAM(s) Oral three times a day  insulin lispro (HumaLOG) corrective regimen sliding scale   SubCutaneous three times a day before meals  insulin lispro (HumaLOG) corrective regimen sliding scale   SubCutaneous at bedtime  latanoprost 0.005% Ophthalmic Solution 1 Drop(s) Left EYE at bedtime  levothyroxine 75 MICROGram(s) Oral daily  piperacillin/tazobactam IVPB.. 3.375 Gram(s) IV Intermittent every 12 hours  polyethylene glycol 3350 17 Gram(s) Oral daily  senna 2 Tablet(s) Oral at bedtime  simvastatin 20 milliGRAM(s) Oral at bedtime  sodium chloride 0.9%. 1000 milliLiter(s) (75 mL/Hr) IV Continuous <Continuous>  vancomycin  IVPB 750 milliGRAM(s) IV Intermittent once    MEDICATIONS  (PRN):  acetaminophen   Tablet .. 650 milliGRAM(s) Oral every 6 hours PRN Temp greater or equal to 38C (100.4F), Mild Pain (1 - 3), Moderate Pain (4 - 6), Severe Pain (7 - 10)  dextrose 40% Gel 15 Gram(s) Oral once PRN Blood Glucose LESS THAN 70 milliGRAM(s)/deciliter  glucagon  Injectable 1 milliGRAM(s) IntraMuscular once PRN Glucose LESS THAN 70 milligrams/deciliter  heparin   Injectable 6000 Unit(s) IV Push every 6 hours PRN For aPTT less than 40  heparin   Injectable 3000 Unit(s) IV Push every 6 hours PRN For aPTT between 40 - 57      LABS:                            7.6    7.35  )-----------( 237 ( 30 Jul 2020 05:32 )             24.5     Hemoglobin: 7.6 g/dL (07-30 @ 05:32)  Hemoglobin: 8.0 g/dL (07-29 @ 07:00)  Hemoglobin: 8.6 g/dL (07-28 @ 06:00)  Hemoglobin: 7.8 g/dL (07-27 @ 03:10)  Hemoglobin: 8.1 g/dL (07-26 @ 05:10)    07-30    136  |  103  |  68<H>  ----------------------------<  120<H>  4.3   |  24  |  2.16<H>    Ca    10.0      30 Jul 2020 05:32  Phos  4.1     07-30  Mg     2.2     07-30      Creatinine Trend: 2.16<--, 2.08<--, 2.13<--, 1.89<--, 1.99<--, 2.06<--   PT/INR - ( 30 Jul 2020 05:32 )   PT: 15.2 SEC;   INR: 1.34          PTT - ( 30 Jul 2020 05:32 )  PTT:59.2 SEC        PHYSICAL EXAM  Vital Signs Last 24 Hrs  T(C): 36.9 (30 Jul 2020 13:36), Max: 36.9 (29 Jul 2020 20:25)  T(F): 98.5 (30 Jul 2020 13:36), Max: 98.5 (30 Jul 2020 05:51)  HR: 74 (30 Jul 2020 13:36) (67 - 82)  BP: 126/49 (30 Jul 2020 13:36) (118/47 - 146/63)  BP(mean): --  RR: 16 (30 Jul 2020 13:36) (16 - 20)  SpO2: 100% (30 Jul 2020 13:36) (98% - 100%)      Gen: Appears well in NAD  HEENT:  (-)icterus (-)pallor  CV: Normal S1, there is still a sharp S2.  Mid-to-late peaking 3/6 systolic murmur at the upper chest, but No pulsus parvus et tardus (+)2 Pulses B/l  Resp:  Clear to ausculatation B/L, normal effort  GI: (+) BS Soft, NT, ND  Lymph:  (-)Edema, (-)obvious lymphadenopathy  Skin: +left foot dressing, Warm to touch, Normal turgor  Psych: Appropriate mood and affect      TELEMETRY: None	      < from: Transthoracic Echocardiogram (07.22.20 @ 16:27) >  CONCLUSIONS:  1. Mitral annular calcification, otherwise normal mitral  valve. Mild-moderate mitral regurgitation.  2. Calcified trileaflet aortic valve with decreased  opening.  Peak transaortic valve gradient [id number] mm Hg,  mean transaortic valve gradient equals 26 mm Hg, estimated  aortic valve area equals 1 sqcm (by continuity equation),  consistent with moderate to severe aortic stenosis.  3. Severely dilated left atrium.  LA volume index = 57  cc/m2.  4. Mild concentric left ventricular hypertrophy.  5. Normal left ventricular systolic function. No segmental  wall motion abnormalities.  6. Severe diastolic dysfunction.  7. Severe right atrial enlargement.  8. Normal right ventricular size and function.  9. Estimated right ventricular systolic pressure equals 53  mm Hg, assuming right atrial pressure equals 10 mm Hg,  consistent with moderate pulmonary hypertension.    < end of copied text >      ASSESSMENT/PLAN: Patient is a 89 year old Female well known to our office (Cardiologist - Dr. Berman), who presented with a foot wound.  Consult requested for pre-op cardiovascular risk stratification.  Her known PMH includes persistent Afib on coumadin who was not a candidate for left atrial appendage occlusion with Watchman, chronic HFpEF, HTN, HLD, hypothyroidism, CKD, Type 2 DM, CVA, and hx of PE.    - Continue AC for cva prevention if no contraindications - on hep gtt currently samira-op  - Continue with PO lasix   - TTE noted above with normal LVEF, severe diastolic dysfunction, mild-mod MR, mod pulm HTN, and mod-severe AS: auscultation favors moderate aortic stenosis.  Yearly Echo for followup.  Patient does not have any 'red flag' symptoms such as syncope, crushing angina, dyspnea on exertion (limited by her foot), so will continue to monitor in the office.  - In addition, the patient and her family prefers conservative medical management of her valvular disease and does not want any further invasive workup or surgery  - no further inpatient cardiac w/u needed at this time  - s/p LE angio - tolerated procedure well in terms of cv perspective  - pod sx now canceled, plan for IV Abx and wound care

## 2020-07-30 NOTE — PROGRESS NOTE ADULT - ASSESSMENT
89 year old female PMH  a-fib on coumadin, diastolic CHF (with EF 63% on TTE 8/13/19), HTN, HLD, CKD stage 3-4, IDDM, PVD, CVA, PE, hypothyroidism, p/w L foot wound. Patient seen today at clinic and sent for cellulitis and abscess, wanting to rule out OM. Patient denies fever, chills, cp, sob, abd pain, n/v, weakness, or numbness/tingling.     Problem/Plan - 1:  ·  Problem: Toe gangrene.  Plan: Podiatry consult noted. S/P IV Abxs. NO fever or Leucocytosis so likely ischemic . ID and Vascular helping. Tentatively booked for Left foot partial 1st ray resection with Dr. Downey for Friday 7/31 at 4pm.       Problem/Plan - 2:  ·  Problem: Stage 3 chronic kidney disease.  Plan: Renal helping .  Baseline Creatinine 1.8.      Problem/Plan - 3:  ·  Problem: PVD (peripheral vascular disease).  Plan: LIZBETH/PVD noted.  Vascular consult noted. S/P angiogram .     Problem/Plan - 4:  ·  Problem: Chronic diastolic congestive heart failure.  Plan: Continuing Lasix .  Cardiology helping.      Problem/Plan - 5:  ·  Problem: DM (diabetes mellitus).  Plan: SSI for now .      Problem/Plan - 6:  Problem: Afib. Plan: ON AC. Heparin till INR therapeutic .      Problem/Plan - 7:  ·  Problem: Pulmonary embolism.  Plan: On AC.      Problem/Plan - 8:  ·  Problem:  Hypothyroid.  Plan: Synthroid.      Problem/Plan - 9:  ·  Problem: Anemia.  Plan: Chronic . Watching CBC . Transfuse PRBC for HGb <7.5G .     Problem/Plan - 10:  ·  Problem: Uncontrolled Hypertension .  Plan: Adjusting BP meds.

## 2020-07-30 NOTE — PROGRESS NOTE ADULT - ASSESSMENT
89 year old female PMH  a-fib on coumadin, diastolic CHF (with EF 63% on TTE 8/13/19), HTN, HLD, CKD stage 3-4, IDDM, PVD, CVA, PE, hypothyroidism, p/w L foot wound. Patient was sent yesterday from podiatrist office for non healing left great toe ulcer, to rule out OM. Renal following for NIKHIL/CKD Mx.     CKD4: Baseline Creatinine 1.7-2.0  euvolemic. Electrolytes acceptable.    s/p LE angiogram, given IVF post procedure.   Can have lasix resumed by tomorrow, 40mg daily, if cr stable. Maintain I=O.   avoid ACEi/ARB for now/NSAIDs/Nephrotoxics.  monitor BMP daily     HTN, controlled-bp optimal. c/w current bp meds. avoid ACEi/ARB. pain Mx.     Toe gangrene, PAD.  f/u w/ Podiatry, plans for amputation cancelled.     DM- Mx per medicine      labs, chart reviewed

## 2020-07-30 NOTE — PROGRESS NOTE ADULT - SUBJECTIVE AND OBJECTIVE BOX
Infectious Diseases progress note:    Subjective:  NAD, afebrile    ROS:  CONSTITUTIONAL:  No fever, chills, rigors  CARDIOVASCULAR:  No chest pain or palpitations  RESPIRATORY:   No SOB, cough, dyspnea on exertion.  No wheezing  GASTROINTESTINAL:  No abd pain, N/V, diarrhea/constipation  EXTREMITIES:  No swelling or joint pain  GENITOURINARY:  No burning on urination, increased frequency or urgency.  No flank pain  NEUROLOGIC:  No HA, visual disturbances  SKIN: No rashes    Allergies    No Known Allergies    Intolerances        ANTIBIOTICS/RELEVANT:  antimicrobials  piperacillin/tazobactam IVPB.. 3.375 Gram(s) IV Intermittent every 12 hours    immunologic:    OTHER:  acetaminophen   Tablet .. 650 milliGRAM(s) Oral every 6 hours PRN  allopurinol 100 milliGRAM(s) Oral daily  amLODIPine   Tablet 10 milliGRAM(s) Oral daily  dextrose 40% Gel 15 Gram(s) Oral once PRN  dextrose 5%. 1000 milliLiter(s) IV Continuous <Continuous>  dextrose 50% Injectable 12.5 Gram(s) IV Push once  dextrose 50% Injectable 25 Gram(s) IV Push once  dextrose 50% Injectable 25 Gram(s) IV Push once  gabapentin 100 milliGRAM(s) Oral three times a day  glucagon  Injectable 1 milliGRAM(s) IntraMuscular once PRN  heparin   Injectable 6000 Unit(s) IV Push every 6 hours PRN  heparin   Injectable 3000 Unit(s) IV Push every 6 hours PRN  heparin  Infusion.  Unit(s)/Hr IV Continuous <Continuous>  hydrALAZINE 75 milliGRAM(s) Oral three times a day  insulin lispro (HumaLOG) corrective regimen sliding scale   SubCutaneous three times a day before meals  insulin lispro (HumaLOG) corrective regimen sliding scale   SubCutaneous at bedtime  latanoprost 0.005% Ophthalmic Solution 1 Drop(s) Left EYE at bedtime  levothyroxine 75 MICROGram(s) Oral daily  polyethylene glycol 3350 17 Gram(s) Oral daily  senna 2 Tablet(s) Oral at bedtime  simvastatin 20 milliGRAM(s) Oral at bedtime  sodium chloride 0.9%. 1000 milliLiter(s) IV Continuous <Continuous>      Objective:  Vital Signs Last 24 Hrs  T(C): 36.9 (30 Jul 2020 13:36), Max: 36.9 (29 Jul 2020 20:25)  T(F): 98.5 (30 Jul 2020 13:36), Max: 98.5 (30 Jul 2020 05:51)  HR: 74 (30 Jul 2020 13:36) (67 - 82)  BP: 126/49 (30 Jul 2020 13:36) (126/49 - 146/63)  BP(mean): --  RR: 16 (30 Jul 2020 13:36) (16 - 18)  SpO2: 100% (30 Jul 2020 13:36) (98% - 100%)    PHYSICAL EXAM:  Constitutional:NAD  Eyes:OLIVIA, EOMI  Ear/Nose/Throat: no thrush, mucositis.  Moist mucous membranes	  Neck:no JVD, no lymphadenopathy, supple  Respiratory: CTA adina  Cardiovascular: S1S2 RRR, no murmurs  Gastrointestinal:soft, nontender,  nondistended (+) BS  Extremities:no e/e/c  Skin:  L ft dsg intact        LABS:                        7.6    7.35  )-----------( 237      ( 30 Jul 2020 05:32 )             24.5     07-30    136  |  103  |  68<H>  ----------------------------<  120<H>  4.3   |  24  |  2.16<H>    Ca    10.0      30 Jul 2020 05:32  Phos  4.1     07-30  Mg     2.2     07-30      PT/INR - ( 30 Jul 2020 05:32 )   PT: 15.2 SEC;   INR: 1.34          PTT - ( 30 Jul 2020 05:32 )  PTT:59.2 SEC        Vancomycin Level, Random:  ug/mL (07-30 @ 09:50)                  MICROBIOLOGY:    Culture - Abscess with Gram Stain (07.21.20 @ 21:26)    -  Amikacin: S <=16    -  Amoxicillin/Clavulanic Acid: S <=8/4    -  Ampicillin: S <=8 These ampicillin results predict results for amoxicillin    -  Ampicillin: S <=2 Predicts results to ampicillin/sulbactam, amoxacillin-clavulanate and  piperacillin-tazobactam.    -  Ampicillin/Sulbactam: S <=4/2 Enterobacter, Citrobacter, and Serratia may develop resistance during prolonged therapy (3-4 days)    -  Ampicillin/Sulbactam: R <=8/4    -  Aztreonam: S <=4    -  Cefazolin: S <=2 Enterobacter, Citrobacter, and Serratia may develop resistance during prolonged therapy (3-4 days)    -  Cefazolin: R 8    -  Cefepime: S <=2    -  Cefoxitin: S <=8    -  Ceftriaxone: S <=1 Enterobacter, Citrobacter, and Serratia may develop resistance during prolonged therapy    -  Ciprofloxacin: S <=0.25    -  Clindamycin: S <=0.25    -  Daptomycin: S 0.5    -  Ertapenem: S <=0.5    -  Erythromycin: S <=0.25    -  Gentamicin: S <=1 Should not be used as monotherapy    -  Gentamicin: S <=2    -  Levofloxacin: S <=0.5    -  Linezolid: S 2    -  Meropenem: S <=1    -  Oxacillin: R >2    -  Penicillin: R >8    -  Piperacillin/Tazobactam: S <=8    -  RIF- Rifampin: S <=1 Should not be used as monotherapy    -  Tetra/Doxy: R >8    -  Tetra/Doxy: S <=1    -  Tobramycin: S <=2    -  Trimethoprim/Sulfamethoxazole: S <=0.5/9.5    -  Trimethoprim/Sulfamethoxazole: R >2/38    -  Vancomycin: S 2    -  Vancomycin: S 2    Specimen Source: .Abscess L foot    Culture Results:   Numerous Proteus mirabilis  Moderate Enterococcus faecalis  Few Methicillin resistant Staphylococcus aureus    Organism Identification: Proteus mirabilis  Enterococcus faecalis  Methicillin resistant Staphylococcus aureus    Organism: Proteus mirabilis    Organism: Enterococcus faecalis    Organism: Methicillin resistant Staphylococcus aureus    Method Type: ALTA    Method Type: ALTA    Method Type: ALTA          RADIOLOGY & ADDITIONAL STUDIES:    < from: Xray Chest 1 View- PORTABLE-Urgent (07.27.20 @ 14:43) >  FINDINGS:    Stable left midlung linear scarring.   No pleural effusion or pneumothorax.   Heart size cannot be assessed appropriately in this projection.   No acute osseous abnormalities.      IMPRESSION:  Stable left midlung linear scarring.    < end of copied text >

## 2020-07-30 NOTE — PROGRESS NOTE ADULT - SUBJECTIVE AND OBJECTIVE BOX
Nephrology Followup Note - [phone] - Dr Catherine / Dr Garcia / Dr Tellez / Dr Sullivan / Dr Castillo / Dr Grijalva / Dr Segovia / Dr Luna  Pt seen and examined at bedside  No acute events overnight. No complaints.     Allergies:  No Known Allergies    Hospital Medications:   MEDICATIONS  (STANDING):  allopurinol 100 milliGRAM(s) Oral daily  amLODIPine   Tablet 10 milliGRAM(s) Oral daily  dextrose 5%. 1000 milliLiter(s) (50 mL/Hr) IV Continuous <Continuous>  dextrose 50% Injectable 12.5 Gram(s) IV Push once  dextrose 50% Injectable 25 Gram(s) IV Push once  dextrose 50% Injectable 25 Gram(s) IV Push once  gabapentin 100 milliGRAM(s) Oral three times a day  heparin  Infusion.  Unit(s)/Hr (13 mL/Hr) IV Continuous <Continuous>  hydrALAZINE 75 milliGRAM(s) Oral three times a day  insulin lispro (HumaLOG) corrective regimen sliding scale   SubCutaneous three times a day before meals  insulin lispro (HumaLOG) corrective regimen sliding scale   SubCutaneous at bedtime  latanoprost 0.005% Ophthalmic Solution 1 Drop(s) Left EYE at bedtime  levothyroxine 75 MICROGram(s) Oral daily  piperacillin/tazobactam IVPB.. 3.375 Gram(s) IV Intermittent every 12 hours  polyethylene glycol 3350 17 Gram(s) Oral daily  senna 2 Tablet(s) Oral at bedtime  simvastatin 20 milliGRAM(s) Oral at bedtime  sodium chloride 0.9%. 1000 milliLiter(s) (75 mL/Hr) IV Continuous <Continuous>      VITALS:  T(F): 98.5 (07-30-20 @ 13:36), Max: 98.5 (07-30-20 @ 05:51)  HR: 74 (07-30-20 @ 13:36)  BP: 126/49 (07-30-20 @ 13:36)  RR: 16 (07-30-20 @ 13:36)  SpO2: 100% (07-30-20 @ 13:36)  Wt(kg): --      PHYSICAL EXAM:  Constitutional: NAD  HEENT: anicteric sclera, oropharynx clear, MMM  Neck: No JVD  Respiratory: CTAB, no wheezes, rales or rhonchi  Cardiovascular: S1, S2, RRR  Gastrointestinal: BS+, soft, NT/ND  Extremities: No cyanosis or clubbing. No peripheral edema  Neurological: A/O x 3, no focal deficits  Psychiatric: Normal mood, normal affect  : No CVA tenderness. No michaud.   Skin: No rashes    LABS:  07-30    136  |  103  |  68<H>  ----------------------------<  120<H>  4.3   |  24  |  2.16<H>    Ca    10.0      30 Jul 2020 05:32  Phos  4.1     07-30  Mg     2.2     07-30      Creatinine Trend: 2.16 <--, 2.08 <--, 2.13 <--, 1.89 <--, 1.99 <--, 2.06 <--, 1.99 <--                        7.6    7.35  )-----------( 237      ( 30 Jul 2020 05:32 )             24.5     Urine Studies:      RADIOLOGY & ADDITIONAL STUDIES:

## 2020-07-30 NOTE — PROGRESS NOTE ADULT - SUBJECTIVE AND OBJECTIVE BOX
INTERVAL HPI/OVERNIGHT EVENTS: I feel fine.   Vital Signs Last 24 Hrs  T(C): 36.9 (30 Jul 2020 13:36), Max: 36.9 (29 Jul 2020 20:25)  T(F): 98.5 (30 Jul 2020 13:36), Max: 98.5 (30 Jul 2020 05:51)  HR: 74 (30 Jul 2020 13:36) (67 - 82)  BP: 126/49 (30 Jul 2020 13:36) (126/49 - 146/63)  BP(mean): --  RR: 16 (30 Jul 2020 13:36) (16 - 18)  SpO2: 100% (30 Jul 2020 13:36) (98% - 100%)  I&O's Summary    MEDICATIONS  (STANDING):  allopurinol 100 milliGRAM(s) Oral daily  amLODIPine   Tablet 10 milliGRAM(s) Oral daily  dextrose 5%. 1000 milliLiter(s) (50 mL/Hr) IV Continuous <Continuous>  dextrose 50% Injectable 12.5 Gram(s) IV Push once  dextrose 50% Injectable 25 Gram(s) IV Push once  dextrose 50% Injectable 25 Gram(s) IV Push once  gabapentin 100 milliGRAM(s) Oral three times a day  heparin  Infusion.  Unit(s)/Hr (13 mL/Hr) IV Continuous <Continuous>  hydrALAZINE 75 milliGRAM(s) Oral three times a day  insulin lispro (HumaLOG) corrective regimen sliding scale   SubCutaneous three times a day before meals  insulin lispro (HumaLOG) corrective regimen sliding scale   SubCutaneous at bedtime  latanoprost 0.005% Ophthalmic Solution 1 Drop(s) Left EYE at bedtime  levothyroxine 75 MICROGram(s) Oral daily  piperacillin/tazobactam IVPB.. 3.375 Gram(s) IV Intermittent every 12 hours  polyethylene glycol 3350 17 Gram(s) Oral daily  senna 2 Tablet(s) Oral at bedtime  simvastatin 20 milliGRAM(s) Oral at bedtime  sodium chloride 0.9%. 1000 milliLiter(s) (75 mL/Hr) IV Continuous <Continuous>    MEDICATIONS  (PRN):  acetaminophen   Tablet .. 650 milliGRAM(s) Oral every 6 hours PRN Temp greater or equal to 38C (100.4F), Mild Pain (1 - 3), Moderate Pain (4 - 6), Severe Pain (7 - 10)  dextrose 40% Gel 15 Gram(s) Oral once PRN Blood Glucose LESS THAN 70 milliGRAM(s)/deciliter  glucagon  Injectable 1 milliGRAM(s) IntraMuscular once PRN Glucose LESS THAN 70 milligrams/deciliter  heparin   Injectable 6000 Unit(s) IV Push every 6 hours PRN For aPTT less than 40  heparin   Injectable 3000 Unit(s) IV Push every 6 hours PRN For aPTT between 40 - 57    LABS:                        7.6    7.35  )-----------( 237      ( 30 Jul 2020 05:32 )             24.5     07-30    136  |  103  |  68<H>  ----------------------------<  120<H>  4.3   |  24  |  2.16<H>    Ca    10.0      30 Jul 2020 05:32  Phos  4.1     07-30  Mg     2.2     07-30      PT/INR - ( 30 Jul 2020 05:32 )   PT: 15.2 SEC;   INR: 1.34          PTT - ( 30 Jul 2020 05:32 )  PTT:59.2 SEC    CAPILLARY BLOOD GLUCOSE      POCT Blood Glucose.: 180 mg/dL (30 Jul 2020 12:02)  POCT Blood Glucose.: 118 mg/dL (30 Jul 2020 08:27)  POCT Blood Glucose.: 143 mg/dL (29 Jul 2020 21:41)  POCT Blood Glucose.: 145 mg/dL (29 Jul 2020 17:21)          REVIEW OF SYSTEMS:  CONSTITUTIONAL: No fever, weight loss, or fatigue  EYES: No eye pain, visual disturbances, or discharge  ENMT:  No difficulty hearing, tinnitus, vertigo; No sinus or throat pain  NECK: No pain or stiffness  RESPIRATORY: No cough, wheezing, chills or hemoptysis; No shortness of breath  CARDIOVASCULAR: No chest pain, palpitations, dizziness, or leg swelling  GASTROINTESTINAL: No abdominal or epigastric pain. No nausea, vomiting, or hematemesis; No diarrhea or constipation. No melena or hematochezia.  GENITOURINARY: No dysuria, frequency, hematuria, or incontinence  NEUROLOGICAL: No headaches, memory loss, loss of strength, numbness, or tremors      RADIOLOGY & ADDITIONAL TESTS:    Consultant(s) Notes Reviewed:  [x ] YES  [ ] NO    PHYSICAL EXAM:  GENERAL: NAD, well-groomed, well-developed, not in any distress ,  HEAD:  Atraumatic, Normocephalic  EYES: EOMI, PERRLA, conjunctiva and sclera clear  ENMT: No tonsillar erythema, exudates, or enlargement; Moist mucous membranes, Good dentition, No lesions  NECK: Supple, No JVD, Normal thyroid  NERVOUS SYSTEM:  Alert & Oriented X3, No focal deficit   CHEST/LUNG: Good air entry bilateral with no  rales, rhonchi, wheezing, or rubs  HEART: Regular rate and rhythm; No murmurs, rubs, or gallops  ABDOMEN: Soft, Nontender, Nondistended; Bowel sounds present  EXTREMITIES:  Rt foot dressed     Care Discussed with Consultants/Other Providers [ x] YES  [ ] NO

## 2020-07-30 NOTE — CHART NOTE - NSCHARTNOTEFT_GEN_A_CORE
Pt received vanco 750mg IV overnight around 1AM per ID, trough drawn this morning at 9:50AM showed result of 8.2.  Discussed with ID, ok to give additional dose of 750mg IV now (will be about 12 hours apart).  Will f/u vanc level in AM.

## 2020-07-30 NOTE — CHART NOTE - NSCHARTNOTEFT_GEN_A_CORE
CANCELLED pod surgery scheduled for Friday 7/31 LF Partial 1st ray resection after discussion with Dr. Hummel.   Continue LWC, stable for d/c.   Podiatry signing off, consult prn. CANCELLED pod surgery scheduled for Friday 7/31 LF Partial 1st ray resection after discussion with Dr. Hummel.   Continue LWC, stable for d/c.   D/C instructions for follow up in Discharge paperwork: Please follow up with Dr. Downey within 1 week of discharge from the hospital, please call [phone]   Podiatry signing off, consult prn.

## 2020-07-30 NOTE — PROGRESS NOTE ADULT - ASSESSMENT
89 year old female PMH  a-fib on coumadin, diastolic CHF (with EF 63% on TTE 8/13/19), HTN, HLD, CKD stage 3-4, IDDM, PVD, CVA, PE, hypothyroidism, p/w L foot wound. Patient seen today at clinic and sent for cellulitis and abscess, wanting to rule out OM. Patient denies fever, chills, cp, sob, abd pain, n/v, weakness, or numbness/tingling. (21 Jul 2020 20:43)    Pt states she has been on multiple PO abx in the past.  She currently denies any purulent discharge, no fever/chills.    ER vss.  No leukocytosis.  Pt seen by podiatry and vascular services.  L foot with avulsed hallux nail, L hallux boggy, necrotic and cold to level of MPJ, no purulence, no open wounds, no malodor, no associated cellulitis on initial exam.  Changes due to PAD.  s/p LLE angiogram  showed SFA occlusion w/ distal SFA/pop recon w/ heavy calcifications, no further vasc intervention.  Pt planned for Left foot partial 1st ray resection.     Wound was cultured and growing MRSA, Enterococcus and Proteus mirabilis.  Pt received vanco x 1, now on cefepime.    ID consulted for further abx recommendations.    L hallux gangrene:    - No drainage/purulence.  Wound appears dry.  No systemic signs of infection, afebrile.  Currently on cefepime.    - Wound cx MRSA, enterococcus and proteus.  dc cefepime, switch to vanco/zosyn.    - If pt becomes febrile, send bcx  x2    - Planned for hallux ray resection, f/u bone cx and path to guide further abx treatment.        NIKHIL/CKD:    - Pt with CKD4, dose abx accordingly.      - s/p LE angiogram, s/p IVF, monitor Cr.  Renal f/u appreciated.      * Vanco re-dosed at 750mg IV x 1 this AM.  Check repeat random in AM.      Will follow,    Melissa Escobedo  [phone]

## 2020-07-31 LAB
ANION GAP SERPL CALC-SCNC: 11 MMO/L — SIGNIFICANT CHANGE UP (ref 7–14)
APTT BLD: 157.5 SEC — CRITICAL HIGH (ref 27–36.3)
APTT BLD: 45.4 SEC — HIGH (ref 27–36.3)
APTT BLD: 86.5 SEC — HIGH (ref 27–36.3)
BLD GP AB SCN SERPL QL: NEGATIVE — SIGNIFICANT CHANGE UP
BUN SERPL-MCNC: 69 MG/DL — HIGH (ref 7–23)
CALCIUM SERPL-MCNC: 10 MG/DL — SIGNIFICANT CHANGE UP (ref 8.4–10.5)
CHLORIDE SERPL-SCNC: 102 MMOL/L — SIGNIFICANT CHANGE UP (ref 98–107)
CO2 SERPL-SCNC: 22 MMOL/L — SIGNIFICANT CHANGE UP (ref 22–31)
CREAT SERPL-MCNC: 2.28 MG/DL — HIGH (ref 0.5–1.3)
GLUCOSE BLDC GLUCOMTR-MCNC: 126 MG/DL — HIGH (ref 70–99)
GLUCOSE BLDC GLUCOMTR-MCNC: 163 MG/DL — HIGH (ref 70–99)
GLUCOSE BLDC GLUCOMTR-MCNC: 170 MG/DL — HIGH (ref 70–99)
GLUCOSE BLDC GLUCOMTR-MCNC: 171 MG/DL — HIGH (ref 70–99)
GLUCOSE SERPL-MCNC: 106 MG/DL — HIGH (ref 70–99)
HCT VFR BLD CALC: 25.2 % — LOW (ref 34.5–45)
HGB BLD-MCNC: 7.8 G/DL — LOW (ref 11.5–15.5)
INR BLD: 1.3 — HIGH (ref 0.88–1.17)
MAGNESIUM SERPL-MCNC: 2.2 MG/DL — SIGNIFICANT CHANGE UP (ref 1.6–2.6)
MCHC RBC-ENTMCNC: 29.8 PG — SIGNIFICANT CHANGE UP (ref 27–34)
MCHC RBC-ENTMCNC: 31 % — LOW (ref 32–36)
MCV RBC AUTO: 96.2 FL — SIGNIFICANT CHANGE UP (ref 80–100)
NRBC # FLD: 0 K/UL — SIGNIFICANT CHANGE UP (ref 0–0)
PHOSPHATE SERPL-MCNC: 4 MG/DL — SIGNIFICANT CHANGE UP (ref 2.5–4.5)
PLATELET # BLD AUTO: 239 K/UL — SIGNIFICANT CHANGE UP (ref 150–400)
PMV BLD: 11.9 FL — SIGNIFICANT CHANGE UP (ref 7–13)
POTASSIUM SERPL-MCNC: 4.5 MMOL/L — SIGNIFICANT CHANGE UP (ref 3.5–5.3)
POTASSIUM SERPL-SCNC: 4.5 MMOL/L — SIGNIFICANT CHANGE UP (ref 3.5–5.3)
PROTHROM AB SERPL-ACNC: 14.8 SEC — HIGH (ref 9.8–13.1)
RBC # BLD: 2.62 M/UL — LOW (ref 3.8–5.2)
RBC # FLD: 15.1 % — HIGH (ref 10.3–14.5)
RH IG SCN BLD-IMP: NEGATIVE — SIGNIFICANT CHANGE UP
SODIUM SERPL-SCNC: 135 MMOL/L — SIGNIFICANT CHANGE UP (ref 135–145)
VANCOMYCIN FLD-MCNC: 13.2 UG/ML — SIGNIFICANT CHANGE UP
WBC # BLD: 7.99 K/UL — SIGNIFICANT CHANGE UP (ref 3.8–10.5)
WBC # FLD AUTO: 7.99 K/UL — SIGNIFICANT CHANGE UP (ref 3.8–10.5)

## 2020-07-31 RX ORDER — WARFARIN SODIUM 2.5 MG/1
3 TABLET ORAL ONCE
Refills: 0 | Status: COMPLETED | OUTPATIENT
Start: 2020-07-31 | End: 2020-07-31

## 2020-07-31 RX ORDER — MORPHINE SULFATE 50 MG/1
2 CAPSULE, EXTENDED RELEASE ORAL EVERY 4 HOURS
Refills: 0 | Status: DISCONTINUED | OUTPATIENT
Start: 2020-07-31 | End: 2020-08-07

## 2020-07-31 RX ADMIN — SENNA PLUS 2 TABLET(S): 8.6 TABLET ORAL at 21:18

## 2020-07-31 RX ADMIN — GABAPENTIN 100 MILLIGRAM(S): 400 CAPSULE ORAL at 05:23

## 2020-07-31 RX ADMIN — GABAPENTIN 100 MILLIGRAM(S): 400 CAPSULE ORAL at 13:49

## 2020-07-31 RX ADMIN — Medication 75 MILLIGRAM(S): at 21:18

## 2020-07-31 RX ADMIN — WARFARIN SODIUM 3 MILLIGRAM(S): 2.5 TABLET ORAL at 18:17

## 2020-07-31 RX ADMIN — HEPARIN SODIUM 1500 UNIT(S)/HR: 5000 INJECTION INTRAVENOUS; SUBCUTANEOUS at 17:25

## 2020-07-31 RX ADMIN — SIMVASTATIN 20 MILLIGRAM(S): 20 TABLET, FILM COATED ORAL at 21:19

## 2020-07-31 RX ADMIN — LATANOPROST 1 DROP(S): 0.05 SOLUTION/ DROPS OPHTHALMIC; TOPICAL at 21:18

## 2020-07-31 RX ADMIN — Medication 75 MILLIGRAM(S): at 13:49

## 2020-07-31 RX ADMIN — HEPARIN SODIUM 1500 UNIT(S)/HR: 5000 INJECTION INTRAVENOUS; SUBCUTANEOUS at 23:51

## 2020-07-31 RX ADMIN — Medication 2: at 17:26

## 2020-07-31 RX ADMIN — PIPERACILLIN AND TAZOBACTAM 25 GRAM(S): 4; .5 INJECTION, POWDER, LYOPHILIZED, FOR SOLUTION INTRAVENOUS at 21:18

## 2020-07-31 RX ADMIN — Medication 75 MICROGRAM(S): at 05:23

## 2020-07-31 RX ADMIN — HEPARIN SODIUM 1700 UNIT(S)/HR: 5000 INJECTION INTRAVENOUS; SUBCUTANEOUS at 07:42

## 2020-07-31 RX ADMIN — POLYETHYLENE GLYCOL 3350 17 GRAM(S): 17 POWDER, FOR SOLUTION ORAL at 11:19

## 2020-07-31 RX ADMIN — HEPARIN SODIUM 3000 UNIT(S): 5000 INJECTION INTRAVENOUS; SUBCUTANEOUS at 07:47

## 2020-07-31 RX ADMIN — GABAPENTIN 100 MILLIGRAM(S): 400 CAPSULE ORAL at 21:19

## 2020-07-31 RX ADMIN — Medication 650 MILLIGRAM(S): at 09:39

## 2020-07-31 RX ADMIN — AMLODIPINE BESYLATE 10 MILLIGRAM(S): 2.5 TABLET ORAL at 05:44

## 2020-07-31 RX ADMIN — Medication 650 MILLIGRAM(S): at 08:52

## 2020-07-31 RX ADMIN — Medication 75 MILLIGRAM(S): at 05:23

## 2020-07-31 RX ADMIN — Medication 100 MILLIGRAM(S): at 11:19

## 2020-07-31 RX ADMIN — Medication 2: at 12:45

## 2020-07-31 RX ADMIN — PIPERACILLIN AND TAZOBACTAM 25 GRAM(S): 4; .5 INJECTION, POWDER, LYOPHILIZED, FOR SOLUTION INTRAVENOUS at 08:46

## 2020-07-31 RX ADMIN — HEPARIN SODIUM 0 UNIT(S)/HR: 5000 INJECTION INTRAVENOUS; SUBCUTANEOUS at 16:23

## 2020-07-31 NOTE — PROGRESS NOTE ADULT - SUBJECTIVE AND OBJECTIVE BOX
S: Denies chest pain or SOB. Review of systems otherwise (-)      MEDICATIONS  (STANDING):  allopurinol 100 milliGRAM(s) Oral daily  amLODIPine   Tablet 10 milliGRAM(s) Oral daily  dextrose 5%. 1000 milliLiter(s) (50 mL/Hr) IV Continuous <Continuous>  dextrose 50% Injectable 12.5 Gram(s) IV Push once  dextrose 50% Injectable 25 Gram(s) IV Push once  dextrose 50% Injectable 25 Gram(s) IV Push once  gabapentin 100 milliGRAM(s) Oral three times a day  heparin  Infusion.  Unit(s)/Hr (13 mL/Hr) IV Continuous <Continuous>  hydrALAZINE 75 milliGRAM(s) Oral three times a day  insulin lispro (HumaLOG) corrective regimen sliding scale   SubCutaneous three times a day before meals  insulin lispro (HumaLOG) corrective regimen sliding scale   SubCutaneous at bedtime  latanoprost 0.005% Ophthalmic Solution 1 Drop(s) Left EYE at bedtime  levothyroxine 75 MICROGram(s) Oral daily  piperacillin/tazobactam IVPB.. 3.375 Gram(s) IV Intermittent every 12 hours  polyethylene glycol 3350 17 Gram(s) Oral daily  senna 2 Tablet(s) Oral at bedtime  simvastatin 20 milliGRAM(s) Oral at bedtime  sodium chloride 0.9%. 1000 milliLiter(s) (75 mL/Hr) IV Continuous <Continuous>  warfarin 3 milliGRAM(s) Oral once    MEDICATIONS  (PRN):  acetaminophen   Tablet .. 650 milliGRAM(s) Oral every 6 hours PRN Temp greater or equal to 38C (100.4F), Mild Pain (1 - 3), Moderate Pain (4 - 6), Severe Pain (7 - 10)  dextrose 40% Gel 15 Gram(s) Oral once PRN Blood Glucose LESS THAN 70 milliGRAM(s)/deciliter  glucagon  Injectable 1 milliGRAM(s) IntraMuscular once PRN Glucose LESS THAN 70 milligrams/deciliter  heparin   Injectable 6000 Unit(s) IV Push every 6 hours PRN For aPTT less than 40  heparin   Injectable 3000 Unit(s) IV Push every 6 hours PRN For aPTT between 40 - 57      LABS:                            7.8    7.99  )-----------( 239 ( 31 Jul 2020 05:30 )             25.2     Hemoglobin: 7.8 g/dL (07-31 @ 05:30)  Hemoglobin: 7.6 g/dL (07-30 @ 05:32)  Hemoglobin: 8.0 g/dL (07-29 @ 07:00)  Hemoglobin: 8.6 g/dL (07-28 @ 06:00)  Hemoglobin: 7.8 g/dL (07-27 @ 03:10)    07-31    135  |  102  |  69<H>  ----------------------------<  106<H>  4.5   |  22  |  2.28<H>    Ca    10.0      31 Jul 2020 05:30  Phos  4.0     07-31  Mg     2.2     07-31      Creatinine Trend: 2.28<--, 2.16<--, 2.08<--, 2.13<--, 1.89<--, 1.99<--   PT/INR - ( 31 Jul 2020 05:30 )   PT: 14.8 SEC;   INR: 1.30          PTT - ( 31 Jul 2020 05:30 )  PTT:45.4 SEC            PHYSICAL EXAM  Vital Signs Last 24 Hrs  T(C): 37.2 (31 Jul 2020 05:22), Max: 37.2 (30 Jul 2020 21:21)  T(F): 99 (31 Jul 2020 05:22), Max: 99 (31 Jul 2020 05:22)  HR: 82 (31 Jul 2020 05:22) (68 - 82)  BP: 131/63 (31 Jul 2020 05:22) (126/49 - 142/58)  BP(mean): --  RR: 18 (31 Jul 2020 05:22) (16 - 18)  SpO2: 97% (31 Jul 2020 05:22) (97% - 100%)      Gen: Appears well in NAD  HEENT:  (-)icterus (-)pallor  CV: Normal S1, there is still a sharp S2.  Mid-to-late peaking 3/6 systolic murmur at the upper chest, but No pulsus parvus et tardus (+)2 Pulses B/l  Resp:  Clear to ausculatation B/L, normal effort  GI: (+) BS Soft, NT, ND  Lymph:  (-)Edema, (-)obvious lymphadenopathy  Skin: +left foot dressing, Warm to touch, Normal turgor  Psych: Appropriate mood and affect      TELEMETRY: None	      < from: Transthoracic Echocardiogram (07.22.20 @ 16:27) >  CONCLUSIONS:  1. Mitral annular calcification, otherwise normal mitral  valve. Mild-moderate mitral regurgitation.  2. Calcified trileaflet aortic valve with decreased  opening.  Peak transaortic valve gradient [id number] mm Hg,  mean transaortic valve gradient equals 26 mm Hg, estimated  aortic valve area equals 1 sqcm (by continuity equation),  consistent with moderate to severe aortic stenosis.  3. Severely dilated left atrium.  LA volume index = 57  cc/m2.  4. Mild concentric left ventricular hypertrophy.  5. Normal left ventricular systolic function. No segmental  wall motion abnormalities.  6. Severe diastolic dysfunction.  7. Severe right atrial enlargement.  8. Normal right ventricular size and function.  9. Estimated right ventricular systolic pressure equals 53  mm Hg, assuming right atrial pressure equals 10 mm Hg,  consistent with moderate pulmonary hypertension.    < end of copied text >      ASSESSMENT/PLAN: Patient is a 89 year old Female well known to our office (Cardiologist - Dr. Berman), who presented with a foot wound.  Consult requested for pre-op cardiovascular risk stratification.  Her known PMH includes persistent Afib on coumadin who was not a candidate for left atrial appendage occlusion with Watchman, chronic HFpEF, HTN, HLD, hypothyroidism, CKD, Type 2 DM, CVA, and hx of PE.    - Continue AC for cva prevention if no contraindications - on hep gtt bridge to coumadin  - Continue with PO lasix   - TTE noted above with normal LVEF, severe diastolic dysfunction, mild-mod MR, mod pulm HTN, and mod-severe AS: auscultation favors moderate aortic stenosis.  Yearly Echo for followup.  Patient does not have any 'red flag' symptoms such as syncope, crushing angina, dyspnea on exertion (limited by her foot), so will continue to monitor in the office.  - In addition, the patient and her family prefers conservative medical management of her valvular disease and does not want any further invasive workup or surgery  - no further inpatient cardiac w/u needed at this time  - s/p LE angio - tolerated procedure well in terms of cv perspective  - pod sx now canceled, plan for IV Abx and wound care    Zi Wiley PA-C  Pager: [phone]

## 2020-07-31 NOTE — PROGRESS NOTE ADULT - ASSESSMENT
89 year old female PMH  a-fib on coumadin, diastolic CHF (with EF 63% on TTE 8/13/19), HTN, HLD, CKD stage 3-4, IDDM, PVD, CVA, PE, hypothyroidism, p/w L foot wound. Patient seen today at clinic and sent for cellulitis and abscess, wanting to rule out OM. Patient denies fever, chills, cp, sob, abd pain, n/v, weakness, or numbness/tingling.     Problem/Plan - 1:  ·  Problem: Toe gangrene.  Plan: Podiatry consult noted. S/P IV Abxs for 7 days . NO fever or Leucocytosis so likely ischemic .   ID and Vascular helping. No intervention per podiatry .        Problem/Plan - 2:  ·  Problem: Stage 3 chronic kidney disease.  Plan: Renal helping .  Baseline Creatinine 1.8.      Problem/Plan - 3:  ·  Problem: PVD (peripheral vascular disease).  Plan: LIZBETH/PVD noted.  Vascular consult noted. S/P angiogram .     Problem/Plan - 4:  ·  Problem: Chronic diastolic congestive heart failure.  Plan: Continuing Lasix .  Cardiology helping.      Problem/Plan - 5:  ·  Problem: DM (diabetes mellitus).  Plan: SSI for now .      Problem/Plan - 6:  Problem: Afib. Plan: ON AC. Heparin till INR therapeutic .      Problem/Plan - 7:  ·  Problem: Pulmonary embolism.  Plan: On AC.      Problem/Plan - 8:  ·  Problem:  Hypothyroid.  Plan: Synthroid.      Problem/Plan - 9:  ·  Problem: Anemia.  Plan: Chronic . Watching CBC . Transfuse PRBC for HGb <7.5G .     Problem/Plan - 10:  ·  Problem: Uncontrolled Hypertension .  Plan: Adjusting BP meds.

## 2020-07-31 NOTE — PROGRESS NOTE ADULT - ASSESSMENT
89 year old female PMH  a-fib on coumadin, diastolic CHF (with EF 63% on TTE 8/13/19), HTN, HLD, CKD stage 3-4, IDDM, PVD, CVA, PE, hypothyroidism, p/w L foot wound. Patient was sent yesterday from podiatrist office for non healing left great toe ulcer, to rule out OM. Renal following for NIKHIL/CKD Mx.     CKD4: Baseline Creatinine 1.7-2.0  euvolemic. Electrolytes acceptable.    Scr 2>2.16>2.28 slowly rising  s/p LE angiogram, given IVF post procedure.   continue to hold lasix  avoid ACEi/ARB for now/NSAIDs/Nephrotoxics.  monitor BMP daily     HTN, controlled-bp optimal. c/w current bp meds. avoid ACEi/ARB. pain Mx.     Toe gangrene, PAD. s/p LE angio, no stent. f/u w/ Podiatry, plans for amputation cancelled.     DM- Mx per medicine    labs, chart reviewed  d/c plan per medicine. will f/u in office upon d/c

## 2020-07-31 NOTE — PROGRESS NOTE ADULT - SUBJECTIVE AND OBJECTIVE BOX
Infectious Diseases progress note:    Subjective: Awake, alert, NAD.  c/o pain in L hallux.  Afebrile.  d/w NP, no plan for toe amputation at this time.  Daughter at bedside.     ROS:  CONSTITUTIONAL:  No fever, chills, rigors  CARDIOVASCULAR:  No chest pain or palpitations  RESPIRATORY:   No SOB, cough, dyspnea on exertion.  No wheezing  GASTROINTESTINAL:  No abd pain, N/V, diarrhea/constipation  EXTREMITIES:  No swelling or joint pain  GENITOURINARY:  No burning on urination, increased frequency or urgency.  No flank pain  NEUROLOGIC:  No HA, visual disturbances  SKIN: No rashes    Allergies    No Known Allergies    Intolerances        ANTIBIOTICS/RELEVANT:  antimicrobials  piperacillin/tazobactam IVPB.. 3.375 Gram(s) IV Intermittent every 12 hours    immunologic:    OTHER:  acetaminophen   Tablet .. 650 milliGRAM(s) Oral every 6 hours PRN  allopurinol 100 milliGRAM(s) Oral daily  amLODIPine   Tablet 10 milliGRAM(s) Oral daily  dextrose 40% Gel 15 Gram(s) Oral once PRN  dextrose 5%. 1000 milliLiter(s) IV Continuous <Continuous>  dextrose 50% Injectable 12.5 Gram(s) IV Push once  dextrose 50% Injectable 25 Gram(s) IV Push once  dextrose 50% Injectable 25 Gram(s) IV Push once  gabapentin 100 milliGRAM(s) Oral three times a day  glucagon  Injectable 1 milliGRAM(s) IntraMuscular once PRN  heparin   Injectable 6000 Unit(s) IV Push every 6 hours PRN  heparin   Injectable 3000 Unit(s) IV Push every 6 hours PRN  heparin  Infusion.  Unit(s)/Hr IV Continuous <Continuous>  hydrALAZINE 75 milliGRAM(s) Oral three times a day  insulin lispro (HumaLOG) corrective regimen sliding scale   SubCutaneous three times a day before meals  insulin lispro (HumaLOG) corrective regimen sliding scale   SubCutaneous at bedtime  latanoprost 0.005% Ophthalmic Solution 1 Drop(s) Left EYE at bedtime  levothyroxine 75 MICROGram(s) Oral daily  polyethylene glycol 3350 17 Gram(s) Oral daily  senna 2 Tablet(s) Oral at bedtime  simvastatin 20 milliGRAM(s) Oral at bedtime  sodium chloride 0.9%. 1000 milliLiter(s) IV Continuous <Continuous>  warfarin 3 milliGRAM(s) Oral once      Objective:  Vital Signs Last 24 Hrs  T(C): 36.7 (31 Jul 2020 13:52), Max: 37.2 (30 Jul 2020 21:21)  T(F): 98 (31 Jul 2020 13:52), Max: 99 (31 Jul 2020 05:22)  HR: 91 (31 Jul 2020 13:52) (68 - 91)  BP: 147/61 (31 Jul 2020 13:52) (131/63 - 147/61)  BP(mean): --  RR: 18 (31 Jul 2020 13:52) (18 - 18)  SpO2: 99% (31 Jul 2020 13:52) (97% - 99%)    PHYSICAL EXAM:  Constitutional:NAD  Eyes:OLIIVA, EOMI  Ear/Nose/Throat: no thrush, mucositis.  Moist mucous membranes	  Neck:no JVD, no lymphadenopathy, supple  Respiratory: CTA adina  Cardiovascular: S1S2 RRR, no murmurs  Gastrointestinal:soft, nontender,  nondistended (+) BS  Extremities:no e/e/c  Skin:  L hallux dry gangrene, no wet conversion, no drainage.         LABS:                        7.8    7.99  )-----------( 239      ( 31 Jul 2020 05:30 )             25.2     07-31    135  |  102  |  69<H>  ----------------------------<  106<H>  4.5   |  22  |  2.28<H>    Ca    10.0      31 Jul 2020 05:30  Phos  4.0     07-31  Mg     2.2     07-31      PT/INR - ( 31 Jul 2020 05:30 )   PT: 14.8 SEC;   INR: 1.30          PTT - ( 31 Jul 2020 14:35 )  PTT:157.5 SEC        Vancomycin Level, Random (07.31.20 @ 05:30)    Vancomycin Level, Random: 13.2: Therapeutic ranges have not been established for random  vancomycin. Interpret results in context of patient's  clinical condition and time sample was drawn relative to  peak and trough therapeutic ranges. Therapeutic ranges for  peak vancomycin are 25-50 and for trough vancomycin 10-20  with 15-20 mcg/mL used for complicated infections. ug/mL            MICROBIOLOGY:    Culture - Abscess with Gram Stain (07.21.20 @ 21:26)    -  Amikacin: S <=16    -  Amoxicillin/Clavulanic Acid: S <=8/4    -  Ampicillin: S <=8 These ampicillin results predict results for amoxicillin    -  Ampicillin: S <=2 Predicts results to ampicillin/sulbactam, amoxacillin-clavulanate and  piperacillin-tazobactam.    -  Ampicillin/Sulbactam: S <=4/2 Enterobacter, Citrobacter, and Serratia may develop resistance during prolonged therapy (3-4 days)    -  Ampicillin/Sulbactam: R <=8/4    -  Aztreonam: S <=4    -  Cefazolin: S <=2 Enterobacter, Citrobacter, and Serratia may develop resistance during prolonged therapy (3-4 days)    -  Cefazolin: R 8    -  Cefepime: S <=2    -  Cefoxitin: S <=8    -  Ceftriaxone: S <=1 Enterobacter, Citrobacter, and Serratia may develop resistance during prolonged therapy    -  Ciprofloxacin: S <=0.25    -  Clindamycin: S <=0.25    -  Daptomycin: S 0.5    -  Ertapenem: S <=0.5    -  Erythromycin: S <=0.25    -  Gentamicin: S <=1 Should not be used as monotherapy    -  Gentamicin: S <=2    -  Levofloxacin: S <=0.5    -  Linezolid: S 2    -  Meropenem: S <=1    -  Oxacillin: R >2    -  Penicillin: R >8    -  Piperacillin/Tazobactam: S <=8    -  RIF- Rifampin: S <=1 Should not be used as monotherapy    -  Tetra/Doxy: R >8    -  Tetra/Doxy: S <=1    -  Tobramycin: S <=2    -  Trimethoprim/Sulfamethoxazole: S <=0.5/9.5    -  Trimethoprim/Sulfamethoxazole: R >2/38    -  Vancomycin: S 2    -  Vancomycin: S 2    Specimen Source: .Abscess L foot    Culture Results:   Numerous Proteus mirabilis  Moderate Enterococcus faecalis  Few Methicillin resistant Staphylococcus aureus    Organism Identification: Proteus mirabilis  Enterococcus faecalis  Methicillin resistant Staphylococcus aureus    Organism: Proteus mirabilis    Organism: Enterococcus faecalis    Organism: Methicillin resistant Staphylococcus aureus    Method Type: ALTA    Method Type: ALTA    Method Type: ALTA          RADIOLOGY & ADDITIONAL STUDIES:    < from: Xray Chest 1 View- PORTABLE-Urgent (07.27.20 @ 14:43) >  FINDINGS:    Stable left midlung linear scarring.   No pleural effusion or pneumothorax.   Heart size cannot be assessed appropriately in this projection.   No acute osseous abnormalities.      IMPRESSION:  Stable left midlung linear scarring.    < end of copied text >

## 2020-07-31 NOTE — PROGRESS NOTE ADULT - SUBJECTIVE AND OBJECTIVE BOX
INTERVAL HPI/OVERNIGHT EVENTS: I am having pain.   Vital Signs Last 24 Hrs  T(C): 36.7 (31 Jul 2020 13:52), Max: 37.2 (30 Jul 2020 21:21)  T(F): 98 (31 Jul 2020 13:52), Max: 99 (31 Jul 2020 05:22)  HR: 91 (31 Jul 2020 13:52) (68 - 91)  BP: 147/61 (31 Jul 2020 13:52) (131/63 - 147/61)  BP(mean): --  RR: 18 (31 Jul 2020 13:52) (18 - 18)  SpO2: 99% (31 Jul 2020 13:52) (97% - 99%)  I&O's Summary    MEDICATIONS  (STANDING):  allopurinol 100 milliGRAM(s) Oral daily  amLODIPine   Tablet 10 milliGRAM(s) Oral daily  dextrose 5%. 1000 milliLiter(s) (50 mL/Hr) IV Continuous <Continuous>  dextrose 50% Injectable 12.5 Gram(s) IV Push once  dextrose 50% Injectable 25 Gram(s) IV Push once  dextrose 50% Injectable 25 Gram(s) IV Push once  gabapentin 100 milliGRAM(s) Oral three times a day  heparin  Infusion.  Unit(s)/Hr (13 mL/Hr) IV Continuous <Continuous>  hydrALAZINE 75 milliGRAM(s) Oral three times a day  insulin lispro (HumaLOG) corrective regimen sliding scale   SubCutaneous three times a day before meals  insulin lispro (HumaLOG) corrective regimen sliding scale   SubCutaneous at bedtime  latanoprost 0.005% Ophthalmic Solution 1 Drop(s) Left EYE at bedtime  levothyroxine 75 MICROGram(s) Oral daily  piperacillin/tazobactam IVPB.. 3.375 Gram(s) IV Intermittent every 12 hours  polyethylene glycol 3350 17 Gram(s) Oral daily  senna 2 Tablet(s) Oral at bedtime  simvastatin 20 milliGRAM(s) Oral at bedtime  sodium chloride 0.9%. 1000 milliLiter(s) (75 mL/Hr) IV Continuous <Continuous>    MEDICATIONS  (PRN):  acetaminophen   Tablet .. 650 milliGRAM(s) Oral every 6 hours PRN Temp greater or equal to 38C (100.4F), Mild Pain (1 - 3), Moderate Pain (4 - 6), Severe Pain (7 - 10)  dextrose 40% Gel 15 Gram(s) Oral once PRN Blood Glucose LESS THAN 70 milliGRAM(s)/deciliter  glucagon  Injectable 1 milliGRAM(s) IntraMuscular once PRN Glucose LESS THAN 70 milligrams/deciliter  heparin   Injectable 6000 Unit(s) IV Push every 6 hours PRN For aPTT less than 40  heparin   Injectable 3000 Unit(s) IV Push every 6 hours PRN For aPTT between 40 - 57    LABS:                        7.8    7.99  )-----------( 239      ( 31 Jul 2020 05:30 )             25.2     07-31    135  |  102  |  69<H>  ----------------------------<  106<H>  4.5   |  22  |  2.28<H>    Ca    10.0      31 Jul 2020 05:30  Phos  4.0     07-31  Mg     2.2     07-31      PT/INR - ( 31 Jul 2020 05:30 )   PT: 14.8 SEC;   INR: 1.30          PTT - ( 31 Jul 2020 14:35 )  PTT:157.5 SEC    CAPILLARY BLOOD GLUCOSE      POCT Blood Glucose.: 170 mg/dL (31 Jul 2020 17:10)  POCT Blood Glucose.: 171 mg/dL (31 Jul 2020 12:08)  POCT Blood Glucose.: 126 mg/dL (31 Jul 2020 08:36)  POCT Blood Glucose.: 158 mg/dL (30 Jul 2020 22:07)          REVIEW OF SYSTEMS:  CONSTITUTIONAL: No fever, weight loss, or fatigue  EYES: No eye pain, visual disturbances, or discharge  ENMT:  No difficulty hearing, tinnitus, vertigo; No sinus or throat pain  NECK: No pain or stiffness  RESPIRATORY: No cough, wheezing, chills or hemoptysis; No shortness of breath  CARDIOVASCULAR: No chest pain, palpitations, dizziness, or leg swelling  GASTROINTESTINAL: No abdominal or epigastric pain. No nausea, vomiting, or hematemesis; No diarrhea or constipation. No melena or hematochezia.  GENITOURINARY: No dysuria, frequency, hematuria, or incontinence  NEUROLOGICAL: No headaches, memory loss, loss of strength, numbness, or tremors      Consultant(s) Notes Reviewed:  [x ] YES  [ ] NO    PHYSICAL EXAM:  GENERAL: NAD, well-groomed, well-developed,not in any distress ,  HEAD:  Atraumatic, Normocephalic  EYES: EOMI, PERRLA, conjunctiva and sclera clear  ENMT: No tonsillar erythema, exudates, or enlargement; Moist mucous membranes, Good dentition, No lesions  NECK: Supple, No JVD, Normal thyroid  NERVOUS SYSTEM:  Alert & Oriented X3, No focal deficit   CHEST/LUNG: Good air entry bilateral with no  rales, rhonchi, wheezing, or rubs  HEART: Regular rate and rhythm; No murmurs, rubs, or gallops  ABDOMEN: Soft, Nontender, Nondistended; Bowel sounds present  EXTREMITIES: Foot dressed   SKIN: No rashes or lesions    Care Discussed with Consultants/Other Providers [ x] YES  [ ] NO

## 2020-07-31 NOTE — PROGRESS NOTE ADULT - SUBJECTIVE AND OBJECTIVE BOX
[facility] - S Radha / Florin S /D Nate/ S Jonathan/ S Rosalinda/ Shahid Segovia / BABAR Escalerau/ O Rudi  service -[phone], office [phone]  ---------------------------------------------------------------------------------------------------------------    Patient seen and examined bedside    Subjective and Objective: No overnight events, denied sob/V/D. No new complaints today. +pain left foot sometimes    Allergies: No Known Allergies      Hospital Medications:   MEDICATIONS  (STANDING):  allopurinol 100 milliGRAM(s) Oral daily  amLODIPine   Tablet 10 milliGRAM(s) Oral daily  dextrose 5%. 1000 milliLiter(s) (50 mL/Hr) IV Continuous <Continuous>  dextrose 50% Injectable 12.5 Gram(s) IV Push once  dextrose 50% Injectable 25 Gram(s) IV Push once  dextrose 50% Injectable 25 Gram(s) IV Push once  gabapentin 100 milliGRAM(s) Oral three times a day  heparin  Infusion.  Unit(s)/Hr (13 mL/Hr) IV Continuous <Continuous>  hydrALAZINE 75 milliGRAM(s) Oral three times a day  insulin lispro (HumaLOG) corrective regimen sliding scale   SubCutaneous three times a day before meals  insulin lispro (HumaLOG) corrective regimen sliding scale   SubCutaneous at bedtime  latanoprost 0.005% Ophthalmic Solution 1 Drop(s) Left EYE at bedtime  levothyroxine 75 MICROGram(s) Oral daily  piperacillin/tazobactam IVPB.. 3.375 Gram(s) IV Intermittent every 12 hours  polyethylene glycol 3350 17 Gram(s) Oral daily  senna 2 Tablet(s) Oral at bedtime  simvastatin 20 milliGRAM(s) Oral at bedtime  sodium chloride 0.9%. 1000 milliLiter(s) (75 mL/Hr) IV Continuous <Continuous>  warfarin 3 milliGRAM(s) Oral once    VITALS:  T(F): 98 (07-31-20 @ 13:52), Max: 99 (07-31-20 @ 05:22)  HR: 91 (07-31-20 @ 13:52)  BP: 147/61 (07-31-20 @ 13:52)  RR: 18 (07-31-20 @ 13:52)  SpO2: 99% (07-31-20 @ 13:52)  Wt(kg): --        PHYSICAL EXAM:  Constitutional: NAD  HEENT: anicteric sclera  Neck: No JVD  Respiratory: CTAB, no wheezes, rales or rhonchi  Cardiovascular: S1, S2, RRR  Gastrointestinal: BS+, soft, NT/ND  Extremities: No peripheral edema. left foot dressing+  Neurological: A/O x 3  Psychiatric: Normal mood, normal affect  :  No michaud.     LABS:  07-31    135  |  102  |  69<H>  ----------------------------<  106<H>  4.5   |  22  |  2.28<H>    Ca    10.0      31 Jul 2020 05:30  Phos  4.0     07-31  Mg     2.2     07-31      Creatinine Trend: 2.28 <--, 2.16 <--, 2.08 <--, 2.13 <--, 1.89 <--, 1.99 <--, 2.06 <--                        7.8    7.99  )-----------( 239      ( 31 Jul 2020 05:30 )             25.2     Urine Studies:        RADIOLOGY & ADDITIONAL STUDIES:

## 2020-07-31 NOTE — PROGRESS NOTE ADULT - ASSESSMENT
89 year old female PMH  a-fib on coumadin, diastolic CHF (with EF 63% on TTE 8/13/19), HTN, HLD, CKD stage 3-4, IDDM, PVD, CVA, PE, hypothyroidism, p/w L foot wound. Patient seen today at clinic and sent for cellulitis and abscess, wanting to rule out OM. Patient denies fever, chills, cp, sob, abd pain, n/v, weakness, or numbness/tingling. (21 Jul 2020 20:43)    Pt states she has been on multiple PO abx in the past.  She currently denies any purulent discharge, no fever/chills.    ER vss.  No leukocytosis.  Pt seen by podiatry and vascular services.  L foot with avulsed hallux nail, L hallux boggy, necrotic and cold to level of MPJ, no purulence, no open wounds, no malodor, no associated cellulitis on initial exam.  Changes due to PAD.  s/p LLE angiogram  showed SFA occlusion w/ distal SFA/pop recon w/ heavy calcifications, no further vasc intervention.  Pt planned for Left foot partial 1st ray resection.     Wound was cultured and growing MRSA, Enterococcus and Proteus mirabilis.  Pt received vanco x 1, now on cefepime.    ID consulted for further abx recommendations.    L hallux gangrene:    - No drainage/purulence.  Wound appears dry.  No systemic signs of infection, afebrile.     - Wound cx MRSA, enterococcus and proteus.  Cont  vanco/zosyn.    - If pt becomes febrile, send bcx  x2    - As per d/w NP, np plan for hallux ray resection by podiatry.  Pt for further outpt f/u with Podiatry and Vascular.        NIKHIL/CKD:    - Pt with CKD4, dose abx accordingly.      - s/p LE angiogram, s/p IVF, monitor Cr.  Renal f/u appreciated.      * complete 7 day course of empiric abx through 8/4.  On vanco by level, no need to redose today.  Check random level in AM (8/1) and redose 1gm IV x 1 if level < 12.  Maintain levels between 10-15.  Cont zosyn.      Can switch to doxy 100mg bid and augmentin 500mg qdaily upon pt discharge.      (Dr. Hailey Castaneda [[phone]] covering 8/1 and 8/2)

## 2020-08-01 LAB
ANION GAP SERPL CALC-SCNC: 14 MMO/L — SIGNIFICANT CHANGE UP (ref 7–14)
APTT BLD: 93.8 SEC — HIGH (ref 27–36.3)
BUN SERPL-MCNC: 62 MG/DL — HIGH (ref 7–23)
CALCIUM SERPL-MCNC: 10.5 MG/DL — SIGNIFICANT CHANGE UP (ref 8.4–10.5)
CHLORIDE SERPL-SCNC: 102 MMOL/L — SIGNIFICANT CHANGE UP (ref 98–107)
CO2 SERPL-SCNC: 23 MMOL/L — SIGNIFICANT CHANGE UP (ref 22–31)
CREAT SERPL-MCNC: 2.15 MG/DL — HIGH (ref 0.5–1.3)
GLUCOSE BLDC GLUCOMTR-MCNC: 126 MG/DL — HIGH (ref 70–99)
GLUCOSE BLDC GLUCOMTR-MCNC: 154 MG/DL — HIGH (ref 70–99)
GLUCOSE BLDC GLUCOMTR-MCNC: 154 MG/DL — HIGH (ref 70–99)
GLUCOSE BLDC GLUCOMTR-MCNC: 174 MG/DL — HIGH (ref 70–99)
GLUCOSE SERPL-MCNC: 104 MG/DL — HIGH (ref 70–99)
HCT VFR BLD CALC: 26.4 % — LOW (ref 34.5–45)
HGB BLD-MCNC: 8.3 G/DL — LOW (ref 11.5–15.5)
INR BLD: 1.33 — HIGH (ref 0.88–1.17)
MAGNESIUM SERPL-MCNC: 2.3 MG/DL — SIGNIFICANT CHANGE UP (ref 1.6–2.6)
MCHC RBC-ENTMCNC: 30.1 PG — SIGNIFICANT CHANGE UP (ref 27–34)
MCHC RBC-ENTMCNC: 31.4 % — LOW (ref 32–36)
MCV RBC AUTO: 95.7 FL — SIGNIFICANT CHANGE UP (ref 80–100)
NRBC # FLD: 0 K/UL — SIGNIFICANT CHANGE UP (ref 0–0)
PHOSPHATE SERPL-MCNC: 4.2 MG/DL — SIGNIFICANT CHANGE UP (ref 2.5–4.5)
PLATELET # BLD AUTO: 257 K/UL — SIGNIFICANT CHANGE UP (ref 150–400)
PMV BLD: 11.9 FL — SIGNIFICANT CHANGE UP (ref 7–13)
POTASSIUM SERPL-MCNC: 4.6 MMOL/L — SIGNIFICANT CHANGE UP (ref 3.5–5.3)
POTASSIUM SERPL-SCNC: 4.6 MMOL/L — SIGNIFICANT CHANGE UP (ref 3.5–5.3)
PROTHROM AB SERPL-ACNC: 15 SEC — HIGH (ref 9.8–13.1)
RBC # BLD: 2.76 M/UL — LOW (ref 3.8–5.2)
RBC # FLD: 15.3 % — HIGH (ref 10.3–14.5)
SODIUM SERPL-SCNC: 139 MMOL/L — SIGNIFICANT CHANGE UP (ref 135–145)
VANCOMYCIN FLD-MCNC: 9.6 UG/ML — SIGNIFICANT CHANGE UP
WBC # BLD: 7.45 K/UL — SIGNIFICANT CHANGE UP (ref 3.8–10.5)
WBC # FLD AUTO: 7.45 K/UL — SIGNIFICANT CHANGE UP (ref 3.8–10.5)

## 2020-08-01 RX ORDER — VANCOMYCIN HCL 1 G
1000 VIAL (EA) INTRAVENOUS ONCE
Refills: 0 | Status: COMPLETED | OUTPATIENT
Start: 2020-08-01 | End: 2020-08-01

## 2020-08-01 RX ORDER — WARFARIN SODIUM 2.5 MG/1
3 TABLET ORAL ONCE
Refills: 0 | Status: COMPLETED | OUTPATIENT
Start: 2020-08-01 | End: 2020-08-01

## 2020-08-01 RX ADMIN — MORPHINE SULFATE 2 MILLIGRAM(S): 50 CAPSULE, EXTENDED RELEASE ORAL at 09:30

## 2020-08-01 RX ADMIN — Medication 2: at 17:43

## 2020-08-01 RX ADMIN — SIMVASTATIN 20 MILLIGRAM(S): 20 TABLET, FILM COATED ORAL at 21:41

## 2020-08-01 RX ADMIN — Medication 2: at 12:37

## 2020-08-01 RX ADMIN — PIPERACILLIN AND TAZOBACTAM 25 GRAM(S): 4; .5 INJECTION, POWDER, LYOPHILIZED, FOR SOLUTION INTRAVENOUS at 21:40

## 2020-08-01 RX ADMIN — Medication 75 MILLIGRAM(S): at 13:25

## 2020-08-01 RX ADMIN — Medication 75 MILLIGRAM(S): at 21:41

## 2020-08-01 RX ADMIN — Medication 250 MILLIGRAM(S): at 09:14

## 2020-08-01 RX ADMIN — Medication 75 MILLIGRAM(S): at 05:26

## 2020-08-01 RX ADMIN — SENNA PLUS 2 TABLET(S): 8.6 TABLET ORAL at 21:41

## 2020-08-01 RX ADMIN — WARFARIN SODIUM 3 MILLIGRAM(S): 2.5 TABLET ORAL at 17:43

## 2020-08-01 RX ADMIN — PIPERACILLIN AND TAZOBACTAM 25 GRAM(S): 4; .5 INJECTION, POWDER, LYOPHILIZED, FOR SOLUTION INTRAVENOUS at 12:37

## 2020-08-01 RX ADMIN — HEPARIN SODIUM 1500 UNIT(S)/HR: 5000 INJECTION INTRAVENOUS; SUBCUTANEOUS at 06:48

## 2020-08-01 RX ADMIN — AMLODIPINE BESYLATE 10 MILLIGRAM(S): 2.5 TABLET ORAL at 06:06

## 2020-08-01 RX ADMIN — Medication 100 MILLIGRAM(S): at 12:37

## 2020-08-01 RX ADMIN — GABAPENTIN 100 MILLIGRAM(S): 400 CAPSULE ORAL at 13:26

## 2020-08-01 RX ADMIN — Medication 75 MICROGRAM(S): at 05:27

## 2020-08-01 RX ADMIN — MORPHINE SULFATE 2 MILLIGRAM(S): 50 CAPSULE, EXTENDED RELEASE ORAL at 09:14

## 2020-08-01 RX ADMIN — GABAPENTIN 100 MILLIGRAM(S): 400 CAPSULE ORAL at 21:41

## 2020-08-01 RX ADMIN — LATANOPROST 1 DROP(S): 0.05 SOLUTION/ DROPS OPHTHALMIC; TOPICAL at 21:41

## 2020-08-01 RX ADMIN — GABAPENTIN 100 MILLIGRAM(S): 400 CAPSULE ORAL at 05:26

## 2020-08-01 NOTE — PROGRESS NOTE ADULT - SUBJECTIVE AND OBJECTIVE BOX
S: Denies chest pain or SOB. Review of systems otherwise (-)        MEDICATIONS  (STANDING):  allopurinol 100 milliGRAM(s) Oral daily  amLODIPine   Tablet 10 milliGRAM(s) Oral daily  dextrose 5%. 1000 milliLiter(s) (50 mL/Hr) IV Continuous <Continuous>  dextrose 50% Injectable 12.5 Gram(s) IV Push once  dextrose 50% Injectable 25 Gram(s) IV Push once  dextrose 50% Injectable 25 Gram(s) IV Push once  gabapentin 100 milliGRAM(s) Oral three times a day  heparin  Infusion.  Unit(s)/Hr (13 mL/Hr) IV Continuous <Continuous>  hydrALAZINE 75 milliGRAM(s) Oral three times a day  insulin lispro (HumaLOG) corrective regimen sliding scale   SubCutaneous three times a day before meals  insulin lispro (HumaLOG) corrective regimen sliding scale   SubCutaneous at bedtime  latanoprost 0.005% Ophthalmic Solution 1 Drop(s) Left EYE at bedtime  levothyroxine 75 MICROGram(s) Oral daily  piperacillin/tazobactam IVPB.. 3.375 Gram(s) IV Intermittent every 12 hours  polyethylene glycol 3350 17 Gram(s) Oral daily  senna 2 Tablet(s) Oral at bedtime  simvastatin 20 milliGRAM(s) Oral at bedtime  sodium chloride 0.9%. 1000 milliLiter(s) (75 mL/Hr) IV Continuous <Continuous>    MEDICATIONS  (PRN):  acetaminophen   Tablet .. 650 milliGRAM(s) Oral every 6 hours PRN Temp greater or equal to 38C (100.4F), Mild Pain (1 - 3), Moderate Pain (4 - 6), Severe Pain (7 - 10)  dextrose 40% Gel 15 Gram(s) Oral once PRN Blood Glucose LESS THAN 70 milliGRAM(s)/deciliter  glucagon  Injectable 1 milliGRAM(s) IntraMuscular once PRN Glucose LESS THAN 70 milligrams/deciliter  heparin   Injectable 6000 Unit(s) IV Push every 6 hours PRN For aPTT less than 40  heparin   Injectable 3000 Unit(s) IV Push every 6 hours PRN For aPTT between 40 - 57  morphine  - Injectable 2 milliGRAM(s) IV Push every 4 hours PRN Severe Pain (7 - 10)      LABS:                            8.3    7.45  )-----------( 257      ( 01 Aug 2020 05:51 )             26.4     Hemoglobin: 8.3 g/dL (08-01 @ 05:51)  Hemoglobin: 7.8 g/dL (07-31 @ 05:30)  Hemoglobin: 7.6 g/dL (07-30 @ 05:32)  Hemoglobin: 8.0 g/dL (07-29 @ 07:00)  Hemoglobin: 8.6 g/dL (07-28 @ 06:00)    08-01    139  |  102  |  62<H>  ----------------------------<  104<H>  4.6   |  23  |  2.15<H>    Ca    10.5      01 Aug 2020 05:51  Phos  4.2     08-01  Mg     2.3     08-01      Creatinine Trend: 2.15<--, 2.28<--, 2.16<--, 2.08<--, 2.13<--, 1.89<--   PT/INR - ( 01 Aug 2020 05:51 )   PT: 15.0 SEC;   INR: 1.33          PTT - ( 01 Aug 2020 05:51 )  PTT:93.8 SEC        PHYSICAL EXAM  Vital Signs Last 24 Hrs  T(C): 36.8 (01 Aug 2020 05:25), Max: 36.8 (01 Aug 2020 05:25)  T(F): 98.2 (01 Aug 2020 05:25), Max: 98.2 (01 Aug 2020 05:25)  HR: 74 (01 Aug 2020 09:30) (72 - 91)  BP: 129/56 (01 Aug 2020 09:30) (129/56 - 178/61)  BP(mean): --  RR: 18 (01 Aug 2020 05:25) (18 - 18)  SpO2: 100% (01 Aug 2020 05:25) (99% - 100%)      Gen: Appears well in NAD  HEENT:  (-)icterus (-)pallor  CV: Normal S1, there is still a sharp S2.  Mid-to-late peaking 3/6 systolic murmur at the upper chest, but No pulsus parvus et tardus (+)2 Pulses B/l  Resp:  Clear to ausculatation B/L, normal effort  GI: (+) BS Soft, NT, ND  Lymph:  (-)Edema, (-)obvious lymphadenopathy  Skin: +left foot dressing, Warm to touch, Normal turgor  Psych: Appropriate mood and affect      TELEMETRY: None	      < from: Transthoracic Echocardiogram (07.22.20 @ 16:27) >  CONCLUSIONS:  1. Mitral annular calcification, otherwise normal mitral  valve. Mild-moderate mitral regurgitation.  2. Calcified trileaflet aortic valve with decreased  opening.  Peak transaortic valve gradient [id number] mm Hg,  mean transaortic valve gradient equals 26 mm Hg, estimated  aortic valve area equals 1 sqcm (by continuity equation),  consistent with moderate to severe aortic stenosis.  3. Severely dilated left atrium.  LA volume index = 57  cc/m2.  4. Mild concentric left ventricular hypertrophy.  5. Normal left ventricular systolic function. No segmental  wall motion abnormalities.  6. Severe diastolic dysfunction.  7. Severe right atrial enlargement.  8. Normal right ventricular size and function.  9. Estimated right ventricular systolic pressure equals 53  mm Hg, assuming right atrial pressure equals 10 mm Hg,  consistent with moderate pulmonary hypertension.    < end of copied text >      ASSESSMENT/PLAN: Patient is a 89 year old Female well known to our office (Cardiologist - Dr. Berman), who presented with a foot wound.  Consult requested for pre-op cardiovascular risk stratification.  Her known PMH includes persistent Afib on coumadin who was not a candidate for left atrial appendage occlusion with Watchman, chronic HFpEF, HTN, HLD, hypothyroidism, CKD, Type 2 DM, CVA, and hx of PE.    - Continue AC for cva prevention if no contraindications - on hep gtt bridge to coumadin  - Continue with PO lasix   - TTE noted above with normal LVEF, severe diastolic dysfunction, mild-mod MR, mod pulm HTN, and mod-severe AS: auscultation favors moderate aortic stenosis.  Yearly Echo for followup.   - In addition, the patient and her family prefers conservative medical management of her valvular disease and does not want any further invasive workup or surgery  - no further inpatient cardiac w/u needed at this time  - s/p LE angio - tolerated procedure well in terms of cv perspective  - pod sx now canceled, plan for IV Abx and wound care  -f/u renal regarding resuming lasix at DC

## 2020-08-01 NOTE — PROGRESS NOTE ADULT - ASSESSMENT
89 year old female PMH  a-fib on coumadin, diastolic CHF (with EF 63% on TTE 8/13/19), HTN, HLD, CKD stage 3-4, IDDM, PVD, CVA, PE, hypothyroidism, p/w L foot wound. Patient was sent from podiatrist office for non healing left great toe ulcer, to rule out OM. Renal following for NIKHIL/CKD Mx.     CKD4: Baseline Creatinine 1.7-2.0  euvolemic. Electrolytes acceptable.    Scr 2>2.16>2.28 >2.1 today- trending down now  s/p LE angiogram, given IVF post procedure.   TTE noted above with normal LVEF, severe diastolic dysfunction  may resume lasix 40mg po qdaily tomorrow if SCr continues to trend down.   avoid ACEi/ARB for now/NSAIDs/Nephrotoxics.  monitor BMP daily     HTN, controlled-bp optimal. c/w current bp meds. avoid ACEi/ARB. pain Mx.     Toe gangrene, PAD. s/p LE angio, no stent. f/u w/ Podiatry, plans for amputation cancelled.     DM- Mx per medicine    labs, chart reviewed  advised daughter to f/u in CKD office upon d/c

## 2020-08-01 NOTE — PROGRESS NOTE ADULT - SUBJECTIVE AND OBJECTIVE BOX
[facility] - S Radha / Florin S /D Nate/ S Jonathan/ S Rosalinda/ Shahid Segovia / BABAR Escalerau/ O Rudi  service -[phone], office [phone]  ---------------------------------------------------------------------------------------------------------------    Patient seen and examined bedside    Subjective and Objective: No overnight events, denied sob. No complaints today.    Allergies: No Known Allergies      Hospital Medications:   MEDICATIONS  (STANDING):  allopurinol 100 milliGRAM(s) Oral daily  amLODIPine   Tablet 10 milliGRAM(s) Oral daily  dextrose 5%. 1000 milliLiter(s) (50 mL/Hr) IV Continuous <Continuous>  dextrose 50% Injectable 12.5 Gram(s) IV Push once  dextrose 50% Injectable 25 Gram(s) IV Push once  dextrose 50% Injectable 25 Gram(s) IV Push once  gabapentin 100 milliGRAM(s) Oral three times a day  heparin  Infusion.  Unit(s)/Hr (13 mL/Hr) IV Continuous <Continuous>  hydrALAZINE 75 milliGRAM(s) Oral three times a day  insulin lispro (HumaLOG) corrective regimen sliding scale   SubCutaneous three times a day before meals  insulin lispro (HumaLOG) corrective regimen sliding scale   SubCutaneous at bedtime  latanoprost 0.005% Ophthalmic Solution 1 Drop(s) Left EYE at bedtime  levothyroxine 75 MICROGram(s) Oral daily  piperacillin/tazobactam IVPB.. 3.375 Gram(s) IV Intermittent every 12 hours  polyethylene glycol 3350 17 Gram(s) Oral daily  senna 2 Tablet(s) Oral at bedtime  simvastatin 20 milliGRAM(s) Oral at bedtime  sodium chloride 0.9%. 1000 milliLiter(s) (75 mL/Hr) IV Continuous <Continuous>  warfarin 3 milliGRAM(s) Oral once      VITALS:  T(F): 97.9 (08-01-20 @ 12:33), Max: 98.2 (08-01-20 @ 05:25)  HR: 75 (08-01-20 @ 12:33)  BP: 158/59 (08-01-20 @ 12:33)  RR: 18 (08-01-20 @ 12:33)  SpO2: 100% (08-01-20 @ 12:33)  Wt(kg): --      PHYSICAL EXAM:  Constitutional: NAD  HEENT: anicteric sclera  Respiratory: CTAB, no wheezes, rales or rhonchi  Cardiovascular: S1, S2, RRR  Gastrointestinal: BS+, soft, NT/ND  Extremities: No peripheral edema. left foot dressing+  Neurological: A/O x 3  Psychiatric: Normal mood, normal affect  : No michaud.       LABS:  08-01    139  |  102  |  62<H>  ----------------------------<  104<H>  4.6   |  23  |  2.15<H>    Ca    10.5      01 Aug 2020 05:51  Phos  4.2     08-01  Mg     2.3     08-01      Creatinine Trend: 2.15 <--, 2.28 <--, 2.16 <--, 2.08 <--, 2.13 <--, 1.89 <--, 1.99 <--                        8.3    7.45  )-----------( 257      ( 01 Aug 2020 05:51 )             26.4     Urine Studies:        RADIOLOGY & ADDITIONAL STUDIES:

## 2020-08-01 NOTE — PROGRESS NOTE ADULT - SUBJECTIVE AND OBJECTIVE BOX
INTERVAL HPI/OVERNIGHT EVENTS: I feel better.   Vital Signs Last 24 Hrs  T(C): 36.6 (01 Aug 2020 12:33), Max: 36.8 (01 Aug 2020 05:25)  T(F): 97.9 (01 Aug 2020 12:33), Max: 98.2 (01 Aug 2020 05:25)  HR: 75 (01 Aug 2020 12:33) (72 - 76)  BP: 158/59 (01 Aug 2020 12:33) (129/56 - 178/61)  BP(mean): --  RR: 18 (01 Aug 2020 12:33) (18 - 18)  SpO2: 100% (01 Aug 2020 12:33) (100% - 100%)  I&O's Summary    MEDICATIONS  (STANDING):  allopurinol 100 milliGRAM(s) Oral daily  amLODIPine   Tablet 10 milliGRAM(s) Oral daily  dextrose 5%. 1000 milliLiter(s) (50 mL/Hr) IV Continuous <Continuous>  dextrose 50% Injectable 12.5 Gram(s) IV Push once  dextrose 50% Injectable 25 Gram(s) IV Push once  dextrose 50% Injectable 25 Gram(s) IV Push once  gabapentin 100 milliGRAM(s) Oral three times a day  heparin  Infusion.  Unit(s)/Hr (13 mL/Hr) IV Continuous <Continuous>  hydrALAZINE 75 milliGRAM(s) Oral three times a day  insulin lispro (HumaLOG) corrective regimen sliding scale   SubCutaneous three times a day before meals  insulin lispro (HumaLOG) corrective regimen sliding scale   SubCutaneous at bedtime  latanoprost 0.005% Ophthalmic Solution 1 Drop(s) Left EYE at bedtime  levothyroxine 75 MICROGram(s) Oral daily  piperacillin/tazobactam IVPB.. 3.375 Gram(s) IV Intermittent every 12 hours  polyethylene glycol 3350 17 Gram(s) Oral daily  senna 2 Tablet(s) Oral at bedtime  simvastatin 20 milliGRAM(s) Oral at bedtime  sodium chloride 0.9%. 1000 milliLiter(s) (75 mL/Hr) IV Continuous <Continuous>    MEDICATIONS  (PRN):  acetaminophen   Tablet .. 650 milliGRAM(s) Oral every 6 hours PRN Temp greater or equal to 38C (100.4F), Mild Pain (1 - 3), Moderate Pain (4 - 6), Severe Pain (7 - 10)  dextrose 40% Gel 15 Gram(s) Oral once PRN Blood Glucose LESS THAN 70 milliGRAM(s)/deciliter  glucagon  Injectable 1 milliGRAM(s) IntraMuscular once PRN Glucose LESS THAN 70 milligrams/deciliter  heparin   Injectable 6000 Unit(s) IV Push every 6 hours PRN For aPTT less than 40  heparin   Injectable 3000 Unit(s) IV Push every 6 hours PRN For aPTT between 40 - 57  morphine  - Injectable 2 milliGRAM(s) IV Push every 4 hours PRN Severe Pain (7 - 10)    LABS:                        8.3    7.45  )-----------( 257      ( 01 Aug 2020 05:51 )             26.4     08-01    139  |  102  |  62<H>  ----------------------------<  104<H>  4.6   |  23  |  2.15<H>    Ca    10.5      01 Aug 2020 05:51  Phos  4.2     08-01  Mg     2.3     08-01      PT/INR - ( 01 Aug 2020 05:51 )   PT: 15.0 SEC;   INR: 1.33          PTT - ( 01 Aug 2020 05:51 )  PTT:93.8 SEC    CAPILLARY BLOOD GLUCOSE      POCT Blood Glucose.: 154 mg/dL (01 Aug 2020 17:07)  POCT Blood Glucose.: 174 mg/dL (01 Aug 2020 12:03)  POCT Blood Glucose.: 126 mg/dL (01 Aug 2020 08:16)  POCT Blood Glucose.: 163 mg/dL (31 Jul 2020 21:08)          REVIEW OF SYSTEMS:  CONSTITUTIONAL: No fever, weight loss, or fatigue  EYES: No eye pain, visual disturbances, or discharge  ENMT:  No difficulty hearing, tinnitus, vertigo; No sinus or throat pain  NECK: No pain or stiffness  RESPIRATORY: No cough, wheezing, chills or hemoptysis; No shortness of breath  CARDIOVASCULAR: No chest pain, palpitations, dizziness, or leg swelling  GASTROINTESTINAL: No abdominal or epigastric pain. No nausea, vomiting, or hematemesis; No diarrhea or constipation. No melena or hematochezia.  GENITOURINARY: No dysuria, frequency, hematuria, or incontinence  NEUROLOGICAL: No headaches, memory loss, loss of strength, numbness, or tremors      RADIOLOGY & ADDITIONAL TESTS:    Consultant(s) Notes Reviewed:  [x ] YES  [ ] NO    PHYSICAL EXAM:  GENERAL: NAD, well-groomed, well-developed ,not in any distress ,  HEAD:  Atraumatic, Normocephalic  EYES: EOMI, PERRLA, conjunctiva and sclera clear  ENMT: No tonsillar erythema, exudates, or enlargement; Moist mucous membranes, Good dentition, No lesions  NECK: Supple, No JVD, Normal thyroid  NERVOUS SYSTEM:  Alert & Oriented X3, No focal deficit   CHEST/LUNG: Good air entry bilateral with no  rales, rhonchi, wheezing, or rubs  HEART: Regular rate and rhythm; No murmurs, rubs, or gallops  ABDOMEN: Soft, Nontender, Nondistended; Bowel sounds present  EXTREMITIES:  left foot dressed     Care Discussed with Consultants/Other Providers [ x] YES  [ ] NO

## 2020-08-01 NOTE — PROGRESS NOTE ADULT - ASSESSMENT
89 year old female PMH  a-fib on coumadin, diastolic CHF (with EF 63% on TTE 8/13/19), HTN, HLD, CKD stage 3-4, IDDM, PVD, CVA, PE, hypothyroidism, p/w L foot wound. Patient seen today at clinic and sent for cellulitis and abscess, wanting to rule out OM. Patient denies fever, chills, cp, sob, abd pain, n/v, weakness, or numbness/tingling.     Problem/Plan - 1:  ·  Problem: Toe gangrene.  Plan: Podiatry consult noted. S/P IV Abxs for 7 days . NO fever or Leucocytosis so likely ischemic .   ID and Vascular helping. No intervention per podiatry .        Problem/Plan - 2:  ·  Problem: Stage 3 chronic kidney disease.  Plan: Renal helping .  Baseline Creatinine 1.8.      Problem/Plan - 3:  ·  Problem: PVD (peripheral vascular disease).  Plan: LIZBETH/PVD noted.  Vascular consult noted. S/P angiogram .     Problem/Plan - 4:  ·  Problem: Chronic diastolic congestive heart failure.  Plan: Continuing Lasix .  Cardiology helping.      Problem/Plan - 5:  ·  Problem: DM (diabetes mellitus).  Plan: SSI for now .      Problem/Plan - 6:  Problem: Afib. Plan: ON AC. Heparin till INR therapeutic .      Problem/Plan - 7:  ·  Problem: Pulmonary embolism.  Plan: On AC.      Problem/Plan - 8:  ·  Problem:  Hypothyroid.  Plan: Synthroid.      Problem/Plan - 9:  ·  Problem: Anemia.  Plan: Chronic . Watching CBC . Transfuse PRBC for HGb <7.5G .     Problem/Plan - 10:  ·  Problem: Uncontrolled Hypertension .  Plan: Adjusting BP meds.       Disposition : DC planning pending Therapeutic INR,.

## 2020-08-02 LAB
ANION GAP SERPL CALC-SCNC: 11 MMO/L — SIGNIFICANT CHANGE UP (ref 7–14)
APTT BLD: 100 SEC — HIGH (ref 27–36.3)
APTT BLD: 59.4 SEC — HIGH (ref 27–36.3)
APTT BLD: 70.8 SEC — HIGH (ref 27–36.3)
BUN SERPL-MCNC: 58 MG/DL — HIGH (ref 7–23)
CALCIUM SERPL-MCNC: 10.4 MG/DL — SIGNIFICANT CHANGE UP (ref 8.4–10.5)
CHLORIDE SERPL-SCNC: 104 MMOL/L — SIGNIFICANT CHANGE UP (ref 98–107)
CO2 SERPL-SCNC: 24 MMOL/L — SIGNIFICANT CHANGE UP (ref 22–31)
CREAT SERPL-MCNC: 2.18 MG/DL — HIGH (ref 0.5–1.3)
GLUCOSE BLDC GLUCOMTR-MCNC: 117 MG/DL — HIGH (ref 70–99)
GLUCOSE BLDC GLUCOMTR-MCNC: 119 MG/DL — HIGH (ref 70–99)
GLUCOSE BLDC GLUCOMTR-MCNC: 158 MG/DL — HIGH (ref 70–99)
GLUCOSE BLDC GLUCOMTR-MCNC: 199 MG/DL — HIGH (ref 70–99)
GLUCOSE SERPL-MCNC: 103 MG/DL — HIGH (ref 70–99)
HCT VFR BLD CALC: 26.4 % — LOW (ref 34.5–45)
HGB BLD-MCNC: 8.3 G/DL — LOW (ref 11.5–15.5)
INR BLD: 1.3 — HIGH (ref 0.88–1.17)
MAGNESIUM SERPL-MCNC: 2.3 MG/DL — SIGNIFICANT CHANGE UP (ref 1.6–2.6)
MCHC RBC-ENTMCNC: 30.2 PG — SIGNIFICANT CHANGE UP (ref 27–34)
MCHC RBC-ENTMCNC: 31.4 % — LOW (ref 32–36)
MCV RBC AUTO: 96 FL — SIGNIFICANT CHANGE UP (ref 80–100)
NRBC # FLD: 0 K/UL — SIGNIFICANT CHANGE UP (ref 0–0)
PHOSPHATE SERPL-MCNC: 3.9 MG/DL — SIGNIFICANT CHANGE UP (ref 2.5–4.5)
PLATELET # BLD AUTO: 245 K/UL — SIGNIFICANT CHANGE UP (ref 150–400)
PMV BLD: 12.1 FL — SIGNIFICANT CHANGE UP (ref 7–13)
POTASSIUM SERPL-MCNC: 5 MMOL/L — SIGNIFICANT CHANGE UP (ref 3.5–5.3)
POTASSIUM SERPL-SCNC: 5 MMOL/L — SIGNIFICANT CHANGE UP (ref 3.5–5.3)
PROTHROM AB SERPL-ACNC: 14.8 SEC — HIGH (ref 9.8–13.1)
RBC # BLD: 2.75 M/UL — LOW (ref 3.8–5.2)
RBC # FLD: 15.6 % — HIGH (ref 10.3–14.5)
SODIUM SERPL-SCNC: 139 MMOL/L — SIGNIFICANT CHANGE UP (ref 135–145)
VANCOMYCIN FLD-MCNC: 14.7 UG/ML — SIGNIFICANT CHANGE UP
WBC # BLD: 7.19 K/UL — SIGNIFICANT CHANGE UP (ref 3.8–10.5)
WBC # FLD AUTO: 7.19 K/UL — SIGNIFICANT CHANGE UP (ref 3.8–10.5)

## 2020-08-02 RX ORDER — WARFARIN SODIUM 2.5 MG/1
3 TABLET ORAL ONCE
Refills: 0 | Status: COMPLETED | OUTPATIENT
Start: 2020-08-02 | End: 2020-08-02

## 2020-08-02 RX ADMIN — Medication 2: at 17:32

## 2020-08-02 RX ADMIN — GABAPENTIN 100 MILLIGRAM(S): 400 CAPSULE ORAL at 13:15

## 2020-08-02 RX ADMIN — Medication 100 MILLIGRAM(S): at 12:29

## 2020-08-02 RX ADMIN — SIMVASTATIN 20 MILLIGRAM(S): 20 TABLET, FILM COATED ORAL at 20:53

## 2020-08-02 RX ADMIN — WARFARIN SODIUM 3 MILLIGRAM(S): 2.5 TABLET ORAL at 17:32

## 2020-08-02 RX ADMIN — Medication 75 MICROGRAM(S): at 05:44

## 2020-08-02 RX ADMIN — HEPARIN SODIUM 1300 UNIT(S)/HR: 5000 INJECTION INTRAVENOUS; SUBCUTANEOUS at 14:36

## 2020-08-02 RX ADMIN — Medication 75 MILLIGRAM(S): at 13:15

## 2020-08-02 RX ADMIN — AMLODIPINE BESYLATE 10 MILLIGRAM(S): 2.5 TABLET ORAL at 05:45

## 2020-08-02 RX ADMIN — LATANOPROST 1 DROP(S): 0.05 SOLUTION/ DROPS OPHTHALMIC; TOPICAL at 20:53

## 2020-08-02 RX ADMIN — MORPHINE SULFATE 2 MILLIGRAM(S): 50 CAPSULE, EXTENDED RELEASE ORAL at 20:52

## 2020-08-02 RX ADMIN — PIPERACILLIN AND TAZOBACTAM 25 GRAM(S): 4; .5 INJECTION, POWDER, LYOPHILIZED, FOR SOLUTION INTRAVENOUS at 08:28

## 2020-08-02 RX ADMIN — SENNA PLUS 2 TABLET(S): 8.6 TABLET ORAL at 20:53

## 2020-08-02 RX ADMIN — HEPARIN SODIUM 1300 UNIT(S)/HR: 5000 INJECTION INTRAVENOUS; SUBCUTANEOUS at 07:21

## 2020-08-02 RX ADMIN — GABAPENTIN 100 MILLIGRAM(S): 400 CAPSULE ORAL at 05:44

## 2020-08-02 RX ADMIN — POLYETHYLENE GLYCOL 3350 17 GRAM(S): 17 POWDER, FOR SOLUTION ORAL at 12:29

## 2020-08-02 RX ADMIN — Medication 2: at 12:29

## 2020-08-02 RX ADMIN — MORPHINE SULFATE 2 MILLIGRAM(S): 50 CAPSULE, EXTENDED RELEASE ORAL at 21:10

## 2020-08-02 RX ADMIN — Medication 75 MILLIGRAM(S): at 20:52

## 2020-08-02 RX ADMIN — Medication 75 MILLIGRAM(S): at 05:44

## 2020-08-02 RX ADMIN — GABAPENTIN 100 MILLIGRAM(S): 400 CAPSULE ORAL at 20:54

## 2020-08-02 RX ADMIN — HEPARIN SODIUM 1300 UNIT(S)/HR: 5000 INJECTION INTRAVENOUS; SUBCUTANEOUS at 21:07

## 2020-08-02 RX ADMIN — PIPERACILLIN AND TAZOBACTAM 25 GRAM(S): 4; .5 INJECTION, POWDER, LYOPHILIZED, FOR SOLUTION INTRAVENOUS at 20:52

## 2020-08-02 NOTE — PROGRESS NOTE ADULT - SUBJECTIVE AND OBJECTIVE BOX
CARDIOLOGY ATTENDING    she denies palpitations, syncope, nor angina, ROS otherwise -    acetaminophen   Tablet .. 650 milliGRAM(s) Oral every 6 hours PRN  allopurinol 100 milliGRAM(s) Oral daily  amLODIPine   Tablet 10 milliGRAM(s) Oral daily  dextrose 40% Gel 15 Gram(s) Oral once PRN  dextrose 5%. 1000 milliLiter(s) IV Continuous <Continuous>  dextrose 50% Injectable 12.5 Gram(s) IV Push once  dextrose 50% Injectable 25 Gram(s) IV Push once  dextrose 50% Injectable 25 Gram(s) IV Push once  gabapentin 100 milliGRAM(s) Oral three times a day  glucagon  Injectable 1 milliGRAM(s) IntraMuscular once PRN  heparin   Injectable 6000 Unit(s) IV Push every 6 hours PRN  heparin   Injectable 3000 Unit(s) IV Push every 6 hours PRN  heparin  Infusion.  Unit(s)/Hr IV Continuous <Continuous>  hydrALAZINE 75 milliGRAM(s) Oral three times a day  insulin lispro (HumaLOG) corrective regimen sliding scale   SubCutaneous three times a day before meals  insulin lispro (HumaLOG) corrective regimen sliding scale   SubCutaneous at bedtime  latanoprost 0.005% Ophthalmic Solution 1 Drop(s) Left EYE at bedtime  levothyroxine 75 MICROGram(s) Oral daily  morphine  - Injectable 2 milliGRAM(s) IV Push every 4 hours PRN  piperacillin/tazobactam IVPB.. 3.375 Gram(s) IV Intermittent every 12 hours  polyethylene glycol 3350 17 Gram(s) Oral daily  senna 2 Tablet(s) Oral at bedtime  simvastatin 20 milliGRAM(s) Oral at bedtime  sodium chloride 0.9%. 1000 milliLiter(s) IV Continuous <Continuous>                            8.3    7.19  )-----------( 245      ( 02 Aug 2020 06:20 )             26.4       08-02    139  |  104  |  58<H>  ----------------------------<  103<H>  5.0   |  24  |  2.18<H>    Ca    10.4      02 Aug 2020 06:20  Phos  3.9     08-02  Mg     2.3     08-02              T(C): 37.2 (08-02-20 @ 13:01), Max: 37.2 (08-02-20 @ 13:01)  HR: 69 (08-02-20 @ 13:01) (69 - 74)  BP: 133/59 (08-02-20 @ 13:01) (133/59 - 150/61)  RR: 17 (08-02-20 @ 13:01) (17 - 18)  SpO2: 100% (08-02-20 @ 13:01) (100% - 100%)  Wt(kg): --    I&O's Summary    Gen: Appears well in NAD  HEENT:  (-)icterus (-)pallor  CV: Normal S1, there is still a sharp S2.  Mid-to-late peaking 3/6 systolic murmur at the upper chest, but No pulsus parvus et tardus (+)2 Pulses B/l  Resp:  Clear to ausculatation B/L, normal effort  GI: (+) BS Soft, NT, ND  Lymph:  (-)Edema, (-)obvious lymphadenopathy  Skin: +left foot dressing, Warm to touch, Normal turgor  Psych: Appropriate mood and affect        ASSESSMENT/PLAN: Patient is a 89 year old Female well known to our office (Cardiologist - Dr. Berman), who presented with a foot wound.  Consult requested for pre-op cardiovascular risk stratification.  Her known PMH includes persistent Afib on coumadin who was not a candidate for left atrial appendage occlusion with Watchman, chronic HFpEF, HTN, HLD, hypothyroidism, CKD, Type 2 DM, CVA, and hx of PE.    - Continue AC for cva prevention if no contraindications - on hep gtt bridge to coumadin  - Continue with PO lasix   - no further inpatient cardiac w/u needed at this time  - s/p LE angio - tolerated procedure well in terms of cv perspective  - pod sx now canceled, plan for IV Abx and wound care  -f/u renal

## 2020-08-02 NOTE — PROGRESS NOTE ADULT - SUBJECTIVE AND OBJECTIVE BOX
INTERVAL HPI/OVERNIGHT EVENTS: I feel okay   Vital Signs Last 24 Hrs  T(C): 37.2 (02 Aug 2020 13:01), Max: 37.2 (02 Aug 2020 13:01)  T(F): 98.9 (02 Aug 2020 13:01), Max: 98.9 (02 Aug 2020 13:01)  HR: 69 (02 Aug 2020 13:01) (69 - 74)  BP: 133/59 (02 Aug 2020 13:01) (133/59 - 150/61)  BP(mean): --  RR: 17 (02 Aug 2020 13:01) (17 - 18)  SpO2: 100% (02 Aug 2020 13:01) (100% - 100%)  I&O's Summary    MEDICATIONS  (STANDING):  allopurinol 100 milliGRAM(s) Oral daily  amLODIPine   Tablet 10 milliGRAM(s) Oral daily  dextrose 5%. 1000 milliLiter(s) (50 mL/Hr) IV Continuous <Continuous>  dextrose 50% Injectable 12.5 Gram(s) IV Push once  dextrose 50% Injectable 25 Gram(s) IV Push once  dextrose 50% Injectable 25 Gram(s) IV Push once  gabapentin 100 milliGRAM(s) Oral three times a day  heparin  Infusion.  Unit(s)/Hr (13 mL/Hr) IV Continuous <Continuous>  hydrALAZINE 75 milliGRAM(s) Oral three times a day  insulin lispro (HumaLOG) corrective regimen sliding scale   SubCutaneous three times a day before meals  insulin lispro (HumaLOG) corrective regimen sliding scale   SubCutaneous at bedtime  latanoprost 0.005% Ophthalmic Solution 1 Drop(s) Left EYE at bedtime  levothyroxine 75 MICROGram(s) Oral daily  piperacillin/tazobactam IVPB.. 3.375 Gram(s) IV Intermittent every 12 hours  polyethylene glycol 3350 17 Gram(s) Oral daily  senna 2 Tablet(s) Oral at bedtime  simvastatin 20 milliGRAM(s) Oral at bedtime  sodium chloride 0.9%. 1000 milliLiter(s) (75 mL/Hr) IV Continuous <Continuous>    MEDICATIONS  (PRN):  acetaminophen   Tablet .. 650 milliGRAM(s) Oral every 6 hours PRN Temp greater or equal to 38C (100.4F), Mild Pain (1 - 3), Moderate Pain (4 - 6), Severe Pain (7 - 10)  dextrose 40% Gel 15 Gram(s) Oral once PRN Blood Glucose LESS THAN 70 milliGRAM(s)/deciliter  glucagon  Injectable 1 milliGRAM(s) IntraMuscular once PRN Glucose LESS THAN 70 milligrams/deciliter  heparin   Injectable 6000 Unit(s) IV Push every 6 hours PRN For aPTT less than 40  heparin   Injectable 3000 Unit(s) IV Push every 6 hours PRN For aPTT between 40 - 57  morphine  - Injectable 2 milliGRAM(s) IV Push every 4 hours PRN Severe Pain (7 - 10)    LABS:                        8.3    7.19  )-----------( 245      ( 02 Aug 2020 06:20 )             26.4     08-02    139  |  104  |  58<H>  ----------------------------<  103<H>  5.0   |  24  |  2.18<H>    Ca    10.4      02 Aug 2020 06:20  Phos  3.9     08-02  Mg     2.3     08-02      PT/INR - ( 02 Aug 2020 06:20 )   PT: 14.8 SEC;   INR: 1.30          PTT - ( 02 Aug 2020 13:15 )  PTT:70.8 SEC    CAPILLARY BLOOD GLUCOSE      POCT Blood Glucose.: 158 mg/dL (02 Aug 2020 17:19)  POCT Blood Glucose.: 199 mg/dL (02 Aug 2020 12:02)  POCT Blood Glucose.: 117 mg/dL (02 Aug 2020 08:12)  POCT Blood Glucose.: 154 mg/dL (01 Aug 2020 22:19)          REVIEW OF SYSTEMS:  CONSTITUTIONAL: No fever, weight loss, or fatigue  EYES: No eye pain, visual disturbances, or discharge  ENMT:  No difficulty hearing, tinnitus, vertigo; No sinus or throat pain  NECK: No pain or stiffness  RESPIRATORY: No cough, wheezing, chills or hemoptysis; No shortness of breath  CARDIOVASCULAR: No chest pain, palpitations, dizziness, or leg swelling  GASTROINTESTINAL: No abdominal or epigastric pain. No nausea, vomiting, or hematemesis; No diarrhea or constipation. No melena or hematochezia.  GENITOURINARY: No dysuria, frequency, hematuria, or incontinence  NEUROLOGICAL: No headaches, memory loss, loss of strength, numbness, or tremors      RADIOLOGY & ADDITIONAL TESTS:    Consultant(s) Notes Reviewed:  [x ] YES  [ ] NO    PHYSICAL EXAM:  GENERAL: NAD, well-groomed, well-developed,not in any distress ,  HEAD:  Atraumatic, Normocephalic  EYES: EOMI, PERRLA, conjunctiva and sclera clear  ENMT: No tonsillar erythema, exudates, or enlargement; Moist mucous membranes, Good dentition, No lesions  NECK: Supple, No JVD, Normal thyroid  NERVOUS SYSTEM:  Alert & Oriented X3, No focal deficit   CHEST/LUNG: Good air entry bilateral with no  rales, rhonchi, wheezing, or rubs  HEART: Regular rate and rhythm; No murmurs, rubs, or gallops  ABDOMEN: Soft, Nontender, Nondistended; Bowel sounds present  EXTREMITIES: Foot dressed     Care Discussed with Consultants/Other Providers [ x] YES  [ ] NO

## 2020-08-03 LAB
ALBUMIN SERPL ELPH-MCNC: 3.3 G/DL — SIGNIFICANT CHANGE UP (ref 3.3–5)
ANION GAP SERPL CALC-SCNC: 10 MMO/L — SIGNIFICANT CHANGE UP (ref 7–14)
APTT BLD: 71 SEC — HIGH (ref 27–36.3)
BUN SERPL-MCNC: 64 MG/DL — HIGH (ref 7–23)
CALCIUM SERPL-MCNC: 10.8 MG/DL — HIGH (ref 8.4–10.5)
CHLORIDE SERPL-SCNC: 103 MMOL/L — SIGNIFICANT CHANGE UP (ref 98–107)
CO2 SERPL-SCNC: 25 MMOL/L — SIGNIFICANT CHANGE UP (ref 22–31)
CREAT SERPL-MCNC: 2.21 MG/DL — HIGH (ref 0.5–1.3)
GLUCOSE BLDC GLUCOMTR-MCNC: 107 MG/DL — HIGH (ref 70–99)
GLUCOSE BLDC GLUCOMTR-MCNC: 130 MG/DL — HIGH (ref 70–99)
GLUCOSE BLDC GLUCOMTR-MCNC: 147 MG/DL — HIGH (ref 70–99)
GLUCOSE BLDC GLUCOMTR-MCNC: 158 MG/DL — HIGH (ref 70–99)
GLUCOSE SERPL-MCNC: 104 MG/DL — HIGH (ref 70–99)
HCT VFR BLD CALC: 24.9 % — LOW (ref 34.5–45)
HGB BLD-MCNC: 8.1 G/DL — LOW (ref 11.5–15.5)
INR BLD: 1.33 — HIGH (ref 0.88–1.17)
MAGNESIUM SERPL-MCNC: 2.3 MG/DL — SIGNIFICANT CHANGE UP (ref 1.6–2.6)
MCHC RBC-ENTMCNC: 30.8 PG — SIGNIFICANT CHANGE UP (ref 27–34)
MCHC RBC-ENTMCNC: 32.5 % — SIGNIFICANT CHANGE UP (ref 32–36)
MCV RBC AUTO: 94.7 FL — SIGNIFICANT CHANGE UP (ref 80–100)
NRBC # FLD: 0 K/UL — SIGNIFICANT CHANGE UP (ref 0–0)
PHOSPHATE SERPL-MCNC: 3.8 MG/DL — SIGNIFICANT CHANGE UP (ref 2.5–4.5)
PLATELET # BLD AUTO: 242 K/UL — SIGNIFICANT CHANGE UP (ref 150–400)
PMV BLD: 12.4 FL — SIGNIFICANT CHANGE UP (ref 7–13)
POTASSIUM SERPL-MCNC: 4.9 MMOL/L — SIGNIFICANT CHANGE UP (ref 3.5–5.3)
POTASSIUM SERPL-SCNC: 4.9 MMOL/L — SIGNIFICANT CHANGE UP (ref 3.5–5.3)
PROTHROM AB SERPL-ACNC: 15.1 SEC — HIGH (ref 9.8–13.1)
RBC # BLD: 2.63 M/UL — LOW (ref 3.8–5.2)
RBC # FLD: 15.6 % — HIGH (ref 10.3–14.5)
SODIUM SERPL-SCNC: 138 MMOL/L — SIGNIFICANT CHANGE UP (ref 135–145)
VANCOMYCIN FLD-MCNC: 11 UG/ML — SIGNIFICANT CHANGE UP
VANCOMYCIN TROUGH SERPL-MCNC: 11 UG/ML — SIGNIFICANT CHANGE UP (ref 10–20)
WBC # BLD: 6.79 K/UL — SIGNIFICANT CHANGE UP (ref 3.8–10.5)
WBC # FLD AUTO: 6.79 K/UL — SIGNIFICANT CHANGE UP (ref 3.8–10.5)

## 2020-08-03 RX ORDER — WARFARIN SODIUM 2.5 MG/1
4 TABLET ORAL ONCE
Refills: 0 | Status: COMPLETED | OUTPATIENT
Start: 2020-08-03 | End: 2020-08-03

## 2020-08-03 RX ORDER — VANCOMYCIN HCL 1 G
1000 VIAL (EA) INTRAVENOUS ONCE
Refills: 0 | Status: COMPLETED | OUTPATIENT
Start: 2020-08-03 | End: 2020-08-03

## 2020-08-03 RX ADMIN — Medication 100 MILLIGRAM(S): at 12:57

## 2020-08-03 RX ADMIN — Medication 75 MILLIGRAM(S): at 21:20

## 2020-08-03 RX ADMIN — Medication 2: at 12:57

## 2020-08-03 RX ADMIN — HEPARIN SODIUM 1300 UNIT(S)/HR: 5000 INJECTION INTRAVENOUS; SUBCUTANEOUS at 08:52

## 2020-08-03 RX ADMIN — PIPERACILLIN AND TAZOBACTAM 25 GRAM(S): 4; .5 INJECTION, POWDER, LYOPHILIZED, FOR SOLUTION INTRAVENOUS at 23:13

## 2020-08-03 RX ADMIN — MORPHINE SULFATE 2 MILLIGRAM(S): 50 CAPSULE, EXTENDED RELEASE ORAL at 21:35

## 2020-08-03 RX ADMIN — Medication 75 MILLIGRAM(S): at 12:57

## 2020-08-03 RX ADMIN — GABAPENTIN 100 MILLIGRAM(S): 400 CAPSULE ORAL at 12:57

## 2020-08-03 RX ADMIN — WARFARIN SODIUM 4 MILLIGRAM(S): 2.5 TABLET ORAL at 18:16

## 2020-08-03 RX ADMIN — LATANOPROST 1 DROP(S): 0.05 SOLUTION/ DROPS OPHTHALMIC; TOPICAL at 21:21

## 2020-08-03 RX ADMIN — MORPHINE SULFATE 2 MILLIGRAM(S): 50 CAPSULE, EXTENDED RELEASE ORAL at 21:21

## 2020-08-03 RX ADMIN — POLYETHYLENE GLYCOL 3350 17 GRAM(S): 17 POWDER, FOR SOLUTION ORAL at 12:57

## 2020-08-03 RX ADMIN — AMLODIPINE BESYLATE 10 MILLIGRAM(S): 2.5 TABLET ORAL at 05:39

## 2020-08-03 RX ADMIN — Medication 250 MILLIGRAM(S): at 18:15

## 2020-08-03 RX ADMIN — Medication 650 MILLIGRAM(S): at 23:21

## 2020-08-03 RX ADMIN — SIMVASTATIN 20 MILLIGRAM(S): 20 TABLET, FILM COATED ORAL at 21:21

## 2020-08-03 RX ADMIN — SENNA PLUS 2 TABLET(S): 8.6 TABLET ORAL at 21:21

## 2020-08-03 RX ADMIN — PIPERACILLIN AND TAZOBACTAM 25 GRAM(S): 4; .5 INJECTION, POWDER, LYOPHILIZED, FOR SOLUTION INTRAVENOUS at 09:52

## 2020-08-03 RX ADMIN — Medication 75 MILLIGRAM(S): at 05:39

## 2020-08-03 RX ADMIN — GABAPENTIN 100 MILLIGRAM(S): 400 CAPSULE ORAL at 21:20

## 2020-08-03 RX ADMIN — GABAPENTIN 100 MILLIGRAM(S): 400 CAPSULE ORAL at 05:39

## 2020-08-03 RX ADMIN — Medication 75 MICROGRAM(S): at 05:39

## 2020-08-03 NOTE — PROGRESS NOTE ADULT - SUBJECTIVE AND OBJECTIVE BOX
INTERVAL HPI/OVERNIGHT EVENTS: I feel fine.   Vital Signs Last 24 Hrs  T(C): 36.7 (03 Aug 2020 05:36), Max: 37.1 (02 Aug 2020 20:51)  T(F): 98.1 (03 Aug 2020 05:36), Max: 98.8 (02 Aug 2020 20:51)  HR: 61 (03 Aug 2020 05:36) (61 - 71)  BP: 143/55 (03 Aug 2020 05:36) (143/55 - 148/52)  BP(mean): --  RR: 18 (03 Aug 2020 05:36) (18 - 18)  SpO2: 100% (03 Aug 2020 05:36) (100% - 100%)  I&O's Summary    MEDICATIONS  (STANDING):  allopurinol 100 milliGRAM(s) Oral daily  amLODIPine   Tablet 10 milliGRAM(s) Oral daily  dextrose 5%. 1000 milliLiter(s) (50 mL/Hr) IV Continuous <Continuous>  dextrose 50% Injectable 12.5 Gram(s) IV Push once  dextrose 50% Injectable 25 Gram(s) IV Push once  dextrose 50% Injectable 25 Gram(s) IV Push once  gabapentin 100 milliGRAM(s) Oral three times a day  heparin  Infusion.  Unit(s)/Hr (13 mL/Hr) IV Continuous <Continuous>  hydrALAZINE 75 milliGRAM(s) Oral three times a day  insulin lispro (HumaLOG) corrective regimen sliding scale   SubCutaneous three times a day before meals  insulin lispro (HumaLOG) corrective regimen sliding scale   SubCutaneous at bedtime  latanoprost 0.005% Ophthalmic Solution 1 Drop(s) Left EYE at bedtime  levothyroxine 75 MICROGram(s) Oral daily  piperacillin/tazobactam IVPB.. 3.375 Gram(s) IV Intermittent every 12 hours  polyethylene glycol 3350 17 Gram(s) Oral daily  senna 2 Tablet(s) Oral at bedtime  simvastatin 20 milliGRAM(s) Oral at bedtime  sodium chloride 0.9%. 1000 milliLiter(s) (75 mL/Hr) IV Continuous <Continuous>  vancomycin  IVPB 1000 milliGRAM(s) IV Intermittent once  warfarin 4 milliGRAM(s) Oral once    MEDICATIONS  (PRN):  acetaminophen   Tablet .. 650 milliGRAM(s) Oral every 6 hours PRN Temp greater or equal to 38C (100.4F), Mild Pain (1 - 3), Moderate Pain (4 - 6), Severe Pain (7 - 10)  dextrose 40% Gel 15 Gram(s) Oral once PRN Blood Glucose LESS THAN 70 milliGRAM(s)/deciliter  glucagon  Injectable 1 milliGRAM(s) IntraMuscular once PRN Glucose LESS THAN 70 milligrams/deciliter  heparin   Injectable 6000 Unit(s) IV Push every 6 hours PRN For aPTT less than 40  heparin   Injectable 3000 Unit(s) IV Push every 6 hours PRN For aPTT between 40 - 57  morphine  - Injectable 2 milliGRAM(s) IV Push every 4 hours PRN Severe Pain (7 - 10)    LABS:                        8.1    6.79  )-----------( 242      ( 03 Aug 2020 06:27 )             24.9     08-03    138  |  103  |  64<H>  ----------------------------<  104<H>  4.9   |  25  |  2.21<H>    Ca    10.8<H>      03 Aug 2020 06:27  Phos  3.8     08-03  Mg     2.3     08-03    TPro  x   /  Alb  3.3  /  TBili  x   /  DBili  x   /  AST  x   /  ALT  x   /  AlkPhos  x   08-03    PT/INR - ( 03 Aug 2020 06:27 )   PT: 15.1 SEC;   INR: 1.33          PTT - ( 03 Aug 2020 06:27 )  PTT:71.0 SEC    CAPILLARY BLOOD GLUCOSE      POCT Blood Glucose.: 158 mg/dL (03 Aug 2020 12:48)  POCT Blood Glucose.: 107 mg/dL (03 Aug 2020 08:35)  POCT Blood Glucose.: 119 mg/dL (02 Aug 2020 22:11)  POCT Blood Glucose.: 158 mg/dL (02 Aug 2020 17:19)          REVIEW OF SYSTEMS:  CONSTITUTIONAL: No fever, weight loss, or fatigue  EYES: No eye pain, visual disturbances, or discharge  ENMT:  No difficulty hearing, tinnitus, vertigo; No sinus or throat pain  RESPIRATORY: No cough, wheezing, chills or hemoptysis; No shortness of breath  CARDIOVASCULAR: No chest pain, palpitations, dizziness, or leg swelling  GASTROINTESTINAL: No abdominal or epigastric pain. No nausea, vomiting, or hematemesis; No diarrhea or constipation. No melena or hematochezia.  GENITOURINARY: No dysuria, frequency, hematuria, or incontinence  NEUROLOGICAL: No headaches, memory loss, loss of strength, numbness, or tremors      Consultant(s) Notes Reviewed:  [x ] YES  [ ] NO    PHYSICAL EXAM:  GENERAL: NAD, well-groomed, well-developed,not in any distress ,  HEAD:  Atraumatic, Normocephalic  EYES: EOMI, PERRLA, conjunctiva and sclera clear  ENMT: No tonsillar erythema, exudates, or enlargement; Moist mucous membranes, Good dentition, No lesions  NECK: Supple, No JVD, Normal thyroid  NERVOUS SYSTEM:  Alert & Oriented X3, No focal deficit   CHEST/LUNG: Good air entry bilateral with no  rales, rhonchi, wheezing, or rubs  HEART: Regular rate and rhythm; No murmurs, rubs, or gallops  ABDOMEN: Soft, Nontender, Nondistended; Bowel sounds present  EXTREMITIES: Left foot dressed   Care Discussed with Consultants/Other Providers [ x] YES  [ ] NO

## 2020-08-03 NOTE — PROGRESS NOTE ADULT - ASSESSMENT
89 year old female PMH  a-fib on coumadin, diastolic CHF (with EF 63% on TTE 8/13/19), HTN, HLD, CKD stage 3-4, IDDM, PVD, CVA, PE, hypothyroidism, p/w L foot wound. Patient was sent from podiatrist office for non healing left great toe ulcer, to rule out OM. Renal following for NIKHIL/CKD Mx.     CKD4: Baseline Creatinine 1.7-2.0  euvolemic. Electrolytes acceptable.    Scr 2>2.16>2.28 >2.1 >2.1 >2.2 today- rel stable  s/p LE angiogram, given IVF post procedure.   TTE noted above with normal LVEF, severe diastolic dysfunction  continue to hold lasix 40mg po qdaily for now. no s/o CHF.   avoid ACEi/ARB for now/NSAIDs/Nephrotoxics.  monitor BMP daily     Hypercalcemia -phos wnl. check vit D 25OH level and iPTH in am. watch ca. avoid ca/vit D suplements  HTN, controlled-bp optimal. c/w current bp meds. avoid ACEi/ARB. pain Mx.     Toe gangrene, PAD. s/p LE angio, no stent. f/u w/ Podiatry, plans for amputation cancelled.     DM- Mx per medicine    labs, chart reviewed  advised daughter to f/u in CKD office upon d/c before

## 2020-08-03 NOTE — PHYSICAL THERAPY INITIAL EVALUATION ADULT - LIVES WITH, PROFILE
Home Health Aid 3 days per week; Patient's children alternate on the days that [facility] does not come; + Ramp/No stairs that patient has to use, as per patient./spouse

## 2020-08-03 NOTE — PROGRESS NOTE ADULT - SUBJECTIVE AND OBJECTIVE BOX
Infectious Diseases progress note:    Subjective: NAD, afebrile.      ROS:  CONSTITUTIONAL:  No fever, chills, rigors  CARDIOVASCULAR:  No chest pain or palpitations  RESPIRATORY:   No SOB, cough, dyspnea on exertion.  No wheezing  GASTROINTESTINAL:  No abd pain, N/V, diarrhea/constipation  EXTREMITIES:  No swelling or joint pain  GENITOURINARY:  No burning on urination, increased frequency or urgency.  No flank pain  NEUROLOGIC:  No HA, visual disturbances  SKIN: No rashes    Allergies    No Known Allergies    Intolerances        ANTIBIOTICS/RELEVANT:  antimicrobials  piperacillin/tazobactam IVPB.. 3.375 Gram(s) IV Intermittent every 12 hours  vancomycin  IVPB 1000 milliGRAM(s) IV Intermittent once    immunologic:    OTHER:  acetaminophen   Tablet .. 650 milliGRAM(s) Oral every 6 hours PRN  allopurinol 100 milliGRAM(s) Oral daily  amLODIPine   Tablet 10 milliGRAM(s) Oral daily  dextrose 40% Gel 15 Gram(s) Oral once PRN  dextrose 5%. 1000 milliLiter(s) IV Continuous <Continuous>  dextrose 50% Injectable 12.5 Gram(s) IV Push once  dextrose 50% Injectable 25 Gram(s) IV Push once  dextrose 50% Injectable 25 Gram(s) IV Push once  gabapentin 100 milliGRAM(s) Oral three times a day  glucagon  Injectable 1 milliGRAM(s) IntraMuscular once PRN  heparin   Injectable 6000 Unit(s) IV Push every 6 hours PRN  heparin   Injectable 3000 Unit(s) IV Push every 6 hours PRN  heparin  Infusion.  Unit(s)/Hr IV Continuous <Continuous>  hydrALAZINE 75 milliGRAM(s) Oral three times a day  insulin lispro (HumaLOG) corrective regimen sliding scale   SubCutaneous three times a day before meals  insulin lispro (HumaLOG) corrective regimen sliding scale   SubCutaneous at bedtime  latanoprost 0.005% Ophthalmic Solution 1 Drop(s) Left EYE at bedtime  levothyroxine 75 MICROGram(s) Oral daily  morphine  - Injectable 2 milliGRAM(s) IV Push every 4 hours PRN  polyethylene glycol 3350 17 Gram(s) Oral daily  senna 2 Tablet(s) Oral at bedtime  simvastatin 20 milliGRAM(s) Oral at bedtime  sodium chloride 0.9%. 1000 milliLiter(s) IV Continuous <Continuous>  warfarin 4 milliGRAM(s) Oral once      Objective:  Vital Signs Last 24 Hrs  T(C): 36.7 (03 Aug 2020 05:36), Max: 37.1 (02 Aug 2020 20:51)  T(F): 98.1 (03 Aug 2020 05:36), Max: 98.8 (02 Aug 2020 20:51)  HR: 61 (03 Aug 2020 05:36) (61 - 71)  BP: 143/55 (03 Aug 2020 05:36) (143/55 - 148/52)  BP(mean): --  RR: 18 (03 Aug 2020 05:36) (18 - 18)  SpO2: 100% (03 Aug 2020 05:36) (100% - 100%)    PHYSICAL EXAM:  Constitutional:NAD  Eyes:OLIVIA, EOMI  Ear/Nose/Throat: no thrush, mucositis.  Moist mucous membranes	  Neck:no JVD, no lymphadenopathy, supple  Respiratory: CTA adina  Cardiovascular: S1S2 RRR, no murmurs  Gastrointestinal:soft, nontender,  nondistended (+) BS  Extremities:no e/e/c  Skin:  L ft dry gangrenous changes hallux area        LABS:                        8.1    6.79  )-----------( 242      ( 03 Aug 2020 06:27 )             24.9     08-03    138  |  103  |  64<H>  ----------------------------<  104<H>  4.9   |  25  |  2.21<H>    Ca    10.8<H>      03 Aug 2020 06:27  Phos  3.8     08-03  Mg     2.3     08-03    TPro  x   /  Alb  3.3  /  TBili  x   /  DBili  x   /  AST  x   /  ALT  x   /  AlkPhos  x   08-03    PT/INR - ( 03 Aug 2020 06:27 )   PT: 15.1 SEC;   INR: 1.33          PTT - ( 03 Aug 2020 06:27 )  PTT:71.0 SEC        Vancomycin Level, Random:  ug/mL (08-03 @ 06:27)  Vancomycin Level, Random:  ug/mL (08-02 @ 06:20)  Vancomycin Level, Random:  ug/mL (08-01 @ 05:51)  Vancomycin Level, Random:  ug/mL (07-31 @ 05:30)  Vancomycin Level, Random:  ug/mL (07-30 @ 09:50)      Vancomycin Level, Trough: 11.0 ug/mL (08-03 @ 06:27)              MICROBIOLOGY:          RADIOLOGY & ADDITIONAL STUDIES:    < from: Xray Chest 1 View- PORTABLE-Urgent (07.27.20 @ 14:43) >  IMPRESSION:  Stable left midlung linear scarring.    < end of copied text >

## 2020-08-03 NOTE — PROGRESS NOTE ADULT - SUBJECTIVE AND OBJECTIVE BOX
[facility] - S Radha / Florin S /D Nate/ S Jonathan/ S Rosalinda/ Shahid Segovia / BABAR Escalerau/ O Rudi  service -[phone], office [phone]  ---------------------------------------------------------------------------------------------------------------    Patient seen and examined bedside    Subjective and Objective: No overnight events, denied V/D/sob. No complaints today    Allergies: No Known Allergies      Hospital Medications:   MEDICATIONS  (STANDING):  allopurinol 100 milliGRAM(s) Oral daily  amLODIPine   Tablet 10 milliGRAM(s) Oral daily  dextrose 5%. 1000 milliLiter(s) (50 mL/Hr) IV Continuous <Continuous>  dextrose 50% Injectable 12.5 Gram(s) IV Push once  dextrose 50% Injectable 25 Gram(s) IV Push once  dextrose 50% Injectable 25 Gram(s) IV Push once  gabapentin 100 milliGRAM(s) Oral three times a day  heparin  Infusion.  Unit(s)/Hr (13 mL/Hr) IV Continuous <Continuous>  hydrALAZINE 75 milliGRAM(s) Oral three times a day  insulin lispro (HumaLOG) corrective regimen sliding scale   SubCutaneous three times a day before meals  insulin lispro (HumaLOG) corrective regimen sliding scale   SubCutaneous at bedtime  latanoprost 0.005% Ophthalmic Solution 1 Drop(s) Left EYE at bedtime  levothyroxine 75 MICROGram(s) Oral daily  piperacillin/tazobactam IVPB.. 3.375 Gram(s) IV Intermittent every 12 hours  polyethylene glycol 3350 17 Gram(s) Oral daily  senna 2 Tablet(s) Oral at bedtime  simvastatin 20 milliGRAM(s) Oral at bedtime  sodium chloride 0.9%. 1000 milliLiter(s) (75 mL/Hr) IV Continuous <Continuous>  vancomycin  IVPB 1000 milliGRAM(s) IV Intermittent once  warfarin 4 milliGRAM(s) Oral once    VITALS:  T(F): 98.1 (08-03-20 @ 05:36), Max: 98.9 (08-02-20 @ 13:01)  HR: 61 (08-03-20 @ 05:36)  BP: 143/55 (08-03-20 @ 05:36)  RR: 18 (08-03-20 @ 05:36)  SpO2: 100% (08-03-20 @ 05:36)  Wt(kg): --    PHYSICAL EXAM:  Constitutional: NAD  HEENT: anicteric sclera  Respiratory: CTAB, no wheezes, rales or rhonchi  Cardiovascular: S1, S2, RRR  Gastrointestinal: BS+, soft, NT/ND  Extremities: No peripheral edema. left foot dressing+  Neurological: A/O x 3  Psychiatric: Normal mood, normal affect  : No michaud.     LABS:  08-03    138  |  103  |  64<H>  ----------------------------<  104<H>  4.9   |  25  |  2.21<H>    Ca    10.8<H>      03 Aug 2020 06:27  Phos  3.8     08-03  Mg     2.3     08-03    TPro      /  Alb  3.3  /  TBili      /  DBili      /  AST      /  ALT      /  AlkPhos      08-03    Creatinine Trend: 2.21 <--, 2.18 <--, 2.15 <--, 2.28 <--, 2.16 <--, 2.08 <--, 2.13 <--                        8.1    6.79  )-----------( 242      ( 03 Aug 2020 06:27 )             24.9     Urine Studies:        RADIOLOGY & ADDITIONAL STUDIES:

## 2020-08-03 NOTE — PHYSICAL THERAPY INITIAL EVALUATION ADULT - ASSISTIVE DEVICE:SUPINE/SIT, REHAB EVAL
bed rails Complex Repair And Skin Substitute Graft Text: The defect edges were debeveled with a #15 scalpel blade.  The primary defect was closed partially with a complex linear closure.  Given the location of the remaining defect, shape of the defect and the proximity to free margins a skin substitute graft was deemed most appropriate to repair the remaining defect.  The graft was trimmed to fit the size of the remaining defect.  The graft was then placed in the primary defect, oriented appropriately, and sutured into place.

## 2020-08-03 NOTE — PROGRESS NOTE ADULT - ASSESSMENT
89 year old female PMH  a-fib on coumadin, diastolic CHF (with EF 63% on TTE 8/13/19), HTN, HLD, CKD stage 3-4, IDDM, PVD, CVA, PE, hypothyroidism, p/w L foot wound. Patient seen today at clinic and sent for cellulitis and abscess, wanting to rule out OM. Patient denies fever, chills, cp, sob, abd pain, n/v, weakness, or numbness/tingling. (21 Jul 2020 20:43)    Pt states she has been on multiple PO abx in the past.  She currently denies any purulent discharge, no fever/chills.    ER vss.  No leukocytosis.  Pt seen by podiatry and vascular services.  L foot with avulsed hallux nail, L hallux boggy, necrotic and cold to level of MPJ, no purulence, no open wounds, no malodor, no associated cellulitis on initial exam.  Changes due to PAD.  s/p LLE angiogram  showed SFA occlusion w/ distal SFA/pop recon w/ heavy calcifications, no further vasc intervention.  Pt planned for Left foot partial 1st ray resection.     Wound was cultured and growing MRSA, Enterococcus and Proteus mirabilis.  Pt received vanco x 1, now on cefepime.    ID consulted for further abx recommendations.    L hallux gangrene:    - No drainage/purulence.  Wound appears dry.  No systemic signs of infection, afebrile.     - Wound cx MRSA, enterococcus and proteus.  Cont  vanco/zosyn.    - If pt becomes febrile, send bcx  x2    - As per d/w NP, no further plan for hallux ray resection by podiatry.  Pt for further outpt f/u with Podiatry and Vascular.        NIKHIL/CKD:    - Pt with CKD4, dose abx accordingly.      - s/p LE angiogram, s/p IVF, monitor Cr.  Renal f/u appreciated.      * complete 7 day course of empiric abx through 8/4.  On vanco by level,  dose 1gm IV x 1 today.   Maintain trough levels between 10-15.  Cont zosyn.      Can switch to doxy 100mg bid and augmentin 500mg qdaily upon pt discharge.  Awaiting therapeutic INR      Melissa Johnsi  [phone]

## 2020-08-04 LAB
24R-OH-CALCIDIOL SERPL-MCNC: 95 NG/ML — HIGH (ref 30–80)
ANION GAP SERPL CALC-SCNC: 9 MMO/L — SIGNIFICANT CHANGE UP (ref 7–14)
APTT BLD: 71.7 SEC — HIGH (ref 27–36.3)
BLD GP AB SCN SERPL QL: NEGATIVE — SIGNIFICANT CHANGE UP
BUN SERPL-MCNC: 56 MG/DL — HIGH (ref 7–23)
CALCIUM SERPL-MCNC: 9.9 MG/DL — SIGNIFICANT CHANGE UP (ref 8.4–10.5)
CHLORIDE SERPL-SCNC: 104 MMOL/L — SIGNIFICANT CHANGE UP (ref 98–107)
CO2 SERPL-SCNC: 24 MMOL/L — SIGNIFICANT CHANGE UP (ref 22–31)
CREAT SERPL-MCNC: 2.08 MG/DL — HIGH (ref 0.5–1.3)
GLUCOSE BLDC GLUCOMTR-MCNC: 114 MG/DL — HIGH (ref 70–99)
GLUCOSE BLDC GLUCOMTR-MCNC: 152 MG/DL — HIGH (ref 70–99)
GLUCOSE BLDC GLUCOMTR-MCNC: 186 MG/DL — HIGH (ref 70–99)
GLUCOSE BLDC GLUCOMTR-MCNC: 188 MG/DL — HIGH (ref 70–99)
GLUCOSE BLDC GLUCOMTR-MCNC: 195 MG/DL — HIGH (ref 70–99)
GLUCOSE SERPL-MCNC: 129 MG/DL — HIGH (ref 70–99)
HCT VFR BLD CALC: 24.8 % — LOW (ref 34.5–45)
HCT VFR BLD CALC: 25.9 % — LOW (ref 34.5–45)
HGB BLD-MCNC: 7.5 G/DL — LOW (ref 11.5–15.5)
HGB BLD-MCNC: 8 G/DL — LOW (ref 11.5–15.5)
INR BLD: 1.39 — HIGH (ref 0.88–1.17)
MAGNESIUM SERPL-MCNC: 2.1 MG/DL — SIGNIFICANT CHANGE UP (ref 1.6–2.6)
MCHC RBC-ENTMCNC: 29 PG — SIGNIFICANT CHANGE UP (ref 27–34)
MCHC RBC-ENTMCNC: 29.2 PG — SIGNIFICANT CHANGE UP (ref 27–34)
MCHC RBC-ENTMCNC: 30.2 % — LOW (ref 32–36)
MCHC RBC-ENTMCNC: 30.9 % — LOW (ref 32–36)
MCV RBC AUTO: 93.8 FL — SIGNIFICANT CHANGE UP (ref 80–100)
MCV RBC AUTO: 96.5 FL — SIGNIFICANT CHANGE UP (ref 80–100)
NRBC # FLD: 0 K/UL — SIGNIFICANT CHANGE UP (ref 0–0)
NRBC # FLD: 0 K/UL — SIGNIFICANT CHANGE UP (ref 0–0)
PHOSPHATE SERPL-MCNC: 4.2 MG/DL — SIGNIFICANT CHANGE UP (ref 2.5–4.5)
PLATELET # BLD AUTO: 213 K/UL — SIGNIFICANT CHANGE UP (ref 150–400)
PLATELET # BLD AUTO: 241 K/UL — SIGNIFICANT CHANGE UP (ref 150–400)
PMV BLD: 11.3 FL — SIGNIFICANT CHANGE UP (ref 7–13)
PMV BLD: 11.6 FL — SIGNIFICANT CHANGE UP (ref 7–13)
POTASSIUM SERPL-MCNC: 5 MMOL/L — SIGNIFICANT CHANGE UP (ref 3.5–5.3)
POTASSIUM SERPL-SCNC: 5 MMOL/L — SIGNIFICANT CHANGE UP (ref 3.5–5.3)
PROTHROM AB SERPL-ACNC: 15.6 SEC — HIGH (ref 10.6–13.6)
PTH-INTACT SERPL-MCNC: 156 PG/ML — HIGH (ref 15–65)
RBC # BLD: 2.57 M/UL — LOW (ref 3.8–5.2)
RBC # BLD: 2.76 M/UL — LOW (ref 3.8–5.2)
RBC # FLD: 15.5 % — HIGH (ref 10.3–14.5)
RBC # FLD: 15.7 % — HIGH (ref 10.3–14.5)
RH IG SCN BLD-IMP: NEGATIVE — SIGNIFICANT CHANGE UP
SODIUM SERPL-SCNC: 137 MMOL/L — SIGNIFICANT CHANGE UP (ref 135–145)
VANCOMYCIN FLD-MCNC: 9.8 UG/ML — SIGNIFICANT CHANGE UP
WBC # BLD: 5.78 K/UL — SIGNIFICANT CHANGE UP (ref 3.8–10.5)
WBC # BLD: 6.09 K/UL — SIGNIFICANT CHANGE UP (ref 3.8–10.5)
WBC # FLD AUTO: 5.78 K/UL — SIGNIFICANT CHANGE UP (ref 3.8–10.5)
WBC # FLD AUTO: 6.09 K/UL — SIGNIFICANT CHANGE UP (ref 3.8–10.5)

## 2020-08-04 RX ORDER — WARFARIN SODIUM 2.5 MG/1
4 TABLET ORAL ONCE
Refills: 0 | Status: COMPLETED | OUTPATIENT
Start: 2020-08-04 | End: 2020-08-04

## 2020-08-04 RX ORDER — VANCOMYCIN HCL 1 G
1000 VIAL (EA) INTRAVENOUS ONCE
Refills: 0 | Status: COMPLETED | OUTPATIENT
Start: 2020-08-04 | End: 2020-08-04

## 2020-08-04 RX ADMIN — Medication 2: at 17:58

## 2020-08-04 RX ADMIN — Medication 75 MILLIGRAM(S): at 13:04

## 2020-08-04 RX ADMIN — GABAPENTIN 100 MILLIGRAM(S): 400 CAPSULE ORAL at 13:04

## 2020-08-04 RX ADMIN — SENNA PLUS 2 TABLET(S): 8.6 TABLET ORAL at 21:19

## 2020-08-04 RX ADMIN — POLYETHYLENE GLYCOL 3350 17 GRAM(S): 17 POWDER, FOR SOLUTION ORAL at 13:04

## 2020-08-04 RX ADMIN — WARFARIN SODIUM 4 MILLIGRAM(S): 2.5 TABLET ORAL at 17:17

## 2020-08-04 RX ADMIN — PIPERACILLIN AND TAZOBACTAM 25 GRAM(S): 4; .5 INJECTION, POWDER, LYOPHILIZED, FOR SOLUTION INTRAVENOUS at 21:20

## 2020-08-04 RX ADMIN — Medication 2: at 13:02

## 2020-08-04 RX ADMIN — Medication 75 MICROGRAM(S): at 06:30

## 2020-08-04 RX ADMIN — PIPERACILLIN AND TAZOBACTAM 25 GRAM(S): 4; .5 INJECTION, POWDER, LYOPHILIZED, FOR SOLUTION INTRAVENOUS at 09:32

## 2020-08-04 RX ADMIN — Medication 75 MILLIGRAM(S): at 06:30

## 2020-08-04 RX ADMIN — Medication 75 MILLIGRAM(S): at 21:19

## 2020-08-04 RX ADMIN — Medication 650 MILLIGRAM(S): at 10:40

## 2020-08-04 RX ADMIN — Medication 250 MILLIGRAM(S): at 17:17

## 2020-08-04 RX ADMIN — SIMVASTATIN 20 MILLIGRAM(S): 20 TABLET, FILM COATED ORAL at 21:19

## 2020-08-04 RX ADMIN — HEPARIN SODIUM 1300 UNIT(S)/HR: 5000 INJECTION INTRAVENOUS; SUBCUTANEOUS at 07:53

## 2020-08-04 RX ADMIN — Medication 100 MILLIGRAM(S): at 13:04

## 2020-08-04 RX ADMIN — Medication 650 MILLIGRAM(S): at 22:20

## 2020-08-04 RX ADMIN — Medication 650 MILLIGRAM(S): at 21:20

## 2020-08-04 RX ADMIN — AMLODIPINE BESYLATE 10 MILLIGRAM(S): 2.5 TABLET ORAL at 06:30

## 2020-08-04 RX ADMIN — GABAPENTIN 100 MILLIGRAM(S): 400 CAPSULE ORAL at 06:30

## 2020-08-04 RX ADMIN — LATANOPROST 1 DROP(S): 0.05 SOLUTION/ DROPS OPHTHALMIC; TOPICAL at 21:20

## 2020-08-04 RX ADMIN — Medication 650 MILLIGRAM(S): at 00:20

## 2020-08-04 RX ADMIN — Medication 650 MILLIGRAM(S): at 09:42

## 2020-08-04 RX ADMIN — GABAPENTIN 100 MILLIGRAM(S): 400 CAPSULE ORAL at 21:19

## 2020-08-04 NOTE — PROGRESS NOTE ADULT - ASSESSMENT
89 year old female PMH  a-fib on coumadin, diastolic CHF (with EF 63% on TTE 8/13/19), HTN, HLD, CKD stage 3-4, IDDM, PVD, CVA, PE, hypothyroidism, p/w L foot wound. Patient was sent from podiatrist office for non healing left great toe ulcer, to rule out OM. Renal following for NIKHIL/CKD Mx.     CKD4: Baseline Creatinine 1.7-2.0  euvolemic. Electrolytes acceptable.    Cr stable.  s/p LE angiogram, given IVF post procedure.   TTE noted above with normal LVEF, severe diastolic dysfunction  continue to hold lasix 40mg po qdaily for now. no s/o CHF.   avoid ACEi/ARB for now/NSAIDs/Nephrotoxics.  monitor BMP daily     Hypercalcemia -phos wnl. elevated iPTH. watch ca. avoid ca/vit D suplements  HTN, controlled-bp optimal. c/w current bp meds. avoid ACEi/ARB. pain Mx.     Toe gangrene, PAD. s/p LE angio, no stent. f/u w/ Podiatry, plans for amputation cancelled.     DM- Mx per medicine    labs, chart reviewed  advised daughter to f/u in CKD office upon d/c before

## 2020-08-04 NOTE — PROGRESS NOTE ADULT - SUBJECTIVE AND OBJECTIVE BOX
INTERVAL HPI/OVERNIGHT EVENTS: No new concerns . Hgb low so rpted .   Vital Signs Last 24 Hrs  T(C): 36.7 (04 Aug 2020 06:36), Max: 36.9 (03 Aug 2020 21:10)  T(F): 98 (04 Aug 2020 06:36), Max: 98.5 (03 Aug 2020 21:10)  HR: 57 (04 Aug 2020 06:36) (57 - 93)  BP: 141/53 (04 Aug 2020 06:36) (141/53 - 159/63)  BP(mean): --  RR: 18 (04 Aug 2020 06:36) (18 - 18)  SpO2: 100% (04 Aug 2020 06:36) (100% - 100%)  I&O's Summary    MEDICATIONS  (STANDING):  allopurinol 100 milliGRAM(s) Oral daily  amLODIPine   Tablet 10 milliGRAM(s) Oral daily  dextrose 5%. 1000 milliLiter(s) (50 mL/Hr) IV Continuous <Continuous>  dextrose 50% Injectable 12.5 Gram(s) IV Push once  dextrose 50% Injectable 25 Gram(s) IV Push once  dextrose 50% Injectable 25 Gram(s) IV Push once  gabapentin 100 milliGRAM(s) Oral three times a day  heparin  Infusion.  Unit(s)/Hr (13 mL/Hr) IV Continuous <Continuous>  hydrALAZINE 75 milliGRAM(s) Oral three times a day  insulin lispro (HumaLOG) corrective regimen sliding scale   SubCutaneous three times a day before meals  insulin lispro (HumaLOG) corrective regimen sliding scale   SubCutaneous at bedtime  latanoprost 0.005% Ophthalmic Solution 1 Drop(s) Left EYE at bedtime  levothyroxine 75 MICROGram(s) Oral daily  piperacillin/tazobactam IVPB.. 3.375 Gram(s) IV Intermittent every 12 hours  polyethylene glycol 3350 17 Gram(s) Oral daily  senna 2 Tablet(s) Oral at bedtime  simvastatin 20 milliGRAM(s) Oral at bedtime  sodium chloride 0.9%. 1000 milliLiter(s) (75 mL/Hr) IV Continuous <Continuous>  vancomycin  IVPB 1000 milliGRAM(s) IV Intermittent once  warfarin 4 milliGRAM(s) Oral once    MEDICATIONS  (PRN):  acetaminophen   Tablet .. 650 milliGRAM(s) Oral every 6 hours PRN Temp greater or equal to 38C (100.4F), Mild Pain (1 - 3), Moderate Pain (4 - 6), Severe Pain (7 - 10)  dextrose 40% Gel 15 Gram(s) Oral once PRN Blood Glucose LESS THAN 70 milliGRAM(s)/deciliter  glucagon  Injectable 1 milliGRAM(s) IntraMuscular once PRN Glucose LESS THAN 70 milligrams/deciliter  heparin   Injectable 6000 Unit(s) IV Push every 6 hours PRN For aPTT less than 40  heparin   Injectable 3000 Unit(s) IV Push every 6 hours PRN For aPTT between 40 - 57  morphine  - Injectable 2 milliGRAM(s) IV Push every 4 hours PRN Severe Pain (7 - 10)    LABS:                        8.0    6.09  )-----------( 241      ( 04 Aug 2020 09:13 )             25.9     08-04    137  |  104  |  56<H>  ----------------------------<  129<H>  5.0   |  24  |  2.08<H>    Ca    9.9      04 Aug 2020 06:08  Phos  4.2     08-04  Mg     2.1     08-04    TPro  x   /  Alb  3.3  /  TBili  x   /  DBili  x   /  AST  x   /  ALT  x   /  AlkPhos  x   08-03    PT/INR - ( 04 Aug 2020 06:08 )   PT: 15.6 SEC;   INR: 1.39          PTT - ( 04 Aug 2020 06:08 )  PTT:71.7 SEC    CAPILLARY BLOOD GLUCOSE      POCT Blood Glucose.: 186 mg/dL (04 Aug 2020 12:27)  POCT Blood Glucose.: 114 mg/dL (04 Aug 2020 08:21)  POCT Blood Glucose.: 130 mg/dL (03 Aug 2020 22:59)  POCT Blood Glucose.: 147 mg/dL (03 Aug 2020 17:14)          REVIEW OF SYSTEMS:  CONSTITUTIONAL: No fever, weight loss, or fatigue  EYES: No eye pain, visual disturbances, or discharge  ENMT:  No difficulty hearing, tinnitus, vertigo; No sinus or throat pain  NECK: No pain or stiffness  RESPIRATORY: No cough, wheezing, chills or hemoptysis; No shortness of breath  CARDIOVASCULAR: No chest pain, palpitations, dizziness, or leg swelling  GASTROINTESTINAL: No abdominal or epigastric pain. No nausea, vomiting, or hematemesis; No diarrhea or constipation. No melena or hematochezia.  GENITOURINARY: No dysuria, frequency, hematuria, or incontinence  NEUROLOGICAL: No headaches, memory loss, loss of strength, numbness, or tremors      Consultant(s) Notes Reviewed:  [x ] YES  [ ] NO    PHYSICAL EXAM:  GENERAL: NAD, well-groomed, well-developed,not in any distress ,  HEAD:  Atraumatic, Normocephalic  EYES: EOMI, PERRLA, conjunctiva and sclera clear  ENMT: No tonsillar erythema, exudates, or enlargement; Moist mucous membranes, Good dentition, No lesions  NECK: Supple, No JVD, Normal thyroid  NERVOUS SYSTEM:  Alert & Oriented X3, No focal deficit   CHEST/LUNG: Good air entry bilateral with no  rales, rhonchi, wheezing, or rubs  HEART: Regular rate and rhythm; No murmurs, rubs, or gallops  ABDOMEN: Soft, Nontender, Nondistended; Bowel sounds present  EXTREMITIES:  foot dressed     Care Discussed with Consultants/Other Providers [ x] YES  [ ] NO

## 2020-08-04 NOTE — PROGRESS NOTE ADULT - ASSESSMENT
89 year old female PMH  a-fib on coumadin, diastolic CHF (with EF 63% on TTE 8/13/19), HTN, HLD, CKD stage 3-4, IDDM, PVD, CVA, PE, hypothyroidism, p/w L foot wound. Patient seen today at clinic and sent for cellulitis and abscess, wanting to rule out OM. Patient denies fever, chills, cp, sob, abd pain, n/v, weakness, or numbness/tingling. (21 Jul 2020 20:43)    Pt states she has been on multiple PO abx in the past.  She currently denies any purulent discharge, no fever/chills.    ER vss.  No leukocytosis.  Pt seen by podiatry and vascular services.  L foot with avulsed hallux nail, L hallux boggy, necrotic and cold to level of MPJ, no purulence, no open wounds, no malodor, no associated cellulitis on initial exam.  Changes due to PAD.  s/p LLE angiogram  showed SFA occlusion w/ distal SFA/pop recon w/ heavy calcifications, no further vasc intervention.  Pt planned for Left foot partial 1st ray resection.     Wound was cultured and growing MRSA, Enterococcus and Proteus mirabilis.  Pt received vanco x 1, now on cefepime.    ID consulted for further abx recommendations.    L hallux gangrene:    - No drainage/purulence.  Wound appears dry.  No systemic signs of infection, afebrile.     - Wound cx MRSA, enterococcus and proteus.  Cont  vanco/zosyn.      - As per d/w NP, no further plan for hallux ray resection by podiatry.  Pt for further outpt f/u with Podiatry and Vascular.        NIKHIL/CKD:    - Pt with CKD4, dose abx accordingly.      - s/p LE angiogram, s/p IVF, monitor Cr.  Renal f/u appreciated.      * complete 7 day course of empiric abx through 8/4.  On vanco by level,  dose 1gm IV x 1 today (8/4).   Maintain trough levels between 10-15.  Cont zosyn.      * Awaiting therapeutic INR    *Last dose of abx today.     Melissa Escobedo  [phone]

## 2020-08-04 NOTE — PROGRESS NOTE ADULT - SUBJECTIVE AND OBJECTIVE BOX
S: no chest pain or sob; ROS otherwise negative.       acetaminophen   Tablet .. 650 milliGRAM(s) Oral every 6 hours PRN  allopurinol 100 milliGRAM(s) Oral daily  amLODIPine   Tablet 10 milliGRAM(s) Oral daily  dextrose 40% Gel 15 Gram(s) Oral once PRN  dextrose 5%. 1000 milliLiter(s) IV Continuous <Continuous>  dextrose 50% Injectable 12.5 Gram(s) IV Push once  dextrose 50% Injectable 25 Gram(s) IV Push once  dextrose 50% Injectable 25 Gram(s) IV Push once  gabapentin 100 milliGRAM(s) Oral three times a day  glucagon  Injectable 1 milliGRAM(s) IntraMuscular once PRN  heparin   Injectable 6000 Unit(s) IV Push every 6 hours PRN  heparin   Injectable 3000 Unit(s) IV Push every 6 hours PRN  heparin  Infusion.  Unit(s)/Hr IV Continuous <Continuous>  hydrALAZINE 75 milliGRAM(s) Oral three times a day  insulin lispro (HumaLOG) corrective regimen sliding scale   SubCutaneous three times a day before meals  insulin lispro (HumaLOG) corrective regimen sliding scale   SubCutaneous at bedtime  latanoprost 0.005% Ophthalmic Solution 1 Drop(s) Left EYE at bedtime  levothyroxine 75 MICROGram(s) Oral daily  morphine  - Injectable 2 milliGRAM(s) IV Push every 4 hours PRN  piperacillin/tazobactam IVPB.. 3.375 Gram(s) IV Intermittent every 12 hours  polyethylene glycol 3350 17 Gram(s) Oral daily  senna 2 Tablet(s) Oral at bedtime  simvastatin 20 milliGRAM(s) Oral at bedtime  sodium chloride 0.9%. 1000 milliLiter(s) IV Continuous <Continuous>  vancomycin  IVPB 1000 milliGRAM(s) IV Intermittent once  warfarin 4 milliGRAM(s) Oral once                            8.0    6.09  )-----------( 241      ( 04 Aug 2020 09:13 )             25.9       08-04    137  |  104  |  56<H>  ----------------------------<  129<H>  5.0   |  24  |  2.08<H>    Ca    9.9      04 Aug 2020 06:08  Phos  4.2     08-04  Mg     2.1     08-04    TPro  x   /  Alb  3.3  /  TBili  x   /  DBili  x   /  AST  x   /  ALT  x   /  AlkPhos  x   08-03            T(C): 36.7 (08-04-20 @ 06:36), Max: 36.9 (08-03-20 @ 21:10)  HR: 57 (08-04-20 @ 06:36) (57 - 93)  BP: 141/53 (08-04-20 @ 06:36) (141/53 - 159/63)  RR: 18 (08-04-20 @ 06:36) (18 - 18)  SpO2: 100% (08-04-20 @ 06:36) (100% - 100%)  Wt(kg): --    I&O's Summary        Gen: Appears well in NAD  HEENT:  (-)icterus (-)pallor  CV: Normal S1, there is still a sharp S2.  Mid-to-late peaking 3/6 systolic murmur at the upper chest, but No pulsus parvus et tardus (+)2 Pulses B/l  Resp:  Clear to ausculatation B/L, normal effort  GI: (+) BS Soft, NT, ND  Lymph:  (-)Edema, (-)obvious lymphadenopathy  Skin: +left foot dressing, Warm to touch, Normal turgor  Psych: Appropriate mood and affect    TTE: < from: Transthoracic Echocardiogram (07.22.20 @ 16:27) >  1. Mitral annular calcification, otherwise normal mitral  valve. Mild-moderate mitral regurgitation.  2. Calcified trileaflet aortic valve with decreased  opening.  Peak transaortic valve gradient [id number] mm Hg,  mean transaortic valve gradient equals 26 mm Hg, estimated  aortic valve area equals 1 sqcm (by continuity equation),  consistent with moderate to severe aortic stenosis.  3. Severely dilated left atrium.  LA volume index = 57  cc/m2.  4. Mild concentric left ventricular hypertrophy.  5. Normal left ventricular systolic function. No segmental  wall motion abnormalities.  6. Severe diastolic dysfunction.  7. Severe right atrial enlargement.  8. Normal right ventricular size and function.  9. Estimated right ventricular systolic pressure equals 53  mm Hg, assuming right atrial pressure equals 10 mm Hg,  consistent with moderate pulmonary hypertension.    < end of copied text >      ASSESSMENT/PLAN: Patient is a 89 year old Female well known to Dr. Berman, who presented with a foot wound.  Consult requested for pre-op cardiovascular risk stratification.  Her known PMH includes persistent Afib on coumadin who was not a candidate for left atrial appendage occlusion with Watchman, chronic HFpEF, HTN, HLD, hypothyroidism, CKD, Type 2 DM, CVA, and hx of PE.    - Continue AC for cva prevention if no contraindications - on hep gtt bridge to coumadin  - Continue with PO lasix   - no further inpatient cardiac w/u needed at this time  - pt. and patient's family prefer conservative cv care and no further workup of valvular disease   - s/p LE angio - tolerated procedure well in terms of cv perspective  - pod sx now canceled, plan for IV Abx and wound care      Trisha Banks MD

## 2020-08-04 NOTE — PROGRESS NOTE ADULT - SUBJECTIVE AND OBJECTIVE BOX
Nephrology Followup Note - [phone] - Dr Catherine / Dr Garcia / Dr Tellez / Dr Sullivan / Dr Castillo / Dr Grijalva / Dr Segovia / Dr Luna  Pt seen and examined at bedside  No acute events overnight. No complaints.     Allergies:  No Known Allergies    Hospital Medications:   MEDICATIONS  (STANDING):  allopurinol 100 milliGRAM(s) Oral daily  amLODIPine   Tablet 10 milliGRAM(s) Oral daily  dextrose 5%. 1000 milliLiter(s) (50 mL/Hr) IV Continuous <Continuous>  dextrose 50% Injectable 12.5 Gram(s) IV Push once  dextrose 50% Injectable 25 Gram(s) IV Push once  dextrose 50% Injectable 25 Gram(s) IV Push once  gabapentin 100 milliGRAM(s) Oral three times a day  heparin  Infusion.  Unit(s)/Hr (13 mL/Hr) IV Continuous <Continuous>  hydrALAZINE 75 milliGRAM(s) Oral three times a day  insulin lispro (HumaLOG) corrective regimen sliding scale   SubCutaneous three times a day before meals  insulin lispro (HumaLOG) corrective regimen sliding scale   SubCutaneous at bedtime  latanoprost 0.005% Ophthalmic Solution 1 Drop(s) Left EYE at bedtime  levothyroxine 75 MICROGram(s) Oral daily  piperacillin/tazobactam IVPB.. 3.375 Gram(s) IV Intermittent every 12 hours  polyethylene glycol 3350 17 Gram(s) Oral daily  senna 2 Tablet(s) Oral at bedtime  simvastatin 20 milliGRAM(s) Oral at bedtime  sodium chloride 0.9%. 1000 milliLiter(s) (75 mL/Hr) IV Continuous <Continuous>      VITALS:  T(F): 98.4 (08-04-20 @ 13:53), Max: 98.5 (08-03-20 @ 21:10)  HR: 56 (08-04-20 @ 13:53)  BP: 113/53 (08-04-20 @ 13:53)  RR: 18 (08-04-20 @ 13:53)  SpO2: 100% (08-04-20 @ 13:53)  Wt(kg): --      PHYSICAL EXAM:  Constitutional: NAD  HEENT: anicteric sclera, oropharynx clear, MMM  Neck: No JVD  Respiratory: CTAB, no wheezes, rales or rhonchi  Cardiovascular: S1, S2, RRR  Gastrointestinal: BS+, soft, NT/ND  Extremities: No cyanosis or clubbing. No peripheral edema  Neurological: A/O x 3, no focal deficits  Psychiatric: Normal mood, normal affect  : No CVA tenderness. No michaud.   Skin: No rashes    LABS:  08-04    137  |  104  |  56<H>  ----------------------------<  129<H>  5.0   |  24  |  2.08<H>    Ca    9.9      04 Aug 2020 06:08  Phos  4.2     08-04  Mg     2.1     08-04    TPro      /  Alb  3.3  /  TBili      /  DBili      /  AST      /  ALT      /  AlkPhos      08-03    Creatinine Trend: 2.08 <--, 2.21 <--, 2.18 <--, 2.15 <--, 2.28 <--, 2.16 <--, 2.08 <--                        8.0    6.09  )-----------( 241      ( 04 Aug 2020 09:13 )             25.9     Urine Studies:      RADIOLOGY & ADDITIONAL STUDIES:

## 2020-08-04 NOTE — PROGRESS NOTE ADULT - SUBJECTIVE AND OBJECTIVE BOX
Infectious Diseases progress note:    Subjective: NAD, afebrile.  No acute o/n events.     ROS:  CONSTITUTIONAL:  No fever, chills, rigors  CARDIOVASCULAR:  No chest pain or palpitations  RESPIRATORY:   No SOB, cough, dyspnea on exertion.  No wheezing  GASTROINTESTINAL:  No abd pain, N/V, diarrhea/constipation  EXTREMITIES:  No swelling or joint pain  GENITOURINARY:  No burning on urination, increased frequency or urgency.  No flank pain  NEUROLOGIC:  No HA, visual disturbances  SKIN: No rashes    Allergies    No Known Allergies    Intolerances        ANTIBIOTICS/RELEVANT:  antimicrobials  piperacillin/tazobactam IVPB.. 3.375 Gram(s) IV Intermittent every 12 hours  vancomycin  IVPB 1000 milliGRAM(s) IV Intermittent once    immunologic:    OTHER:  acetaminophen   Tablet .. 650 milliGRAM(s) Oral every 6 hours PRN  allopurinol 100 milliGRAM(s) Oral daily  amLODIPine   Tablet 10 milliGRAM(s) Oral daily  dextrose 40% Gel 15 Gram(s) Oral once PRN  dextrose 5%. 1000 milliLiter(s) IV Continuous <Continuous>  dextrose 50% Injectable 12.5 Gram(s) IV Push once  dextrose 50% Injectable 25 Gram(s) IV Push once  dextrose 50% Injectable 25 Gram(s) IV Push once  gabapentin 100 milliGRAM(s) Oral three times a day  glucagon  Injectable 1 milliGRAM(s) IntraMuscular once PRN  heparin   Injectable 6000 Unit(s) IV Push every 6 hours PRN  heparin   Injectable 3000 Unit(s) IV Push every 6 hours PRN  heparin  Infusion.  Unit(s)/Hr IV Continuous <Continuous>  hydrALAZINE 75 milliGRAM(s) Oral three times a day  insulin lispro (HumaLOG) corrective regimen sliding scale   SubCutaneous three times a day before meals  insulin lispro (HumaLOG) corrective regimen sliding scale   SubCutaneous at bedtime  latanoprost 0.005% Ophthalmic Solution 1 Drop(s) Left EYE at bedtime  levothyroxine 75 MICROGram(s) Oral daily  morphine  - Injectable 2 milliGRAM(s) IV Push every 4 hours PRN  polyethylene glycol 3350 17 Gram(s) Oral daily  senna 2 Tablet(s) Oral at bedtime  simvastatin 20 milliGRAM(s) Oral at bedtime  sodium chloride 0.9%. 1000 milliLiter(s) IV Continuous <Continuous>  warfarin 4 milliGRAM(s) Oral once      Objective:  Vital Signs Last 24 Hrs  T(C): 36.9 (04 Aug 2020 13:53), Max: 36.9 (03 Aug 2020 21:10)  T(F): 98.4 (04 Aug 2020 13:53), Max: 98.5 (03 Aug 2020 21:10)  HR: 56 (04 Aug 2020 13:53) (56 - 93)  BP: 113/53 (04 Aug 2020 13:53) (113/53 - 159/63)  BP(mean): --  RR: 18 (04 Aug 2020 13:53) (18 - 18)  SpO2: 100% (04 Aug 2020 13:53) (100% - 100%)    PHYSICAL EXAM:  Constitutional:NAD  Eyes:OLIVIA, EOMI  Ear/Nose/Throat: no thrush, mucositis.  Moist mucous membranes	  Neck:no JVD, no lymphadenopathy, supple  Respiratory: CTA adina  Cardiovascular: S1S2 RRR, no murmurs  Gastrointestinal:soft, nontender,  nondistended (+) BS  Extremities:no e/e/c  Skin:  L foot wound stable, dry gangrene of L hallux        LABS:                        8.0    6.09  )-----------( 241      ( 04 Aug 2020 09:13 )             25.9     08-04    137  |  104  |  56<H>  ----------------------------<  129<H>  5.0   |  24  |  2.08<H>    Ca    9.9      04 Aug 2020 06:08  Phos  4.2     08-04  Mg     2.1     08-04    TPro  x   /  Alb  3.3  /  TBili  x   /  DBili  x   /  AST  x   /  ALT  x   /  AlkPhos  x   08-03    PT/INR - ( 04 Aug 2020 06:08 )   PT: 15.6 SEC;   INR: 1.39          PTT - ( 04 Aug 2020 06:08 )  PTT:71.7 SEC        Vancomycin Level, Random:  ug/mL (08-04 @ 06:08)  Vancomycin Level, Random:  ug/mL (08-03 @ 06:27)  Vancomycin Level, Random:  ug/mL (08-02 @ 06:20)  Vancomycin Level, Random:  ug/mL (08-01 @ 05:51)  Vancomycin Level, Random:  ug/mL (07-31 @ 05:30)      Vancomycin Level, Trough: 11.0 ug/mL (08-03 @ 06:27)              MICROBIOLOGY:          RADIOLOGY & ADDITIONAL STUDIES:

## 2020-08-04 NOTE — CHART NOTE - NSCHARTNOTEFT_GEN_A_CORE
Patient course and care plan discussed with patient's family. Plan for patient to follow up with Podiatry within 1 week of discharge, and to continue local wound care with betadine, gauze, kerlix per Podiatry. Discussed with primary team, and discussed need for VNS for local wound care. Primary team will coordinate with KEERTHI/HERIBERTO. Please call back with any questions or concerns.    Vascular Surgery [id number]

## 2020-08-05 DIAGNOSIS — E11.9 TYPE 2 DIABETES MELLITUS WITHOUT COMPLICATIONS: ICD-10-CM

## 2020-08-05 DIAGNOSIS — E83.52 HYPERCALCEMIA: ICD-10-CM

## 2020-08-05 LAB
ALBUMIN SERPL ELPH-MCNC: 3.3 G/DL — SIGNIFICANT CHANGE UP (ref 3.3–5)
ANION GAP SERPL CALC-SCNC: 13 MMO/L — SIGNIFICANT CHANGE UP (ref 7–14)
APTT BLD: 69.2 SEC — HIGH (ref 27–36.3)
BUN SERPL-MCNC: 66 MG/DL — HIGH (ref 7–23)
CALCIUM SERPL-MCNC: 10.8 MG/DL — HIGH (ref 8.4–10.5)
CHLORIDE SERPL-SCNC: 102 MMOL/L — SIGNIFICANT CHANGE UP (ref 98–107)
CO2 SERPL-SCNC: 22 MMOL/L — SIGNIFICANT CHANGE UP (ref 22–31)
CREAT SERPL-MCNC: 2.14 MG/DL — HIGH (ref 0.5–1.3)
GLUCOSE BLDC GLUCOMTR-MCNC: 110 MG/DL — HIGH (ref 70–99)
GLUCOSE BLDC GLUCOMTR-MCNC: 119 MG/DL — HIGH (ref 70–99)
GLUCOSE BLDC GLUCOMTR-MCNC: 157 MG/DL — HIGH (ref 70–99)
GLUCOSE BLDC GLUCOMTR-MCNC: 165 MG/DL — HIGH (ref 70–99)
GLUCOSE SERPL-MCNC: 104 MG/DL — HIGH (ref 70–99)
INR BLD: 1.32 — HIGH (ref 0.88–1.16)
MAGNESIUM SERPL-MCNC: 2.2 MG/DL — SIGNIFICANT CHANGE UP (ref 1.6–2.6)
PHOSPHATE SERPL-MCNC: 4.4 MG/DL — SIGNIFICANT CHANGE UP (ref 2.5–4.5)
POTASSIUM SERPL-MCNC: 5.1 MMOL/L — SIGNIFICANT CHANGE UP (ref 3.5–5.3)
POTASSIUM SERPL-SCNC: 5.1 MMOL/L — SIGNIFICANT CHANGE UP (ref 3.5–5.3)
PROTHROM AB SERPL-ACNC: 15 SEC — HIGH (ref 10.6–13.6)
SODIUM SERPL-SCNC: 137 MMOL/L — SIGNIFICANT CHANGE UP (ref 135–145)

## 2020-08-05 RX ORDER — WARFARIN SODIUM 2.5 MG/1
5 TABLET ORAL ONCE
Refills: 0 | Status: COMPLETED | OUTPATIENT
Start: 2020-08-05 | End: 2020-08-05

## 2020-08-05 RX ADMIN — Medication 75 MILLIGRAM(S): at 14:56

## 2020-08-05 RX ADMIN — SIMVASTATIN 20 MILLIGRAM(S): 20 TABLET, FILM COATED ORAL at 21:48

## 2020-08-05 RX ADMIN — AMLODIPINE BESYLATE 10 MILLIGRAM(S): 2.5 TABLET ORAL at 05:23

## 2020-08-05 RX ADMIN — HEPARIN SODIUM 1300 UNIT(S)/HR: 5000 INJECTION INTRAVENOUS; SUBCUTANEOUS at 07:45

## 2020-08-05 RX ADMIN — LATANOPROST 1 DROP(S): 0.05 SOLUTION/ DROPS OPHTHALMIC; TOPICAL at 21:47

## 2020-08-05 RX ADMIN — GABAPENTIN 100 MILLIGRAM(S): 400 CAPSULE ORAL at 21:47

## 2020-08-05 RX ADMIN — Medication 75 MICROGRAM(S): at 05:24

## 2020-08-05 RX ADMIN — GABAPENTIN 100 MILLIGRAM(S): 400 CAPSULE ORAL at 14:56

## 2020-08-05 RX ADMIN — Medication 75 MILLIGRAM(S): at 21:47

## 2020-08-05 RX ADMIN — MORPHINE SULFATE 2 MILLIGRAM(S): 50 CAPSULE, EXTENDED RELEASE ORAL at 22:05

## 2020-08-05 RX ADMIN — MORPHINE SULFATE 2 MILLIGRAM(S): 50 CAPSULE, EXTENDED RELEASE ORAL at 21:52

## 2020-08-05 RX ADMIN — WARFARIN SODIUM 5 MILLIGRAM(S): 2.5 TABLET ORAL at 17:59

## 2020-08-05 RX ADMIN — Medication 2: at 17:56

## 2020-08-05 RX ADMIN — POLYETHYLENE GLYCOL 3350 17 GRAM(S): 17 POWDER, FOR SOLUTION ORAL at 12:50

## 2020-08-05 RX ADMIN — Medication 2: at 12:50

## 2020-08-05 RX ADMIN — Medication 100 MILLIGRAM(S): at 12:50

## 2020-08-05 RX ADMIN — SENNA PLUS 2 TABLET(S): 8.6 TABLET ORAL at 21:47

## 2020-08-05 RX ADMIN — Medication 75 MILLIGRAM(S): at 05:24

## 2020-08-05 RX ADMIN — GABAPENTIN 100 MILLIGRAM(S): 400 CAPSULE ORAL at 05:24

## 2020-08-05 NOTE — PROGRESS NOTE ADULT - SUBJECTIVE AND OBJECTIVE BOX
S: no chest pain or sob; ROS otherwise negative.       MEDICATIONS  (STANDING):  allopurinol 100 milliGRAM(s) Oral daily  amLODIPine   Tablet 10 milliGRAM(s) Oral daily  dextrose 5%. 1000 milliLiter(s) (50 mL/Hr) IV Continuous <Continuous>  dextrose 50% Injectable 12.5 Gram(s) IV Push once  dextrose 50% Injectable 25 Gram(s) IV Push once  dextrose 50% Injectable 25 Gram(s) IV Push once  gabapentin 100 milliGRAM(s) Oral three times a day  heparin  Infusion.  Unit(s)/Hr (13 mL/Hr) IV Continuous <Continuous>  hydrALAZINE 75 milliGRAM(s) Oral three times a day  insulin lispro (HumaLOG) corrective regimen sliding scale   SubCutaneous three times a day before meals  insulin lispro (HumaLOG) corrective regimen sliding scale   SubCutaneous at bedtime  latanoprost 0.005% Ophthalmic Solution 1 Drop(s) Left EYE at bedtime  levothyroxine 75 MICROGram(s) Oral daily  polyethylene glycol 3350 17 Gram(s) Oral daily  senna 2 Tablet(s) Oral at bedtime  simvastatin 20 milliGRAM(s) Oral at bedtime  sodium chloride 0.9%. 1000 milliLiter(s) (75 mL/Hr) IV Continuous <Continuous>  warfarin 5 milliGRAM(s) Oral once    MEDICATIONS  (PRN):  acetaminophen   Tablet .. 650 milliGRAM(s) Oral every 6 hours PRN Temp greater or equal to 38C (100.4F), Mild Pain (1 - 3), Moderate Pain (4 - 6), Severe Pain (7 - 10)  dextrose 40% Gel 15 Gram(s) Oral once PRN Blood Glucose LESS THAN 70 milliGRAM(s)/deciliter  glucagon  Injectable 1 milliGRAM(s) IntraMuscular once PRN Glucose LESS THAN 70 milligrams/deciliter  heparin   Injectable 6000 Unit(s) IV Push every 6 hours PRN For aPTT less than 40  heparin   Injectable 3000 Unit(s) IV Push every 6 hours PRN For aPTT between 40 - 57  morphine  - Injectable 2 milliGRAM(s) IV Push every 4 hours PRN Severe Pain (7 - 10)      LABS:                     8.0    6.09  )-----------( 241      ( 04 Aug 2020 09:13 )             25.9     137  |  102  |  66<H>  ----------------------------<  104<H>  5.1   |  22  |  2.14<H>    Ca    10.8<H>      05 Aug 2020 06:30  Phos  4.4     08-05  Mg     2.2     08-05    TPro  x   /  Alb  3.3  /  TBili  x   /  DBili  x   /  AST  x   /  ALT  x   /  AlkPhos  x   08-05    Creatinine Trend: 2.14<--, 2.08<--, 2.21<--, 2.18<--, 2.15<--, 2.28<--   PT/INR - ( 05 Aug 2020 06:30 )   PT: 15.0 SEC;   INR: 1.32     PTT - ( 05 Aug 2020 06:30 )  PTT:69.2 SEC    PHYSICAL EXAM  Vital Signs Last 24 Hrs  T(C): 37.1 (05 Aug 2020 13:02), Max: 37.1 (05 Aug 2020 13:02)  T(F): 98.7 (05 Aug 2020 13:02), Max: 98.7 (05 Aug 2020 13:02)  HR: 83 (05 Aug 2020 13:02) (68 - 83)  BP: 137/54 (05 Aug 2020 13:02) (137/54 - 142/42)  RR: 17 (05 Aug 2020 13:02) (17 - 18)  SpO2: 100% (05 Aug 2020 13:02) (98% - 100%)      Gen: Appears well in NAD  HEENT:  (-)icterus (-)pallor  CV: Normal S1, there is still a sharp S2.  Mid-to-late peaking 3/6 systolic murmur at the upper chest (+)2 Pulses B/l  Resp:  Clear to ausculatation B/L, normal effort  GI: (+) BS Soft, NT, ND  Lymph:  (-)Edema, (-)obvious lymphadenopathy  Skin: +left foot dressing, Warm to touch, Normal turgor  Psych: Appropriate mood and affect    TTE: < from: Transthoracic Echocardiogram (07.22.20 @ 16:27) >  1. Mitral annular calcification, otherwise normal mitral  valve. Mild-moderate mitral regurgitation.  2. Calcified trileaflet aortic valve with decreased  opening.  Peak transaortic valve gradient [id number] mm Hg,  mean transaortic valve gradient equals 26 mm Hg, estimated  aortic valve area equals 1 sqcm (by continuity equation),  consistent with moderate to severe aortic stenosis.  3. Severely dilated left atrium.  LA volume index = 57  cc/m2.  4. Mild concentric left ventricular hypertrophy.  5. Normal left ventricular systolic function. No segmental  wall motion abnormalities.  6. Severe diastolic dysfunction.  7. Severe right atrial enlargement.  8. Normal right ventricular size and function.  9. Estimated right ventricular systolic pressure equals 53  mm Hg, assuming right atrial pressure equals 10 mm Hg,  consistent with moderate pulmonary hypertension.    < end of copied text >      ASSESSMENT/PLAN: Patient is a 89 year old Female well known to Dr. Berman, who presented with a foot wound.  Consult requested for pre-op cardiovascular risk stratification.  Her known PMH includes persistent Afib on coumadin who was not a candidate for left atrial appendage occlusion with Watchman, chronic HFpEF, HTN, HLD, hypothyroidism, CKD, Type 2 DM, CVA, and hx of PE.    - Continue AC for cva prevention if no contraindications - on hep gtt bridge to coumadin  - Continue with PO lasix   - no further inpatient cardiac w/u needed at this time  - pt. and patient's family prefer conservative cv care and no further workup of valvular disease   - s/p LE angio - tolerated procedure well in terms of cv perspective  - pod sx now canceled, plan for IV Abx and wound care

## 2020-08-05 NOTE — PROGRESS NOTE ADULT - SUBJECTIVE AND OBJECTIVE BOX
Infectious Diseases progress note:    Subjective: NAD, afebrile.  No acute o/n events.     ROS:  CONSTITUTIONAL:  No fever, chills, rigors  CARDIOVASCULAR:  No chest pain or palpitations  RESPIRATORY:   No SOB, cough, dyspnea on exertion.  No wheezing  GASTROINTESTINAL:  No abd pain, N/V, diarrhea/constipation  EXTREMITIES:  No swelling or joint pain  GENITOURINARY:  No burning on urination, increased frequency or urgency.  No flank pain  NEUROLOGIC:  No HA, visual disturbances  SKIN: No rashes    Allergies    No Known Allergies    Intolerances        ANTIBIOTICS/RELEVANT:  antimicrobials    immunologic:    OTHER:  acetaminophen   Tablet .. 650 milliGRAM(s) Oral every 6 hours PRN  allopurinol 100 milliGRAM(s) Oral daily  amLODIPine   Tablet 10 milliGRAM(s) Oral daily  dextrose 40% Gel 15 Gram(s) Oral once PRN  dextrose 5%. 1000 milliLiter(s) IV Continuous <Continuous>  dextrose 50% Injectable 12.5 Gram(s) IV Push once  dextrose 50% Injectable 25 Gram(s) IV Push once  dextrose 50% Injectable 25 Gram(s) IV Push once  gabapentin 100 milliGRAM(s) Oral three times a day  glucagon  Injectable 1 milliGRAM(s) IntraMuscular once PRN  heparin   Injectable 6000 Unit(s) IV Push every 6 hours PRN  heparin   Injectable 3000 Unit(s) IV Push every 6 hours PRN  heparin  Infusion.  Unit(s)/Hr IV Continuous <Continuous>  hydrALAZINE 75 milliGRAM(s) Oral three times a day  insulin lispro (HumaLOG) corrective regimen sliding scale   SubCutaneous three times a day before meals  insulin lispro (HumaLOG) corrective regimen sliding scale   SubCutaneous at bedtime  latanoprost 0.005% Ophthalmic Solution 1 Drop(s) Left EYE at bedtime  levothyroxine 75 MICROGram(s) Oral daily  morphine  - Injectable 2 milliGRAM(s) IV Push every 4 hours PRN  polyethylene glycol 3350 17 Gram(s) Oral daily  senna 2 Tablet(s) Oral at bedtime  simvastatin 20 milliGRAM(s) Oral at bedtime  sodium chloride 0.9%. 1000 milliLiter(s) IV Continuous <Continuous>  warfarin 5 milliGRAM(s) Oral once      Objective:  Vital Signs Last 24 Hrs  T(C): 37.1 (05 Aug 2020 13:02), Max: 37.1 (05 Aug 2020 13:02)  T(F): 98.7 (05 Aug 2020 13:02), Max: 98.7 (05 Aug 2020 13:02)  HR: 83 (05 Aug 2020 13:02) (56 - 83)  BP: 137/54 (05 Aug 2020 13:02) (113/53 - 142/42)  BP(mean): --  RR: 17 (05 Aug 2020 13:02) (17 - 18)  SpO2: 100% (05 Aug 2020 13:02) (98% - 100%)    PHYSICAL EXAM:  Constitutional:NAD  Eyes:OLIVIA, EOMI  Ear/Nose/Throat: no thrush, mucositis.  Moist mucous membranes	  Neck:no JVD, no lymphadenopathy, supple  Respiratory: CTA adina  Cardiovascular: S1S2 RRR, no murmurs  Gastrointestinal:soft, nontender,  nondistended (+) BS  Extremities:no e/e/c  Skin:  L ft dsg c/d/i        LABS:                        8.0    6.09  )-----------( 241      ( 04 Aug 2020 09:13 )             25.9     08-05    137  |  102  |  66<H>  ----------------------------<  104<H>  5.1   |  22  |  2.14<H>    Ca    10.8<H>      05 Aug 2020 06:30  Phos  4.4     08-05  Mg     2.2     08-05    TPro  x   /  Alb  3.3  /  TBili  x   /  DBili  x   /  AST  x   /  ALT  x   /  AlkPhos  x   08-05    PT/INR - ( 05 Aug 2020 06:30 )   PT: 15.0 SEC;   INR: 1.32          PTT - ( 05 Aug 2020 06:30 )  PTT:69.2 SEC        Vancomycin Level, Random:  ug/mL (08-04 @ 06:08)  Vancomycin Level, Random:  ug/mL (08-03 @ 06:27)  Vancomycin Level, Random:  ug/mL (08-02 @ 06:20)  Vancomycin Level, Random:  ug/mL (08-01 @ 05:51)      Vancomycin Level, Trough: 11.0 ug/mL (08-03 @ 06:27)              MICROBIOLOGY:          RADIOLOGY & ADDITIONAL STUDIES:

## 2020-08-05 NOTE — CONSULT NOTE ADULT - SUBJECTIVE AND OBJECTIVE BOX
HPI:  89 year old female PMH  a-fib on coumadin, diastolic CHF (with EF 63% on TTE 8/13/19), HTN, HLD, CKD stage 3-4, IDDM, PVD, CVA, PE, hypothyroidism, p/w L foot wound. Patient seen today at clinic and sent for cellulitis and abscess, wanting to rule out OM. Patient denies fever, chills, cp, sob, abd pain, n/v, weakness, or numbness/tingling. (21 Jul 2020 20:43)  Patient has history of diabetes, hypothyroidism on synthroid, she denies history of hypercalcemia but has CKD. Patient follows up with PCP for health management.    PAST MEDICAL & SURGICAL HISTORY:  CKD (chronic kidney disease)  CVA (cerebrovascular accident)  Personal history of PE (pulmonary embolism)  Glaucoma  PVD (peripheral vascular disease)  Hypothyroid  HTN (hypertension)  HLD (hyperlipidemia)  CHF (congestive heart failure)  DM (diabetes mellitus)  Afib  Elective surgery: abdominal abcess removals  History of partial thyroidectomy      FAMILY HISTORY:  No pertinent family history in first degree relatives      Social History:    Outpatient Medications:    MEDICATIONS  (STANDING):  allopurinol 100 milliGRAM(s) Oral daily  amLODIPine   Tablet 10 milliGRAM(s) Oral daily  dextrose 5%. 1000 milliLiter(s) (50 mL/Hr) IV Continuous <Continuous>  dextrose 50% Injectable 12.5 Gram(s) IV Push once  dextrose 50% Injectable 25 Gram(s) IV Push once  dextrose 50% Injectable 25 Gram(s) IV Push once  gabapentin 100 milliGRAM(s) Oral three times a day  heparin  Infusion.  Unit(s)/Hr (13 mL/Hr) IV Continuous <Continuous>  hydrALAZINE 75 milliGRAM(s) Oral three times a day  insulin lispro (HumaLOG) corrective regimen sliding scale   SubCutaneous three times a day before meals  insulin lispro (HumaLOG) corrective regimen sliding scale   SubCutaneous at bedtime  latanoprost 0.005% Ophthalmic Solution 1 Drop(s) Left EYE at bedtime  levothyroxine 75 MICROGram(s) Oral daily  polyethylene glycol 3350 17 Gram(s) Oral daily  senna 2 Tablet(s) Oral at bedtime  simvastatin 20 milliGRAM(s) Oral at bedtime  sodium chloride 0.9%. 1000 milliLiter(s) (75 mL/Hr) IV Continuous <Continuous>  warfarin 5 milliGRAM(s) Oral once    MEDICATIONS  (PRN):  acetaminophen   Tablet .. 650 milliGRAM(s) Oral every 6 hours PRN Temp greater or equal to 38C (100.4F), Mild Pain (1 - 3), Moderate Pain (4 - 6), Severe Pain (7 - 10)  dextrose 40% Gel 15 Gram(s) Oral once PRN Blood Glucose LESS THAN 70 milliGRAM(s)/deciliter  glucagon  Injectable 1 milliGRAM(s) IntraMuscular once PRN Glucose LESS THAN 70 milligrams/deciliter  heparin   Injectable 6000 Unit(s) IV Push every 6 hours PRN For aPTT less than 40  heparin   Injectable 3000 Unit(s) IV Push every 6 hours PRN For aPTT between 40 - 57  morphine  - Injectable 2 milliGRAM(s) IV Push every 4 hours PRN Severe Pain (7 - 10)      Allergies    No Known Allergies    Intolerances      Review of Systems:  Constitutional: No fever, no chills  Eyes: No blurry vision  Neuro: No tremors  HEENT: No pain, no neck swelling  Cardiovascular: No chest pain, no palpitations  Respiratory: No SOB, no cough  GI: No nausea, vomiting, abdominal pain  : No dysuria  Skin: no rash  MSK: Has leg swelling.  Psych: no depression  Endocrine: no polyuria, polydipsia    UNABLE TO OBTAIN    ALL OTHER SYSTEMS REVIEWED AND NEGATIVE        PHYSICAL EXAM:  VITALS: T(C): 36.7 (08-05-20 @ 05:22)  T(F): 98 (08-05-20 @ 05:22), Max: 98.4 (08-04-20 @ 13:53)  HR: 68 (08-05-20 @ 05:22) (56 - 69)  BP: 139/52 (08-05-20 @ 05:22) (113/53 - 142/42)  RR:  (18 - 18)  SpO2:  (98% - 100%)  Wt(kg): --  GENERAL: NAD, well-groomed, well-developed  EYES: No proptosis, no lid lag  HEENT:  Atraumatic, Normocephalic  THYROID: Normal size, no palpable nodules  RESPIRATORY: Clear to auscultation bilaterally; No rales, rhonchi, wheezing  CARDIOVASCULAR: Si S2, No murmurs;  GI: Soft, non distended, normal bowel sounds  SKIN: Dry, intact, No rashes or lesions  MUSCULOSKELETAL: Has BL lower extremity edema.  NEURO:  no tremor, sensation decreased in feet BL,    POCT Blood Glucose.: 110 mg/dL (08-05-20 @ 08:19)  POCT Blood Glucose.: 152 mg/dL (08-04-20 @ 22:32)  POCT Blood Glucose.: 195 mg/dL (08-04-20 @ 17:56)  POCT Blood Glucose.: 188 mg/dL (08-04-20 @ 12:59)  POCT Blood Glucose.: 186 mg/dL (08-04-20 @ 12:27)  POCT Blood Glucose.: 114 mg/dL (08-04-20 @ 08:21)  POCT Blood Glucose.: 130 mg/dL (08-03-20 @ 22:59)  POCT Blood Glucose.: 147 mg/dL (08-03-20 @ 17:14)  POCT Blood Glucose.: 158 mg/dL (08-03-20 @ 12:48)  POCT Blood Glucose.: 107 mg/dL (08-03-20 @ 08:35)  POCT Blood Glucose.: 119 mg/dL (08-02-20 @ 22:11)  POCT Blood Glucose.: 158 mg/dL (08-02-20 @ 17:19)  POCT Blood Glucose.: 199 mg/dL (08-02-20 @ 12:02)                            8.0    6.09  )-----------( 241      ( 04 Aug 2020 09:13 )             25.9       08-05    137  |  102  |  66<H>  ----------------------------<  104<H>  5.1   |  22  |  2.14<H>    EGFR if : 23  EGFR if non : 20    Ca    10.8<H>      08-05  Mg     2.2     08-05  Phos  4.4     08-05    TPro  x   /  Alb  3.3  /  TBili  x   /  DBili  x   /  AST  x   /  ALT  x   /  AlkPhos  x   08-05      Thyroid Function Tests:              Radiology:

## 2020-08-05 NOTE — PROGRESS NOTE ADULT - SUBJECTIVE AND OBJECTIVE BOX
INTERVAL HPI/OVERNIGHT EVENTS: I feel fine.   Vital Signs Last 24 Hrs  T(C): 36.8 (05 Aug 2020 14:55), Max: 37.1 (05 Aug 2020 13:02)  T(F): 98.2 (05 Aug 2020 14:55), Max: 98.7 (05 Aug 2020 13:02)  HR: 68 (05 Aug 2020 14:55) (68 - 83)  BP: 133/45 (05 Aug 2020 14:55) (133/45 - 142/42)  BP(mean): --  RR: 16 (05 Aug 2020 14:55) (16 - 18)  SpO2: 100% (05 Aug 2020 14:55) (98% - 100%)  I&O's Summary    MEDICATIONS  (STANDING):  allopurinol 100 milliGRAM(s) Oral daily  amLODIPine   Tablet 10 milliGRAM(s) Oral daily  dextrose 5%. 1000 milliLiter(s) (50 mL/Hr) IV Continuous <Continuous>  dextrose 50% Injectable 12.5 Gram(s) IV Push once  dextrose 50% Injectable 25 Gram(s) IV Push once  dextrose 50% Injectable 25 Gram(s) IV Push once  gabapentin 100 milliGRAM(s) Oral three times a day  heparin  Infusion.  Unit(s)/Hr (13 mL/Hr) IV Continuous <Continuous>  hydrALAZINE 75 milliGRAM(s) Oral three times a day  insulin lispro (HumaLOG) corrective regimen sliding scale   SubCutaneous three times a day before meals  insulin lispro (HumaLOG) corrective regimen sliding scale   SubCutaneous at bedtime  latanoprost 0.005% Ophthalmic Solution 1 Drop(s) Left EYE at bedtime  levothyroxine 75 MICROGram(s) Oral daily  polyethylene glycol 3350 17 Gram(s) Oral daily  senna 2 Tablet(s) Oral at bedtime  simvastatin 20 milliGRAM(s) Oral at bedtime  sodium chloride 0.9%. 1000 milliLiter(s) (75 mL/Hr) IV Continuous <Continuous>  warfarin 5 milliGRAM(s) Oral once    MEDICATIONS  (PRN):  acetaminophen   Tablet .. 650 milliGRAM(s) Oral every 6 hours PRN Temp greater or equal to 38C (100.4F), Mild Pain (1 - 3), Moderate Pain (4 - 6), Severe Pain (7 - 10)  dextrose 40% Gel 15 Gram(s) Oral once PRN Blood Glucose LESS THAN 70 milliGRAM(s)/deciliter  glucagon  Injectable 1 milliGRAM(s) IntraMuscular once PRN Glucose LESS THAN 70 milligrams/deciliter  heparin   Injectable 6000 Unit(s) IV Push every 6 hours PRN For aPTT less than 40  heparin   Injectable 3000 Unit(s) IV Push every 6 hours PRN For aPTT between 40 - 57  morphine  - Injectable 2 milliGRAM(s) IV Push every 4 hours PRN Severe Pain (7 - 10)    LABS:                        8.0    6.09  )-----------( 241      ( 04 Aug 2020 09:13 )             25.9     08-05    137  |  102  |  66<H>  ----------------------------<  104<H>  5.1   |  22  |  2.14<H>    Ca    10.8<H>      05 Aug 2020 06:30  Phos  4.4     08-05  Mg     2.2     08-05    TPro  x   /  Alb  3.3  /  TBili  x   /  DBili  x   /  AST  x   /  ALT  x   /  AlkPhos  x   08-05    PT/INR - ( 05 Aug 2020 06:30 )   PT: 15.0 SEC;   INR: 1.32          PTT - ( 05 Aug 2020 06:30 )  PTT:69.2 SEC    CAPILLARY BLOOD GLUCOSE      POCT Blood Glucose.: 157 mg/dL (05 Aug 2020 12:28)  POCT Blood Glucose.: 110 mg/dL (05 Aug 2020 08:19)  POCT Blood Glucose.: 152 mg/dL (04 Aug 2020 22:32)  POCT Blood Glucose.: 195 mg/dL (04 Aug 2020 17:56)          REVIEW OF SYSTEMS:  CONSTITUTIONAL: No fever, weight loss, or fatigue  EYES: No eye pain, visual disturbances, or discharge  ENMT:  No difficulty hearing, tinnitus, vertigo; No sinus or throat pain  NECK: No pain or stiffness  BREASTS: No pain, masses, or nipple discharge  RESPIRATORY: No cough, wheezing, chills or hemoptysis; No shortness of breath  CARDIOVASCULAR: No chest pain, palpitations, dizziness, or leg swelling  GASTROINTESTINAL: No abdominal or epigastric pain. No nausea, vomiting, or hematemesis; No diarrhea or constipation. No melena or hematochezia.  GENITOURINARY: No dysuria, frequency, hematuria, or incontinence  NEUROLOGICAL: No headaches, memory loss, loss of strength, numbness, or tremors      Consultant(s) Notes Reviewed:  [x ] YES  [ ] NO    PHYSICAL EXAM:  GENERAL: NAD, well-groomed, well-developed,not in any distress ,  HEAD:  Atraumatic, Normocephalic  EYES: EOMI, PERRLA, conjunctiva and sclera clear  ENMT: No tonsillar erythema, exudates, or enlargement; Moist mucous membranes, Good dentition, No lesions  NECK: Supple, No JVD, Normal thyroid  NERVOUS SYSTEM:  Alert & Oriented X3, No focal deficit   CHEST/LUNG: Good air entry bilateral with no  rales, rhonchi, wheezing, or rubs  HEART: Regular rate and rhythm; No murmurs, rubs, or gallops  ABDOMEN: Soft, Nontender, Nondistended; Bowel sounds present  EXTREMITIES:  Foot is dressed     Care Discussed with Consultants/Other Providers [ x] YES  [ ] NO

## 2020-08-05 NOTE — CONSULT NOTE ADULT - PROBLEM SELECTOR RECOMMENDATION 3
In view of her age, comorbidities and advance age she may not be a surgical candidate, so will not do parathyroid scan at this time, if calcium PTH levels worsen may start her on Sensipar, monitor calcium FU In view of her comorbidities and advance age and border line calcium PTH, she may not be a surgical candidate, so will not do parathyroid scan at this time, if calcium PTH levels worsen may start her on Sensipar, monitor calcium FU

## 2020-08-05 NOTE — PROGRESS NOTE ADULT - SUBJECTIVE AND OBJECTIVE BOX
[facility] - S Radha / Florin S /D Nate/ S Jonathan/ S Rosalinda/ Shahid Segovia / M Laliu/ O Rudi  service -[phone], office [phone]  ---------------------------------------------------------------------------------------------------------------    Patient seen and examined bedside    Subjective and Objective: No overnight events, denied V/D/sob. No new complaints today. + foot pain at night    Allergies: No Known Allergies      Hospital Medications:   MEDICATIONS  (STANDING):  allopurinol 100 milliGRAM(s) Oral daily  amLODIPine   Tablet 10 milliGRAM(s) Oral daily  dextrose 5%. 1000 milliLiter(s) (50 mL/Hr) IV Continuous <Continuous>  dextrose 50% Injectable 12.5 Gram(s) IV Push once  dextrose 50% Injectable 25 Gram(s) IV Push once  dextrose 50% Injectable 25 Gram(s) IV Push once  gabapentin 100 milliGRAM(s) Oral three times a day  heparin  Infusion.  Unit(s)/Hr (13 mL/Hr) IV Continuous <Continuous>  hydrALAZINE 75 milliGRAM(s) Oral three times a day  insulin lispro (HumaLOG) corrective regimen sliding scale   SubCutaneous three times a day before meals  insulin lispro (HumaLOG) corrective regimen sliding scale   SubCutaneous at bedtime  latanoprost 0.005% Ophthalmic Solution 1 Drop(s) Left EYE at bedtime  levothyroxine 75 MICROGram(s) Oral daily  polyethylene glycol 3350 17 Gram(s) Oral daily  senna 2 Tablet(s) Oral at bedtime  simvastatin 20 milliGRAM(s) Oral at bedtime  sodium chloride 0.9%. 1000 milliLiter(s) (75 mL/Hr) IV Continuous <Continuous>  warfarin 5 milliGRAM(s) Oral once    VITALS:  T(F): 98.2 (08-05-20 @ 14:55), Max: 98.7 (08-05-20 @ 13:02)  HR: 68 (08-05-20 @ 14:55)  BP: 133/45 (08-05-20 @ 14:55)  RR: 16 (08-05-20 @ 14:55)  SpO2: 100% (08-05-20 @ 14:55)  Wt(kg): --      PHYSICAL EXAM:  Constitutional: NAD  HEENT: anicteric sclera  Neck: No JVD  Respiratory: CTAB, no wheezes, rales or rhonchi  Cardiovascular: S1, S2, RRR  Gastrointestinal: BS+, soft, NT/ND  Extremities: No peripheral edema. left foot dressing+  Neurological: A/O x 3  Psychiatric: Normal mood, normal affect  :  No michaud.     LABS:  08-05    137  |  102  |  66<H>  ----------------------------<  104<H>  5.1   |  22  |  2.14<H>    Ca    10.8<H>      05 Aug 2020 06:30  Phos  4.4     08-05  Mg     2.2     08-05    TPro      /  Alb  3.3  /  TBili      /  DBili      /  AST      /  ALT      /  AlkPhos      08-05    Creatinine Trend: 2.14 <--, 2.08 <--, 2.21 <--, 2.18 <--, 2.15 <--, 2.28 <--, 2.16 <--                        8.0    6.09  )-----------( 241      ( 04 Aug 2020 09:13 )             25.9     Urine Studies:        RADIOLOGY & ADDITIONAL STUDIES:

## 2020-08-05 NOTE — PROGRESS NOTE ADULT - ASSESSMENT
89 year old female PMH  a-fib on coumadin, diastolic CHF (with EF 63% on TTE 8/13/19), HTN, HLD, CKD stage 3-4, IDDM, PVD, CVA, PE, hypothyroidism, p/w L foot wound. Patient was sent from podiatrist office for non healing left great toe ulcer, to rule out OM. Renal following for NIKHIL/CKD Mx.     CKD4: Baseline Creatinine 1.7-2.0  euvolemic. Electrolytes acceptable.    Cr rel stable.  s/p LE angiogram, given IVF post procedure.   TTE noted above with normal LVEF, severe diastolic dysfunction  continue to hold lasix 40mg po qdaily for now. no s/o CHF.   avoid ACEi/ARB for now/NSAIDs/Nephrotoxics.  monitor BMP daily   low K in diet    Hypercalcemia -phos wnl. elevated iPTH. watch ca. avoid ca/vit D suplements. ca 10.8>9.9>10.8 likely 2/2 high vit D levels. can't rule out primary HPT, consider parathyroid scan, can be done as outpt.   HTN, controlled-bp optimal. c/w current bp meds. avoid ACEi/ARB. pain Mx.     Toe gangrene, PAD. s/p LE angio, no stent. f/u w/ Podiatry, plans for amputation cancelled.     DM- Mx per medicine    labs, chart reviewed  advised daughter to f/u in CKD office upon d/c before

## 2020-08-05 NOTE — CHART NOTE - NSCHARTNOTEFT_GEN_A_CORE
Source: Patient [x] Medical record reviewed. Length of stay nutrition follow-up.    Nutrition related information: Patient reports okay appetite/PO intake. Reports consuming at least >50% of meals and is supplementing with Nepro oral nutrition supplement. No chewing/swallowing difficulties. No GI distress (nausea/vomiting/diarrhea/constipation) noted at this time. +BM 8/05. Patient's phos and K+ WDL at this time. Finger sticks within acceptable range as well. Encouraged PO intake.    Current Diet : Diet, Consistent Carbohydrate Renal w/Evening Snack:   Supplement Feeding Modality:  Oral  Nepro Cans or Servings Per Day:  1       Frequency:  Daily (07-29-20 @ 15:37)    PO intake:  < 50% [ ] 50-75% [x]   % [ ]  other :  Current Weight: 72.7kg (8/03), 72kg (7/21)    __________________ Pertinent Medications__________________   MEDICATIONS  (STANDING):  allopurinol 100 milliGRAM(s) Oral daily  amLODIPine   Tablet 10 milliGRAM(s) Oral daily  dextrose 5%. 1000 milliLiter(s) (50 mL/Hr) IV Continuous <Continuous>  dextrose 50% Injectable 12.5 Gram(s) IV Push once  dextrose 50% Injectable 25 Gram(s) IV Push once  dextrose 50% Injectable 25 Gram(s) IV Push once  gabapentin 100 milliGRAM(s) Oral three times a day  heparin  Infusion.  Unit(s)/Hr (13 mL/Hr) IV Continuous <Continuous>  hydrALAZINE 75 milliGRAM(s) Oral three times a day  insulin lispro (HumaLOG) corrective regimen sliding scale   SubCutaneous three times a day before meals  insulin lispro (HumaLOG) corrective regimen sliding scale   SubCutaneous at bedtime  latanoprost 0.005% Ophthalmic Solution 1 Drop(s) Left EYE at bedtime  levothyroxine 75 MICROGram(s) Oral daily  polyethylene glycol 3350 17 Gram(s) Oral daily  senna 2 Tablet(s) Oral at bedtime  simvastatin 20 milliGRAM(s) Oral at bedtime  sodium chloride 0.9%. 1000 milliLiter(s) (75 mL/Hr) IV Continuous <Continuous>  warfarin 5 milliGRAM(s) Oral once    MEDICATIONS  (PRN):  acetaminophen   Tablet .. 650 milliGRAM(s) Oral every 6 hours PRN Temp greater or equal to 38C (100.4F), Mild Pain (1 - 3), Moderate Pain (4 - 6), Severe Pain (7 - 10)  dextrose 40% Gel 15 Gram(s) Oral once PRN Blood Glucose LESS THAN 70 milliGRAM(s)/deciliter  glucagon  Injectable 1 milliGRAM(s) IntraMuscular once PRN Glucose LESS THAN 70 milligrams/deciliter  heparin   Injectable 6000 Unit(s) IV Push every 6 hours PRN For aPTT less than 40  heparin   Injectable 3000 Unit(s) IV Push every 6 hours PRN For aPTT between 40 - 57  morphine  - Injectable 2 milliGRAM(s) IV Push every 4 hours PRN Severe Pain (7 - 10)    __________________ Pertinent Labs__________________   08-05 Na137 mmol/L Glu 104 mg/dL<H> K+ 5.1 mmol/L Cr  2.14 mg/dL<H> BUN 66 mg/dL<H> 08-05 Phos 4.4 mg/dL 08-05 Alb 3.3 g/dL  CAPILLARY BLOOD GLUCOSE  POCT Blood Glucose.: 157 mg/dL (05 Aug 2020 12:28)  POCT Blood Glucose.: 110 mg/dL (05 Aug 2020 08:19)  POCT Blood Glucose.: 152 mg/dL (04 Aug 2020 22:32)  POCT Blood Glucose.: 195 mg/dL (04 Aug 2020 17:56)    Skin: left great toe gangrene  Edema: 1+ left foot    Estimated Needs:   [x] no change since previous assessment  [ ] recalculated:     Previous Nutrition Diagnosis:   [x] Altered GI function - constipation  [x] resolved     Goal(s):  1. Patient to meet > 75% estimated pro/kcal needs.   2. Tolerance to diet.   3. Maintain blood glucose control.    Interventions:   1. Continue Consistent Carbohydrate Renal diet with evening snack.   2. Continue Nepro 1x daily (425 kcals, 19.1g protein).  3. Please Encourage po intake, assist with meals and menu selections, provide alternatives PRN.       Monitoring and Evaluation:   1. Monitor weights, labs, BM's, skin integrity, p.o. intake, and GI function.   Follow-up per department protocol.   RD to remain available, Salena Shelton RD, CDN - Pager #[phone]

## 2020-08-05 NOTE — PROGRESS NOTE ADULT - ASSESSMENT
89 year old female PMH  a-fib on coumadin, diastolic CHF (with EF 63% on TTE 8/13/19), HTN, HLD, CKD stage 3-4, IDDM, PVD, CVA, PE, hypothyroidism, p/w L foot wound. Patient seen today at clinic and sent for cellulitis and abscess, wanting to rule out OM. Patient denies fever, chills, cp, sob, abd pain, n/v, weakness, or numbness/tingling. (21 Jul 2020 20:43)    Pt states she has been on multiple PO abx in the past.  She currently denies any purulent discharge, no fever/chills.    ER vss.  No leukocytosis.  Pt seen by podiatry and vascular services.  L foot with avulsed hallux nail, L hallux boggy, necrotic and cold to level of MPJ, no purulence, no open wounds, no malodor, no associated cellulitis on initial exam.  Changes due to PAD.  s/p LLE angiogram  showed SFA occlusion w/ distal SFA/pop recon w/ heavy calcifications, no further vasc intervention.  Pt planned for Left foot partial 1st ray resection.     Wound was cultured and growing MRSA, Enterococcus and Proteus mirabilis.  Pt received vanco x 1, now on cefepime.    ID consulted for further abx recommendations.    L hallux gangrene:    - No drainage/purulence.  Wound appears dry.  No systemic signs of infection, afebrile.     - Wound cx MRSA, enterococcus and proteus.  Cont  vanco/zosyn.      - As per d/w NP, no further plan for hallux ray resection by podiatry.  Pt for further outpt f/u with Podiatry and Vascular.        NIKHIL/CKD:    - Pt with CKD4, dose abx accordingly.      - s/p LE angiogram, s/p IVF, monitor Cr.  Renal f/u appreciated.      * complete 7 day course of empiric abx through 8/4.  On vanco by level,  dose 1gm IV x 1 today (8/4).   Maintain trough levels between 10-15.  Cont zosyn.      * Awaiting therapeutic INR    *Last dose of abx completed on 8/4.  Cont to monitor off.     Melissa Escobedo  [phone]

## 2020-08-05 NOTE — CONSULT NOTE ADULT - ASSESSMENT
89 year old woman presents with nonhealing dry gangrene of left 1st toe    Recommendations:  -f/u LIZBETH/PVR's  -appreciate Podiatry recommendations  -hold coumadin and start therapeutic AC with heparin drip when INR subtherapeutic  -optimize kidney function in case patient is to undergo angiogram    -d/w Dr. ERIC Crenshaw, Vascular Fellow  -POLINA Grover, R3
89 y/o F w/ necrotic L hallux     - pt seen and evaluated  - afebrile, no leukocytosis  - awaiting L foot XR  - L foot with avulsed hallucal nail, L hallux boggy, necrotic and cold to level of MPJ, no purulence, no open wounds, no malodor, no associated cellulitis, etiology 2/2 to PVD  - wound cultured  - admit to medicine, rec cont IV Vanco, Cefepime   - no acute pod sx intervention  - ordered LIZBETH/PVR's  - rec vasc c/s  - pod plan likely L hallux amputation pending vascular recs/ optimization   - discussed w/ attending
Assessment  DMT2: 89y Female with DM T2 with hyperglycemia admitted with , A1C 6.1% on insulin coverage, blood sugars in acceptable range, no hypoglycemic episode,  eating meals  Hypothyroidism: On Synthroid 75 mcg po daily, compliant with Synthroid intake, asymptomatic.    CKD: labs, chart reviewed.  Hypercalcemia: Due to primary hyperparathyroidisms, received hydration, calcium borderline high.               Curtis Castillo MD  Cell: [phone]  Office: [phone]
89 year old female PMH  a-fib on coumadin, diastolic CHF (with EF 63% on TTE 8/13/19), HTN, HLD, CKD stage 3-4, IDDM, PVD, CVA, PE, hypothyroidism, p/w L foot wound. Patient seen today at clinic and sent for cellulitis and abscess, wanting to rule out OM. Patient denies fever, chills, cp, sob, abd pain, n/v, weakness, or numbness/tingling. (21 Jul 2020 20:43)    Pt states she has been on multiple PO abx in the past.  She currently denies any purulent discharge, no fever/chills.    ER vss.  No leukocytosis.  Pt seen by podiatry and vascular services.  L foot with avulsed hallux nail, L hallux boggy, necrotic and cold to level of MPJ, no purulence, no open wounds, no malodor, no associated cellulitis on initial exam.  Changes due to PAD.  s/p LLE angiogram  showed SFA occlusion w/ distal SFA/pop recon w/ heavy calcifications, no further vasc intervention.  Pt planned for Left foot partial 1st ray resection.     Wound was cultured and growing MRSA, Enterococcus and Proteus mirabilis.  Pt received vanco x 1, now on cefepime.    ID consulted for further abx recommendations.    L hallux gangrene:    - No drainage/purulence.  Wound appears dry.  No systemic signs of infection, afebrile.  Currently on cefepime.    - Wound cx MRSA, enterococcus and proteus.  dc cefepime, switch to vanco/zosyn.    - If pt becomes febrile, send bcx  x2    - Planned for hallux ray resection, f/u bone cx and path to guide further abx treatment.        NIKHIL/CKD:    - Pt with CKD4, dose abx accordingly.      - s/p LE angiogram, s/p IVF, monitor Cr.  Renal f/u appreciated.    Will follow,    Melissa Escobedo  [phone]
89 year old female PMH  a-fib on coumadin, diastolic CHF (with EF 63% on TTE 8/13/19), HTN, HLD, CKD stage 3-4, IDDM, PVD, CVA, PE, hypothyroidism, p/w L foot wound. Patient was sent yesterday from podiatrist office for non healing left great toe ulcer, to rule out OM. Renal consulted for NIKHIL/CKD Mx.     CKD4: Baseline Creatinine 1.7-1.9   euvolemic  SCr 2> 1.87 improved, at baseline now  monitor  BMP daily and u/o   dose all meds for eGFR<30ml/min.   avoid ACEi/ARB for now/NSAIDs/Nephrotoxics.  if LE angiogram to evaluate PAD is indicated, pt is at Intermediate risk for BESS, explained to pts daughter bedside including the possibility of worsening RFT post cath and if no recovery of RF, may need RRT/dialysis. she verbalized understanding.   rec mucomyst 1200mg bid -2doses pre and 2 doses post angio and IVF/NS 4-6hrs periangio if no s/o CHF    HTN, controlled-bp suboptimal. c/w current bp meds. pain Mx.   Toe gangrene.  f/u w/ Podiatry, ID and vas sx. on cefepime, as per ID    DM- Mx per medicine      labs, rad, chart reviewed  Thanks for consulting. will closely follow up.

## 2020-08-06 DIAGNOSIS — E03.9 HYPOTHYROIDISM, UNSPECIFIED: ICD-10-CM

## 2020-08-06 DIAGNOSIS — N18.3 CHRONIC KIDNEY DISEASE, STAGE 3 (MODERATE): ICD-10-CM

## 2020-08-06 DIAGNOSIS — I96 GANGRENE, NOT ELSEWHERE CLASSIFIED: ICD-10-CM

## 2020-08-06 LAB
ANION GAP SERPL CALC-SCNC: 14 MMO/L — SIGNIFICANT CHANGE UP (ref 7–14)
APTT BLD: 67.8 SEC — HIGH (ref 27–36.3)
BUN SERPL-MCNC: 63 MG/DL — HIGH (ref 7–23)
CALCIUM SERPL-MCNC: 10.5 MG/DL — SIGNIFICANT CHANGE UP (ref 8.4–10.5)
CHLORIDE SERPL-SCNC: 103 MMOL/L — SIGNIFICANT CHANGE UP (ref 98–107)
CO2 SERPL-SCNC: 21 MMOL/L — LOW (ref 22–31)
CREAT SERPL-MCNC: 1.91 MG/DL — HIGH (ref 0.5–1.3)
GLUCOSE BLDC GLUCOMTR-MCNC: 106 MG/DL — HIGH (ref 70–99)
GLUCOSE BLDC GLUCOMTR-MCNC: 122 MG/DL — HIGH (ref 70–99)
GLUCOSE BLDC GLUCOMTR-MCNC: 146 MG/DL — HIGH (ref 70–99)
GLUCOSE BLDC GLUCOMTR-MCNC: 157 MG/DL — HIGH (ref 70–99)
GLUCOSE SERPL-MCNC: 105 MG/DL — HIGH (ref 70–99)
HCT VFR BLD CALC: 25.5 % — LOW (ref 34.5–45)
HGB BLD-MCNC: 7.7 G/DL — LOW (ref 11.5–15.5)
INR BLD: 1.46 — HIGH (ref 0.88–1.16)
MAGNESIUM SERPL-MCNC: 2.2 MG/DL — SIGNIFICANT CHANGE UP (ref 1.6–2.6)
MCHC RBC-ENTMCNC: 29.4 PG — SIGNIFICANT CHANGE UP (ref 27–34)
MCHC RBC-ENTMCNC: 30.2 % — LOW (ref 32–36)
MCV RBC AUTO: 97.3 FL — SIGNIFICANT CHANGE UP (ref 80–100)
NRBC # FLD: 0 K/UL — SIGNIFICANT CHANGE UP (ref 0–0)
PHOSPHATE SERPL-MCNC: 3.6 MG/DL — SIGNIFICANT CHANGE UP (ref 2.5–4.5)
PLATELET # BLD AUTO: 215 K/UL — SIGNIFICANT CHANGE UP (ref 150–400)
PMV BLD: 12.1 FL — SIGNIFICANT CHANGE UP (ref 7–13)
POTASSIUM SERPL-MCNC: 5.1 MMOL/L — SIGNIFICANT CHANGE UP (ref 3.5–5.3)
POTASSIUM SERPL-SCNC: 5.1 MMOL/L — SIGNIFICANT CHANGE UP (ref 3.5–5.3)
PROTHROM AB SERPL-ACNC: 16.3 SEC — HIGH (ref 10.6–13.6)
RBC # BLD: 2.62 M/UL — LOW (ref 3.8–5.2)
RBC # FLD: 15.9 % — HIGH (ref 10.3–14.5)
SODIUM SERPL-SCNC: 138 MMOL/L — SIGNIFICANT CHANGE UP (ref 135–145)
T4 FREE SERPL-MCNC: 1.35 NG/DL — SIGNIFICANT CHANGE UP (ref 0.9–1.8)
TSH SERPL-MCNC: 1.22 UIU/ML — SIGNIFICANT CHANGE UP (ref 0.27–4.2)
WBC # BLD: 8.61 K/UL — SIGNIFICANT CHANGE UP (ref 3.8–10.5)
WBC # FLD AUTO: 8.61 K/UL — SIGNIFICANT CHANGE UP (ref 3.8–10.5)

## 2020-08-06 RX ORDER — WARFARIN SODIUM 2.5 MG/1
5 TABLET ORAL ONCE
Refills: 0 | Status: COMPLETED | OUTPATIENT
Start: 2020-08-06 | End: 2020-08-06

## 2020-08-06 RX ORDER — ASPIRIN/CALCIUM CARB/MAGNESIUM 324 MG
81 TABLET ORAL DAILY
Refills: 0 | Status: DISCONTINUED | OUTPATIENT
Start: 2020-08-06 | End: 2020-08-07

## 2020-08-06 RX ADMIN — SIMVASTATIN 20 MILLIGRAM(S): 20 TABLET, FILM COATED ORAL at 21:07

## 2020-08-06 RX ADMIN — Medication 2: at 17:41

## 2020-08-06 RX ADMIN — Medication 75 MILLIGRAM(S): at 21:06

## 2020-08-06 RX ADMIN — SENNA PLUS 2 TABLET(S): 8.6 TABLET ORAL at 21:06

## 2020-08-06 RX ADMIN — Medication 75 MICROGRAM(S): at 05:31

## 2020-08-06 RX ADMIN — Medication 75 MILLIGRAM(S): at 16:24

## 2020-08-06 RX ADMIN — LATANOPROST 1 DROP(S): 0.05 SOLUTION/ DROPS OPHTHALMIC; TOPICAL at 21:06

## 2020-08-06 RX ADMIN — Medication 650 MILLIGRAM(S): at 22:00

## 2020-08-06 RX ADMIN — Medication 81 MILLIGRAM(S): at 21:05

## 2020-08-06 RX ADMIN — Medication 75 MILLIGRAM(S): at 05:31

## 2020-08-06 RX ADMIN — Medication 100 MILLIGRAM(S): at 12:23

## 2020-08-06 RX ADMIN — Medication 650 MILLIGRAM(S): at 02:05

## 2020-08-06 RX ADMIN — GABAPENTIN 100 MILLIGRAM(S): 400 CAPSULE ORAL at 05:31

## 2020-08-06 RX ADMIN — WARFARIN SODIUM 5 MILLIGRAM(S): 2.5 TABLET ORAL at 17:46

## 2020-08-06 RX ADMIN — GABAPENTIN 100 MILLIGRAM(S): 400 CAPSULE ORAL at 14:48

## 2020-08-06 RX ADMIN — Medication 650 MILLIGRAM(S): at 01:04

## 2020-08-06 RX ADMIN — AMLODIPINE BESYLATE 10 MILLIGRAM(S): 2.5 TABLET ORAL at 05:31

## 2020-08-06 RX ADMIN — POLYETHYLENE GLYCOL 3350 17 GRAM(S): 17 POWDER, FOR SOLUTION ORAL at 12:22

## 2020-08-06 RX ADMIN — Medication 650 MILLIGRAM(S): at 21:07

## 2020-08-06 RX ADMIN — GABAPENTIN 100 MILLIGRAM(S): 400 CAPSULE ORAL at 21:05

## 2020-08-06 NOTE — PROGRESS NOTE ADULT - ASSESSMENT
Assessment  DMT2: 89y Female with DM T2 with hyperglycemia admitted with L foot wound, A1C 6.1%, was on basal insulin at home, now on insulin coverage only, blood sugars trending within acceptable range, no hypoglycemic episodes. Patient is eating meals, appears alert and comfortable, denies current complaints.  Hypothyroidism: On Synthroid 75 mcg po daily, compliant with Synthroid intake, asymptomatic, euthyroid.  Hypercalcemia: Due to primary hyperparathyroidisms, received hydration, calcium was borderline high, now improved.  Toe Gangrene: No intervention, FU Vascular/Podiatry.  CKD: labs, chart reviewed.              Curtis Castillo MD  Cell: [phone]  Office: [phone]

## 2020-08-06 NOTE — CHART NOTE - NSCHARTNOTEFT_GEN_A_CORE
LLE angiogram results appreciated below:    LEFT LOWER EXTREMITY VESSELS: no aortoiliac occlusive disease  SFA occlusion, distal sfa/pop recon w heavy calcification  single peroneal runoff  non reconstructible disease  Final catheter position Left common femoral artery  COMPLICATIONS: There were no complications.  DIAGNOSTIC IMPRESSIONS: cont medical optimization, antiplatelet, statin ldl  < 70 if tolerated, antihypertensive.  INTERVENTIONAL IMPRESSIONS: cont medical optimization, antiplatelet, statin  ldl < 70 if tolerated, antihypertensive.  Prepared and signed by  Robert Hummel M.D.      Reviewed recommendation of antiplatelet on angio report with Dr. Momin who agrees with starting patient on ASA 81mg daily for PAD in addition to coumadin for afib/PE. Reviewed findings with vascular team today as well who again recommended antiplatelet therapy as well.   Will start patient on ASA 81mg daily as above.  Case and plan discussed with Dr. Momin.  Patient currently stable, will continue to monitor.

## 2020-08-06 NOTE — PROGRESS NOTE ADULT - ASSESSMENT
89 year old female PMH  a-fib on coumadin, diastolic CHF (with EF 63% on TTE 8/13/19), HTN, HLD, CKD stage 3-4, IDDM, PVD, CVA, PE, hypothyroidism, p/w L foot wound. Patient seen today at clinic and sent for cellulitis and abscess, wanting to rule out OM. Patient denies fever, chills, cp, sob, abd pain, n/v, weakness, or numbness/tingling.     Problem/Plan - 1:  ·  Problem: Toe gangrene.  Plan: Podiatry consult noted. S/P IV Abxs for 7 days . NO fever or Leucocytosis so likely ischemic .   ID and Vascular helping. No intervention per podiatry .        Problem/Plan - 2:  ·  Problem: Stage 3 chronic kidney disease.  Plan: Renal helping .  Baseline Creatinine 1.8.      Problem/Plan - 3:  ·  Problem: PVD (peripheral vascular disease).  Plan: LIZBETH/PVD noted.  Vascular consult noted. S/P angiogram .Aspirin started as recommended by Vascular .      Problem/Plan - 4:  ·  Problem: Chronic diastolic congestive heart failure.  Plan: Continuing Lasix .  Cardiology helping.      Problem/Plan - 5:  ·  Problem: DM (diabetes mellitus).  Plan: SSI for now .      Problem/Plan - 6:  Problem: Afib. Plan: ON AC. Heparin till INR therapeutic .      Problem/Plan - 7:  ·  Problem: Pulmonary embolism.  Plan: On AC.      Problem/Plan - 8:  ·  Problem:  Hypothyroid.  Plan: Synthroid.      Problem/Plan - 9:  ·  Problem: Anemia.  Plan: Chronic . Watching CBC . Transfuse PRBC for HGb <7.5G .     Problem/Plan - 10:  ·  Problem: Uncontrolled Hypertension .  Plan: Adjusting BP meds.       Disposition : DC planning pending Therapeutic INR,.

## 2020-08-06 NOTE — PROGRESS NOTE ADULT - SUBJECTIVE AND OBJECTIVE BOX
INTERVAL HPI/OVERNIGHT EVENTS: I feel fine.   Vital Signs Last 24 Hrs  T(C): 36.6 (06 Aug 2020 14:45), Max: 37 (05 Aug 2020 21:43)  T(F): 97.8 (06 Aug 2020 14:45), Max: 98.6 (05 Aug 2020 21:43)  HR: 53 (06 Aug 2020 14:45) (53 - 77)  BP: 135/55 (06 Aug 2020 14:45) (135/55 - 148/58)  BP(mean): --  RR: 18 (06 Aug 2020 14:45) (16 - 18)  SpO2: 100% (06 Aug 2020 14:45) (99% - 100%)  I&O's Summary    MEDICATIONS  (STANDING):  allopurinol 100 milliGRAM(s) Oral daily  amLODIPine   Tablet 10 milliGRAM(s) Oral daily  aspirin enteric coated 81 milliGRAM(s) Oral daily  dextrose 5%. 1000 milliLiter(s) (50 mL/Hr) IV Continuous <Continuous>  dextrose 50% Injectable 12.5 Gram(s) IV Push once  dextrose 50% Injectable 25 Gram(s) IV Push once  dextrose 50% Injectable 25 Gram(s) IV Push once  gabapentin 100 milliGRAM(s) Oral three times a day  heparin  Infusion.  Unit(s)/Hr (13 mL/Hr) IV Continuous <Continuous>  hydrALAZINE 75 milliGRAM(s) Oral three times a day  insulin lispro (HumaLOG) corrective regimen sliding scale   SubCutaneous three times a day before meals  insulin lispro (HumaLOG) corrective regimen sliding scale   SubCutaneous at bedtime  latanoprost 0.005% Ophthalmic Solution 1 Drop(s) Left EYE at bedtime  levothyroxine 75 MICROGram(s) Oral daily  polyethylene glycol 3350 17 Gram(s) Oral daily  senna 2 Tablet(s) Oral at bedtime  simvastatin 20 milliGRAM(s) Oral at bedtime  sodium chloride 0.9%. 1000 milliLiter(s) (75 mL/Hr) IV Continuous <Continuous>    MEDICATIONS  (PRN):  acetaminophen   Tablet .. 650 milliGRAM(s) Oral every 6 hours PRN Temp greater or equal to 38C (100.4F), Mild Pain (1 - 3), Moderate Pain (4 - 6), Severe Pain (7 - 10)  dextrose 40% Gel 15 Gram(s) Oral once PRN Blood Glucose LESS THAN 70 milliGRAM(s)/deciliter  glucagon  Injectable 1 milliGRAM(s) IntraMuscular once PRN Glucose LESS THAN 70 milligrams/deciliter  heparin   Injectable 6000 Unit(s) IV Push every 6 hours PRN For aPTT less than 40  heparin   Injectable 3000 Unit(s) IV Push every 6 hours PRN For aPTT between 40 - 57  morphine  - Injectable 2 milliGRAM(s) IV Push every 4 hours PRN Severe Pain (7 - 10)    LABS:                        7.7    8.61  )-----------( 215      ( 06 Aug 2020 06:05 )             25.5     08-06    138  |  103  |  63<H>  ----------------------------<  105<H>  5.1   |  21<L>  |  1.91<H>    Ca    10.5      06 Aug 2020 06:05  Phos  3.6     08-06  Mg     2.2     08-06    TPro  x   /  Alb  3.3  /  TBili  x   /  DBili  x   /  AST  x   /  ALT  x   /  AlkPhos  x   08-05    PT/INR - ( 06 Aug 2020 06:05 )   PT: 16.3 SEC;   INR: 1.46          PTT - ( 06 Aug 2020 06:05 )  PTT:67.8 SEC    CAPILLARY BLOOD GLUCOSE      POCT Blood Glucose.: 157 mg/dL (06 Aug 2020 17:05)  POCT Blood Glucose.: 146 mg/dL (06 Aug 2020 12:21)  POCT Blood Glucose.: 106 mg/dL (06 Aug 2020 08:28)  POCT Blood Glucose.: 119 mg/dL (05 Aug 2020 22:11)          REVIEW OF SYSTEMS:  CONSTITUTIONAL: No fever, weight loss, or fatigue  EYES: No eye pain, visual disturbances, or discharge  ENMT:  No difficulty hearing, tinnitus, vertigo; No sinus or throat pain  RESPIRATORY: No cough, wheezing, chills or hemoptysis; No shortness of breath  CARDIOVASCULAR: No chest pain, palpitations, dizziness, or leg swelling  GASTROINTESTINAL: No abdominal or epigastric pain. No nausea, vomiting, or hematemesis; No diarrhea or constipation. No melena or hematochezia.  GENITOURINARY: No dysuria, frequency, hematuria, or incontinence  NEUROLOGICAL: No headaches, memory loss, loss of strength, numbness, or tremors      Consultant(s) Notes Reviewed:  [x ] YES  [ ] NO    PHYSICAL EXAM:  GENERAL: NAD, well-groomed, well-developed,not in any distress ,  HEAD:  Atraumatic, Normocephalic  EYES: EOMI, PERRLA, conjunctiva and sclera clear  ENMT: No tonsillar erythema, exudates, or enlargement; Moist mucous membranes, Good dentition, No lesions  NECK: Supple, No JVD, Normal thyroid  NERVOUS SYSTEM:  Alert & Oriented X3, No focal deficit   CHEST/LUNG: Good air entry bilateral with no  rales, rhonchi, wheezing, or rubs  HEART: Regular rate and rhythm; No murmurs, rubs, or gallops  ABDOMEN: Soft, Nontender, Nondistended; Bowel sounds present  EXTREMITIES: foot dressed     Care Discussed with Consultants/Other Providers [ x] YES  [ ] NO

## 2020-08-06 NOTE — PROGRESS NOTE ADULT - SUBJECTIVE AND OBJECTIVE BOX
Chief complaint  Patient is a 89y old  Female who presents with a chief complaint of left foot big toe pain (05 Aug 2020 17:12)   Review of systems  Patient in bed eating breakfast, alert and comfortable, no hypoglycemic episodes.    Labs and Fingersticks  CAPILLARY BLOOD GLUCOSE      POCT Blood Glucose.: 106 mg/dL (06 Aug 2020 08:28)  POCT Blood Glucose.: 119 mg/dL (05 Aug 2020 22:11)  POCT Blood Glucose.: 165 mg/dL (05 Aug 2020 17:11)  POCT Blood Glucose.: 157 mg/dL (05 Aug 2020 12:28)      Anion Gap, Serum: 14 (08-06 @ 06:05)  Anion Gap, Serum: 13 (08-05 @ 06:30)      Calcium, Total Serum: 10.5 (08-06 @ 06:05)  Calcium, Total Serum: 10.8 <H> (08-05 @ 06:30)  Albumin, Serum: 3.3 (08-05 @ 06:30)          08-06    138  |  103  |  63<H>  ----------------------------<  105<H>  5.1   |  21<L>  |  1.91<H>    Ca    10.5      06 Aug 2020 06:05  Phos  3.6     08-06  Mg     2.2     08-06    TPro  x   /  Alb  3.3  /  TBili  x   /  DBili  x   /  AST  x   /  ALT  x   /  AlkPhos  x   08-05                        7.7    8.61  )-----------( 215      ( 06 Aug 2020 06:05 )             25.5     Medications  MEDICATIONS  (STANDING):  allopurinol 100 milliGRAM(s) Oral daily  amLODIPine   Tablet 10 milliGRAM(s) Oral daily  dextrose 5%. 1000 milliLiter(s) (50 mL/Hr) IV Continuous <Continuous>  dextrose 50% Injectable 12.5 Gram(s) IV Push once  dextrose 50% Injectable 25 Gram(s) IV Push once  dextrose 50% Injectable 25 Gram(s) IV Push once  gabapentin 100 milliGRAM(s) Oral three times a day  heparin  Infusion.  Unit(s)/Hr (13 mL/Hr) IV Continuous <Continuous>  hydrALAZINE 75 milliGRAM(s) Oral three times a day  insulin lispro (HumaLOG) corrective regimen sliding scale   SubCutaneous three times a day before meals  insulin lispro (HumaLOG) corrective regimen sliding scale   SubCutaneous at bedtime  latanoprost 0.005% Ophthalmic Solution 1 Drop(s) Left EYE at bedtime  levothyroxine 75 MICROGram(s) Oral daily  polyethylene glycol 3350 17 Gram(s) Oral daily  senna 2 Tablet(s) Oral at bedtime  simvastatin 20 milliGRAM(s) Oral at bedtime  sodium chloride 0.9%. 1000 milliLiter(s) (75 mL/Hr) IV Continuous <Continuous>      Physical Exam  General: Patient comfortable in bed  Vital Signs Last 12 Hrs  T(F): 98.3 (08-06-20 @ 05:26), Max: 98.6 (08-05-20 @ 21:43)  HR: 77 (08-06-20 @ 05:26) (71 - 77)  BP: 148/58 (08-06-20 @ 05:26) (146/54 - 148/58)  BP(mean): --  RR: 16 (08-06-20 @ 05:26) (16 - 18)  SpO2: 99% (08-06-20 @ 05:26) (99% - 99%)  Neck: No palpable thyroid nodules.  CVS: S1S2, No murmurs  Respiratory: No wheezing, no crepitations  GI: Abdomen soft, bowel sounds positive  Musculoskeletal:  edema lower extremities.   Skin: No skin rashes, no ecchymosis    Diagnostics

## 2020-08-06 NOTE — PROGRESS NOTE ADULT - SUBJECTIVE AND OBJECTIVE BOX
Cardiology Attending    S: no chest pain or sob; ROS otherwise negative.     acetaminophen   Tablet .. 650 milliGRAM(s) Oral every 6 hours PRN  allopurinol 100 milliGRAM(s) Oral daily  amLODIPine   Tablet 10 milliGRAM(s) Oral daily  dextrose 40% Gel 15 Gram(s) Oral once PRN  dextrose 5%. 1000 milliLiter(s) IV Continuous <Continuous>  dextrose 50% Injectable 12.5 Gram(s) IV Push once  dextrose 50% Injectable 25 Gram(s) IV Push once  dextrose 50% Injectable 25 Gram(s) IV Push once  gabapentin 100 milliGRAM(s) Oral three times a day  glucagon  Injectable 1 milliGRAM(s) IntraMuscular once PRN  heparin   Injectable 6000 Unit(s) IV Push every 6 hours PRN  heparin   Injectable 3000 Unit(s) IV Push every 6 hours PRN  heparin  Infusion.  Unit(s)/Hr IV Continuous <Continuous>  hydrALAZINE 75 milliGRAM(s) Oral three times a day  insulin lispro (HumaLOG) corrective regimen sliding scale   SubCutaneous three times a day before meals  insulin lispro (HumaLOG) corrective regimen sliding scale   SubCutaneous at bedtime  latanoprost 0.005% Ophthalmic Solution 1 Drop(s) Left EYE at bedtime  levothyroxine 75 MICROGram(s) Oral daily  morphine  - Injectable 2 milliGRAM(s) IV Push every 4 hours PRN  polyethylene glycol 3350 17 Gram(s) Oral daily  senna 2 Tablet(s) Oral at bedtime  simvastatin 20 milliGRAM(s) Oral at bedtime  sodium chloride 0.9%. 1000 milliLiter(s) IV Continuous <Continuous>                          7.7    8.61  )-----------( 215      ( 06 Aug 2020 06:05 )             25.5     08-06    138  |  103  |  63<H>  ----------------------------<  105<H>  5.1   |  21<L>  |  1.91<H>    Ca    10.5      06 Aug 2020 06:05  Phos  3.6     08-06  Mg     2.2     08-06    INR 1.46    TPro  x   /  Alb  3.3  /  TBili  x   /  DBili  x   /  AST  x   /  ALT  x   /  AlkPhos  x   08-05    T(C): 36.8 (08-06-20 @ 05:26), Max: 37 (08-05-20 @ 21:43)  HR: 77 (08-06-20 @ 05:26) (68 - 77)  BP: 148/58 (08-06-20 @ 05:26) (133/45 - 148/58)  RR: 16 (08-06-20 @ 05:26) (16 - 18)  SpO2: 99% (08-06-20 @ 05:26) (99% - 100%)  Wt(kg): --    I&O's Summary    Gen: Frail elderly AA woman, nondiaphoretic and comfortable.  HEENT:  (-)icterus (-)pallor  CV: Normal S1, there is still a sharp S2.  Mid-to-late peaking 3/6 systolic murmur at the upper chest (+)2 Pulses B/l  Resp:  Clear to ausculatation B/L, normal effort  GI: (+) BS Soft, NT, ND  Lymph:  (-)Edema, (-)obvious lymphadenopathy  Skin: +left foot dressing, Warm to touch, Normal turgor  Psych: Appropriate mood and affect    TTE: < from: Transthoracic Echocardiogram (07.22.20 @ 16:27) >  1. Mitral annular calcification, otherwise normal mitral  valve. Mild-moderate mitral regurgitation.  2. Calcified trileaflet aortic valve with decreased  opening.  Peak transaortic valve gradient pantpu63 mm Hg,  mean transaortic valve gradient equals 26 mm Hg, estimated  aortic valve area equals 1 sqcm (by continuity equation),  consistent with moderate to severe aortic stenosis.  3. Severely dilated left atrium.  LA volume index = 57  cc/m2.  4. Mild concentric left ventricular hypertrophy.  5. Normal left ventricular systolic function. No segmental  wall motion abnormalities.  6. Severe diastolic dysfunction.  7. Severe right atrial enlargement.  8. Normal right ventricular size and function.  9. Estimated right ventricular systolic pressure equals 53  mm Hg, assuming right atrial pressure equals 10 mm Hg,  consistent with moderate pulmonary hypertension.    < end of copied text >      ASSESSMENT/PLAN: Patient is a 89 year old Female well known to Dr. Berman, who presented with a foot wound.  Consult requested for pre-op cardiovascular risk stratification.  Her known PMH includes persistent Afib on coumadin who was not a candidate for left atrial appendage occlusion with Watchman, chronic HFpEF, HTN, HLD, hypothyroidism, CKD, Type 2 DM, CVA, and hx of PE.    - Continue AC for cva prevention if no contraindications - on hep gtt bridge to coumadin for goal INR 2-3.  - Continue with PO lasix   - no further inpatient cardiac w/u needed at this time  - pt. and patient's family prefer conservative cv care and no further workup of valvular disease (her AS is moderate by examination)  - s/p LE angio - tolerated procedure well in terms of cv perspective  - podiatric surgery canceled, plan for IV Abx and wound care.  working on PT and discharge planning.    Simon Jung M.D.  Cardiac Electrophysiology  [phone]

## 2020-08-07 ENCOUNTER — TRANSCRIPTION ENCOUNTER (OUTPATIENT)
Age: 85
End: 2020-08-07

## 2020-08-07 LAB
ANION GAP SERPL CALC-SCNC: 10 MMO/L — SIGNIFICANT CHANGE UP (ref 7–14)
APTT BLD: 68.9 SEC — HIGH (ref 27–36.3)
BLD GP AB SCN SERPL QL: NEGATIVE — SIGNIFICANT CHANGE UP
BUN SERPL-MCNC: 66 MG/DL — HIGH (ref 7–23)
CALCIUM SERPL-MCNC: 10 MG/DL — SIGNIFICANT CHANGE UP (ref 8.4–10.5)
CHLORIDE SERPL-SCNC: 110 MMOL/L — HIGH (ref 98–107)
CO2 SERPL-SCNC: 23 MMOL/L — SIGNIFICANT CHANGE UP (ref 22–31)
CREAT SERPL-MCNC: 1.92 MG/DL — HIGH (ref 0.5–1.3)
GLUCOSE BLDC GLUCOMTR-MCNC: 104 MG/DL — HIGH (ref 70–99)
GLUCOSE BLDC GLUCOMTR-MCNC: 138 MG/DL — HIGH (ref 70–99)
GLUCOSE BLDC GLUCOMTR-MCNC: 141 MG/DL — HIGH (ref 70–99)
GLUCOSE BLDC GLUCOMTR-MCNC: 152 MG/DL — HIGH (ref 70–99)
GLUCOSE SERPL-MCNC: 102 MG/DL — HIGH (ref 70–99)
HCT VFR BLD CALC: 22.9 % — LOW (ref 34.5–45)
HCT VFR BLD CALC: 25.5 % — LOW (ref 34.5–45)
HGB BLD-MCNC: 7.3 G/DL — LOW (ref 11.5–15.5)
HGB BLD-MCNC: 7.9 G/DL — LOW (ref 11.5–15.5)
INR BLD: 1.41 — HIGH (ref 0.88–1.16)
MAGNESIUM SERPL-MCNC: 2.2 MG/DL — SIGNIFICANT CHANGE UP (ref 1.6–2.6)
MCHC RBC-ENTMCNC: 29.2 PG — SIGNIFICANT CHANGE UP (ref 27–34)
MCHC RBC-ENTMCNC: 30.8 PG — SIGNIFICANT CHANGE UP (ref 27–34)
MCHC RBC-ENTMCNC: 31 % — LOW (ref 32–36)
MCHC RBC-ENTMCNC: 31.9 % — LOW (ref 32–36)
MCV RBC AUTO: 94.1 FL — SIGNIFICANT CHANGE UP (ref 80–100)
MCV RBC AUTO: 96.6 FL — SIGNIFICANT CHANGE UP (ref 80–100)
NRBC # FLD: 0 K/UL — SIGNIFICANT CHANGE UP (ref 0–0)
NRBC # FLD: 0 K/UL — SIGNIFICANT CHANGE UP (ref 0–0)
PHOSPHATE SERPL-MCNC: 4.2 MG/DL — SIGNIFICANT CHANGE UP (ref 2.5–4.5)
PLATELET # BLD AUTO: 200 K/UL — SIGNIFICANT CHANGE UP (ref 150–400)
PLATELET # BLD AUTO: 222 K/UL — SIGNIFICANT CHANGE UP (ref 150–400)
PMV BLD: 12.2 FL — SIGNIFICANT CHANGE UP (ref 7–13)
PMV BLD: 12.6 FL — SIGNIFICANT CHANGE UP (ref 7–13)
POTASSIUM SERPL-MCNC: 5.2 MMOL/L — SIGNIFICANT CHANGE UP (ref 3.5–5.3)
POTASSIUM SERPL-SCNC: 5.2 MMOL/L — SIGNIFICANT CHANGE UP (ref 3.5–5.3)
PROTHROM AB SERPL-ACNC: 16 SEC — HIGH (ref 10.6–13.6)
RBC # BLD: 2.37 M/UL — LOW (ref 3.8–5.2)
RBC # BLD: 2.71 M/UL — LOW (ref 3.8–5.2)
RBC # FLD: 16.3 % — HIGH (ref 10.3–14.5)
RBC # FLD: 16.3 % — HIGH (ref 10.3–14.5)
RH IG SCN BLD-IMP: NEGATIVE — SIGNIFICANT CHANGE UP
SODIUM SERPL-SCNC: 143 MMOL/L — SIGNIFICANT CHANGE UP (ref 135–145)
WBC # BLD: 5.49 K/UL — SIGNIFICANT CHANGE UP (ref 3.8–10.5)
WBC # BLD: 5.91 K/UL — SIGNIFICANT CHANGE UP (ref 3.8–10.5)
WBC # FLD AUTO: 5.49 K/UL — SIGNIFICANT CHANGE UP (ref 3.8–10.5)
WBC # FLD AUTO: 5.91 K/UL — SIGNIFICANT CHANGE UP (ref 3.8–10.5)

## 2020-08-07 RX ORDER — MORPHINE SULFATE 50 MG/1
2 CAPSULE, EXTENDED RELEASE ORAL EVERY 4 HOURS
Refills: 0 | Status: DISCONTINUED | OUTPATIENT
Start: 2020-08-07 | End: 2020-08-12

## 2020-08-07 RX ORDER — WARFARIN SODIUM 2.5 MG/1
6 TABLET ORAL ONCE
Refills: 0 | Status: COMPLETED | OUTPATIENT
Start: 2020-08-07 | End: 2020-08-07

## 2020-08-07 RX ORDER — NYSTATIN CREAM 100000 [USP'U]/G
1 CREAM TOPICAL
Refills: 0 | Status: DISCONTINUED | OUTPATIENT
Start: 2020-08-07 | End: 2020-08-12

## 2020-08-07 RX ADMIN — WARFARIN SODIUM 6 MILLIGRAM(S): 2.5 TABLET ORAL at 18:06

## 2020-08-07 RX ADMIN — LATANOPROST 1 DROP(S): 0.05 SOLUTION/ DROPS OPHTHALMIC; TOPICAL at 21:09

## 2020-08-07 RX ADMIN — NYSTATIN CREAM 1 APPLICATION(S): 100000 CREAM TOPICAL at 18:17

## 2020-08-07 RX ADMIN — AMLODIPINE BESYLATE 10 MILLIGRAM(S): 2.5 TABLET ORAL at 05:50

## 2020-08-07 RX ADMIN — Medication 75 MICROGRAM(S): at 05:50

## 2020-08-07 RX ADMIN — Medication 650 MILLIGRAM(S): at 21:15

## 2020-08-07 RX ADMIN — HEPARIN SODIUM 1300 UNIT(S)/HR: 5000 INJECTION INTRAVENOUS; SUBCUTANEOUS at 08:58

## 2020-08-07 RX ADMIN — SIMVASTATIN 20 MILLIGRAM(S): 20 TABLET, FILM COATED ORAL at 21:08

## 2020-08-07 RX ADMIN — POLYETHYLENE GLYCOL 3350 17 GRAM(S): 17 POWDER, FOR SOLUTION ORAL at 12:35

## 2020-08-07 RX ADMIN — GABAPENTIN 100 MILLIGRAM(S): 400 CAPSULE ORAL at 21:08

## 2020-08-07 RX ADMIN — SENNA PLUS 2 TABLET(S): 8.6 TABLET ORAL at 21:08

## 2020-08-07 RX ADMIN — Medication 100 MILLIGRAM(S): at 12:35

## 2020-08-07 RX ADMIN — Medication 75 MILLIGRAM(S): at 05:50

## 2020-08-07 RX ADMIN — GABAPENTIN 100 MILLIGRAM(S): 400 CAPSULE ORAL at 05:50

## 2020-08-07 RX ADMIN — Medication 75 MILLIGRAM(S): at 21:09

## 2020-08-07 RX ADMIN — GABAPENTIN 100 MILLIGRAM(S): 400 CAPSULE ORAL at 12:35

## 2020-08-07 RX ADMIN — Medication 650 MILLIGRAM(S): at 22:15

## 2020-08-07 RX ADMIN — Medication 75 MILLIGRAM(S): at 12:36

## 2020-08-07 NOTE — PROGRESS NOTE ADULT - SUBJECTIVE AND OBJECTIVE BOX
S: no chest pain or sob; ROS otherwise negative.       MEDICATIONS  (STANDING):  allopurinol 100 milliGRAM(s) Oral daily  amLODIPine   Tablet 10 milliGRAM(s) Oral daily  dextrose 5%. 1000 milliLiter(s) (50 mL/Hr) IV Continuous <Continuous>  dextrose 50% Injectable 12.5 Gram(s) IV Push once  dextrose 50% Injectable 25 Gram(s) IV Push once  dextrose 50% Injectable 25 Gram(s) IV Push once  gabapentin 100 milliGRAM(s) Oral three times a day  heparin  Infusion.  Unit(s)/Hr (13 mL/Hr) IV Continuous <Continuous>  hydrALAZINE 75 milliGRAM(s) Oral three times a day  insulin lispro (HumaLOG) corrective regimen sliding scale   SubCutaneous three times a day before meals  insulin lispro (HumaLOG) corrective regimen sliding scale   SubCutaneous at bedtime  latanoprost 0.005% Ophthalmic Solution 1 Drop(s) Left EYE at bedtime  levothyroxine 75 MICROGram(s) Oral daily  nystatin Powder 1 Application(s) Topical two times a day  polyethylene glycol 3350 17 Gram(s) Oral daily  senna 2 Tablet(s) Oral at bedtime  simvastatin 20 milliGRAM(s) Oral at bedtime  warfarin 6 milliGRAM(s) Oral once    MEDICATIONS  (PRN):  acetaminophen   Tablet .. 650 milliGRAM(s) Oral every 6 hours PRN Temp greater or equal to 38C (100.4F), Mild Pain (1 - 3), Moderate Pain (4 - 6), Severe Pain (7 - 10)  dextrose 40% Gel 15 Gram(s) Oral once PRN Blood Glucose LESS THAN 70 milliGRAM(s)/deciliter  glucagon  Injectable 1 milliGRAM(s) IntraMuscular once PRN Glucose LESS THAN 70 milligrams/deciliter  heparin   Injectable 6000 Unit(s) IV Push every 6 hours PRN For aPTT less than 40  heparin   Injectable 3000 Unit(s) IV Push every 6 hours PRN For aPTT between 40 - 57  morphine  - Injectable 2 milliGRAM(s) IV Push every 4 hours PRN Severe Pain (7 - 10)      LABS:                      7.9    5.91  )-----------( 222      ( 07 Aug 2020 09:08 )             25.5     143  |  110<H>  |  66<H>  ----------------------------<  102<H>  5.2   |  23  |  1.92<H>    Ca    10.0      07 Aug 2020 07:09  Phos  4.2     08-07  Mg     2.2     08-07      Creatinine Trend: 1.92<--, 1.91<--, 2.14<--, 2.08<--, 2.21<--, 2.18<--   PT/INR - ( 07 Aug 2020 07:09 )   PT: 16.0 SEC;   INR: 1.41     PTT - ( 07 Aug 2020 07:09 )  PTT:68.9 SEC    PHYSICAL EXAM  Vital Signs Last 24 Hrs  T(C): 36.7 (07 Aug 2020 12:38), Max: 36.7 (07 Aug 2020 12:38)  T(F): 98 (07 Aug 2020 12:38), Max: 98 (07 Aug 2020 12:38)  HR: 64 (07 Aug 2020 05:30) (53 - 66)  BP: 130/60 (07 Aug 2020 12:38) (129/54 - 136/66)  RR: 18 (07 Aug 2020 12:38) (16 - 18)  SpO2: 100% (07 Aug 2020 12:38) (98% - 100%)    Gen: Frail elderly AA woman, nondiaphoretic and comfortable.  HEENT:  (-)icterus (-)pallor  CV: Normal S1, there is still a sharp S2.  Mid-to-late peaking 3/6 systolic murmur at the upper chest (+)2 Pulses B/l  Resp:  Clear to ausculatation B/L, normal effort  GI: (+) BS Soft, NT, ND  Lymph:  (-)Edema, (-)obvious lymphadenopathy  Skin: +left foot dressing, Warm to touch, Normal turgor  Psych: Appropriate mood and affect    TTE: < from: Transthoracic Echocardiogram (07.22.20 @ 16:27) >  1. Mitral annular calcification, otherwise normal mitral  valve. Mild-moderate mitral regurgitation.  2. Calcified trileaflet aortic valve with decreased  opening.  Peak transaortic valve gradient ohfdhn53 mm Hg,  mean transaortic valve gradient equals 26 mm Hg, estimated  aortic valve area equals 1 sqcm (by continuity equation),  consistent with moderate to severe aortic stenosis.  3. Severely dilated left atrium.  LA volume index = 57  cc/m2.  4. Mild concentric left ventricular hypertrophy.  5. Normal left ventricular systolic function. No segmental  wall motion abnormalities.  6. Severe diastolic dysfunction.  7. Severe right atrial enlargement.  8. Normal right ventricular size and function.  9. Estimated right ventricular systolic pressure equals 53  mm Hg, assuming right atrial pressure equals 10 mm Hg,  consistent with moderate pulmonary hypertension.    < end of copied text >      ASSESSMENT/PLAN: Patient is a 89 year old Female well known to Dr. Berman, who presented with a foot wound.  Consult requested for pre-op cardiovascular risk stratification.  Her known PMH includes persistent Afib on coumadin who was not a candidate for left atrial appendage occlusion with Watchman, chronic HFpEF, HTN, HLD, hypothyroidism, CKD, Type 2 DM, CVA, and hx of PE.    - Continue AC for cva prevention if no contraindications - on hep gtt bridge to coumadin for goal INR 2-3.  - Continue with PO lasix   - no further inpatient cardiac w/u needed at this time  - pt. and patient's family prefer conservative cv care and no further workup of valvular disease (her AS is moderate by examination)  - s/p LE angio - tolerated procedure well in terms of cv perspective  - podiatric surgery canceled, plan for IV Abx and wound care.  working on PT and discharge planning.

## 2020-08-07 NOTE — DISCHARGE NOTE NURSING/CASE MANAGEMENT/SOCIAL WORK - NSDCFUADDAPPT_GEN_ALL_CORE_FT
Dr Berman 8/31 at 11:20AM [phone]     Podiatry Discharge Instructions:  Follow up: Please follow up with Dr. Downey within 1 week of discharge from the hospital, please call [phone] for appointment and discuss that you recently were seen in the hospital.  Wound Care: Please apply betadine to the left big toe, 4x4 gauze, kerlix daily.  Weight bearing: Please weight bear as tolerated in a surgical shoe.  Antibiotics: Please continue as instructed.

## 2020-08-07 NOTE — PROGRESS NOTE ADULT - ASSESSMENT
89 year old female PMH  a-fib on coumadin, diastolic CHF (with EF 63% on TTE 8/13/19), HTN, HLD, CKD stage 3-4, IDDM, PVD, CVA, PE, hypothyroidism, p/w L foot wound. Patient seen today at clinic and sent for cellulitis and abscess, wanting to rule out OM. Patient denies fever, chills, cp, sob, abd pain, n/v, weakness, or numbness/tingling.     Problem/Plan - 1:  ·  Problem: Toe gangrene.  Plan: Podiatry consult noted. S/P IV Abxs for 7 days . NO fever or Leucocytosis so likely ischemic .   ID and Vascular helping. No intervention per podiatry .        Problem/Plan - 2:  ·  Problem: Stage 3 chronic kidney disease.  Plan: Renal helping .  Baseline Creatinine 1.8.      Problem/Plan - 3:  ·  Problem: PVD (peripheral vascular disease).  Plan: LIZBETH/PVD noted.  Vascular consult noted. S/P angiogram .Aspirin started as recommended by Vascular .      Problem/Plan - 4:  ·  Problem: Chronic diastolic congestive heart failure.  Plan: Continuing Lasix .  Cardiology helping.      Problem/Plan - 5:  ·  Problem: DM (diabetes mellitus).  Plan: SSI for now .      Problem/Plan - 6:  Problem: Afib. Plan: ON AC. Heparin till INR therapeutic .      Problem/Plan - 7:  ·  Problem: Pulmonary embolism.  Plan: On AC.      Problem/Plan - 8:  ·  Problem:  Hypothyroid.  Plan: Synthroid.      Problem/Plan - 9:  ·  Problem: Anemia.  Plan: Chronic . Watching CBC . Transfuse PRBC for HGb <7.5G .Hh stable .      Problem/Plan - 10:  ·  Problem: Uncontrolled Hypertension .  Plan: Adjusting BP meds.       Disposition : DC planning pending Therapeutic INR,.

## 2020-08-07 NOTE — PROGRESS NOTE ADULT - SUBJECTIVE AND OBJECTIVE BOX
Nephrology Followup Note - [phone] - Dr Catherine / Dr Garcia / Dr Tellez / Dr Sullivan / Dr Castillo / Dr Grijalva / Dr Segovia / Dr Luna  Pt seen and examined at bedside  No acute events overnight. Pt frustrated about being in hospital.     Allergies:  No Known Allergies    Hospital Medications:   MEDICATIONS  (STANDING):  allopurinol 100 milliGRAM(s) Oral daily  amLODIPine   Tablet 10 milliGRAM(s) Oral daily  dextrose 5%. 1000 milliLiter(s) (50 mL/Hr) IV Continuous <Continuous>  dextrose 50% Injectable 12.5 Gram(s) IV Push once  dextrose 50% Injectable 25 Gram(s) IV Push once  dextrose 50% Injectable 25 Gram(s) IV Push once  gabapentin 100 milliGRAM(s) Oral three times a day  heparin  Infusion.  Unit(s)/Hr (13 mL/Hr) IV Continuous <Continuous>  hydrALAZINE 75 milliGRAM(s) Oral three times a day  insulin lispro (HumaLOG) corrective regimen sliding scale   SubCutaneous three times a day before meals  insulin lispro (HumaLOG) corrective regimen sliding scale   SubCutaneous at bedtime  latanoprost 0.005% Ophthalmic Solution 1 Drop(s) Left EYE at bedtime  levothyroxine 75 MICROGram(s) Oral daily  nystatin Powder 1 Application(s) Topical two times a day  polyethylene glycol 3350 17 Gram(s) Oral daily  senna 2 Tablet(s) Oral at bedtime  simvastatin 20 milliGRAM(s) Oral at bedtime  warfarin 6 milliGRAM(s) Oral once      VITALS:  T(F): 98 (08-07-20 @ 12:38), Max: 98 (08-07-20 @ 12:38)  HR: 64 (08-07-20 @ 05:30)  BP: 130/60 (08-07-20 @ 12:38)  RR: 18 (08-07-20 @ 12:38)  SpO2: 100% (08-07-20 @ 12:38)  Wt(kg): --      PHYSICAL EXAM:  Constitutional: NAD  HEENT: anicteric sclera, oropharynx clear, MMM  Neck: No JVD  Respiratory: CTAB, no wheezes, rales or rhonchi  Cardiovascular: S1, S2, RRR  Gastrointestinal: BS+, soft, NT/ND  Extremities: No cyanosis or clubbing. No peripheral edema  Neurological: A/O x 3, no focal deficits  Psychiatric: Normal mood, normal affect  : No CVA tenderness. No michaud.   Skin: No rashes    LABS:  08-07    143  |  110<H>  |  66<H>  ----------------------------<  102<H>  5.2   |  23  |  1.92<H>    Ca    10.0      07 Aug 2020 07:09  Phos  4.2     08-07  Mg     2.2     08-07      Creatinine Trend: 1.92 <--, 1.91 <--, 2.14 <--, 2.08 <--, 2.21 <--, 2.18 <--, 2.15 <--                        7.9    5.91  )-----------( 222      ( 07 Aug 2020 09:08 )             25.5     Urine Studies:      RADIOLOGY & ADDITIONAL STUDIES:

## 2020-08-07 NOTE — DISCHARGE NOTE NURSING/CASE MANAGEMENT/SOCIAL WORK - PATIENT PORTAL LINK FT
You can access the FollowMyHealth Patient Portal offered by [facility] by registering at the following website: [email or web address]. By joining [employer]’s FollowMyHealth portal, you will also be able to view your health information using other applications (apps) compatible with our system.

## 2020-08-07 NOTE — PROGRESS NOTE ADULT - ASSESSMENT
89 year old female PMH  a-fib on coumadin, diastolic CHF (with EF 63% on TTE 8/13/19), HTN, HLD, CKD stage 3-4, IDDM, PVD, CVA, PE, hypothyroidism, p/w L foot wound. Patient was sent from podiatrist office for non healing left great toe ulcer, to rule out OM. Renal following for NIKHIL/CKD Mx.     CKD4: Baseline Creatinine 1.7-2.0  euvolemic. Electrolytes acceptable.    Cr stable.  s/p LE angiogram, given IVF post procedure. No evidence of Contrast induced nephropathy.   TTE noted above with normal LVEF, severe diastolic dysfunction  continue to hold lasix 40mg po qdaily for now. no s/o CHF.   avoid ACEi/ARB for now/NSAIDs/Nephrotoxics.  monitor BMP daily   low K in diet    HTN, controlled-bp optimal. c/w current bp meds. avoid ACEi/ARB. pain Mx.     Toe gangrene, PAD. s/p LE angio, no stent. f/u w/ Podiatry, plans for amputation cancelled.     DM- Mx per medicine    labs, chart reviewed

## 2020-08-07 NOTE — PROGRESS NOTE ADULT - SUBJECTIVE AND OBJECTIVE BOX
INTERVAL HPI/OVERNIGHT EVENTS: no new concerns.   Vital Signs Last 24 Hrs  T(C): 36.7 (07 Aug 2020 12:38), Max: 36.7 (07 Aug 2020 12:38)  T(F): 98 (07 Aug 2020 12:38), Max: 98 (07 Aug 2020 12:38)  HR: 64 (07 Aug 2020 05:30) (64 - 66)  BP: 130/60 (07 Aug 2020 12:38) (129/54 - 136/66)  BP(mean): --  RR: 18 (07 Aug 2020 12:38) (16 - 18)  SpO2: 100% (07 Aug 2020 12:38) (98% - 100%)  I&O's Summary    MEDICATIONS  (STANDING):  allopurinol 100 milliGRAM(s) Oral daily  amLODIPine   Tablet 10 milliGRAM(s) Oral daily  dextrose 5%. 1000 milliLiter(s) (50 mL/Hr) IV Continuous <Continuous>  dextrose 50% Injectable 12.5 Gram(s) IV Push once  dextrose 50% Injectable 25 Gram(s) IV Push once  dextrose 50% Injectable 25 Gram(s) IV Push once  gabapentin 100 milliGRAM(s) Oral three times a day  heparin  Infusion.  Unit(s)/Hr (13 mL/Hr) IV Continuous <Continuous>  hydrALAZINE 75 milliGRAM(s) Oral three times a day  insulin lispro (HumaLOG) corrective regimen sliding scale   SubCutaneous three times a day before meals  insulin lispro (HumaLOG) corrective regimen sliding scale   SubCutaneous at bedtime  latanoprost 0.005% Ophthalmic Solution 1 Drop(s) Left EYE at bedtime  levothyroxine 75 MICROGram(s) Oral daily  nystatin Powder 1 Application(s) Topical two times a day  polyethylene glycol 3350 17 Gram(s) Oral daily  senna 2 Tablet(s) Oral at bedtime  simvastatin 20 milliGRAM(s) Oral at bedtime  warfarin 6 milliGRAM(s) Oral once    MEDICATIONS  (PRN):  acetaminophen   Tablet .. 650 milliGRAM(s) Oral every 6 hours PRN Temp greater or equal to 38C (100.4F), Mild Pain (1 - 3), Moderate Pain (4 - 6), Severe Pain (7 - 10)  dextrose 40% Gel 15 Gram(s) Oral once PRN Blood Glucose LESS THAN 70 milliGRAM(s)/deciliter  glucagon  Injectable 1 milliGRAM(s) IntraMuscular once PRN Glucose LESS THAN 70 milligrams/deciliter  heparin   Injectable 6000 Unit(s) IV Push every 6 hours PRN For aPTT less than 40  heparin   Injectable 3000 Unit(s) IV Push every 6 hours PRN For aPTT between 40 - 57  morphine  - Injectable 2 milliGRAM(s) IV Push every 4 hours PRN Severe Pain (7 - 10)    LABS:                        7.9    5.91  )-----------( 222      ( 07 Aug 2020 09:08 )             25.5     08-07    143  |  110<H>  |  66<H>  ----------------------------<  102<H>  5.2   |  23  |  1.92<H>    Ca    10.0      07 Aug 2020 07:09  Phos  4.2     08-07  Mg     2.2     08-07      PT/INR - ( 07 Aug 2020 07:09 )   PT: 16.0 SEC;   INR: 1.41          PTT - ( 07 Aug 2020 07:09 )  PTT:68.9 SEC    CAPILLARY BLOOD GLUCOSE      POCT Blood Glucose.: 141 mg/dL (07 Aug 2020 12:37)  POCT Blood Glucose.: 104 mg/dL (07 Aug 2020 08:42)  POCT Blood Glucose.: 122 mg/dL (06 Aug 2020 22:16)  POCT Blood Glucose.: 157 mg/dL (06 Aug 2020 17:05)          REVIEW OF SYSTEMS:  CONSTITUTIONAL: No fever, weight loss, or fatigue  EYES: No eye pain, visual disturbances, or discharge  ENMT:  No difficulty hearing, tinnitus, vertigo; No sinus or throat pain  RESPIRATORY: No cough, wheezing, chills or hemoptysis; No shortness of breath  CARDIOVASCULAR: No chest pain, palpitations, dizziness, or leg swelling  GASTROINTESTINAL: No abdominal or epigastric pain. No nausea, vomiting, or hematemesis; No diarrhea or constipation. No melena or hematochezia.  GENITOURINARY: No dysuria, frequency, hematuria, or incontinence  NEUROLOGICAL: No headaches, memory loss, loss of strength, numbness, or tremors      Consultant(s) Notes Reviewed:  [x ] YES  [ ] NO    PHYSICAL EXAM:  GENERAL: NAD, well-groomed, well-developed,not in any distress ,  HEAD:  Atraumatic, Normocephalic  EYES: EOMI, PERRLA, conjunctiva and sclera clear  ENMT: No tonsillar erythema, exudates, or enlargement; Moist mucous membranes, Good dentition, No lesions  NECK: Supple, No JVD, Normal thyroid  NERVOUS SYSTEM:  Alert & Oriented X3, No focal deficit   CHEST/LUNG: Good air entry bilateral with no  rales, rhonchi, wheezing, or rubs  HEART: Regular rate and rhythm; No murmurs, rubs, or gallops  ABDOMEN: Soft, Nontender, Nondistended; Bowel sounds present  EXTREMITIES:  L foot dressed     Care Discussed with Consultants/Other Providers [ x] YES  [ ] NO

## 2020-08-07 NOTE — PROGRESS NOTE ADULT - SUBJECTIVE AND OBJECTIVE BOX
Infectious Diseases progress note:    Subjective: NAD, afebrile.  Awaiting therapeutic INR    ROS:  CONSTITUTIONAL:  No fever, chills, rigors  CARDIOVASCULAR:  No chest pain or palpitations  RESPIRATORY:   No SOB, cough, dyspnea on exertion.  No wheezing  GASTROINTESTINAL:  No abd pain, N/V, diarrhea/constipation  EXTREMITIES:  No swelling or joint pain  GENITOURINARY:  No burning on urination, increased frequency or urgency.  No flank pain  NEUROLOGIC:  No HA, visual disturbances  SKIN: No rashes    Allergies    No Known Allergies    Intolerances        ANTIBIOTICS/RELEVANT:  antimicrobials    immunologic:    OTHER:  acetaminophen   Tablet .. 650 milliGRAM(s) Oral every 6 hours PRN  allopurinol 100 milliGRAM(s) Oral daily  amLODIPine   Tablet 10 milliGRAM(s) Oral daily  dextrose 40% Gel 15 Gram(s) Oral once PRN  dextrose 5%. 1000 milliLiter(s) IV Continuous <Continuous>  dextrose 50% Injectable 12.5 Gram(s) IV Push once  dextrose 50% Injectable 25 Gram(s) IV Push once  dextrose 50% Injectable 25 Gram(s) IV Push once  gabapentin 100 milliGRAM(s) Oral three times a day  glucagon  Injectable 1 milliGRAM(s) IntraMuscular once PRN  heparin   Injectable 6000 Unit(s) IV Push every 6 hours PRN  heparin   Injectable 3000 Unit(s) IV Push every 6 hours PRN  heparin  Infusion.  Unit(s)/Hr IV Continuous <Continuous>  hydrALAZINE 75 milliGRAM(s) Oral three times a day  insulin lispro (HumaLOG) corrective regimen sliding scale   SubCutaneous three times a day before meals  insulin lispro (HumaLOG) corrective regimen sliding scale   SubCutaneous at bedtime  latanoprost 0.005% Ophthalmic Solution 1 Drop(s) Left EYE at bedtime  levothyroxine 75 MICROGram(s) Oral daily  morphine  - Injectable 2 milliGRAM(s) IV Push every 4 hours PRN  nystatin Powder 1 Application(s) Topical two times a day  polyethylene glycol 3350 17 Gram(s) Oral daily  senna 2 Tablet(s) Oral at bedtime  simvastatin 20 milliGRAM(s) Oral at bedtime  warfarin 6 milliGRAM(s) Oral once      Objective:  Vital Signs Last 24 Hrs  T(C): 36.7 (07 Aug 2020 12:38), Max: 36.7 (07 Aug 2020 12:38)  T(F): 98 (07 Aug 2020 12:38), Max: 98 (07 Aug 2020 12:38)  HR: 64 (07 Aug 2020 05:30) (53 - 66)  BP: 130/60 (07 Aug 2020 12:38) (129/54 - 136/66)  BP(mean): --  RR: 18 (07 Aug 2020 12:38) (16 - 18)  SpO2: 100% (07 Aug 2020 12:38) (98% - 100%)    PHYSICAL EXAM:  Constitutional:NAD  Eyes:OLIVIA, EOMI  Ear/Nose/Throat: no thrush, mucositis.  Moist mucous membranes	  Neck:no JVD, no lymphadenopathy, supple  Respiratory: CTA adina  Cardiovascular: S1S2 RRR, no murmurs  Gastrointestinal:soft, nontender,  nondistended (+) BS  Extremities:no e/e/c  Skin:  L ft dsg intact        LABS:                        7.9    5.91  )-----------( 222      ( 07 Aug 2020 09:08 )             25.5     08-07    143  |  110<H>  |  66<H>  ----------------------------<  102<H>  5.2   |  23  |  1.92<H>    Ca    10.0      07 Aug 2020 07:09  Phos  4.2     08-07  Mg     2.2     08-07      PT/INR - ( 07 Aug 2020 07:09 )   PT: 16.0 SEC;   INR: 1.41          PTT - ( 07 Aug 2020 07:09 )  PTT:68.9 SEC        Vancomycin Level, Random:  ug/mL (08-04 @ 06:08)  Vancomycin Level, Random:  ug/mL (08-03 @ 06:27)      Vancomycin Level, Trough: 11.0 ug/mL (08-03 @ 06:27)              MICROBIOLOGY:          RADIOLOGY & ADDITIONAL STUDIES:    < from: Xray Chest 1 View- PORTABLE-Urgent (07.27.20 @ 14:43) >  FINDINGS:    Stable left midlung linear scarring.   No pleural effusion or pneumothorax.   Heart size cannot be assessed appropriately in this projection.   No acute osseous abnormalities.      IMPRESSION:  Stable left midlung linear scarring.    < end of copied text >

## 2020-08-07 NOTE — CHART NOTE - NSCHARTNOTEFT_GEN_A_CORE
7.3    5.49  )-----------( 200      ( 07 Aug 2020 07:09 )             22.9     H/H noted as above. Patient denies any dizziness, chest pain, headache, chills, SOB. No visible signs of bleeding.   With clear yellow colored urine. Repeat CBC and T&S ordered.   Discussed with Dr. Momin-> TODD ASA for now, repeat CBC, Obtain occult stool. Will monitor closely 7.3    5.49  )-----------( 200      ( 07 Aug 2020 07:09 )             22.9     H/H noted as above. Patient denies any dizziness, chest pain, headache, chills, SOB. No visible signs of bleeding.   With clear yellow colored urine. Repeat CBC and T&S ordered.   Discussed with Dr. Momin-> DC ASA for now, repeat CBC, Obtain occult stool. Will monitor closely    Addendum: Repeat CBC as below, discussed with Dr. Momin. No transfusion at this time. Dose Coumadin 6mg today.                     7.9    5.91  )-----------( 222      ( 07 Aug 2020 09:08 )             25.5

## 2020-08-07 NOTE — PROGRESS NOTE ADULT - ASSESSMENT
Assessment  DMT2: 89y Female with DM T2 with hyperglycemia admitted with L foot wound, A1C 6.1%, was on basal insulin at home, now on insulin coverage only, blood sugars trending within acceptable range, no hypoglycemic episodes. Patient is eating meals, appears alert and comfortable, denies current complaints.  Hypothyroidism: On Synthroid 75 mcg po daily, compliant with Synthroid intake, asymptomatic, euthyroid.  Hypercalcemia: Due to primary hyperparathyroidisms, received hydration, calcium was borderline high, now improved.  Toe Gangrene: No intervention, FU Vascular/Podiatry.  CKD: labs, chart reviewed.              Curtis Castillo MD  Cell: [phone]  Office: [phone]

## 2020-08-07 NOTE — PATIENT PROFILE ADULT - NSPROHMSYMPCOND_GEN_A_NUR
D/c tracker update:    7/29 per IV certified letter mail card returned unsigned.     IV to send letters. 30 day provider oversight until 8/21.    D/c list and status updated.    Routed to care team.     none

## 2020-08-07 NOTE — PROGRESS NOTE ADULT - SUBJECTIVE AND OBJECTIVE BOX
Chief complaint  Patient is a 89y old  Female who presents with a chief complaint of left foot big toe pain (06 Aug 2020 13:47)   Review of systems  Patient in bed, looks comfortable, no fever, no hypoglycemia.    Labs and Fingersticks  CAPILLARY BLOOD GLUCOSE      POCT Blood Glucose.: 104 mg/dL (07 Aug 2020 08:42)  POCT Blood Glucose.: 122 mg/dL (06 Aug 2020 22:16)  POCT Blood Glucose.: 157 mg/dL (06 Aug 2020 17:05)  POCT Blood Glucose.: 146 mg/dL (06 Aug 2020 12:21)      Anion Gap, Serum: 10 (08-07 @ 07:09)  Anion Gap, Serum: 14 (08-06 @ 06:05)      Calcium, Total Serum: 10.0 (08-07 @ 07:09)  Calcium, Total Serum: 10.5 (08-06 @ 06:05)          08-07    143  |  110<H>  |  66<H>  ----------------------------<  102<H>  5.2   |  23  |  1.92<H>    Ca    10.0      07 Aug 2020 07:09  Phos  4.2     08-07  Mg     2.2     08-07                          7.9    5.91  )-----------( 222      ( 07 Aug 2020 09:08 )             25.5     Medications  MEDICATIONS  (STANDING):  allopurinol 100 milliGRAM(s) Oral daily  amLODIPine   Tablet 10 milliGRAM(s) Oral daily  dextrose 5%. 1000 milliLiter(s) (50 mL/Hr) IV Continuous <Continuous>  dextrose 50% Injectable 12.5 Gram(s) IV Push once  dextrose 50% Injectable 25 Gram(s) IV Push once  dextrose 50% Injectable 25 Gram(s) IV Push once  gabapentin 100 milliGRAM(s) Oral three times a day  heparin  Infusion.  Unit(s)/Hr (13 mL/Hr) IV Continuous <Continuous>  hydrALAZINE 75 milliGRAM(s) Oral three times a day  insulin lispro (HumaLOG) corrective regimen sliding scale   SubCutaneous three times a day before meals  insulin lispro (HumaLOG) corrective regimen sliding scale   SubCutaneous at bedtime  latanoprost 0.005% Ophthalmic Solution 1 Drop(s) Left EYE at bedtime  levothyroxine 75 MICROGram(s) Oral daily  polyethylene glycol 3350 17 Gram(s) Oral daily  senna 2 Tablet(s) Oral at bedtime  simvastatin 20 milliGRAM(s) Oral at bedtime      Physical Exam  General: Patient comfortable in bed  Vital Signs Last 12 Hrs  T(F): 97.9 (08-07-20 @ 05:30), Max: 97.9 (08-07-20 @ 05:30)  HR: 64 (08-07-20 @ 05:30) (64 - 64)  BP: 129/54 (08-07-20 @ 05:30) (129/54 - 129/54)  BP(mean): --  RR: 16 (08-07-20 @ 05:30) (16 - 16)  SpO2: 98% (08-07-20 @ 05:30) (98% - 98%)  Neck: No palpable thyroid nodules.  CVS: S1S2, No murmurs  Respiratory: No wheezing, no crepitations  GI: Abdomen soft, bowel sounds positive  Musculoskeletal:  edema lower extremities.   Skin: No skin rashes, no ecchymosis    Diagnostics    Thyroid Stimulating Hormone, Serum: AM Sched. Collection: 06-Aug-2020 04:00 (08-05 @ 09:51)  Free Thyroxine, Serum: AM Sched. Collection: 06-Aug-2020 04:00 (08-05 @ 09:51)

## 2020-08-08 LAB
ALBUMIN SERPL ELPH-MCNC: 3.3 G/DL — SIGNIFICANT CHANGE UP (ref 3.3–5)
ANION GAP SERPL CALC-SCNC: 10 MMO/L — SIGNIFICANT CHANGE UP (ref 7–14)
ANION GAP SERPL CALC-SCNC: 11 MMO/L — SIGNIFICANT CHANGE UP (ref 7–14)
APTT BLD: 106.9 SEC — HIGH (ref 27–36.3)
APTT BLD: 121.1 SEC — CRITICAL HIGH (ref 27–36.3)
APTT BLD: 171.2 SEC — CRITICAL HIGH (ref 27–36.3)
APTT BLD: 66.4 SEC — HIGH (ref 27–36.3)
BUN SERPL-MCNC: 76 MG/DL — HIGH (ref 7–23)
BUN SERPL-MCNC: 77 MG/DL — HIGH (ref 7–23)
CALCIUM SERPL-MCNC: 10.7 MG/DL — HIGH (ref 8.4–10.5)
CALCIUM SERPL-MCNC: 10.7 MG/DL — HIGH (ref 8.4–10.5)
CHLORIDE SERPL-SCNC: 102 MMOL/L — SIGNIFICANT CHANGE UP (ref 98–107)
CHLORIDE SERPL-SCNC: 98 MMOL/L — SIGNIFICANT CHANGE UP (ref 98–107)
CO2 SERPL-SCNC: 23 MMOL/L — SIGNIFICANT CHANGE UP (ref 22–31)
CO2 SERPL-SCNC: 23 MMOL/L — SIGNIFICANT CHANGE UP (ref 22–31)
CREAT SERPL-MCNC: 1.89 MG/DL — HIGH (ref 0.5–1.3)
CREAT SERPL-MCNC: 1.9 MG/DL — HIGH (ref 0.5–1.3)
GLUCOSE BLDC GLUCOMTR-MCNC: 107 MG/DL — HIGH (ref 70–99)
GLUCOSE BLDC GLUCOMTR-MCNC: 116 MG/DL — HIGH (ref 70–99)
GLUCOSE BLDC GLUCOMTR-MCNC: 127 MG/DL — HIGH (ref 70–99)
GLUCOSE BLDC GLUCOMTR-MCNC: 95 MG/DL — SIGNIFICANT CHANGE UP (ref 70–99)
GLUCOSE SERPL-MCNC: 151 MG/DL — HIGH (ref 70–99)
GLUCOSE SERPL-MCNC: 92 MG/DL — SIGNIFICANT CHANGE UP (ref 70–99)
HCT VFR BLD CALC: 26.2 % — LOW (ref 34.5–45)
HGB BLD-MCNC: 8.1 G/DL — LOW (ref 11.5–15.5)
INR BLD: 1.48 — HIGH (ref 0.88–1.16)
MAGNESIUM SERPL-MCNC: 2.4 MG/DL — SIGNIFICANT CHANGE UP (ref 1.6–2.6)
MAGNESIUM SERPL-MCNC: 2.5 MG/DL — SIGNIFICANT CHANGE UP (ref 1.6–2.6)
MCHC RBC-ENTMCNC: 29.8 PG — SIGNIFICANT CHANGE UP (ref 27–34)
MCHC RBC-ENTMCNC: 30.9 % — LOW (ref 32–36)
MCV RBC AUTO: 96.3 FL — SIGNIFICANT CHANGE UP (ref 80–100)
NRBC # FLD: 0 K/UL — SIGNIFICANT CHANGE UP (ref 0–0)
PHOSPHATE SERPL-MCNC: 4.8 MG/DL — HIGH (ref 2.5–4.5)
PHOSPHATE SERPL-MCNC: 5.6 MG/DL — HIGH (ref 2.5–4.5)
PLATELET # BLD AUTO: 212 K/UL — SIGNIFICANT CHANGE UP (ref 150–400)
PMV BLD: 12.3 FL — SIGNIFICANT CHANGE UP (ref 7–13)
POTASSIUM SERPL-MCNC: 5 MMOL/L — SIGNIFICANT CHANGE UP (ref 3.5–5.3)
POTASSIUM SERPL-MCNC: 5.3 MMOL/L — SIGNIFICANT CHANGE UP (ref 3.5–5.3)
POTASSIUM SERPL-SCNC: 5 MMOL/L — SIGNIFICANT CHANGE UP (ref 3.5–5.3)
POTASSIUM SERPL-SCNC: 5.3 MMOL/L — SIGNIFICANT CHANGE UP (ref 3.5–5.3)
PROTHROM AB SERPL-ACNC: 16.6 SEC — HIGH (ref 10.6–13.6)
RBC # BLD: 2.72 M/UL — LOW (ref 3.8–5.2)
RBC # FLD: 16.2 % — HIGH (ref 10.3–14.5)
SODIUM SERPL-SCNC: 132 MMOL/L — LOW (ref 135–145)
SODIUM SERPL-SCNC: 135 MMOL/L — SIGNIFICANT CHANGE UP (ref 135–145)
WBC # BLD: 5.37 K/UL — SIGNIFICANT CHANGE UP (ref 3.8–10.5)
WBC # FLD AUTO: 5.37 K/UL — SIGNIFICANT CHANGE UP (ref 3.8–10.5)

## 2020-08-08 RX ORDER — CINACALCET 30 MG/1
60 TABLET, FILM COATED ORAL DAILY
Refills: 0 | Status: DISCONTINUED | OUTPATIENT
Start: 2020-08-08 | End: 2020-08-12

## 2020-08-08 RX ORDER — WARFARIN SODIUM 2.5 MG/1
6 TABLET ORAL ONCE
Refills: 0 | Status: COMPLETED | OUTPATIENT
Start: 2020-08-08 | End: 2020-08-08

## 2020-08-08 RX ADMIN — GABAPENTIN 100 MILLIGRAM(S): 400 CAPSULE ORAL at 05:08

## 2020-08-08 RX ADMIN — Medication 75 MILLIGRAM(S): at 13:35

## 2020-08-08 RX ADMIN — Medication 75 MILLIGRAM(S): at 05:08

## 2020-08-08 RX ADMIN — Medication 100 MILLIGRAM(S): at 12:06

## 2020-08-08 RX ADMIN — CINACALCET 60 MILLIGRAM(S): 30 TABLET, FILM COATED ORAL at 13:34

## 2020-08-08 RX ADMIN — Medication 75 MILLIGRAM(S): at 21:08

## 2020-08-08 RX ADMIN — SENNA PLUS 2 TABLET(S): 8.6 TABLET ORAL at 21:08

## 2020-08-08 RX ADMIN — HEPARIN SODIUM 900 UNIT(S)/HR: 5000 INJECTION INTRAVENOUS; SUBCUTANEOUS at 22:05

## 2020-08-08 RX ADMIN — HEPARIN SODIUM 1100 UNIT(S)/HR: 5000 INJECTION INTRAVENOUS; SUBCUTANEOUS at 09:20

## 2020-08-08 RX ADMIN — AMLODIPINE BESYLATE 10 MILLIGRAM(S): 2.5 TABLET ORAL at 05:08

## 2020-08-08 RX ADMIN — GABAPENTIN 100 MILLIGRAM(S): 400 CAPSULE ORAL at 21:08

## 2020-08-08 RX ADMIN — HEPARIN SODIUM 900 UNIT(S)/HR: 5000 INJECTION INTRAVENOUS; SUBCUTANEOUS at 15:43

## 2020-08-08 RX ADMIN — NYSTATIN CREAM 1 APPLICATION(S): 100000 CREAM TOPICAL at 17:58

## 2020-08-08 RX ADMIN — POLYETHYLENE GLYCOL 3350 17 GRAM(S): 17 POWDER, FOR SOLUTION ORAL at 12:05

## 2020-08-08 RX ADMIN — Medication 75 MICROGRAM(S): at 05:08

## 2020-08-08 RX ADMIN — SIMVASTATIN 20 MILLIGRAM(S): 20 TABLET, FILM COATED ORAL at 21:08

## 2020-08-08 RX ADMIN — WARFARIN SODIUM 6 MILLIGRAM(S): 2.5 TABLET ORAL at 17:58

## 2020-08-08 RX ADMIN — GABAPENTIN 100 MILLIGRAM(S): 400 CAPSULE ORAL at 13:35

## 2020-08-08 RX ADMIN — LATANOPROST 1 DROP(S): 0.05 SOLUTION/ DROPS OPHTHALMIC; TOPICAL at 21:08

## 2020-08-08 RX ADMIN — NYSTATIN CREAM 1 APPLICATION(S): 100000 CREAM TOPICAL at 05:08

## 2020-08-08 NOTE — PROGRESS NOTE ADULT - SUBJECTIVE AND OBJECTIVE BOX
S: no chest pain or sob; ROS otherwise negative.       MEDICATIONS  (STANDING):  allopurinol 100 milliGRAM(s) Oral daily  amLODIPine   Tablet 10 milliGRAM(s) Oral daily  cinacalcet 60 milliGRAM(s) Oral daily  dextrose 5%. 1000 milliLiter(s) (50 mL/Hr) IV Continuous <Continuous>  dextrose 50% Injectable 12.5 Gram(s) IV Push once  dextrose 50% Injectable 25 Gram(s) IV Push once  dextrose 50% Injectable 25 Gram(s) IV Push once  gabapentin 100 milliGRAM(s) Oral three times a day  heparin  Infusion.  Unit(s)/Hr (13 mL/Hr) IV Continuous <Continuous>  hydrALAZINE 75 milliGRAM(s) Oral three times a day  insulin lispro (HumaLOG) corrective regimen sliding scale   SubCutaneous three times a day before meals  insulin lispro (HumaLOG) corrective regimen sliding scale   SubCutaneous at bedtime  latanoprost 0.005% Ophthalmic Solution 1 Drop(s) Left EYE at bedtime  levothyroxine 75 MICROGram(s) Oral daily  nystatin Powder 1 Application(s) Topical two times a day  polyethylene glycol 3350 17 Gram(s) Oral daily  senna 2 Tablet(s) Oral at bedtime  simvastatin 20 milliGRAM(s) Oral at bedtime  warfarin 6 milliGRAM(s) Oral once    MEDICATIONS  (PRN):  acetaminophen   Tablet .. 650 milliGRAM(s) Oral every 6 hours PRN Temp greater or equal to 38C (100.4F), Mild Pain (1 - 3), Moderate Pain (4 - 6), Severe Pain (7 - 10)  dextrose 40% Gel 15 Gram(s) Oral once PRN Blood Glucose LESS THAN 70 milliGRAM(s)/deciliter  glucagon  Injectable 1 milliGRAM(s) IntraMuscular once PRN Glucose LESS THAN 70 milligrams/deciliter  heparin   Injectable 6000 Unit(s) IV Push every 6 hours PRN For aPTT less than 40  heparin   Injectable 3000 Unit(s) IV Push every 6 hours PRN For aPTT between 40 - 57  morphine  - Injectable 2 milliGRAM(s) IV Push every 4 hours PRN Severe Pain (7 - 10)      LABS:                     8.1    5.37  )-----------( 212      ( 08 Aug 2020 05:39 )             26.2    135  |  102  |  77<H>  ----------------------------<  92  5.3   |  23  |  1.90<H>    Ca    10.7<H>      08 Aug 2020 08:20  Phos  4.8     08-08  Mg     2.5     08-08    TPro  x   /  Alb  3.3  /  TBili  x   /  DBili  x   /  AST  x   /  ALT  x   /  AlkPhos  x   08-08    Creatinine Trend: 1.90<--, 1.89<--, 1.92<--, 1.91<--, 2.14<--, 2.08<--   PT/INR - ( 08 Aug 2020 05:39 )   PT: 16.6 SEC;   INR: 1.48    PTT - ( 08 Aug 2020 08:20 )  PTT:106.9 SEC    PHYSICAL EXAM  Vital Signs Last 24 Hrs  T(C): 36.3 (08 Aug 2020 05:07), Max: 36.7 (07 Aug 2020 12:38)  T(F): 97.3 (08 Aug 2020 05:07), Max: 98 (07 Aug 2020 12:38)  HR: 52 (08 Aug 2020 05:07) (52 - 66)  BP: 130/54 (08 Aug 2020 05:07) (130/54 - 147/61)  RR: 12 (08 Aug 2020 05:07) (12 - 20)  SpO2: 100% (08 Aug 2020 05:07) (100% - 100%)    Gen: Frail elderly AA woman, nondiaphoretic and comfortable.  HEENT:  (-)icterus (-)pallor  CV: Normal S1, there is still a sharp S2.  Mid-to-late peaking 3/6 systolic murmur at the upper chest (+)2 Pulses B/l  Resp:  Clear to ausculatation B/L, normal effort  GI: (+) BS Soft, NT, ND  Lymph:  (-)Edema, (-)obvious lymphadenopathy  Skin: +left foot dressing, Warm to touch, Normal turgor  Psych: Appropriate mood and affect    TTE: < from: Transthoracic Echocardiogram (07.22.20 @ 16:27) >  1. Mitral annular calcification, otherwise normal mitral  valve. Mild-moderate mitral regurgitation.  2. Calcified trileaflet aortic valve with decreased  opening.  Peak transaortic valve gradient [id number] mm Hg,  mean transaortic valve gradient equals 26 mm Hg, estimated  aortic valve area equals 1 sqcm (by continuity equation),  consistent with moderate to severe aortic stenosis.  3. Severely dilated left atrium.  LA volume index = 57  cc/m2.  4. Mild concentric left ventricular hypertrophy.  5. Normal left ventricular systolic function. No segmental  wall motion abnormalities.  6. Severe diastolic dysfunction.  7. Severe right atrial enlargement.  8. Normal right ventricular size and function.  9. Estimated right ventricular systolic pressure equals 53  mm Hg, assuming right atrial pressure equals 10 mm Hg,  consistent with moderate pulmonary hypertension.    < end of copied text >      ASSESSMENT/PLAN: Patient is a 89 year old Female well known to Dr. Berman, who presented with a foot wound.  Consult requested for pre-op cardiovascular risk stratification.  Her known PMH includes persistent Afib on coumadin who was not a candidate for left atrial appendage occlusion with Watchman, chronic HFpEF, HTN, HLD, hypothyroidism, CKD, Type 2 DM, CVA, and hx of PE.    - Continue AC for cva prevention if no contraindications - on hep gtt bridge to coumadin for goal INR 2-3.  - Continue with PO lasix   - no further inpatient cardiac w/u needed at this time  - pt. and patient's family prefer conservative cv care and no further workup of valvular disease (her AS is moderate by examination)  - s/p LE angio - tolerated procedure well in terms of cv perspective  - podiatric surgery canceled, plan for IV Abx and wound care.  working on PT and discharge planning.  -f/u with Dr Berman 8/31 at 11:20AM [phone]

## 2020-08-08 NOTE — PROGRESS NOTE ADULT - ASSESSMENT
89 year old female PMH  a-fib on coumadin, diastolic CHF (with EF 63% on TTE 8/13/19), HTN, HLD, CKD stage 3-4, IDDM, PVD, CVA, PE, hypothyroidism, p/w L foot wound. Patient was sent from podiatrist office for non healing left great toe ulcer, to rule out OM. Renal following for NIKHIL/CKD Mx.     ·	CKD4: Baseline Creatinine 1.7-2.0  euvolemic. Electrolytes acceptable.    labs reviewed. Serum Creatinine stable    s/p LE angiogram, given IVF post procedure. Serum Creatinine remained stable  TTE noted above with normal LVEF, severe diastolic dysfunction  continue to hold lasix 40mg po qdaily for now.   avoid ACEi/ARB for now/NSAIDs/Nephrotoxics.  monitor BMP daily , ace or arb will be needed once Serum Creatinine stable  low K in diet    ·	HTN, controlled-bp optimal. c/w current bp meds. avoid ACEi/ARB. pain Mx.     ·	Toe gangrene, PAD. s/p LE angio, no stent. f/u w/ Podiatry, plans for amputation cancelled.     ·	DM- Mx per medicine

## 2020-08-08 NOTE — PROGRESS NOTE ADULT - ASSESSMENT
Assessment  DMT2: 89y Female with DM T2 with hyperglycemia admitted with L foot wound, A1C 6.1%, was on basal insulin at home, now on insulin coverage only, blood sugars trending within acceptable range, no hypoglycemic episodes. Patient is eating meals, appears alert and comfortable, denies current complaints.  Hypothyroidism: On Synthroid 75 mcg po daily, compliant with Synthroid intake, asymptomatic, euthyroid.  Hypercalcemia: Due to primary hyperparathyroidisms, calcium trending up..  Toe Gangrene: No intervention, FU Vascular/Podiatry.  CKD: labs, chart reviewed.              Curtis Castillo MD  Cell: [phone]  Office: [phone]

## 2020-08-08 NOTE — PROGRESS NOTE ADULT - SUBJECTIVE AND OBJECTIVE BOX
INTERVAL HPI/OVERNIGHT EVENTS: i feel okay .   Vital Signs Last 24 Hrs  T(C): 36.3 (08 Aug 2020 05:07), Max: 36.7 (07 Aug 2020 12:38)  T(F): 97.3 (08 Aug 2020 05:07), Max: 98 (07 Aug 2020 12:38)  HR: 52 (08 Aug 2020 05:07) (52 - 66)  BP: 130/54 (08 Aug 2020 05:07) (130/54 - 147/61)  BP(mean): --  RR: 12 (08 Aug 2020 05:07) (12 - 20)  SpO2: 100% (08 Aug 2020 05:07) (100% - 100%)  I&O's Summary    07 Aug 2020 07:01  -  08 Aug 2020 07:00  --------------------------------------------------------  IN: 0 mL / OUT: 750 mL / NET: -750 mL      MEDICATIONS  (STANDING):  allopurinol 100 milliGRAM(s) Oral daily  amLODIPine   Tablet 10 milliGRAM(s) Oral daily  dextrose 5%. 1000 milliLiter(s) (50 mL/Hr) IV Continuous <Continuous>  dextrose 50% Injectable 12.5 Gram(s) IV Push once  dextrose 50% Injectable 25 Gram(s) IV Push once  dextrose 50% Injectable 25 Gram(s) IV Push once  gabapentin 100 milliGRAM(s) Oral three times a day  heparin  Infusion.  Unit(s)/Hr (13 mL/Hr) IV Continuous <Continuous>  hydrALAZINE 75 milliGRAM(s) Oral three times a day  insulin lispro (HumaLOG) corrective regimen sliding scale   SubCutaneous three times a day before meals  insulin lispro (HumaLOG) corrective regimen sliding scale   SubCutaneous at bedtime  latanoprost 0.005% Ophthalmic Solution 1 Drop(s) Left EYE at bedtime  levothyroxine 75 MICROGram(s) Oral daily  nystatin Powder 1 Application(s) Topical two times a day  polyethylene glycol 3350 17 Gram(s) Oral daily  senna 2 Tablet(s) Oral at bedtime  simvastatin 20 milliGRAM(s) Oral at bedtime  warfarin 6 milliGRAM(s) Oral once    MEDICATIONS  (PRN):  acetaminophen   Tablet .. 650 milliGRAM(s) Oral every 6 hours PRN Temp greater or equal to 38C (100.4F), Mild Pain (1 - 3), Moderate Pain (4 - 6), Severe Pain (7 - 10)  dextrose 40% Gel 15 Gram(s) Oral once PRN Blood Glucose LESS THAN 70 milliGRAM(s)/deciliter  glucagon  Injectable 1 milliGRAM(s) IntraMuscular once PRN Glucose LESS THAN 70 milligrams/deciliter  heparin   Injectable 6000 Unit(s) IV Push every 6 hours PRN For aPTT less than 40  heparin   Injectable 3000 Unit(s) IV Push every 6 hours PRN For aPTT between 40 - 57  morphine  - Injectable 2 milliGRAM(s) IV Push every 4 hours PRN Severe Pain (7 - 10)    LABS:                        8.1    5.37  )-----------( 212      ( 08 Aug 2020 05:39 )             26.2     08-08    135  |  102  |  77<H>  ----------------------------<  92  5.3   |  23  |  1.90<H>    Ca    10.7<H>      08 Aug 2020 08:20  Phos  4.8     08-08  Mg     2.5     08-08      PT/INR - ( 08 Aug 2020 05:39 )   PT: 16.6 SEC;   INR: 1.48          PTT - ( 08 Aug 2020 08:20 )  PTT:106.9 SEC    CAPILLARY BLOOD GLUCOSE      POCT Blood Glucose.: 95 mg/dL (08 Aug 2020 08:32)  POCT Blood Glucose.: 138 mg/dL (07 Aug 2020 22:11)  POCT Blood Glucose.: 152 mg/dL (07 Aug 2020 17:01)  POCT Blood Glucose.: 141 mg/dL (07 Aug 2020 12:37)          REVIEW OF SYSTEMS:  CONSTITUTIONAL: No fever, weight loss, or fatigue  EYES: No eye pain, visual disturbances, or discharge  RESPIRATORY: No cough, wheezing, chills or hemoptysis; No shortness of breath  CARDIOVASCULAR: No chest pain, palpitations, dizziness, or leg swelling  GASTROINTESTINAL: No abdominal or epigastric pain. No nausea, vomiting, or hematemesis; No diarrhea or constipation. No melena or hematochezia.  GENITOURINARY: No dysuria, frequency, hematuria, or incontinence  NEUROLOGICAL: No headaches, memory loss, loss of strength, numbness, or tremors    Consultant(s) Notes Reviewed:  [x ] YES  [ ] NO    PHYSICAL EXAM:  GENERAL: NAD, well-groomed, well-developed,not in any distress ,  HEAD:  Atraumatic, Normocephalic  EYES: EOMI, PERRLA, conjunctiva and sclera clear  ENMT: No tonsillar erythema, exudates, or enlargement; Moist mucous membranes, Good dentition, No lesions  NECK: Supple, No JVD, Normal thyroid  NERVOUS SYSTEM:  Alert & Oriented X3, No focal deficit   CHEST/LUNG: Good air entry bilateral with no  rales, rhonchi, wheezing, or rubs  HEART: Regular rate and rhythm; No murmurs, rubs, or gallops  ABDOMEN: Soft, Nontender, Nondistended; Bowel sounds present  EXTREMITIES:  2+ Peripheral Pulses, No clubbing, cyanosis, or edema  SKIN: No rashes or lesions    Care Discussed with Consultants/Other Providers [ x] YES  [ ] NO

## 2020-08-08 NOTE — PROGRESS NOTE ADULT - SUBJECTIVE AND OBJECTIVE BOX
Chief complaint  Patient is a 89y old  Female who presents with a chief complaint of left foot big toe pain (08 Aug 2020 12:23)   Review of systems  Patient in bed, looks comfortable, no fever, no hypoglycemia.    Labs and Fingersticks  CAPILLARY BLOOD GLUCOSE      POCT Blood Glucose.: 107 mg/dL (08 Aug 2020 12:18)  POCT Blood Glucose.: 95 mg/dL (08 Aug 2020 08:32)  POCT Blood Glucose.: 138 mg/dL (07 Aug 2020 22:11)  POCT Blood Glucose.: 152 mg/dL (07 Aug 2020 17:01)      Anion Gap, Serum: 10 (08-08 @ 08:20)  Anion Gap, Serum: 11 (08-08 @ 05:39)  Anion Gap, Serum: 10 (08-07 @ 07:09)      Calcium, Total Serum: 10.7 <H> (08-08 @ 08:20)  Calcium, Total Serum: 10.7 <H> (08-08 @ 05:39)  Calcium, Total Serum: 10.0 (08-07 @ 07:09)  Albumin, Serum: 3.3 (08-08 @ 08:20)          08-08    135  |  102  |  77<H>  ----------------------------<  92  5.3   |  23  |  1.90<H>    Ca    10.7<H>      08 Aug 2020 08:20  Phos  4.8     08-08  Mg     2.5     08-08    TPro  x   /  Alb  3.3  /  TBili  x   /  DBili  x   /  AST  x   /  ALT  x   /  AlkPhos  x   08-08                        8.1    5.37  )-----------( 212      ( 08 Aug 2020 05:39 )             26.2     Medications  MEDICATIONS  (STANDING):  allopurinol 100 milliGRAM(s) Oral daily  amLODIPine   Tablet 10 milliGRAM(s) Oral daily  cinacalcet 60 milliGRAM(s) Oral daily  dextrose 5%. 1000 milliLiter(s) (50 mL/Hr) IV Continuous <Continuous>  dextrose 50% Injectable 12.5 Gram(s) IV Push once  dextrose 50% Injectable 25 Gram(s) IV Push once  dextrose 50% Injectable 25 Gram(s) IV Push once  gabapentin 100 milliGRAM(s) Oral three times a day  heparin  Infusion.  Unit(s)/Hr (13 mL/Hr) IV Continuous <Continuous>  hydrALAZINE 75 milliGRAM(s) Oral three times a day  insulin lispro (HumaLOG) corrective regimen sliding scale   SubCutaneous three times a day before meals  insulin lispro (HumaLOG) corrective regimen sliding scale   SubCutaneous at bedtime  latanoprost 0.005% Ophthalmic Solution 1 Drop(s) Left EYE at bedtime  levothyroxine 75 MICROGram(s) Oral daily  nystatin Powder 1 Application(s) Topical two times a day  polyethylene glycol 3350 17 Gram(s) Oral daily  senna 2 Tablet(s) Oral at bedtime  simvastatin 20 milliGRAM(s) Oral at bedtime  warfarin 6 milliGRAM(s) Oral once      Physical Exam  General: Patient comfortable in bed  Vital Signs Last 12 Hrs  T(F): 97.3 (08-08-20 @ 05:07), Max: 97.3 (08-08-20 @ 05:07)  HR: 52 (08-08-20 @ 05:07) (52 - 52)  BP: 130/54 (08-08-20 @ 05:07) (130/54 - 130/54)  BP(mean): --  RR: 12 (08-08-20 @ 05:07) (12 - 12)  SpO2: 100% (08-08-20 @ 05:07) (100% - 100%)  Neck: No palpable thyroid nodules.  CVS: S1S2, No murmurs  Respiratory: No wheezing, no crepitations  GI: Abdomen soft, bowel sounds positive  Musculoskeletal:  edema lower extremities.   Skin: No skin rashes, no ecchymosis    Diagnostics    Thyroid Stimulating Hormone, Serum: AM Sched. Collection: 06-Aug-2020 04:00 (08-05 @ 09:51)  Free Thyroxine, Serum: AM Sched. Collection: 06-Aug-2020 04:00 (08-05 @ 09:51)

## 2020-08-08 NOTE — PROGRESS NOTE ADULT - SUBJECTIVE AND OBJECTIVE BOX
[facility] : [facility] [phone], Office [phone]  Dr Sullivan/Dr Garcia / Dr Florin FELDMAN /Dr José carpio /Dr GASTON Castillo/Dr Abebe Tellez/Dr Shahid Segovia /Dr BABAR Luna  _______________________________________________________________________________________________    seen and examined today for   Interval :  VITALS:  T(F): 97.3 (08-08-20 @ 05:07), Max: 98 (08-07-20 @ 12:38)  HR: 52 (08-08-20 @ 05:07)  BP: 130/54 (08-08-20 @ 05:07)  RR: 12 (08-08-20 @ 05:07)  SpO2: 100% (08-08-20 @ 05:07)  08-07 @ 07:01  -  08-08 @ 07:00  --------------------------------------------------------  IN: 0 mL / OUT: 750 mL / NET: -750 mL    Physical Exam :-  Constitutional: NAD  Respiratory: Bilateral equal breath sounds, no Crackles present.  Cardiovascular: S1, S2 normal, positive Murmur  Gastrointestinal: Bowel Sounds present, soft, non tender.  Extremities: no Edema Feet  Neurological: Alert   Psychiatric: Normal mood, normal affect  Data:-  Allergies :   No Known Allergies    Hospital Medications:   MEDICATIONS  (STANDING):  allopurinol 100 milliGRAM(s) Oral daily  amLODIPine   Tablet 10 milliGRAM(s) Oral daily  cinacalcet 60 milliGRAM(s) Oral daily  dextrose 5%. 1000 milliLiter(s) (50 mL/Hr) IV Continuous <Continuous>  dextrose 50% Injectable 12.5 Gram(s) IV Push once  dextrose 50% Injectable 25 Gram(s) IV Push once  dextrose 50% Injectable 25 Gram(s) IV Push once  gabapentin 100 milliGRAM(s) Oral three times a day  heparin  Infusion.  Unit(s)/Hr (13 mL/Hr) IV Continuous <Continuous>  hydrALAZINE 75 milliGRAM(s) Oral three times a day  insulin lispro (HumaLOG) corrective regimen sliding scale   SubCutaneous three times a day before meals  insulin lispro (HumaLOG) corrective regimen sliding scale   SubCutaneous at bedtime  latanoprost 0.005% Ophthalmic Solution 1 Drop(s) Left EYE at bedtime  levothyroxine 75 MICROGram(s) Oral daily  nystatin Powder 1 Application(s) Topical two times a day  polyethylene glycol 3350 17 Gram(s) Oral daily  senna 2 Tablet(s) Oral at bedtime  simvastatin 20 milliGRAM(s) Oral at bedtime  warfarin 6 milliGRAM(s) Oral once    08-08    135  |  102  |  77<H>  ----------------------------<  92  5.3   |  23  |  1.90<H>    Ca    10.7<H>      08 Aug 2020 08:20  Phos  4.8     08-08  Mg     2.5     08-08    TPro      /  Alb  3.3  /  TBili      /  DBili      /  AST      /  ALT      /  AlkPhos      08-08    Creatinine Trend: 1.90 <--, 1.89 <--, 1.92 <--, 1.91 <--, 2.14 <--, 2.08 <--, 2.21 <--, 2.18 <--                        8.1    5.37  )-----------( 212      ( 08 Aug 2020 05:39 )             26.2

## 2020-08-08 NOTE — CHART NOTE - NSCHARTNOTEFT_GEN_A_CORE
corrected calcium 11.3, discussed with endocrine, sensipar 60 mg QD started  INCOMPLETE NOTE Discussed case with endocrinology team. 8/8 calcium elevated 10.7, corrected calcium 11.3. Sensipar 60 mg QD started for hypercalcemia per endocrinology recommendations. Team to continue to monitor.

## 2020-08-09 LAB
ANION GAP SERPL CALC-SCNC: 12 MMO/L — SIGNIFICANT CHANGE UP (ref 7–14)
ANION GAP SERPL CALC-SCNC: 12 MMO/L — SIGNIFICANT CHANGE UP (ref 7–14)
APTT BLD: 63.9 SEC — HIGH (ref 27–36.3)
BUN SERPL-MCNC: 68 MG/DL — HIGH (ref 7–23)
BUN SERPL-MCNC: 77 MG/DL — HIGH (ref 7–23)
CALCIUM SERPL-MCNC: 10.3 MG/DL — SIGNIFICANT CHANGE UP (ref 8.4–10.5)
CALCIUM SERPL-MCNC: 9.9 MG/DL — SIGNIFICANT CHANGE UP (ref 8.4–10.5)
CHLORIDE SERPL-SCNC: 100 MMOL/L — SIGNIFICANT CHANGE UP (ref 98–107)
CHLORIDE SERPL-SCNC: 103 MMOL/L — SIGNIFICANT CHANGE UP (ref 98–107)
CO2 SERPL-SCNC: 22 MMOL/L — SIGNIFICANT CHANGE UP (ref 22–31)
CO2 SERPL-SCNC: 23 MMOL/L — SIGNIFICANT CHANGE UP (ref 22–31)
CREAT SERPL-MCNC: 1.83 MG/DL — HIGH (ref 0.5–1.3)
CREAT SERPL-MCNC: 1.94 MG/DL — HIGH (ref 0.5–1.3)
GLUCOSE BLDC GLUCOMTR-MCNC: 105 MG/DL — HIGH (ref 70–99)
GLUCOSE BLDC GLUCOMTR-MCNC: 110 MG/DL — HIGH (ref 70–99)
GLUCOSE BLDC GLUCOMTR-MCNC: 116 MG/DL — HIGH (ref 70–99)
GLUCOSE BLDC GLUCOMTR-MCNC: 141 MG/DL — HIGH (ref 70–99)
GLUCOSE SERPL-MCNC: 114 MG/DL — HIGH (ref 70–99)
GLUCOSE SERPL-MCNC: 115 MG/DL — HIGH (ref 70–99)
HCT VFR BLD CALC: 24.5 % — LOW (ref 34.5–45)
HGB BLD-MCNC: 7.8 G/DL — LOW (ref 11.5–15.5)
INR BLD: 1.6 — HIGH (ref 0.88–1.16)
MAGNESIUM SERPL-MCNC: 2.2 MG/DL — SIGNIFICANT CHANGE UP (ref 1.6–2.6)
MAGNESIUM SERPL-MCNC: 2.3 MG/DL — SIGNIFICANT CHANGE UP (ref 1.6–2.6)
MCHC RBC-ENTMCNC: 30.1 PG — SIGNIFICANT CHANGE UP (ref 27–34)
MCHC RBC-ENTMCNC: 31.8 % — LOW (ref 32–36)
MCV RBC AUTO: 94.6 FL — SIGNIFICANT CHANGE UP (ref 80–100)
NRBC # FLD: 0 K/UL — SIGNIFICANT CHANGE UP (ref 0–0)
PHOSPHATE SERPL-MCNC: 4 MG/DL — SIGNIFICANT CHANGE UP (ref 2.5–4.5)
PHOSPHATE SERPL-MCNC: 4.4 MG/DL — SIGNIFICANT CHANGE UP (ref 2.5–4.5)
PLATELET # BLD AUTO: 205 K/UL — SIGNIFICANT CHANGE UP (ref 150–400)
PMV BLD: 11.6 FL — SIGNIFICANT CHANGE UP (ref 7–13)
POTASSIUM SERPL-MCNC: 4.7 MMOL/L — SIGNIFICANT CHANGE UP (ref 3.5–5.3)
POTASSIUM SERPL-MCNC: 5.6 MMOL/L — HIGH (ref 3.5–5.3)
POTASSIUM SERPL-SCNC: 4.7 MMOL/L — SIGNIFICANT CHANGE UP (ref 3.5–5.3)
POTASSIUM SERPL-SCNC: 5.6 MMOL/L — HIGH (ref 3.5–5.3)
PROTHROM AB SERPL-ACNC: 18 SEC — HIGH (ref 10.6–13.6)
RBC # BLD: 2.59 M/UL — LOW (ref 3.8–5.2)
RBC # FLD: 16.5 % — HIGH (ref 10.3–14.5)
SODIUM SERPL-SCNC: 134 MMOL/L — LOW (ref 135–145)
SODIUM SERPL-SCNC: 138 MMOL/L — SIGNIFICANT CHANGE UP (ref 135–145)
WBC # BLD: 6.41 K/UL — SIGNIFICANT CHANGE UP (ref 3.8–10.5)
WBC # FLD AUTO: 6.41 K/UL — SIGNIFICANT CHANGE UP (ref 3.8–10.5)

## 2020-08-09 PROCEDURE — 93010 ELECTROCARDIOGRAM REPORT: CPT

## 2020-08-09 RX ORDER — SODIUM ZIRCONIUM CYCLOSILICATE 10 G/10G
10 POWDER, FOR SUSPENSION ORAL ONCE
Refills: 0 | Status: COMPLETED | OUTPATIENT
Start: 2020-08-09 | End: 2020-08-09

## 2020-08-09 RX ORDER — WARFARIN SODIUM 2.5 MG/1
6 TABLET ORAL ONCE
Refills: 0 | Status: COMPLETED | OUTPATIENT
Start: 2020-08-09 | End: 2020-08-09

## 2020-08-09 RX ADMIN — AMLODIPINE BESYLATE 10 MILLIGRAM(S): 2.5 TABLET ORAL at 05:07

## 2020-08-09 RX ADMIN — LATANOPROST 1 DROP(S): 0.05 SOLUTION/ DROPS OPHTHALMIC; TOPICAL at 21:57

## 2020-08-09 RX ADMIN — SENNA PLUS 2 TABLET(S): 8.6 TABLET ORAL at 21:57

## 2020-08-09 RX ADMIN — SIMVASTATIN 20 MILLIGRAM(S): 20 TABLET, FILM COATED ORAL at 21:57

## 2020-08-09 RX ADMIN — Medication 75 MILLIGRAM(S): at 13:11

## 2020-08-09 RX ADMIN — SODIUM ZIRCONIUM CYCLOSILICATE 10 GRAM(S): 10 POWDER, FOR SUSPENSION ORAL at 10:04

## 2020-08-09 RX ADMIN — Medication 75 MILLIGRAM(S): at 21:58

## 2020-08-09 RX ADMIN — HEPARIN SODIUM 900 UNIT(S)/HR: 5000 INJECTION INTRAVENOUS; SUBCUTANEOUS at 05:07

## 2020-08-09 RX ADMIN — WARFARIN SODIUM 6 MILLIGRAM(S): 2.5 TABLET ORAL at 17:48

## 2020-08-09 RX ADMIN — CINACALCET 60 MILLIGRAM(S): 30 TABLET, FILM COATED ORAL at 13:11

## 2020-08-09 RX ADMIN — Medication 650 MILLIGRAM(S): at 21:58

## 2020-08-09 RX ADMIN — Medication 75 MICROGRAM(S): at 05:07

## 2020-08-09 RX ADMIN — Medication 75 MILLIGRAM(S): at 05:07

## 2020-08-09 RX ADMIN — GABAPENTIN 100 MILLIGRAM(S): 400 CAPSULE ORAL at 21:58

## 2020-08-09 RX ADMIN — NYSTATIN CREAM 1 APPLICATION(S): 100000 CREAM TOPICAL at 17:48

## 2020-08-09 RX ADMIN — NYSTATIN CREAM 1 APPLICATION(S): 100000 CREAM TOPICAL at 05:06

## 2020-08-09 RX ADMIN — Medication 100 MILLIGRAM(S): at 13:11

## 2020-08-09 RX ADMIN — GABAPENTIN 100 MILLIGRAM(S): 400 CAPSULE ORAL at 13:12

## 2020-08-09 RX ADMIN — GABAPENTIN 100 MILLIGRAM(S): 400 CAPSULE ORAL at 05:07

## 2020-08-09 RX ADMIN — Medication 650 MILLIGRAM(S): at 22:58

## 2020-08-09 NOTE — PROGRESS NOTE ADULT - SUBJECTIVE AND OBJECTIVE BOX
8/8- resting comfortably.  no acute events overnight. no palpitations, chest pain, SOB, or lightheadedness.  working on PT with a walker.    acetaminophen   Tablet .. 650 milliGRAM(s) Oral every 6 hours PRN  allopurinol 100 milliGRAM(s) Oral daily  amLODIPine   Tablet 10 milliGRAM(s) Oral daily  cinacalcet 60 milliGRAM(s) Oral daily  dextrose 40% Gel 15 Gram(s) Oral once PRN  dextrose 5%. 1000 milliLiter(s) IV Continuous <Continuous>  dextrose 50% Injectable 12.5 Gram(s) IV Push once  dextrose 50% Injectable 25 Gram(s) IV Push once  dextrose 50% Injectable 25 Gram(s) IV Push once  gabapentin 100 milliGRAM(s) Oral three times a day  glucagon  Injectable 1 milliGRAM(s) IntraMuscular once PRN  heparin   Injectable 6000 Unit(s) IV Push every 6 hours PRN  heparin   Injectable 3000 Unit(s) IV Push every 6 hours PRN  heparin  Infusion.  Unit(s)/Hr IV Continuous <Continuous>  hydrALAZINE 75 milliGRAM(s) Oral three times a day  insulin lispro (HumaLOG) corrective regimen sliding scale   SubCutaneous three times a day before meals  insulin lispro (HumaLOG) corrective regimen sliding scale   SubCutaneous at bedtime  latanoprost 0.005% Ophthalmic Solution 1 Drop(s) Left EYE at bedtime  levothyroxine 75 MICROGram(s) Oral daily  morphine  - Injectable 2 milliGRAM(s) IV Push every 4 hours PRN  nystatin Powder 1 Application(s) Topical two times a day  polyethylene glycol 3350 17 Gram(s) Oral daily  senna 2 Tablet(s) Oral at bedtime  simvastatin 20 milliGRAM(s) Oral at bedtime  sodium zirconium cyclosilicate 10 Gram(s) Oral once  warfarin 6 milliGRAM(s) Oral once                            7.8    6.41  )-----------( 205      ( 09 Aug 2020 03:46 )             24.5       08-09    138  |  103  |  77<H>  ----------------------------<  114<H>  5.6<H>   |  23  |  1.83<H>    Ca    10.3      09 Aug 2020 03:46  Phos  4.4     08-09  Mg     2.3     08-09    TPro  x   /  Alb  3.3  /  TBili  x   /  DBili  x   /  AST  x   /  ALT  x   /  AlkPhos  x   08-08    T(C): 36.7 (08-09-20 @ 05:06), Max: 36.7 (08-09-20 @ 05:06)  HR: 68 (08-09-20 @ 05:06) (60 - 68)  BP: 150/67 (08-09-20 @ 05:06) (132/60 - 159/87)  RR: 12 (08-09-20 @ 05:06) (12 - 20)  SpO2: 98% (08-09-20 @ 05:06) (98% - 100%)  Wt(kg): --    I&O's Summary    08 Aug 2020 07:01  -  09 Aug 2020 07:00  --------------------------------------------------------  IN: 0 mL / OUT: 750 mL / NET: -750 mL    Gen: Frail elderly AA woman, nondiaphoretic and comfortable.  HEENT:  (-)icterus (-)pallor  CV: Normal S1, there is still a sharp S2.  Mid-to-late peaking 3/6 systolic murmur at the upper chest (+)2 Pulses B/l  Resp:  Clear to ausculatation B/L, normal effort  GI: (+) BS Soft, NT, ND  Lymph:  (-)Edema, (-)obvious lymphadenopathy  Skin: +left foot dressing, Warm to touch, Normal turgor  Psych: Appropriate mood and affect    TTE: < from: Transthoracic Echocardiogram (07.22.20 @ 16:27) >  1. Mitral annular calcification, otherwise normal mitral  valve. Mild-moderate mitral regurgitation.  2. Calcified trileaflet aortic valve with decreased  opening.  Peak transaortic valve gradient [id number] mm Hg,  mean transaortic valve gradient equals 26 mm Hg, estimated  aortic valve area equals 1 sqcm (by continuity equation),  consistent with moderate to severe aortic stenosis.  3. Severely dilated left atrium.  LA volume index = 57  cc/m2.  4. Mild concentric left ventricular hypertrophy.  5. Normal left ventricular systolic function. No segmental  wall motion abnormalities.  6. Severe diastolic dysfunction.  7. Severe right atrial enlargement.  8. Normal right ventricular size and function.  9. Estimated right ventricular systolic pressure equals 53  mm Hg, assuming right atrial pressure equals 10 mm Hg,  consistent with moderate pulmonary hypertension.    < end of copied text >      ASSESSMENT/PLAN: Patient is a 89 year old Female well known to Dr. Berman, who presented with a foot wound.  Consult requested for pre-op cardiovascular risk stratification.  Her known PMH includes persistent Afib on coumadin who was not a candidate for left atrial appendage occlusion with Watchman, chronic HFpEF, HTN, HLD, hypothyroidism, CKD, Type 2 DM, CVA, and hx of PE.    - Continue AC for cva prevention if no contraindications - on hep gtt bridge to coumadin for goal INR 2-3.    - no cardiovascular complications with any of her procedures during hospitalization.  stable for DC planning, and working on PT/ambulation.  - no further inpatient cardiac w/u needed at this time    - pt. and patient's family prefer conservative cv care and no further workup of valvular disease (her AS is moderate by examination)  -f/u with Dr Berman 8/31 at 11:20AM [phone]    Simon Jung M.D.  Cardiac Electrophysiology  [phone]

## 2020-08-09 NOTE — PROGRESS NOTE ADULT - SUBJECTIVE AND OBJECTIVE BOX
Infectious Diseases progress note:    Subjective: NAD, afebrile.  No new complaints.  Stable off abx.     ROS:  CONSTITUTIONAL:  No fever, chills, rigors  CARDIOVASCULAR:  No chest pain or palpitations  RESPIRATORY:   No SOB, cough, dyspnea on exertion.  No wheezing  GASTROINTESTINAL:  No abd pain, N/V, diarrhea/constipation  EXTREMITIES:  No swelling or joint pain  GENITOURINARY:  No burning on urination, increased frequency or urgency.  No flank pain  NEUROLOGIC:  No HA, visual disturbances  SKIN: No rashes    Allergies    No Known Allergies    Intolerances        ANTIBIOTICS/RELEVANT:  antimicrobials    immunologic:    OTHER:  acetaminophen   Tablet .. 650 milliGRAM(s) Oral every 6 hours PRN  allopurinol 100 milliGRAM(s) Oral daily  amLODIPine   Tablet 10 milliGRAM(s) Oral daily  cinacalcet 60 milliGRAM(s) Oral daily  dextrose 40% Gel 15 Gram(s) Oral once PRN  dextrose 5%. 1000 milliLiter(s) IV Continuous <Continuous>  dextrose 50% Injectable 12.5 Gram(s) IV Push once  dextrose 50% Injectable 25 Gram(s) IV Push once  dextrose 50% Injectable 25 Gram(s) IV Push once  gabapentin 100 milliGRAM(s) Oral three times a day  glucagon  Injectable 1 milliGRAM(s) IntraMuscular once PRN  heparin   Injectable 6000 Unit(s) IV Push every 6 hours PRN  heparin   Injectable 3000 Unit(s) IV Push every 6 hours PRN  heparin  Infusion.  Unit(s)/Hr IV Continuous <Continuous>  hydrALAZINE 75 milliGRAM(s) Oral three times a day  insulin lispro (HumaLOG) corrective regimen sliding scale   SubCutaneous three times a day before meals  insulin lispro (HumaLOG) corrective regimen sliding scale   SubCutaneous at bedtime  latanoprost 0.005% Ophthalmic Solution 1 Drop(s) Left EYE at bedtime  levothyroxine 75 MICROGram(s) Oral daily  morphine  - Injectable 2 milliGRAM(s) IV Push every 4 hours PRN  nystatin Powder 1 Application(s) Topical two times a day  polyethylene glycol 3350 17 Gram(s) Oral daily  senna 2 Tablet(s) Oral at bedtime  simvastatin 20 milliGRAM(s) Oral at bedtime  warfarin 6 milliGRAM(s) Oral once      Objective:  Vital Signs Last 24 Hrs  T(C): 36.7 (09 Aug 2020 05:06), Max: 36.7 (09 Aug 2020 05:06)  T(F): 98 (09 Aug 2020 05:06), Max: 98 (09 Aug 2020 05:06)  HR: 68 (09 Aug 2020 05:06) (60 - 68)  BP: 150/67 (09 Aug 2020 05:06) (132/60 - 159/87)  BP(mean): --  RR: 12 (09 Aug 2020 05:06) (12 - 20)  SpO2: 98% (09 Aug 2020 05:06) (98% - 100%)    PHYSICAL EXAM:  Constitutional:NAD  Eyes:OLIVIA, EOMI  Ear/Nose/Throat: no thrush, mucositis.  Moist mucous membranes	  Neck:no JVD, no lymphadenopathy, supple  Respiratory: CTA adina  Cardiovascular: S1S2 RRR, no murmurs  Gastrointestinal:soft, nontender,  nondistended (+) BS  Extremities:no e/e/c  Skin:  L ft dsg c/d/i        LABS:                        7.8    6.41  )-----------( 205      ( 09 Aug 2020 03:46 )             24.5     08-09    138  |  103  |  77<H>  ----------------------------<  114<H>  5.6<H>   |  23  |  1.83<H>    Ca    10.3      09 Aug 2020 03:46  Phos  4.4     08-09  Mg     2.3     08-09    TPro  x   /  Alb  3.3  /  TBili  x   /  DBili  x   /  AST  x   /  ALT  x   /  AlkPhos  x   08-08    PT/INR - ( 09 Aug 2020 03:46 )   PT: 18.0 SEC;   INR: 1.60          PTT - ( 09 Aug 2020 03:46 )  PTT:63.9 SEC                        MICROBIOLOGY:    Culture - Abscess with Gram Stain (07.21.20 @ 21:26)    -  Amikacin: S <=16    -  Amoxicillin/Clavulanic Acid: S <=8/4    -  Ampicillin: S <=8 These ampicillin results predict results for amoxicillin    -  Ampicillin: S <=2 Predicts results to ampicillin/sulbactam, amoxacillin-clavulanate and  piperacillin-tazobactam.    -  Ampicillin/Sulbactam: S <=4/2 Enterobacter, Citrobacter, and Serratia may develop resistance during prolonged therapy (3-4 days)    -  Ampicillin/Sulbactam: R <=8/4    -  Aztreonam: S <=4    -  Cefazolin: S <=2 Enterobacter, Citrobacter, and Serratia may develop resistance during prolonged therapy (3-4 days)    -  Cefazolin: R 8    -  Cefepime: S <=2    -  Cefoxitin: S <=8    -  Ceftriaxone: S <=1 Enterobacter, Citrobacter, and Serratia may develop resistance during prolonged therapy    -  Ciprofloxacin: S <=0.25    -  Clindamycin: S <=0.25    -  Daptomycin: S 0.5    -  Ertapenem: S <=0.5    -  Erythromycin: S <=0.25    -  Gentamicin: S <=1 Should not be used as monotherapy    -  Gentamicin: S <=2    -  Levofloxacin: S <=0.5    -  Linezolid: S 2    -  Meropenem: S <=1    -  Oxacillin: R >2    -  Penicillin: R >8    -  Piperacillin/Tazobactam: S <=8    -  RIF- Rifampin: S <=1 Should not be used as monotherapy    -  Tetra/Doxy: R >8    -  Tetra/Doxy: S <=1    -  Tobramycin: S <=2    -  Trimethoprim/Sulfamethoxazole: S <=0.5/9.5    -  Trimethoprim/Sulfamethoxazole: R >2/38    -  Vancomycin: S 2    -  Vancomycin: S 2    Specimen Source: .Abscess L foot    Culture Results:   Numerous Proteus mirabilis  Moderate Enterococcus faecalis  Few Methicillin resistant Staphylococcus aureus    Organism Identification: Proteus mirabilis  Enterococcus faecalis  Methicillin resistant Staphylococcus aureus    Organism: Proteus mirabilis    Organism: Enterococcus faecalis    Organism: Methicillin resistant Staphylococcus aureus    Method Type: ALTA    Method Type: ATLA    Method Type: ALTA          RADIOLOGY & ADDITIONAL STUDIES:    < from: Xray Chest 1 View- PORTABLE-Urgent (07.27.20 @ 14:43) >  FINDINGS:    Stable left midlung linear scarring.   No pleural effusion or pneumothorax.   Heart size cannot be assessed appropriately in this projection.   No acute osseous abnormalities.      IMPRESSION:  Stable left midlung linear scarring.    < end of copied text >

## 2020-08-09 NOTE — PROGRESS NOTE ADULT - ASSESSMENT
89 year old female PMH  a-fib on coumadin, diastolic CHF (with EF 63% on TTE 8/13/19), HTN, HLD, CKD stage 3-4, IDDM, PVD, CVA, PE, hypothyroidism, p/w L foot wound. Patient was sent from podiatrist office for non healing left great toe ulcer, to rule out OM. Renal following for NIKHIL/CKD Mx.     ·	CKD4: Baseline Creatinine 1.7-2.0  k higher today, give dose of kayexalate 30 gm x 1 dose    s/p LE angiogram, given IVF post procedure. Serum Creatinine remained stable  TTE noted above with normal LVEF, severe diastolic dysfunction  continue to hold lasix 40mg po qdaily for now.   avoid ACEi/ARB for now/NSAIDs/Nephrotoxics.  monitor BMP daily , ace or arb will be needed once Serum Creatinine stable  low K in diet    ·	HTN, controlled-bp optimal. c/w current bp meds. avoid ACEi/ARB. pain Mx.     ·	Toe gangrene, PAD. s/p LE angio, no stent. f/u w/ Podiatry, plans for amputation cancelled.     ·	DM- Mx per medicine

## 2020-08-09 NOTE — CHART NOTE - NSCHARTNOTEFT_GEN_A_CORE
Potassium elevated 5.6 not hemolyzed  EKG without peaked t waves  Discussed with medical attending   Lokelma 10 mg give STAT  Will repeat BMP this evening    INCOMPLETE NOTE 8/9/2020 AM labs with Potassium elevated 5.6, not hemolyzed  EKG obtained: unchanged, without peaked t waves  Discussed with medical attending   Lokelma 10 mg given STAT per medical attending recommendations  Will repeat BMP this evening  Team to continue to monitor.

## 2020-08-09 NOTE — PROGRESS NOTE ADULT - SUBJECTIVE AND OBJECTIVE BOX
[facility] : [facility] [phone], Office [phone]  Dr Sullivan/Dr Garcia / Dr Florin FELDMAN /Dr José carpio /Dr GASTON Castillo/Dr Abebe Tellez/Dr Shahid Segovia /Dr BABAR Luna  _______________________________________________________________________________________________    seen and examined today for Chronic Kidney Disease   Interval : int k level higher  VITALS:  T(F): 98 (08-09-20 @ 05:06), Max: 98 (08-09-20 @ 05:06)  HR: 68 (08-09-20 @ 05:06)  BP: 150/67 (08-09-20 @ 05:06)  RR: 12 (08-09-20 @ 05:06)  SpO2: 98% (08-09-20 @ 05:06)    08-08 @ 07:01  -  08-09 @ 07:00  --------------------------------------------------------  IN: 0 mL / OUT: 750 mL / NET: -750 mL    Physical Exam :-  Constitutional: NAD  Neck: Supple.  Respiratory: Bilateral equal breath sounds,  Crackles present.  Cardiovascular: S1, S2 normal, positive Murmur  Gastrointestinal: Bowel Sounds present, soft, non tender.  Extremities: no Edema Feet  Neurological: Alert and Oriented x 3, no focal deficits  Psychiatric: Normal mood, normal affect  Data:-  Allergies :   No Known Allergies    Hospital Medications:   MEDICATIONS  (STANDING):  allopurinol 100 milliGRAM(s) Oral daily  amLODIPine   Tablet 10 milliGRAM(s) Oral daily  cinacalcet 60 milliGRAM(s) Oral daily  dextrose 5%. 1000 milliLiter(s) (50 mL/Hr) IV Continuous <Continuous>  dextrose 50% Injectable 12.5 Gram(s) IV Push once  dextrose 50% Injectable 25 Gram(s) IV Push once  dextrose 50% Injectable 25 Gram(s) IV Push once  gabapentin 100 milliGRAM(s) Oral three times a day  heparin  Infusion.  Unit(s)/Hr (13 mL/Hr) IV Continuous <Continuous>  hydrALAZINE 75 milliGRAM(s) Oral three times a day  insulin lispro (HumaLOG) corrective regimen sliding scale   SubCutaneous three times a day before meals  insulin lispro (HumaLOG) corrective regimen sliding scale   SubCutaneous at bedtime  latanoprost 0.005% Ophthalmic Solution 1 Drop(s) Left EYE at bedtime  levothyroxine 75 MICROGram(s) Oral daily  nystatin Powder 1 Application(s) Topical two times a day  polyethylene glycol 3350 17 Gram(s) Oral daily  senna 2 Tablet(s) Oral at bedtime  simvastatin 20 milliGRAM(s) Oral at bedtime  warfarin 6 milliGRAM(s) Oral once    08-09    138  |  103  |  77<H>  ----------------------------<  114<H>  5.6<H>   |  23  |  1.83<H>    Ca    10.3      09 Aug 2020 03:46  Phos  4.4     08-09  Mg     2.3     08-09    TPro      /  Alb  3.3  /  TBili      /  DBili      /  AST      /  ALT      /  AlkPhos      08-08    Creatinine Trend: 1.83 <--, 1.90 <--, 1.89 <--, 1.92 <--, 1.91 <--, 2.14 <--, 2.08 <--, 2.21 <--                        7.8    6.41  )-----------( 205      ( 09 Aug 2020 03:46 )             24.5

## 2020-08-09 NOTE — PROGRESS NOTE ADULT - SUBJECTIVE AND OBJECTIVE BOX
INTERVAL HPI/OVERNIGHT EVENTS: No new concerns.   Vital Signs Last 24 Hrs  T(C): 36.9 (09 Aug 2020 13:09), Max: 36.9 (09 Aug 2020 13:09)  T(F): 98.5 (09 Aug 2020 13:09), Max: 98.5 (09 Aug 2020 13:09)  HR: 70 (09 Aug 2020 13:09) (68 - 70)  BP: 130/50 (09 Aug 2020 13:09) (130/50 - 159/87)  BP(mean): --  RR: 16 (09 Aug 2020 13:09) (12 - 20)  SpO2: 100% (09 Aug 2020 13:09) (98% - 100%)  I&O's Summary    08 Aug 2020 07:01  -  09 Aug 2020 07:00  --------------------------------------------------------  IN: 0 mL / OUT: 750 mL / NET: -750 mL      MEDICATIONS  (STANDING):  allopurinol 100 milliGRAM(s) Oral daily  amLODIPine   Tablet 10 milliGRAM(s) Oral daily  cinacalcet 60 milliGRAM(s) Oral daily  dextrose 5%. 1000 milliLiter(s) (50 mL/Hr) IV Continuous <Continuous>  dextrose 50% Injectable 12.5 Gram(s) IV Push once  dextrose 50% Injectable 25 Gram(s) IV Push once  dextrose 50% Injectable 25 Gram(s) IV Push once  gabapentin 100 milliGRAM(s) Oral three times a day  heparin  Infusion.  Unit(s)/Hr (13 mL/Hr) IV Continuous <Continuous>  hydrALAZINE 75 milliGRAM(s) Oral three times a day  insulin lispro (HumaLOG) corrective regimen sliding scale   SubCutaneous three times a day before meals  insulin lispro (HumaLOG) corrective regimen sliding scale   SubCutaneous at bedtime  latanoprost 0.005% Ophthalmic Solution 1 Drop(s) Left EYE at bedtime  levothyroxine 75 MICROGram(s) Oral daily  nystatin Powder 1 Application(s) Topical two times a day  polyethylene glycol 3350 17 Gram(s) Oral daily  senna 2 Tablet(s) Oral at bedtime  simvastatin 20 milliGRAM(s) Oral at bedtime  warfarin 6 milliGRAM(s) Oral once    MEDICATIONS  (PRN):  acetaminophen   Tablet .. 650 milliGRAM(s) Oral every 6 hours PRN Temp greater or equal to 38C (100.4F), Mild Pain (1 - 3), Moderate Pain (4 - 6), Severe Pain (7 - 10)  dextrose 40% Gel 15 Gram(s) Oral once PRN Blood Glucose LESS THAN 70 milliGRAM(s)/deciliter  glucagon  Injectable 1 milliGRAM(s) IntraMuscular once PRN Glucose LESS THAN 70 milligrams/deciliter  heparin   Injectable 6000 Unit(s) IV Push every 6 hours PRN For aPTT less than 40  heparin   Injectable 3000 Unit(s) IV Push every 6 hours PRN For aPTT between 40 - 57  morphine  - Injectable 2 milliGRAM(s) IV Push every 4 hours PRN Severe Pain (7 - 10)    LABS:                        7.8    6.41  )-----------( 205      ( 09 Aug 2020 03:46 )             24.5     08-09    138  |  103  |  77<H>  ----------------------------<  114<H>  5.6<H>   |  23  |  1.83<H>    Ca    10.3      09 Aug 2020 03:46  Phos  4.4     08-09  Mg     2.3     08-09    TPro  x   /  Alb  3.3  /  TBili  x   /  DBili  x   /  AST  x   /  ALT  x   /  AlkPhos  x   08-08    PT/INR - ( 09 Aug 2020 03:46 )   PT: 18.0 SEC;   INR: 1.60          PTT - ( 09 Aug 2020 03:46 )  PTT:63.9 SEC    CAPILLARY BLOOD GLUCOSE      POCT Blood Glucose.: 141 mg/dL (09 Aug 2020 12:10)  POCT Blood Glucose.: 110 mg/dL (09 Aug 2020 08:24)  POCT Blood Glucose.: 127 mg/dL (08 Aug 2020 22:28)  POCT Blood Glucose.: 116 mg/dL (08 Aug 2020 17:15)          REVIEW OF SYSTEMS:  CONSTITUTIONAL: No fever, weight loss, or fatigue  EYES: No eye pain, visual disturbances, or discharge  RESPIRATORY: No cough, wheezing, chills or hemoptysis; No shortness of breath  CARDIOVASCULAR: No chest pain, palpitations, dizziness, or leg swelling  GASTROINTESTINAL: No abdominal or epigastric pain. No nausea, vomiting, or hematemesis; No diarrhea or constipation. No melena or hematochezia.  GENITOURINARY: No dysuria, frequency, hematuria, or incontinence  NEUROLOGICAL: No headaches, memory loss, loss of strength, numbness, or tremors      Consultant(s) Notes Reviewed:  [x ] YES  [ ] NO    PHYSICAL EXAM:  GENERAL: NAD, well-groomed, well-developed,not in any distress ,  HEAD:  Atraumatic, Normocephalic  EYES: EOMI, PERRLA, conjunctiva and sclera clear  ENMT: No tonsillar erythema, exudates, or enlargement; Moist mucous membranes, Good dentition, No lesions  NECK: Supple, No JVD, Normal thyroid  NERVOUS SYSTEM:  Alert & Oriented X3, No focal deficit   CHEST/LUNG: Good air entry bilateral with no  rales, rhonchi, wheezing, or rubs  HEART: Regular rate and rhythm; ESM+  ABDOMEN: Soft, Nontender, Nondistended; Bowel sounds present  EXTREMITIES:  Foot dressed     Care Discussed with Consultants/Other Providers [ x] YES  [ ] NO

## 2020-08-09 NOTE — PROGRESS NOTE ADULT - ASSESSMENT
Assessment  DMT2: 89y Female with DM T2 with hyperglycemia admitted with L foot wound, A1C 6.1%, on insulin coverage, blood sugars trending within acceptable range, no hypoglycemic episodes. Patient is eating meals, appears alert and comfortable, denies current complaints.  Hypothyroidism: On Synthroid 75 mcg po daily, compliant with Synthroid intake, asymptomatic, euthyroid.  Hypercalcemia: Due to primary hyperparathyroidisms, on sensipar,  calcium improved  Toe Gangrene: No intervention, FU Vascular/Podiatry.  CKD: labs, chart reviewed.              Curtis Castillo MD  Cell: [phone]  Office: [phone]

## 2020-08-09 NOTE — PROGRESS NOTE ADULT - ASSESSMENT
89 year old female PMH  a-fib on coumadin, diastolic CHF (with EF 63% on TTE 8/13/19), HTN, HLD, CKD stage 3-4, IDDM, PVD, CVA, PE, hypothyroidism, p/w L foot wound. Patient seen today at clinic and sent for cellulitis and abscess, wanting to rule out OM. Patient denies fever, chills, cp, sob, abd pain, n/v, weakness, or numbness/tingling.     Problem/Plan - 1:  ·  Problem: Toe gangrene.  Plan: Podiatry consult noted. S/P IV Abxs for 7 days . NO fever or Leucocytosis so likely ischemic .   ID and Vascular helping. No intervention per podiatry .        Problem/Plan - 2:  ·  Problem: Stage 3 chronic kidney disease.  Plan: Renal helping .  Baseline Creatinine 1.8.      Problem/Plan - 3:  ·  Problem: PVD (peripheral vascular disease).  Plan: LIZBETH/PVD noted.  Vascular consult noted. S/P angiogram .Aspirin started as recommended by Vascular .      Problem/Plan - 4:  ·  Problem: Chronic diastolic congestive heart failure.  Plan: Continuing Lasix .  Cardiology helping.      Problem/Plan - 5:  ·  Problem: DM (diabetes mellitus).  Plan: SSI for now .      Problem/Plan - 6:  Problem: Afib. Plan: ON AC. Heparin till INR therapeutic .      Problem/Plan - 7:  ·  Problem: Pulmonary embolism.  Plan: On AC.      Problem/Plan - 8:  ·  Problem:  Hypothyroid.  Plan: Synthroid.      Problem/Plan - 9:  ·  Problem: Anemia.  Plan: Chronic . Watching CBC . Transfuse PRBC for HGb <7.5G .Hh stable .      Problem/Plan - 10:  ·  Problem: Uncontrolled Hypertension .  Plan: Adjusting BP meds. BP readings better.      Problem/Plan - 11:  ·  Problem:  Hyperkalemia .  Plan: Treating and repeating BMP .    Disposition : DC planning pending Therapeutic INR,.

## 2020-08-09 NOTE — PROGRESS NOTE ADULT - SUBJECTIVE AND OBJECTIVE BOX
Chief complaint  Patient is a 89y old  Female who presents with a chief complaint of left foot big toe pain (09 Aug 2020 13:10)   Review of systems  Patient in bed, looks comfortable, no hypoglycemia.    Labs and Fingersticks  CAPILLARY BLOOD GLUCOSE      POCT Blood Glucose.: 105 mg/dL (09 Aug 2020 17:03)  POCT Blood Glucose.: 141 mg/dL (09 Aug 2020 12:10)  POCT Blood Glucose.: 110 mg/dL (09 Aug 2020 08:24)  POCT Blood Glucose.: 127 mg/dL (08 Aug 2020 22:28)  POCT Blood Glucose.: 116 mg/dL (08 Aug 2020 17:15)      Anion Gap, Serum: 12 (08-09 @ 03:46)  Anion Gap, Serum: 10 (08-08 @ 08:20)  Anion Gap, Serum: 11 (08-08 @ 05:39)      Calcium, Total Serum: 10.3 (08-09 @ 03:46)  Calcium, Total Serum: 10.7 <H> (08-08 @ 08:20)  Calcium, Total Serum: 10.7 <H> (08-08 @ 05:39)  Albumin, Serum: 3.3 (08-08 @ 08:20)          08-09    138  |  103  |  77<H>  ----------------------------<  114<H>  5.6<H>   |  23  |  1.83<H>    Ca    10.3      09 Aug 2020 03:46  Phos  4.4     08-09  Mg     2.3     08-09    TPro  x   /  Alb  3.3  /  TBili  x   /  DBili  x   /  AST  x   /  ALT  x   /  AlkPhos  x   08-08                        7.8    6.41  )-----------( 205      ( 09 Aug 2020 03:46 )             24.5     Medications  MEDICATIONS  (STANDING):  allopurinol 100 milliGRAM(s) Oral daily  amLODIPine   Tablet 10 milliGRAM(s) Oral daily  cinacalcet 60 milliGRAM(s) Oral daily  dextrose 5%. 1000 milliLiter(s) (50 mL/Hr) IV Continuous <Continuous>  dextrose 50% Injectable 12.5 Gram(s) IV Push once  dextrose 50% Injectable 25 Gram(s) IV Push once  dextrose 50% Injectable 25 Gram(s) IV Push once  gabapentin 100 milliGRAM(s) Oral three times a day  heparin  Infusion.  Unit(s)/Hr (13 mL/Hr) IV Continuous <Continuous>  hydrALAZINE 75 milliGRAM(s) Oral three times a day  insulin lispro (HumaLOG) corrective regimen sliding scale   SubCutaneous three times a day before meals  insulin lispro (HumaLOG) corrective regimen sliding scale   SubCutaneous at bedtime  latanoprost 0.005% Ophthalmic Solution 1 Drop(s) Left EYE at bedtime  levothyroxine 75 MICROGram(s) Oral daily  nystatin Powder 1 Application(s) Topical two times a day  polyethylene glycol 3350 17 Gram(s) Oral daily  senna 2 Tablet(s) Oral at bedtime  simvastatin 20 milliGRAM(s) Oral at bedtime  warfarin 6 milliGRAM(s) Oral once      Physical Exam  General: Patient comfortable in bed  Vital Signs Last 12 Hrs  T(F): 98.5 (08-09-20 @ 13:09), Max: 98.5 (08-09-20 @ 13:09)  HR: 70 (08-09-20 @ 13:09) (70 - 70)  BP: 130/50 (08-09-20 @ 13:09) (130/50 - 130/50)  BP(mean): --  RR: 16 (08-09-20 @ 13:09) (16 - 16)  SpO2: 100% (08-09-20 @ 13:09) (100% - 100%)  Neck: No palpable thyroid nodules.  CVS: S1S2, No murmurs  Respiratory: No wheezing, no crepitations  GI: Abdomen soft, bowel sounds positive  Musculoskeletal:  edema lower extremities.   Skin: No skin rashes, no ecchymosis    Diagnostics    Thyroid Stimulating Hormone, Serum: AM Sched. Collection: 06-Aug-2020 04:00 (08-05 @ 09:51)  Free Thyroxine, Serum: AM Sched. Collection: 06-Aug-2020 04:00 (08-05 @ 09:51)

## 2020-08-09 NOTE — PROGRESS NOTE ADULT - ASSESSMENT
89 year old female PMH  a-fib on coumadin, diastolic CHF (with EF 63% on TTE 8/13/19), HTN, HLD, CKD stage 3-4, IDDM, PVD, CVA, PE, hypothyroidism, p/w L foot wound. Patient seen today at clinic and sent for cellulitis and abscess, wanting to rule out OM. Patient denies fever, chills, cp, sob, abd pain, n/v, weakness, or numbness/tingling. (21 Jul 2020 20:43)    Pt states she has been on multiple PO abx in the past.  She currently denies any purulent discharge, no fever/chills.    ER vss.  No leukocytosis.  Pt seen by podiatry and vascular services.  L foot with avulsed hallux nail, L hallux boggy, necrotic and cold to level of MPJ, no purulence, no open wounds, no malodor, no associated cellulitis on initial exam.  Changes due to PAD.  s/p LLE angiogram  showed SFA occlusion w/ distal SFA/pop recon w/ heavy calcifications, no further vasc intervention.  Pt planned for Left foot partial 1st ray resection.     Wound was cultured and growing MRSA, Enterococcus and Proteus mirabilis.  Pt received vanco x 1, now on cefepime.    ID consulted for further abx recommendations.    L hallux gangrene:    - No drainage/purulence.  Wound appears dry.  No systemic signs of infection, afebrile.     - Wound cx MRSA, enterococcus and proteus.  Cont  vanco/zosyn.      - As per d/w NP, no further plan for hallux ray resection by podiatry.  Pt for further outpt f/u with Podiatry and Vascular.        NIKHIL/CKD:    - Pt with CKD4, dose abx accordingly.      - s/p LE angiogram, s/p IVF, monitor Cr.  Renal f/u appreciated.      * complete 7 day course of empiric abx through 8/4.  On vanco by level,  dose 1gm IV x 1 today (8/4).   Maintain trough levels between 10-15.  Cont zosyn.      * Awaiting therapeutic INR    *Last dose of abx completed on 8/4.  Cont to monitor off.  No further ID w/u indicated at this time.   Pt remains stable off abx.     Melissa Escobedo  [phone]

## 2020-08-10 LAB
ANION GAP SERPL CALC-SCNC: 10 MMO/L — SIGNIFICANT CHANGE UP (ref 7–14)
APTT BLD: 62.5 SEC — HIGH (ref 27–36.3)
BLD GP AB SCN SERPL QL: NEGATIVE — SIGNIFICANT CHANGE UP
BUN SERPL-MCNC: 69 MG/DL — HIGH (ref 7–23)
CALCIUM SERPL-MCNC: 9.8 MG/DL — SIGNIFICANT CHANGE UP (ref 8.4–10.5)
CHLORIDE SERPL-SCNC: 105 MMOL/L — SIGNIFICANT CHANGE UP (ref 98–107)
CO2 SERPL-SCNC: 22 MMOL/L — SIGNIFICANT CHANGE UP (ref 22–31)
CREAT SERPL-MCNC: 1.76 MG/DL — HIGH (ref 0.5–1.3)
GLUCOSE BLDC GLUCOMTR-MCNC: 104 MG/DL — HIGH (ref 70–99)
GLUCOSE BLDC GLUCOMTR-MCNC: 111 MG/DL — HIGH (ref 70–99)
GLUCOSE BLDC GLUCOMTR-MCNC: 124 MG/DL — HIGH (ref 70–99)
GLUCOSE BLDC GLUCOMTR-MCNC: 90 MG/DL — SIGNIFICANT CHANGE UP (ref 70–99)
GLUCOSE SERPL-MCNC: 84 MG/DL — SIGNIFICANT CHANGE UP (ref 70–99)
HCT VFR BLD CALC: 23.4 % — LOW (ref 34.5–45)
HGB BLD-MCNC: 7.3 G/DL — LOW (ref 11.5–15.5)
INR BLD: 1.9 — HIGH (ref 0.88–1.16)
INR BLD: 1.99 — HIGH (ref 0.88–1.16)
MAGNESIUM SERPL-MCNC: 2.1 MG/DL — SIGNIFICANT CHANGE UP (ref 1.6–2.6)
MCHC RBC-ENTMCNC: 29.7 PG — SIGNIFICANT CHANGE UP (ref 27–34)
MCHC RBC-ENTMCNC: 31.2 % — LOW (ref 32–36)
MCV RBC AUTO: 95.1 FL — SIGNIFICANT CHANGE UP (ref 80–100)
NRBC # FLD: 0 K/UL — SIGNIFICANT CHANGE UP (ref 0–0)
PHOSPHATE SERPL-MCNC: 3.9 MG/DL — SIGNIFICANT CHANGE UP (ref 2.5–4.5)
PLATELET # BLD AUTO: 187 K/UL — SIGNIFICANT CHANGE UP (ref 150–400)
PMV BLD: 11.9 FL — SIGNIFICANT CHANGE UP (ref 7–13)
POTASSIUM SERPL-MCNC: 4.7 MMOL/L — SIGNIFICANT CHANGE UP (ref 3.5–5.3)
POTASSIUM SERPL-SCNC: 4.7 MMOL/L — SIGNIFICANT CHANGE UP (ref 3.5–5.3)
PROTHROM AB SERPL-ACNC: 21 SEC — HIGH (ref 10.6–13.6)
PROTHROM AB SERPL-ACNC: 22.1 SEC — HIGH (ref 10.6–13.6)
RBC # BLD: 2.46 M/UL — LOW (ref 3.8–5.2)
RBC # FLD: 16.3 % — HIGH (ref 10.3–14.5)
RH IG SCN BLD-IMP: NEGATIVE — SIGNIFICANT CHANGE UP
SODIUM SERPL-SCNC: 137 MMOL/L — SIGNIFICANT CHANGE UP (ref 135–145)
WBC # BLD: 5.19 K/UL — SIGNIFICANT CHANGE UP (ref 3.8–10.5)
WBC # FLD AUTO: 5.19 K/UL — SIGNIFICANT CHANGE UP (ref 3.8–10.5)

## 2020-08-10 RX ORDER — WARFARIN SODIUM 2.5 MG/1
4 TABLET ORAL ONCE
Refills: 0 | Status: COMPLETED | OUTPATIENT
Start: 2020-08-10 | End: 2020-08-10

## 2020-08-10 RX ADMIN — NYSTATIN CREAM 1 APPLICATION(S): 100000 CREAM TOPICAL at 05:34

## 2020-08-10 RX ADMIN — NYSTATIN CREAM 1 APPLICATION(S): 100000 CREAM TOPICAL at 19:17

## 2020-08-10 RX ADMIN — GABAPENTIN 100 MILLIGRAM(S): 400 CAPSULE ORAL at 05:34

## 2020-08-10 RX ADMIN — Medication 75 MICROGRAM(S): at 05:34

## 2020-08-10 RX ADMIN — Medication 650 MILLIGRAM(S): at 21:42

## 2020-08-10 RX ADMIN — CINACALCET 60 MILLIGRAM(S): 30 TABLET, FILM COATED ORAL at 13:45

## 2020-08-10 RX ADMIN — SIMVASTATIN 20 MILLIGRAM(S): 20 TABLET, FILM COATED ORAL at 21:05

## 2020-08-10 RX ADMIN — HEPARIN SODIUM 900 UNIT(S)/HR: 5000 INJECTION INTRAVENOUS; SUBCUTANEOUS at 05:29

## 2020-08-10 RX ADMIN — WARFARIN SODIUM 4 MILLIGRAM(S): 2.5 TABLET ORAL at 19:18

## 2020-08-10 RX ADMIN — Medication 75 MILLIGRAM(S): at 05:33

## 2020-08-10 RX ADMIN — POLYETHYLENE GLYCOL 3350 17 GRAM(S): 17 POWDER, FOR SOLUTION ORAL at 13:45

## 2020-08-10 RX ADMIN — SENNA PLUS 2 TABLET(S): 8.6 TABLET ORAL at 21:05

## 2020-08-10 RX ADMIN — GABAPENTIN 100 MILLIGRAM(S): 400 CAPSULE ORAL at 13:45

## 2020-08-10 RX ADMIN — AMLODIPINE BESYLATE 10 MILLIGRAM(S): 2.5 TABLET ORAL at 05:33

## 2020-08-10 RX ADMIN — LATANOPROST 1 DROP(S): 0.05 SOLUTION/ DROPS OPHTHALMIC; TOPICAL at 21:06

## 2020-08-10 RX ADMIN — Medication 75 MILLIGRAM(S): at 21:05

## 2020-08-10 RX ADMIN — GABAPENTIN 100 MILLIGRAM(S): 400 CAPSULE ORAL at 21:05

## 2020-08-10 RX ADMIN — Medication 650 MILLIGRAM(S): at 20:51

## 2020-08-10 RX ADMIN — Medication 75 MILLIGRAM(S): at 13:46

## 2020-08-10 RX ADMIN — Medication 100 MILLIGRAM(S): at 13:45

## 2020-08-10 RX ADMIN — Medication 650 MILLIGRAM(S): at 05:33

## 2020-08-10 NOTE — PROGRESS NOTE ADULT - SUBJECTIVE AND OBJECTIVE BOX
Chief complaint  Patient is a 89y old  Female who presents with a chief complaint of left foot big toe pain (09 Aug 2020 17:13)   Review of systems  Patient in bed, looks comfortable, no hypoglycemia.    Labs and Fingersticks  CAPILLARY BLOOD GLUCOSE      POCT Blood Glucose.: 116 mg/dL (09 Aug 2020 22:34)  POCT Blood Glucose.: 105 mg/dL (09 Aug 2020 17:03)  POCT Blood Glucose.: 141 mg/dL (09 Aug 2020 12:10)  POCT Blood Glucose.: 110 mg/dL (09 Aug 2020 08:24)      Anion Gap, Serum: 10 (08-10 @ 04:38)  Anion Gap, Serum: 12 (08-09 @ 18:15)  Anion Gap, Serum: 12 (08-09 @ 03:46)  Anion Gap, Serum: 10 (08-08 @ 08:20)      Calcium, Total Serum: 9.8 (08-10 @ 04:38)  Calcium, Total Serum: 9.9 (08-09 @ 18:15)  Calcium, Total Serum: 10.3 (08-09 @ 03:46)  Calcium, Total Serum: 10.7 <H> (08-08 @ 08:20)  Albumin, Serum: 3.3 (08-08 @ 08:20)          08-10    137  |  105  |  69<H>  ----------------------------<  84  4.7   |  22  |  1.76<H>    Ca    9.8      10 Aug 2020 04:38  Phos  3.9     08-10  Mg     2.1     08-10    TPro  x   /  Alb  3.3  /  TBili  x   /  DBili  x   /  AST  x   /  ALT  x   /  AlkPhos  x   08-08                        7.3    5.19  )-----------( 187      ( 10 Aug 2020 04:38 )             23.4     Medications  MEDICATIONS  (STANDING):  allopurinol 100 milliGRAM(s) Oral daily  amLODIPine   Tablet 10 milliGRAM(s) Oral daily  cinacalcet 60 milliGRAM(s) Oral daily  dextrose 5%. 1000 milliLiter(s) (50 mL/Hr) IV Continuous <Continuous>  dextrose 50% Injectable 12.5 Gram(s) IV Push once  dextrose 50% Injectable 25 Gram(s) IV Push once  dextrose 50% Injectable 25 Gram(s) IV Push once  gabapentin 100 milliGRAM(s) Oral three times a day  heparin  Infusion.  Unit(s)/Hr (13 mL/Hr) IV Continuous <Continuous>  hydrALAZINE 75 milliGRAM(s) Oral three times a day  insulin lispro (HumaLOG) corrective regimen sliding scale   SubCutaneous three times a day before meals  insulin lispro (HumaLOG) corrective regimen sliding scale   SubCutaneous at bedtime  latanoprost 0.005% Ophthalmic Solution 1 Drop(s) Left EYE at bedtime  levothyroxine 75 MICROGram(s) Oral daily  nystatin Powder 1 Application(s) Topical two times a day  polyethylene glycol 3350 17 Gram(s) Oral daily  senna 2 Tablet(s) Oral at bedtime  simvastatin 20 milliGRAM(s) Oral at bedtime      Physical Exam  General: Patient comfortable in bed  Vital Signs Last 12 Hrs  T(F): 98 (08-10-20 @ 05:30), Max: 98 (08-10-20 @ 05:30)  HR: 68 (08-10-20 @ 05:30) (68 - 70)  BP: 153/71 (08-10-20 @ 05:30) (153/71 - 158/71)  BP(mean): --  RR: 16 (08-10-20 @ 05:30) (16 - 16)  SpO2: 100% (08-10-20 @ 05:30) (100% - 100%)  Neck: No palpable thyroid nodules.  CVS: S1S2, No murmurs  Respiratory: No wheezing, no crepitations  GI: Abdomen soft, bowel sounds positive  Musculoskeletal:  edema lower extremities.   Skin: No skin rashes, no ecchymosis    Diagnostics    Thyroid Stimulating Hormone, Serum: AM Sched. Collection: 06-Aug-2020 04:00 (08-05 @ 09:51)  Free Thyroxine, Serum: AM Sched. Collection: 06-Aug-2020 04:00 (08-05 @ 09:51)

## 2020-08-10 NOTE — PROGRESS NOTE ADULT - SUBJECTIVE AND OBJECTIVE BOX
Infectious Diseases progress note:    Subjective:  pt remains on hep gtt pending therapeutic INR.  No new fevers    ROS:  CONSTITUTIONAL:  No fever, chills, rigors  CARDIOVASCULAR:  No chest pain or palpitations  RESPIRATORY:   No SOB, cough, dyspnea on exertion.  No wheezing  GASTROINTESTINAL:  No abd pain, N/V, diarrhea/constipation  EXTREMITIES:  No swelling or joint pain  GENITOURINARY:  No burning on urination, increased frequency or urgency.  No flank pain  NEUROLOGIC:  No HA, visual disturbances  SKIN: No rashes    Allergies    No Known Allergies    Intolerances        ANTIBIOTICS/RELEVANT:  antimicrobials    immunologic:    OTHER:  acetaminophen   Tablet .. 650 milliGRAM(s) Oral every 6 hours PRN  allopurinol 100 milliGRAM(s) Oral daily  amLODIPine   Tablet 10 milliGRAM(s) Oral daily  cinacalcet 60 milliGRAM(s) Oral daily  dextrose 40% Gel 15 Gram(s) Oral once PRN  dextrose 5%. 1000 milliLiter(s) IV Continuous <Continuous>  dextrose 50% Injectable 12.5 Gram(s) IV Push once  dextrose 50% Injectable 25 Gram(s) IV Push once  dextrose 50% Injectable 25 Gram(s) IV Push once  gabapentin 100 milliGRAM(s) Oral three times a day  glucagon  Injectable 1 milliGRAM(s) IntraMuscular once PRN  heparin   Injectable 6000 Unit(s) IV Push every 6 hours PRN  heparin   Injectable 3000 Unit(s) IV Push every 6 hours PRN  heparin  Infusion.  Unit(s)/Hr IV Continuous <Continuous>  hydrALAZINE 75 milliGRAM(s) Oral three times a day  insulin lispro (HumaLOG) corrective regimen sliding scale   SubCutaneous three times a day before meals  insulin lispro (HumaLOG) corrective regimen sliding scale   SubCutaneous at bedtime  latanoprost 0.005% Ophthalmic Solution 1 Drop(s) Left EYE at bedtime  levothyroxine 75 MICROGram(s) Oral daily  morphine  - Injectable 2 milliGRAM(s) IV Push every 4 hours PRN  nystatin Powder 1 Application(s) Topical two times a day  polyethylene glycol 3350 17 Gram(s) Oral daily  senna 2 Tablet(s) Oral at bedtime  simvastatin 20 milliGRAM(s) Oral at bedtime  warfarin 4 milliGRAM(s) Oral once      Objective:  Vital Signs Last 24 Hrs  T(C): 37.1 (10 Aug 2020 12:40), Max: 37.1 (10 Aug 2020 12:40)  T(F): 98.7 (10 Aug 2020 12:40), Max: 98.7 (10 Aug 2020 12:40)  HR: 68 (10 Aug 2020 13:43) (68 - 71)  BP: 137/55 (10 Aug 2020 13:43) (137/55 - 158/71)  BP(mean): --  RR: 17 (10 Aug 2020 12:40) (16 - 17)  SpO2: 100% (10 Aug 2020 12:40) (100% - 100%)    PHYSICAL EXAM:  Constitutional:NAD  Eyes:OLIVIA, EOMI  Ear/Nose/Throat: no thrush, mucositis.  Moist mucous membranes	  Neck:no JVD, no lymphadenopathy, supple  Respiratory: CTA adina  Cardiovascular: S1S2 RRR, no murmurs  Gastrointestinal:soft, nontender,  nondistended (+) BS  Extremities:no e/e/c  Skin:  L ft dsg c/d/i        LABS:                        7.3    5.19  )-----------( 187      ( 10 Aug 2020 04:38 )             23.4     08-10    137  |  105  |  69<H>  ----------------------------<  84  4.7   |  22  |  1.76<H>    Ca    9.8      10 Aug 2020 04:38  Phos  3.9     08-10  Mg     2.1     08-10      PT/INR - ( 10 Aug 2020 04:38 )   PT: 21.0 SEC;   INR: 1.90          PTT - ( 10 Aug 2020 04:38 )  PTT:62.5 SEC                        MICROBIOLOGY:    Culture - Abscess with Gram Stain (07.21.20 @ 21:26)    -  Amikacin: S <=16    -  Amoxicillin/Clavulanic Acid: S <=8/4    -  Ampicillin: S <=8 These ampicillin results predict results for amoxicillin    -  Ampicillin: S <=2 Predicts results to ampicillin/sulbactam, amoxacillin-clavulanate and  piperacillin-tazobactam.    -  Ampicillin/Sulbactam: S <=4/2 Enterobacter, Citrobacter, and Serratia may develop resistance during prolonged therapy (3-4 days)    -  Ampicillin/Sulbactam: R <=8/4    -  Aztreonam: S <=4    -  Cefazolin: S <=2 Enterobacter, Citrobacter, and Serratia may develop resistance during prolonged therapy (3-4 days)    -  Cefazolin: R 8    -  Cefepime: S <=2    -  Cefoxitin: S <=8    -  Ceftriaxone: S <=1 Enterobacter, Citrobacter, and Serratia may develop resistance during prolonged therapy    -  Ciprofloxacin: S <=0.25    -  Clindamycin: S <=0.25    -  Daptomycin: S 0.5    -  Ertapenem: S <=0.5    -  Erythromycin: S <=0.25    -  Gentamicin: S <=1 Should not be used as monotherapy    -  Gentamicin: S <=2    -  Levofloxacin: S <=0.5    -  Linezolid: S 2    -  Meropenem: S <=1    -  Oxacillin: R >2    -  Penicillin: R >8    -  Piperacillin/Tazobactam: S <=8    -  RIF- Rifampin: S <=1 Should not be used as monotherapy    -  Tetra/Doxy: R >8    -  Tetra/Doxy: S <=1    -  Tobramycin: S <=2    -  Trimethoprim/Sulfamethoxazole: S <=0.5/9.5    -  Trimethoprim/Sulfamethoxazole: R >2/38    -  Vancomycin: S 2    -  Vancomycin: S 2    Specimen Source: .Abscess L foot    Culture Results:   Numerous Proteus mirabilis  Moderate Enterococcus faecalis  Few Methicillin resistant Staphylococcus aureus    Organism Identification: Proteus mirabilis  Enterococcus faecalis  Methicillin resistant Staphylococcus aureus    Organism: Proteus mirabilis    Organism: Enterococcus faecalis    Organism: Methicillin resistant Staphylococcus aureus    Method Type: ALTA    Method Type: ALTA    Method Type: ALTA          RADIOLOGY & ADDITIONAL STUDIES:

## 2020-08-10 NOTE — PROGRESS NOTE ADULT - ASSESSMENT
89 year old female PMH  a-fib on coumadin, diastolic CHF (with EF 63% on TTE 8/13/19), HTN, HLD, CKD stage 3-4, IDDM, PVD, CVA, PE, hypothyroidism, p/w L foot wound. Patient seen today at clinic and sent for cellulitis and abscess, wanting to rule out OM. Patient denies fever, chills, cp, sob, abd pain, n/v, weakness, or numbness/tingling. (21 Jul 2020 20:43)    Pt states she has been on multiple PO abx in the past.  She currently denies any purulent discharge, no fever/chills.    ER vss.  No leukocytosis.  Pt seen by podiatry and vascular services.  L foot with avulsed hallux nail, L hallux boggy, necrotic and cold to level of MPJ, no purulence, no open wounds, no malodor, no associated cellulitis on initial exam.  Changes due to PAD.  s/p LLE angiogram  showed SFA occlusion w/ distal SFA/pop recon w/ heavy calcifications, no further vasc intervention.  Pt planned for Left foot partial 1st ray resection.     Wound was cultured and growing MRSA, Enterococcus and Proteus mirabilis.  Pt received vanco x 1, now on cefepime.    ID consulted for further abx recommendations.    L hallux gangrene:    - No drainage/purulence.  Wound appears dry.  No systemic signs of infection, afebrile.     - Wound cx MRSA, enterococcus and proteus.  Cont  vanco/zosyn.      - As per d/w NP, no further plan for hallux ray resection by podiatry.  Pt for further outpt f/u with Podiatry and Vascular.        NIKHIL/CKD:    - Pt with CKD4, dose abx accordingly.      - s/p LE angiogram, s/p IVF, monitor Cr.  Renal f/u appreciated.      * complete 7 day course of empiric abx through 8/4.  On vanco by level,  dose 1gm IV x 1 today (8/4).   Maintain trough levels between 10-15.  Cont zosyn.      * Awaiting therapeutic INR - remains on hep gtt to coumadin bridge    *Last dose of abx completed on 8/4.  Cont to monitor off.  No further ID w/u indicated at this time.   Pt remains stable off abx.     [facility]  [phone]

## 2020-08-10 NOTE — PROGRESS NOTE ADULT - SUBJECTIVE AND OBJECTIVE BOX
INTERVAL HPI/OVERNIGHT EVENTS: Seen and examined with daughter in room . I feel fine. Per daughter had fecal incontinence twice .  Vital Signs Last 24 Hrs  T(C): 37.1 (10 Aug 2020 12:40), Max: 37.1 (10 Aug 2020 12:40)  T(F): 98.7 (10 Aug 2020 12:40), Max: 98.7 (10 Aug 2020 12:40)  HR: 68 (10 Aug 2020 13:43) (68 - 71)  BP: 137/55 (10 Aug 2020 13:43) (137/55 - 158/71)  BP(mean): --  RR: 17 (10 Aug 2020 12:40) (16 - 17)  SpO2: 100% (10 Aug 2020 12:40) (100% - 100%)  I&O's Summary    MEDICATIONS  (STANDING):  allopurinol 100 milliGRAM(s) Oral daily  amLODIPine   Tablet 10 milliGRAM(s) Oral daily  cinacalcet 60 milliGRAM(s) Oral daily  dextrose 5%. 1000 milliLiter(s) (50 mL/Hr) IV Continuous <Continuous>  dextrose 50% Injectable 12.5 Gram(s) IV Push once  dextrose 50% Injectable 25 Gram(s) IV Push once  dextrose 50% Injectable 25 Gram(s) IV Push once  gabapentin 100 milliGRAM(s) Oral three times a day  heparin  Infusion.  Unit(s)/Hr (13 mL/Hr) IV Continuous <Continuous>  hydrALAZINE 75 milliGRAM(s) Oral three times a day  insulin lispro (HumaLOG) corrective regimen sliding scale   SubCutaneous three times a day before meals  insulin lispro (HumaLOG) corrective regimen sliding scale   SubCutaneous at bedtime  latanoprost 0.005% Ophthalmic Solution 1 Drop(s) Left EYE at bedtime  levothyroxine 75 MICROGram(s) Oral daily  nystatin Powder 1 Application(s) Topical two times a day  polyethylene glycol 3350 17 Gram(s) Oral daily  senna 2 Tablet(s) Oral at bedtime  simvastatin 20 milliGRAM(s) Oral at bedtime  warfarin 4 milliGRAM(s) Oral once    MEDICATIONS  (PRN):  acetaminophen   Tablet .. 650 milliGRAM(s) Oral every 6 hours PRN Temp greater or equal to 38C (100.4F), Mild Pain (1 - 3), Moderate Pain (4 - 6), Severe Pain (7 - 10)  dextrose 40% Gel 15 Gram(s) Oral once PRN Blood Glucose LESS THAN 70 milliGRAM(s)/deciliter  glucagon  Injectable 1 milliGRAM(s) IntraMuscular once PRN Glucose LESS THAN 70 milligrams/deciliter  heparin   Injectable 6000 Unit(s) IV Push every 6 hours PRN For aPTT less than 40  heparin   Injectable 3000 Unit(s) IV Push every 6 hours PRN For aPTT between 40 - 57  morphine  - Injectable 2 milliGRAM(s) IV Push every 4 hours PRN Severe Pain (7 - 10)    LABS:                        7.3    5.19  )-----------( 187      ( 10 Aug 2020 04:38 )             23.4     08-10    137  |  105  |  69<H>  ----------------------------<  84  4.7   |  22  |  1.76<H>    Ca    9.8      10 Aug 2020 04:38  Phos  3.9     08-10  Mg     2.1     08-10      PT/INR - ( 10 Aug 2020 04:38 )   PT: 21.0 SEC;   INR: 1.90          PTT - ( 10 Aug 2020 04:38 )  PTT:62.5 SEC    CAPILLARY BLOOD GLUCOSE      POCT Blood Glucose.: 111 mg/dL (10 Aug 2020 17:14)  POCT Blood Glucose.: 124 mg/dL (10 Aug 2020 12:19)  POCT Blood Glucose.: 90 mg/dL (10 Aug 2020 08:28)  POCT Blood Glucose.: 116 mg/dL (09 Aug 2020 22:34)          REVIEW OF SYSTEMS:  CONSTITUTIONAL: No fever, weight loss, or fatigue  EYES: No eye pain, visual disturbances, or discharge  ENMT:  No difficulty hearing, tinnitus, vertigo; No sinus or throat pain  RESPIRATORY: No cough, wheezing, chills or hemoptysis; No shortness of breath  CARDIOVASCULAR: No chest pain, palpitations, dizziness, or leg swelling  GASTROINTESTINAL: No abdominal or epigastric pain. No nausea, vomiting, or hematemesis; No diarrhea or constipation. No melena or hematochezia.  GENITOURINARY: No dysuria, frequency, hematuria, or incontinence      Consultant(s) Notes Reviewed:  [x ] YES  [ ] NO    PHYSICAL EXAM:  GENERAL: NAD, well-groomed, well-developed,not in any distress ,  HEAD:  Atraumatic, Normocephalic  NECK: Supple, No JVD, Normal thyroid  NERVOUS SYSTEM:  Alert & Oriented X3, No focal deficit   CHEST/LUNG: Good air entry bilateral with no  rales, rhonchi, wheezing, or rubs  HEART: Regular rate and rhythm; No murmurs, rubs, or gallops  ABDOMEN: Soft, Nontender, Nondistended; Bowel sounds present  EXTREMITIES:  2+ Peripheral Pulses, No clubbing, cyanosis, or edema    Care Discussed with Consultants/Other Providers [ x] YES  [ ] NO

## 2020-08-10 NOTE — PROGRESS NOTE ADULT - ASSESSMENT
89 year old female PMH  a-fib on coumadin, diastolic CHF (with EF 63% on TTE 8/13/19), HTN, HLD, CKD stage 3-4, IDDM, PVD, CVA, PE, hypothyroidism, p/w L foot wound. Patient seen today at clinic and sent for cellulitis and abscess, wanting to rule out OM. Patient denies fever, chills, cp, sob, abd pain, n/v, weakness, or numbness/tingling.     Problem/Plan - 1:  ·  Problem: Toe gangrene.  Plan: Podiatry consult noted. S/P IV Abxs for 7 days . NO fever or Leucocytosis so likely ischemic .   ID and Vascular helping. No intervention per podiatry .        Problem/Plan - 2:  ·  Problem: Stage 3 chronic kidney disease.  Plan: Renal helping .  Baseline Creatinine 1.8.      Problem/Plan - 3:  ·  Problem: PVD (peripheral vascular disease).  Plan: LIZBETH/PVD noted.  Vascular consult noted. S/P angiogram .Aspirin started as recommended by Vascular .      Problem/Plan - 4:  ·  Problem: Chronic diastolic congestive heart failure.  Plan: Continuing Lasix .  Cardiology helping.      Problem/Plan - 5:  ·  Problem: DM (diabetes mellitus).  Plan: SSI for now .      Problem/Plan - 6:  Problem: Afib. Plan: ON AC. Heparin till INR therapeutic .      Problem/Plan - 7:  ·  Problem: Pulmonary embolism.  Plan: On AC.      Problem/Plan - 8:  ·  Problem:  Hypothyroid.  Plan: Synthroid.      Problem/Plan - 9:  ·  Problem: Anemia.  Plan: Chronic . Watching CBC . Transfuse PRBC for HGb <7.5G .Work up in progress.      Problem/Plan - 10:  ·  Problem: Uncontrolled Hypertension .  Plan: Adjusting BP meds. BP readings better.      Problem/Plan - 11:  ·  Problem:  Hyperkalemia .  Plan: Treating and repeating BMP .    Problem/Plan - 12:  ·  Problem:  Incontinence of Bowel  .  Plan: Neurology consulted.     Disposition : DC planning pending Therapeutic INR,.

## 2020-08-10 NOTE — PROGRESS NOTE ADULT - ASSESSMENT
89 year old female PMH  a-fib on coumadin, diastolic CHF (with EF 63% on TTE 8/13/19), HTN, HLD, CKD stage 3-4, IDDM, PVD, CVA, PE, hypothyroidism, p/w L foot wound. Patient was sent from podiatrist office for non healing left great toe ulcer, to rule out OM. Renal following for NIKHIL/CKD Mx.     ·	CKD4: Baseline Creatinine 1.7-2.0  k higher yesterday, given dose of kayexalate 30 gm x 1 dose. K wnl today    s/p LE angiogram, given IVF post procedure. Serum Creatinine remained stable  TTE noted above with normal LVEF, severe diastolic dysfunction  lasix 40mg po qdaily been on hold, resume upon d/c and will f/u in office  avoid ACEi/ARB for now/NSAIDs/Nephrotoxics.  will consider adding acei or arb if Serum Creatinine stable outpt  low K in diet, d/w pt  stable for d/c renal stand point    ·	HTN, controlled-bp optimal. c/w current bp meds. avoid ACEi/ARB. pain Mx.     ·	Toe gangrene, PAD. s/p LE angio, no stent. f/u w/ Podiatry, plans for amputation cancelled.     ·	DM- Mx per medicine    labs, chart reviewed

## 2020-08-10 NOTE — PROGRESS NOTE ADULT - SUBJECTIVE AND OBJECTIVE BOX
resting comfortably.  no acute events overnight. no palpitations, chest pain, SOB, or lightheadedness.  working on PT with a walker.      MEDICATIONS  (STANDING):  allopurinol 100 milliGRAM(s) Oral daily  amLODIPine   Tablet 10 milliGRAM(s) Oral daily  cinacalcet 60 milliGRAM(s) Oral daily  dextrose 5%. 1000 milliLiter(s) (50 mL/Hr) IV Continuous <Continuous>  dextrose 50% Injectable 12.5 Gram(s) IV Push once  dextrose 50% Injectable 25 Gram(s) IV Push once  dextrose 50% Injectable 25 Gram(s) IV Push once  gabapentin 100 milliGRAM(s) Oral three times a day  heparin  Infusion.  Unit(s)/Hr (13 mL/Hr) IV Continuous <Continuous>  hydrALAZINE 75 milliGRAM(s) Oral three times a day  insulin lispro (HumaLOG) corrective regimen sliding scale   SubCutaneous three times a day before meals  insulin lispro (HumaLOG) corrective regimen sliding scale   SubCutaneous at bedtime  latanoprost 0.005% Ophthalmic Solution 1 Drop(s) Left EYE at bedtime  levothyroxine 75 MICROGram(s) Oral daily  nystatin Powder 1 Application(s) Topical two times a day  polyethylene glycol 3350 17 Gram(s) Oral daily  senna 2 Tablet(s) Oral at bedtime  simvastatin 20 milliGRAM(s) Oral at bedtime    MEDICATIONS  (PRN):  acetaminophen   Tablet .. 650 milliGRAM(s) Oral every 6 hours PRN Temp greater or equal to 38C (100.4F), Mild Pain (1 - 3), Moderate Pain (4 - 6), Severe Pain (7 - 10)  dextrose 40% Gel 15 Gram(s) Oral once PRN Blood Glucose LESS THAN 70 milliGRAM(s)/deciliter  glucagon  Injectable 1 milliGRAM(s) IntraMuscular once PRN Glucose LESS THAN 70 milligrams/deciliter  heparin   Injectable 6000 Unit(s) IV Push every 6 hours PRN For aPTT less than 40  heparin   Injectable 3000 Unit(s) IV Push every 6 hours PRN For aPTT between 40 - 57  morphine  - Injectable 2 milliGRAM(s) IV Push every 4 hours PRN Severe Pain (7 - 10)      LABS:                      7.3    5.19  )-----------( 187      ( 10 Aug 2020 04:38 )             23.4     137  |  105  |  69<H>  ----------------------------<  84  4.7   |  22  |  1.76<H>    Ca    9.8      10 Aug 2020 04:38  Phos  3.9     08-10  Mg     2.1     08-10      Creatinine Trend: 1.76<--, 1.94<--, 1.83<--, 1.90<--, 1.89<--, 1.92<--   PT/INR - ( 10 Aug 2020 04:38 )   PT: 21.0 SEC;   INR: 1.90          PTT - ( 10 Aug 2020 04:38 )  PTT:62.5 SEC        PHYSICAL EXAM  Vital Signs Last 24 Hrs  T(C): 37.1 (10 Aug 2020 12:40), Max: 37.1 (10 Aug 2020 12:40)  T(F): 98.7 (10 Aug 2020 12:40), Max: 98.7 (10 Aug 2020 12:40)  HR: 71 (10 Aug 2020 12:40) (68 - 71)  BP: 152/58 (10 Aug 2020 12:40) (130/50 - 158/71)  RR: 17 (10 Aug 2020 12:40) (16 - 17)  SpO2: 100% (10 Aug 2020 12:40) (100% - 100%)      Gen: Frail elderly AA woman, nondiaphoretic and comfortable.  HEENT:  (-)icterus (-)pallor  CV: Normal S1, there is still a sharp S2.  Mid-to-late peaking 3/6 systolic murmur at the upper chest (+)2 Pulses B/l  Resp:  Clear to ausculatation B/L, normal effort  GI: (+) BS Soft, NT, ND  Lymph:  (-)Edema, (-)obvious lymphadenopathy  Skin: +left foot dressing, Warm to touch, Normal turgor  Psych: Appropriate mood and affect    TTE: < from: Transthoracic Echocardiogram (07.22.20 @ 16:27) >  1. Mitral annular calcification, otherwise normal mitral  valve. Mild-moderate mitral regurgitation.  2. Calcified trileaflet aortic valve with decreased  opening.  Peak transaortic valve gradient noculf40 mm Hg,  mean transaortic valve gradient equals 26 mm Hg, estimated  aortic valve area equals 1 sqcm (by continuity equation),  consistent with moderate to severe aortic stenosis.  3. Severely dilated left atrium.  LA volume index = 57  cc/m2.  4. Mild concentric left ventricular hypertrophy.  5. Normal left ventricular systolic function. No segmental  wall motion abnormalities.  6. Severe diastolic dysfunction.  7. Severe right atrial enlargement.  8. Normal right ventricular size and function.  9. Estimated right ventricular systolic pressure equals 53  mm Hg, assuming right atrial pressure equals 10 mm Hg,  consistent with moderate pulmonary hypertension.    < end of copied text >      ASSESSMENT/PLAN: Patient is a 89 year old Female well known to Dr. Berman, who presented with a foot wound.  Consult requested for pre-op cardiovascular risk stratification.  Her known PMH includes persistent Afib on coumadin who was not a candidate for left atrial appendage occlusion with Watchman, chronic HFpEF, HTN, HLD, hypothyroidism, CKD, Type 2 DM, CVA, and hx of PE.    - Continue AC for cva prevention if no contraindications - on hep gtt bridge to coumadin for goal INR 2-3.    - no cardiovascular complications with any of her procedures during hospitalization.  stable for DC planning, and working on PT/ambulation.  - no further inpatient cardiac w/u needed at this time  - pt. and patient's family prefer conservative cv care and no further workup of valvular disease (her AS is moderate by examination)  -f/u with Dr Berman 8/31 at 11:20AM [phone]

## 2020-08-10 NOTE — PROGRESS NOTE ADULT - ASSESSMENT
Assessment  DMT2: 89y Female with DM T2 with hyperglycemia admitted with L foot wound, A1C 6.1%, on insulin coverage, FS improved no hypoglycemic episodes. Patient is eating meals.  Hypothyroidism: On Synthroid 75 mcg po daily, compliant with Synthroid intake, asymptomatic, euthyroid.  Hypercalcemia: Due to primary hyperparathyroidisms, on sensipar,  calcium improved  Toe Gangrene: No intervention, FU Vascular/Podiatry.  CKD: labs, chart reviewed.              Curtis Castillo MD  Cell: [phone]  Office: [phone]

## 2020-08-10 NOTE — PROGRESS NOTE ADULT - SUBJECTIVE AND OBJECTIVE BOX
[facility] - S Radha / Florin S /D Nate/ S Jonathan/ GASTON Tellez/ Shahid Segovia / BABAR Escalerau/ O Rudi  service -[phone], office [phone]  ---------------------------------------------------------------------------------------------------------------    Patient seen and examined bedside    Subjective and Objective: No overnight events, sob resolved. No complaints today. feeling better    Allergies: No Known Allergies      Hospital Medications:   MEDICATIONS  (STANDING):  allopurinol 100 milliGRAM(s) Oral daily  amLODIPine   Tablet 10 milliGRAM(s) Oral daily  cinacalcet 60 milliGRAM(s) Oral daily  dextrose 5%. 1000 milliLiter(s) (50 mL/Hr) IV Continuous <Continuous>  dextrose 50% Injectable 12.5 Gram(s) IV Push once  dextrose 50% Injectable 25 Gram(s) IV Push once  dextrose 50% Injectable 25 Gram(s) IV Push once  gabapentin 100 milliGRAM(s) Oral three times a day  heparin  Infusion.  Unit(s)/Hr (13 mL/Hr) IV Continuous <Continuous>  hydrALAZINE 75 milliGRAM(s) Oral three times a day  insulin lispro (HumaLOG) corrective regimen sliding scale   SubCutaneous three times a day before meals  insulin lispro (HumaLOG) corrective regimen sliding scale   SubCutaneous at bedtime  latanoprost 0.005% Ophthalmic Solution 1 Drop(s) Left EYE at bedtime  levothyroxine 75 MICROGram(s) Oral daily  nystatin Powder 1 Application(s) Topical two times a day  polyethylene glycol 3350 17 Gram(s) Oral daily  senna 2 Tablet(s) Oral at bedtime  simvastatin 20 milliGRAM(s) Oral at bedtime      REVIEW OF SYSTEMS:  CONSTITUTIONAL: No weakness, fevers or chills  EYES/ENT: No visual changes;  No vertigo or throat pain   NECK: No pain or stiffness  RESPIRATORY: No cough, wheezing, hemoptysis; No shortness of breath  CARDIOVASCULAR: No chest pain or palpitations.  GASTROINTESTINAL: No abdominal or epigastric pain. No nausea, vomiting, or hematemesis; No diarrhea or constipation. No melena or hematochezia.  GENITOURINARY: No dysuria, frequency, foamy urine, urinary urgency, incontinence or hematuria  NEUROLOGICAL: No numbness or weakness  SKIN: No itching, burning, rashes, or lesions   VASCULAR: No bilateral lower extremity edema.   All other review of systems is negative unless indicated above.    VITALS:  T(F): 98.7 (08-10-20 @ 12:40), Max: 98.7 (08-10-20 @ 12:40)  HR: 68 (08-10-20 @ 13:43)  BP: 137/55 (08-10-20 @ 13:43)  RR: 17 (08-10-20 @ 12:40)  SpO2: 100% (08-10-20 @ 12:40)  Wt(kg): --        PHYSICAL EXAM:  Constitutional: NAD  HEENT: anicteric sclera, oropharynx clear  Neck: No JVD  Respiratory: CTAB, no wheezes, rales or rhonchi  Cardiovascular: S1, S2, RRR  Gastrointestinal: BS+, soft, NT/ND  Extremities: No cyanosis or clubbing. No peripheral edema  Neurological: A/O x 3, no focal deficits  Psychiatric: Normal mood, normal affect  : No CVA tenderness. No michaud.   Skin: No rashes  Vascular Access:    LABS:  08-10    137  |  105  |  69<H>  ----------------------------<  84  4.7   |  22  |  1.76<H>    Ca    9.8      10 Aug 2020 04:38  Phos  3.9     08-10  Mg     2.1     08-10      Creatinine Trend: 1.76 <--, 1.94 <--, 1.83 <--, 1.90 <--, 1.89 <--, 1.92 <--, 1.91 <--, 2.14 <--, 2.08 <--                        7.3    5.19  )-----------( 187      ( 10 Aug 2020 04:38 )             23.4     Urine Studies:        RADIOLOGY & ADDITIONAL STUDIES: [facility] - S Radha / Florin S /D Nate/ S Jonathan/ GASTON Tellez/ Shahid Segovia / BABAR Luna/ O Rudi  service [phone], office [phone]  ---------------------------------------------------------------------------------------------------------------    Patient seen and examined bedside    Subjective and Objective: No overnight events, denied sob. No complaints today. feeling better    Allergies: No Known Allergies      Hospital Medications:   MEDICATIONS  (STANDING):  allopurinol 100 milliGRAM(s) Oral daily  amLODIPine   Tablet 10 milliGRAM(s) Oral daily  cinacalcet 60 milliGRAM(s) Oral daily  dextrose 5%. 1000 milliLiter(s) (50 mL/Hr) IV Continuous <Continuous>  dextrose 50% Injectable 12.5 Gram(s) IV Push once  dextrose 50% Injectable 25 Gram(s) IV Push once  dextrose 50% Injectable 25 Gram(s) IV Push once  gabapentin 100 milliGRAM(s) Oral three times a day  heparin  Infusion.  Unit(s)/Hr (13 mL/Hr) IV Continuous <Continuous>  hydrALAZINE 75 milliGRAM(s) Oral three times a day  insulin lispro (HumaLOG) corrective regimen sliding scale   SubCutaneous three times a day before meals  insulin lispro (HumaLOG) corrective regimen sliding scale   SubCutaneous at bedtime  latanoprost 0.005% Ophthalmic Solution 1 Drop(s) Left EYE at bedtime  levothyroxine 75 MICROGram(s) Oral daily  nystatin Powder 1 Application(s) Topical two times a day  polyethylene glycol 3350 17 Gram(s) Oral daily  senna 2 Tablet(s) Oral at bedtime  simvastatin 20 milliGRAM(s) Oral at bedtime    VITALS:  T(F): 98.7 (08-10-20 @ 12:40), Max: 98.7 (08-10-20 @ 12:40)  HR: 68 (08-10-20 @ 13:43)  BP: 137/55 (08-10-20 @ 13:43)  RR: 17 (08-10-20 @ 12:40)  SpO2: 100% (08-10-20 @ 12:40)  Wt(kg): --    PHYSICAL EXAM:  Constitutional: NAD  HEENT: anicteric sclera  Neck: No JVD  Respiratory: CTAB, no wheezes, rales or rhonchi  Cardiovascular: S1, S2, RRR  Gastrointestinal: BS+, soft, NT/ND  Extremities: No peripheral edema  Neurological: A/O x 3  Psychiatric: Normal mood, normal affect  : No michaud.     LABS:  08-10    137  |  105  |  69<H>  ----------------------------<  84  4.7   |  22  |  1.76<H>    Ca    9.8      10 Aug 2020 04:38  Phos  3.9     08-10  Mg     2.1     08-10      Creatinine Trend: 1.76 <--, 1.94 <--, 1.83 <--, 1.90 <--, 1.89 <--, 1.92 <--, 1.91 <--, 2.14 <--, 2.08 <--                        7.3    5.19  )-----------( 187      ( 10 Aug 2020 04:38 )             23.4     Urine Studies:        RADIOLOGY & ADDITIONAL STUDIES:

## 2020-08-11 LAB
ANION GAP SERPL CALC-SCNC: 12 MMO/L — SIGNIFICANT CHANGE UP (ref 7–14)
APTT BLD: 60 SEC — HIGH (ref 27–36.3)
BUN SERPL-MCNC: 62 MG/DL — HIGH (ref 7–23)
CALCIUM SERPL-MCNC: 9.9 MG/DL — SIGNIFICANT CHANGE UP (ref 8.4–10.5)
CHLORIDE SERPL-SCNC: 105 MMOL/L — SIGNIFICANT CHANGE UP (ref 98–107)
CO2 SERPL-SCNC: 23 MMOL/L — SIGNIFICANT CHANGE UP (ref 22–31)
CREAT SERPL-MCNC: 1.57 MG/DL — HIGH (ref 0.5–1.3)
FERRITIN SERPL-MCNC: 49.94 NG/ML — SIGNIFICANT CHANGE UP (ref 15–150)
FOLATE SERPL-MCNC: 9.9 NG/ML — SIGNIFICANT CHANGE UP (ref 4.7–20)
GLUCOSE BLDC GLUCOMTR-MCNC: 105 MG/DL — HIGH (ref 70–99)
GLUCOSE BLDC GLUCOMTR-MCNC: 136 MG/DL — HIGH (ref 70–99)
GLUCOSE BLDC GLUCOMTR-MCNC: 82 MG/DL — SIGNIFICANT CHANGE UP (ref 70–99)
GLUCOSE BLDC GLUCOMTR-MCNC: 98 MG/DL — SIGNIFICANT CHANGE UP (ref 70–99)
GLUCOSE SERPL-MCNC: 76 MG/DL — SIGNIFICANT CHANGE UP (ref 70–99)
HAPTOGLOB SERPL-MCNC: 127 MG/DL — SIGNIFICANT CHANGE UP (ref 34–200)
HCT VFR BLD CALC: 25.9 % — LOW (ref 34.5–45)
HGB BLD-MCNC: 8.2 G/DL — LOW (ref 11.5–15.5)
INR BLD: 2.07 — HIGH (ref 0.88–1.16)
IRON SATN MFR SERPL: 240 UG/DL — SIGNIFICANT CHANGE UP (ref 140–530)
IRON SATN MFR SERPL: 46 UG/DL — SIGNIFICANT CHANGE UP (ref 30–160)
MAGNESIUM SERPL-MCNC: 2 MG/DL — SIGNIFICANT CHANGE UP (ref 1.6–2.6)
MCHC RBC-ENTMCNC: 29.6 PG — SIGNIFICANT CHANGE UP (ref 27–34)
MCHC RBC-ENTMCNC: 31.7 % — LOW (ref 32–36)
MCV RBC AUTO: 93.5 FL — SIGNIFICANT CHANGE UP (ref 80–100)
NRBC # FLD: 0 K/UL — SIGNIFICANT CHANGE UP (ref 0–0)
OB PNL STL: NEGATIVE — SIGNIFICANT CHANGE UP
PHOSPHATE SERPL-MCNC: 4 MG/DL — SIGNIFICANT CHANGE UP (ref 2.5–4.5)
PLATELET # BLD AUTO: 194 K/UL — SIGNIFICANT CHANGE UP (ref 150–400)
PMV BLD: 11.5 FL — SIGNIFICANT CHANGE UP (ref 7–13)
POTASSIUM SERPL-MCNC: 4.7 MMOL/L — SIGNIFICANT CHANGE UP (ref 3.5–5.3)
POTASSIUM SERPL-SCNC: 4.7 MMOL/L — SIGNIFICANT CHANGE UP (ref 3.5–5.3)
PROTHROM AB SERPL-ACNC: 23 SEC — HIGH (ref 10.6–13.6)
RBC # BLD: 2.77 M/UL — LOW (ref 3.8–5.2)
RBC # FLD: 16.3 % — HIGH (ref 10.3–14.5)
SODIUM SERPL-SCNC: 140 MMOL/L — SIGNIFICANT CHANGE UP (ref 135–145)
UIBC SERPL-MCNC: 193.5 UG/DL — SIGNIFICANT CHANGE UP (ref 110–370)
VIT B12 SERPL-MCNC: 829 PG/ML — SIGNIFICANT CHANGE UP (ref 200–900)
WBC # BLD: 5.7 K/UL — SIGNIFICANT CHANGE UP (ref 3.8–10.5)
WBC # FLD AUTO: 5.7 K/UL — SIGNIFICANT CHANGE UP (ref 3.8–10.5)

## 2020-08-11 PROCEDURE — 72131 CT LUMBAR SPINE W/O DYE: CPT | Mod: 26

## 2020-08-11 RX ORDER — WARFARIN SODIUM 2.5 MG/1
6 TABLET ORAL ONCE
Refills: 0 | Status: COMPLETED | OUTPATIENT
Start: 2020-08-11 | End: 2020-08-11

## 2020-08-11 RX ADMIN — GABAPENTIN 100 MILLIGRAM(S): 400 CAPSULE ORAL at 14:17

## 2020-08-11 RX ADMIN — WARFARIN SODIUM 6 MILLIGRAM(S): 2.5 TABLET ORAL at 18:31

## 2020-08-11 RX ADMIN — Medication 100 MILLIGRAM(S): at 11:45

## 2020-08-11 RX ADMIN — NYSTATIN CREAM 1 APPLICATION(S): 100000 CREAM TOPICAL at 18:31

## 2020-08-11 RX ADMIN — LATANOPROST 1 DROP(S): 0.05 SOLUTION/ DROPS OPHTHALMIC; TOPICAL at 21:27

## 2020-08-11 RX ADMIN — AMLODIPINE BESYLATE 10 MILLIGRAM(S): 2.5 TABLET ORAL at 06:08

## 2020-08-11 RX ADMIN — GABAPENTIN 100 MILLIGRAM(S): 400 CAPSULE ORAL at 06:08

## 2020-08-11 RX ADMIN — NYSTATIN CREAM 1 APPLICATION(S): 100000 CREAM TOPICAL at 06:08

## 2020-08-11 RX ADMIN — GABAPENTIN 100 MILLIGRAM(S): 400 CAPSULE ORAL at 21:27

## 2020-08-11 RX ADMIN — Medication 75 MILLIGRAM(S): at 21:27

## 2020-08-11 RX ADMIN — Medication 75 MILLIGRAM(S): at 14:17

## 2020-08-11 RX ADMIN — SENNA PLUS 2 TABLET(S): 8.6 TABLET ORAL at 21:27

## 2020-08-11 RX ADMIN — SIMVASTATIN 20 MILLIGRAM(S): 20 TABLET, FILM COATED ORAL at 21:27

## 2020-08-11 RX ADMIN — Medication 75 MILLIGRAM(S): at 06:08

## 2020-08-11 RX ADMIN — CINACALCET 60 MILLIGRAM(S): 30 TABLET, FILM COATED ORAL at 11:46

## 2020-08-11 RX ADMIN — Medication 75 MICROGRAM(S): at 06:08

## 2020-08-11 RX ADMIN — POLYETHYLENE GLYCOL 3350 17 GRAM(S): 17 POWDER, FOR SOLUTION ORAL at 11:44

## 2020-08-11 RX ADMIN — HEPARIN SODIUM 900 UNIT(S)/HR: 5000 INJECTION INTRAVENOUS; SUBCUTANEOUS at 07:27

## 2020-08-11 NOTE — PROGRESS NOTE ADULT - ASSESSMENT
Assessment  DMT2: 89y Female with DM T2 with hyperglycemia admitted with L foot wound, A1C 6.1%, on insulin coverage, FS improved no hypoglycemic episodes. Patient is eating meals.  Hypothyroidism: On Synthroid 75 mcg po daily, compliant with Synthroid intake, asymptomatic, euthyroid.  Hypercalcemia: Due to primary hyperparathyroidisms, on sensipar,  calcium improved  Toe Gangrene: No intervention, FU Vascular/Podiatry.  CKD: labs, chart reviewed.              Curtis Castillo MD  Cell: [phone]  Office: [phone]

## 2020-08-11 NOTE — PROGRESS NOTE ADULT - SUBJECTIVE AND OBJECTIVE BOX
INTERVAL HPI/OVERNIGHT EVENTS: I feel fine.   Vital Signs Last 24 Hrs  T(C): 36.7 (11 Aug 2020 11:00), Max: 37 (10 Aug 2020 20:44)  T(F): 98 (11 Aug 2020 11:00), Max: 98.6 (10 Aug 2020 20:44)  HR: 69 (11 Aug 2020 14:22) (61 - 71)  BP: 156/72 (11 Aug 2020 14:22) (139/59 - 165/60)  BP(mean): --  RR: 17 (11 Aug 2020 11:00) (17 - 18)  SpO2: 100% (11 Aug 2020 11:00) (98% - 100%)  I&O's Summary    MEDICATIONS  (STANDING):  allopurinol 100 milliGRAM(s) Oral daily  amLODIPine   Tablet 10 milliGRAM(s) Oral daily  cinacalcet 60 milliGRAM(s) Oral daily  dextrose 5%. 1000 milliLiter(s) (50 mL/Hr) IV Continuous <Continuous>  dextrose 50% Injectable 12.5 Gram(s) IV Push once  dextrose 50% Injectable 25 Gram(s) IV Push once  dextrose 50% Injectable 25 Gram(s) IV Push once  gabapentin 100 milliGRAM(s) Oral three times a day  hydrALAZINE 75 milliGRAM(s) Oral three times a day  insulin lispro (HumaLOG) corrective regimen sliding scale   SubCutaneous three times a day before meals  insulin lispro (HumaLOG) corrective regimen sliding scale   SubCutaneous at bedtime  latanoprost 0.005% Ophthalmic Solution 1 Drop(s) Left EYE at bedtime  levothyroxine 75 MICROGram(s) Oral daily  nystatin Powder 1 Application(s) Topical two times a day  polyethylene glycol 3350 17 Gram(s) Oral daily  senna 2 Tablet(s) Oral at bedtime  simvastatin 20 milliGRAM(s) Oral at bedtime  warfarin 6 milliGRAM(s) Oral once    MEDICATIONS  (PRN):  acetaminophen   Tablet .. 650 milliGRAM(s) Oral every 6 hours PRN Temp greater or equal to 38C (100.4F), Mild Pain (1 - 3), Moderate Pain (4 - 6), Severe Pain (7 - 10)  dextrose 40% Gel 15 Gram(s) Oral once PRN Blood Glucose LESS THAN 70 milliGRAM(s)/deciliter  glucagon  Injectable 1 milliGRAM(s) IntraMuscular once PRN Glucose LESS THAN 70 milligrams/deciliter  morphine  - Injectable 2 milliGRAM(s) IV Push every 4 hours PRN Severe Pain (7 - 10)    LABS:                        8.2    5.70  )-----------( 194      ( 11 Aug 2020 06:00 )             25.9     08-11    140  |  105  |  62<H>  ----------------------------<  76  4.7   |  23  |  1.57<H>    Ca    9.9      11 Aug 2020 06:00  Phos  4.0     08-11  Mg     2.0     08-11      PT/INR - ( 11 Aug 2020 06:00 )   PT: 23.0 SEC;   INR: 2.07          PTT - ( 11 Aug 2020 06:00 )  PTT:60.0 SEC    CAPILLARY BLOOD GLUCOSE      POCT Blood Glucose.: 105 mg/dL (11 Aug 2020 11:47)  POCT Blood Glucose.: 82 mg/dL (11 Aug 2020 08:19)  POCT Blood Glucose.: 104 mg/dL (10 Aug 2020 22:37)  POCT Blood Glucose.: 111 mg/dL (10 Aug 2020 17:14)          REVIEW OF SYSTEMS:  CONSTITUTIONAL: No fever, weight loss, or fatigue  EYES: No eye pain, visual disturbances, or discharge  ENMT:  No difficulty hearing, tinnitus, vertigo; No sinus or throat pain  NECK: No pain or stiffness  RESPIRATORY: No cough, wheezing, chills or hemoptysis; No shortness of breath  CARDIOVASCULAR: No chest pain, palpitations, dizziness, or leg swelling  GASTROINTESTINAL: No abdominal or epigastric pain. No nausea, vomiting, or hematemesis; No diarrhea or constipation. No melena or hematochezia.  GENITOURINARY: No dysuria, frequency, hematuria, or incontinence  NEUROLOGICAL: No headaches, memory loss, loss of strength, numbness, or tremors      Consultant(s) Notes Reviewed:  [x ] YES  [ ] NO    PHYSICAL EXAM:  GENERAL: NAD, well-groomed, well-developed,not in any distress ,  HEAD:  Atraumatic, Normocephalic  EYES: EOMI, PERRLA, conjunctiva and sclera clear  ENMT: No tonsillar erythema, exudates, or enlargement; Moist mucous membranes, Good dentition, No lesions  NECK: Supple, No JVD, Normal thyroid  NERVOUS SYSTEM:  Alert & Oriented X3, No focal deficit   CHEST/LUNG: Good air entry bilateral with no  rales, rhonchi, wheezing, or rubs  HEART: Regular rate and rhythm; No murmurs, rubs, or gallops  ABDOMEN: Soft, Nontender, Nondistended; Bowel sounds present  EXTREMITIES:  2+ Peripheral Pulses, No clubbing, cyanosis, or edema    Care Discussed with Consultants/Other Providers [ x] YES  [ ] NO

## 2020-08-11 NOTE — PROGRESS NOTE ADULT - SUBJECTIVE AND OBJECTIVE BOX
S: Denies chest pain or SOB. Review of systems otherwise (-)      MEDICATIONS  (STANDING):  allopurinol 100 milliGRAM(s) Oral daily  amLODIPine   Tablet 10 milliGRAM(s) Oral daily  cinacalcet 60 milliGRAM(s) Oral daily  dextrose 5%. 1000 milliLiter(s) (50 mL/Hr) IV Continuous <Continuous>  dextrose 50% Injectable 12.5 Gram(s) IV Push once  dextrose 50% Injectable 25 Gram(s) IV Push once  dextrose 50% Injectable 25 Gram(s) IV Push once  gabapentin 100 milliGRAM(s) Oral three times a day  hydrALAZINE 75 milliGRAM(s) Oral three times a day  insulin lispro (HumaLOG) corrective regimen sliding scale   SubCutaneous three times a day before meals  insulin lispro (HumaLOG) corrective regimen sliding scale   SubCutaneous at bedtime  latanoprost 0.005% Ophthalmic Solution 1 Drop(s) Left EYE at bedtime  levothyroxine 75 MICROGram(s) Oral daily  nystatin Powder 1 Application(s) Topical two times a day  polyethylene glycol 3350 17 Gram(s) Oral daily  senna 2 Tablet(s) Oral at bedtime  simvastatin 20 milliGRAM(s) Oral at bedtime  warfarin 6 milliGRAM(s) Oral once    MEDICATIONS  (PRN):  acetaminophen   Tablet .. 650 milliGRAM(s) Oral every 6 hours PRN Temp greater or equal to 38C (100.4F), Mild Pain (1 - 3), Moderate Pain (4 - 6), Severe Pain (7 - 10)  dextrose 40% Gel 15 Gram(s) Oral once PRN Blood Glucose LESS THAN 70 milliGRAM(s)/deciliter  glucagon  Injectable 1 milliGRAM(s) IntraMuscular once PRN Glucose LESS THAN 70 milligrams/deciliter  morphine  - Injectable 2 milliGRAM(s) IV Push every 4 hours PRN Severe Pain (7 - 10)      LABS:                            8.2    5.70  )-----------( 194      ( 11 Aug 2020 06:00 )             25.9     Hemoglobin: 8.2 g/dL (08-11 @ 06:00)  Hemoglobin: 7.3 g/dL (08-10 @ 04:38)  Hemoglobin: 7.8 g/dL (08-09 @ 03:46)  Hemoglobin: 8.1 g/dL (08-08 @ 05:39)  Hemoglobin: 7.9 g/dL (08-07 @ 09:08)    08-11    140  |  105  |  62<H>  ----------------------------<  76  4.7   |  23  |  1.57<H>    Ca    9.9      11 Aug 2020 06:00  Phos  4.0     08-11  Mg     2.0     08-11      Creatinine Trend: 1.57<--, 1.76<--, 1.94<--, 1.83<--, 1.90<--, 1.89<--   PT/INR - ( 11 Aug 2020 06:00 )   PT: 23.0 SEC;   INR: 2.07          PTT - ( 11 Aug 2020 06:00 )  PTT:60.0 SEC            PHYSICAL EXAM  Vital Signs Last 24 Hrs  T(C): 36.7 (11 Aug 2020 11:00), Max: 37.1 (10 Aug 2020 12:40)  T(F): 98 (11 Aug 2020 11:00), Max: 98.7 (10 Aug 2020 12:40)  HR: 61 (11 Aug 2020 11:00) (61 - 71)  BP: 139/59 (11 Aug 2020 11:00) (137/55 - 165/60)  BP(mean): --  RR: 17 (11 Aug 2020 11:00) (17 - 18)  SpO2: 100% (11 Aug 2020 11:00) (98% - 100%)      Gen: Frail elderly AA woman, nondiaphoretic and comfortable.  HEENT:  (-)icterus (-)pallor  CV: Normal S1, there is still a sharp S2.  Mid-to-late peaking 3/6 systolic murmur at the upper chest (+)2 Pulses B/l  Resp:  Clear to auscultation B/L, normal effort  GI: (+) BS Soft, NT, ND  Lymph:  (-)Edema, (-)obvious lymphadenopathy  Skin: Warm to touch, Normal turgor  Psych: Appropriate mood and affect    TTE: < from: Transthoracic Echocardiogram (07.22.20 @ 16:27) >  1. Mitral annular calcification, otherwise normal mitral  valve. Mild-moderate mitral regurgitation.  2. Calcified trileaflet aortic valve with decreased  opening.  Peak transaortic valve gradient mkpssa55 mm Hg,  mean transaortic valve gradient equals 26 mm Hg, estimated  aortic valve area equals 1 sqcm (by continuity equation),  consistent with moderate to severe aortic stenosis.  3. Severely dilated left atrium.  LA volume index = 57  cc/m2.  4. Mild concentric left ventricular hypertrophy.  5. Normal left ventricular systolic function. No segmental  wall motion abnormalities.  6. Severe diastolic dysfunction.  7. Severe right atrial enlargement.  8. Normal right ventricular size and function.  9. Estimated right ventricular systolic pressure equals 53  mm Hg, assuming right atrial pressure equals 10 mm Hg,  consistent with moderate pulmonary hypertension.    < end of copied text >      ASSESSMENT/PLAN: Patient is a 89 year old Female well known to Dr. Berman, who presented with a foot wound.  Consult requested for pre-op cardiovascular risk stratification. Her known PMH includes persistent Afib on coumadin who was not a candidate for left atrial appendage occlusion with Watchman, chronic HFpEF, HTN, HLD, hypothyroidism, CKD, Type 2 DM, CVA, and hx of PE.    - Continue AC with coumadin for cva prevention if no contraindications - goal INR 2-3  - no cardiovascular complications with any of her procedures during hospitalization.  stable for DC planning, and working on PT/ambulation.  - pt. and patient's family prefer conservative cv care and no further workup of valvular disease (her AS is moderate by examination)  - given the above, no further inpatient cardiac w/u needed at this time  - Patient to f/u with Dr Berman 8/31 at 11:20AM [phone]    Zi Wiley PA-C  Pager: [phone]

## 2020-08-11 NOTE — CONSULT NOTE ADULT - CONSULT REQUESTED DATE/TIME
05-Aug-2020 09:47
21-Jul-2020 16:18
22-Jul-2020 14:21
22-Jul-2020 18:49
29-Jul-2020 16:00
22-Jul-2020
11-Aug-2020 21:14

## 2020-08-11 NOTE — CONSULT NOTE ADULT - CONSULT REASON
Afib/CHF, preop clearance
Hypercalcemia
L hallux wound
non-healing LLE wound
toe infection
NIKHIL, CKD
incontinence

## 2020-08-11 NOTE — PROGRESS NOTE ADULT - SUBJECTIVE AND OBJECTIVE BOX
Chief complaint  Patient is a 89y old  Female who presents with a chief complaint of left foot big toe pain (10 Aug 2020 18:42)   Review of systems  Patient in bed, appears comfortable.    Labs and Fingersticks  CAPILLARY BLOOD GLUCOSE      POCT Blood Glucose.: 82 mg/dL (11 Aug 2020 08:19)  POCT Blood Glucose.: 104 mg/dL (10 Aug 2020 22:37)  POCT Blood Glucose.: 111 mg/dL (10 Aug 2020 17:14)  POCT Blood Glucose.: 124 mg/dL (10 Aug 2020 12:19)      Anion Gap, Serum: 12 (08-11 @ 06:00)  Anion Gap, Serum: 10 (08-10 @ 04:38)  Anion Gap, Serum: 12 (08-09 @ 18:15)      Calcium, Total Serum: 9.9 (08-11 @ 06:00)  Calcium, Total Serum: 9.8 (08-10 @ 04:38)  Calcium, Total Serum: 9.9 (08-09 @ 18:15)          08-11    140  |  105  |  62<H>  ----------------------------<  76  4.7   |  23  |  1.57<H>    Ca    9.9      11 Aug 2020 06:00  Phos  4.0     08-11  Mg     2.0     08-11                          8.2    5.70  )-----------( 194      ( 11 Aug 2020 06:00 )             25.9     Medications  MEDICATIONS  (STANDING):  allopurinol 100 milliGRAM(s) Oral daily  amLODIPine   Tablet 10 milliGRAM(s) Oral daily  cinacalcet 60 milliGRAM(s) Oral daily  dextrose 5%. 1000 milliLiter(s) (50 mL/Hr) IV Continuous <Continuous>  dextrose 50% Injectable 12.5 Gram(s) IV Push once  dextrose 50% Injectable 25 Gram(s) IV Push once  dextrose 50% Injectable 25 Gram(s) IV Push once  gabapentin 100 milliGRAM(s) Oral three times a day  hydrALAZINE 75 milliGRAM(s) Oral three times a day  insulin lispro (HumaLOG) corrective regimen sliding scale   SubCutaneous three times a day before meals  insulin lispro (HumaLOG) corrective regimen sliding scale   SubCutaneous at bedtime  latanoprost 0.005% Ophthalmic Solution 1 Drop(s) Left EYE at bedtime  levothyroxine 75 MICROGram(s) Oral daily  nystatin Powder 1 Application(s) Topical two times a day  polyethylene glycol 3350 17 Gram(s) Oral daily  senna 2 Tablet(s) Oral at bedtime  simvastatin 20 milliGRAM(s) Oral at bedtime  warfarin 6 milliGRAM(s) Oral once      Physical Exam  General: Patient comfortable in bed  Vital Signs Last 12 Hrs  T(F): 98.1 (08-11-20 @ 06:06), Max: 98.1 (08-11-20 @ 06:06)  HR: 66 (08-11-20 @ 06:06) (66 - 66)  BP: 154/54 (08-11-20 @ 06:06) (154/54 - 154/54)  BP(mean): --  RR: 18 (08-11-20 @ 06:06) (18 - 18)  SpO2: 99% (08-11-20 @ 06:06) (99% - 99%)  Neck: No palpable thyroid nodules.  CVS: S1S2, No murmurs  Respiratory: No wheezing, no crepitations  GI: Abdomen soft, bowel sounds positive  Musculoskeletal:  edema lower extremities.     Diagnostics    Thyroid Stimulating Hormone, Serum: AM Sched. Collection: 06-Aug-2020 04:00 (08-05 @ 09:51)  Free Thyroxine, Serum: AM Sched. Collection: 06-Aug-2020 04:00 (08-05 @ 09:51)

## 2020-08-11 NOTE — CONSULT NOTE ADULT - SUBJECTIVE AND OBJECTIVE BOX
[facility]        Patient is a 89y old  Female who presents with a chief complaint of left foot big toe pain (11 Aug 2020 09:17)    Excerpt from H&P,"     89 year old female PMH  a-fib on coumadin, diastolic CHF (with EF 63% on TTE 19), HTN, HLD, CKD stage 3-4, IDDM, PVD, CVA, PE, hypothyroidism, p/w L foot wound. Patient seen today at clinic and sent for cellulitis and abscess, wanting to rule out OM. Patient denies fever, chills, cp, sob, abd pain, n/v, weakness, or numbness/tingling.     incontinence        *****PAST MEDICAL / Surgical  HISTORY:  PAST MEDICAL & SURGICAL HISTORY:  CKD (chronic kidney disease)  CVA (cerebrovascular accident)  Personal history of PE (pulmonary embolism)  Glaucoma  PVD (peripheral vascular disease)  Hypothyroid  HTN (hypertension)  HLD (hyperlipidemia)  CHF (congestive heart failure)  DM (diabetes mellitus)  Afib  Elective surgery: abdominal abcess removals  History of partial thyroidectomy           *****FAMILY HISTORY:  FAMILY HISTORY:  No pertinent family history in first degree relatives           *****SOCIAL HISTORY:  Alcohol: None  Smoking: None         *****ALLERGIES:   Allergies    No Known Allergies    Intolerances             *****MEDICATIONS: current medication reviewed and documented.   MEDICATIONS  (STANDING):  allopurinol 100 milliGRAM(s) Oral daily  amLODIPine   Tablet 10 milliGRAM(s) Oral daily  cinacalcet 60 milliGRAM(s) Oral daily  dextrose 5%. 1000 milliLiter(s) (50 mL/Hr) IV Continuous <Continuous>  dextrose 50% Injectable 12.5 Gram(s) IV Push once  dextrose 50% Injectable 25 Gram(s) IV Push once  dextrose 50% Injectable 25 Gram(s) IV Push once  gabapentin 100 milliGRAM(s) Oral three times a day  hydrALAZINE 75 milliGRAM(s) Oral three times a day  insulin lispro (HumaLOG) corrective regimen sliding scale   SubCutaneous three times a day before meals  insulin lispro (HumaLOG) corrective regimen sliding scale   SubCutaneous at bedtime  latanoprost 0.005% Ophthalmic Solution 1 Drop(s) Left EYE at bedtime  levothyroxine 75 MICROGram(s) Oral daily  nystatin Powder 1 Application(s) Topical two times a day  polyethylene glycol 3350 17 Gram(s) Oral daily  senna 2 Tablet(s) Oral at bedtime  simvastatin 20 milliGRAM(s) Oral at bedtime  warfarin 6 milliGRAM(s) Oral once    MEDICATIONS  (PRN):  acetaminophen   Tablet .. 650 milliGRAM(s) Oral every 6 hours PRN Temp greater or equal to 38C (100.4F), Mild Pain (1 - 3), Moderate Pain (4 - 6), Severe Pain (7 - 10)  dextrose 40% Gel 15 Gram(s) Oral once PRN Blood Glucose LESS THAN 70 milliGRAM(s)/deciliter  glucagon  Injectable 1 milliGRAM(s) IntraMuscular once PRN Glucose LESS THAN 70 milligrams/deciliter  morphine  - Injectable 2 milliGRAM(s) IV Push every 4 hours PRN Severe Pain (7 - 10)           *****REVIEW OF SYSTEM:  GEN: no fever, no chills, no pain  RESP: no SOB, no cough, no sputum  CVS: no chest pain, no palpitations, no edema  GI: no abdominal pain, no nausea, no vomiting, no constipation, no diarrhea  : no dysurea, no frequency, no hematurea  Neuro: no headache, no dizziness  PSYCH: no anxiety, no depression  Derm : no itching, no rash         *****VITAL SIGNS:  T(C): 36.7 (20 @ 11:00), Max: 37.1 (08-10-20 @ 12:40)  HR: 61 (20 @ 11:00) (61 - 71)  BP: 139/59 (20 @ 11:00) (137/55 - 165/60)  RR: 17 (20 @ 11:00) (17 - 18)  SpO2: 100% (20 @ 11:00) (98% - 100%)  Wt(kg): --           *****PHYSICAL EXAM:   {Alert oriented x 3   Attention comprehension are fair. Able to name, repeat, read without any difficulty.   Able to follow 3 step commands.     EOMI fundi not visualized,  VFF to confrontration  No facial asymmetry   Tongue is midline   Palate elevates symmetrically   Moving all 4 ext symmetrically no pronator drift   Reflexes are symmetric throughout   sensation is grossly symmetric  Gait : not assessed.  B/L down going toes   }            *****LAB AND IMAGIN.2    5.70  )-----------( 194      ( 11 Aug 2020 06:00 )             25.9               08-11    140  |  105  |  62<H>  ----------------------------<  76  4.7   |  23  |  1.57<H>    Ca    9.9      11 Aug 2020 06:00  Phos  4.0     08-11  Mg     2.0     08-11      PT/INR - ( 11 Aug 2020 06:00 )   PT: 23.0 SEC;   INR: 2.07          PTT - ( 11 Aug 2020 06:00 )  PTT:60.0 SEC                            [All pertinent recent Imaging reports reviewed]         *****A S S E S S M E N T   A N D   P L A N :        Problem/Recommendations 1: incontinence ? overflow incontinence vs. neurogenic cause   mr lumbosacral spine with and without contrast       Problem/Recommendations 2:       ___________________________  Will follow with you.  Thank you,  Angela Choudhary MD  Diplomate of the American Board of Neurology and Psychiatry.  Diplomate of the American Board of Vascular Neurology.   [facility] ([facility]), [facility]   Ph: [phone]    Differential diagnosis and plan of care discussed with patient after the evaluation.   Advanced care planning options discussed.   Pain assessed and judicious use of narcotics when appropriate was discussed.  Importance of Fall prevention discussed.  Counseling on Smoking and Alcohol cessation was offered when appropriate.  Counseling on Diet, exercise, and medication compliance was done.   83 minutes spent on the total encounter;  more than 50 % of the visit was spent on counseling  and or coordinating care by the attending physician.    Thank you for allowing me to participate in the care of this carey patient. Please do not hesitate to call me if you have any questions.     This and subsequent notes were partially created using voice recognition software and will  inherently be subject to errors including those of syntax and sound alike substitutions which may escape proofreading. In such instances original meaning may be extrapolated by contextual derivation. [facility]        Patient is a 89y old  Female who presents with a chief complaint of left foot big toe pain (11 Aug 2020 09:17)    Excerpt from H&P,"     89 year old female PMH  a-fib on coumadin, diastolic CHF (with EF 63% on TTE 19), HTN, HLD, CKD stage 3-4, IDDM, PVD, CVA, PE, hypothyroidism, p/w L foot wound. Patient seen today at clinic and sent for cellulitis and abscess, wanting to rule out OM. Patient denies fever, chills, cp, sob, abd pain, n/v, weakness, or numbness/tingling.     incontinence  while pt was urinating had 1 large bm. Pt was on multiple antibiotics and was on stool softeners due to constipation.            *****PAST MEDICAL / Surgical  HISTORY:  PAST MEDICAL & SURGICAL HISTORY:  CKD (chronic kidney disease)  CVA (cerebrovascular accident)  Personal history of PE (pulmonary embolism)  Glaucoma  PVD (peripheral vascular disease)  Hypothyroid  HTN (hypertension)  HLD (hyperlipidemia)  CHF (congestive heart failure)  DM (diabetes mellitus)  Afib  Elective surgery: abdominal abcess removals  History of partial thyroidectomy           *****FAMILY HISTORY:  FAMILY HISTORY:  No pertinent family history in first degree relatives           *****SOCIAL HISTORY:  Alcohol: None  Smoking: None         *****ALLERGIES:   Allergies    No Known Allergies    Intolerances             *****MEDICATIONS: current medication reviewed and documented.   MEDICATIONS  (STANDING):  allopurinol 100 milliGRAM(s) Oral daily  amLODIPine   Tablet 10 milliGRAM(s) Oral daily  cinacalcet 60 milliGRAM(s) Oral daily  dextrose 5%. 1000 milliLiter(s) (50 mL/Hr) IV Continuous <Continuous>  dextrose 50% Injectable 12.5 Gram(s) IV Push once  dextrose 50% Injectable 25 Gram(s) IV Push once  dextrose 50% Injectable 25 Gram(s) IV Push once  gabapentin 100 milliGRAM(s) Oral three times a day  hydrALAZINE 75 milliGRAM(s) Oral three times a day  insulin lispro (HumaLOG) corrective regimen sliding scale   SubCutaneous three times a day before meals  insulin lispro (HumaLOG) corrective regimen sliding scale   SubCutaneous at bedtime  latanoprost 0.005% Ophthalmic Solution 1 Drop(s) Left EYE at bedtime  levothyroxine 75 MICROGram(s) Oral daily  nystatin Powder 1 Application(s) Topical two times a day  polyethylene glycol 3350 17 Gram(s) Oral daily  senna 2 Tablet(s) Oral at bedtime  simvastatin 20 milliGRAM(s) Oral at bedtime  warfarin 6 milliGRAM(s) Oral once    MEDICATIONS  (PRN):  acetaminophen   Tablet .. 650 milliGRAM(s) Oral every 6 hours PRN Temp greater or equal to 38C (100.4F), Mild Pain (1 - 3), Moderate Pain (4 - 6), Severe Pain (7 - 10)  dextrose 40% Gel 15 Gram(s) Oral once PRN Blood Glucose LESS THAN 70 milliGRAM(s)/deciliter  glucagon  Injectable 1 milliGRAM(s) IntraMuscular once PRN Glucose LESS THAN 70 milligrams/deciliter  morphine  - Injectable 2 milliGRAM(s) IV Push every 4 hours PRN Severe Pain (7 - 10)           *****REVIEW OF SYSTEM:  GEN: no fever, no chills, no pain  RESP: no SOB, no cough, no sputum  CVS: no chest pain, no palpitations, no edema  GI: no abdominal pain, no nausea, no vomiting, no constipation, no diarrhea  : no dysurea, no frequency, no hematurea  Neuro: no headache, no dizziness  PSYCH: no anxiety, no depression  Derm : no itching, no rash         *****VITAL SIGNS:  T(C): 36.7 (20 @ 11:00), Max: 37.1 (08-10-20 @ 12:40)  HR: 61 (20 @ 11:00) (61 - 71)  BP: 139/59 (20 @ 11:00) (137/55 - 165/60)  RR: 17 (20 @ 11:00) (17 - 18)  SpO2: 100% (20 @ 11:00) (98% - 100%)  Wt(kg): --           *****PHYSICAL EXAM:   Alert oriented x 2.5  Attention comprehension are fair. Able to name, repeat, without any difficulty.   Able to follow 1-2 step commands.     EOMI fundi not visualized,  VFF to confrontration  No facial asymmetry   Tongue is midline   Palate elevates symmetrically   Moving all 4 ext symmetrically antigravity   RLE limited due to pain   Reflexes are diminished throughout   sensation is reduced in a paTchy distribution on the left leg ( per pt is chronic)   Gait : not assessed.  B/L down going toes                *****LAB AND IMAGIN.2    5.70  )-----------( 194      ( 11 Aug 2020 06:00 )             25.9               08-11    140  |  105  |  62<H>  ----------------------------<  76  4.7   |  23  |  1.57<H>    Ca    9.9      11 Aug 2020 06:00  Phos  4.0     08-11  Mg     2.0     08-11      PT/INR - ( 11 Aug 2020 06:00 )   PT: 23.0 SEC;   INR: 2.07          PTT - ( 11 Aug 2020 06:00 )  PTT:60.0 SEC                            [All pertinent recent Imaging reports reviewed]         *****A S S E S S M E N T   A N D   P L A N :   89 year old female PMH  a-fib on coumadin, diastolic CHF (with EF 63% on TTE 19), HTN, HLD, CKD stage 3-4, IDDM, PVD, CVA, PE, hypothyroidism, p/w L foot wound. Patient seen today at clinic and sent for cellulitis and abscess, wanting to rule out OM. Patient denies fever, chills, cp, sob, abd pain, n/v, weakness, or numbness/tingling.     incontinence x2      Problem/Recommendations 1: incontinence ? overflow incontinence due to chronic constipation vs. neurogenic cause   mr lumbosacral spine with and without contrast   risk vs benefits of contrast discussed with primary team   ck rectal tone   pt eval   emg/ncv as out pt   can consider outpt follow up with urology          ___________________________  Will follow with you.  Thank you,  Angela Choudhary MD  Diplomate of the American Board of Neurology and Psychiatry.  Diplomate of the American Board of Vascular Neurology.   [facility] ([facility]), [facility]   Ph: [phone]    Differential diagnosis and plan of care discussed with patient after the evaluation.   Advanced care planning options discussed.   Pain assessed and judicious use of narcotics when appropriate was discussed.  Importance of Fall prevention discussed.  Counseling on Smoking and Alcohol cessation was offered when appropriate.  Counseling on Diet, exercise, and medication compliance was done.   83 minutes spent on the total encounter;  more than 50 % of the visit was spent on counseling  and or coordinating care by the attending physician.    Thank you for allowing me to participate in the care of this carey patient. Please do not hesitate to call me if you have any questions.     This and subsequent notes were partially created using voice recognition software and will  inherently be subject to errors including those of syntax and sound alike substitutions which may escape proofreading. In such instances original meaning may be extrapolated by contextual derivation.

## 2020-08-11 NOTE — CONSULT NOTE ADULT - REASON FOR ADMISSION
left foot big toe pain

## 2020-08-11 NOTE — PROGRESS NOTE ADULT - ASSESSMENT
89 year old female PMH  a-fib on coumadin, diastolic CHF (with EF 63% on TTE 8/13/19), HTN, HLD, CKD stage 3-4, IDDM, PVD, CVA, PE, hypothyroidism, p/w L foot wound. Patient seen today at clinic and sent for cellulitis and abscess, wanting to rule out OM. Patient denies fever, chills, cp, sob, abd pain, n/v, weakness, or numbness/tingling.     Problem/Plan - 1:  ·  Problem: Toe gangrene.  Plan: Podiatry consult noted. S/P IV Abxs for 7 days . NO fever or Leucocytosis so likely ischemic .   ID and Vascular helping. No intervention per podiatry .        Problem/Plan - 2:  ·  Problem: Stage 3 chronic kidney disease.  Plan: Renal helping .  Baseline Creatinine 1.8.      Problem/Plan - 3:  ·  Problem: PVD (peripheral vascular disease).  Plan: LIZBETH/PVD noted.  Vascular consult noted. S/P angiogram .Aspirin started as recommended by Vascular .      Problem/Plan - 4:  ·  Problem: Chronic diastolic congestive heart failure.  Plan: Continuing Lasix .  Cardiology helping.      Problem/Plan - 5:  ·  Problem: DM (diabetes mellitus).  Plan: SSI for now .      Problem/Plan - 6:  Problem: Afib. Plan: ON AC. Heparin off as  INR therapeutic .      Problem/Plan - 7:  ·  Problem: Pulmonary embolism.  Plan: On AC.      Problem/Plan - 8:  ·  Problem:  Hypothyroid.  Plan: Synthroid.      Problem/Plan - 9:  ·  Problem: Anemia.  Plan: Chronic . Watching CBC . Transfuse PRBC for HGb <7.5G .Work up noted.      Problem/Plan - 10:  ·  Problem: Uncontrolled Hypertension .  Plan: Adjusting BP meds. BP readings better.      Problem/Plan - 11:  ·  Problem:  Hyperkalemia .  Plan: Treating and repeating BMP .    Problem/Plan - 12:  ·  Problem:  Incontinence of Bowel  .  Plan: Neurology consulted.     Disposition : DC planning home pending CT LS Spine.

## 2020-08-12 VITALS
TEMPERATURE: 97 F | SYSTOLIC BLOOD PRESSURE: 154 MMHG | RESPIRATION RATE: 18 BRPM | OXYGEN SATURATION: 99 % | DIASTOLIC BLOOD PRESSURE: 74 MMHG | HEART RATE: 74 BPM

## 2020-08-12 DIAGNOSIS — I35.0 NONRHEUMATIC AORTIC (VALVE) STENOSIS: ICD-10-CM

## 2020-08-12 DIAGNOSIS — E87.5 HYPERKALEMIA: ICD-10-CM

## 2020-08-12 LAB
ANION GAP SERPL CALC-SCNC: 12 MMO/L — SIGNIFICANT CHANGE UP (ref 7–14)
APTT BLD: 36.8 SEC — HIGH (ref 27–36.3)
BASOPHILS # BLD AUTO: 0.03 K/UL — SIGNIFICANT CHANGE UP (ref 0–0.2)
BASOPHILS NFR BLD AUTO: 0.6 % — SIGNIFICANT CHANGE UP (ref 0–2)
BASOPHILS NFR SPEC: 0.9 % — SIGNIFICANT CHANGE UP (ref 0–2)
BLASTS # FLD: 0 % — SIGNIFICANT CHANGE UP (ref 0–0)
BUN SERPL-MCNC: 55 MG/DL — HIGH (ref 7–23)
CALCIUM SERPL-MCNC: 9.7 MG/DL — SIGNIFICANT CHANGE UP (ref 8.4–10.5)
CHLORIDE SERPL-SCNC: 105 MMOL/L — SIGNIFICANT CHANGE UP (ref 98–107)
CO2 SERPL-SCNC: 24 MMOL/L — SIGNIFICANT CHANGE UP (ref 22–31)
CREAT SERPL-MCNC: 1.61 MG/DL — HIGH (ref 0.5–1.3)
EOSINOPHIL # BLD AUTO: 0.12 K/UL — SIGNIFICANT CHANGE UP (ref 0–0.5)
EOSINOPHIL NFR BLD AUTO: 2.3 % — SIGNIFICANT CHANGE UP (ref 0–6)
EOSINOPHIL NFR FLD: 2.6 % — SIGNIFICANT CHANGE UP (ref 0–6)
GIANT PLATELETS BLD QL SMEAR: PRESENT — SIGNIFICANT CHANGE UP
GLUCOSE BLDC GLUCOMTR-MCNC: 121 MG/DL — HIGH (ref 70–99)
GLUCOSE BLDC GLUCOMTR-MCNC: 154 MG/DL — HIGH (ref 70–99)
GLUCOSE BLDC GLUCOMTR-MCNC: 83 MG/DL — SIGNIFICANT CHANGE UP (ref 70–99)
GLUCOSE SERPL-MCNC: 75 MG/DL — SIGNIFICANT CHANGE UP (ref 70–99)
HCT VFR BLD CALC: 26.6 % — LOW (ref 34.5–45)
HGB BLD-MCNC: 8.3 G/DL — LOW (ref 11.5–15.5)
IMM GRANULOCYTES NFR BLD AUTO: 0.4 % — SIGNIFICANT CHANGE UP (ref 0–1.5)
INR BLD: 2.37 — HIGH (ref 0.88–1.16)
LYMPHOCYTES # BLD AUTO: 1.31 K/UL — SIGNIFICANT CHANGE UP (ref 1–3.3)
LYMPHOCYTES # BLD AUTO: 25.5 % — SIGNIFICANT CHANGE UP (ref 13–44)
LYMPHOCYTES NFR SPEC AUTO: 24.8 % — SIGNIFICANT CHANGE UP (ref 13–44)
MACROCYTES BLD QL: SIGNIFICANT CHANGE UP
MAGNESIUM SERPL-MCNC: 1.9 MG/DL — SIGNIFICANT CHANGE UP (ref 1.6–2.6)
MCHC RBC-ENTMCNC: 29.3 PG — SIGNIFICANT CHANGE UP (ref 27–34)
MCHC RBC-ENTMCNC: 31.2 % — LOW (ref 32–36)
MCV RBC AUTO: 94 FL — SIGNIFICANT CHANGE UP (ref 80–100)
METAMYELOCYTES # FLD: 0 % — SIGNIFICANT CHANGE UP (ref 0–1)
MICROCYTES BLD QL: SLIGHT — SIGNIFICANT CHANGE UP
MONOCYTES # BLD AUTO: 0.88 K/UL — SIGNIFICANT CHANGE UP (ref 0–0.9)
MONOCYTES NFR BLD AUTO: 17.1 % — HIGH (ref 2–14)
MONOCYTES NFR BLD: 7.9 % — SIGNIFICANT CHANGE UP (ref 2–9)
MYELOCYTES NFR BLD: 0 % — SIGNIFICANT CHANGE UP (ref 0–0)
NEUTROPHIL AB SER-ACNC: 60.2 % — SIGNIFICANT CHANGE UP (ref 43–77)
NEUTROPHILS # BLD AUTO: 2.78 K/UL — SIGNIFICANT CHANGE UP (ref 1.8–7.4)
NEUTROPHILS NFR BLD AUTO: 54.1 % — SIGNIFICANT CHANGE UP (ref 43–77)
NEUTS BAND # BLD: 0.9 % — SIGNIFICANT CHANGE UP (ref 0–6)
NRBC # BLD: 1 /100WBC — SIGNIFICANT CHANGE UP
NRBC # FLD: 0 K/UL — SIGNIFICANT CHANGE UP (ref 0–0)
OTHER - HEMATOLOGY %: 0 — SIGNIFICANT CHANGE UP
OVALOCYTES BLD QL SMEAR: SLIGHT — SIGNIFICANT CHANGE UP
PHOSPHATE SERPL-MCNC: 3.5 MG/DL — SIGNIFICANT CHANGE UP (ref 2.5–4.5)
PLATELET # BLD AUTO: 197 K/UL — SIGNIFICANT CHANGE UP (ref 150–400)
PLATELET COUNT - ESTIMATE: NORMAL — SIGNIFICANT CHANGE UP
PMV BLD: 11.9 FL — SIGNIFICANT CHANGE UP (ref 7–13)
POTASSIUM SERPL-MCNC: 4.7 MMOL/L — SIGNIFICANT CHANGE UP (ref 3.5–5.3)
POTASSIUM SERPL-SCNC: 4.7 MMOL/L — SIGNIFICANT CHANGE UP (ref 3.5–5.3)
PROMYELOCYTES # FLD: 0 % — SIGNIFICANT CHANGE UP (ref 0–0)
PROTHROM AB SERPL-ACNC: 26.2 SEC — HIGH (ref 10.6–13.6)
RBC # BLD: 2.83 M/UL — LOW (ref 3.8–5.2)
RBC # FLD: 16.6 % — HIGH (ref 10.3–14.5)
SCHISTOCYTES BLD QL AUTO: SLIGHT — SIGNIFICANT CHANGE UP
SODIUM SERPL-SCNC: 141 MMOL/L — SIGNIFICANT CHANGE UP (ref 135–145)
VARIANT LYMPHS # BLD: 1.8 % — SIGNIFICANT CHANGE UP
WBC # BLD: 5.14 K/UL — SIGNIFICANT CHANGE UP (ref 3.8–10.5)
WBC # FLD AUTO: 5.14 K/UL — SIGNIFICANT CHANGE UP (ref 3.8–10.5)

## 2020-08-12 RX ORDER — OXYCODONE HYDROCHLORIDE 5 MG/1
5 TABLET ORAL EVERY 6 HOURS
Refills: 0 | Status: DISCONTINUED | OUTPATIENT
Start: 2020-08-12 | End: 2020-08-12

## 2020-08-12 RX ORDER — WARFARIN SODIUM 2.5 MG/1
1 TABLET ORAL
Qty: 30 | Refills: 0
Start: 2020-08-12 | End: 2020-09-10

## 2020-08-12 RX ORDER — LATANOPROST 0.05 MG/ML
1 SOLUTION/ DROPS OPHTHALMIC; TOPICAL
Qty: 1 | Refills: 0
Start: 2020-08-12 | End: 2020-08-25

## 2020-08-12 RX ORDER — INSULIN DETEMIR 100/ML (3)
10 INSULIN PEN (ML) SUBCUTANEOUS
Qty: 300 | Refills: 0
Start: 2020-08-12 | End: 2020-09-10

## 2020-08-12 RX ORDER — HYDRALAZINE HCL 50 MG
1 TABLET ORAL
Qty: 0 | Refills: 0 | DISCHARGE

## 2020-08-12 RX ORDER — SPIRONOLACTONE 25 MG/1
1 TABLET, FILM COATED ORAL
Qty: 30 | Refills: 0

## 2020-08-12 RX ORDER — WARFARIN SODIUM 2.5 MG/1
5 TABLET ORAL ONCE
Refills: 0 | Status: COMPLETED | OUTPATIENT
Start: 2020-08-12 | End: 2020-08-12

## 2020-08-12 RX ORDER — WARFARIN SODIUM 2.5 MG/1
1 TABLET ORAL
Qty: 0 | Refills: 0 | DISCHARGE

## 2020-08-12 RX ORDER — FUROSEMIDE 40 MG
1 TABLET ORAL
Qty: 0 | Refills: 0 | DISCHARGE

## 2020-08-12 RX ORDER — ALLOPURINOL 300 MG
1 TABLET ORAL
Qty: 0 | Refills: 0 | DISCHARGE

## 2020-08-12 RX ORDER — LEVOTHYROXINE SODIUM 125 MCG
1 TABLET ORAL
Qty: 0 | Refills: 0 | DISCHARGE

## 2020-08-12 RX ORDER — SENNA PLUS 8.6 MG/1
2 TABLET ORAL
Qty: 60 | Refills: 0
Start: 2020-08-12 | End: 2020-09-10

## 2020-08-12 RX ORDER — SIMVASTATIN 20 MG/1
1 TABLET, FILM COATED ORAL
Qty: 30 | Refills: 0
Start: 2020-08-12 | End: 2020-09-10

## 2020-08-12 RX ORDER — OXYCODONE HYDROCHLORIDE 5 MG/1
1 TABLET ORAL
Qty: 9 | Refills: 0
Start: 2020-08-12 | End: 2020-08-14

## 2020-08-12 RX ORDER — ACETAMINOPHEN 500 MG
2 TABLET ORAL
Qty: 0 | Refills: 0 | DISCHARGE
Start: 2020-08-12

## 2020-08-12 RX ORDER — FUROSEMIDE 40 MG
1 TABLET ORAL
Qty: 30 | Refills: 0
Start: 2020-08-12 | End: 2020-09-10

## 2020-08-12 RX ORDER — SIMVASTATIN 20 MG/1
1 TABLET, FILM COATED ORAL
Qty: 0 | Refills: 0 | DISCHARGE

## 2020-08-12 RX ORDER — INSULIN DETEMIR 100/ML (3)
10 INSULIN PEN (ML) SUBCUTANEOUS
Qty: 0 | Refills: 0 | DISCHARGE

## 2020-08-12 RX ORDER — GABAPENTIN 400 MG/1
1 CAPSULE ORAL
Qty: 42 | Refills: 0
Start: 2020-08-12 | End: 2020-08-25

## 2020-08-12 RX ORDER — AMLODIPINE BESYLATE 2.5 MG/1
1 TABLET ORAL
Qty: 30 | Refills: 0
Start: 2020-08-12 | End: 2020-09-10

## 2020-08-12 RX ORDER — LEVOTHYROXINE SODIUM 125 MCG
1 TABLET ORAL
Qty: 30 | Refills: 0
Start: 2020-08-12 | End: 2020-09-10

## 2020-08-12 RX ORDER — ALLOPURINOL 300 MG
1 TABLET ORAL
Qty: 30 | Refills: 0
Start: 2020-08-12 | End: 2020-09-10

## 2020-08-12 RX ORDER — HYDRALAZINE HCL 50 MG
3 TABLET ORAL
Qty: 270 | Refills: 0
Start: 2020-08-12 | End: 2020-09-10

## 2020-08-12 RX ORDER — POLYETHYLENE GLYCOL 3350 17 G/17G
17 POWDER, FOR SOLUTION ORAL
Qty: 238 | Refills: 0
Start: 2020-08-12 | End: 2020-08-25

## 2020-08-12 RX ORDER — GABAPENTIN 400 MG/1
1 CAPSULE ORAL
Qty: 0 | Refills: 0 | DISCHARGE

## 2020-08-12 RX ORDER — CINACALCET 30 MG/1
1 TABLET, FILM COATED ORAL
Qty: 14 | Refills: 0
Start: 2020-08-12 | End: 2020-08-25

## 2020-08-12 RX ADMIN — WARFARIN SODIUM 5 MILLIGRAM(S): 2.5 TABLET ORAL at 17:55

## 2020-08-12 RX ADMIN — Medication 75 MILLIGRAM(S): at 14:54

## 2020-08-12 RX ADMIN — Medication 75 MICROGRAM(S): at 06:09

## 2020-08-12 RX ADMIN — AMLODIPINE BESYLATE 10 MILLIGRAM(S): 2.5 TABLET ORAL at 06:09

## 2020-08-12 RX ADMIN — POLYETHYLENE GLYCOL 3350 17 GRAM(S): 17 POWDER, FOR SOLUTION ORAL at 11:29

## 2020-08-12 RX ADMIN — Medication 2: at 12:41

## 2020-08-12 RX ADMIN — Medication 100 MILLIGRAM(S): at 11:29

## 2020-08-12 RX ADMIN — NYSTATIN CREAM 1 APPLICATION(S): 100000 CREAM TOPICAL at 17:55

## 2020-08-12 RX ADMIN — Medication 75 MILLIGRAM(S): at 06:09

## 2020-08-12 RX ADMIN — CINACALCET 60 MILLIGRAM(S): 30 TABLET, FILM COATED ORAL at 11:29

## 2020-08-12 RX ADMIN — NYSTATIN CREAM 1 APPLICATION(S): 100000 CREAM TOPICAL at 06:09

## 2020-08-12 RX ADMIN — GABAPENTIN 100 MILLIGRAM(S): 400 CAPSULE ORAL at 14:54

## 2020-08-12 RX ADMIN — GABAPENTIN 100 MILLIGRAM(S): 400 CAPSULE ORAL at 06:09

## 2020-08-12 NOTE — PROGRESS NOTE ADULT - PROVIDER SPECIALTY LIST ADULT
Cardiology
Endocrinology
Infectious Disease
Internal Medicine
Nephrology
Neurology
Podiatry
Vascular Surgery
Cardiology
Internal Medicine
Nephrology
Cardiology
Vascular Surgery
Internal Medicine
Endocrinology

## 2020-08-12 NOTE — PROGRESS NOTE ADULT - ASSESSMENT
Assessment  DMT2: 89y Female with DM T2 with hyperglycemia admitted with L foot wound, A1C 6.1%, on insulin coverage, sugars in acceptable range, Patient is eating meals.  Hypothyroidism: On Synthroid 75 mcg po daily, compliant with Synthroid intake, asymptomatic, euthyroid.  Hypercalcemia: Due to primary hyperparathyroidisms, on sensipar,  calcium improved  Toe Gangrene: No intervention, FU Vascular/Podiatry.  CKD: labs, chart reviewed.              Curtis Castillo MD  Cell: [phone]  Office: [phone]

## 2020-08-12 NOTE — PROGRESS NOTE ADULT - SUBJECTIVE AND OBJECTIVE BOX
S: Denies chest pain or SOB. Review of systems otherwise (-)      MEDICATIONS  (STANDING):  allopurinol 100 milliGRAM(s) Oral daily  amLODIPine   Tablet 10 milliGRAM(s) Oral daily  cinacalcet 60 milliGRAM(s) Oral daily  dextrose 5%. 1000 milliLiter(s) (50 mL/Hr) IV Continuous <Continuous>  dextrose 50% Injectable 12.5 Gram(s) IV Push once  dextrose 50% Injectable 25 Gram(s) IV Push once  dextrose 50% Injectable 25 Gram(s) IV Push once  gabapentin 100 milliGRAM(s) Oral three times a day  hydrALAZINE 75 milliGRAM(s) Oral three times a day  insulin lispro (HumaLOG) corrective regimen sliding scale   SubCutaneous three times a day before meals  insulin lispro (HumaLOG) corrective regimen sliding scale   SubCutaneous at bedtime  latanoprost 0.005% Ophthalmic Solution 1 Drop(s) Left EYE at bedtime  levothyroxine 75 MICROGram(s) Oral daily  nystatin Powder 1 Application(s) Topical two times a day  polyethylene glycol 3350 17 Gram(s) Oral daily  senna 2 Tablet(s) Oral at bedtime  simvastatin 20 milliGRAM(s) Oral at bedtime    MEDICATIONS  (PRN):  acetaminophen   Tablet .. 650 milliGRAM(s) Oral every 6 hours PRN Temp greater or equal to 38C (100.4F), Mild Pain (1 - 3), Moderate Pain (4 - 6), Severe Pain (7 - 10)  dextrose 40% Gel 15 Gram(s) Oral once PRN Blood Glucose LESS THAN 70 milliGRAM(s)/deciliter  glucagon  Injectable 1 milliGRAM(s) IntraMuscular once PRN Glucose LESS THAN 70 milligrams/deciliter  morphine  - Injectable 2 milliGRAM(s) IV Push every 4 hours PRN Severe Pain (7 - 10)      LABS:                            8.3    5.14  )-----------( 197      ( 12 Aug 2020 06:18 )             26.6     Hemoglobin: 8.3 g/dL (08-12 @ 06:18)  Hemoglobin: 8.2 g/dL (08-11 @ 06:00)  Hemoglobin: 7.3 g/dL (08-10 @ 04:38)  Hemoglobin: 7.8 g/dL (08-09 @ 03:46)  Hemoglobin: 8.1 g/dL (08-08 @ 05:39)    08-12    141  |  105  |  55<H>  ----------------------------<  75  4.7   |  24  |  1.61<H>    Ca    9.7      12 Aug 2020 06:18  Phos  3.5     08-12  Mg     1.9     08-12      Creatinine Trend: 1.61<--, 1.57<--, 1.76<--, 1.94<--, 1.83<--, 1.90<--   PT/INR - ( 12 Aug 2020 06:18 )   PT: 26.2 SEC;   INR: 2.37          PTT - ( 12 Aug 2020 06:18 )  PTT:36.8 SEC        PHYSICAL EXAM  Vital Signs Last 24 Hrs  T(C): 36.9 (12 Aug 2020 06:08), Max: 36.9 (12 Aug 2020 06:08)  T(F): 98.4 (12 Aug 2020 06:08), Max: 98.4 (12 Aug 2020 06:08)  HR: 71 (12 Aug 2020 06:08) (60 - 71)  BP: 145/66 (12 Aug 2020 06:08) (131/55 - 163/69)  BP(mean): --  RR: 17 (12 Aug 2020 06:08) (16 - 17)  SpO2: 97% (12 Aug 2020 06:08) (97% - 100%)        Gen: Frail elderly AA woman, nondiaphoretic and comfortable.  HEENT:  (-)icterus (-)pallor  CV: Normal S1, there is still a sharp S2.  Mid-to-late peaking 3/6 systolic murmur at the upper chest (+)2 Pulses B/l  Resp:  Clear to auscultation B/L, normal effort  GI: (+) BS Soft, NT, ND  Lymph:  (-)Edema, (-)obvious lymphadenopathy  Skin: Warm to touch, Normal turgor  Psych: Appropriate mood and affect    TTE: < from: Transthoracic Echocardiogram (07.22.20 @ 16:27) >  1. Mitral annular calcification, otherwise normal mitral  valve. Mild-moderate mitral regurgitation.  2. Calcified trileaflet aortic valve with decreased  opening.  Peak transaortic valve gradient [id number] mm Hg,  mean transaortic valve gradient equals 26 mm Hg, estimated  aortic valve area equals 1 sqcm (by continuity equation),  consistent with moderate to severe aortic stenosis.  3. Severely dilated left atrium.  LA volume index = 57  cc/m2.  4. Mild concentric left ventricular hypertrophy.  5. Normal left ventricular systolic function. No segmental  wall motion abnormalities.  6. Severe diastolic dysfunction.  7. Severe right atrial enlargement.  8. Normal right ventricular size and function.  9. Estimated right ventricular systolic pressure equals 53  mm Hg, assuming right atrial pressure equals 10 mm Hg,  consistent with moderate pulmonary hypertension.    < end of copied text >      < from: CT Lumbar Spine No Cont (08.11.20 @ 17:00) >    IMPRESSION:  Multilevel lumbar spondylosis with severe spinal stenosis at the L3-L4 and L4-L5 levels.    Multilevel foraminal stenosis.    < end of copied text >      ASSESSMENT/PLAN: Patient is a 89 year old Female well known to Dr. Berman, who presented with a foot wound.  Consult requested for pre-op cardiovascular risk stratification. Her known PMH includes persistent Afib on coumadin who was not a candidate for left atrial appendage occlusion with Watchman, chronic HFpEF, HTN, HLD, hypothyroidism, CKD, Type 2 DM, CVA, and hx of PE.    - Continue AC with coumadin for cva prevention if no contraindications - goal INR 2-3  - no cardiovascular complications with any of her procedures during hospitalization.  stable for DC planning, and working on PT/ambulation.  - pt. and patient's family prefer conservative cv care and no further workup of valvular disease (her AS is moderate by examination)  - given the above, no further inpatient cardiac w/u needed at this time  - Patient to f/u with Dr Berman 8/31 at 11:20AM [phone]

## 2020-08-12 NOTE — PROGRESS NOTE ADULT - PROBLEM SELECTOR PLAN 2
Patient euthyroid with FT4 in acceptable range.  Will continue home-dose synthroid.
Will continue home-dose synthroid.

## 2020-08-12 NOTE — PROGRESS NOTE ADULT - NSHPATTENDINGPLANDISCUSS_GEN_ALL_CORE
pt and ACP
pt
pt ,her daughter and ACP
pt and ACP
pt and ACP d/w family about starting ASA.
pt and acp
pt and acp
pt and acp . Left a message at her daughter Catherine phone.
pt and daughter in detail
pt, daughter bedside
pt, daughter bedside, PA covering
acp andf pt
pt

## 2020-08-12 NOTE — PROGRESS NOTE ADULT - ATTENDING COMMENTS
Medically optimized for Vascular procedure.
Agree with above  Optimized from cv perspective for LE angio  Follow up vascular    Trisha Banks MD
Agree with above  TTE with moderate to severe AS - pt. and family prefer conservative cv care   BCx NGTD  S/p LE angio - medical management of PAD per vascular  No further inpatient cardiac workup needed at this time.     Trisha Banks MD
CARDIOLOGY ATTENDING    Patient seen and examined. Agree with above. No further inpatient cardiac workup needed. D/W her daughter ana
Medically optimized for the procedure.
[facility]  Office [phone]  Ans Serv [phone]  [facility] - [phone]
[facility]  Office [phone]  Ans Serv [phone]  [facility] - [phone]
[facility]  Office [phone]  Ans Serv [phone]  [facility] - [phone]
[facility]  Office [phone]  Ans Serv [phone]  [facility] - [phone]
[facility]  Office [phone]  Ans Serv [phone]  [facility] - [phone]
[facility]  Office [phone]  Ans Serv [phone]  [facility] - [phone]
Patient seen and examined.  Agree with above.   No further inpatient cardiac workup needed at this time.   Continue with ac if no contraindications for goal INR 2-3    Trisha Banks MD
Patient seen and examined.  Agree with above.   Optimized from cv perspective for LE angio    Trisha Banks MD
Solomon Catherine MD  [facility]  Office [phone]  Ans Serv [phone] cell - [phone]
Solomon Catherine MD  [facility]  Office [phone]  Ans Serv [phone] cell - [phone]
Solomon Catherine MD  [facility]  Office [phone]  Ans Serv [phone] cell - [phone]
Solomon Catherine MD  [facility]  Office [phone]  Ans Serv [phone] cell - [phone]
Solomon Catherine MD  [facility]  Office [phone]  Ans Serv [phone] cell - [phone]
Agree with above  No further inpatient cardiac workup needed at this time.   Recommend medical management of valvular heart disease as pt. and family want conservative cv care  Management of PAD per vascular  Follow up podiatry    Trisha Banks MD
Doing well post-op from limited amputation, but working on getting the energy to do more walking.  Is asking about types of appropriate footwear (will refer this to podiatry and vasc surg).  She plans to use a walker to go in short durations until she has more energy.  Looking forward to rehab.
Patient seen and examined with PA.  Note edited for specificity of physical exam findings and followup.  Otherwise agree with above.
Patient seen and examined.  Agree with above.   AC if no contraindications  No further inpatient cardiac workup needed at this time.     Trisha Banks MD
Patient seen and examined.  Agree with above.   No further inpatient cardiac workup needed at this time.   Awaiting therapeutic INR      Trisha Banks MD
Patient seen and examined.  Agree with above.   No further inpatient cardiac workup needed at this time.   Awaiting therapeutic INR    Trisha Banks MD
seen w/ PA and agree w/ note.  doing well and looking forward to eventual discharge.  will follow.
severe nonreconstructible vascular disease.  single peroneal runoff to ankle, unable to visualize pedal vessels.  high risk for amputation  cont medical optimization
pl call for any q's  cell [phone]
renal following for ckd.  plan angio tmw for cli, minimize contrast, will use CO2

## 2020-08-12 NOTE — PROGRESS NOTE ADULT - REASON FOR ADMISSION
left foot big toe pain

## 2020-08-12 NOTE — PROGRESS NOTE ADULT - ASSESSMENT
89 year old female PMH  a-fib on coumadin, diastolic CHF (with EF 63% on TTE 8/13/19), HTN, HLD, CKD stage 3-4, IDDM, PVD, CVA, PE, hypothyroidism, p/w L foot wound. Patient seen today at clinic and sent for cellulitis and abscess, wanting to rule out OM. Patient denies fever, chills, cp, sob, abd pain, n/v, weakness, or numbness/tingling.     Problem/Plan - 1:  ·  Problem: Toe gangrene.  Plan: Podiatry consult noted. S/P IV Abxs for 7 days . NO fever or Leucocytosis so likely ischemic .   ID and Vascular helping. No intervention per podiatry .        Problem/Plan - 2:  ·  Problem: Stage 3 chronic kidney disease.  Plan: Renal helping .  Baseline Creatinine 1.8.      Problem/Plan - 3:  ·  Problem: PVD (peripheral vascular disease).  Plan: LIZBETH/PVD noted.  Vascular consult noted. S/P angiogram .Aspirin started as recommended by Vascular .      Problem/Plan - 4:  ·  Problem: Chronic diastolic congestive heart failure.  Plan: Continuing Lasix .  Cardiology helping.      Problem/Plan - 5:  ·  Problem: DM (diabetes mellitus).  Plan: SSI for now .      Problem/Plan - 6:  Problem: Afib. Plan: ON AC. Heparin off as  INR therapeutic .      Problem/Plan - 7:  ·  Problem: Pulmonary embolism.  Plan: On AC.      Problem/Plan - 8:  ·  Problem:  Hypothyroid.  Plan: Synthroid.      Problem/Plan - 9:  ·  Problem: Anemia.  Plan: Chronic . Watching CBC . Transfuse PRBC for HGb <7.5G .Work up noted.      Problem/Plan - 10:  ·  Problem: Uncontrolled Hypertension .  Plan: Adjusting BP meds. BP readings better.      Problem/Plan - 11:  ·  Problem:  Hyperkalemia .  Plan: Treating and repeating BMP .    Problem/Plan - 12:  ·  Problem:  Incontinence of Bowel  .  Plan: Neurology consult noted . CT Spine noted.     Disposition : DC planning home to f/u outpt with Neurology,Renal ,PCP , Podiatry and Vascular .

## 2020-08-12 NOTE — PROGRESS NOTE ADULT - PROBLEM SELECTOR PLAN 4
Suggest to continue management per primary team/Vascular/Podiatry recommendations.

## 2020-08-12 NOTE — PROGRESS NOTE ADULT - PROBLEM SELECTOR PLAN 5
Monitor labs, Renal FU.

## 2020-08-12 NOTE — PROGRESS NOTE ADULT - SUBJECTIVE AND OBJECTIVE BOX
INTERVAL HPI/OVERNIGHT EVENTS: I feel fine.   Vital Signs Last 24 Hrs  T(C): 36.9 (12 Aug 2020 06:08), Max: 36.9 (12 Aug 2020 06:08)  T(F): 98.4 (12 Aug 2020 06:08), Max: 98.4 (12 Aug 2020 06:08)  HR: 71 (12 Aug 2020 06:08) (60 - 71)  BP: 145/66 (12 Aug 2020 06:08) (131/55 - 163/69)  BP(mean): --  RR: 17 (12 Aug 2020 06:08) (16 - 17)  SpO2: 97% (12 Aug 2020 06:08) (97% - 100%)  I&O's Summary    MEDICATIONS  (STANDING):  allopurinol 100 milliGRAM(s) Oral daily  amLODIPine   Tablet 10 milliGRAM(s) Oral daily  cinacalcet 60 milliGRAM(s) Oral daily  dextrose 5%. 1000 milliLiter(s) (50 mL/Hr) IV Continuous <Continuous>  dextrose 50% Injectable 12.5 Gram(s) IV Push once  dextrose 50% Injectable 25 Gram(s) IV Push once  dextrose 50% Injectable 25 Gram(s) IV Push once  gabapentin 100 milliGRAM(s) Oral three times a day  hydrALAZINE 75 milliGRAM(s) Oral three times a day  insulin lispro (HumaLOG) corrective regimen sliding scale   SubCutaneous three times a day before meals  insulin lispro (HumaLOG) corrective regimen sliding scale   SubCutaneous at bedtime  latanoprost 0.005% Ophthalmic Solution 1 Drop(s) Left EYE at bedtime  levothyroxine 75 MICROGram(s) Oral daily  nystatin Powder 1 Application(s) Topical two times a day  polyethylene glycol 3350 17 Gram(s) Oral daily  senna 2 Tablet(s) Oral at bedtime  simvastatin 20 milliGRAM(s) Oral at bedtime  warfarin 5 milliGRAM(s) Oral once    MEDICATIONS  (PRN):  acetaminophen   Tablet .. 650 milliGRAM(s) Oral every 6 hours PRN Temp greater or equal to 38C (100.4F), Mild Pain (1 - 3), Moderate Pain (4 - 6), Severe Pain (7 - 10)  dextrose 40% Gel 15 Gram(s) Oral once PRN Blood Glucose LESS THAN 70 milliGRAM(s)/deciliter  glucagon  Injectable 1 milliGRAM(s) IntraMuscular once PRN Glucose LESS THAN 70 milligrams/deciliter  oxyCODONE    IR 5 milliGRAM(s) Oral every 6 hours PRN Severe Pain (7 - 10)    LABS:                        8.3    5.14  )-----------( 197      ( 12 Aug 2020 06:18 )             26.6     08-12    141  |  105  |  55<H>  ----------------------------<  75  4.7   |  24  |  1.61<H>    Ca    9.7      12 Aug 2020 06:18  Phos  3.5     08-12  Mg     1.9     08-12      PT/INR - ( 12 Aug 2020 06:18 )   PT: 26.2 SEC;   INR: 2.37          PTT - ( 12 Aug 2020 06:18 )  PTT:36.8 SEC    CAPILLARY BLOOD GLUCOSE      POCT Blood Glucose.: 154 mg/dL (12 Aug 2020 12:28)  POCT Blood Glucose.: 83 mg/dL (12 Aug 2020 08:25)  POCT Blood Glucose.: 98 mg/dL (11 Aug 2020 22:08)  POCT Blood Glucose.: 136 mg/dL (11 Aug 2020 17:07)          REVIEW OF SYSTEMS:  CONSTITUTIONAL: No fever, weight loss, or fatigue  EYES: No eye pain, visual disturbances, or discharge  ENMT:  No difficulty hearing, tinnitus, vertigo; No sinus or throat pain  NECK: No pain or stiffness  RESPIRATORY: No cough, wheezing, chills or hemoptysis; No shortness of breath  CARDIOVASCULAR: No chest pain, palpitations, dizziness, or leg swelling  GASTROINTESTINAL: No abdominal or epigastric pain. No nausea, vomiting, or hematemesis; No diarrhea or constipation. No melena or hematochezia.  GENITOURINARY: No dysuria, frequency, hematuria, or incontinence  NEUROLOGICAL: No headaches, memory loss, loss of strength, numbness, or tremors      Consultant(s) Notes Reviewed:  [x ] YES  [ ] NO    PHYSICAL EXAM:  GENERAL: NAD, well-groomed, well-developed, not in any distress ,  HEAD:  Atraumatic, Normocephalic  EYES: EOMI, PERRLA, conjunctiva and sclera clear  ENMT: No tonsillar erythema, exudates, or enlargement; Moist mucous membranes, Good dentition, No lesions  NECK: Supple, No JVD, Normal thyroid  NERVOUS SYSTEM:  Alert & Oriented X3, No focal deficit   CHEST/LUNG: Good air entry bilateral with no  rales, rhonchi, wheezing, or rubs  HEART: Regular rate and rhythm; No murmurs, rubs, or gallops  ABDOMEN: Soft, Nontender, Nondistended; Bowel sounds present  EXTREMITIES:  2+ Peripheral Pulses, No clubbing, cyanosis, or edema    Care Discussed with Consultants/Other Providers [ x] YES  [ ] NO

## 2020-08-12 NOTE — PROGRESS NOTE ADULT - SUBJECTIVE AND OBJECTIVE BOX
[facility]      Seen earlier today, and examined.  - Today, patient is without complaints.           *****MEDICATIONS: Current medication reviewed and documented.    MEDICATIONS  (STANDING):  allopurinol 100 milliGRAM(s) Oral daily  amLODIPine   Tablet 10 milliGRAM(s) Oral daily  cinacalcet 60 milliGRAM(s) Oral daily  dextrose 5%. 1000 milliLiter(s) (50 mL/Hr) IV Continuous <Continuous>  dextrose 50% Injectable 12.5 Gram(s) IV Push once  dextrose 50% Injectable 25 Gram(s) IV Push once  dextrose 50% Injectable 25 Gram(s) IV Push once  gabapentin 100 milliGRAM(s) Oral three times a day  hydrALAZINE 75 milliGRAM(s) Oral three times a day  insulin lispro (HumaLOG) corrective regimen sliding scale   SubCutaneous three times a day before meals  insulin lispro (HumaLOG) corrective regimen sliding scale   SubCutaneous at bedtime  latanoprost 0.005% Ophthalmic Solution 1 Drop(s) Left EYE at bedtime  levothyroxine 75 MICROGram(s) Oral daily  nystatin Powder 1 Application(s) Topical two times a day  polyethylene glycol 3350 17 Gram(s) Oral daily  senna 2 Tablet(s) Oral at bedtime  simvastatin 20 milliGRAM(s) Oral at bedtime    MEDICATIONS  (PRN):  acetaminophen   Tablet .. 650 milliGRAM(s) Oral every 6 hours PRN Temp greater or equal to 38C (100.4F), Mild Pain (1 - 3), Moderate Pain (4 - 6), Severe Pain (7 - 10)  dextrose 40% Gel 15 Gram(s) Oral once PRN Blood Glucose LESS THAN 70 milliGRAM(s)/deciliter  glucagon  Injectable 1 milliGRAM(s) IntraMuscular once PRN Glucose LESS THAN 70 milligrams/deciliter  oxyCODONE    IR 5 milliGRAM(s) Oral every 6 hours PRN Severe Pain (7 - 10)          ***** VITAL SIGNS:  T(F): 97.3 (20 @ 14:53), Max: 98.4 (20 @ 06:08)  HR: 74 (20 @ 14:53) (71 - 74)  BP: 154/74 (20 @ 14:53) (145/66 - 154/74)  RR: 18 (20 @ 14:53) (17 - 18)  SpO2: 99% (20 @ 14:53) (97% - 99%)  Wt(kg): --  ,   I&O's Summary           *****PHYSICAL EXAM:   alert oriented x 3 attention comprehension are fair.  Able to name, repeat.   EOmi fundi not visualized   no nystagmus VFF to confrontation  Tongue is midline  Palate elevates symmetrically   Moving all 4 ext spontaneously     Gait not assessed.            *****LAB AND IMAGIN.3    5.14  )-----------( 197      ( 12 Aug 2020 06:18 )             26.6               08-    141  |  105  |  55<H>  ----------------------------<  75  4.7   |  24  |  1.61<H>    Ca    9.7      12 Aug 2020 06:18  Phos  3.5       Mg     1.9           PT/INR - ( 12 Aug 2020 06:18 )   PT: 26.2 SEC;   INR: 2.37          PTT - ( 12 Aug 2020 06:18 )  PTT:36.8 SEC                     [All pertinent recent Imaging/Reports reviewed]           *****A S S E S S M E N T   A N D   P L A N :    89 year old female PMH  a-fib on coumadin, diastolic CHF (with EF 63% on TTE 19), HTN, HLD, CKD stage 3-4, IDDM, PVD, CVA, PE, hypothyroidism, p/w L foot wound. Patient seen today at clinic and sent for cellulitis and abscess, wanting to rule out OM. Patient denies fever, chills, cp, sob, abd pain, n/v, weakness, or numbness/tingling.     incontinence x2      Problem/Recommendations 1: incontinence ? overflow incontinence due to chronic constipation vs. neurogenic cause   mr lumbosacral spine with and without contrast, deferred per primary team due to ckd   ct l spine c/w advanced atherosclerosis   risk vs benefits of contrast discussed with primary team   ck rectal tone   pt eval   emg/ncv as out pt   can consider outpt follow up with urology     pt had one isolated event, will continue to monitor  closely, if symptoms recur to return to ed, and or consider mri as outpt   this was discussed at length with yesi in depth. Yesi agreeable to plan       Thank you for allowing me to participate in the care of this patient. Please do not hesitate to call me if you have any  questions.        ________________  Angela Choudhary MD  [facility] ([facility]  [phone]      33 minutes spent on total encounter; more than 50 % of the visit was  spent counseling about plan of care, compliance to diet/exercise and medication regimen and or  coordinating care by the attending physician.      It is advised that stroke patients follow up with BK Rahman @ [phone] in 1- 2 weeks.   Others please follow up with Dr. Michael Nissenbaum [phone]

## 2020-08-12 NOTE — PROGRESS NOTE ADULT - PROBLEM SELECTOR PLAN 3
Started Sensipar today.  Will continue monitoring calcium and FU. Hydration as tolerated.
Ca borderline high/improved today. In view of her comorbidities and advance age and borderline calcium PTH, she may not be a surgical candidate, so will not do parathyroid scan at this time. If calcium PTH levels worsen may start her on Sensipar.  Will continue monitoring calcium and FU.
Ca borderline high/improved today. In view of her comorbidities and advance age and borderline calcium PTH, she may not be a surgical candidate, so will not do parathyroid scan at this time. If calcium PTH levels worsen may start her on Sensipar.  Will continue monitoring calcium and FU.
Continue Sensipar.  Will continue monitoring calcium and FU. Hydration as tolerated.

## 2020-08-12 NOTE — PROGRESS NOTE ADULT - SUBJECTIVE AND OBJECTIVE BOX
Chief complaint  Patient is a 89y old  Female who presents with a chief complaint of left foot big toe pain (11 Aug 2020 13:38)   Review of systems  Patient in bed, appears comfortable.    Labs and Fingersticks  CAPILLARY BLOOD GLUCOSE      POCT Blood Glucose.: 83 mg/dL (12 Aug 2020 08:25)  POCT Blood Glucose.: 98 mg/dL (11 Aug 2020 22:08)  POCT Blood Glucose.: 136 mg/dL (11 Aug 2020 17:07)  POCT Blood Glucose.: 105 mg/dL (11 Aug 2020 11:47)      Anion Gap, Serum: 12 (08-12 @ 06:18)  Anion Gap, Serum: 12 (08-11 @ 06:00)      Calcium, Total Serum: 9.7 (08-12 @ 06:18)  Calcium, Total Serum: 9.9 (08-11 @ 06:00)          08-12    141  |  105  |  55<H>  ----------------------------<  75  4.7   |  24  |  1.61<H>    Ca    9.7      12 Aug 2020 06:18  Phos  3.5     08-12  Mg     1.9     08-12                          8.3    5.14  )-----------( 197      ( 12 Aug 2020 06:18 )             26.6     Medications  MEDICATIONS  (STANDING):  allopurinol 100 milliGRAM(s) Oral daily  amLODIPine   Tablet 10 milliGRAM(s) Oral daily  cinacalcet 60 milliGRAM(s) Oral daily  dextrose 5%. 1000 milliLiter(s) (50 mL/Hr) IV Continuous <Continuous>  dextrose 50% Injectable 12.5 Gram(s) IV Push once  dextrose 50% Injectable 25 Gram(s) IV Push once  dextrose 50% Injectable 25 Gram(s) IV Push once  gabapentin 100 milliGRAM(s) Oral three times a day  hydrALAZINE 75 milliGRAM(s) Oral three times a day  insulin lispro (HumaLOG) corrective regimen sliding scale   SubCutaneous three times a day before meals  insulin lispro (HumaLOG) corrective regimen sliding scale   SubCutaneous at bedtime  latanoprost 0.005% Ophthalmic Solution 1 Drop(s) Left EYE at bedtime  levothyroxine 75 MICROGram(s) Oral daily  nystatin Powder 1 Application(s) Topical two times a day  polyethylene glycol 3350 17 Gram(s) Oral daily  senna 2 Tablet(s) Oral at bedtime  simvastatin 20 milliGRAM(s) Oral at bedtime      Physical Exam  General: Patient comfortable in bed  Vital Signs Last 12 Hrs  T(F): 98.4 (08-12-20 @ 06:08), Max: 98.4 (08-12-20 @ 06:08)  HR: 71 (08-12-20 @ 06:08) (60 - 71)  BP: 145/66 (08-12-20 @ 06:08) (131/55 - 145/66)  BP(mean): --  RR: 17 (08-12-20 @ 06:08) (17 - 17)  SpO2: 97% (08-12-20 @ 06:08) (97% - 97%)  Neck: No palpable thyroid nodules.  CVS: S1S2, No murmurs  Respiratory: No wheezing, no crepitations  GI: Abdomen soft, bowel sounds positive  Musculoskeletal:  edema lower extremities.     Diagnostics    Thyroid Stimulating Hormone, Serum: AM Sched. Collection: 06-Aug-2020 04:00 (08-05 @ 09:51)  Free Thyroxine, Serum: AM Sched. Collection: 06-Aug-2020 04:00 (08-05 @ 09:51)

## 2020-08-12 NOTE — PROGRESS NOTE ADULT - PROBLEM SELECTOR PLAN 1
Will continue current insulin regimen for now. Will continue monitoring FS, log, and FU.  Patient counseled for compliance with consistent low carb diet and exercise as tolerated outpatient.
Will continue current insulin regimen for now. Will continue monitoring FS, log, and FU.  Patient with well-controlled DM, A1C 6.1% on Levemir 10u at bedtime. She reports well-controlled blood sugars and denies hypoglycemic episodes. Will continue monitoring and make recommendations for outpatient management prior to DC.  Patient counseled for compliance with consistent low carb diet and exercise as tolerated outpatient.

## 2020-08-12 NOTE — CHART NOTE - NSCHARTNOTEFT_GEN_A_CORE
CT Lumbar spine results were discussed with Neuro Dr. Choudhary-> recommend MRI w/ IVC but contraindicated by nephro 2/2 NIKHIL/CKD, no further inpatient neurology intervention at this time-> suspect related to incontinence less likely neurologic, Recommend outpatient follow up with Urology and Neuro/NeuroSx for outpatient EMG/NCV.   INR therapeutic today 2.37, coumadin dosed 5mg per attending recs. No acute complaints, patient reports pain is well controlled.     Patient's case and CT results/labs were discussed with Dr. Momin, patient is medically cleared and optimized for discharge home with home care today. All medications were reviewed with attending (c/w coumadin 5mg qhs, may resume lasix 40mg QD and c/w sensipar per nephro, may resume ASA, hold aldactone for now, c/w inpatient hydra/amlodipine dose per cards).  Medications were sent to mutually agreed upon pharmacy (offered [facility] but patient's daughter prefers [facility]).    Writer discussed plan of care in length with patient and patient's daughter, Catherine ([phone]), informed about importance of follow up as outpatient with Podiatry with Dr. Downey, Vascular, PCP, Endo, and Nephro (has outpatient nephrologist). Patient has follow up with Cardiology Dr. Berman on 8/31/2020.  CM involved with setting up home care/VNS, RN to provider wound care supplies.

## 2020-08-12 NOTE — CHART NOTE - NSCHARTNOTEFT_GEN_A_CORE
Source: Patient [x] Medical record reviewed. Length of stay nutrition follow-up.     When asked about appetite/PO intake  - patient reported "I'm trying my best." Tolerating PO. Reports eating a good breakfast of eggs, cereal and tea today. No GI distress (nausea/vomiting/diarrhea/constipation) noted at this time. +BM 8/12. Encouraged PO intake as tolerated.     Current Diet : Diet, Consistent Carbohydrate Renal w/Evening Snack:   Supplement Feeding Modality:  Oral  Nepro Cans or Servings Per Day:  1       Frequency:  Daily (07-29-20 @ 15:37)    PO intake:  < 50% [ ] 50-75% [x]   % [ ]  other :    Current Weight:  new weight to assess, 72.2kg (8/03), 72.5kg (8/02), 72.1kg (8/01)    __________________ Pertinent Medications__________________   MEDICATIONS  (STANDING):  allopurinol 100 milliGRAM(s) Oral daily  amLODIPine   Tablet 10 milliGRAM(s) Oral daily  cinacalcet 60 milliGRAM(s) Oral daily  dextrose 5%. 1000 milliLiter(s) (50 mL/Hr) IV Continuous <Continuous>  gabapentin 100 milliGRAM(s) Oral three times a day  hydrALAZINE 75 milliGRAM(s) Oral three times a day  insulin lispro (HumaLOG) corrective regimen sliding scale   SubCutaneous three times a day before meals  insulin lispro (HumaLOG) corrective regimen sliding scale   SubCutaneous at bedtime  latanoprost 0.005% Ophthalmic Solution 1 Drop(s) Left EYE at bedtime  levothyroxine 75 MICROGram(s) Oral daily  nystatin Powder 1 Application(s) Topical two times a day  polyethylene glycol 3350 17 Gram(s) Oral daily  senna 2 Tablet(s) Oral at bedtime  simvastatin 20 milliGRAM(s) Oral at bedtime  warfarin 5 milliGRAM(s) Oral once    MEDICATIONS  (PRN):  acetaminophen   Tablet .. 650 milliGRAM(s) Oral every 6 hours PRN Temp greater or equal to 38C (100.4F), Mild Pain (1 - 3), Moderate Pain (4 - 6), Severe Pain (7 - 10)  dextrose 40% Gel 15 Gram(s) Oral once PRN Blood Glucose LESS THAN 70 milliGRAM(s)/deciliter  glucagon  Injectable 1 milliGRAM(s) IntraMuscular once PRN Glucose LESS THAN 70 milligrams/deciliter  oxyCODONE    IR 5 milliGRAM(s) Oral every 6 hours PRN Severe Pain (7 - 10)      __________________ Pertinent Labs__________________   08-12 Na141 mmol/L Glu 75 mg/dL K+ 4.7 mmol/L Cr  1.61 mg/dL<H> BUN 55 mg/dL<H> 08-12 Phos 3.5 mg/dL 08-08 Alb 3.3 g/dL  CAPILLARY BLOOD GLUCOSE  POCT Blood Glucose.: 154 mg/dL (12 Aug 2020 12:28)  POCT Blood Glucose.: 83 mg/dL (12 Aug 2020 08:25)  POCT Blood Glucose.: 98 mg/dL (11 Aug 2020 22:08)  POCT Blood Glucose.: 136 mg/dL (11 Aug 2020 17:07)    Skin: left toe gangrene  Edema: 1+ left/right ankle/foot    Estimated Needs:   [x] no change since previous assessment  [ ] recalculated:     Goal(s):  1. Patient to meet > 75% estimated pro/kcal needs.   2. Tolerance to diet.   3. Maintain blood glucose control.    Interventions:   1. Continue Consistent Carbohydrate Renal diet with evening snack.   2. Continue Nepro 1x daily (425 kcals, 19.1g protein).  3. Please Encourage po intake, assist with meals and menu selections, provide alternatives PRN.    Monitoring and Evaluation:   1. Monitor weights, labs, BM's, skin integrity, p.o. intake, and GI function.   Follow-up per department protocol.   RD to remain available, Salena Shelton RD, CDN - Pager #[phone].

## 2020-08-13 NOTE — CHART NOTE - NSCHARTNOTEFT_GEN_A_CORE
Received call from patient's [facility] pharmacy, all meds are covered and ready for . Sensipar needs Pre-auth (was recommended for short course by nephro) for hypercalcemia PTH).  Called [facility] ([phone]) and spoke with Insurance Rep Alessandra, Patient ID # [id number]. Phone prior auth was approved by [facility] and insurance, papers to be faxed to provider and patient.   Called patient's pharmacy again and spoke with Aubrey-> informed about PA and to dispense medication as prescribed.

## 2020-08-14 NOTE — PROGRESS NOTE ADULT - SUBJECTIVE AND OBJECTIVE BOX
S: no chest pain or sob; ROS - .      MEDICATIONS  (STANDING):  allopurinol 100 milliGRAM(s) Oral daily  amLODIPine   Tablet 10 milliGRAM(s) Oral daily  cefepime   IVPB 1000 milliGRAM(s) IV Intermittent every 24 hours  dextrose 5%. 1000 milliLiter(s) (50 mL/Hr) IV Continuous <Continuous>  dextrose 50% Injectable 12.5 Gram(s) IV Push once  dextrose 50% Injectable 25 Gram(s) IV Push once  dextrose 50% Injectable 25 Gram(s) IV Push once  gabapentin 100 milliGRAM(s) Oral three times a day  heparin  Infusion.  Unit(s)/Hr (13 mL/Hr) IV Continuous <Continuous>  hydrALAZINE 75 milliGRAM(s) Oral three times a day  insulin lispro (HumaLOG) corrective regimen sliding scale   SubCutaneous three times a day before meals  insulin lispro (HumaLOG) corrective regimen sliding scale   SubCutaneous at bedtime  latanoprost 0.005% Ophthalmic Solution 1 Drop(s) Left EYE at bedtime  levothyroxine 75 MICROGram(s) Oral daily  polyethylene glycol 3350 17 Gram(s) Oral daily  senna 2 Tablet(s) Oral at bedtime  simvastatin 20 milliGRAM(s) Oral at bedtime  sodium chloride 0.9%. 1000 milliLiter(s) (75 mL/Hr) IV Continuous <Continuous>    MEDICATIONS  (PRN):  acetaminophen   Tablet .. 650 milliGRAM(s) Oral every 6 hours PRN Temp greater or equal to 38C (100.4F), Mild Pain (1 - 3), Moderate Pain (4 - 6), Severe Pain (7 - 10)  dextrose 40% Gel 15 Gram(s) Oral once PRN Blood Glucose LESS THAN 70 milliGRAM(s)/deciliter  glucagon  Injectable 1 milliGRAM(s) IntraMuscular once PRN Glucose LESS THAN 70 milligrams/deciliter  heparin   Injectable 6000 Unit(s) IV Push every 6 hours PRN For aPTT less than 40  heparin   Injectable 3000 Unit(s) IV Push every 6 hours PRN For aPTT between 40 - 57      LABS:                      8.0    7.63  )-----------( 233      ( 29 Jul 2020 07:00 )             24.7   138  |  100  |  57<H>  ----------------------------<  149<H>  4.5   |  24  |  2.13<H>    Ca    9.9      28 Jul 2020 06:00  Phos  3.9     07-28  Mg     2.0     07-28    Creatinine Trend: 2.13<--, 1.89<--, 1.99<--, 2.06<--, 1.99<--, 2.09<--   PT/INR - ( 29 Jul 2020 07:00 )   PT: 15.7 SEC;   INR: 1.35     PTT - ( 29 Jul 2020 07:00 )  PTT:49.5 SEC    PHYSICAL EXAM  Vital Signs Last 24 Hrs  T(C): 36.8 (29 Jul 2020 05:10), Max: 36.9 (28 Jul 2020 21:10)  T(F): 98.2 (29 Jul 2020 05:10), Max: 98.4 (28 Jul 2020 21:10)  HR: 70 (29 Jul 2020 05:10) (66 - 75)  BP: 135/58 (29 Jul 2020 05:10) (125/54 - 135/58)-  RR: 20 (29 Jul 2020 05:10) (16 - 20)  SpO2: 98% (29 Jul 2020 05:10) (98% - 100%)      Gen: Appears well in NAD  HEENT:  (-)icterus (-)pallor  CV: Normal S1, there is still a sharp S2.  Mid-to-late peaking 3/6 systolic murmur at the upper chest, but No pulsus parvus et tardus (+)2 Pulses B/l  Resp:  Clear to ausculatation B/L, normal effort  GI: (+) BS Soft, NT, ND  Lymph:  (-)Edema, (-)obvious lymphadenopathy  Skin: +left foot dressing, Warm to touch, Normal turgor  Psych: Appropriate mood and affect      TELEMETRY: None	      < from: Transthoracic Echocardiogram (07.22.20 @ 16:27) >  CONCLUSIONS:  1. Mitral annular calcification, otherwise normal mitral  valve. Mild-moderate mitral regurgitation.  2. Calcified trileaflet aortic valve with decreased  opening.  Peak transaortic valve gradient [id number] mm Hg,  mean transaortic valve gradient equals 26 mm Hg, estimated  aortic valve area equals 1 sqcm (by continuity equation),  consistent with moderate to severe aortic stenosis.  3. Severely dilated left atrium.  LA volume index = 57  cc/m2.  4. Mild concentric left ventricular hypertrophy.  5. Normal left ventricular systolic function. No segmental  wall motion abnormalities.  6. Severe diastolic dysfunction.  7. Severe right atrial enlargement.  8. Normal right ventricular size and function.  9. Estimated right ventricular systolic pressure equals 53  mm Hg, assuming right atrial pressure equals 10 mm Hg,  consistent with moderate pulmonary hypertension.    < end of copied text >      ASSESSMENT/PLAN: Patient is a 89 year old Female well known to our office (Cardiologist - Dr. Berman), who presented with a foot wound.  Consult requested for pre-op cardiovascular risk stratification.  Her known PMH includes persistent Afib on coumadin who was not a candidate for left atrial appendage occlusion with Watchman, chronic HFpEF, HTN, HLD, hypothyroidism, CKD, Type 2 DM, CVA, and hx of PE.    - Continue AC for cva prevention if no contraindications - on hep gtt currently samira-op  - Continue with PO lasix   - TTE noted above with normal LVEF, severe diastolic dysfunction, mild-mod MR, mod pulm HTN, and mod-severe AS: auscultation favors moderate aortic stenosis.  Yearly Echo for followup.  Patient does not have any 'red flag' symptoms such as syncope, crushing angina, dyspnea on exertion (limited by her foot), so will continue to monitor in the office.  - In addition, the patient and her family prefers conservative medical management of her valvular disease and does not want any further invasive workup or surgery  - no further inpatient cardiac w/u needed at this time  - s/p LE angio - tolerated procedure well in terms of cv perspective  - pre-op L tasneem amp actual

## 2020-09-10 ENCOUNTER — TRANSCRIPTION ENCOUNTER (OUTPATIENT)
Age: 85
End: 2020-09-10

## 2020-11-14 ENCOUNTER — INPATIENT (INPATIENT)
Facility: HOSPITAL | Age: 85
LOS: 9 days | Discharge: HOME HEALTH | End: 2020-11-24
Attending: INTERNAL MEDICINE | Admitting: INTERNAL MEDICINE
Payer: MEDICARE

## 2020-11-14 VITALS
HEART RATE: 84 BPM | SYSTOLIC BLOOD PRESSURE: 176 MMHG | RESPIRATION RATE: 17 BRPM | TEMPERATURE: 99 F | OXYGEN SATURATION: 96 % | HEIGHT: 66 IN | DIASTOLIC BLOOD PRESSURE: 82 MMHG

## 2020-11-14 DIAGNOSIS — I26.99 OTHER PULMONARY EMBOLISM WITHOUT ACUTE COR PULMONALE: ICD-10-CM

## 2020-11-14 DIAGNOSIS — Z41.9 ENCOUNTER FOR PROCEDURE FOR PURPOSES OTHER THAN REMEDYING HEALTH STATE, UNSPECIFIED: Chronic | ICD-10-CM

## 2020-11-14 DIAGNOSIS — I73.9 PERIPHERAL VASCULAR DISEASE, UNSPECIFIED: ICD-10-CM

## 2020-11-14 DIAGNOSIS — H40.9 UNSPECIFIED GLAUCOMA: ICD-10-CM

## 2020-11-14 DIAGNOSIS — I10 ESSENTIAL (PRIMARY) HYPERTENSION: ICD-10-CM

## 2020-11-14 DIAGNOSIS — I48.91 UNSPECIFIED ATRIAL FIBRILLATION: ICD-10-CM

## 2020-11-14 DIAGNOSIS — E89.0 POSTPROCEDURAL HYPOTHYROIDISM: Chronic | ICD-10-CM

## 2020-11-14 DIAGNOSIS — I96 GANGRENE, NOT ELSEWHERE CLASSIFIED: ICD-10-CM

## 2020-11-14 DIAGNOSIS — I50.9 HEART FAILURE, UNSPECIFIED: ICD-10-CM

## 2020-11-14 DIAGNOSIS — E03.9 HYPOTHYROIDISM, UNSPECIFIED: ICD-10-CM

## 2020-11-14 DIAGNOSIS — E11.9 TYPE 2 DIABETES MELLITUS WITHOUT COMPLICATIONS: ICD-10-CM

## 2020-11-14 DIAGNOSIS — N18.9 CHRONIC KIDNEY DISEASE, UNSPECIFIED: ICD-10-CM

## 2020-11-14 LAB
ALBUMIN SERPL ELPH-MCNC: 3.9 G/DL — SIGNIFICANT CHANGE UP (ref 3.3–5)
ALP SERPL-CCNC: 89 U/L — SIGNIFICANT CHANGE UP (ref 40–120)
ALT FLD-CCNC: 13 U/L — SIGNIFICANT CHANGE UP (ref 4–33)
ANION GAP SERPL CALC-SCNC: 9 MMO/L — SIGNIFICANT CHANGE UP (ref 7–14)
APTT BLD: 37.5 SEC — HIGH (ref 27–36.3)
AST SERPL-CCNC: 15 U/L — SIGNIFICANT CHANGE UP (ref 4–32)
BASOPHILS # BLD AUTO: 0.01 K/UL — SIGNIFICANT CHANGE UP (ref 0–0.2)
BASOPHILS NFR BLD AUTO: 0.2 % — SIGNIFICANT CHANGE UP (ref 0–2)
BILIRUB SERPL-MCNC: 0.6 MG/DL — SIGNIFICANT CHANGE UP (ref 0.2–1.2)
BLD GP AB SCN SERPL QL: NEGATIVE — SIGNIFICANT CHANGE UP
BUN SERPL-MCNC: 56 MG/DL — HIGH (ref 7–23)
CALCIUM SERPL-MCNC: 11 MG/DL — HIGH (ref 8.4–10.5)
CHLORIDE SERPL-SCNC: 95 MMOL/L — LOW (ref 98–107)
CO2 SERPL-SCNC: 36 MMOL/L — HIGH (ref 22–31)
CREAT SERPL-MCNC: 2.08 MG/DL — HIGH (ref 0.5–1.3)
CRP SERPL-MCNC: 51.8 MG/L — HIGH
EOSINOPHIL # BLD AUTO: 0.03 K/UL — SIGNIFICANT CHANGE UP (ref 0–0.5)
EOSINOPHIL NFR BLD AUTO: 0.5 % — SIGNIFICANT CHANGE UP (ref 0–6)
ERYTHROCYTE [SEDIMENTATION RATE] IN BLOOD: 57 MM/HR — HIGH (ref 4–25)
GLUCOSE BLDC GLUCOMTR-MCNC: 176 MG/DL — HIGH (ref 70–99)
GLUCOSE SERPL-MCNC: 136 MG/DL — HIGH (ref 70–99)
HCT VFR BLD CALC: 30.4 % — LOW (ref 34.5–45)
HGB BLD-MCNC: 9.4 G/DL — LOW (ref 11.5–15.5)
IMM GRANULOCYTES NFR BLD AUTO: 0.3 % — SIGNIFICANT CHANGE UP (ref 0–1.5)
INR BLD: 2.54 — HIGH (ref 0.88–1.16)
LYMPHOCYTES # BLD AUTO: 0.79 K/UL — LOW (ref 1–3.3)
LYMPHOCYTES # BLD AUTO: 12.5 % — LOW (ref 13–44)
MCHC RBC-ENTMCNC: 27.9 PG — SIGNIFICANT CHANGE UP (ref 27–34)
MCHC RBC-ENTMCNC: 30.9 % — LOW (ref 32–36)
MCV RBC AUTO: 90.2 FL — SIGNIFICANT CHANGE UP (ref 80–100)
MONOCYTES # BLD AUTO: 0.74 K/UL — SIGNIFICANT CHANGE UP (ref 0–0.9)
MONOCYTES NFR BLD AUTO: 11.7 % — SIGNIFICANT CHANGE UP (ref 2–14)
NEUTROPHILS # BLD AUTO: 4.72 K/UL — SIGNIFICANT CHANGE UP (ref 1.8–7.4)
NEUTROPHILS NFR BLD AUTO: 74.8 % — SIGNIFICANT CHANGE UP (ref 43–77)
NRBC # FLD: 0 K/UL — SIGNIFICANT CHANGE UP (ref 0–0)
PLATELET # BLD AUTO: 189 K/UL — SIGNIFICANT CHANGE UP (ref 150–400)
PMV BLD: 10 FL — SIGNIFICANT CHANGE UP (ref 7–13)
POTASSIUM SERPL-MCNC: 3.9 MMOL/L — SIGNIFICANT CHANGE UP (ref 3.5–5.3)
POTASSIUM SERPL-SCNC: 3.9 MMOL/L — SIGNIFICANT CHANGE UP (ref 3.5–5.3)
PROT SERPL-MCNC: 7.9 G/DL — SIGNIFICANT CHANGE UP (ref 6–8.3)
PROTHROM AB SERPL-ACNC: 27.9 SEC — HIGH (ref 10.6–13.6)
RBC # BLD: 3.37 M/UL — LOW (ref 3.8–5.2)
RBC # FLD: 17.5 % — HIGH (ref 10.3–14.5)
RH IG SCN BLD-IMP: NEGATIVE — SIGNIFICANT CHANGE UP
SARS-COV-2 RNA SPEC QL NAA+PROBE: SIGNIFICANT CHANGE UP
SODIUM SERPL-SCNC: 140 MMOL/L — SIGNIFICANT CHANGE UP (ref 135–145)
WBC # BLD: 6.31 K/UL — SIGNIFICANT CHANGE UP (ref 3.8–10.5)
WBC # FLD AUTO: 6.31 K/UL — SIGNIFICANT CHANGE UP (ref 3.8–10.5)

## 2020-11-14 PROCEDURE — 99232 SBSQ HOSP IP/OBS MODERATE 35: CPT | Mod: GC

## 2020-11-14 PROCEDURE — 76937 US GUIDE VASCULAR ACCESS: CPT | Mod: 26

## 2020-11-14 PROCEDURE — 36410 VNPNXR 3YR/> PHY/QHP DX/THER: CPT

## 2020-11-14 PROCEDURE — 99285 EMERGENCY DEPT VISIT HI MDM: CPT | Mod: 25

## 2020-11-14 RX ORDER — ACETAMINOPHEN 500 MG
650 TABLET ORAL EVERY 6 HOURS
Refills: 0 | Status: DISCONTINUED | OUTPATIENT
Start: 2020-11-14 | End: 2020-11-24

## 2020-11-14 RX ORDER — DEXTROSE 50 % IN WATER 50 %
12.5 SYRINGE (ML) INTRAVENOUS ONCE
Refills: 0 | Status: DISCONTINUED | OUTPATIENT
Start: 2020-11-14 | End: 2020-11-24

## 2020-11-14 RX ORDER — SODIUM CHLORIDE 9 MG/ML
1000 INJECTION, SOLUTION INTRAVENOUS
Refills: 0 | Status: DISCONTINUED | OUTPATIENT
Start: 2020-11-14 | End: 2020-11-24

## 2020-11-14 RX ORDER — SIMVASTATIN 20 MG/1
20 TABLET, FILM COATED ORAL AT BEDTIME
Refills: 0 | Status: DISCONTINUED | OUTPATIENT
Start: 2020-11-14 | End: 2020-11-24

## 2020-11-14 RX ORDER — HYDRALAZINE HCL 50 MG
75 TABLET ORAL THREE TIMES A DAY
Refills: 0 | Status: DISCONTINUED | OUTPATIENT
Start: 2020-11-14 | End: 2020-11-24

## 2020-11-14 RX ORDER — LATANOPROST 0.05 MG/ML
1 SOLUTION/ DROPS OPHTHALMIC; TOPICAL AT BEDTIME
Refills: 0 | Status: DISCONTINUED | OUTPATIENT
Start: 2020-11-14 | End: 2020-11-24

## 2020-11-14 RX ORDER — MORPHINE SULFATE 50 MG/1
4 CAPSULE, EXTENDED RELEASE ORAL ONCE
Refills: 0 | Status: DISCONTINUED | OUTPATIENT
Start: 2020-11-14 | End: 2020-11-14

## 2020-11-14 RX ORDER — LEVOTHYROXINE SODIUM 125 MCG
75 TABLET ORAL DAILY
Refills: 0 | Status: DISCONTINUED | OUTPATIENT
Start: 2020-11-14 | End: 2020-11-24

## 2020-11-14 RX ORDER — GABAPENTIN 400 MG/1
100 CAPSULE ORAL THREE TIMES A DAY
Refills: 0 | Status: DISCONTINUED | OUTPATIENT
Start: 2020-11-14 | End: 2020-11-15

## 2020-11-14 RX ORDER — GLUCAGON INJECTION, SOLUTION 0.5 MG/.1ML
1 INJECTION, SOLUTION SUBCUTANEOUS ONCE
Refills: 0 | Status: DISCONTINUED | OUTPATIENT
Start: 2020-11-14 | End: 2020-11-24

## 2020-11-14 RX ORDER — CEFEPIME 1 G/1
1000 INJECTION, POWDER, FOR SOLUTION INTRAMUSCULAR; INTRAVENOUS EVERY 8 HOURS
Refills: 0 | Status: DISCONTINUED | OUTPATIENT
Start: 2020-11-14 | End: 2020-11-15

## 2020-11-14 RX ORDER — CINACALCET 30 MG/1
60 TABLET, FILM COATED ORAL DAILY
Refills: 0 | Status: DISCONTINUED | OUTPATIENT
Start: 2020-11-14 | End: 2020-11-24

## 2020-11-14 RX ORDER — DEXTROSE 50 % IN WATER 50 %
25 SYRINGE (ML) INTRAVENOUS ONCE
Refills: 0 | Status: DISCONTINUED | OUTPATIENT
Start: 2020-11-14 | End: 2020-11-24

## 2020-11-14 RX ORDER — HYDRALAZINE HCL 50 MG
25 TABLET ORAL ONCE
Refills: 0 | Status: COMPLETED | OUTPATIENT
Start: 2020-11-14 | End: 2020-11-14

## 2020-11-14 RX ORDER — INSULIN LISPRO 100/ML
VIAL (ML) SUBCUTANEOUS
Refills: 0 | Status: DISCONTINUED | OUTPATIENT
Start: 2020-11-14 | End: 2020-11-24

## 2020-11-14 RX ORDER — FUROSEMIDE 40 MG
40 TABLET ORAL ONCE
Refills: 0 | Status: COMPLETED | OUTPATIENT
Start: 2020-11-14 | End: 2020-11-14

## 2020-11-14 RX ORDER — OXYCODONE HYDROCHLORIDE 5 MG/1
5 TABLET ORAL EVERY 8 HOURS
Refills: 0 | Status: DISCONTINUED | OUTPATIENT
Start: 2020-11-14 | End: 2020-11-19

## 2020-11-14 RX ORDER — CEFEPIME 1 G/1
2000 INJECTION, POWDER, FOR SOLUTION INTRAMUSCULAR; INTRAVENOUS ONCE
Refills: 0 | Status: COMPLETED | OUTPATIENT
Start: 2020-11-14 | End: 2020-11-14

## 2020-11-14 RX ORDER — SENNA PLUS 8.6 MG/1
2 TABLET ORAL AT BEDTIME
Refills: 0 | Status: DISCONTINUED | OUTPATIENT
Start: 2020-11-14 | End: 2020-11-24

## 2020-11-14 RX ORDER — ALLOPURINOL 300 MG
100 TABLET ORAL DAILY
Refills: 0 | Status: DISCONTINUED | OUTPATIENT
Start: 2020-11-14 | End: 2020-11-24

## 2020-11-14 RX ORDER — AMLODIPINE BESYLATE 2.5 MG/1
10 TABLET ORAL DAILY
Refills: 0 | Status: DISCONTINUED | OUTPATIENT
Start: 2020-11-14 | End: 2020-11-24

## 2020-11-14 RX ORDER — INSULIN LISPRO 100/ML
VIAL (ML) SUBCUTANEOUS AT BEDTIME
Refills: 0 | Status: DISCONTINUED | OUTPATIENT
Start: 2020-11-14 | End: 2020-11-24

## 2020-11-14 RX ORDER — DEXTROSE 50 % IN WATER 50 %
15 SYRINGE (ML) INTRAVENOUS ONCE
Refills: 0 | Status: DISCONTINUED | OUTPATIENT
Start: 2020-11-14 | End: 2020-11-24

## 2020-11-14 RX ORDER — VANCOMYCIN HCL 1 G
1000 VIAL (EA) INTRAVENOUS ONCE
Refills: 0 | Status: COMPLETED | OUTPATIENT
Start: 2020-11-14 | End: 2020-11-14

## 2020-11-14 RX ADMIN — Medication 75 MILLIGRAM(S): at 21:55

## 2020-11-14 RX ADMIN — MORPHINE SULFATE 4 MILLIGRAM(S): 50 CAPSULE, EXTENDED RELEASE ORAL at 14:55

## 2020-11-14 RX ADMIN — Medication 250 MILLIGRAM(S): at 13:20

## 2020-11-14 RX ADMIN — SENNA PLUS 2 TABLET(S): 8.6 TABLET ORAL at 21:56

## 2020-11-14 RX ADMIN — CEFEPIME 100 MILLIGRAM(S): 1 INJECTION, POWDER, FOR SOLUTION INTRAMUSCULAR; INTRAVENOUS at 16:12

## 2020-11-14 RX ADMIN — SIMVASTATIN 20 MILLIGRAM(S): 20 TABLET, FILM COATED ORAL at 21:55

## 2020-11-14 RX ADMIN — Medication 25 MILLIGRAM(S): at 16:12

## 2020-11-14 RX ADMIN — MORPHINE SULFATE 4 MILLIGRAM(S): 50 CAPSULE, EXTENDED RELEASE ORAL at 16:13

## 2020-11-14 RX ADMIN — Medication 1000 MILLIGRAM(S): at 16:13

## 2020-11-14 RX ADMIN — Medication 40 MILLIGRAM(S): at 16:12

## 2020-11-14 NOTE — CONSULT NOTE ADULT - ATTENDING COMMENTS
[facility]  Office [phone]  Ans Serv [phone]  [facility] - [phone]
severe small vessel disease, non reconstructible.  abx, local wound care

## 2020-11-14 NOTE — CONSULT NOTE ADULT - ASSESSMENT
89 yo f w/ left foot dry gangrene with early wet conversion to left hallux and 2nd digit    -pt seen and evaluated  -afebrile, no leukocytosis, ESR 57, CRP 5.1  -LF Xray pending  -wet gangrene to the left hallux and 2nd digit, malodor present, bogginess to the left 2nd digit and sulcus, 5 cc of sangiopurulence, probing to bone, no erythema  -sharp incision and drainage to the left 1st interspace to the level of and not beyond subQ, expressed 5cc of sangiopurulence, flushed, wound culture taken, packed with 1/4' iodofrom packing, DSD  -pt started on vanco, cefepime  -admit to medicine  -rec vasc consult  -pod plan possible amputation pending vasc recs  -discussed with attending

## 2020-11-14 NOTE — H&P ADULT - ASSESSMENT
89 y/o female pmh htn, dm, ckd, afib on coumadin c/o infected L toes. Pt admits to having dry gangrene to L first toe and was admitted in the Hospital in August. Pt states that over th elast several days, her L 2nd toe has become swollen, painful, red and has drainage. Pt went to her podiatrist, Dr. Eaton, today who sent pt to the ER for wet gangrene. Pt denies chest pain, sob, abd pain, n/v/d, weakness, dizziness ,syncope, fever or chills.

## 2020-11-14 NOTE — CONSULT NOTE ADULT - ASSESSMENT
89 y/o female pmh htn, dm, ckd, afib on coumadin p/w c/o infected L toes. pt being admitted for wet gangrene. Renal consulted for NIKHIL/CKD, vol Mx.     NIKHIL on CKD3:  b/l SCr 1.6 8/2020  has chronic LE edema, better lately per pt  hold diuretics/lasix for now  low Na diet and fluid restriction  monitor BMP daily   dose all meds for eGFR<15ml/min.   avoid ACEi/ARB for now/NSAIDs/Nephrotoxics.    HTN, bp controlled  Hypercalcemia- rpt ca, check phos, PTH, [id number] level. avoid ca suplements  wet Gangrene L foot- Mx per Podiatry. vas sx eval    labs, rad, chart reviewed  Thanks for consulting. will closely follow up.

## 2020-11-14 NOTE — CONSULT NOTE ADULT - SUBJECTIVE AND OBJECTIVE BOX
Podiatry pager #: [phone] ([place])/ [phone] ([facility])    Patient is a 89y old  Female who presents with a chief complaint of left foot gangrene    HPI:     89 y/o female pmh htn, dm, ckd, afib on coumadin c/o infected L toes. Pt admits to having dry gangrene to L first toe and was admitted in the Hospital in August. Pt states that over th elast several days, her L 2nd toe has become swollen, painful, red and has drainage. Pt went to her podiatrist, Dr. Eaton, today who sent pt to the ER for wet gangrene. Pt denies chest pain, sob, abd pain, n/v/d, weakness, dizziness ,syncope, fever or chills.  PAST MEDICAL & SURGICAL HISTORY:  CKD (chronic kidney disease)    CVA (cerebrovascular accident)    Personal history of PE (pulmonary embolism)    Glaucoma    PVD (peripheral vascular disease)    Hypothyroid    HTN (hypertension)    HLD (hyperlipidemia)    CHF (congestive heart failure)    DM (diabetes mellitus)    Afib    Elective surgery  abdominal abcess removals    History of partial thyroidectomy        MEDICATIONS  (STANDING):    MEDICATIONS  (PRN):      Allergies    No Known Allergies    Intolerances        VITALS:    Vital Signs Last 24 Hrs  T(C): 36.6 (14 Nov 2020 16:40), Max: 37.1 (14 Nov 2020 11:34)  T(F): 97.9 (14 Nov 2020 16:40), Max: 98.8 (14 Nov 2020 11:34)  HR: 85 (14 Nov 2020 16:40) (71 - 85)  BP: 155/81 (14 Nov 2020 16:40) (134/83 - 176/82)  BP(mean): --  RR: 18 (14 Nov 2020 16:40) (16 - 18)  SpO2: 96% (14 Nov 2020 16:40) (96% - 98%)    LABS:                          9.4    6.31  )-----------( 189      ( 14 Nov 2020 12:45 )             30.4       11-14    140  |  95<L>  |  56<H>  ----------------------------<  136<H>  3.9   |  36<H>  |  2.08<H>    Ca    11.0<H>      14 Nov 2020 12:45    TPro  7.9  /  Alb  3.9  /  TBili  0.6  /  DBili  x   /  AST  15  /  ALT  13  /  AlkPhos  89  11-14      CAPILLARY BLOOD GLUCOSE      POCT Blood Glucose.: 131 mg/dL (14 Nov 2020 11:33)      PT/INR - ( 14 Nov 2020 12:45 )   PT: 27.9 SEC;   INR: 2.54          PTT - ( 14 Nov 2020 12:45 )  PTT:37.5 SEC    LOWER EXTREMITY PHYSICAL EXAM:    Vascular: DP/PT 0/4 B/L, CFT absent to left hallux, CFT <3 seconds x 9, Temperature gradient warm to cool B/L except to left hallux warm to cold  Neuro: Epicritic sensation intact to the level of forefoot B/L  Musculoskeletal/Ortho: no deformity notd  Skin: wet gangrene to the left hallux and 2nd digit, malodor present, bogginess to the left 2nd digit and sulcus, 5 cc of sangiopurulence, probing to bone, no erythema  Wound #1:   Location: left hallux  Size: 4x1x3 cm  Depth: to bone  Wound bed: fibronecrotic  Drainage: sangiopurulence  Odor: malodor  Periwound: necrotic  Etiology: ischemic    RADIOLOGY & ADDITIONAL STUDIES:

## 2020-11-14 NOTE — CONSULT NOTE ADULT - ASSESSMENT
ASSESSMENT:  Idalmis is a very pleasant 89 year old lady with history of Afib on coumadin, CHF, HTN, HLD, CKD stage 4, IDDM, PVD, CVA, PE, hypothyroidism, p/w L foot wound. Pt known to the vascular surgery service, s/p LLE angiography on 7/28/2020, presenting with wet gangrene of LLE toes, now s/p I&D by podiatry    PLAN:  - No acute vascular surgical intervention at this time  - IV Abx  - Would recommend repeat LIZBETH/PVRs  - Wound management per podiatry  - Vascular will follow  - Discussed with vascular surgery fellow on behalf of attending    Joe Solano, PGY 3  Surgery [id number]

## 2020-11-14 NOTE — ED PROVIDER NOTE - PHYSICAL EXAMINATION
L 2nd toe- edematous, erythematous, + serosanguinous drianage, no sensation  L foot- TTP dorsal, 2+ pulses. no crepitus

## 2020-11-14 NOTE — ED ADULT NURSE NOTE - NSIMPLEMENTINTERV_GEN_ALL_ED
Implemented All Fall with Harm Risk Interventions:  [place] to call system. Call bell, personal items and telephone within reach. Instruct patient to call for assistance. Room bathroom lighting operational. Non-slip footwear when patient is off stretcher. Physically safe environment: no spills, clutter or unnecessary equipment. Stretcher in lowest position, wheels locked, appropriate side rails in place. Provide visual cue, wrist band, yellow gown, etc. Monitor gait and stability. Monitor for mental status changes and reorient to person, place, and time. Review medications for side effects contributing to fall risk. Reinforce activity limits and safety measures with patient and family. Provide visual clues: red socks.

## 2020-11-14 NOTE — ED PROVIDER NOTE - ATTENDING CONTRIBUTION TO CARE
ANTICOAGULATION FOLLOW-UP CLINIC VISIT    Patient Name:  Sandhya Trujillo  Date:  2020  Contact Type:  Telephone/ with Sandhya    SUBJECTIVE:  Patient Findings     Positives:   Other complaints (Will be getting Methotrexate tomorrow and IgG on 2020)        Clinical Outcomes     Negatives:   Major bleeding event, Thromboembolic event, Anticoagulation-related hospital admission, Anticoagulation-related ED visit, Anticoagulation-related fatality           OBJECTIVE    INR   Date Value Ref Range Status   2020 2.7 (A) 0.90 - 1.10 Final     Factor 2 Assay   Date Value Ref Range Status   2016 27 (L) 60 - 140 % Final       ASSESSMENT / PLAN  INR assessment THER    Recheck INR In: 3 DAYS    INR Location Clinic      Anticoagulation Summary  As of 2020    INR goal:   2.0-3.0   TTR:   74.8 % (1 y)   INR used for dosin.7 (2020)   Warfarin maintenance plan:   5 mg (2.5 mg x 2) every Mon; 3.75 mg (2.5 mg x 1.5) all other days   Full warfarin instructions:   5 mg every Mon; 3.75 mg all other days   Weekly warfarin total:   27.5 mg   No change documented:   Ana Woodall, RN   Plan last modified:   Yoselyn Winn RN (2020)   Next INR check:   2/10/2020   Priority:   Maintenance   Target end date:   Indefinite    Indications    Long-term (current) use of anticoagulants [Z79.01] [Z79.01]  Pulmonary embolism (H) (Resolved) [I26.99]             Anticoagulation Episode Summary     INR check location:       Preferred lab:       Send INR reminders to:   [facility]    Comments:   MD INR      Anticoagulation Care Providers     Provider Role Specialty Phone number    Sarah Vaughn MD Responsible Internal Medicine [phone]            See the Encounter Report to view Anticoagulation Flowsheet and Dosing Calendar (Go to Encounters tab in chart review, and find the Anticoagulation Therapy Visit)    Dosage adjustment made based on physician directed care plan.    Ana NELSON  JAY Woodall                  Dr Bloch, ATTENDING MD-  I performed a face to face bedside interview with patient regarding history of present illness, review of symptoms and past medical history. I completed an independent physical exam.  I have discussed patient's plan of care with PA.   Documentation as above in the note.  Patient examined well appearing NAd HEENT nml heart sounds nml, lungs clear abd soft pos fem pulse 2nd toe blackened with purulent d/c, 3rd toe with erythema, decreased sensation toes,  decreased DP pulse.

## 2020-11-14 NOTE — ED ADULT NURSE REASSESSMENT NOTE - NS ED NURSE REASSESS COMMENT FT1
PT 20G IV in right AC infiltrated. DEEP Caceres aware, awaiting ultrasound IV. will restart medication once IV in place.

## 2020-11-14 NOTE — ED PROCEDURE NOTE - PROCEDURE DATE TIME, MLM
Assessment   1  Closed nondisplaced lateral mass fracture of first cervical vertebra, sequela (805 01)   (S12 041S)  2  Concussion, with LOC of 30 min or less, sequela (907 0) (S06 0X1S)  3  Laceration of forehead, sequela (906 0) (S01 81XS)  4  Rheumatoid arthritis (714 0) (M06 9)  5  Current chronic use of systemic steroids (V58 65) (Z79 52)    Plan  Closed nondisplaced lateral mass fracture of first cervical vertebra, sequela    · Start: TraMADol HCl - 50 MG Oral Tablet; TAKE 1 TABLET BY MOUTH EVERY 6 TO 8  HOURS AS NEEDED FOR PAIN  Rx By: Meir Haynes; Dispense: 30 Days ; #:30 Tablet; Refill: 0;For: Closed nondisplaced   lateral mass fracture of first cervical vertebra, sequela; DANY = N; Call Rx  Crohn's disease    · Changed: From  Cyanocobalamin 1000 MCG/ML Injection Solution INJECT 1 ML  INTRAMUSCULARLY EVERY 2 WEEKS To Cyanocobalamin 1000 MCG/ML Injection  Solution INJECT 1 ML INTRAMUSCULARLY WEEKLY  Rx By: Meir Haynes; Dispense: 80 Days ; #:10 ML; Refill: 1;For: Crohn's disease; DANY =   N; Sent To: [id number]  · Changed: From  Syringe 2G X 1-1/4" 3 ML Miscellaneous USE AS DIRECTED To Syringe  2G X 1-1/4" 3 ML Miscellaneous USE AS DIRECTED WEEKLY WITH THE B12  Rx By: Meir Haynes; Dispense: 0 Days ; #:1 X 1000 Miscellaneous Box; Refill: 0;For:   Crohn's disease; DANY = N; Sent To: [id number]   Discussion/Summary  Discussion Summary:   F/U with concussion and spine clinic thru [address]  Call rheumatologist and schedule an appt  Cont C Collar 24 hours a day   Asking to increase her B12 to weekly which is fine  1  rto in 4 weeks    Counseling Documentation With Imm: The patient was counseled regarding diagnostic results, impressions  Medication SE Review and Pt Understands Tx: Possible side effects of new medications were reviewed with the patient/guardian today  The treatment plan was reviewed with the patient/guardian   The patient/guardian understands and agrees with the treatment plan       1 14-Nov-2020 15:20 Amended By: Fiorella Aguilar; May 23 2017 12:13 PM EST    Chief Complaint  Chief Complaint Free Text Note Form: TCM      History of Present Illness  TCM Communication [place]: The patient is being contacted for follow-up after hospitalization  Hospital records were reviewed  She was hospitalized at [facility]  The date of admission: 05/17/2017, date of discharge: 05/19/2017  Diagnosis: C1 cervical fracture  She was discharged to home  Medications were not reviewed today  The patient is currently asymptomatic  Counseling was provided to the patient  Communication performed and completed by Viv De Leon   HPI: Carlos Mediate at home on 5/17  She cleaned her house, might have had pledge on the floor and slipped when she went to the kitchen to give her dog some food  She hit her head on the garbage can, lost consciousness for a few seconds  Small laceration on the left forehead  She drove herself to the ER  CT revealed a C1 fracture  She was kept in the hospital for 2 nights and discharged with a cervical collar that she has been using 24 h a day  She take methocarbamol every 4 hours and Tylenol routinely  She did not take the oxycodone and would like something less strong  She will be going to the concussion clinic next week and the spine clinic in about 2 weeks      Review of Systems  Complete-Female:   Constitutional: no fever and no chills  Eyes: no eyesight problems  ENT: ringing in left ear  Cardiovascular: no chest pain  Respiratory: no shortness of breath  Gastrointestinal: nausea, but no abdominal pain  Musculoskeletal: as noted in HPI  Neurological: dizziness, but no headache  Active Problems   1  Crohn's disease (555 9) (K50 90)  2  Depression (311) (F32 9)  3  Essential hypertriglyceridemia (272 1) (E78 1)  4  Insomnia (780 52) (G47 00)  5  Long-term use of Plaquenil (V58 69) (Z79 899)  6  Nephrolithiasis (592 0) (N20 0)  7  Rheumatoid arthritis (714 0) (M06 9)  8   Symptomatic menopausal or female climacteric states (627 2) (N95 1)  9  Urinary hesitancy (788 64) (R39 11)    Past Medical History   1  History of fatigue (V13 89) (O16 988)    Surgical History  Surgical History Reviewed: The surgical history was reviewed and updated today  Family History  Family History   1  Family history of CAD (coronary artery disease)  2  Family history of cardiac disorder (V17 49) (Z82 49)  Family History Reviewed: The family history was reviewed and updated today  Social History    · Alcohol use (V49 89) (Z78 9)   ·    · Former smoker (M46 49) (M31 218)   · Occasional alcohol use   · Pets/Animals: Dog   · Two children  Social History Reviewed: The social history was reviewed and updated today  Current Meds  1  Cyanocobalamin 1000 MCG/ML Injection Solution; INJECT 1 ML INTRAMUSCULARLY   EVERY 2 WEEKS; Therapy: 09GIW2756 to (Rosales Owens)  Requested for: 16Nzu9166; Last   Rx:[id number] Ordered  2  DULoxetine HCl - 60 MG Oral Capsule Delayed Release Particles; take 1 capsule daily; Therapy: 06JLK1796 to (Evaluate:51Kpe0498)  Requested for: 21Nov2016; Last   Rx:[id number] Ordered  3  Flagyl 250 MG Oral Tablet; TAKE 1 TABLET BID for the first 10days of every month; Therapy: 25IOS2739 to Recorded  4  Folic Acid 1 MG Oral Tablet; TAKE 1 TABLET DAILY; Therapy: 13HHJ0031 to (Evaluate:12Hhi2761) Recorded  5  LORazepam 0 5 MG Oral Tablet; TAKE 1 TABLET DAILY AS NEEDED; Therapy: 54Vik3343 to (Evaluate:16Lmj6695); Last Rx:[id number] Ordered  6  LORazepam 1 MG Oral Tablet; take 1 tablet by mouth at bedtime as needed; Therapy: 86BWO5494 to (Evaluate:94Mne1494)  Requested for: 57EZK9250; Last   Rx:[id number] Ordered  7  MedroxyPROGESTERone Acetate 2 5 MG Oral Tablet; TAKE 1 TABLET DAILY 10-14 days   a month; Therapy: 99SXJ6398 to Recorded  8  Menest 0 625 MG Oral Tablet; TAKE 1 TABLET DAILY; Therapy: 95XCX2753 to Recorded  9   PredniSONE 5 MG Oral Tablet; TAKE 1 TABLET BY MOUTH ONCE DAILY -;   Therapy: 15RBA4687 to (Evaluate:43Bot5154)  Requested for: 25TPR0667 Recorded  10  Rituxan 100 MG/10ML Intravenous Solution; Infuse 1000mg on day 0 and 14; Therapy: 69QLQ6683 to (Evaluate:59Hhf3559) Recorded  11  Syringe 2G X 1-1/4" 3 ML Miscellaneous; USE AS DIRECTED; Therapy: 13EYW4800 to (Last Rx:49Ewf5295)  Requested for: 70Crq5345 Ordered  12  Tylenol 325 MG Oral Tablet; TAKE 1 OR 2 TABLET EVERY 4 HOURS AS NEEDED FOR    PAIN/FEVER; Therapy: 15BKT7607 to Recorded    Allergies   1  Orencia  2  Remicade   3  No Known Environmental Allergies  4  No Known Food Allergies    Vitals  Signs   Recorded: 45HIY8611 11:18AM   Temperature: 98 5 F, Oral    Physical Exam    Constitutional   General appearance: No acute distress, well appearing and well nourished  Eyes   Conjunctiva and lids: No swelling, erythema or discharge  Ears, Nose, Mouth, and Throat   Otoscopic examination: Tympanic membranes translucent with normal light reflex  Canals patent without erythema  Pulmonary   Respiratory effort: No increased work of breathing or signs of respiratory distress  Auscultation of lungs: Clear to auscultation  Cardiovascular   Auscultation of heart: Normal rate and rhythm, normal S1 and S2, without murmurs  Musculoskeletal   Gait and station: Normal     Psychiatric   Orientation to person, place, and time: Normal     Mood and affect: Normal          Future Appointments    Date/Time Provider Specialty Site   06/01/2017 09:00 AM RAHUL Mercado   Internal Medicine [facility]     Signatures   Electronically signed by : Jia Gant, ; May 22 2017  3:36PM EST                       (Author)    Electronically signed by : Merlinda Abraham, M D ; May 23 2017 12:10PM EST                       (Author)    Electronically signed by : Merlinda Abraham, M D ; May 23 2017 12:13PM EST                       (Author)

## 2020-11-14 NOTE — CONSULT NOTE ADULT - SUBJECTIVE AND OBJECTIVE BOX
[facility] - S Radha / Florin S /D Nate/ S Jonathan/ GASTON Tellez/ Shahid Segovia / BABAR Escalerau/ O Rudi  service -[phone], office [phone]  ---------------------------------------------------------------------------------------------------------------  Patient seen and examined in ER    89 y/o female pmh htn, dm, ckd, afib on coumadin p/w c/o infected L toes. Pt is well known to me from prior hospitalizations at [facility], has known h/o CKD, follows w/my partner DR Segovia in office. pt admits to having dry gangrene to L first toe and was admitted in the Hospital in August. Pt states that over th elast several days, her L 2nd toe has become swollen, painful, red and has drainage. Pt went to her podiatrist, Dr. Eaton, today who sent pt to the ER for wet gangrene. Pt denies chest pain, sob, abd pain, n/v/d. pt denied recent abx/NSAID use.     PAST MEDICAL & SURGICAL HISTORY:  CKD (chronic kidney disease)    CVA (cerebrovascular accident)    Personal history of PE (pulmonary embolism)    Glaucoma    PVD (peripheral vascular disease)    Hypothyroid    HTN (hypertension)    HLD (hyperlipidemia)    CHF (congestive heart failure)    DM (diabetes mellitus)    Afib    Elective surgery  abdominal abcess removals    History of partial thyroidectomy    Allergies: No Known Allergies    Home Medications Reviewed  Hospital Medications:   MEDICATIONS  (STANDING):    SOCIAL HISTORY:  Denies ETOh,Smoking, illicit drug use  FAMILY HISTORY:  No pertinent family history in first degree relatives      REVIEW OF SYSTEMS:  CONSTITUTIONAL: No weakness, fevers or chills  EYES/ENT: No visual changes;  No vertigo or throat pain   NECK: No pain or stiffness  RESPIRATORY: No cough, wheezing, hemoptysis; No shortness of breath  CARDIOVASCULAR: No chest pain or palpitations.  GASTROINTESTINAL: No abdominal or epigastric pain. No nausea, vomiting, or hematemesis; No diarrhea or constipation. No melena or hematochezia.  GENITOURINARY: No dysuria, frequency, foamy urine, urinary urgency, incontinence or hematuria  NEUROLOGICAL: No numbness or weakness  VASCULAR: +bilateral lower extremity edema. improving  All other review of systems is negative unless indicated above.    VITALS:  T(F): 97.9 (11-14-20 @ 16:40), Max: 98.8 (11-14-20 @ 11:34)  HR: 85 (11-14-20 @ 16:40)  BP: 155/81 (11-14-20 @ 16:40)  RR: 18 (11-14-20 @ 16:40)  SpO2: 96% (11-14-20 @ 16:40)  Wt(kg): --    Height (cm): 167.6 (11-14 @ 11:34)    PHYSICAL EXAM:  Constitutional: NAD  HEENT: anicteric sclera  Neck: No JVD  Respiratory: CTAB, no wheezes, rales or rhonchi  Cardiovascular: S1, S2, RRR  Gastrointestinal: BS+, soft, NT/ND  Extremities: + peripheral edema, left foot dressing+  Neurological: A/O x 3  Psychiatric: Normal mood, normal affect  : No michaud.     LABS:  11-14    140  |  95<L>  |  56<H>  ----------------------------<  136<H>  3.9   |  36<H>  |  2.08<H>    Ca    11.0<H>      14 Nov 2020 12:45    TPro  7.9  /  Alb  3.9  /  TBili  0.6  /  DBili      /  AST  15  /  ALT  13  /  AlkPhos  89  11-14    Creatinine Trend: 2.08 <--                        9.4    6.31  )-----------( 189      ( 14 Nov 2020 12:45 )             30.4     Urine Studies:    RADIOLOGY & ADDITIONAL STUDIES:    no new rad

## 2020-11-14 NOTE — ED PROVIDER NOTE - RATE
December 17, 2018       Naima Haque DO  [address] [facility] [address]  VIA In Basket      Patient: Bradley Montague   YOB: 1962   Date of Visit: 12/17/2018       Dear Dr. Haque:    I saw your patient, Bradley Montague, for an evaluation. Below are my notes for this visit with him.    If you have questions, please do not hesitate to call me.      Sincerely,        Jerry Landa MD        CC: No Recipients  Jerry Landa MD  12/17/2018 12:26 PM  Sign at close encounter  12/17/2018    Naima Haque DO    Chief Complaint   Patient presents with   • Follow-up     5 month check-up       HPI:         Patient 57 yo male with history of CAD, DM, HTN, SAJI, CHF, Life vest for last 6 months. Admitted for NSTEMI and had 90 % occlusion with stent placed by Dr. Myers (3/18/2018). N     Previously patient. Patient works as  and has admitted to not taking all of his prescribed BP medications because he did not think he needed them. Patient does not wear CPAP because financial issues he owes so has not received new supplies needed.      Pt had been hospitalized in March 2018 for chest pain where he underwent primary stenting of LAD by Dr. Myers (3/16/18). Post-cath had issues with severe HTN and pulm edema. treated initially by IV nitro then later by oral meds. BP was borderline controlled on discharge.     Currently patient notices his SOB if he walks to far. Can walk about 1 block before getting SOB. Not using CPAP at night. Has a bill with the DME people and therefore they will not provide his CPAP. using O2 at night. States BP has been good at home, not checking daily though. states it was once 120. eats 'superfoods'. states 'he knows himself and when his BP is high'.      appetite intact. mild belly bloating. feels that sometimes he drinks too much wate.r BP high in clin but patient states he didn't' take his mid-day meds     7/2/18 - pt denies SOB/LOERA. no LE edema. pt complains of RUE  swelling and pain. States he hit his arm on his stick shift and it swelled up however he is also concerned about gout. eating cherries to treat gout. Pt BP is uncontrlled and has several excuses for why his BP is elevated . every time we meet his BP is uncontrolled.       12/17/18-  Admits to some SOB at night.  BP severely elevated today.  Patient states it is because he has been talking too much.  Non-compliant with oxygen.  Explained to patient why this is necessary, patietn states by breathing he gets his sats up.        Relevant Past Medical History:  Past Medical History:   Diagnosis Date   • CAD (coronary artery disease)     s/p PCI   • Diabetes (CMS/HCC)    • Heart failure, systolic and diastolic, chronic (CMS/HCC)    • Hypertension    • Sleep apnea, obstructive        There is no problem list on file for this patient.      Review of Systems   Constitutional: Negative for activity change, appetite change and unexpected weight change.   HENT: Negative for congestion, rhinorrhea and sore throat.    Eyes: Negative for pain.   Respiratory: Negative for apnea, chest tightness and stridor.    Cardiovascular: Negative.    Gastrointestinal: Negative for blood in stool, constipation, diarrhea, nausea and vomiting.   Endocrine: Negative for cold intolerance and heat intolerance.   Genitourinary: Negative for decreased urine volume and difficulty urinating.   Musculoskeletal: Negative for arthralgias and myalgias.   Skin: Negative for color change and pallor.   Neurological: Negative for tremors, speech difficulty, weakness, numbness and headaches.       ALLERGIES:   Allergen Reactions   • Amlodipine SWELLING   • Lisinopril Cough       Past Surgical History:  Past Surgical History:   Procedure Laterality Date   • Coronary stent placement         Current Medications:   This SmartLink is deprecated. Use FramehawkEDLIST instead to display the medication list for a patient.    Social History     Socioeconomic History   •  Marital status: /Civil Union     Spouse name: Not on file   • Number of children: Not on file   • Years of education: Not on file   • Highest education level: Not on file   Social Needs   • Financial resource strain: Not on file   • Food insecurity - worry: Not on file   • Food insecurity - inability: Not on file   • Transportation needs - medical: Not on file   • Transportation needs - non-medical: Not on file   Occupational History   • Not on file   Tobacco Use   • Smoking status: Never Smoker   • Smokeless tobacco: Never Used   Substance and Sexual Activity   • Alcohol use: No     Frequency: Never   • Drug use: Not on file   • Sexual activity: Not on file   Other Topics Concern   • Not on file   Social History Narrative   • Not on file       Family History:  family history includes Hypertension in his father and mother.    Examination:   Blood pressure (!) 198/104, pulse 80, height 5' 8\" (1.727 m), weight 82.1 kg (181 lb).  Weight    12/17/18 1158   Weight: 82.1 kg (181 lb)       Physical Exam   Constitutional: He is oriented to person, place, and time. He appears well-developed and well-nourished.   HENT:   Head: Normocephalic.   Eyes: Conjunctivae and EOM are normal. Pupils are equal, round, and reactive to light.   Neck: Normal range of motion. No JVD present.   Cardiovascular: Normal rate, regular rhythm, normal heart sounds and intact distal pulses.   Pulmonary/Chest: Effort normal and breath sounds normal.   Abdominal: Soft. Bowel sounds are normal.   Musculoskeletal: Normal range of motion. He exhibits no edema.   Neurological: He is alert and oriented to person, place, and time.   Skin: Skin is warm and dry.   Psychiatric: He has a normal mood and affect. His behavior is normal.   Nursing note and vitals reviewed.      Diagnostic data:  Sodium (mmol/L)   Date Value   10/04/2018 139     Potassium (mmol/L)   Date Value   10/04/2018 3.2 (L)     Chloride (mmol/L)   Date Value   10/04/2018 99      Glucose (mg/dl)   Date Value   10/04/2018 244 (H)     CALCIUM (mg/dl)   Date Value   10/04/2018 8.8     Carbon Dioxide (mmol/L)   Date Value   10/04/2018 29     BUN (mg/dl)   Date Value   10/04/2018 52 (H)     Creatinine (mg/dl)   Date Value   10/04/2018 1.35 (H)       B-TYPE NATRIURETIC PEPTIDE (pg/ml)   Date Value   07/12/2018 56       EKG: No results found for this or any previous visit.    Echocardiogram:     Stress study:     Left Heart Cath:     Right Heart Cath:     Assessment/Plan:    Chronic systolic and diastolic HF  - NYHA Class III, Stage C - mostly euvolemic on exam  - Etiology: Mixed - likely HTN + CAD (recent NSTEMI)  - Echo: EF: 35-40% LVEDd: 4.0cm  mild MR  - currently on hydralazine 100mg q8h, isordil 40mg q8h, spironolactone 25mg daily and carvedilol 25mg BID  - no ACEi/ARB due to allergy --> throat swelling     NSTEMI: CAD s/p PCI   -pci distal lad 3/16/18  -On plavix, asa, statin     HTN  Kidney US in 7/2017 no renal artery stenosis   plasma metanephrines normal march 2018  Renin/aldosterone levels normal march 2018  Isosorbide 40 mg TID  Hydralazine 100mg TID  Coreg 25mg Q12h   chlorthalidone 25mg daily  clonidine 0.1mg BID     SAJI  - didn't get sleep study, states he could go to the doctor due to gout.       DM  -per primary  - typically uncontrolled      Plan:  - BP uncontrolled but patient states that it is because he is talking too much  - increase clonidine to 0.1mg TID.     - Patient's BP has never been controlled. he disagrees with my recommendations to increase his medications as he always has reasons why his BP is elevated. Explained risks of high BP including MI, stroke and dissection and he persistently refuses to change his meds. He says it is well controlled at home although I have never seen his BP under control. I have recommended increasing his BP meds and patient states he is not here for medications he is here for 'knowledge'. I have informed him of the risks of HTN and  the importance of controlling his BP to reduce his risk of stroke and MI.     - compliance is still a major issue      RTC in 4 months     Thank you for allowing me to participate in the care of this patient. Please call me with any questions or concerns.     Jerry Landa MD [facility]  Advanced HF and Transplant Cardiology  [facility]  In-house Pager:   Outside Pager: [phone]  [facility] Answering Service: [phone]                     65

## 2020-11-14 NOTE — CONSULT NOTE ADULT - SUBJECTIVE AND OBJECTIVE BOX
HPI:  Idalmis is a very pleasant 89 year old lady with history of Afib on coumadin, CHF, HTN, HLD, CKD stage 4, IDDM, PVD, CVA, PE, hypothyroidism, p/w L foot wound. Pt known to the vascular surgery service, had recently been admitted for management of wounds in the same location and ultimately underwent LLE angiography on 7/28/2020, finding no lesions amenable to revascularization, with single vessel peroneal runoff. Pt states the toe wounds have been present for just over 1 year now, and she is still ambulatory with walker and assistance, however she developed severe pain in the left foot yesterday, prompting her to come to the ED. Denies any other complaints including recent illness/sick contacts, fevers/chills, chest pain/shortness of breath, nausea/vomiting, diarrhea/constipation.     PMHx: CKD (chronic kidney disease)    CVA (cerebrovascular accident)    Personal history of PE (pulmonary embolism)    Glaucoma    PVD (peripheral vascular disease)    Hypothyroid    HTN (hypertension)    HLD (hyperlipidemia)    CHF (congestive heart failure)    DM (diabetes mellitus)    Afib      PSHx: Elective surgery    History of partial thyroidectomy      Medications (inpatient): allopurinol 100 milliGRAM(s) Oral daily  amLODIPine   Tablet 10 milliGRAM(s) Oral daily  cefepime   IVPB 1000 milliGRAM(s) IV Intermittent every 8 hours  cinacalcet 60 milliGRAM(s) Oral daily  dextrose 40% Gel 15 Gram(s) Oral once  dextrose 5%. 1000 milliLiter(s) IV Continuous <Continuous>  dextrose 5%. 1000 milliLiter(s) IV Continuous <Continuous>  dextrose 50% Injectable 25 Gram(s) IV Push once  dextrose 50% Injectable 12.5 Gram(s) IV Push once  dextrose 50% Injectable 25 Gram(s) IV Push once  gabapentin 100 milliGRAM(s) Oral three times a day  glucagon  Injectable 1 milliGRAM(s) IntraMuscular once  hydrALAZINE 75 milliGRAM(s) Oral three times a day  insulin lispro (ADMELOG) corrective regimen sliding scale   SubCutaneous three times a day before meals  insulin lispro (ADMELOG) corrective regimen sliding scale   SubCutaneous at bedtime  latanoprost 0.005% Ophthalmic Solution 1 Drop(s) Both EYES at bedtime  levothyroxine 75 MICROGram(s) Oral daily  senna 2 Tablet(s) Oral at bedtime  simvastatin 20 milliGRAM(s) Oral at bedtime    Medications (PRN):acetaminophen   Tablet .. 650 milliGRAM(s) Oral every 6 hours PRN  oxyCODONE    IR 5 milliGRAM(s) Oral every 8 hours PRN    Allergies: No Known Allergies  (Intolerances: )  Social Hx:   Family Hx: No pertinent family history in first degree relatives        T(C): 36.6  HR: 85 (71 - 85)  BP: 155/81 (134/83 - 176/82)  RR: 18 (16 - 18)  SpO2: 96% (96% - 98%)  Tmax: T(C): , Max: 37.1 (11-14-20 @ 11:34)      Physical Exam:  General: Elderly female, in no acute distress   Neurologic: Awake, alert, GCS 15, No focal Deficits   Respiratory: Normal respiratory effort  CVS: RRR, perfusing adequately  Abdomen: Abdomen soft, NT/ND  Ext: LLE great toe and 2nd toe with dry gangrene, wet gangrene component in space between 1st and 2nd toes, with packing in place at site of incision, superficial heel ulcerations b/l.   Vascular: 2+ b/l femoral, nonpalpable pedal pulses with biphasic RLE DP/PT signals, biphasic LLE DP, monophasic PT, no edema appreciated    Labs:                        9.4    6.31  )-----------( 189      ( 14 Nov 2020 12:45 )             30.4     PT/INR - ( 14 Nov 2020 12:45 )   PT: 27.9 SEC;   INR: 2.54          PTT - ( 14 Nov 2020 12:45 )  PTT:37.5 SEC  11-14    140  |  95<L>  |  56<H>  ----------------------------<  136<H>  3.9   |  36<H>  |  2.08<H>    Ca    11.0<H>      14 Nov 2020 12:45    TPro  7.9  /  Alb  3.9  /  TBili  0.6  /  DBili  x   /  AST  15  /  ALT  13  /  AlkPhos  89  11-14            Imaging and other studies:

## 2020-11-14 NOTE — ED PROVIDER NOTE - OBJECTIVE STATEMENT
89 y/o female pmh htn, dm, ckd, afib on coumadin c/o infected L toes. Pt admits to having dry gangrene to L first toe and was admitted in the Hospital in August. Pt states that over th elast several days, her L 2nd toe has become swollen, painful, red and has drainage. Pt went to her podiatrist, Dr. Eaton, today who sent pt to the ER for wet gangrene. Pt denies chest pain, sob, abd pain, n/v/d, weakness, dizziness ,syncope, fever or chills.

## 2020-11-15 ENCOUNTER — TRANSCRIPTION ENCOUNTER (OUTPATIENT)
Age: 85
End: 2020-11-15

## 2020-11-15 LAB
24R-OH-CALCIDIOL SERPL-MCNC: 76.1 NG/ML — SIGNIFICANT CHANGE UP (ref 30–80)
ANION GAP SERPL CALC-SCNC: 13 MMO/L — SIGNIFICANT CHANGE UP (ref 7–14)
BUN SERPL-MCNC: 54 MG/DL — HIGH (ref 7–23)
CALCIUM SERPL-MCNC: 11.1 MG/DL — HIGH (ref 8.4–10.5)
CHLORIDE SERPL-SCNC: 93 MMOL/L — LOW (ref 98–107)
CO2 SERPL-SCNC: 33 MMOL/L — HIGH (ref 22–31)
CREAT SERPL-MCNC: 2.18 MG/DL — HIGH (ref 0.5–1.3)
GLUCOSE BLDC GLUCOMTR-MCNC: 112 MG/DL — HIGH (ref 70–99)
GLUCOSE BLDC GLUCOMTR-MCNC: 126 MG/DL — HIGH (ref 70–99)
GLUCOSE BLDC GLUCOMTR-MCNC: 164 MG/DL — HIGH (ref 70–99)
GLUCOSE SERPL-MCNC: 124 MG/DL — HIGH (ref 70–99)
HBA1C BLD-MCNC: 6.1 % — HIGH (ref 4–5.6)
HCT VFR BLD CALC: 31 % — LOW (ref 34.5–45)
HGB BLD-MCNC: 9.3 G/DL — LOW (ref 11.5–15.5)
INR BLD: 2.62 — HIGH (ref 0.88–1.16)
MCHC RBC-ENTMCNC: 26.6 PG — LOW (ref 27–34)
MCHC RBC-ENTMCNC: 30 % — LOW (ref 32–36)
MCV RBC AUTO: 88.8 FL — SIGNIFICANT CHANGE UP (ref 80–100)
NRBC # FLD: 0 K/UL — SIGNIFICANT CHANGE UP (ref 0–0)
PHOSPHATE SERPL-MCNC: 2.7 MG/DL — SIGNIFICANT CHANGE UP (ref 2.5–4.5)
PLATELET # BLD AUTO: 209 K/UL — SIGNIFICANT CHANGE UP (ref 150–400)
PMV BLD: 11.6 FL — SIGNIFICANT CHANGE UP (ref 7–13)
POTASSIUM SERPL-MCNC: 3.4 MMOL/L — LOW (ref 3.5–5.3)
POTASSIUM SERPL-SCNC: 3.4 MMOL/L — LOW (ref 3.5–5.3)
PROTHROM AB SERPL-ACNC: 28.5 SEC — HIGH (ref 10.6–13.6)
PTH-INTACT SERPL-MCNC: 282.8 PG/ML — HIGH (ref 15–65)
RBC # BLD: 3.49 M/UL — LOW (ref 3.8–5.2)
RBC # FLD: 17.6 % — HIGH (ref 10.3–14.5)
SODIUM SERPL-SCNC: 139 MMOL/L — SIGNIFICANT CHANGE UP (ref 135–145)
WBC # BLD: 8.17 K/UL — SIGNIFICANT CHANGE UP (ref 3.8–10.5)
WBC # FLD AUTO: 8.17 K/UL — SIGNIFICANT CHANGE UP (ref 3.8–10.5)

## 2020-11-15 RX ORDER — POTASSIUM CHLORIDE 20 MEQ
40 PACKET (EA) ORAL ONCE
Refills: 0 | Status: COMPLETED | OUTPATIENT
Start: 2020-11-15 | End: 2020-11-15

## 2020-11-15 RX ORDER — INFLUENZA VIRUS VACCINE 15; 15; 15; 15 UG/.5ML; UG/.5ML; UG/.5ML; UG/.5ML
0.5 SUSPENSION INTRAMUSCULAR ONCE
Refills: 0 | Status: DISCONTINUED | OUTPATIENT
Start: 2020-11-15 | End: 2020-11-16

## 2020-11-15 RX ORDER — GABAPENTIN 400 MG/1
100 CAPSULE ORAL
Refills: 0 | Status: DISCONTINUED | OUTPATIENT
Start: 2020-11-15 | End: 2020-11-24

## 2020-11-15 RX ORDER — WARFARIN SODIUM 2.5 MG/1
5 TABLET ORAL ONCE
Refills: 0 | Status: COMPLETED | OUTPATIENT
Start: 2020-11-15 | End: 2020-11-15

## 2020-11-15 RX ORDER — CEFEPIME 1 G/1
1000 INJECTION, POWDER, FOR SOLUTION INTRAMUSCULAR; INTRAVENOUS EVERY 12 HOURS
Refills: 0 | Status: DISCONTINUED | OUTPATIENT
Start: 2020-11-15 | End: 2020-11-16

## 2020-11-15 RX ADMIN — Medication 100 MILLIGRAM(S): at 17:51

## 2020-11-15 RX ADMIN — GABAPENTIN 100 MILLIGRAM(S): 400 CAPSULE ORAL at 17:51

## 2020-11-15 RX ADMIN — LATANOPROST 1 DROP(S): 0.05 SOLUTION/ DROPS OPHTHALMIC; TOPICAL at 22:20

## 2020-11-15 RX ADMIN — Medication 2: at 17:51

## 2020-11-15 RX ADMIN — SENNA PLUS 2 TABLET(S): 8.6 TABLET ORAL at 22:20

## 2020-11-15 RX ADMIN — Medication 75 MILLIGRAM(S): at 22:20

## 2020-11-15 RX ADMIN — Medication 75 MILLIGRAM(S): at 14:13

## 2020-11-15 RX ADMIN — Medication 40 MILLIEQUIVALENT(S): at 10:04

## 2020-11-15 RX ADMIN — AMLODIPINE BESYLATE 10 MILLIGRAM(S): 2.5 TABLET ORAL at 06:27

## 2020-11-15 RX ADMIN — CINACALCET 60 MILLIGRAM(S): 30 TABLET, FILM COATED ORAL at 17:51

## 2020-11-15 RX ADMIN — Medication 75 MILLIGRAM(S): at 06:27

## 2020-11-15 RX ADMIN — SIMVASTATIN 20 MILLIGRAM(S): 20 TABLET, FILM COATED ORAL at 22:20

## 2020-11-15 RX ADMIN — WARFARIN SODIUM 5 MILLIGRAM(S): 2.5 TABLET ORAL at 18:19

## 2020-11-15 RX ADMIN — GABAPENTIN 100 MILLIGRAM(S): 400 CAPSULE ORAL at 06:27

## 2020-11-15 RX ADMIN — Medication 75 MICROGRAM(S): at 06:27

## 2020-11-15 RX ADMIN — CEFEPIME 100 MILLIGRAM(S): 1 INJECTION, POWDER, FOR SOLUTION INTRAMUSCULAR; INTRAVENOUS at 18:19

## 2020-11-15 RX ADMIN — CEFEPIME 100 MILLIGRAM(S): 1 INJECTION, POWDER, FOR SOLUTION INTRAMUSCULAR; INTRAVENOUS at 06:06

## 2020-11-15 NOTE — PROGRESS NOTE ADULT - ASSESSMENT
89 y/o female pmh htn, dm, ckd, afib on coumadin p/w c/o infected L toes. pt being admitted for wet gangrene. Renal following for NIKHIL/CKD, vol Mx.     NIKHIL on CKD3: b/l SCr 1.6 8/2020  Scr 2>2.1 today   has chronic LE edema, better lately per pt  holding diuretics/lasix for now  low Na diet and fluid restriction  monitor BMP daily   dose all meds for eGFR<15ml/min.   avoid ACEi/ARB for now/NSAIDs/Nephrotoxics.    Hypokalemia s/p kcl 40meq pox1-agree  HTN, bp controlled  Hypercalcemia- phos wnl,  high, cdbG74rh level 76 noted. avoid ca/vitD suplements   r/o primary hyperparathyroidism. consider parathyroid scan. can be done as outpt.    c/w sensipar   wet Gangrene L foot- Mx per Podiatry. f/u w/vas sx. no plan for intervention at this time    labs, chart reviewed

## 2020-11-15 NOTE — CHART NOTE - NSCHARTNOTEFT_GEN_A_CORE
Spoke with attending Dr. Momin. Recommends no coumadin at this time, as patient might be getting a procedure next week. Recommends to start heparin drip if INR <2. Today's INR 2.62. Will continue monitoring patient at this time.

## 2020-11-15 NOTE — DISCHARGE NOTE PROVIDER - CARE PROVIDER_API CALL
Yazmin Vaz)  Cardiovascular Disease; Internal Medicine  [address], [address]  [place] [address]  Phone: [phone]  Fax: [phone]  Follow Up Time: 1 week

## 2020-11-15 NOTE — DISCHARGE NOTE PROVIDER - NSDCCPCAREPLAN_GEN_ALL_CORE_FT
PRINCIPAL DISCHARGE DIAGNOSIS  Diagnosis: Gangrene  Assessment and Plan of Treatment: Your left 2nd toe was found to have wet gangrene. You were treated with IV antibiotics in the hospital. Please continue to take your oral antibiotics as prescribed. Please see your podiatrist for further evalation and monitoring.       PRINCIPAL DISCHARGE DIAGNOSIS  Diagnosis: Gangrene  Assessment and Plan of Treatment: Your left 2nd toe was found to have wet gangrene. You were treated with IV antibiotics in the hospital. Please continue to take your oral antibiotics as prescribed. Please see your podiatrist for further evalation and monitoring. Local wound care recommendations are to: Cleanse left foot, first webspace incision w/ saline, then pack w/ 1/4 inch of iodoform packing, apply betadine to the big toe, then dress w/ 4x4 any and quincy, daily.      SECONDARY DISCHARGE DIAGNOSES  Diagnosis: Hypothyroidism  Assessment and Plan of Treatment: Continue taking your synthroid as prescribed.    Diagnosis: Hypertension  Assessment and Plan of Treatment: Please continue to take Hydalazine and Amlopidine as prescribed and see your cardiologist for further management    Diagnosis: Chronic kidney disease  Assessment and Plan of Treatment: Your Creatinine level is 2.05 today. Please hold off on taking lasix/metolazone. Please avoid NSAID medications. Please see your PCP for further management.    Diagnosis: Atrial fibrillation  Assessment and Plan of Treatment: Please continue to take Coumadin as prescribed. Your INR at the date of discharge was ______. Please continue to have your INR monitored as you had done prior to admission.     PRINCIPAL DISCHARGE DIAGNOSIS  Diagnosis: Gangrene  Assessment and Plan of Treatment: Your left 2nd toe was found to have wet gangrene. You were treated with IV antibiotics in the hospital. Please continue to take your oral antibiotics as prescribed. Please see your podiatrist for further evalation and monitoring. Local wound care recommendations are to: Cleanse left foot, first webspace incision w/ saline, then pack w/ 1/4 inch of iodoform packing, apply betadine to the big toe, then dress w/ 4x4 any and quincy, daily.      SECONDARY DISCHARGE DIAGNOSES  Diagnosis: Hypothyroidism  Assessment and Plan of Treatment: Continue taking your synthroid as prescribed.    Diagnosis: Hypertension  Assessment and Plan of Treatment: Please continue to take Hydalazine and Amlopidine as prescribed and see your cardiologist for further management    Diagnosis: Chronic kidney disease  Assessment and Plan of Treatment: Your Creatinine level is 4.76 today. Please hold off on taking lasix/metolazone. Please avoid NSAID medications. Please see your PCP for further management.    Diagnosis: Atrial fibrillation  Assessment and Plan of Treatment: Stop taking Coumadin. Your INR was elevated. You will continue taking Vitamin K as outpatient. Follow up with your cardiologist to monitor your INR.

## 2020-11-15 NOTE — DISCHARGE NOTE PROVIDER - NSDCFUADDINST_GEN_ALL_CORE_FT
Podiatry Discharge Instructions:  Follow up: Please follow up with Dr. Destiny Winters within 1 week of discharge from the hospital, please call [phone] for appointment and discuss that you recently were seen in the hospital.  Wound Care:Please cleanse LF first webspace wound w/ saline, pack first webspace w/ 1/4inch iodoform guaze packing, then apply betadine to LF hallux. Then dress foot w/ 4x4 any and quincy, daily.   Weight bearing: Please weight bear as tolerated in a surgical shoe.  Antibiotics: Please continue as instructed. Podiatry Discharge Instructions:  Follow up: Please follow up with Dr. Destiny Winters within 1 week of discharge from the hospital, please call [phone] for appointment and discuss that you recently were seen in the hospital.  Wound Care: Please apply betadine to Left foot  big toe and 2nd toe. Then dress foot w/ 4x4 guaze and quincy, daily.   Weight bearing: Please weight bear as tolerated in a surgical shoe.  Antibiotics: Please continue as instructed.

## 2020-11-15 NOTE — PROGRESS NOTE ADULT - ASSESSMENT
89 y/o female pmh htn, dm, ckd, afib on coumadin c/o infected L toes. Pt admits to having dry gangrene to L first toe and was admitted in the Hospital in August. Pt states that over th elast several days, her L 2nd toe has become swollen, painful, red and has drainage. Pt went to her podiatrist, Dr. Eaton, today who sent pt to the ER for wet gangrene. Pt denies chest pain, sob, abd pain, n/v/d, weakness, dizziness ,syncope, fever or chills.     Problem/Plan - 1:  ·  Problem: Gangrene.  Plan: S/P I&D by Podiatry . IV abxs and ID consulted. Vascular helping.    No surgical intervention per Podiatry and vascular.      Problem/Plan - 2:  ·  Problem: CKD (chronic kidney disease) with NIKHIL .  Plan:  Renal following.,      Problem/Plan - 3:  ·  Problem: PVD (peripheral vascular disease).  Plan: Vascular helping.      Problem/Plan - 4:  ·  Problem: HTN (hypertension).  Plan: BP meds with hold parameters.      Problem/Plan - 5:  ·  Problem: CHF (congestive heart failure).  Plan: Holding Lasix as Euvolemic.      Problem/Plan - 6:  Problem: DM (diabetes mellitus). Plan: SSI for now.     Problem/Plan - 7:  ·  Problem: Afib.  Plan: On AC .      Problem/Plan - 8:  ·  Problem: Hypothyroid.  Plan: Home dose synthroid.      Problem/Plan - 9:  ·  Problem: Glaucoma.  Plan: Eye drops.      Problem/Plan - 10:  Problem: Pulmonary embolism. Plan; On AC.

## 2020-11-15 NOTE — DISCHARGE NOTE PROVIDER - NSFOLLOWUPCLINICS_GEN_ALL_ED_FT
[facility]  Geriatrics  [address]  [place] [address]  Phone: [phone]  Fax:   Follow Up Time: 1 week

## 2020-11-15 NOTE — CONSULT NOTE ADULT - SUBJECTIVE AND OBJECTIVE BOX
EP ATTENDING    History: She is an extremely pleasant 89 year old female with a past medical history of atrial fibrillation, managed with Coumadin therapy. More recently, she was admitted to the hospital with lower GI bleeding from diverticulosis. She underwent a colonoscopy that did not require any intervention and her anticoagulation was restarted. She is now referred to me for left atrial appendage occlusion. She denies palpitations, syncope, or angina. She is a/w with a RLE wound, and is being followed by vascular surgery    PMHX: As above, hypertension, paroxysmal atrial fibrillation, diabetes, CKD, and hypothyroidism.  PSHX: Thyroidectomy.  Social HX: No tobacco, alcohol, or drugs.  Allergies: She has no known drug allergies.  Home Medications: Hydralazine 50 mg three times daily, Lasix 40 mg twice daily, Norvasc 5 mg twice daily, Synthroid 100 mcg daily, allopurinol 100 mg daily, Aldactone 25 mg three times weekly, Zocor 20 mg daily, Coumadin 4 mg daily, and Rayaldee 30 mcg daily.  Review of Systems: No nausea, vomiting, diarrhea, fever, abdominal pain, rashes, weakness, syncope, or angina.    INPATIENT MEDICATIONS  (STANDING):  allopurinol 100 milliGRAM(s) Oral daily  amLODIPine   Tablet 10 milliGRAM(s) Oral daily  cefepime   IVPB 1000 milliGRAM(s) IV Intermittent every 12 hours  cinacalcet 60 milliGRAM(s) Oral daily  dextrose 40% Gel 15 Gram(s) Oral once  dextrose 5%. 1000 milliLiter(s) (50 mL/Hr) IV Continuous <Continuous>  dextrose 5%. 1000 milliLiter(s) (100 mL/Hr) IV Continuous <Continuous>  dextrose 50% Injectable 25 Gram(s) IV Push once  dextrose 50% Injectable 12.5 Gram(s) IV Push once  dextrose 50% Injectable 25 Gram(s) IV Push once  gabapentin 100 milliGRAM(s) Oral two times a day  glucagon  Injectable 1 milliGRAM(s) IntraMuscular once  hydrALAZINE 75 milliGRAM(s) Oral three times a day  insulin lispro (ADMELOG) corrective regimen sliding scale   SubCutaneous three times a day before meals  insulin lispro (ADMELOG) corrective regimen sliding scale   SubCutaneous at bedtime  latanoprost 0.005% Ophthalmic Solution 1 Drop(s) Both EYES at bedtime  levothyroxine 75 MICROGram(s) Oral daily  senna 2 Tablet(s) Oral at bedtime  simvastatin 20 milliGRAM(s) Oral at bedtime      PHYSICAL EXAM:  T(C): 36.8 (11-15-20 @ 09:57), Max: 36.9 (11-14-20 @ 21:43)  HR: 82 (11-15-20 @ 09:57) (65 - 85)  BP: 147/71 (11-15-20 @ 09:57) (107/60 - 188/78)  RR: 18 (11-15-20 @ 09:57) (16 - 20)  SpO2: 100% (11-15-20 @ 09:57) (96% - 100%)  Wt(kg): --    Appearance: Normal	  HEENT:   Normal oral mucosa, PERRL, EOMI	  Lymphatic: No lymphadenopathy , no edema  Cardiovascular: IRR, Normal S1 S2, No JVD, No murmurs , Peripheral pulses palpable 2+ bilaterally  Respiratory: Lungs clear to auscultation, normal effort 	  Gastrointestinal:  Soft, Non-tender, + BS	  Skin: No rashes, No ecchymoses, No cyanosis, warm to touch  Musculoskeletal: Normal range of motion, normal strength  Psychiatry:  Mood & affect appropriate      TELEMETRY: 	    ECG:  	    Echo:  NST:  Cath:  	  	  LABS:	 	                          9.3    8.17  )-----------( 209      ( 15 Nov 2020 06:15 )             31.0     11-15    139  |  93<L>  |  54<H>  ----------------------------<  124<H>  3.4<L>   |  33<H>  |  2.18<H>    Ca    11.1<H>      15 Nov 2020 06:15  Phos  2.7     11-15    TPro  7.9  /  Alb  3.9  /  TBili  0.6  /  DBili  x   /  AST  15  /  ALT  13  /  AlkPhos  89  11-14    proBNP:   Lipid Profile:   HgA1c:   TSH:     A/P) She is an extremely pleasant 89 year old female with a past medical history of atrial fibrillation, managed with Coumadin therapy. More recently, she was admitted to the hospital with lower GI bleeding from diverticulosis. She underwent a colonoscopy that did not require any intervention and her anticoagulation was restarted. She is now referred to me for left atrial appendage occlusion. She denies palpitations, syncope, or angina. She is a/w with a RLE wound, and is being followed by vascular surgery    -please restart coumadin  -as long as she can tolerate coumadin I would not recommend [facility] given her advanced age  -get repeat echo  -if coumadin can not be started would at least bridge with heparin drip given her elevated chads-vasc  -no inpatient EP procedures expected  -f/u with [facility] after discharge      Susan Cho M.D., [facility]  Cardiac Electrophysiology  [facility]  [address] [place] [address], [place] [address]  [email or web address]    office [phone]  pager [phone]

## 2020-11-15 NOTE — DISCHARGE NOTE PROVIDER - NSDCFUADDAPPT_GEN_ALL_CORE_FT
If you are in need of a general medicine physician and post-discharge medical follow-up for further care/recommendations you may contact the [facility] for an appointment [phone]

## 2020-11-15 NOTE — DISCHARGE NOTE PROVIDER - NSDCMRMEDTOKEN_GEN_ALL_CORE_FT
acetaminophen 325 mg oral tablet: 2 tab(s) orally every 6 hours, As needed, Temp greater or equal to 38C (100.4F), Mild Pain (1 - 3), Moderate Pain (4 - 6), Severe Pain (7 - 10)  allopurinol 100 mg oral tablet: 1 tab(s) orally once a day  amLODIPine 10 mg oral tablet: 1 tab(s) orally once a day  hold for SBP&lt;100  cinacalcet 60 mg oral tablet: 1 tab(s) orally once a day  monitor calcium levels with your doctor to determine continuation   furosemide 40 mg oral tablet: 1 tab(s) orally once a day  Hold for SBP&lt;110   gabapentin 100 mg oral capsule: 1 cap(s) orally 3 times a day  hold for sedation/lethargy  hydrALAZINE 25 mg oral tablet: 3 tab(s) orally 3 times a day  Hold for SBP&lt;110  latanoprost 0.005% ophthalmic solution: 1 drop(s) to each affected eye once a day (at bedtime)  Levemir 100 units/mL subcutaneous solution: 10 unit(s) subcutaneous once a day (at bedtime)  levothyroxine 75 mcg (0.075 mg) oral tablet: 1 tab(s) orally once a day  oxyCODONE 5 mg oral tablet: 1 tab(s) orally every 8 hours, As Needed -Severe Pain (7 - 10) MDD:3  HOLD for sedation or respiratory rate less than 12, do not drive   polyethylene glycol 3350 oral powder for reconstitution: 17 gram(s) orally once a day  Rayaldee 30 mcg oral capsule, extended release: 1 cap(s) orally once a day (at bedtime)  senna oral tablet: 2 tab(s) orally once a day (at bedtime)  simvastatin 20 mg oral tablet: 1 tab(s) orally once a day (at bedtime)  warfarin 5 mg oral tablet: 1 tab(s) orally once a day (at bedtime)    close monitoring of your INR within 3-5 days  to adjust dose accordingly   acetaminophen 325 mg oral tablet: 2 tab(s) orally every 6 hours, As needed, Temp greater or equal to 38C (100.4F), Mild Pain (1 - 3), Moderate Pain (4 - 6), Severe Pain (7 - 10)  allopurinol 100 mg oral tablet: 1 tab(s) orally once a day  aluminum hydroxide-magnesium hydroxide 200 mg-200 mg/5 mL oral suspension: 30 milliliter(s) orally every 6 hours, As needed, Dyspepsia  amLODIPine 10 mg oral tablet: 1 tab(s) orally once a day  hold for SBP&lt;100  cefuroxime 250 mg oral tablet: 1 tab(s) orally once a day   cinacalcet 60 mg oral tablet: 1 tab(s) orally once a day  monitor calcium levels with your doctor to determine continuation   doxycycline hyclate 100 mg oral tablet: 1 tab(s) orally 2 times a day   hydrALAZINE 25 mg oral tablet: 3 tab(s) orally 3 times a day  Hold for SBP&lt;110  latanoprost 0.005% ophthalmic solution: 1 drop(s) to each affected eye once a day (at bedtime)  Levemir 100 units/mL subcutaneous solution: 10 unit(s) subcutaneous once a day (at bedtime)  levothyroxine 75 mcg (0.075 mg) oral tablet: 1 tab(s) orally once a day  melatonin 3 mg oral tablet: 1 tab(s) orally once a day (at bedtime), As needed, Insomnia  Neurontin 100 mg oral capsule: 1 cap(s) orally 2 times a day  oxyCODONE 5 mg oral tablet: 1 tab(s) orally every 8 hours, As Needed -Severe Pain (7 - 10) MDD:3  HOLD for sedation or respiratory rate less than 12, do not drive   polyethylene glycol 3350 oral powder for reconstitution: 17 gram(s) orally once a day  senna oral tablet: 2 tab(s) orally once a day (at bedtime)  simvastatin 20 mg oral tablet: 1 tab(s) orally once a day (at bedtime)   acetaminophen 325 mg oral tablet: 2 tab(s) orally every 6 hours, As needed, Temp greater or equal to 38C (100.4F), Mild Pain (1 - 3), Moderate Pain (4 - 6), Severe Pain (7 - 10)  allopurinol 100 mg oral tablet: 1 tab(s) orally once a day  aluminum hydroxide-magnesium hydroxide 200 mg-200 mg/5 mL oral suspension: 30 milliliter(s) orally every 6 hours, As needed, Dyspepsia  amLODIPine 10 mg oral tablet: 1 tab(s) orally once a day  hold for SBP&lt;100  cefuroxime 250 mg oral tablet: 1 tab(s) orally once a day   cinacalcet 60 mg oral tablet: 1 tab(s) orally once a day  monitor calcium levels with your doctor to determine continuation   doxycycline hyclate 100 mg oral tablet: 1 tab(s) orally 2 times a day   hydrALAZINE 25 mg oral tablet: 3 tab(s) orally 3 times a day  Hold for SBP&lt;110  latanoprost 0.005% ophthalmic solution: 1 drop(s) to each affected eye once a day (at bedtime)  Levemir 100 units/mL subcutaneous solution: 10 unit(s) subcutaneous once a day (at bedtime)  levothyroxine 75 mcg (0.075 mg) oral tablet: 1 tab(s) orally once a day  melatonin 3 mg oral tablet: 1 tab(s) orally once a day (at bedtime), As needed, Insomnia  Neurontin 100 mg oral capsule: 1 cap(s) orally 2 times a day  oxyCODONE 5 mg oral tablet: 1 tab(s) orally every 8 hours, As Needed -Severe Pain (7 - 10) MDD:3  HOLD for sedation or respiratory rate less than 12, do not drive   phytonadione 5 mg oral tablet: 1 tab(s) orally once a day   polyethylene glycol 3350 oral powder for reconstitution: 17 gram(s) orally once a day  senna oral tablet: 2 tab(s) orally once a day (at bedtime)  simvastatin 20 mg oral tablet: 1 tab(s) orally once a day (at bedtime)   acetaminophen 325 mg oral tablet: 2 tab(s) orally every 6 hours, As needed, Temp greater or equal to 38C (100.4F), Mild Pain (1 - 3), Moderate Pain (4 - 6), Severe Pain (7 - 10)  allopurinol 100 mg oral tablet: 1 tab(s) orally once a day  aluminum hydroxide-magnesium hydroxide 200 mg-200 mg/5 mL oral suspension: 30 milliliter(s) orally every 6 hours, As needed, Dyspepsia  amLODIPine 10 mg oral tablet: 1 tab(s) orally once a day  hold for SBP&lt;100  cefuroxime 250 mg oral tablet: 1 tab(s) orally once a day   cinacalcet 60 mg oral tablet: 1 tab(s) orally once a day  monitor calcium levels with your doctor to determine continuation   doxycycline hyclate 100 mg oral tablet: 1 tab(s) orally 2 times a day   hydrALAZINE 25 mg oral tablet: 3 tab(s) orally 3 times a day  Hold for SBP&lt;110  latanoprost 0.005% ophthalmic solution: 1 drop(s) to each affected eye once a day (at bedtime)  Levemir 100 units/mL subcutaneous solution: 10 unit(s) subcutaneous once a day (at bedtime)  levothyroxine 75 mcg (0.075 mg) oral tablet: 1 tab(s) orally once a day  melatonin 3 mg oral tablet: 1 tab(s) orally once a day (at bedtime), As needed, Insomnia  Neurontin 100 mg oral capsule: 1 cap(s) orally 2 times a day  nystatin 100,000 units/mL oral suspension: 2 milliliter(s) orally 4 times a day   oxyCODONE 5 mg oral tablet: 1 tab(s) orally every 8 hours, As Needed -Severe Pain (7 - 10) MDD:3  HOLD for sedation or respiratory rate less than 12, do not drive   phytonadione 5 mg oral tablet: 1 tab(s) orally once a day   polyethylene glycol 3350 oral powder for reconstitution: 17 gram(s) orally once a day  senna oral tablet: 2 tab(s) orally once a day (at bedtime)  simvastatin 20 mg oral tablet: 1 tab(s) orally once a day (at bedtime)

## 2020-11-15 NOTE — PROGRESS NOTE ADULT - SUBJECTIVE AND OBJECTIVE BOX
INTERVAL HPI/OVERNIGHT EVENTS: I feel fine.   Vital Signs Last 24 Hrs  T(C): 36.8 (15 Nov 2020 17:48), Max: 36.9 (14 Nov 2020 21:43)  T(F): 98.2 (15 Nov 2020 17:48), Max: 98.5 (14 Nov 2020 21:43)  HR: 63 (15 Nov 2020 17:48) (63 - 84)  BP: 139/75 (15 Nov 2020 17:48) (107/60 - 188/78)  BP(mean): --  RR: 18 (15 Nov 2020 17:48) (18 - 20)  SpO2: 100% (15 Nov 2020 17:48) (96% - 100%)  I&O's Summary    MEDICATIONS  (STANDING):  allopurinol 100 milliGRAM(s) Oral daily  amLODIPine   Tablet 10 milliGRAM(s) Oral daily  cefepime   IVPB 1000 milliGRAM(s) IV Intermittent every 12 hours  cinacalcet 60 milliGRAM(s) Oral daily  dextrose 40% Gel 15 Gram(s) Oral once  dextrose 5%. 1000 milliLiter(s) (50 mL/Hr) IV Continuous <Continuous>  dextrose 5%. 1000 milliLiter(s) (100 mL/Hr) IV Continuous <Continuous>  dextrose 50% Injectable 25 Gram(s) IV Push once  dextrose 50% Injectable 12.5 Gram(s) IV Push once  dextrose 50% Injectable 25 Gram(s) IV Push once  gabapentin 100 milliGRAM(s) Oral two times a day  glucagon  Injectable 1 milliGRAM(s) IntraMuscular once  hydrALAZINE 75 milliGRAM(s) Oral three times a day  insulin lispro (ADMELOG) corrective regimen sliding scale   SubCutaneous three times a day before meals  insulin lispro (ADMELOG) corrective regimen sliding scale   SubCutaneous at bedtime  latanoprost 0.005% Ophthalmic Solution 1 Drop(s) Both EYES at bedtime  levothyroxine 75 MICROGram(s) Oral daily  senna 2 Tablet(s) Oral at bedtime  simvastatin 20 milliGRAM(s) Oral at bedtime  warfarin 5 milliGRAM(s) Oral once    MEDICATIONS  (PRN):  acetaminophen   Tablet .. 650 milliGRAM(s) Oral every 6 hours PRN Temp greater or equal to 38.5C (101.3F), Moderate Pain (4 - 6)  oxyCODONE    IR 5 milliGRAM(s) Oral every 8 hours PRN Severe Pain (7 - 10)    LABS:                        9.3    8.17  )-----------( 209      ( 15 Nov 2020 06:15 )             31.0     11-15    139  |  93<L>  |  54<H>  ----------------------------<  124<H>  3.4<L>   |  33<H>  |  2.18<H>    Ca    11.1<H>      15 Nov 2020 06:15  Phos  2.7     11-15    TPro  7.9  /  Alb  3.9  /  TBili  0.6  /  DBili  x   /  AST  15  /  ALT  13  /  AlkPhos  89  11-14    PT/INR - ( 15 Nov 2020 10:48 )   PT: 28.5 SEC;   INR: 2.62          PTT - ( 14 Nov 2020 12:45 )  PTT:37.5 SEC    CAPILLARY BLOOD GLUCOSE      POCT Blood Glucose.: 164 mg/dL (15 Nov 2020 17:04)  POCT Blood Glucose.: 126 mg/dL (15 Nov 2020 11:32)  POCT Blood Glucose.: 176 mg/dL (14 Nov 2020 21:45)          REVIEW OF SYSTEMS:  CONSTITUTIONAL: No fever, weight loss, or fatigue  EYES: No eye pain, visual disturbances, or discharge  ENMT:  No difficulty hearing, tinnitus, vertigo; No sinus or throat pain  NECK: No pain or stiffness  RESPIRATORY: No cough, wheezing, chills or hemoptysis; No shortness of breath  CARDIOVASCULAR: No chest pain, palpitations, dizziness, or leg swelling  GASTROINTESTINAL: No abdominal or epigastric pain. No nausea, vomiting, or hematemesis; No diarrhea or constipation. No melena or hematochezia.  GENITOURINARY: No dysuria, frequency, hematuria, or incontinence  NEUROLOGICAL: No headaches, memory loss, loss of strength, numbness, or tremors      RADIOLOGY & ADDITIONAL TESTS:    Consultant(s) Notes Reviewed:  [x ] YES  [ ] NO    PHYSICAL EXAM:  GENERAL: NAD, well-groomed, well-developed, not in any distress ,  HEAD:  Atraumatic, Normocephalic  EYES: EOMI, PERRLA, conjunctiva and sclera clear  ENMT: No tonsillar erythema, exudates, or enlargement; Moist mucous membranes, Good dentition, No lesions  NECK: Supple, No JVD, Normal thyroid  NERVOUS SYSTEM:  Alert & Oriented X3, No focal deficit   CHEST/LUNG: Good air entry bilateral with no  rales, rhonchi, wheezing, or rubs  HEART: Regular rate and rhythm; No murmurs, rubs, or gallops  ABDOMEN: Soft, Nontender, Nondistended; Bowel sounds present  EXTREMITIES:  left foot dressed  .No clubbing, cyanosis, or edema    Care Discussed with Consultants/Other Providers [ x] YES  [ ] NO

## 2020-11-15 NOTE — CHART NOTE - NSCHARTNOTEFT_GEN_A_CORE
Spoke with vascular surgery Dr. Islas, no need for LIZBETH/PVR at this time. Spoke with podiatry Dr. Monreal, no plan for surgical intervention at this time. Discussed with attending Dr. Momin, recommends to dose coumadin 5mg today.

## 2020-11-15 NOTE — PROGRESS NOTE ADULT - SUBJECTIVE AND OBJECTIVE BOX
Patient is a 89y old  Female who presents with a chief complaint of left foot pain (14 Nov 2020 21:23)       INTERVAL HPI/OVERNIGHT EVENTS:  Patient seen and evaluated at bedside.  Pt is resting comfortable in NAD. Denies N/V/F/C.  Pain rated at X/10    Allergies    No Known Allergies    Intolerances        Vital Signs Last 24 Hrs  T(C): 36.8 (15 Nov 2020 06:00), Max: 37.1 (14 Nov 2020 11:34)  T(F): 98.2 (15 Nov 2020 06:00), Max: 98.8 (14 Nov 2020 11:34)  HR: 78 (15 Nov 2020 06:00) (65 - 85)  BP: 188/78 (15 Nov 2020 06:00) (107/60 - 188/78)  BP(mean): --  RR: 18 (15 Nov 2020 06:00) (16 - 20)  SpO2: 100% (15 Nov 2020 06:00) (96% - 100%)    LABS:                        9.3    8.17  )-----------( 209      ( 15 Nov 2020 06:15 )             31.0     11-14    140  |  95<L>  |  56<H>  ----------------------------<  136<H>  3.9   |  36<H>  |  2.08<H>    Ca    11.0<H>      14 Nov 2020 12:45    TPro  7.9  /  Alb  3.9  /  TBili  0.6  /  DBili  x   /  AST  15  /  ALT  13  /  AlkPhos  89  11-14    PT/INR - ( 14 Nov 2020 12:45 )   PT: 27.9 SEC;   INR: 2.54          PTT - ( 14 Nov 2020 12:45 )  PTT:37.5 SEC    CAPILLARY BLOOD GLUCOSE      POCT Blood Glucose.: 176 mg/dL (14 Nov 2020 21:45)  POCT Blood Glucose.: 131 mg/dL (14 Nov 2020 11:33)      Lower Extremity Physical Exam:      Vascular: DP/PT 0/4 B/L, CFT absent to left hallux, CFT <3 seconds x 9, Temperature gradient warm to cool B/L except to left hallux warm to cold  Neuro: Epicritic sensation intact to the level of forefoot B/L  Musculoskeletal/Ortho: no deformity notd  Skin: wet gangrene to the left hallux and 2nd digit, malodor present, bogginess to the left 2nd digit and sulcus, 5 cc of sangiopurulence, probing to bone, no erythema  Wound #1:   Location: left hallux  Size: 4x1x3 cm  Depth: to bone  Wound bed: fibronecrotic  Drainage: sangiopurulence  Odor: malodor  Periwound: necrotic  Etiology: ischemic    RADIOLOGY & ADDITIONAL TESTS:

## 2020-11-15 NOTE — CHART NOTE - NSCHARTNOTEFT_GEN_A_CORE
89 year old lady with history of Afib on coumadin, CHF, HTN, HLD, CKD stage 4, IDDM, PVD, CVA, PE, hypothyroidism, p/w L foot wound. Pt known to the vascular surgery service, s/p LLE angiography on 7/28/2020, presenting with wet gangrene of LLE toes, now s/p I&D by podiatry    PLAN:  - No acute vascular surgical intervention at this time  - IV Abx  - order for LIZBETH/PVR placed  - Wound management per podiatry; possible amp with podiatry next week   - Vascular will follow    Vascular Surgery, [id number] 89 year old lady with history of Afib on coumadin, CHF, HTN, HLD, CKD stage 4, IDDM, PVD, CVA, PE, hypothyroidism, p/w L foot wound. Pt known to the vascular surgery service, presenting with wet gangrene of LLE toes, now s/p I&D by podiatry  LLE angiography on 7/28/2020 demonstrates single-vessel runoff without visualization of pedal vasculature.    PLAN:  - No acute vascular surgical intervention at this time  - IV Abx  - order for LIZBETH/PVR cancelled. No need as there are no revascularization options.  - Wound management per podiatry; possible amp with podiatry next week   - Please call vascular surgery if more proximal amputation required (i.e. if podiatry wound care or TMA not sufficient)    Gus Islas, PGY4  [id number]

## 2020-11-15 NOTE — DISCHARGE NOTE PROVIDER - HOSPITAL COURSE
89 y/o female pmh htn, dm, ckd, afib on coumadin c/o infected L toes. Pt admits to having dry gangrene to L first toe and was admitted in the Hospital in August. Pt states that over the last several days, her L 2nd toe has become swollen, painful, red and has drainage. Pt went to her podiatrist, Dr. Eaton, who sent pt to the ER for wet gangrene.    Left 2nd toe Gangrene.    S/P I&D by Podiatry.   Treated with IV Abx: Cefepime, Vanco, Zosyn  No surgical intervention per Podiatry and vascular.     CKD (chronic kidney disease) with NIKHIL .   Nephro       PVD (peripheral vascular disease).    Vascular     HTN (hypertension).    BP meds     CHF (congestive heart failure).    Holding Lasix as Euvolemic.     DM (diabetes mellitus)      Afib.   On Coumadin 5mg --> INR is ______ on date of DC    Hypothyroid.    Home dose synthroid.     Glaucoma.    Eye drops.     Pulmonary embolism.  On Coumadin, INR is therapeutic.    On ___ this case was reviewed with Dr. Momin, the patient is medically stable and optimized for discharge. All medications were reviewed and prescriptions were sent to mutually agreed upon pharmacy. The patient agrees to follow up with providers as recommended.   89 y/o female pmh htn, dm, ckd, afib on coumadin c/o infected L toes. Pt admits to having dry gangrene to L first toe and was admitted in the Hospital in August. Pt states that over the last several days, her L 2nd toe has become swollen, painful, red and has drainage. Pt went to her podiatrist, Dr. Eaton, who sent pt to the ER for wet gangrene.    Left 2nd toe Gangrene.    -Wet gangrene to the left hallux and 2nd digit, malodor present, bogginess to the left 2nd digit and sulcus  - No acute surgical intervention at this time, as any amputation unlikely to heal  - Podiatry plans for LWC, and IV abx. Recs pt f/u w/ outpatient interventional cardiology   S/P I&D by Podiatry.   Treated with IV Abx: Cefepime, Vanco, Zosyn  -Transitioned to PO abx: _________ upon D/C  No surgical intervention per Podiatry and vascular.   -LWC recs: Please cleanse left foot, first webspace incision w/ saline, then pack w/ 1/4 inch of iodoform packing, apply betadine to the big toe, then dress w/ 4x4 guaze and quincy. Daily.    CKD (chronic kidney disease) with NIKHIL   Baseline SCr: 1.6 (8/2020)  Serum creat: 2.05 on date of DC  holding diuretics- lasix/metolazone  low Na diet and fluid restriction  dose all meds for eGFR<15ml/min.   avoid ACEi/ARB for now/NSAIDs/Nephrotoxics.    PVD (peripheral vascular disease).   Pt known to the vascular surgery service, s/p LLE angiography on 7/28/2020 Pt known to the vascular surgery service, s/p LLE angiography on 7/28/2020.   As per vascular order for LIZBETH/PVR cancelled as there are no revascularization options.    HTN (hypertension).    Hydalazine and Amlopidine     CHF (congestive heart failure).    Holding Lasix as Euvolemic.   TTE confirms severe AS which has been managed conservatively per the patient and the patient's family's wishes    DM (diabetes mellitus)  -Managed with ISS.     Afib.   On Coumadin 5mg --> INR is ______ on date of DC    Pulmonary embolism.  On Coumadin, INR is therapeutic.    Hypothyroid.    Home dose synthroid.     Glaucoma.    Eye drops.       On ___ this case was reviewed with Dr. Momin, the patient is medically stable and optimized for discharge. All medications were reviewed and prescriptions were sent to mutually agreed upon pharmacy. The patient agrees to follow up with providers as recommended.   89 y/o female pmh htn, dm, ckd, afib on coumadin c/o infected L toes. Pt admits to having dry gangrene to L first toe and was admitted in the Hospital in August. Pt states that over the last several days, her L 2nd toe has become swollen, painful, red and has drainage. Pt went to her podiatrist, Dr. Eaton, who sent pt to the ER for wet gangrene.    Left 2nd toe Gangrene.    -Wet gangrene to the left hallux and 2nd digit, malodor present, bogginess to the left 2nd digit and sulcus  - No acute surgical intervention at this time, as any amputation unlikely to heal  - Podiatry plans for LWC, and IV abx. Recs pt f/u w/ outpatient interventional cardiology   S/P I&D by Podiatry.   Treated with IV Abx: Cefepime, Vanco, Zosyn, Doxycycline and Aztreonam   -Transitioned to PO abx: _________ upon D/C  No surgical intervention per Podiatry and vascular.   -LWC recs: Please cleanse left foot, first webspace incision w/ saline, then pack w/ 1/4 inch of iodoform packing, apply betadine to the big toe, then dress w/ 4x4 guaze and quincy. Daily.    CKD (chronic kidney disease) with NIKHIL   Baseline SCr: 1.6 (8/2020)  Serum creat: 2.05 on date of DC  holding diuretics- lasix/metolazone  low Na diet and fluid restriction  dose all meds for eGFR<15ml/min.   avoid ACEi/ARB for now/NSAIDs/Nephrotoxics.    PVD (peripheral vascular disease).   Pt known to the vascular surgery service, s/p LLE angiography on 7/28/2020 Pt known to the vascular surgery service, s/p LLE angiography on 7/28/2020.   As per vascular order for LIZBETH/PVR cancelled as there are no revascularization options.    HTN (hypertension).    Hydalazine and Amlopidine     CHF (congestive heart failure).    Holding Lasix as Euvolemic.   TTE confirms severe AS which has been managed conservatively per the patient and the patient's family's wishes    DM (diabetes mellitus)  -Managed with ISS.     Afib.   On Coumadin 5mg --> INR is ______ on date of DC    Pulmonary embolism.  On Coumadin, INR is therapeutic.    Hypothyroid.    Home dose synthroid.     Glaucoma.    Eye drops.       On ___ this case was reviewed with Dr. Momin, the patient is medically stable and optimized for discharge. All medications were reviewed and prescriptions were sent to mutually agreed upon pharmacy. The patient agrees to follow up with providers as recommended.   89 y/o female pmh htn, dm, ckd, afib on coumadin c/o infected L toes. Pt admits to having dry gangrene to L first toe and was admitted in the Hospital in August. Pt states that over the last several days, her L 2nd toe has become swollen, painful, red and has drainage. Pt went to her podiatrist, Dr. Eaton, who sent pt to the ER for wet gangrene.    Left 2nd toe Gangrene.    -Wet gangrene to the left hallux and 2nd digit, malodor present, bogginess to the left 2nd digit and sulcus  - No acute surgical intervention at this time, as any amputation unlikely to heal  - Podiatry plans for LWC, and IV abx. Recs pt f/u w/ outpatient interventional cardiology   S/P I&D by Podiatry.   Treated with IV Abx: Cefepime, Vanco, Zosyn, Doxycycline and Aztreonam   -Transitioned to PO abx: Ceftin and Doxycycline po  x 2 weeks upon D/C  No surgical intervention per Podiatry and vascular.   -LWC recs: Please cleanse left foot, first webspace incision w/ saline, then pack w/ 1/4 inch of iodoform packing, apply betadine to the big toe, then dress w/ 4x4 guaze and quincy. Daily.    CKD (chronic kidney disease) with NIKHIL   Baseline SCr: 1.6 (8/2020)  Serum creat: 4.76 on 11/24 , most likely rom ATN   holding diuretics- lasix/metolazone  low Na diet and fluid restriction  dose all meds for eGFR<15ml/min.   avoid ACEi/ARB for now/NSAIDs/Nephrotoxics.    PVD (peripheral vascular disease).   Pt known to the vascular surgery service, s/p LLE angiography on 7/28/2020 Pt known to the vascular surgery service, s/p LLE angiography on 7/28/2020.   As per vascular order for LIZBETH/PVR cancelled as there are no revascularization options.    HTN (hypertension).    Hydalazine and Amlopidine     CHF (congestive heart failure).    Holding Lasix as Euvolemic.   TTE confirms severe AS which has been managed conservatively per the patient and the patient's family's wishes    DM (diabetes mellitus)  -Managed with ISS.     Afib.   On Coumadin 5mg , last dose on 11/19 given uptrending INR, INR:9.5 on 11/24 s/p IV vitamin k x 1 dose will be sent out on po on discharge    Pulmonary embolism.  On Coumadin, INR is therapeutic.    Hypothyroid.    Home dose synthroid.     Glaucoma.    Eye drops.       On 11/24 this case was reviewed with Dr. Momin, the patient is medically stable and optimized for discharge. All medications were reviewed and prescriptions were sent to mutually agreed upon pharmacy. The patient agrees to follow up with providers as recommended.

## 2020-11-15 NOTE — PROGRESS NOTE ADULT - SUBJECTIVE AND OBJECTIVE BOX
[facility] - S Radha / Florin S /D Nate/ S Jonathan/ S Rosalinda/ Shahid Segovia / BABAR Escalerau/ O Rudi  service -[phone], office [phone]  ---------------------------------------------------------------------------------------------------------------    Patient seen and examined bedside    Subjective and Objective: No overnight events, denied V/D/sob. No complaints today. feeling better    Allergies: No Known Allergies      Hospital Medications:   MEDICATIONS  (STANDING):  allopurinol 100 milliGRAM(s) Oral daily  amLODIPine   Tablet 10 milliGRAM(s) Oral daily  cefepime   IVPB 1000 milliGRAM(s) IV Intermittent every 12 hours  cinacalcet 60 milliGRAM(s) Oral daily  dextrose 40% Gel 15 Gram(s) Oral once  dextrose 5%. 1000 milliLiter(s) (50 mL/Hr) IV Continuous <Continuous>  dextrose 5%. 1000 milliLiter(s) (100 mL/Hr) IV Continuous <Continuous>  dextrose 50% Injectable 25 Gram(s) IV Push once  dextrose 50% Injectable 12.5 Gram(s) IV Push once  dextrose 50% Injectable 25 Gram(s) IV Push once  gabapentin 100 milliGRAM(s) Oral two times a day  glucagon  Injectable 1 milliGRAM(s) IntraMuscular once  hydrALAZINE 75 milliGRAM(s) Oral three times a day  insulin lispro (ADMELOG) corrective regimen sliding scale   SubCutaneous three times a day before meals  insulin lispro (ADMELOG) corrective regimen sliding scale   SubCutaneous at bedtime  latanoprost 0.005% Ophthalmic Solution 1 Drop(s) Both EYES at bedtime  levothyroxine 75 MICROGram(s) Oral daily  senna 2 Tablet(s) Oral at bedtime  simvastatin 20 milliGRAM(s) Oral at bedtime  warfarin 5 milliGRAM(s) Oral once      VITALS:  T(F): 98.2 (11-15-20 @ 17:48), Max: 98.5 (11-14-20 @ 21:43)  HR: 63 (11-15-20 @ 17:48)  BP: 139/75 (11-15-20 @ 17:48)  RR: 18 (11-15-20 @ 17:48)  SpO2: 100% (11-15-20 @ 17:48)  Wt(kg): --    PHYSICAL EXAM:  Constitutional: NAD  HEENT: anicteric sclera  Neck: No JVD  Respiratory: CTAB, no wheezes, rales or rhonchi  Cardiovascular: S1, S2, RRR  Gastrointestinal: BS+, soft, NT/ND  Extremities: + peripheral edema, left foot dressing+  Neurological: A/O x 3  Psychiatric: Normal mood, normal affect  : No michaud.       LABS:  11-15    139  |  93<L>  |  54<H>  ----------------------------<  124<H>  3.4<L>   |  33<H>  |  2.18<H>    Ca    11.1<H>      15 Nov 2020 06:15  Phos  2.7     11-15    TPro  7.9  /  Alb  3.9  /  TBili  0.6  /  DBili      /  AST  15  /  ALT  13  /  AlkPhos  89  11-14    Creatinine Trend: 2.18 <--, 2.08 <--                        9.3    8.17  )-----------( 209      ( 15 Nov 2020 06:15 )             31.0     Urine Studies:        RADIOLOGY & ADDITIONAL STUDIES:

## 2020-11-15 NOTE — PROGRESS NOTE ADULT - ASSESSMENT
89 yo f w/ left foot dry gangrene with early wet conversion to left hallux and 2nd digit  -pt seen and evaluated  -afebrile, no leukocytosis, ESR 57, CRP 5.1  -LF Xray pending  -wet gangrene to the left hallux and 2nd digit, malodor present, bogginess to the left 2nd digit and sulcus, no purulence expressed today   -pod plan possible amputation pending vasc recs  -Repeat ABIs pending  -discussed with attending 89 yo f w/ left foot dry gangrene with early wet conversion to left hallux and 2nd digit  -pt seen and evaluated  -afebrile, no leukocytosis, ESR 57, CRP 5.1  -LF Xray pending  -wet gangrene to the left hallux and 2nd digit, malodor present, bogginess to the left 2nd digit and sulcus, no purulence expressed today   - No acute surgical intervention at this time, as any amputation unlikely to heal  - Pod plan for LWC, and IV abx. Rec pt f/u w/ outpatient interventional cardiology   - Repeat ABIs pending  -discussed with attending

## 2020-11-16 LAB
-  AMIKACIN: SIGNIFICANT CHANGE UP
-  AMIKACIN: SIGNIFICANT CHANGE UP
-  AMOXICILLIN/CLAVULANIC ACID: SIGNIFICANT CHANGE UP
-  AMOXICILLIN/CLAVULANIC ACID: SIGNIFICANT CHANGE UP
-  AMPICILLIN/SULBACTAM: SIGNIFICANT CHANGE UP
-  AMPICILLIN: SIGNIFICANT CHANGE UP
-  AZTREONAM: SIGNIFICANT CHANGE UP
-  AZTREONAM: SIGNIFICANT CHANGE UP
-  CEFAZOLIN: SIGNIFICANT CHANGE UP
-  CEFEPIME: SIGNIFICANT CHANGE UP
-  CEFEPIME: SIGNIFICANT CHANGE UP
-  CEFOXITIN: SIGNIFICANT CHANGE UP
-  CEFOXITIN: SIGNIFICANT CHANGE UP
-  CEFTRIAXONE: SIGNIFICANT CHANGE UP
-  CEFTRIAXONE: SIGNIFICANT CHANGE UP
-  CIPROFLOXACIN: SIGNIFICANT CHANGE UP
-  CIPROFLOXACIN: SIGNIFICANT CHANGE UP
-  CLINDAMYCIN: SIGNIFICANT CHANGE UP
-  DAPTOMYCIN: SIGNIFICANT CHANGE UP
-  ERTAPENEM: SIGNIFICANT CHANGE UP
-  ERTAPENEM: SIGNIFICANT CHANGE UP
-  ERYTHROMYCIN: SIGNIFICANT CHANGE UP
-  GENTAMICIN: SIGNIFICANT CHANGE UP
-  IMIPENEM: SIGNIFICANT CHANGE UP
-  LEVOFLOXACIN: SIGNIFICANT CHANGE UP
-  LEVOFLOXACIN: SIGNIFICANT CHANGE UP
-  LINEZOLID: SIGNIFICANT CHANGE UP
-  MEROPENEM: SIGNIFICANT CHANGE UP
-  MEROPENEM: SIGNIFICANT CHANGE UP
-  OXACILLIN: SIGNIFICANT CHANGE UP
-  PENICILLIN: SIGNIFICANT CHANGE UP
-  PIPERACILLIN/TAZOBACTAM: SIGNIFICANT CHANGE UP
-  PIPERACILLIN/TAZOBACTAM: SIGNIFICANT CHANGE UP
-  RIFAMPIN: SIGNIFICANT CHANGE UP
-  TETRACYCLINE: SIGNIFICANT CHANGE UP
-  TETRACYCLINE: SIGNIFICANT CHANGE UP
-  TOBRAMYCIN: SIGNIFICANT CHANGE UP
-  TOBRAMYCIN: SIGNIFICANT CHANGE UP
-  TRIMETHOPRIM/SULFAMETHOXAZOLE: SIGNIFICANT CHANGE UP
-  VANCOMYCIN: SIGNIFICANT CHANGE UP
-  VANCOMYCIN: SIGNIFICANT CHANGE UP
ALBUMIN SERPL ELPH-MCNC: 3.7 G/DL — SIGNIFICANT CHANGE UP (ref 3.3–5)
ALP SERPL-CCNC: 80 U/L — SIGNIFICANT CHANGE UP (ref 40–120)
ALT FLD-CCNC: 11 U/L — SIGNIFICANT CHANGE UP (ref 4–33)
ANION GAP SERPL CALC-SCNC: 13 MMO/L — SIGNIFICANT CHANGE UP (ref 7–14)
AST SERPL-CCNC: 15 U/L — SIGNIFICANT CHANGE UP (ref 4–32)
BASOPHILS # BLD AUTO: 0.02 K/UL — SIGNIFICANT CHANGE UP (ref 0–0.2)
BASOPHILS NFR BLD AUTO: 0.2 % — SIGNIFICANT CHANGE UP (ref 0–2)
BILIRUB SERPL-MCNC: 0.9 MG/DL — SIGNIFICANT CHANGE UP (ref 0.2–1.2)
BUN SERPL-MCNC: 52 MG/DL — HIGH (ref 7–23)
CALCIUM SERPL-MCNC: 10.3 MG/DL — SIGNIFICANT CHANGE UP (ref 8.4–10.5)
CHLORIDE SERPL-SCNC: 97 MMOL/L — LOW (ref 98–107)
CO2 SERPL-SCNC: 30 MMOL/L — SIGNIFICANT CHANGE UP (ref 22–31)
CREAT SERPL-MCNC: 2.04 MG/DL — HIGH (ref 0.5–1.3)
CULTURE RESULTS: SIGNIFICANT CHANGE UP
EOSINOPHIL # BLD AUTO: 0.01 K/UL — SIGNIFICANT CHANGE UP (ref 0–0.5)
EOSINOPHIL NFR BLD AUTO: 0.1 % — SIGNIFICANT CHANGE UP (ref 0–6)
GLUCOSE BLDC GLUCOMTR-MCNC: 127 MG/DL — HIGH (ref 70–99)
GLUCOSE BLDC GLUCOMTR-MCNC: 135 MG/DL — HIGH (ref 70–99)
GLUCOSE BLDC GLUCOMTR-MCNC: 138 MG/DL — HIGH (ref 70–99)
GLUCOSE BLDC GLUCOMTR-MCNC: 146 MG/DL — HIGH (ref 70–99)
GLUCOSE BLDC GLUCOMTR-MCNC: 180 MG/DL — HIGH (ref 70–99)
GLUCOSE BLDC GLUCOMTR-MCNC: 52 MG/DL — CRITICAL LOW (ref 70–99)
GLUCOSE SERPL-MCNC: 123 MG/DL — HIGH (ref 70–99)
HCT VFR BLD CALC: 29.7 % — LOW (ref 34.5–45)
HGB BLD-MCNC: 9.1 G/DL — LOW (ref 11.5–15.5)
IMM GRANULOCYTES NFR BLD AUTO: 0.3 % — SIGNIFICANT CHANGE UP (ref 0–1.5)
INR BLD: 2.11 — HIGH (ref 0.88–1.16)
LYMPHOCYTES # BLD AUTO: 0.86 K/UL — LOW (ref 1–3.3)
LYMPHOCYTES # BLD AUTO: 9.8 % — LOW (ref 13–44)
MAGNESIUM SERPL-MCNC: 1.9 MG/DL — SIGNIFICANT CHANGE UP (ref 1.6–2.6)
MCHC RBC-ENTMCNC: 27 PG — SIGNIFICANT CHANGE UP (ref 27–34)
MCHC RBC-ENTMCNC: 30.6 % — LOW (ref 32–36)
MCV RBC AUTO: 88.1 FL — SIGNIFICANT CHANGE UP (ref 80–100)
METHOD TYPE: SIGNIFICANT CHANGE UP
MONOCYTES # BLD AUTO: 1.16 K/UL — HIGH (ref 0–0.9)
MONOCYTES NFR BLD AUTO: 13.3 % — SIGNIFICANT CHANGE UP (ref 2–14)
NEUTROPHILS # BLD AUTO: 6.66 K/UL — SIGNIFICANT CHANGE UP (ref 1.8–7.4)
NEUTROPHILS NFR BLD AUTO: 76.3 % — SIGNIFICANT CHANGE UP (ref 43–77)
NRBC # FLD: 0 K/UL — SIGNIFICANT CHANGE UP (ref 0–0)
ORGANISM # SPEC MICROSCOPIC CNT: SIGNIFICANT CHANGE UP
PHOSPHATE SERPL-MCNC: 2.6 MG/DL — SIGNIFICANT CHANGE UP (ref 2.5–4.5)
PLATELET # BLD AUTO: 201 K/UL — SIGNIFICANT CHANGE UP (ref 150–400)
PMV BLD: 12 FL — SIGNIFICANT CHANGE UP (ref 7–13)
POTASSIUM SERPL-MCNC: 3.6 MMOL/L — SIGNIFICANT CHANGE UP (ref 3.5–5.3)
POTASSIUM SERPL-SCNC: 3.6 MMOL/L — SIGNIFICANT CHANGE UP (ref 3.5–5.3)
PROT SERPL-MCNC: 7.4 G/DL — SIGNIFICANT CHANGE UP (ref 6–8.3)
PROTHROM AB SERPL-ACNC: 23.4 SEC — HIGH (ref 10.6–13.6)
RBC # BLD: 3.37 M/UL — LOW (ref 3.8–5.2)
RBC # FLD: 17.4 % — HIGH (ref 10.3–14.5)
SODIUM SERPL-SCNC: 140 MMOL/L — SIGNIFICANT CHANGE UP (ref 135–145)
SPECIMEN SOURCE: SIGNIFICANT CHANGE UP
VANCOMYCIN FLD-MCNC: 5.8 UG/ML — SIGNIFICANT CHANGE UP
WBC # BLD: 8.74 K/UL — SIGNIFICANT CHANGE UP (ref 3.8–10.5)
WBC # FLD AUTO: 8.74 K/UL — SIGNIFICANT CHANGE UP (ref 3.8–10.5)

## 2020-11-16 PROCEDURE — 99223 1ST HOSP IP/OBS HIGH 75: CPT

## 2020-11-16 PROCEDURE — 93306 TTE W/DOPPLER COMPLETE: CPT | Mod: 26

## 2020-11-16 RX ORDER — VANCOMYCIN HCL 1 G
1000 VIAL (EA) INTRAVENOUS ONCE
Refills: 0 | Status: COMPLETED | OUTPATIENT
Start: 2020-11-16 | End: 2020-11-16

## 2020-11-16 RX ORDER — INFLUENZA VIRUS VACCINE 15; 15; 15; 15 UG/.5ML; UG/.5ML; UG/.5ML; UG/.5ML
0.7 SUSPENSION INTRAMUSCULAR ONCE
Refills: 0 | Status: DISCONTINUED | OUTPATIENT
Start: 2020-11-16 | End: 2020-11-24

## 2020-11-16 RX ORDER — PIPERACILLIN AND TAZOBACTAM 4; .5 G/20ML; G/20ML
3.38 INJECTION, POWDER, LYOPHILIZED, FOR SOLUTION INTRAVENOUS ONCE
Refills: 0 | Status: COMPLETED | OUTPATIENT
Start: 2020-11-16 | End: 2020-11-16

## 2020-11-16 RX ORDER — PIPERACILLIN AND TAZOBACTAM 4; .5 G/20ML; G/20ML
3.38 INJECTION, POWDER, LYOPHILIZED, FOR SOLUTION INTRAVENOUS EVERY 8 HOURS
Refills: 0 | Status: DISCONTINUED | OUTPATIENT
Start: 2020-11-16 | End: 2020-11-23

## 2020-11-16 RX ORDER — WARFARIN SODIUM 2.5 MG/1
5 TABLET ORAL ONCE
Refills: 0 | Status: COMPLETED | OUTPATIENT
Start: 2020-11-16 | End: 2020-11-16

## 2020-11-16 RX ADMIN — Medication 100 MILLIGRAM(S): at 17:49

## 2020-11-16 RX ADMIN — Medication 75 MILLIGRAM(S): at 14:00

## 2020-11-16 RX ADMIN — AMLODIPINE BESYLATE 10 MILLIGRAM(S): 2.5 TABLET ORAL at 06:25

## 2020-11-16 RX ADMIN — SIMVASTATIN 20 MILLIGRAM(S): 20 TABLET, FILM COATED ORAL at 22:42

## 2020-11-16 RX ADMIN — LATANOPROST 1 DROP(S): 0.05 SOLUTION/ DROPS OPHTHALMIC; TOPICAL at 22:42

## 2020-11-16 RX ADMIN — Medication 75 MILLIGRAM(S): at 22:42

## 2020-11-16 RX ADMIN — Medication 250 MILLIGRAM(S): at 19:42

## 2020-11-16 RX ADMIN — Medication 75 MICROGRAM(S): at 06:26

## 2020-11-16 RX ADMIN — CINACALCET 60 MILLIGRAM(S): 30 TABLET, FILM COATED ORAL at 17:49

## 2020-11-16 RX ADMIN — WARFARIN SODIUM 5 MILLIGRAM(S): 2.5 TABLET ORAL at 17:49

## 2020-11-16 RX ADMIN — GABAPENTIN 100 MILLIGRAM(S): 400 CAPSULE ORAL at 06:25

## 2020-11-16 RX ADMIN — GABAPENTIN 100 MILLIGRAM(S): 400 CAPSULE ORAL at 17:49

## 2020-11-16 RX ADMIN — PIPERACILLIN AND TAZOBACTAM 200 GRAM(S): 4; .5 INJECTION, POWDER, LYOPHILIZED, FOR SOLUTION INTRAVENOUS at 15:51

## 2020-11-16 RX ADMIN — Medication 75 MILLIGRAM(S): at 06:25

## 2020-11-16 RX ADMIN — CEFEPIME 100 MILLIGRAM(S): 1 INJECTION, POWDER, FOR SOLUTION INTRAMUSCULAR; INTRAVENOUS at 06:25

## 2020-11-16 NOTE — CONSULT NOTE ADULT - SUBJECTIVE AND OBJECTIVE BOX
Requesting Physician : Dr. Berman     Reason for Consultation: Aortic stenosis     HISTORY OF PRESENT ILLNESS:  89 year old female with history of HTN, DM, CKD, Afib on ac with coumadin, known moderate to severe AS conservatively managed per [facility] who was admitted with dry gangrene on left first toe.  The patient was admitted and started on IV abx.  Podiatry and vascular surgery evaluated the patient and vascular surgery recommended continuing medical management of her known PAD.  The patient has no chest pain, sob, orthopnea, or LE edema.  She has known moderate to severe AS which was diagnosed on last admission - at that time, the patient and family wanted conservative cv care.  In addition, the patient was found to have diverticular bleed on last admission and was cleared for ac with coumadin.           PAST MEDICAL & SURGICAL HISTORY:  CKD (chronic kidney disease)    CVA (cerebrovascular accident)    Personal history of PE (pulmonary embolism)    Glaucoma    PVD (peripheral vascular disease)    Hypothyroid    HTN (hypertension)    HLD (hyperlipidemia)    CHF (congestive heart failure)    DM (diabetes mellitus)    Afib    Elective surgery  abdominal abcess removals    History of partial thyroidectomy            MEDICATIONS:  MEDICATIONS  (STANDING):  allopurinol 100 milliGRAM(s) Oral daily  amLODIPine   Tablet 10 milliGRAM(s) Oral daily  cefepime   IVPB 1000 milliGRAM(s) IV Intermittent every 12 hours  cinacalcet 60 milliGRAM(s) Oral daily  dextrose 40% Gel 15 Gram(s) Oral once  dextrose 5%. 1000 milliLiter(s) (50 mL/Hr) IV Continuous <Continuous>  dextrose 5%. 1000 milliLiter(s) (100 mL/Hr) IV Continuous <Continuous>  dextrose 50% Injectable 25 Gram(s) IV Push once  dextrose 50% Injectable 12.5 Gram(s) IV Push once  dextrose 50% Injectable 25 Gram(s) IV Push once  gabapentin 100 milliGRAM(s) Oral two times a day  glucagon  Injectable 1 milliGRAM(s) IntraMuscular once  hydrALAZINE 75 milliGRAM(s) Oral three times a day  influenza  Vaccine (HIGH DOSE) 0.7 milliLiter(s) IntraMuscular once  insulin lispro (ADMELOG) corrective regimen sliding scale   SubCutaneous three times a day before meals  insulin lispro (ADMELOG) corrective regimen sliding scale   SubCutaneous at bedtime  latanoprost 0.005% Ophthalmic Solution 1 Drop(s) Both EYES at bedtime  levothyroxine 75 MICROGram(s) Oral daily  senna 2 Tablet(s) Oral at bedtime  simvastatin 20 milliGRAM(s) Oral at bedtime  warfarin 5 milliGRAM(s) Oral once      Allergies    No Known Allergies    Intolerances        FAMILY HISTORY:  No pertinent family history in first degree relatives      Non-contributary for premature coronary disease or sudden cardiac death    SOCIAL HISTORY:    [x ] Non-smoker  [ ] Smoker  [ ] Alcohol      REVIEW OF SYSTEMS:  [ ]chest pain  [  ]shortness of breath  [  ]palpitations  [  ]syncope  [ ]near syncope [ ]upper extremity weakness   [ ] lower extremity weakness  [  ]diplopia  [  ]altered mental status   [  ]fevers  [ ]chills [ ]nausea  [ ]vomitting  [  ]dysphagia    [ ]abdominal pain  [ ]melena  [ ]BRBPR    [  ]epistaxis  [  ]rash    [ ]lower extremity edema        [x ] All others negative	  [ ] Unable to obtain    PHYSICAL EXAM:  T(C): 36.7 (11-16-20 @ 09:05), Max: 37.1 (11-15-20 @ 22:08)  HR: 87 (11-16-20 @ 09:05) (63 - 87)  BP: 133/63 (11-16-20 @ 09:05) (133/63 - 155/81)  RR: 18 (11-16-20 @ 09:05) (18 - 20)  SpO2: 98% (11-16-20 @ 09:05) (97% - 100%)  Wt(kg): --  I&O's Summary    15 Nov 2020 07:01  -  16 Nov 2020 07:00  --------------------------------------------------------  IN: 0 mL / OUT: 600 mL / NET: -600 mL          HEENT:   Normal oral mucosa, PERRL, EOMI	  Lymphatic: No lymphadenopathy , no edema  Cardiovascular: Normal S1 S2, No JVD, 2/6 systolic murmur heard throughout; Peripheral pulses palpable 2+ bilaterally  Respiratory: Lungs clear to auscultation, normal effort 	  Gastrointestinal:  Soft, Non-tender, + BS	  Skin: No rashes, No ecchymoses, No cyanosis, warm to touch  Musculoskeletal: Normal range of motion, normal strength  Psychiatry:  Mood & affect appropriate      TELEMETRY: not on tele 	    ECG:  AF, TWI inferior leads (seen on prior ECG)	  RADIOLOGY:  OTHER:     DIAGNOSTIC TESTING:  [ ] Echocardiogram: < from: Transthoracic Echocardiogram (07.22.20 @ 16:27) >  1. Mitral annular calcification, otherwise normal mitral  valve. Mild-moderate mitral regurgitation.  2. Calcified trileaflet aortic valve with decreased  opening.  Peak transaortic valve gradient [id number] mm Hg,  mean transaortic valve gradient equals 26 mm Hg, estimated  aortic valve area equals 1 sqcm (by continuity equation),  consistent with moderate to severe aortic stenosis.  3. Severely dilated left atrium.  LA volume index = 57  cc/m2.  4. Mild concentric left ventricular hypertrophy.  5. Normal left ventricular systolic function. No segmental  wall motion abnormalities.  6. Severe diastolic dysfunction.  7. Severe right atrial enlargement.  8. Normal right ventricular size and function.  9. Estimated right ventricular systolic pressure equals 53  mm Hg, assuming right atrial pressure equals 10 mm Hg,  consistent with moderate pulmonary hypertension.    < end of copied text >    [ ]  Catheterization:  [ ] Stress Test:    	  	  LABS:	 	    CARDIAC MARKERS:                              9.1    8.74  )-----------( 201      ( 16 Nov 2020 06:03 )             29.7     11-16    140  |  97<L>  |  52<H>  ----------------------------<  123<H>  3.6   |  30  |  2.04<H>    Ca    10.3      16 Nov 2020 06:03  Phos  2.6     11-16  Mg     1.9     11-16    TPro  7.4  /  Alb  3.7  /  TBili  0.9  /  DBili  x   /  AST  15  /  ALT  11  /  AlkPhos  80  11-16    proBNP:   Lipid Profile:   HgA1c:   TSH:     ASSESSMENT/PLAN: 89 year old female with history of HTN, DM, CKD, Afib on ac with coumadin, known moderate to severe AS conservatively managed per [facility] who was admitted with dry gangrene on left first toe.    -pt. with no clinical heart failure or anginal symptoms  -pt. with known moderate to severe AS - follow up TTE  -would continue with conservative management of known valvular heart disease per her [facility] and wishes  -ac for afib per EP if no contraindications  -follow up Cx and ID workup    Guruprasad Darren, MD

## 2020-11-16 NOTE — PROGRESS NOTE ADULT - ASSESSMENT
89 y/o female pmh htn, dm, ckd, afib on coumadin p/w c/o infected L toes. pt being admitted for wet gangrene. Renal following for NIKHIL/CKD, vol Mx.     NIKHIL on CKD3: b/l SCr 1.6 8/2020  Scr 2>2.1 today   has chronic LE edema, much better now  holding diuretics- lasix/metolazone for now  low Na diet and fluid restriction  monitor BMP daily   dose all meds for eGFR<15ml/min.   avoid ACEi/ARB for now/NSAIDs/Nephrotoxics.    Hypokalemia s/p kcl 40meq pox1-K better  HTN, bp controlled well  Hypercalcemia- improving   phos wnl,  high, [id number] level 76 noted. avoid ca/vitD suplements   r/o primary hyperparathyroidism. consider parathyroid scan. can be done as outpt.    c/w sensipar   wet Gangrene L foot- Mx per Podiatry. f/u w/vas sx. no plan for intervention at this time    labs, chart reviewed

## 2020-11-16 NOTE — PROGRESS NOTE ADULT - SUBJECTIVE AND OBJECTIVE BOX
[facility] - S Radha / Florin S /D Nate/ S Jonathan/ S Rosalinda/ Shahid Segovia / BABAR Escalerau/ O Rudi  service -[phone], office [phone]  ---------------------------------------------------------------------------------------------------------------    Patient seen and examined bedside    Subjective and Objective: No overnight events, sob resolved. No complaints today. feeling better    Allergies: No Known Allergies      Hospital Medications:   MEDICATIONS  (STANDING):  allopurinol 100 milliGRAM(s) Oral daily  amLODIPine   Tablet 10 milliGRAM(s) Oral daily  cinacalcet 60 milliGRAM(s) Oral daily  dextrose 40% Gel 15 Gram(s) Oral once  dextrose 5%. 1000 milliLiter(s) (50 mL/Hr) IV Continuous <Continuous>  dextrose 5%. 1000 milliLiter(s) (100 mL/Hr) IV Continuous <Continuous>  dextrose 50% Injectable 25 Gram(s) IV Push once  dextrose 50% Injectable 12.5 Gram(s) IV Push once  dextrose 50% Injectable 25 Gram(s) IV Push once  gabapentin 100 milliGRAM(s) Oral two times a day  glucagon  Injectable 1 milliGRAM(s) IntraMuscular once  hydrALAZINE 75 milliGRAM(s) Oral three times a day  influenza  Vaccine (HIGH DOSE) 0.7 milliLiter(s) IntraMuscular once  insulin lispro (ADMELOG) corrective regimen sliding scale   SubCutaneous three times a day before meals  insulin lispro (ADMELOG) corrective regimen sliding scale   SubCutaneous at bedtime  latanoprost 0.005% Ophthalmic Solution 1 Drop(s) Both EYES at bedtime  levothyroxine 75 MICROGram(s) Oral daily  piperacillin/tazobactam IVPB. 3.375 Gram(s) IV Intermittent once  piperacillin/tazobactam IVPB.. 3.375 Gram(s) IV Intermittent every 8 hours  senna 2 Tablet(s) Oral at bedtime  simvastatin 20 milliGRAM(s) Oral at bedtime  warfarin 5 milliGRAM(s) Oral once      REVIEW OF SYSTEMS:  CONSTITUTIONAL: No weakness, fevers or chills  EYES/ENT: No visual changes;  No vertigo or throat pain   NECK: No pain or stiffness  RESPIRATORY: No cough, wheezing, hemoptysis; No shortness of breath  CARDIOVASCULAR: No chest pain or palpitations.  GASTROINTESTINAL: No abdominal or epigastric pain. No nausea, vomiting, or hematemesis; No diarrhea or constipation. No melena or hematochezia.  GENITOURINARY: No dysuria, frequency, foamy urine, urinary urgency, incontinence or hematuria  NEUROLOGICAL: No numbness or weakness  SKIN: No itching, burning, rashes, or lesions   VASCULAR: No bilateral lower extremity edema.   All other review of systems is negative unless indicated above.    VITALS:  T(F): 98.2 (11-16-20 @ 13:59), Max: 98.8 (11-15-20 @ 22:08)  HR: 78 (11-16-20 @ 13:59)  BP: 135/65 (11-16-20 @ 13:59)  RR: 18 (11-16-20 @ 13:59)  SpO2: 95% (11-16-20 @ 13:59)  Wt(kg): --    11-15 @ 07:01  -  11-16 @ 07:00  --------------------------------------------------------  IN: 0 mL / OUT: 600 mL / NET: -600 mL          PHYSICAL EXAM:  Constitutional: NAD  HEENT: anicteric sclera, oropharynx clear  Neck: No JVD  Respiratory: CTAB, no wheezes, rales or rhonchi  Cardiovascular: S1, S2, RRR  Gastrointestinal: BS+, soft, NT/ND  Extremities: No cyanosis or clubbing. No peripheral edema  Neurological: A/O x 3, no focal deficits  Psychiatric: Normal mood, normal affect  : No CVA tenderness. No michaud.   Skin: No rashes  Vascular Access:    LABS:  11-16    140  |  97<L>  |  52<H>  ----------------------------<  123<H>  3.6   |  30  |  2.04<H>    Ca    10.3      16 Nov 2020 06:03  Phos  2.6     11-16  Mg     1.9     11-16    TPro  7.4  /  Alb  3.7  /  TBili  0.9  /  DBili      /  AST  15  /  ALT  11  /  AlkPhos  80  11-16    Creatinine Trend: 2.04 <--, 2.18 <--, 2.08 <--                        9.1    8.74  )-----------( 201      ( 16 Nov 2020 06:03 )             29.7     Urine Studies:        RADIOLOGY & ADDITIONAL STUDIES:   [facility] - S Radha / Florin S /D Nate/ S Jonathan/ S Rosalinda/ Shahid Segovia / BABAR Escalerau/ O Rudi  service -[phone], office [phone]  ---------------------------------------------------------------------------------------------------------------    Patient seen and examined bedside    Subjective and Objective: No overnight events, denied sob. No complaints today. feeling better  daughter bedside    Allergies: No Known Allergies      Hospital Medications:   MEDICATIONS  (STANDING):  allopurinol 100 milliGRAM(s) Oral daily  amLODIPine   Tablet 10 milliGRAM(s) Oral daily  cinacalcet 60 milliGRAM(s) Oral daily  dextrose 40% Gel 15 Gram(s) Oral once  dextrose 5%. 1000 milliLiter(s) (50 mL/Hr) IV Continuous <Continuous>  dextrose 5%. 1000 milliLiter(s) (100 mL/Hr) IV Continuous <Continuous>  dextrose 50% Injectable 25 Gram(s) IV Push once  dextrose 50% Injectable 12.5 Gram(s) IV Push once  dextrose 50% Injectable 25 Gram(s) IV Push once  gabapentin 100 milliGRAM(s) Oral two times a day  glucagon  Injectable 1 milliGRAM(s) IntraMuscular once  hydrALAZINE 75 milliGRAM(s) Oral three times a day  influenza  Vaccine (HIGH DOSE) 0.7 milliLiter(s) IntraMuscular once  insulin lispro (ADMELOG) corrective regimen sliding scale   SubCutaneous three times a day before meals  insulin lispro (ADMELOG) corrective regimen sliding scale   SubCutaneous at bedtime  latanoprost 0.005% Ophthalmic Solution 1 Drop(s) Both EYES at bedtime  levothyroxine 75 MICROGram(s) Oral daily  piperacillin/tazobactam IVPB. 3.375 Gram(s) IV Intermittent once  piperacillin/tazobactam IVPB.. 3.375 Gram(s) IV Intermittent every 8 hours  senna 2 Tablet(s) Oral at bedtime  simvastatin 20 milliGRAM(s) Oral at bedtime  warfarin 5 milliGRAM(s) Oral once      VITALS:  T(F): 98.2 (11-16-20 @ 13:59), Max: 98.8 (11-15-20 @ 22:08)  HR: 78 (11-16-20 @ 13:59)  BP: 135/65 (11-16-20 @ 13:59)  RR: 18 (11-16-20 @ 13:59)  SpO2: 95% (11-16-20 @ 13:59)  Wt(kg): --    11-15 @ 07:01  -  11-16 @ 07:00  --------------------------------------------------------  IN: 0 mL / OUT: 600 mL / NET: -600 mL      PHYSICAL EXAM:  Constitutional: NAD  HEENT: anicteric sclera  Neck: No JVD  Respiratory: CTAB, no wheezes, rales or rhonchi  Cardiovascular: S1, S2, RRR  Gastrointestinal: BS+, soft, NT/ND  Extremities: trace peripheral edema, left foot dressing+  Neurological: A/O x 3  Psychiatric: Normal mood, normal affect  : No michaud.     LABS:  11-16    140  |  97<L>  |  52<H>  ----------------------------<  123<H>  3.6   |  30  |  2.04<H>    Ca    10.3      16 Nov 2020 06:03  Phos  2.6     11-16  Mg     1.9     11-16    TPro  7.4  /  Alb  3.7  /  TBili  0.9  /  DBili      /  AST  15  /  ALT  11  /  AlkPhos  80  11-16    Creatinine Trend: 2.04 <--, 2.18 <--, 2.08 <--                        9.1    8.74  )-----------( 201      ( 16 Nov 2020 06:03 )             29.7     Urine Studies:        RADIOLOGY & ADDITIONAL STUDIES:

## 2020-11-16 NOTE — PROGRESS NOTE ADULT - ASSESSMENT
89 yo f w/ left foot dry gangrene with early wet conversion to left hallux and 2nd digit  -pt seen and evaluated  -afebrile, no leukocytosis, ESR 57, CRP 5.1  -LF Xray pending  -wet gangrene to the left hallux and 2nd digit, malodor present, bogginess to the left 2nd digit and sulcus, no purulence expressed today   - No acute surgical intervention at this time, as any amputation unlikely to heal  - Pod plan for LWC, and IV abx. Rec pt f/u w/ outpatient interventional cardiology   -seen with attending     89 yo f w/ left foot dry gangrene with early wet conversion to left hallux and 2nd digit  -pt seen and evaluated  -afebrile, no leukocytosis, ESR 57, CRP 5.1  -LF Xray pending  -wet gangrene to the left hallux and 2nd digit, malodor present, bogginess to the left 2nd digit and sulcus, no purulence expressed today   - No acute surgical intervention at this time, as any amputation unlikely to heal  - Pod plan for LWC, and IV abx. Rec pt f/u w/ outpatient interventional cardiology   - Rec 7 day course PO Augmentin   -seen with attending   89 yo f w/ left foot dry gangrene with early wet conversion to left hallux and 2nd digit  -pt seen and evaluated  -afebrile, no leukocytosis, ESR 57, CRP 5.1  -LF Xray pending  -wet gangrene to the left hallux and 2nd digit, malodor present, bogginess to the left 2nd digit and sulcus, no purulence expressed today   - No acute surgical intervention at this time, as any amputation unlikely to heal  - Pod plan for LWC, and IV abx. Rec pt f/u w/ outpatient interventional cardiology   - seen with attending

## 2020-11-16 NOTE — PROGRESS NOTE ADULT - SUBJECTIVE AND OBJECTIVE BOX
Patient is a 89y old  Female who presents with a chief complaint of left foot pain (16 Nov 2020 08:22)       INTERVAL HPI/OVERNIGHT EVENTS:  Patient seen and evaluated at bedside.  Pt is resting comfortable in NAD. Denies N/V/F/C.  Pain rated at X/10    Allergies    No Known Allergies    Intolerances        Vital Signs Last 24 Hrs  T(C): 36.7 (16 Nov 2020 06:24), Max: 37.1 (15 Nov 2020 22:08)  T(F): 98.1 (16 Nov 2020 06:24), Max: 98.8 (15 Nov 2020 22:08)  HR: 76 (16 Nov 2020 06:24) (63 - 82)  BP: 141/72 (16 Nov 2020 06:24) (139/75 - 155/81)  BP(mean): --  RR: 18 (16 Nov 2020 06:24) (18 - 20)  SpO2: 99% (16 Nov 2020 06:24) (97% - 100%)    LABS:                        9.1    8.74  )-----------( 201      ( 16 Nov 2020 06:03 )             29.7     11-16    140  |  97<L>  |  52<H>  ----------------------------<  123<H>  3.6   |  30  |  2.04<H>    Ca    10.3      16 Nov 2020 06:03  Phos  2.6     11-16  Mg     1.9     11-16    TPro  7.4  /  Alb  3.7  /  TBili  0.9  /  DBili  x   /  AST  15  /  ALT  11  /  AlkPhos  80  11-16    PT/INR - ( 16 Nov 2020 06:03 )   PT: 23.4 SEC;   INR: 2.11          PTT - ( 14 Nov 2020 12:45 )  PTT:37.5 SEC    CAPILLARY BLOOD GLUCOSE      POCT Blood Glucose.: 135 mg/dL (16 Nov 2020 07:23)  POCT Blood Glucose.: 138 mg/dL (16 Nov 2020 07:21)  POCT Blood Glucose.: 52 mg/dL (16 Nov 2020 07:19)  POCT Blood Glucose.: 112 mg/dL (15 Nov 2020 22:01)  POCT Blood Glucose.: 164 mg/dL (15 Nov 2020 17:04)  POCT Blood Glucose.: 126 mg/dL (15 Nov 2020 11:32)      Lower Extremity Physical Exam:      Vascular: DP/PT 0/4 B/L, CFT absent to left hallux, CFT <3 seconds x 9, Temperature gradient warm to cool B/L except to left hallux warm to cold  Neuro: Epicritic sensation intact to the level of forefoot B/L  Musculoskeletal/Ortho: no deformity notd  Skin: wet gangrene to the left hallux and 2nd digit, malodor present, bogginess to the left 2nd digit and sulcus, 5 cc of sangiopurulence, probing to bone, no erythema  Wound #1:   Location: left hallux  Size: 4x1x3 cm  Depth: to bone  Wound bed: fibronecrotic  Drainage: sangiopurulence  Odor: malodor  Periwound: necrotic  Etiology: ischemic  RADIOLOGY & ADDITIONAL TESTS:

## 2020-11-16 NOTE — CONSULT NOTE ADULT - ASSESSMENT
89 y/o female pmh htn, dm, ckd, afib on coumadin c/o infected L toes. Pt admits to having dry gangrene to L first toe and was admitted in the Hospital in August. Pt states that over the last several days, her L 2nd toe has become swollen, painful, red and has drainage. Pt went to her podiatrist, Dr. Eaton, today who sent pt to the ER for wet gangrene. Pt denies chest pain, sob, abd pain, n/v/d, weakness, dizziness ,syncope, fever or chills. (14 Nov 2020 19:20)    ER vss, afebrile.  ESR 57, CRP 51.  WBC 8.7.  Abscess cx staph aureus, GPC pairs, proteus, klebsiella.  Pt with early wet gangrene to the left hallux and 2nd digit, malodor present, bogginess to the left 2nd digit, 5 cc of sangiopurulence, probing to bone, no erythema.  s/p I&D to the left 1st interspace, with 5cc of sangiopurulence.  Cultures sent.   Pt on vanco/cefepime.      No revascularization options as per Vascular.  Pt already had LLE angiography on 7/28/2020 demonstrates single-vessel runoff without visualization of pedal vasculature. 89 y/o female pmh htn, dm, ckd, afib on coumadin c/o infected L toes. Pt admits to having dry gangrene to L first toe and was admitted in the Hospital in August. Pt states that over the last several days, her L 2nd toe has become swollen, painful, red and has drainage. Pt went to her podiatrist, Dr. Eaton, today who sent pt to the ER for wet gangrene. Pt denies chest pain, sob, abd pain, n/v/d, weakness, dizziness ,syncope, fever or chills. (14 Nov 2020 19:20)    ER vss, afebrile.  ESR 57, CRP 51.  WBC 8.7.  Abscess cx staph aureus, GPC pairs, proteus,   klebsiella.  Pt with early wet gangrene to the left hallux and 2nd digit, malodor present, bogginess to the left 2nd digit, 5 cc of sangiopurulence, probing to bone, no erythema.  s/p I&D to the left 1st interspace, with 5cc of sangiopurulence.  Cultures sent.   Pt on vanco/cefepime.      No revascularization options as per Vascular.  Pt already had LLE angiography on 7/28/2020 demonstrates single-vessel runoff without visualization of pedal vasculature.     L ft infection:    - Pt with early wet gangrene to left hallus and 2nd digit, (+) probe to bone, s/p I&D.  Abscess cultures noted.    - Cont vanco (by level).  Change from cefepime to zosyn for additional anerobic coverage.  f/u final culture results.      - Pt with h/o underlying PAD, no options for revascularization or further surgery.  Recommend IV abx and local wound care for now.  Narrow abx coverage once culture results available.      - f/u with podiatry/vascular.     - Check vanco random level, redose if < 15.       Will follow,    Melissa Escobedo  [phone]

## 2020-11-16 NOTE — PROGRESS NOTE ADULT - SUBJECTIVE AND OBJECTIVE BOX
INTERVAL HPI/OVERNIGHT EVENTS: I feel fine.   Vital Signs Last 24 Hrs  T(C): 36.7 (16 Nov 2020 09:05), Max: 37.1 (15 Nov 2020 22:08)  T(F): 98 (16 Nov 2020 09:05), Max: 98.8 (15 Nov 2020 22:08)  HR: 87 (16 Nov 2020 09:05) (63 - 87)  BP: 133/63 (16 Nov 2020 09:05) (133/63 - 155/81)  BP(mean): --  RR: 18 (16 Nov 2020 09:05) (18 - 20)  SpO2: 98% (16 Nov 2020 09:05) (97% - 100%)  I&O's Summary    15 Nov 2020 07:01  -  16 Nov 2020 07:00  --------------------------------------------------------  IN: 0 mL / OUT: 600 mL / NET: -600 mL      MEDICATIONS  (STANDING):  allopurinol 100 milliGRAM(s) Oral daily  amLODIPine   Tablet 10 milliGRAM(s) Oral daily  cefepime   IVPB 1000 milliGRAM(s) IV Intermittent every 12 hours  cinacalcet 60 milliGRAM(s) Oral daily  dextrose 40% Gel 15 Gram(s) Oral once  dextrose 5%. 1000 milliLiter(s) (50 mL/Hr) IV Continuous <Continuous>  dextrose 5%. 1000 milliLiter(s) (100 mL/Hr) IV Continuous <Continuous>  dextrose 50% Injectable 25 Gram(s) IV Push once  dextrose 50% Injectable 12.5 Gram(s) IV Push once  dextrose 50% Injectable 25 Gram(s) IV Push once  gabapentin 100 milliGRAM(s) Oral two times a day  glucagon  Injectable 1 milliGRAM(s) IntraMuscular once  hydrALAZINE 75 milliGRAM(s) Oral three times a day  influenza  Vaccine (HIGH DOSE) 0.7 milliLiter(s) IntraMuscular once  insulin lispro (ADMELOG) corrective regimen sliding scale   SubCutaneous three times a day before meals  insulin lispro (ADMELOG) corrective regimen sliding scale   SubCutaneous at bedtime  latanoprost 0.005% Ophthalmic Solution 1 Drop(s) Both EYES at bedtime  levothyroxine 75 MICROGram(s) Oral daily  senna 2 Tablet(s) Oral at bedtime  simvastatin 20 milliGRAM(s) Oral at bedtime  warfarin 5 milliGRAM(s) Oral once    MEDICATIONS  (PRN):  acetaminophen   Tablet .. 650 milliGRAM(s) Oral every 6 hours PRN Temp greater or equal to 38.5C (101.3F), Moderate Pain (4 - 6)  oxyCODONE    IR 5 milliGRAM(s) Oral every 8 hours PRN Severe Pain (7 - 10)    LABS:                        9.1    8.74  )-----------( 201      ( 16 Nov 2020 06:03 )             29.7     11-16    140  |  97<L>  |  52<H>  ----------------------------<  123<H>  3.6   |  30  |  2.04<H>    Ca    10.3      16 Nov 2020 06:03  Phos  2.6     11-16  Mg     1.9     11-16    TPro  7.4  /  Alb  3.7  /  TBili  0.9  /  DBili  x   /  AST  15  /  ALT  11  /  AlkPhos  80  11-16    PT/INR - ( 16 Nov 2020 06:03 )   PT: 23.4 SEC;   INR: 2.11              CAPILLARY BLOOD GLUCOSE      POCT Blood Glucose.: 127 mg/dL (16 Nov 2020 11:52)  POCT Blood Glucose.: 135 mg/dL (16 Nov 2020 07:23)  POCT Blood Glucose.: 138 mg/dL (16 Nov 2020 07:21)  POCT Blood Glucose.: 52 mg/dL (16 Nov 2020 07:19)  POCT Blood Glucose.: 112 mg/dL (15 Nov 2020 22:01)  POCT Blood Glucose.: 164 mg/dL (15 Nov 2020 17:04)          REVIEW OF SYSTEMS:  CONSTITUTIONAL: No fever, weight loss, or fatigue  EYES: No eye pain, visual disturbances, or discharge  ENMT:  No difficulty hearing, tinnitus, vertigo; No sinus or throat pain  RESPIRATORY: No cough, wheezing, chills or hemoptysis; No shortness of breath  CARDIOVASCULAR: No chest pain, palpitations, dizziness, or leg swelling  GASTROINTESTINAL: No abdominal or epigastric pain. No nausea, vomiting, or hematemesis; No diarrhea or constipation. No melena or hematochezia.  GENITOURINARY: No dysuria, frequency, hematuria, or incontinence  NEUROLOGICAL: No headaches, memory loss, loss of strength, numbness, or tremors    Consultant(s) Notes Reviewed:  [x ] YES  [ ] NO    PHYSICAL EXAM:  GENERAL: NAD, well-groomed, well-developed,not in any distress ,  HEAD:  Atraumatic, Normocephalic  EYES: EOMI, PERRLA, conjunctiva and sclera clear  ENMT: No tonsillar erythema, exudates, or enlargement; Moist mucous membranes, Good dentition, No lesions  NECK: Supple, No JVD, Normal thyroid  NERVOUS SYSTEM:  Alert & Oriented X3, No focal deficit   CHEST/LUNG: Good air entry bilateral with no  rales, rhonchi, wheezing, or rubs  HEART: Regular rate and rhythm; No murmurs, rubs, or gallops  ABDOMEN: Soft, Nontender, Nondistended; Bowel sounds present  EXTREMITIES:  Left foot dressed     Care Discussed with Consultants/Other Providers [ x] YES  [ ] NO

## 2020-11-16 NOTE — PROGRESS NOTE ADULT - ASSESSMENT
89 y/o female pmh htn, dm, ckd, afib on coumadin c/o infected L toes. Pt admits to having dry gangrene to L first toe and was admitted in the Hospital in August. Pt states that over th elast several days, her L 2nd toe has become swollen, painful, red and has drainage. Pt went to her podiatrist, Dr. Eaton, today who sent pt to the ER for wet gangrene. Pt denies chest pain, sob, abd pain, n/v/d, weakness, dizziness ,syncope, fever or chills.     Problem/Plan - 1:  ·  Problem: Gangrene.  Plan: S/P I&D by Podiatry . IV abxs and ID helping.  Vascular helping.    No surgical intervention per Podiatry and vascular.      Problem/Plan - 2:  ·  Problem: CKD (chronic kidney disease) with NIKHIL .  Plan:  Renal following.     Problem/Plan - 3:  ·  Problem: PVD (peripheral vascular disease).  Plan: Vascular helping.      Problem/Plan - 4:  ·  Problem: HTN (hypertension).  Plan: BP meds with hold parameters.      Problem/Plan - 5:  ·  Problem: CHF (congestive heart failure).  Plan: Holding Lasix as Euvolemic.      Problem/Plan - 6:  Problem: DM (diabetes mellitus). Plan: SSI for now.     Problem/Plan - 7:  ·  Problem: Afib.  Plan: On AC .      Problem/Plan - 8:  ·  Problem: Hypothyroid.  Plan: Home dose synthroid.      Problem/Plan - 9:  ·  Problem: Glaucoma.  Plan: Eye drops.      Problem/Plan - 10:  Problem: Pulmonary embolism. Plan; On AC.

## 2020-11-16 NOTE — CONSULT NOTE ADULT - SUBJECTIVE AND OBJECTIVE BOX
Patient is a 89y old  Female who presents with a chief complaint of left foot pain (16 Nov 2020 12:50)      HPI:    89 y/o female pmh htn, dm, ckd, afib on coumadin c/o infected L toes. Pt admits to having dry gangrene to L first toe and was admitted in the Hospital in August. Pt states that over the last several days, her L 2nd toe has become swollen, painful, red and has drainage. Pt went to her podiatrist, Dr. Eaton, today who sent pt to the ER for wet gangrene. Pt denies chest pain, sob, abd pain, n/v/d, weakness, dizziness ,syncope, fever or chills. (14 Nov 2020 19:20)      REVIEW OF SYSTEMS:    CONSTITUTIONAL: No fever, weight loss, or fatigue  EYES: No eye pain, visual disturbances, or discharge  ENMT:  No sore throat  NECK: No pain or stiffness  RESPIRATORY: No cough, wheezing, chills or hemoptysis; No shortness of breath  CARDIOVASCULAR: No chest pain, palpitations, dizziness, or leg swelling  GASTROINTESTINAL: No abdominal or epigastric pain. No nausea, vomiting, or hematemesis; No diarrhea or constipation. No melena or hematochezia.  GENITOURINARY: No dysuria, frequency, hematuria, or incontinence  NEUROLOGICAL: No headaches, memory loss, loss of strength, numbness, or tremors  SKIN: No itching, burning, rashes, or lesions   LYMPH NODES: No enlarged glands  MUSCULOSKELETAL: No joint pain or swelling; No muscle, back, or extremity pain      PAST MEDICAL & SURGICAL HISTORY:  CKD (chronic kidney disease)    CVA (cerebrovascular accident)    Personal history of PE (pulmonary embolism)    Glaucoma    PVD (peripheral vascular disease)    Hypothyroid    HTN (hypertension)    HLD (hyperlipidemia)    CHF (congestive heart failure)    DM (diabetes mellitus)    Afib    Elective surgery  abdominal abcess removals    History of partial thyroidectomy        Allergies    No Known Allergies    Intolerances        FAMILY HISTORY:  No pertinent family history in first degree relatives        SOCIAL HISTORY:        MEDICATIONS  (STANDING):  allopurinol 100 milliGRAM(s) Oral daily  amLODIPine   Tablet 10 milliGRAM(s) Oral daily  cefepime   IVPB 1000 milliGRAM(s) IV Intermittent every 12 hours  cinacalcet 60 milliGRAM(s) Oral daily  dextrose 40% Gel 15 Gram(s) Oral once  dextrose 5%. 1000 milliLiter(s) (50 mL/Hr) IV Continuous <Continuous>  dextrose 5%. 1000 milliLiter(s) (100 mL/Hr) IV Continuous <Continuous>  dextrose 50% Injectable 25 Gram(s) IV Push once  dextrose 50% Injectable 12.5 Gram(s) IV Push once  dextrose 50% Injectable 25 Gram(s) IV Push once  gabapentin 100 milliGRAM(s) Oral two times a day  glucagon  Injectable 1 milliGRAM(s) IntraMuscular once  hydrALAZINE 75 milliGRAM(s) Oral three times a day  influenza  Vaccine (HIGH DOSE) 0.7 milliLiter(s) IntraMuscular once  insulin lispro (ADMELOG) corrective regimen sliding scale   SubCutaneous three times a day before meals  insulin lispro (ADMELOG) corrective regimen sliding scale   SubCutaneous at bedtime  latanoprost 0.005% Ophthalmic Solution 1 Drop(s) Both EYES at bedtime  levothyroxine 75 MICROGram(s) Oral daily  senna 2 Tablet(s) Oral at bedtime  simvastatin 20 milliGRAM(s) Oral at bedtime  warfarin 5 milliGRAM(s) Oral once    MEDICATIONS  (PRN):  acetaminophen   Tablet .. 650 milliGRAM(s) Oral every 6 hours PRN Temp greater or equal to 38.5C (101.3F), Moderate Pain (4 - 6)  oxyCODONE    IR 5 milliGRAM(s) Oral every 8 hours PRN Severe Pain (7 - 10)      Vital Signs Last 24 Hrs  T(C): 36.8 (16 Nov 2020 13:59), Max: 37.1 (15 Nov 2020 22:08)  T(F): 98.2 (16 Nov 2020 13:59), Max: 98.8 (15 Nov 2020 22:08)  HR: 78 (16 Nov 2020 13:59) (63 - 87)  BP: 135/65 (16 Nov 2020 13:59) (133/63 - 155/81)  BP(mean): --  RR: 18 (16 Nov 2020 13:59) (18 - 20)  SpO2: 95% (16 Nov 2020 13:59) (95% - 100%)    PHYSICAL EXAM:    GENERAL: NAD, well-groomed  HEAD:  Atraumatic, Normocephalic  EYES: EOMI, PERRLA, conjunctiva and sclera clear  ENMT: No tonsillar erythema, exudates, or enlargement; Moist mucous membranes  NECK: Supple, No JVD  CHEST/LUNG: Clear to percussion bilaterally; No rales, rhonchi, wheezing, or rubs  HEART: Regular rate and rhythm; No murmurs, rubs, or gallops  ABDOMEN: Soft, Nontender, Nondistended; Bowel sounds present  EXTREMITIES:  2+ Peripheral Pulses, No clubbing, cyanosis, or edema  LYMPH: No lymphadenopathy noted  SKIN: No rashes or lesions    LABS:  CBC Full  -  ( 16 Nov 2020 06:03 )  WBC Count : 8.74 K/uL  RBC Count : 3.37 M/uL  Hemoglobin : 9.1 g/dL  Hematocrit : 29.7 %  Platelet Count - Automated : 201 K/uL  Mean Cell Volume : 88.1 fL  Mean Cell Hemoglobin : 27.0 pg  Mean Cell Hemoglobin Concentration : 30.6 %  Auto Neutrophil # : 6.66 K/uL  Auto Lymphocyte # : 0.86 K/uL  Auto Monocyte # : 1.16 K/uL  Auto Eosinophil # : 0.01 K/uL  Auto Basophil # : 0.02 K/uL  Auto Neutrophil % : 76.3 %  Auto Lymphocyte % : 9.8 %  Auto Monocyte % : 13.3 %  Auto Eosinophil % : 0.1 %  Auto Basophil % : 0.2 %      11-16    140  |  97<L>  |  52<H>  ----------------------------<  123<H>  3.6   |  30  |  2.04<H>    Ca    10.3      16 Nov 2020 06:03  Phos  2.6     11-16  Mg     1.9     11-16    TPro  7.4  /  Alb  3.7  /  TBili  0.9  /  DBili  x   /  AST  15  /  ALT  11  /  AlkPhos  80  11-16      LIVER FUNCTIONS - ( 16 Nov 2020 06:03 )  Alb: 3.7 g/dL / Pro: 7.4 g/dL / ALK PHOS: 80 u/L / ALT: 11 u/L / AST: 15 u/L / GGT: x                               MICROBIOLOGY:                    RADIOLOGY:                 Patient is a 89y old  Female who presents with a chief complaint of left foot pain (16 Nov 2020 12:50)      HPI:    89 y/o female pmh htn, dm, ckd, afib on coumadin c/o infected L toes. Pt admits to having dry gangrene to L first toe and was admitted in the Hospital in August. Pt states that over the last several days, her L 2nd toe has become swollen, painful, red and has drainage. Pt went to her podiatrist, Dr. Eaton, today who sent pt to the ER for wet gangrene. Pt denies chest pain, sob, abd pain, n/v/d, weakness, dizziness ,syncope, fever or chills. (14 Nov 2020 19:20)    ER vss, afebrile.  ESR 57, CRP 51.  WBC 8.7.  Abscess cx staph aureus, GPC pairs, proteus, klebsiella.  Pt with early wet gangrene to the left hallux and 2nd digit, malodor present, bogginess to the left 2nd digit, 5 cc of sangiopurulence, probing to bone, no erythema.  s/p I&D to the left 1st interspace, with 5cc of sangiopurulence.  Cultures sent.   Pt on vanco/cefepime.      No revascularization options as per Vascular.  Pt already had LLE angiography on 7/28/2020 demonstrates single-vessel runoff without visualization of pedal vasculature.      ID consulted for further abx management.       REVIEW OF SYSTEMS:    CONSTITUTIONAL: No fever, weight loss, or fatigue  EYES: No eye pain, visual disturbances, or discharge  ENMT:  No sore throat  NECK: No pain or stiffness  RESPIRATORY: No cough, wheezing, chills or hemoptysis; No shortness of breath  CARDIOVASCULAR: No chest pain, palpitations, dizziness, or leg swelling  GASTROINTESTINAL: No abdominal or epigastric pain. No nausea, vomiting, or hematemesis; No diarrhea or constipation. No melena or hematochezia.  GENITOURINARY: No dysuria, frequency, hematuria, or incontinence  NEUROLOGICAL: No headaches, memory loss, loss of strength, numbness, or tremors  SKIN: No itching, burning, rashes, or lesions   LYMPH NODES: No enlarged glands  MUSCULOSKELETAL: No joint pain or swelling; No muscle, back, or extremity pain      PAST MEDICAL & SURGICAL HISTORY:  CKD (chronic kidney disease)    CVA (cerebrovascular accident)    Personal history of PE (pulmonary embolism)    Glaucoma    PVD (peripheral vascular disease)    Hypothyroid    HTN (hypertension)    HLD (hyperlipidemia)    CHF (congestive heart failure)    DM (diabetes mellitus)    Afib    Elective surgery  abdominal abcess removals    History of partial thyroidectomy        Allergies    No Known Allergies    Intolerances        FAMILY HISTORY:  No pertinent family history in first degree relatives        SOCIAL HISTORY:        MEDICATIONS  (STANDING):  allopurinol 100 milliGRAM(s) Oral daily  amLODIPine   Tablet 10 milliGRAM(s) Oral daily  cefepime   IVPB 1000 milliGRAM(s) IV Intermittent every 12 hours  cinacalcet 60 milliGRAM(s) Oral daily  dextrose 40% Gel 15 Gram(s) Oral once  dextrose 5%. 1000 milliLiter(s) (50 mL/Hr) IV Continuous <Continuous>  dextrose 5%. 1000 milliLiter(s) (100 mL/Hr) IV Continuous <Continuous>  dextrose 50% Injectable 25 Gram(s) IV Push once  dextrose 50% Injectable 12.5 Gram(s) IV Push once  dextrose 50% Injectable 25 Gram(s) IV Push once  gabapentin 100 milliGRAM(s) Oral two times a day  glucagon  Injectable 1 milliGRAM(s) IntraMuscular once  hydrALAZINE 75 milliGRAM(s) Oral three times a day  influenza  Vaccine (HIGH DOSE) 0.7 milliLiter(s) IntraMuscular once  insulin lispro (ADMELOG) corrective regimen sliding scale   SubCutaneous three times a day before meals  insulin lispro (ADMELOG) corrective regimen sliding scale   SubCutaneous at bedtime  latanoprost 0.005% Ophthalmic Solution 1 Drop(s) Both EYES at bedtime  levothyroxine 75 MICROGram(s) Oral daily  senna 2 Tablet(s) Oral at bedtime  simvastatin 20 milliGRAM(s) Oral at bedtime  warfarin 5 milliGRAM(s) Oral once    MEDICATIONS  (PRN):  acetaminophen   Tablet .. 650 milliGRAM(s) Oral every 6 hours PRN Temp greater or equal to 38.5C (101.3F), Moderate Pain (4 - 6)  oxyCODONE    IR 5 milliGRAM(s) Oral every 8 hours PRN Severe Pain (7 - 10)      Vital Signs Last 24 Hrs  T(C): 36.8 (16 Nov 2020 13:59), Max: 37.1 (15 Nov 2020 22:08)  T(F): 98.2 (16 Nov 2020 13:59), Max: 98.8 (15 Nov 2020 22:08)  HR: 78 (16 Nov 2020 13:59) (63 - 87)  BP: 135/65 (16 Nov 2020 13:59) (133/63 - 155/81)  BP(mean): --  RR: 18 (16 Nov 2020 13:59) (18 - 20)  SpO2: 95% (16 Nov 2020 13:59) (95% - 100%)    PHYSICAL EXAM:    GENERAL: NAD, well-groomed  HEAD:  Atraumatic, Normocephalic  EYES: EOMI, PERRLA, conjunctiva and sclera clear  ENMT: No tonsillar erythema, exudates, or enlargement; Moist mucous membranes  NECK: Supple, No JVD  CHEST/LUNG: Clear to percussion bilaterally; No rales, rhonchi, wheezing, or rubs  HEART: Regular rate and rhythm; No murmurs, rubs, or gallops  ABDOMEN: Soft, Nontender, Nondistended; Bowel sounds present  EXTREMITIES:  2+ Peripheral Pulses, No clubbing, cyanosis, or edema  LYMPH: No lymphadenopathy noted  SKIN: No rashes or lesions    LABS:  CBC Full  -  ( 16 Nov 2020 06:03 )  WBC Count : 8.74 K/uL  RBC Count : 3.37 M/uL  Hemoglobin : 9.1 g/dL  Hematocrit : 29.7 %  Platelet Count - Automated : 201 K/uL  Mean Cell Volume : 88.1 fL  Mean Cell Hemoglobin : 27.0 pg  Mean Cell Hemoglobin Concentration : 30.6 %  Auto Neutrophil # : 6.66 K/uL  Auto Lymphocyte # : 0.86 K/uL  Auto Monocyte # : 1.16 K/uL  Auto Eosinophil # : 0.01 K/uL  Auto Basophil # : 0.02 K/uL  Auto Neutrophil % : 76.3 %  Auto Lymphocyte % : 9.8 %  Auto Monocyte % : 13.3 %  Auto Eosinophil % : 0.1 %  Auto Basophil % : 0.2 %      11-16    140  |  97<L>  |  52<H>  ----------------------------<  123<H>  3.6   |  30  |  2.04<H>    Ca    10.3      16 Nov 2020 06:03  Phos  2.6     11-16  Mg     1.9     11-16    TPro  7.4  /  Alb  3.7  /  TBili  0.9  /  DBili  x   /  AST  15  /  ALT  11  /  AlkPhos  80  11-16      LIVER FUNCTIONS - ( 16 Nov 2020 06:03 )  Alb: 3.7 g/dL / Pro: 7.4 g/dL / ALK PHOS: 80 u/L / ALT: 11 u/L / AST: 15 u/L / GGT: x                               MICROBIOLOGY:      Culture - Abscess with Gram Stain (11.14.20 @ 18:00)   Specimen Source: .Abscess left foot wound   Culture Results:   Moderate Staphylococcus aureus   Moderate Gram positive cocci in pairs   Few Proteus mirabilis   Rare Klebsiella oxytoca/Raoutella ornithinolytica       Culture - Blood (11.14.20 @ 14:31)   Specimen Source: .Blood Blood-Venous   Culture Results:   No growth to date.       Culture - Blood (11.14.20 @ 14:31)   Specimen Source: .Blood Blood-Peripheral   Culture Results:   No growth to date.         COVID-19 PCR . (11.14.20 @ 15:57)   COVID-19 PCR: NotDetec: Testing is performed using polymerase chain reaction (PCR) or   transcription mediated amplification (TMA). This COVID-19 (SARS-CoV-2)   nucleic acid amplification test was validated by [employer] and is   in use under the FDA Emergency Use Authorization (EUA) for clinical labs   CLIA-certified to perform high complexity testing. Test results should be   correlated with clinical presentation, patient history, and epidemiology.     RADIOLOGY:

## 2020-11-16 NOTE — PROGRESS NOTE ADULT - SUBJECTIVE AND OBJECTIVE BOX
Patient denies chest pain or shortness of breath.   Review of systems otherwise (-)  	  chief complaint:     extended hpi:    89 year old female with a past medical history of atrial fibrillation, managed with Coumadin, recent GI bleed, admitted with RLE wound      Review of Systems:   Constitutional: [ ] fevers, [ ] chills.   Skin: [ ] dry skin. [ ] rashes.  Psychiatric: [ ] depression, [ ] anxiety.   Gastrointestinal: [ ] BRBPR, [ ] melena.   Neurological: [ ] confusion. [ ] seizures. [ ] shuffling gait.   Ears,Nose,Mouth and Throat: [ ] ear pain [ ] sore throat.   Eyes: [ ] diplopia.   Respiratory: [ ] hemoptysis. [ ] shortness of breath  Cardiovascular: See HPI above  Hematologic/Lymphatic: [ ] anemia. [ ] painful nodes. [ ] prolonged bleeding.   Genitourinary: [ ] hematuria. [ ] flank pain.   Endocrine: [ ] significant change in weight. [ ] intolerance to heat and cold.     Review of systems [ ] otherwise negative, [ ] otherwise unable to obtain    FH: no family history of sudden cardiac death in first degree relatives    SH: [ ] tobacco, [ ] alcohol, [ ] drugs    acetaminophen   Tablet .. 650 milliGRAM(s) Oral every 6 hours PRN  allopurinol 100 milliGRAM(s) Oral daily  amLODIPine   Tablet 10 milliGRAM(s) Oral daily  cefepime   IVPB 1000 milliGRAM(s) IV Intermittent every 12 hours  cinacalcet 60 milliGRAM(s) Oral daily  dextrose 40% Gel 15 Gram(s) Oral once  dextrose 5%. 1000 milliLiter(s) IV Continuous <Continuous>  dextrose 5%. 1000 milliLiter(s) IV Continuous <Continuous>  dextrose 50% Injectable 25 Gram(s) IV Push once  dextrose 50% Injectable 12.5 Gram(s) IV Push once  dextrose 50% Injectable 25 Gram(s) IV Push once  gabapentin 100 milliGRAM(s) Oral two times a day  glucagon  Injectable 1 milliGRAM(s) IntraMuscular once  hydrALAZINE 75 milliGRAM(s) Oral three times a day  influenza  Vaccine (HIGH DOSE) 0.7 milliLiter(s) IntraMuscular once  insulin lispro (ADMELOG) corrective regimen sliding scale   SubCutaneous three times a day before meals  insulin lispro (ADMELOG) corrective regimen sliding scale   SubCutaneous at bedtime  latanoprost 0.005% Ophthalmic Solution 1 Drop(s) Both EYES at bedtime  levothyroxine 75 MICROGram(s) Oral daily  oxyCODONE    IR 5 milliGRAM(s) Oral every 8 hours PRN  senna 2 Tablet(s) Oral at bedtime  simvastatin 20 milliGRAM(s) Oral at bedtime  warfarin 5 milliGRAM(s) Oral once                          9.1    8.74  )-----------( 201      ( 16 Nov 2020 06:03 )             29.7       140  |  97<L>  |  52<H>  ----------------------------<  123<H>  3.6   |  30  |  2.04<H>    Ca    10.3      16 Nov 2020 06:03  Phos  2.6     11-16  Mg     1.9     11-16    TPro  7.4  /  Alb  3.7  /  TBili  0.9  /  DBili  x   /  AST  15  /  ALT  11  /  AlkPhos  80  11-16      T(C): 36.8 (11-16-20 @ 13:59), Max: 37.1 (11-15-20 @ 22:08)  HR: 78 (11-16-20 @ 13:59) (63 - 87)  BP: 135/65 (11-16-20 @ 13:59) (133/63 - 155/81)  RR: 18 (11-16-20 @ 13:59) (18 - 20)  SpO2: 95% (11-16-20 @ 13:59) (95% - 100%)      General: Well nourished in no acute distress. Alert and Oriented * 3.   Head: Normocephalic and atraumatic.   Neck: No JVD. No bruits. Supple. Does not appear to be enlarged.   Cardiovascular: + S1,S2 ; RRR Soft systolic murmur at the left lower sternal border. No rubs noted.    Lungs: CTA b/l. No rhonchi, rales or wheezes.   Abdomen: + BS, soft. Non tender. Non distended. No rebound. No guarding.   Extremities: No clubbing/cyanosis/edema.   Neurologic: Moves all four extremities. Full range of motion.   Skin: Warm and moist. The patient's skin has normal elasticity and good skin turgor.   Psychiatric: Appropriate mood and affect.  Musculoskeletal: Normal range of motion, normal strength      TELEMETRY: none	        ASSESSMENT/PLAN: 	      89 year old female with a past medical history of atrial fibrillation, managed with Coumadin therapy. More recently, she was admitted to the hospital with lower GI bleeding from diverticulosis. She underwent a colonoscopy that did not require any intervention and her anticoagulation was restarted. She is now referred to me for left atrial appendage occlusion. She denies palpitations, syncope, or angina. She is a/w with a RLE wound, and is being followed by vascular surgery    -cont Coumadin, goal INR 2-3  -as long as she can tolerate coumadin I would not recommend [facility] given her advanced age  -get repeat echo  -no inpatient EP procedures expected  -f/u with Antonella after discharge

## 2020-11-17 LAB
ANION GAP SERPL CALC-SCNC: 12 MMO/L — SIGNIFICANT CHANGE UP (ref 7–14)
BUN SERPL-MCNC: 55 MG/DL — HIGH (ref 7–23)
CALCIUM SERPL-MCNC: 9.5 MG/DL — SIGNIFICANT CHANGE UP (ref 8.4–10.5)
CHLORIDE SERPL-SCNC: 97 MMOL/L — LOW (ref 98–107)
CO2 SERPL-SCNC: 29 MMOL/L — SIGNIFICANT CHANGE UP (ref 22–31)
CREAT SERPL-MCNC: 2.05 MG/DL — HIGH (ref 0.5–1.3)
GLUCOSE BLDC GLUCOMTR-MCNC: 130 MG/DL — HIGH (ref 70–99)
GLUCOSE BLDC GLUCOMTR-MCNC: 141 MG/DL — HIGH (ref 70–99)
GLUCOSE BLDC GLUCOMTR-MCNC: 156 MG/DL — HIGH (ref 70–99)
GLUCOSE BLDC GLUCOMTR-MCNC: 171 MG/DL — HIGH (ref 70–99)
GLUCOSE SERPL-MCNC: 157 MG/DL — HIGH (ref 70–99)
HCT VFR BLD CALC: 27.7 % — LOW (ref 34.5–45)
HGB BLD-MCNC: 8.5 G/DL — LOW (ref 11.5–15.5)
INR BLD: 2.43 — HIGH (ref 0.88–1.16)
MAGNESIUM SERPL-MCNC: 1.8 MG/DL — SIGNIFICANT CHANGE UP (ref 1.6–2.6)
MCHC RBC-ENTMCNC: 26.8 PG — LOW (ref 27–34)
MCHC RBC-ENTMCNC: 30.7 % — LOW (ref 32–36)
MCV RBC AUTO: 87.4 FL — SIGNIFICANT CHANGE UP (ref 80–100)
NRBC # FLD: 0 K/UL — SIGNIFICANT CHANGE UP (ref 0–0)
PHOSPHATE SERPL-MCNC: 3 MG/DL — SIGNIFICANT CHANGE UP (ref 2.5–4.5)
PLATELET # BLD AUTO: 205 K/UL — SIGNIFICANT CHANGE UP (ref 150–400)
PMV BLD: 11.5 FL — SIGNIFICANT CHANGE UP (ref 7–13)
POTASSIUM SERPL-MCNC: 3.4 MMOL/L — LOW (ref 3.5–5.3)
POTASSIUM SERPL-SCNC: 3.4 MMOL/L — LOW (ref 3.5–5.3)
PROTHROM AB SERPL-ACNC: 26.6 SEC — HIGH (ref 10.6–13.6)
RBC # BLD: 3.17 M/UL — LOW (ref 3.8–5.2)
RBC # FLD: 17.3 % — HIGH (ref 10.3–14.5)
SODIUM SERPL-SCNC: 138 MMOL/L — SIGNIFICANT CHANGE UP (ref 135–145)
VANCOMYCIN FLD-MCNC: 12.6 UG/ML — SIGNIFICANT CHANGE UP
WBC # BLD: 9.82 K/UL — SIGNIFICANT CHANGE UP (ref 3.8–10.5)
WBC # FLD AUTO: 9.82 K/UL — SIGNIFICANT CHANGE UP (ref 3.8–10.5)

## 2020-11-17 PROCEDURE — 73630 X-RAY EXAM OF FOOT: CPT | Mod: 26,LT

## 2020-11-17 RX ORDER — POTASSIUM CHLORIDE 20 MEQ
40 PACKET (EA) ORAL ONCE
Refills: 0 | Status: COMPLETED | OUTPATIENT
Start: 2020-11-17 | End: 2020-11-17

## 2020-11-17 RX ORDER — LANOLIN ALCOHOL/MO/W.PET/CERES
3 CREAM (GRAM) TOPICAL ONCE
Refills: 0 | Status: COMPLETED | OUTPATIENT
Start: 2020-11-17 | End: 2020-11-17

## 2020-11-17 RX ORDER — WARFARIN SODIUM 2.5 MG/1
5 TABLET ORAL ONCE
Refills: 0 | Status: COMPLETED | OUTPATIENT
Start: 2020-11-17 | End: 2020-11-17

## 2020-11-17 RX ADMIN — SENNA PLUS 2 TABLET(S): 8.6 TABLET ORAL at 22:56

## 2020-11-17 RX ADMIN — Medication 75 MICROGRAM(S): at 05:44

## 2020-11-17 RX ADMIN — PIPERACILLIN AND TAZOBACTAM 25 GRAM(S): 4; .5 INJECTION, POWDER, LYOPHILIZED, FOR SOLUTION INTRAVENOUS at 22:56

## 2020-11-17 RX ADMIN — CINACALCET 60 MILLIGRAM(S): 30 TABLET, FILM COATED ORAL at 12:59

## 2020-11-17 RX ADMIN — SIMVASTATIN 20 MILLIGRAM(S): 20 TABLET, FILM COATED ORAL at 22:56

## 2020-11-17 RX ADMIN — GABAPENTIN 100 MILLIGRAM(S): 400 CAPSULE ORAL at 17:11

## 2020-11-17 RX ADMIN — Medication 75 MILLIGRAM(S): at 22:56

## 2020-11-17 RX ADMIN — GABAPENTIN 100 MILLIGRAM(S): 400 CAPSULE ORAL at 05:44

## 2020-11-17 RX ADMIN — Medication 75 MILLIGRAM(S): at 12:59

## 2020-11-17 RX ADMIN — Medication 2: at 07:52

## 2020-11-17 RX ADMIN — Medication 3 MILLIGRAM(S): at 01:50

## 2020-11-17 RX ADMIN — PIPERACILLIN AND TAZOBACTAM 25 GRAM(S): 4; .5 INJECTION, POWDER, LYOPHILIZED, FOR SOLUTION INTRAVENOUS at 17:11

## 2020-11-17 RX ADMIN — AMLODIPINE BESYLATE 10 MILLIGRAM(S): 2.5 TABLET ORAL at 05:45

## 2020-11-17 RX ADMIN — PIPERACILLIN AND TAZOBACTAM 25 GRAM(S): 4; .5 INJECTION, POWDER, LYOPHILIZED, FOR SOLUTION INTRAVENOUS at 00:22

## 2020-11-17 RX ADMIN — Medication 75 MILLIGRAM(S): at 05:45

## 2020-11-17 RX ADMIN — PIPERACILLIN AND TAZOBACTAM 25 GRAM(S): 4; .5 INJECTION, POWDER, LYOPHILIZED, FOR SOLUTION INTRAVENOUS at 09:53

## 2020-11-17 RX ADMIN — Medication 100 MILLIGRAM(S): at 11:37

## 2020-11-17 RX ADMIN — WARFARIN SODIUM 5 MILLIGRAM(S): 2.5 TABLET ORAL at 17:11

## 2020-11-17 RX ADMIN — Medication 40 MILLIEQUIVALENT(S): at 07:53

## 2020-11-17 RX ADMIN — LATANOPROST 1 DROP(S): 0.05 SOLUTION/ DROPS OPHTHALMIC; TOPICAL at 22:39

## 2020-11-17 NOTE — PROGRESS NOTE ADULT - SUBJECTIVE AND OBJECTIVE BOX
Nephrology Followup Note - [phone] - Dr Catherine / Dr Garcia / Dr Tellez / Dr Sullivan / Dr Castillo / Dr Grijalva / Dr Segovia / Dr Luna  Pt seen and examined at bedside  No acute events overnight. No complaints.     Allergies:  No Known Allergies    Hospital Medications:   MEDICATIONS  (STANDING):  allopurinol 100 milliGRAM(s) Oral daily  amLODIPine   Tablet 10 milliGRAM(s) Oral daily  cinacalcet 60 milliGRAM(s) Oral daily  dextrose 40% Gel 15 Gram(s) Oral once  dextrose 5%. 1000 milliLiter(s) (50 mL/Hr) IV Continuous <Continuous>  dextrose 5%. 1000 milliLiter(s) (100 mL/Hr) IV Continuous <Continuous>  dextrose 50% Injectable 25 Gram(s) IV Push once  dextrose 50% Injectable 12.5 Gram(s) IV Push once  dextrose 50% Injectable 25 Gram(s) IV Push once  gabapentin 100 milliGRAM(s) Oral two times a day  glucagon  Injectable 1 milliGRAM(s) IntraMuscular once  hydrALAZINE 75 milliGRAM(s) Oral three times a day  influenza  Vaccine (HIGH DOSE) 0.7 milliLiter(s) IntraMuscular once  insulin lispro (ADMELOG) corrective regimen sliding scale   SubCutaneous three times a day before meals  insulin lispro (ADMELOG) corrective regimen sliding scale   SubCutaneous at bedtime  latanoprost 0.005% Ophthalmic Solution 1 Drop(s) Both EYES at bedtime  levothyroxine 75 MICROGram(s) Oral daily  piperacillin/tazobactam IVPB.. 3.375 Gram(s) IV Intermittent every 8 hours  senna 2 Tablet(s) Oral at bedtime  simvastatin 20 milliGRAM(s) Oral at bedtime  warfarin 5 milliGRAM(s) Oral once    VITALS:  T(F): 97.7 (11-17-20 @ 11:39), Max: 98.9 (11-16-20 @ 22:36)  HR: 73 (11-17-20 @ 11:39)  BP: 120/59 (11-17-20 @ 11:39)  RR: 18 (11-17-20 @ 11:39)  SpO2: 99% (11-17-20 @ 11:39)  Wt(kg): --      PHYSICAL EXAM:  Constitutional: NAD  HEENT: anicteric sclera, oropharynx clear, MMM  Neck: No JVD  Respiratory: CTAB, no wheezes, rales or rhonchi  Cardiovascular: S1, S2, RRR  Gastrointestinal: BS+, soft, NT/ND  Extremities: No cyanosis or clubbing. No peripheral edema  Neurological: A/O x 3, no focal deficits  Psychiatric: Normal mood, normal affect  : No CVA tenderness. No michaud.   Skin: No rashes    LABS:  11-17    138  |  97<L>  |  55<H>  ----------------------------<  157<H>  3.4<L>   |  29  |  2.05<H>    Ca    9.5      17 Nov 2020 06:06  Phos  3.0     11-17  Mg     1.8     11-17    TPro  7.4  /  Alb  3.7  /  TBili  0.9  /  DBili      /  AST  15  /  ALT  11  /  AlkPhos  80  11-16    Creatinine Trend: 2.05 <--, 2.04 <--, 2.18 <--, 2.08 <--                        8.5    9.82  )-----------( 205      ( 17 Nov 2020 06:06 )             27.7     Urine Studies:      RADIOLOGY & ADDITIONAL STUDIES:

## 2020-11-17 NOTE — PROGRESS NOTE ADULT - SUBJECTIVE AND OBJECTIVE BOX
INTERVAL HPI/OVERNIGHT EVENTS: i feel fine.   Vital Signs Last 24 Hrs  T(C): 36.6 (17 Nov 2020 16:12), Max: 37.2 (16 Nov 2020 22:36)  T(F): 97.8 (17 Nov 2020 16:12), Max: 98.9 (16 Nov 2020 22:36)  HR: 81 (17 Nov 2020 16:12) (73 - 85)  BP: 130/63 (17 Nov 2020 16:12) (120/59 - 148/78)  BP(mean): --  RR: 18 (17 Nov 2020 16:12) (17 - 18)  SpO2: 100% (17 Nov 2020 16:12) (95% - 100%)  I&O's Summary    MEDICATIONS  (STANDING):  allopurinol 100 milliGRAM(s) Oral daily  amLODIPine   Tablet 10 milliGRAM(s) Oral daily  cinacalcet 60 milliGRAM(s) Oral daily  dextrose 40% Gel 15 Gram(s) Oral once  dextrose 5%. 1000 milliLiter(s) (50 mL/Hr) IV Continuous <Continuous>  dextrose 5%. 1000 milliLiter(s) (100 mL/Hr) IV Continuous <Continuous>  dextrose 50% Injectable 25 Gram(s) IV Push once  dextrose 50% Injectable 12.5 Gram(s) IV Push once  dextrose 50% Injectable 25 Gram(s) IV Push once  gabapentin 100 milliGRAM(s) Oral two times a day  glucagon  Injectable 1 milliGRAM(s) IntraMuscular once  hydrALAZINE 75 milliGRAM(s) Oral three times a day  influenza  Vaccine (HIGH DOSE) 0.7 milliLiter(s) IntraMuscular once  insulin lispro (ADMELOG) corrective regimen sliding scale   SubCutaneous three times a day before meals  insulin lispro (ADMELOG) corrective regimen sliding scale   SubCutaneous at bedtime  latanoprost 0.005% Ophthalmic Solution 1 Drop(s) Both EYES at bedtime  levothyroxine 75 MICROGram(s) Oral daily  piperacillin/tazobactam IVPB.. 3.375 Gram(s) IV Intermittent every 8 hours  senna 2 Tablet(s) Oral at bedtime  simvastatin 20 milliGRAM(s) Oral at bedtime    MEDICATIONS  (PRN):  acetaminophen   Tablet .. 650 milliGRAM(s) Oral every 6 hours PRN Temp greater or equal to 38.5C (101.3F), Moderate Pain (4 - 6)  oxyCODONE    IR 5 milliGRAM(s) Oral every 8 hours PRN Severe Pain (7 - 10)    LABS:                        8.5    9.82  )-----------( 205      ( 17 Nov 2020 06:06 )             27.7     11-17    138  |  97<L>  |  55<H>  ----------------------------<  157<H>  3.4<L>   |  29  |  2.05<H>    Ca    9.5      17 Nov 2020 06:06  Phos  3.0     11-17  Mg     1.8     11-17    TPro  7.4  /  Alb  3.7  /  TBili  0.9  /  DBili  x   /  AST  15  /  ALT  11  /  AlkPhos  80  11-16    PT/INR - ( 17 Nov 2020 06:06 )   PT: 26.6 SEC;   INR: 2.43              CAPILLARY BLOOD GLUCOSE      POCT Blood Glucose.: 130 mg/dL (17 Nov 2020 16:58)  POCT Blood Glucose.: 141 mg/dL (17 Nov 2020 11:42)  POCT Blood Glucose.: 171 mg/dL (17 Nov 2020 07:28)  POCT Blood Glucose.: 180 mg/dL (16 Nov 2020 22:26)          REVIEW OF SYSTEMS:  CONSTITUTIONAL: No fever, weight loss, or fatigue  EYES: No eye pain, visual disturbances, or discharge  ENMT:  No difficulty hearing, tinnitus, vertigo; No sinus or throat pain  NECK: No pain or stiffness  RESPIRATORY: No cough, wheezing, chills or hemoptysis; No shortness of breath  CARDIOVASCULAR: No chest pain, palpitations, dizziness, or leg swelling  GASTROINTESTINAL: No abdominal or epigastric pain. No nausea, vomiting, or hematemesis; No diarrhea or constipation. No melena or hematochezia.  GENITOURINARY: No dysuria, frequency, hematuria, or incontinence      Consultant(s) Notes Reviewed:  [x ] YES  [ ] NO    PHYSICAL EXAM:  GENERAL: NAD, well-groomed, well-developed, not in any distress ,  HEAD:  Atraumatic, Normocephalic  EYES: EOMI, PERRLA, conjunctiva and sclera clear  ENMT: No tonsillar erythema, exudates, or enlargement; Moist mucous membranes, Good dentition, No lesions  NECK: Supple, No JVD, Normal thyroid  NERVOUS SYSTEM:  Alert & Oriented X3, No focal deficit   CHEST/LUNG: Good air entry bilateral with no  rales, rhonchi, wheezing, or rubs  HEART: Regular rate and rhythm; No murmurs, rubs, or gallops  ABDOMEN: Soft, Nontender, Nondistended; Bowel sounds present  EXTREMITIES: left foot dressed     Care Discussed with Consultants/Other Providers [ x] YES  [ ] NO

## 2020-11-17 NOTE — PROGRESS NOTE ADULT - SUBJECTIVE AND OBJECTIVE BOX
chief complaint: left foot pain    S: no chest pain or sob; ROS otherwise negative.     extended hpi:  89 year old female with history of HTN, DM, CKD, Afib on ac with coumadin, known moderate to severe AS conservatively managed per [facility] who was admitted with dry gangrene on left first toe.      Review of Systems:   Constitutional: [ ] fevers, [ ] chills.   Skin: [ ] dry skin. [ ] rashes.  Psychiatric: [ ] depression, [ ] anxiety.   Gastrointestinal: [ ] BRBPR, [ ] melena.   Neurological: [ ] confusion. [ ] seizures. [ ] shuffling gait.   Ears,Nose,Mouth and Throat: [ ] ear pain [ ] sore throat.   Eyes: [ ] diplopia.   Respiratory: [ ] hemoptysis. [ ] shortness of breath  Cardiovascular: See HPI above  Hematologic/Lymphatic: [ ] anemia. [ ] painful nodes. [ ] prolonged bleeding.   Genitourinary: [ ] hematuria. [ ] flank pain.   Endocrine: [ ] significant change in weight. [ ] intolerance to heat and cold.     Review of systems [x ] otherwise negative, [ ] otherwise unable to obtain    FH: no family history of sudden cardiac death in first degree relatives    SH: [ ] tobacco, [ ] alcohol, [ ] drugs    acetaminophen   Tablet .. 650 milliGRAM(s) Oral every 6 hours PRN  allopurinol 100 milliGRAM(s) Oral daily  amLODIPine   Tablet 10 milliGRAM(s) Oral daily  cinacalcet 60 milliGRAM(s) Oral daily  dextrose 40% Gel 15 Gram(s) Oral once  dextrose 5%. 1000 milliLiter(s) IV Continuous <Continuous>  dextrose 5%. 1000 milliLiter(s) IV Continuous <Continuous>  dextrose 50% Injectable 25 Gram(s) IV Push once  dextrose 50% Injectable 12.5 Gram(s) IV Push once  dextrose 50% Injectable 25 Gram(s) IV Push once  gabapentin 100 milliGRAM(s) Oral two times a day  glucagon  Injectable 1 milliGRAM(s) IntraMuscular once  hydrALAZINE 75 milliGRAM(s) Oral three times a day  influenza  Vaccine (HIGH DOSE) 0.7 milliLiter(s) IntraMuscular once  insulin lispro (ADMELOG) corrective regimen sliding scale   SubCutaneous three times a day before meals  insulin lispro (ADMELOG) corrective regimen sliding scale   SubCutaneous at bedtime  latanoprost 0.005% Ophthalmic Solution 1 Drop(s) Both EYES at bedtime  levothyroxine 75 MICROGram(s) Oral daily  oxyCODONE    IR 5 milliGRAM(s) Oral every 8 hours PRN  piperacillin/tazobactam IVPB.. 3.375 Gram(s) IV Intermittent every 8 hours  senna 2 Tablet(s) Oral at bedtime  simvastatin 20 milliGRAM(s) Oral at bedtime  warfarin 5 milliGRAM(s) Oral once                            8.5    9.82  )-----------( 205      ( 17 Nov 2020 06:06 )             27.7       11-17    138  |  97<L>  |  55<H>  ----------------------------<  157<H>  3.4<L>   |  29  |  2.05<H>    Ca    9.5      17 Nov 2020 06:06  Phos  3.0     11-17  Mg     1.8     11-17    TPro  7.4  /  Alb  3.7  /  TBili  0.9  /  DBili  x   /  AST  15  /  ALT  11  /  AlkPhos  80  11-16            T(C): 36.5 (11-17-20 @ 11:39), Max: 37.2 (11-16-20 @ 22:36)  HR: 73 (11-17-20 @ 11:39) (73 - 85)  BP: 120/59 (11-17-20 @ 11:39) (120/59 - 148/78)  RR: 18 (11-17-20 @ 11:39) (17 - 18)  SpO2: 99% (11-17-20 @ 11:39) (95% - 99%)  Wt(kg): --    I&O's Summary      General: Well nourished in no acute distress. Alert and Oriented * 3.   Head: Normocephalic and atraumatic.   Neck: No JVD. No bruits. Supple. Does not appear to be enlarged.   Cardiovascular: + S1,S2 ; RRR 2/6 systolic murmur heard throughout No rubs noted.    Lungs: CTA b/l. No rhonchi, rales or wheezes.   Abdomen: + BS, soft. Non tender. Non distended. No rebound. No guarding.   Extremities: No clubbing/cyanosis/edema.   Neurologic: Moves all four extremities. Full range of motion.   Skin: Warm and moist. The patient's skin has normal elasticity and good skin turgor.   Psychiatric: Appropriate mood and affect.  Musculoskeletal: Normal range of motion, normal strength    TELE: not on tele    TTE: < from: Transthoracic Echocardiogram (11.16.20 @ 18:38) >  1. Mitral annular calcification, otherwise normal mitral  valve. Mild mitral regurgitation.  2. Calcified trileaflet aortic valve with decreased  opening. Peak transaortic valve gradient equals 49 mm Hg,  mean transaortic valve gradient equals 36 mm Hg, estimated  aortic valve area equals 0.6 sqcm (by continuity equation),  aortic valve velocity time integral equals 86 cm,  consistent with severe aortic stenosis. Minimal aortic  regurgitation.  3. Mild concentric left ventricular hypertrophy.  4. Hyperdynamic left ventricle.  5. Normal right ventricular size and function.  6. Estimated right ventricular systolic pressure equals 59  mm Hg, assuming right atrial pressure equals 10 mm Hg,  consistent with moderate pulmonary hypertension.  *** Compared with echocardiogram of 7/22/2020, no  significant changes noted.    < end of copied text >     89 year old female with history of HTN, DM, CKD, Afib on ac with coumadin, known moderate to severe AS conservatively managed per [facility] who was admitted with dry gangrene on left first toe.     -pt. with no clinical heart failure on exam  -TTE confirms severe AS which has been managed conservatively per the patient and the patient's family's wishes  -ABx per ID   -BCx NGTD   -follow up podiatry  -no further cardiac workup anticipated as long as goals of care do not change    Trisha Banks MD

## 2020-11-17 NOTE — PROVIDER CONTACT NOTE (OTHER) - SITUATION
Lab called, [place], stating patients abscess culture resulted >4 anerobic and aerobic cultures including +MRSA, Klebsiella, etc.

## 2020-11-17 NOTE — PROGRESS NOTE ADULT - SUBJECTIVE AND OBJECTIVE BOX
89 year old female with a past medical history of atrial fibrillation, managed with Coumadin, recent GI bleed, admitted with RLE wound  Patient denies chest pain or shortness of breath.   Review of systems otherwise (-)  	    Review of Systems:   Constitutional: [ ] fevers, [ ] chills.   Skin: [ ] dry skin. [ ] rashes.  Psychiatric: [ ] depression, [ ] anxiety.   Gastrointestinal: [ ] BRBPR, [ ] melena.   Neurological: [ ] confusion. [ ] seizures. [ ] shuffling gait.   Ears,Nose,Mouth and Throat: [ ] ear pain [ ] sore throat.   Eyes: [ ] diplopia.   Respiratory: [ ] hemoptysis. [ ] shortness of breath  Cardiovascular: See HPI above  Hematologic/Lymphatic: [ ] anemia. [ ] painful nodes. [ ] prolonged bleeding.   Genitourinary: [ ] hematuria. [ ] flank pain.   Endocrine: [ ] significant change in weight. [ ] intolerance to heat and cold.     Review of systems [ ] otherwise negative, [ ] otherwise unable to obtain    FH: no family history of sudden cardiac death in first degree relatives    SH: [ ] tobacco, [ ] alcohol, [ ] drugs    acetaminophen   Tablet .. 650 milliGRAM(s) Oral every 6 hours PRN  allopurinol 100 milliGRAM(s) Oral daily  amLODIPine   Tablet 10 milliGRAM(s) Oral daily  cinacalcet 60 milliGRAM(s) Oral daily  dextrose 40% Gel 15 Gram(s) Oral once  dextrose 5%. 1000 milliLiter(s) IV Continuous <Continuous>  dextrose 5%. 1000 milliLiter(s) IV Continuous <Continuous>  dextrose 50% Injectable 25 Gram(s) IV Push once  dextrose 50% Injectable 12.5 Gram(s) IV Push once  dextrose 50% Injectable 25 Gram(s) IV Push once  gabapentin 100 milliGRAM(s) Oral two times a day  glucagon  Injectable 1 milliGRAM(s) IntraMuscular once  hydrALAZINE 75 milliGRAM(s) Oral three times a day  influenza  Vaccine (HIGH DOSE) 0.7 milliLiter(s) IntraMuscular once  insulin lispro (ADMELOG) corrective regimen sliding scale   SubCutaneous three times a day before meals  insulin lispro (ADMELOG) corrective regimen sliding scale   SubCutaneous at bedtime  latanoprost 0.005% Ophthalmic Solution 1 Drop(s) Both EYES at bedtime  levothyroxine 75 MICROGram(s) Oral daily  oxyCODONE    IR 5 milliGRAM(s) Oral every 8 hours PRN  piperacillin/tazobactam IVPB.. 3.375 Gram(s) IV Intermittent every 8 hours  senna 2 Tablet(s) Oral at bedtime  simvastatin 20 milliGRAM(s) Oral at bedtime  warfarin 5 milliGRAM(s) Oral once                            8.5    9.82  )-----------( 205      ( 17 Nov 2020 06:06 )             27.7       11-17    138  |  97<L>  |  55<H>  ----------------------------<  157<H>  3.4<L>   |  29  |  2.05<H>    Ca    9.5      17 Nov 2020 06:06  Phos  3.0     11-17  Mg     1.8     11-17    TPro  7.4  /  Alb  3.7  /  TBili  0.9  /  DBili  x   /  AST  15  /  ALT  11  /  AlkPhos  80  11-16    T(C): 36.5 (11-17-20 @ 11:39), Max: 37.2 (11-16-20 @ 22:36)  HR: 73 (11-17-20 @ 11:39) (73 - 85)  BP: 120/59 (11-17-20 @ 11:39) (120/59 - 148/78)  RR: 18 (11-17-20 @ 11:39) (17 - 18)  SpO2: 99% (11-17-20 @ 11:39) (95% - 99%)  Wt(kg): --    I&O's Summary      General: Well nourished in no acute distress. Alert and Oriented * 3.   Head: Normocephalic and atraumatic.   Neck: No JVD. No bruits. Supple. Does not appear to be enlarged.   Cardiovascular: + S1,S2 ; RRR Soft systolic murmur at the left lower sternal border. No rubs noted.    Lungs: CTA b/l. No rhonchi, rales or wheezes.   Abdomen: + BS, soft. Non tender. Non distended. No rebound. No guarding.   Extremities: No clubbing/cyanosis/edema.   Neurologic: Moves all four extremities. Full range of motion.   Skin: Warm and moist. The patient's skin has normal elasticity and good skin turgor.   Psychiatric: Appropriate mood and affect.  Musculoskeletal: Normal range of motion, normal strength      TELEMETRY: none	        ASSESSMENT/PLAN: 	      89 year old female with a past medical history of atrial fibrillation, managed with Coumadin therapy. More recently, she was admitted to the hospital with lower GI bleeding from diverticulosis. She underwent a colonoscopy that did not require any intervention and her anticoagulation was restarted. She is now referred to me for left atrial appendage occlusion. She denies palpitations, syncope, or angina. She is a/w with a RLE wound, and is being followed by vascular surgery    -cont Coumadin, goal INR 2-3  -as long as she can tolerate coumadin I would not recommend [facility] given her advanced age  -get repeat echo  -no inpatient EP procedures expected  -f/u with Antonella after discharge    Simon Jung M.D.  Cardiac Electrophysiology  [phone]

## 2020-11-17 NOTE — PROGRESS NOTE ADULT - ASSESSMENT
89 y/o female pmh htn, dm, ckd, afib on coumadin p/w c/o infected L toes. pt being admitted for wet gangrene. Renal following for NIKHIL/CKD, vol Mx.     NIKHIL on CKD3: b/l SCr 1.6 8/2020  Stable Cr ~2.   Improved LE edema, diuretics on hold.   low Na diet and fluid restriction  monitor BMP daily   dose all meds for eGFR<15ml/min.   avoid ACEi/ARB for now/NSAIDs/Nephrotoxics.    Hypokalemia no new BMP today, can replete K prn to goal 4.0  HTN, bp controlled well  Hypercalcemia- improving   phos wnl,  high, [id number] level 76 noted. avoid ca/vitD suplements   r/o primary hyperparathyroidism. consider parathyroid scan. can be done as outpt.    c/w sensipar   wet Gangrene L foot- Mx per Podiatry. f/u w/vas sx. no plan for surgical intervention at this time    labs, chart reviewed

## 2020-11-17 NOTE — PROGRESS NOTE ADULT - ASSESSMENT
89 y/o female pmh htn, dm, ckd, afib on coumadin c/o infected L toes. Pt admits to having dry gangrene to L first toe and was admitted in the Hospital in August. Pt states that over th elast several days, her L 2nd toe has become swollen, painful, red and has drainage. Pt went to her podiatrist, Dr. Eaton, today who sent pt to the ER for wet gangrene. Pt denies chest pain, sob, abd pain, n/v/d, weakness, dizziness ,syncope, fever or chills.     Problem/Plan - 1:  ·  Problem: Gangrene.  Plan: S/P I&D by Podiatry . IV abxs and ID helping.  Vascular helping.    No surgical intervention per Podiatry and vascular.      Problem/Plan - 2:  ·  Problem: CKD (chronic kidney disease) with NIKHIL .  Plan:  Renal following.     Problem/Plan - 3:  ·  Problem: PVD (peripheral vascular disease).  Plan: Vascular helping.      Problem/Plan - 4:  ·  Problem: HTN (hypertension).  Plan: BP meds with hold parameters.      Problem/Plan - 5:  ·  Problem: CHF (congestive heart failure).  Plan: Holding Lasix as Euvolemic.      Problem/Plan - 6:  Problem: DM (diabetes mellitus). Plan: SSI for now.     Problem/Plan - 7:  ·  Problem: Afib.  Plan: On AC .      Problem/Plan - 8:  ·  Problem: Hypothyroid.  Plan: Home dose synthroid.      Problem/Plan - 9:  ·  Problem: Hyperparathyroidism .  Plan: Parathyroid scan outpt . Ca level normal now.      Problem/Plan - 10:  Problem: Pulmonary embolism. Plan; On AC.     Problem/Plan - 11:  ·  Problem: Delirium .  Plan: Infection VS Hospital induced. Better.     Disposition : DC planning home once Abxs switched to PO.

## 2020-11-18 LAB
ANION GAP SERPL CALC-SCNC: 11 MMO/L — SIGNIFICANT CHANGE UP (ref 7–14)
BUN SERPL-MCNC: 60 MG/DL — HIGH (ref 7–23)
CALCIUM SERPL-MCNC: 9 MG/DL — SIGNIFICANT CHANGE UP (ref 8.4–10.5)
CHLORIDE SERPL-SCNC: 98 MMOL/L — SIGNIFICANT CHANGE UP (ref 98–107)
CO2 SERPL-SCNC: 31 MMOL/L — SIGNIFICANT CHANGE UP (ref 22–31)
CREAT SERPL-MCNC: 2.4 MG/DL — HIGH (ref 0.5–1.3)
GLUCOSE BLDC GLUCOMTR-MCNC: 101 MG/DL — HIGH (ref 70–99)
GLUCOSE BLDC GLUCOMTR-MCNC: 147 MG/DL — HIGH (ref 70–99)
GLUCOSE BLDC GLUCOMTR-MCNC: 159 MG/DL — HIGH (ref 70–99)
GLUCOSE BLDC GLUCOMTR-MCNC: 198 MG/DL — HIGH (ref 70–99)
GLUCOSE SERPL-MCNC: 118 MG/DL — HIGH (ref 70–99)
HCT VFR BLD CALC: 26.6 % — LOW (ref 34.5–45)
HGB BLD-MCNC: 8.2 G/DL — LOW (ref 11.5–15.5)
INR BLD: 2.77 — HIGH (ref 0.88–1.16)
MAGNESIUM SERPL-MCNC: 1.9 MG/DL — SIGNIFICANT CHANGE UP (ref 1.6–2.6)
MCHC RBC-ENTMCNC: 27 PG — SIGNIFICANT CHANGE UP (ref 27–34)
MCHC RBC-ENTMCNC: 30.8 % — LOW (ref 32–36)
MCV RBC AUTO: 87.5 FL — SIGNIFICANT CHANGE UP (ref 80–100)
NRBC # FLD: 0 K/UL — SIGNIFICANT CHANGE UP (ref 0–0)
PHOSPHATE SERPL-MCNC: 3.2 MG/DL — SIGNIFICANT CHANGE UP (ref 2.5–4.5)
PLATELET # BLD AUTO: 200 K/UL — SIGNIFICANT CHANGE UP (ref 150–400)
PMV BLD: 11.9 FL — SIGNIFICANT CHANGE UP (ref 7–13)
POTASSIUM SERPL-MCNC: 3.8 MMOL/L — SIGNIFICANT CHANGE UP (ref 3.5–5.3)
POTASSIUM SERPL-SCNC: 3.8 MMOL/L — SIGNIFICANT CHANGE UP (ref 3.5–5.3)
PROTHROM AB SERPL-ACNC: 30.3 SEC — HIGH (ref 10.6–13.6)
RBC # BLD: 3.04 M/UL — LOW (ref 3.8–5.2)
RBC # FLD: 17.2 % — HIGH (ref 10.3–14.5)
SODIUM SERPL-SCNC: 140 MMOL/L — SIGNIFICANT CHANGE UP (ref 135–145)
WBC # BLD: 8.51 K/UL — SIGNIFICANT CHANGE UP (ref 3.8–10.5)
WBC # FLD AUTO: 8.51 K/UL — SIGNIFICANT CHANGE UP (ref 3.8–10.5)

## 2020-11-18 RX ORDER — VANCOMYCIN HCL 1 G
1000 VIAL (EA) INTRAVENOUS ONCE
Refills: 0 | Status: COMPLETED | OUTPATIENT
Start: 2020-11-18 | End: 2020-11-18

## 2020-11-18 RX ORDER — WARFARIN SODIUM 2.5 MG/1
5 TABLET ORAL ONCE
Refills: 0 | Status: COMPLETED | OUTPATIENT
Start: 2020-11-18 | End: 2020-11-18

## 2020-11-18 RX ADMIN — Medication 250 MILLIGRAM(S): at 21:15

## 2020-11-18 RX ADMIN — PIPERACILLIN AND TAZOBACTAM 25 GRAM(S): 4; .5 INJECTION, POWDER, LYOPHILIZED, FOR SOLUTION INTRAVENOUS at 21:15

## 2020-11-18 RX ADMIN — Medication 75 MILLIGRAM(S): at 13:07

## 2020-11-18 RX ADMIN — Medication 100 MILLIGRAM(S): at 12:00

## 2020-11-18 RX ADMIN — GABAPENTIN 100 MILLIGRAM(S): 400 CAPSULE ORAL at 06:38

## 2020-11-18 RX ADMIN — Medication 2: at 11:59

## 2020-11-18 RX ADMIN — WARFARIN SODIUM 5 MILLIGRAM(S): 2.5 TABLET ORAL at 17:43

## 2020-11-18 RX ADMIN — CINACALCET 60 MILLIGRAM(S): 30 TABLET, FILM COATED ORAL at 12:00

## 2020-11-18 RX ADMIN — GABAPENTIN 100 MILLIGRAM(S): 400 CAPSULE ORAL at 17:40

## 2020-11-18 RX ADMIN — Medication 75 MICROGRAM(S): at 06:38

## 2020-11-18 RX ADMIN — PIPERACILLIN AND TAZOBACTAM 25 GRAM(S): 4; .5 INJECTION, POWDER, LYOPHILIZED, FOR SOLUTION INTRAVENOUS at 06:39

## 2020-11-18 RX ADMIN — SENNA PLUS 2 TABLET(S): 8.6 TABLET ORAL at 21:14

## 2020-11-18 RX ADMIN — Medication 75 MILLIGRAM(S): at 06:38

## 2020-11-18 RX ADMIN — Medication 75 MILLIGRAM(S): at 21:14

## 2020-11-18 RX ADMIN — SIMVASTATIN 20 MILLIGRAM(S): 20 TABLET, FILM COATED ORAL at 21:14

## 2020-11-18 RX ADMIN — PIPERACILLIN AND TAZOBACTAM 25 GRAM(S): 4; .5 INJECTION, POWDER, LYOPHILIZED, FOR SOLUTION INTRAVENOUS at 13:07

## 2020-11-18 RX ADMIN — LATANOPROST 1 DROP(S): 0.05 SOLUTION/ DROPS OPHTHALMIC; TOPICAL at 21:14

## 2020-11-18 RX ADMIN — AMLODIPINE BESYLATE 10 MILLIGRAM(S): 2.5 TABLET ORAL at 06:38

## 2020-11-18 NOTE — PHYSICAL THERAPY INITIAL EVALUATION ADULT - PERTINENT HX OF CURRENT PROBLEM, REHAB EVAL
89 y/o female pmh htn, dm, ckd, afib on coumadin c/o infected L toes. Pt admits to having dry gangrene to L first toe and was admitted in the Hospital in August. Pt states that over th elast several days, her L 2nd toe has become swollen, painful, red and has drainage. Pt went to her podiatrist, Dr. Eaton, today who sent pt to the ER for wet gangrene.

## 2020-11-18 NOTE — PROGRESS NOTE ADULT - SUBJECTIVE AND OBJECTIVE BOX
INTERVAL HPI/OVERNIGHT EVENTS: No new concerns .  Vital Signs Last 24 Hrs  T(C): 36.8 (18 Nov 2020 13:06), Max: 36.9 (18 Nov 2020 06:00)  T(F): 98.2 (18 Nov 2020 13:06), Max: 98.5 (18 Nov 2020 06:00)  HR: 63 (18 Nov 2020 13:06) (63 - 86)  BP: 136/73 (18 Nov 2020 13:06) (121/68 - 136/73)  BP(mean): --  RR: 18 (18 Nov 2020 13:06) (18 - 18)  SpO2: 96% (18 Nov 2020 13:06) (95% - 97%)  I&O's Summary    17 Nov 2020 07:01  -  18 Nov 2020 07:00  --------------------------------------------------------  IN: 0 mL / OUT: 700 mL / NET: -700 mL      MEDICATIONS  (STANDING):  allopurinol 100 milliGRAM(s) Oral daily  amLODIPine   Tablet 10 milliGRAM(s) Oral daily  cinacalcet 60 milliGRAM(s) Oral daily  dextrose 40% Gel 15 Gram(s) Oral once  dextrose 5%. 1000 milliLiter(s) (50 mL/Hr) IV Continuous <Continuous>  dextrose 5%. 1000 milliLiter(s) (100 mL/Hr) IV Continuous <Continuous>  dextrose 50% Injectable 25 Gram(s) IV Push once  dextrose 50% Injectable 12.5 Gram(s) IV Push once  dextrose 50% Injectable 25 Gram(s) IV Push once  gabapentin 100 milliGRAM(s) Oral two times a day  glucagon  Injectable 1 milliGRAM(s) IntraMuscular once  hydrALAZINE 75 milliGRAM(s) Oral three times a day  influenza  Vaccine (HIGH DOSE) 0.7 milliLiter(s) IntraMuscular once  insulin lispro (ADMELOG) corrective regimen sliding scale   SubCutaneous three times a day before meals  insulin lispro (ADMELOG) corrective regimen sliding scale   SubCutaneous at bedtime  latanoprost 0.005% Ophthalmic Solution 1 Drop(s) Both EYES at bedtime  levothyroxine 75 MICROGram(s) Oral daily  piperacillin/tazobactam IVPB.. 3.375 Gram(s) IV Intermittent every 8 hours  senna 2 Tablet(s) Oral at bedtime  simvastatin 20 milliGRAM(s) Oral at bedtime  vancomycin  IVPB 1000 milliGRAM(s) IV Intermittent once  warfarin 5 milliGRAM(s) Oral once    MEDICATIONS  (PRN):  acetaminophen   Tablet .. 650 milliGRAM(s) Oral every 6 hours PRN Temp greater or equal to 38.5C (101.3F), Moderate Pain (4 - 6)  oxyCODONE    IR 5 milliGRAM(s) Oral every 8 hours PRN Severe Pain (7 - 10)    LABS:                        8.2    8.51  )-----------( 200      ( 18 Nov 2020 06:42 )             26.6     11-18    140  |  98  |  60<H>  ----------------------------<  118<H>  3.8   |  31  |  2.40<H>    Ca    9.0      18 Nov 2020 06:42  Phos  3.2     11-18  Mg     1.9     11-18      PT/INR - ( 18 Nov 2020 06:42 )   PT: 30.3 SEC;   INR: 2.77              CAPILLARY BLOOD GLUCOSE      POCT Blood Glucose.: 101 mg/dL (18 Nov 2020 17:04)  POCT Blood Glucose.: 198 mg/dL (18 Nov 2020 11:47)  POCT Blood Glucose.: 147 mg/dL (18 Nov 2020 08:02)  POCT Blood Glucose.: 156 mg/dL (17 Nov 2020 21:36)          REVIEW OF SYSTEMS:  CONSTITUTIONAL: No fever, weight loss, or fatigue  EYES: No eye pain, visual disturbances, or discharge  ENMT:  No difficulty hearing, tinnitus, vertigo; No sinus or throat pain  NECK: No pain or stiffness  RESPIRATORY: No cough, wheezing, chills or hemoptysis; No shortness of breath  CARDIOVASCULAR: No chest pain, palpitations, dizziness, or leg swelling  GASTROINTESTINAL: No abdominal or epigastric pain. No nausea, vomiting, or hematemesis; No diarrhea or constipation. No melena or hematochezia.  GENITOURINARY: No dysuria, frequency, hematuria, or incontinence  NEUROLOGICAL: No headaches, memory loss, loss of strength, numbness, or tremors    Consultant(s) Notes Reviewed:  [x ] YES  [ ] NO    PHYSICAL EXAM:  GENERAL: NAD, well-groomed, well-developed, not in any distress ,  HEAD:  Atraumatic, Normocephalic  EYES: EOMI, PERRLA, conjunctiva and sclera clear  ENMT: No tonsillar erythema, exudates, or enlargement; Moist mucous membranes, Good dentition, No lesions  NECK: Supple, No JVD, Normal thyroid  NERVOUS SYSTEM:  Alert & Oriented X3, No focal deficit   CHEST/LUNG: Good air entry bilateral with no  rales, rhonchi, wheezing, or rubs  HEART: Regular rate and rhythm; No murmurs, rubs, or gallops  ABDOMEN: Soft, Nontender, Nondistended; Bowel sounds present  EXTREMITIES:  Left foot dressed     Care Discussed with Consultants/Other Providers [ x] YES  [ ] NO

## 2020-11-18 NOTE — PROGRESS NOTE ADULT - ASSESSMENT
89 y/o female pmh htn, dm, ckd, afib on coumadin c/o infected L toes. Pt admits to having dry gangrene to L first toe and was admitted in the Hospital in August. Pt states that over th elast several days, her L 2nd toe has become swollen, painful, red and has drainage. Pt went to her podiatrist, Dr. Eaton, today who sent pt to the ER for wet gangrene. Pt denies chest pain, sob, abd pain, n/v/d, weakness, dizziness ,syncope, fever or chills.     Problem/Plan - 1:  ·  Problem: Gangrene.  Plan: S/P I&D by Podiatry . IV abxs and ID helping.  Vascular helping.    No surgical intervention per Podiatry and vascular.      Problem/Plan - 2:  ·  Problem: CKD (chronic kidney disease) with NIKHIL .  Plan:  Renal following.     Problem/Plan - 3:  ·  Problem: PVD (peripheral vascular disease).  Plan: Vascular helping.      Problem/Plan - 4:  ·  Problem: HTN (hypertension).  Plan: BP meds with hold parameters.      Problem/Plan - 5:  ·  Problem: CHF (congestive heart failure).  Plan: Holding Lasix as Euvolemic.      Problem/Plan - 6:  Problem: DM (diabetes mellitus). Plan: SSI for now.     Problem/Plan - 7:  ·  Problem: Afib.  Plan: On AC .      Problem/Plan - 8:  ·  Problem: Hypothyroid.  Plan: Home dose synthroid.      Problem/Plan - 9:  ·  Problem: Hyperparathyroidism .  Plan: Parathyroid scan outpt . Ca level normal now.      Problem/Plan - 10:  Problem: Pulmonary embolism. Plan; On AC.     Problem/Plan - 11:  ·  Problem: Delirium .  Plan: Infection VS Hospital induced. Better.     Disposition : DC planning home as  Abxs switched to PO.

## 2020-11-18 NOTE — PROGRESS NOTE ADULT - SUBJECTIVE AND OBJECTIVE BOX
Infectious Diseases progress note:    Subjective: Resting comfortably.  S/p dressing change today.  Decreased drainage, no malodor.     ROS:  CONSTITUTIONAL:  No fever, chills, rigors  CARDIOVASCULAR:  No chest pain or palpitations  RESPIRATORY:   No SOB, cough, dyspnea on exertion.  No wheezing  GASTROINTESTINAL:  No abd pain, N/V, diarrhea/constipation  EXTREMITIES:  No swelling or joint pain  GENITOURINARY:  No burning on urination, increased frequency or urgency.  No flank pain  NEUROLOGIC:  No HA, visual disturbances  SKIN: No rashes    Allergies    No Known Allergies    Intolerances        ANTIBIOTICS/RELEVANT:  antimicrobials  piperacillin/tazobactam IVPB.. 3.375 Gram(s) IV Intermittent every 8 hours    immunologic:  influenza  Vaccine (HIGH DOSE) 0.7 milliLiter(s) IntraMuscular once    OTHER:  acetaminophen   Tablet .. 650 milliGRAM(s) Oral every 6 hours PRN  allopurinol 100 milliGRAM(s) Oral daily  amLODIPine   Tablet 10 milliGRAM(s) Oral daily  cinacalcet 60 milliGRAM(s) Oral daily  dextrose 40% Gel 15 Gram(s) Oral once  dextrose 5%. 1000 milliLiter(s) IV Continuous <Continuous>  dextrose 5%. 1000 milliLiter(s) IV Continuous <Continuous>  dextrose 50% Injectable 25 Gram(s) IV Push once  dextrose 50% Injectable 12.5 Gram(s) IV Push once  dextrose 50% Injectable 25 Gram(s) IV Push once  gabapentin 100 milliGRAM(s) Oral two times a day  glucagon  Injectable 1 milliGRAM(s) IntraMuscular once  hydrALAZINE 75 milliGRAM(s) Oral three times a day  insulin lispro (ADMELOG) corrective regimen sliding scale   SubCutaneous three times a day before meals  insulin lispro (ADMELOG) corrective regimen sliding scale   SubCutaneous at bedtime  latanoprost 0.005% Ophthalmic Solution 1 Drop(s) Both EYES at bedtime  levothyroxine 75 MICROGram(s) Oral daily  oxyCODONE    IR 5 milliGRAM(s) Oral every 8 hours PRN  senna 2 Tablet(s) Oral at bedtime  simvastatin 20 milliGRAM(s) Oral at bedtime  warfarin 5 milliGRAM(s) Oral once      Objective:  Vital Signs Last 24 Hrs  T(C): 36.8 (18 Nov 2020 13:06), Max: 36.9 (18 Nov 2020 06:00)  T(F): 98.2 (18 Nov 2020 13:06), Max: 98.5 (18 Nov 2020 06:00)  HR: 63 (18 Nov 2020 13:06) (63 - 86)  BP: 136/73 (18 Nov 2020 13:06) (121/68 - 136/73)  BP(mean): --  RR: 18 (18 Nov 2020 13:06) (18 - 18)  SpO2: 96% (18 Nov 2020 13:06) (95% - 100%)    PHYSICAL EXAM:  Constitutional:NAD  Eyes:OLIVIA, EOMI  Ear/Nose/Throat: no thrush, mucositis.  Moist mucous membranes	  Neck:no JVD, no lymphadenopathy, supple  Respiratory: CTA adina  Cardiovascular: S1S2 RRR, no murmurs  Gastrointestinal:soft, nontender,  nondistended (+) BS  Extremities:no e/e/c  Skin:  no rashes, open wounds or ulcerations        LABS:                        8.2    8.51  )-----------( 200      ( 18 Nov 2020 06:42 )             26.6     11-18    140  |  98  |  60<H>  ----------------------------<  118<H>  3.8   |  31  |  2.40<H>    Ca    9.0      18 Nov 2020 06:42  Phos  3.2     11-18  Mg     1.9     11-18      PT/INR - ( 18 Nov 2020 06:42 )   PT: 30.3 SEC;   INR: 2.77                  Vancomycin Level, Random:  ug/mL (11-17 @ 19:27)  Vancomycin Level, Random:  ug/mL (11-16 @ 15:55)                  MICROBIOLOGY:      Culture - Abscess with Gram Stain (11.14.20 @ 18:00)   - Linezolid: S 2   - Meropenem: S <=1   - Meropenem: S <=1   - Oxacillin: R >2   - Penicillin: R >8   - Piperacillin/Tazobactam: S <=8   - Piperacillin/Tazobactam: S <=8   - RIF- Rifampin: S <=1 Should not be used as monotherapy   - Tetra/Doxy: S <=1   - Tetra/Doxy: R >8   - Tobramycin: S <=2   - Tobramycin: S <=2   - Trimethoprim/Sulfamethoxazole: S <=0.5/9.5   - Trimethoprim/Sulfamethoxazole: S <=0.5/9.5   - Trimethoprim/Sulfamethoxazole: R >2/38   - Vancomycin: S 2   - Vancomycin: S 1   - Amikacin: S <=16   - Amikacin: S <=16   - Amoxicillin/Clavulanic Acid: S <=8/4   - Amoxicillin/Clavulanic Acid: S <=8/4   - Ampicillin: R 16 These ampicillin results predict results for amoxicillin   - Ampicillin: S <=8 These ampicillin results predict results for amoxicillin   - Ampicillin: S <=2 Predicts results to ampicillin/sulbactam, amoxacillin-clavulanate and piperacillin-tazobactam.   - Ampicillin/Sulbactam: S <=4/2 Enterobacter, Citrobacter, and Serratia may develop resistance during prolonged therapy (3-4 days)   - Ampicillin/Sulbactam: S <=4/2 Enterobacter, Citrobacter, and Serratia may develop resistance during prolonged therapy (3-4 days)   - Ampicillin/Sulbactam: R <=8/4   - Aztreonam: S <=4   - Aztreonam: S <=4   - Cefazolin: S <=2 Enterobacter, Citrobacter, and Serratia may develop resistance during prolonged therapy (3-4 days)   - Cefazolin: I 4 Enterobacter, Citrobacter, and Serratia may develop resistance during prolonged therapy (3-4 days)   - Cefazolin: R 16   - Cefepime: S <=2   - Cefepime: S <=2   - Cefoxitin: S <=8   - Cefoxitin: S <=8   - Ceftriaxone: S <=1 Enterobacter, Citrobacter, and Serratia may develop resistance during prolonged therapy   - Ceftriaxone: S <=1 Enterobacter, Citrobacter, and Serratia may develop resistance during prolonged therapy   - Ciprofloxacin: S <=0.25   - Ciprofloxacin: S <=0.25   - Clindamycin: S 0.5   - Daptomycin: S 1   - Ertapenem: S <=0.5   - Ertapenem: S <=0.5   - Erythromycin: S <=0.25   - Gentamicin: S <=1 Should not be used as monotherapy   - Gentamicin: S <=2   - Gentamicin: S <=2   - Imipenem: S <=1   - Levofloxacin: S <=0.5   - Levofloxacin: S <=0.5   Specimen Source: .Abscess left foot wound   Culture Results:   After additional incubation time, Culture yields >4 types of aerobic   and/or anaerobic bacteria   Call client services within 7 days if further workup is clinically   indicated. Culture includes   Moderate Methicillin resistant Staphylococcus aureus   Few Proteus mirabilis   Rare Klebsiella oxytoca/Raoutella ornithinolytica   Moderate Enterococcus faecalis   Organism Identification: Methicillin resistant Staphylococcus aureus   Enterococcus faecalis   Proteus mirabilis   Klebsiella oxytoca /Raoutella ornithinolytica   Organism: Methicillin resistant Staphylococcus aureus   Organism: Enterococcus faecalis   Organism: Proteus mirabilis   Organism: Klebsiella oxytoca /Raoutella ornithinolytica   Method Type: ALTA   Method Type: ALTA   Method Type: ALTA   Method Type: ALTA     RADIOLOGY & ADDITIONAL STUDIES:    < from: Xray Foot AP + Lateral + Oblique, Left (11.17.20 @ 18:07) >  EXAM:  RAD FOOT MIN 3 VIEWS LT        PROCEDURE DATE:  Nov 17 2020         INTERPRETATION:  CLINICAL INDICATION: left hallux gangrene    EXAM:  Portable frontal oblique and lateral left foot from 11/17/2020 at 1807. Compared to prior study from 7/21/2020.    IMPRESSION:  Shrunken/atrophied hallux soft tissues with localized gas collections in the region consistent with gangrene. No proximally tracking gas collections beyond this area and no definite radiographic evidence for underlying osteomyelitis.    No fractures or dislocations.    Tarsometatarsal alignment maintained without evidence for a Lisfranc injury.    Congenitally fused 5th DIP joint. Preserved remaining joint spaces and no joint margin erosions.    Plantar and posterior calcaneal enthesophytes.    Generalized marked osteopenia otherwise no discrete lytic or blastic lesions.    Extensive vascular calcifications.    < end of copied text >

## 2020-11-18 NOTE — PROGRESS NOTE ADULT - SUBJECTIVE AND OBJECTIVE BOX
Podiatry pager #: [phone] ([place])/ [phone] ([facility])    Patient is a 89y old  Female who presents with a chief complaint of left foot pain (17 Nov 2020 15:51)       INTERVAL HPI/OVERNIGHT EVENTS:  Patient seen and evaluated at bedside.  Pt is resting comfortable in NAD. Denies N/V/F/C.     Allergies    No Known Allergies    Intolerances        Vital Signs Last 24 Hrs  T(C): 36.6 (18 Nov 2020 08:44), Max: 36.9 (18 Nov 2020 06:00)  T(F): 97.9 (18 Nov 2020 08:44), Max: 98.5 (18 Nov 2020 06:00)  HR: 86 (18 Nov 2020 08:44) (73 - 86)  BP: 130/60 (18 Nov 2020 08:44) (120/59 - 130/63)  BP(mean): --  RR: 18 (18 Nov 2020 08:44) (18 - 18)  SpO2: 97% (18 Nov 2020 08:44) (95% - 100%)    LABS:                        8.2    8.51  )-----------( 200      ( 18 Nov 2020 06:42 )             26.6     11-18    140  |  98  |  60<H>  ----------------------------<  118<H>  3.8   |  31  |  2.40<H>    Ca    9.0      18 Nov 2020 06:42  Phos  3.2     11-18  Mg     1.9     11-18      PT/INR - ( 18 Nov 2020 06:42 )   PT: 30.3 SEC;   INR: 2.77              CAPILLARY BLOOD GLUCOSE      POCT Blood Glucose.: 147 mg/dL (18 Nov 2020 08:02)  POCT Blood Glucose.: 156 mg/dL (17 Nov 2020 21:36)  POCT Blood Glucose.: 130 mg/dL (17 Nov 2020 16:58)  POCT Blood Glucose.: 141 mg/dL (17 Nov 2020 11:42)      Lower Extremity Physical Exam:    Vascular: DP/PT 0/4 B/L, CFT absent to left hallux, CFT <3 seconds x 9, Temperature gradient warm to cool B/L except to left hallux warm to cold  Neuro: Epicritic sensation intact to the level of forefoot B/L  Musculoskeletal/Ortho: no deformity notd  Skin: wet gangrene to the left hallux and 2nd digit, malodor present, bogginess to the left 2nd digit and sulcus, 5 cc of sangiopurulence, probing to bone, no erythema  Wound #1:   Location: left hallux  Size: 4x1x3 cm  Depth: to bone  Wound bed: fibronecrotic  Drainage: sangiopurulence  Odor: malodor  Periwound: necrotic  Etiology: ischemic

## 2020-11-18 NOTE — PHYSICAL THERAPY INITIAL EVALUATION ADULT - ADDITIONAL COMMENTS
Pt lives with her , son and grand-daughter in private home. There are no stairs to enter or inside. Pt was using walker prior to admission. Pt requires assistance with all ADL prior to admission. Pt states she has an aide for 5 hours Wednesday, Thursday and Fridays. Pt states her son works during the day and cannot assist if needed.

## 2020-11-18 NOTE — PROGRESS NOTE ADULT - ASSESSMENT
89 y/o female pmh htn, dm, ckd, afib on coumadin p/w c/o infected L toes. pt being admitted for wet gangrene. Renal following for NIKHIL/CKD, vol Mx.     NIKHIL on CKD3: b/l SCr around 1.6 8/2020  Scr maddie 2>2.4 today   has chronic LE edema, much better now  continue to hold diuretics- lasix/metolazone for now  low Na diet and fluid restriction  monitor BMP daily   dose all meds for eGFR<15ml/min.   avoid ACEi/ARB for now/NSAIDs/Nephrotoxics.    Hypokalemia -K better  HTN, bp controlled well  Hypercalcemia- improved, now 9   phos wnl,  high, [id number] level 76 noted. avoid ca/vitD suplements   c/w sensipar   wet Gangrene L foot- Mx per Podiatry. f/u w/vas sx. on vanco and Zosyn. monitor vanco trough levels    labs, chart reviewed

## 2020-11-18 NOTE — PHYSICAL THERAPY INITIAL EVALUATION ADULT - DISCHARGE DISPOSITION, PT EVAL
rehabilitation facility/to improve strength and balance for safe ambulation and transfers to prevent future falls

## 2020-11-18 NOTE — PROGRESS NOTE ADULT - ASSESSMENT
89 yo f w/ left foot dry gangrene with early wet conversion to left hallux and 2nd digit    -pt seen and evaluated  -afebrile, no leukocytosis, ESR 57, CRP 5.1  -LF Xray no gas, no OM  - Dry gangrene to the left hallux and 2nd digit, no malodor present, bogginess to the left 2nd digit and sulcus, no purulence expressed today   - No acute surgical intervention at this time, as any amputation unlikely to heal  - Pod plan for LWC, and IV abx. Rec pt f/u w/ outpatient interventional cardiology   - Pod stable for discharge, follow up info in discharge paperwork  - seen with attending

## 2020-11-18 NOTE — PROGRESS NOTE ADULT - SUBJECTIVE AND OBJECTIVE BOX
EP ATTENDING    no tele    she denies palpitations, syncope, nor angina, ROS otherwise -      Review of Systems:   Constitutional: [ ] fevers, [ ] chills.   Skin: [ ] dry skin. [ ] rashes.  Psychiatric: [ ] depression, [ ] anxiety.   Gastrointestinal: [ ] BRBPR, [ ] melena.   Neurological: [ ] confusion. [ ] seizures. [ ] shuffling gait.   Ears,Nose,Mouth and Throat: [ ] ear pain [ ] sore throat.   Eyes: [ ] diplopia.   Respiratory: [ ] hemoptysis. [ ] shortness of breath  Cardiovascular: See HPI above  Hematologic/Lymphatic: [ ] anemia. [ ] painful nodes. [ ] prolonged bleeding.   Genitourinary: [ ] hematuria. [ ] flank pain.   Endocrine: [ ] significant change in weight. [ ] intolerance to heat and cold.     Review of systems [ x] otherwise negative, [ ] otherwise unable to obtain    FH: no family history of sudden cardiac death in first degree relatives    SH: [ ] tobacco, [ ] alcohol, [ ] drugs    acetaminophen   Tablet .. 650 milliGRAM(s) Oral every 6 hours PRN  allopurinol 100 milliGRAM(s) Oral daily  amLODIPine   Tablet 10 milliGRAM(s) Oral daily  cinacalcet 60 milliGRAM(s) Oral daily  dextrose 40% Gel 15 Gram(s) Oral once  dextrose 5%. 1000 milliLiter(s) IV Continuous <Continuous>  dextrose 5%. 1000 milliLiter(s) IV Continuous <Continuous>  dextrose 50% Injectable 25 Gram(s) IV Push once  dextrose 50% Injectable 12.5 Gram(s) IV Push once  dextrose 50% Injectable 25 Gram(s) IV Push once  gabapentin 100 milliGRAM(s) Oral two times a day  glucagon  Injectable 1 milliGRAM(s) IntraMuscular once  hydrALAZINE 75 milliGRAM(s) Oral three times a day  influenza  Vaccine (HIGH DOSE) 0.7 milliLiter(s) IntraMuscular once  insulin lispro (ADMELOG) corrective regimen sliding scale   SubCutaneous three times a day before meals  insulin lispro (ADMELOG) corrective regimen sliding scale   SubCutaneous at bedtime  latanoprost 0.005% Ophthalmic Solution 1 Drop(s) Both EYES at bedtime  levothyroxine 75 MICROGram(s) Oral daily  oxyCODONE    IR 5 milliGRAM(s) Oral every 8 hours PRN  piperacillin/tazobactam IVPB.. 3.375 Gram(s) IV Intermittent every 8 hours  senna 2 Tablet(s) Oral at bedtime  simvastatin 20 milliGRAM(s) Oral at bedtime  warfarin 5 milliGRAM(s) Oral once                            8.2    8.51  )-----------( 200      ( 18 Nov 2020 06:42 )             26.6       11-18    140  |  98  |  60<H>  ----------------------------<  118<H>  3.8   |  31  |  2.40<H>    Ca    9.0      18 Nov 2020 06:42  Phos  3.2     11-18  Mg     1.9     11-18              T(C): 36.8 (11-18-20 @ 13:06), Max: 36.9 (11-18-20 @ 06:00)  HR: 63 (11-18-20 @ 13:06) (63 - 86)  BP: 136/73 (11-18-20 @ 13:06) (121/68 - 136/73)  RR: 18 (11-18-20 @ 13:06) (18 - 18)  SpO2: 96% (11-18-20 @ 13:06) (95% - 100%)  Wt(kg): --    I&O's Summary    17 Nov 2020 07:01  -  18 Nov 2020 07:00  --------------------------------------------------------  IN: 0 mL / OUT: 700 mL / NET: -700 mL        General: Well nourished in no acute distress. Alert and Oriented * 3.   Head: Normocephalic and atraumatic.   Neck: No JVD. No bruits. Supple. Does not appear to be enlarged.   Cardiovascular: + S1,S2 ; RRR Soft systolic murmur at the left lower sternal border. No rubs noted.    Lungs: CTA b/l. No rhonchi, rales or wheezes.   Abdomen: + BS, soft. Non tender. Non distended. No rebound. No guarding.   Extremities: No clubbing/cyanosis/edema.   Neurologic: Moves all four extremities. Full range of motion.   Skin: Warm and moist. The patient's skin has normal elasticity and good skin turgor.   Psychiatric: Appropriate mood and affect.  Musculoskeletal: Normal range of motion, normal strength      echo: severe AS, normal LVEF      A/P) She is an extremely pleasant 89 year old female with a past medical history of atrial fibrillation, managed with Coumadin therapy. More recently, she was admitted to the hospital with lower GI bleeding from diverticulosis. She underwent a colonoscopy that did not require any intervention and her anticoagulation was restarted. She is now referred to me for left atrial appendage occlusion. She denies palpitations, syncope, or angina. She is a/w with a RLE wound, and is being followed by vascular surgery    -please continue coumadin to keep INR 2-3  -as long as she can tolerate coumadin I would not recommend [facility] given her advanced age  -no inpatient EP procedures expected  -f/u with [facility] after discharge      Susan Cho M.D., [facility]  Cardiac Electrophysiology  [facility]  [address] [place] [address], [place] [address]  [email or web address]    office [phone]  pager [phone]

## 2020-11-18 NOTE — PROGRESS NOTE ADULT - SUBJECTIVE AND OBJECTIVE BOX
chief complaint: left foot pain    S: no chest pain or sob; ROS otherwise negative.     chief complaint: leg pain    Review of Systems:   Constitutional: [ ] fevers, [ ] chills.   Skin: [ ] dry skin. [ ] rashes.  Psychiatric: [ ] depression, [ ] anxiety.   Gastrointestinal: [ ] BRBPR, [ ] melena.   Neurological: [ ] confusion. [ ] seizures. [ ] shuffling gait.   Ears,Nose,Mouth and Throat: [ ] ear pain [ ] sore throat.   Eyes: [ ] diplopia.   Respiratory: [ ] hemoptysis. [ ] shortness of breath  Cardiovascular: See HPI above  Hematologic/Lymphatic: [ ] anemia. [ ] painful nodes. [ ] prolonged bleeding.   Genitourinary: [ ] hematuria. [ ] flank pain.   Endocrine: [ ] significant change in weight. [ ] intolerance to heat and cold.     Review of systems [ x] otherwise negative, [ ] otherwise unable to obtain    FH: no family history of sudden cardiac death in first degree relatives    SH: [ ] tobacco, [ ] alcohol, [ ] drugs    acetaminophen   Tablet .. 650 milliGRAM(s) Oral every 6 hours PRN  allopurinol 100 milliGRAM(s) Oral daily  amLODIPine   Tablet 10 milliGRAM(s) Oral daily  cinacalcet 60 milliGRAM(s) Oral daily  dextrose 40% Gel 15 Gram(s) Oral once  dextrose 5%. 1000 milliLiter(s) IV Continuous <Continuous>  dextrose 5%. 1000 milliLiter(s) IV Continuous <Continuous>  dextrose 50% Injectable 25 Gram(s) IV Push once  dextrose 50% Injectable 12.5 Gram(s) IV Push once  dextrose 50% Injectable 25 Gram(s) IV Push once  gabapentin 100 milliGRAM(s) Oral two times a day  glucagon  Injectable 1 milliGRAM(s) IntraMuscular once  hydrALAZINE 75 milliGRAM(s) Oral three times a day  influenza  Vaccine (HIGH DOSE) 0.7 milliLiter(s) IntraMuscular once  insulin lispro (ADMELOG) corrective regimen sliding scale   SubCutaneous three times a day before meals  insulin lispro (ADMELOG) corrective regimen sliding scale   SubCutaneous at bedtime  latanoprost 0.005% Ophthalmic Solution 1 Drop(s) Both EYES at bedtime  levothyroxine 75 MICROGram(s) Oral daily  oxyCODONE    IR 5 milliGRAM(s) Oral every 8 hours PRN  piperacillin/tazobactam IVPB.. 3.375 Gram(s) IV Intermittent every 8 hours  senna 2 Tablet(s) Oral at bedtime  simvastatin 20 milliGRAM(s) Oral at bedtime  warfarin 5 milliGRAM(s) Oral once                            8.2    8.51  )-----------( 200      ( 18 Nov 2020 06:42 )             26.6       11-18    140  |  98  |  60<H>  ----------------------------<  118<H>  3.8   |  31  |  2.40<H>    Ca    9.0      18 Nov 2020 06:42  Phos  3.2     11-18  Mg     1.9     11-18    T(C): 36.8 (11-18-20 @ 13:06), Max: 36.9 (11-18-20 @ 06:00)  HR: 63 (11-18-20 @ 13:06) (63 - 86)  BP: 136/73 (11-18-20 @ 13:06) (121/68 - 136/73)  RR: 18 (11-18-20 @ 13:06) (18 - 18)  SpO2: 96% (11-18-20 @ 13:06) (95% - 100%)  Wt(kg): --    I&O's Summary    17 Nov 2020 07:01  -  18 Nov 2020 07:00  --------------------------------------------------------  IN: 0 mL / OUT: 700 mL / NET: -700 mL        General: Well nourished in no acute distress. Alert and Oriented * 3.   Head: Normocephalic and atraumatic.   Neck: No JVD. No bruits. Supple. Does not appear to be enlarged.   Cardiovascular: + S1,S2 ; RRR Soft systolic murmur at the left lower sternal border. No rubs noted.    Lungs: CTA b/l. No rhonchi, rales or wheezes.   Abdomen: + BS, soft. Non tender. Non distended. No rebound. No guarding.   Extremities: No clubbing/cyanosis/edema.   Neurologic: Moves all four extremities. Full range of motion.   Skin: Warm and moist. The patient's skin has normal elasticity and good skin turgor.   Psychiatric: Appropriate mood and affect.  Musculoskeletal: Normal range of motion, normal strength        TELE: not on tele    TTE: < from: Transthoracic Echocardiogram (11.16.20 @ 18:38) >  1. Mitral annular calcification, otherwise normal mitral  valve. Mild mitral regurgitation.  2. Calcified trileaflet aortic valve with decreased  opening. Peak transaortic valve gradient equals 49 mm Hg,  mean transaortic valve gradient equals 36 mm Hg, estimated  aortic valve area equals 0.6 sqcm (by continuity equation),  aortic valve velocity time integral equals 86 cm,  consistent with severe aortic stenosis. Minimal aortic  regurgitation.  3. Mild concentric left ventricular hypertrophy.  4. Hyperdynamic left ventricle.  5. Normal right ventricular size and function.  6. Estimated right ventricular systolic pressure equals 59  mm Hg, assuming right atrial pressure equals 10 mm Hg,  consistent with moderate pulmonary hypertension.  *** Compared with echocardiogram of 7/22/2020, no  significant changes noted.    < end of copied text >     89 year old female with history of HTN, DM, CKD, Afib on ac with coumadin, known moderate to severe AS conservatively managed per [facility] who was admitted with dry gangrene on left first toe.     -pt. with no clinical heart failure on exam  -TTE confirms severe AS which has been managed conservatively per the patient and the patient's family's wishes, confirmed with daughter Catherine today on the phone   -ABx per ID   -BCx NGTD   -follow up podiatry  -no further cardiac workup anticipated as long as goals of care do not change

## 2020-11-18 NOTE — PROGRESS NOTE ADULT - SUBJECTIVE AND OBJECTIVE BOX
[facility] - S Radha / Florin S /D Nate/ S Jonathan/ S Rosalinda/ Shahid Segovia / BABAR Escalerau/ O Rudi  service -[phone], office [phone]  ---------------------------------------------------------------------------------------------------------------    Patient seen and examined bedside    Subjective and Objective: No overnight events, denied sob. No complaints today. feeling better    Allergies: No Known Allergies      Hospital Medications:   MEDICATIONS  (STANDING):  allopurinol 100 milliGRAM(s) Oral daily  amLODIPine   Tablet 10 milliGRAM(s) Oral daily  cinacalcet 60 milliGRAM(s) Oral daily  dextrose 40% Gel 15 Gram(s) Oral once  dextrose 5%. 1000 milliLiter(s) (50 mL/Hr) IV Continuous <Continuous>  dextrose 5%. 1000 milliLiter(s) (100 mL/Hr) IV Continuous <Continuous>  dextrose 50% Injectable 25 Gram(s) IV Push once  dextrose 50% Injectable 12.5 Gram(s) IV Push once  dextrose 50% Injectable 25 Gram(s) IV Push once  gabapentin 100 milliGRAM(s) Oral two times a day  glucagon  Injectable 1 milliGRAM(s) IntraMuscular once  hydrALAZINE 75 milliGRAM(s) Oral three times a day  influenza  Vaccine (HIGH DOSE) 0.7 milliLiter(s) IntraMuscular once  insulin lispro (ADMELOG) corrective regimen sliding scale   SubCutaneous three times a day before meals  insulin lispro (ADMELOG) corrective regimen sliding scale   SubCutaneous at bedtime  latanoprost 0.005% Ophthalmic Solution 1 Drop(s) Both EYES at bedtime  levothyroxine 75 MICROGram(s) Oral daily  piperacillin/tazobactam IVPB.. 3.375 Gram(s) IV Intermittent every 8 hours  senna 2 Tablet(s) Oral at bedtime  simvastatin 20 milliGRAM(s) Oral at bedtime  vancomycin  IVPB 1000 milliGRAM(s) IV Intermittent once      VITALS:  T(F): 98.9 (11-18-20 @ 17:39), Max: 98.9 (11-18-20 @ 17:39)  HR: 84 (11-18-20 @ 17:39)  BP: 135/66 (11-18-20 @ 17:39)  RR: 18 (11-18-20 @ 17:39)  SpO2: 96% (11-18-20 @ 17:39)  Wt(kg): --    11-17 @ 07:01  -  11-18 @ 07:00  --------------------------------------------------------  IN: 0 mL / OUT: 700 mL / NET: -700 mL      PHYSICAL EXAM:  Constitutional: NAD  HEENT: anicteric sclera  Neck: No JVD  Respiratory: CTAB, no wheezes, rales or rhonchi  Cardiovascular: S1, S2, RRR  Gastrointestinal: BS+, soft, NT/ND  Extremities: trace peripheral edema, left foot dressing+  Neurological: A/O x 3  Psychiatric: Normal mood, normal affect  : No michaud.       LABS:  11-18    140  |  98  |  60<H>  ----------------------------<  118<H>  3.8   |  31  |  2.40<H>    Ca    9.0      18 Nov 2020 06:42  Phos  3.2     11-18  Mg     1.9     11-18      Creatinine Trend: 2.40 <--, 2.05 <--, 2.04 <--, 2.18 <--, 2.08 <--                        8.2    8.51  )-----------( 200      ( 18 Nov 2020 06:42 )             26.6     Urine Studies:        RADIOLOGY & ADDITIONAL STUDIES:

## 2020-11-18 NOTE — CHART NOTE - NSCHARTNOTEFT_GEN_A_CORE
RN called this evening to notify me that the pt is c/o loss of taste and decreased appetite for today. Pt is already in a private room due to +MRSA/Kleb. COVID Swab sent STAT to r/o possible infection.

## 2020-11-18 NOTE — PROGRESS NOTE ADULT - ASSESSMENT
89 y/o female pmh htn, dm, ckd, afib on coumadin c/o infected L toes. Pt admits to having dry gangrene to L first toe and was admitted in the Hospital in August. Pt states that over the last several days, her L 2nd toe has become swollen, painful, red and has drainage. Pt went to her podiatrist, Dr. Eaton, today who sent pt to the ER for wet gangrene. Pt denies chest pain, sob, abd pain, n/v/d, weakness, dizziness ,syncope, fever or chills. (14 Nov 2020 19:20)    ER vss, afebrile.  ESR 57, CRP 51.  WBC 8.7.  Abscess cx staph aureus, GPC pairs, proteus,   klebsiella.  Pt with early wet gangrene to the left hallux and 2nd digit, malodor present, bogginess to the left 2nd digit, 5 cc of sangiopurulence, probing to bone, no erythema.  s/p I&D to the left 1st interspace, with 5cc of sangiopurulence.  Cultures sent.   Pt on vanco/cefepime.      No revascularization options as per Vascular.  Pt already had LLE angiography on 7/28/2020 demonstrates single-vessel runoff without visualization of pedal vasculature.     L ft infection:    - Pt with early wet gangrene to left hallus and 2nd digit, (+) probe to bone, s/p I&D.  Abscess cultures noted.    - Cont vanco (by level).  Change from cefepime to zosyn for additional anerobic coverage.  f/u final culture results.      - Pt with h/o underlying PAD, no options for revascularization or further surgery.   No further intervention by Podiatry as wound unlikely to heal.    - Abx alone will not treat pt's gangrene.  I see no utility in long term IV abx in this case, as wound is unlikely to heal given PAD, and abx will have poor penetration to the area given her compromised circulation.      Change to PO doxycycline 100mg bid and augmentin 500mg qdaily x 2 weeks.  Pt at increased risk for recurrent infections and forsee some type of surgical procedure in the future for source control.      Cont local wound care.     - f/u with podiatry/vascular.     - Vanco 1gm IV redosed today.  Change to PO upon discharge.       Will follow,    Melissa Escobedo  [phone]

## 2020-11-19 ENCOUNTER — TRANSCRIPTION ENCOUNTER (OUTPATIENT)
Age: 85
End: 2020-11-19

## 2020-11-19 LAB
ANION GAP SERPL CALC-SCNC: 14 MMO/L — SIGNIFICANT CHANGE UP (ref 7–14)
BUN SERPL-MCNC: 61 MG/DL — HIGH (ref 7–23)
CALCIUM SERPL-MCNC: 8.4 MG/DL — SIGNIFICANT CHANGE UP (ref 8.4–10.5)
CHLORIDE SERPL-SCNC: 97 MMOL/L — LOW (ref 98–107)
CO2 SERPL-SCNC: 28 MMOL/L — SIGNIFICANT CHANGE UP (ref 22–31)
CREAT SERPL-MCNC: 2.4 MG/DL — HIGH (ref 0.5–1.3)
CULTURE RESULTS: SIGNIFICANT CHANGE UP
CULTURE RESULTS: SIGNIFICANT CHANGE UP
GLUCOSE BLDC GLUCOMTR-MCNC: 103 MG/DL — HIGH (ref 70–99)
GLUCOSE BLDC GLUCOMTR-MCNC: 123 MG/DL — HIGH (ref 70–99)
GLUCOSE BLDC GLUCOMTR-MCNC: 136 MG/DL — HIGH (ref 70–99)
GLUCOSE BLDC GLUCOMTR-MCNC: 159 MG/DL — HIGH (ref 70–99)
GLUCOSE SERPL-MCNC: 111 MG/DL — HIGH (ref 70–99)
HCT VFR BLD CALC: 27.6 % — LOW (ref 34.5–45)
HGB BLD-MCNC: 8.3 G/DL — LOW (ref 11.5–15.5)
INR BLD: 3.28 — HIGH (ref 0.88–1.16)
MCHC RBC-ENTMCNC: 26.4 PG — LOW (ref 27–34)
MCHC RBC-ENTMCNC: 30.1 % — LOW (ref 32–36)
MCV RBC AUTO: 87.9 FL — SIGNIFICANT CHANGE UP (ref 80–100)
NRBC # FLD: 0 K/UL — SIGNIFICANT CHANGE UP (ref 0–0)
PLATELET # BLD AUTO: 210 K/UL — SIGNIFICANT CHANGE UP (ref 150–400)
PMV BLD: 11.4 FL — SIGNIFICANT CHANGE UP (ref 7–13)
POTASSIUM SERPL-MCNC: 3.5 MMOL/L — SIGNIFICANT CHANGE UP (ref 3.5–5.3)
POTASSIUM SERPL-SCNC: 3.5 MMOL/L — SIGNIFICANT CHANGE UP (ref 3.5–5.3)
PROTHROM AB SERPL-ACNC: 35.3 SEC — HIGH (ref 10.6–13.6)
RBC # BLD: 3.14 M/UL — LOW (ref 3.8–5.2)
RBC # FLD: 17.3 % — HIGH (ref 10.3–14.5)
SARS-COV-2 RNA SPEC QL NAA+PROBE: SIGNIFICANT CHANGE UP
SODIUM SERPL-SCNC: 139 MMOL/L — SIGNIFICANT CHANGE UP (ref 135–145)
SPECIMEN SOURCE: SIGNIFICANT CHANGE UP
SPECIMEN SOURCE: SIGNIFICANT CHANGE UP
WBC # BLD: 9.02 K/UL — SIGNIFICANT CHANGE UP (ref 3.8–10.5)
WBC # FLD AUTO: 9.02 K/UL — SIGNIFICANT CHANGE UP (ref 3.8–10.5)

## 2020-11-19 RX ORDER — OXYCODONE HYDROCHLORIDE 5 MG/1
5 TABLET ORAL EVERY 8 HOURS
Refills: 0 | Status: DISCONTINUED | OUTPATIENT
Start: 2020-11-19 | End: 2020-11-24

## 2020-11-19 RX ORDER — WARFARIN SODIUM 2.5 MG/1
3 TABLET ORAL ONCE
Refills: 0 | Status: COMPLETED | OUTPATIENT
Start: 2020-11-19 | End: 2020-11-19

## 2020-11-19 RX ADMIN — LATANOPROST 1 DROP(S): 0.05 SOLUTION/ DROPS OPHTHALMIC; TOPICAL at 21:44

## 2020-11-19 RX ADMIN — WARFARIN SODIUM 3 MILLIGRAM(S): 2.5 TABLET ORAL at 17:22

## 2020-11-19 RX ADMIN — SENNA PLUS 2 TABLET(S): 8.6 TABLET ORAL at 21:43

## 2020-11-19 RX ADMIN — GABAPENTIN 100 MILLIGRAM(S): 400 CAPSULE ORAL at 17:23

## 2020-11-19 RX ADMIN — CINACALCET 60 MILLIGRAM(S): 30 TABLET, FILM COATED ORAL at 13:30

## 2020-11-19 RX ADMIN — Medication 2: at 17:22

## 2020-11-19 RX ADMIN — OXYCODONE HYDROCHLORIDE 5 MILLIGRAM(S): 5 TABLET ORAL at 18:09

## 2020-11-19 RX ADMIN — Medication 100 MILLIGRAM(S): at 13:30

## 2020-11-19 RX ADMIN — AMLODIPINE BESYLATE 10 MILLIGRAM(S): 2.5 TABLET ORAL at 05:34

## 2020-11-19 RX ADMIN — Medication 75 MICROGRAM(S): at 05:34

## 2020-11-19 RX ADMIN — PIPERACILLIN AND TAZOBACTAM 25 GRAM(S): 4; .5 INJECTION, POWDER, LYOPHILIZED, FOR SOLUTION INTRAVENOUS at 13:30

## 2020-11-19 RX ADMIN — Medication 75 MILLIGRAM(S): at 21:44

## 2020-11-19 RX ADMIN — Medication 30 MILLILITER(S): at 13:30

## 2020-11-19 RX ADMIN — OXYCODONE HYDROCHLORIDE 5 MILLIGRAM(S): 5 TABLET ORAL at 19:02

## 2020-11-19 RX ADMIN — Medication 75 MILLIGRAM(S): at 05:34

## 2020-11-19 RX ADMIN — SIMVASTATIN 20 MILLIGRAM(S): 20 TABLET, FILM COATED ORAL at 21:44

## 2020-11-19 RX ADMIN — GABAPENTIN 100 MILLIGRAM(S): 400 CAPSULE ORAL at 05:34

## 2020-11-19 RX ADMIN — Medication 75 MILLIGRAM(S): at 13:30

## 2020-11-19 RX ADMIN — PIPERACILLIN AND TAZOBACTAM 25 GRAM(S): 4; .5 INJECTION, POWDER, LYOPHILIZED, FOR SOLUTION INTRAVENOUS at 21:44

## 2020-11-19 RX ADMIN — PIPERACILLIN AND TAZOBACTAM 25 GRAM(S): 4; .5 INJECTION, POWDER, LYOPHILIZED, FOR SOLUTION INTRAVENOUS at 05:33

## 2020-11-19 NOTE — PROGRESS NOTE ADULT - SUBJECTIVE AND OBJECTIVE BOX
Nephrology Followup Note - [phone] - Dr Catherine / Dr Garcia / Dr Tellez / Dr Sullivan / Dr Castillo / Dr Grijalva / Dr Segovia / Dr Luna  Pt seen and examined at bedside  pt c/o fatigue and anorexia. Denies overt pain, n/v/f/c, or sob.     Allergies:  No Known Allergies    Hospital Medications:   MEDICATIONS  (STANDING):  allopurinol 100 milliGRAM(s) Oral daily  amLODIPine   Tablet 10 milliGRAM(s) Oral daily  cinacalcet 60 milliGRAM(s) Oral daily  dextrose 40% Gel 15 Gram(s) Oral once  dextrose 5%. 1000 milliLiter(s) (50 mL/Hr) IV Continuous <Continuous>  dextrose 5%. 1000 milliLiter(s) (100 mL/Hr) IV Continuous <Continuous>  dextrose 50% Injectable 25 Gram(s) IV Push once  dextrose 50% Injectable 12.5 Gram(s) IV Push once  dextrose 50% Injectable 25 Gram(s) IV Push once  gabapentin 100 milliGRAM(s) Oral two times a day  glucagon  Injectable 1 milliGRAM(s) IntraMuscular once  hydrALAZINE 75 milliGRAM(s) Oral three times a day  influenza  Vaccine (HIGH DOSE) 0.7 milliLiter(s) IntraMuscular once  insulin lispro (ADMELOG) corrective regimen sliding scale   SubCutaneous three times a day before meals  insulin lispro (ADMELOG) corrective regimen sliding scale   SubCutaneous at bedtime  latanoprost 0.005% Ophthalmic Solution 1 Drop(s) Both EYES at bedtime  levothyroxine 75 MICROGram(s) Oral daily  piperacillin/tazobactam IVPB.. 3.375 Gram(s) IV Intermittent every 8 hours  senna 2 Tablet(s) Oral at bedtime  simvastatin 20 milliGRAM(s) Oral at bedtime  warfarin 3 milliGRAM(s) Oral once    VITALS:  T(F): 98 (11-19-20 @ 13:25), Max: 98.9 (11-18-20 @ 17:39)  HR: 82 (11-19-20 @ 13:25)  BP: 132/65 (11-19-20 @ 13:25)  RR: 16 (11-19-20 @ 13:25)  SpO2: 95% (11-19-20 @ 13:25)  Wt(kg): --    11-18 @ 07:01  -  11-19 @ 07:00  --------------------------------------------------------  IN: 480 mL / OUT: 300 mL / NET: 180 mL        PHYSICAL EXAM:  Constitutional: NAD  HEENT: anicteric sclera, oropharynx clear, MMM  Neck: No JVD  Respiratory: CTAB, no wheezes, rales or rhonchi  Cardiovascular: S1, S2, RRR  Gastrointestinal: BS+, soft, NT/ND  Extremities: No cyanosis or clubbing. No peripheral edema  Neurological: A/O x 3, no focal deficits  Psychiatric: Normal mood, normal affect  : No CVA tenderness. No michaud.   Skin: No rashes    LABS:  11-19    139  |  97<L>  |  61<H>  ----------------------------<  111<H>  3.5   |  28  |  2.40<H>    Ca    8.4      19 Nov 2020 06:23  Phos  3.2     11-18  Mg     1.9     11-18      Creatinine Trend: 2.40 <--, 2.40 <--, 2.05 <--, 2.04 <--, 2.18 <--, 2.08 <--                        8.3    9.02  )-----------( 210      ( 19 Nov 2020 06:23 )             27.6     Urine Studies:      RADIOLOGY & ADDITIONAL STUDIES:

## 2020-11-19 NOTE — DISCHARGE NOTE NURSING/CASE MANAGEMENT/SOCIAL WORK - NSSCNAMETXT_GEN_ALL_CORE
[facility] [phone] Nurse to visit on the day following discharge. Other appropriate services to be arranged thereafter. Please contact the home care agency at the above phone number if you have not heard from them by approximately 12 noon on the day after your hospital discharge.

## 2020-11-19 NOTE — PROGRESS NOTE ADULT - ASSESSMENT
89 y/o female pmh htn, dm, ckd, afib on coumadin c/o infected L toes. Pt admits to having dry gangrene to L first toe and was admitted in the Hospital in August. Pt states that over th elast several days, her L 2nd toe has become swollen, painful, red and has drainage. Pt went to her podiatrist, Dr. Eaton, today who sent pt to the ER for wet gangrene. Pt denies chest pain, sob, abd pain, n/v/d, weakness, dizziness ,syncope, fever or chills.     Problem/Plan - 1:  ·  Problem: Gangrene.  Plan: S/P I&D by Podiatry . IV abxs and ID helping.  Vascular helping.    No surgical intervention per Podiatry and vascular.      Problem/Plan - 2:  ·  Problem: CKD (chronic kidney disease) with NIKHIL .  Plan:  Renal following.     Problem/Plan - 3:  ·  Problem: PVD (peripheral vascular disease).  Plan: Vascular helping.      Problem/Plan - 4:  ·  Problem: HTN (hypertension).  Plan: BP meds with hold parameters.      Problem/Plan - 5:  ·  Problem: CHF (congestive heart failure).  Plan: Holding Lasix as Euvolemic.      Problem/Plan - 6:  Problem: DM (diabetes mellitus). Plan: SSI for now.     Problem/Plan - 7:  ·  Problem: Afib.  Plan: On AC . INR went up so reducing dose of coumadin and rechecking in AM.      Problem/Plan - 8:  ·  Problem: Hypothyroid.  Plan: Home dose synthroid.      Problem/Plan - 9:  ·  Problem: Hyperparathyroidism .  Plan: Parathyroid scan outpt . Ca level normal now.      Problem/Plan - 10:  Problem: Pulmonary embolism. Plan; On AC.     Problem/Plan - 11:  ·  Problem: Delirium .  Plan: Infection VS Hospital induced. Better.     Disposition : DC planning home on hold.

## 2020-11-19 NOTE — PROGRESS NOTE ADULT - ASSESSMENT
89 y/o female pmh htn, dm, ckd, afib on coumadin c/o infected L toes. Pt admits to having dry gangrene to L first toe and was admitted in the Hospital in August. Pt states that over the last several days, her L 2nd toe has become swollen, painful, red and has drainage. Pt went to her podiatrist, Dr. Eaton, today who sent pt to the ER for wet gangrene. Pt denies chest pain, sob, abd pain, n/v/d, weakness, dizziness ,syncope, fever or chills. (14 Nov 2020 19:20)    ER vss, afebrile.  ESR 57, CRP 51.  WBC 8.7.  Abscess cx staph aureus, GPC pairs, proteus,   klebsiella.  Pt with early wet gangrene to the left hallux and 2nd digit, malodor present, bogginess to the left 2nd digit, 5 cc of sangiopurulence, probing to bone, no erythema.  s/p I&D to the left 1st interspace, with 5cc of sangiopurulence.  Cultures sent.   Pt on vanco/cefepime.      No revascularization options as per Vascular.  Pt already had LLE angiography on 7/28/2020 demonstrates single-vessel runoff without visualization of pedal vasculature.     L ft infection:    - Pt with early wet gangrene to left hallus and 2nd digit, (+) probe to bone, s/p I&D.  Abscess cultures noted.    - Cont vanco (by level).  Change from cefepime to zosyn for additional anerobic coverage.  f/u final culture results.      - Pt with h/o underlying PAD, no options for revascularization or further surgery.   No further intervention by Podiatry as wound unlikely to heal.    - Abx alone will not treat pt's gangrene.  I see no utility in long term IV abx in this case, as wound is unlikely to heal given PAD, and abx will have poor penetration to the area given her compromised circulation.      Change to PO doxycycline 100mg bid and augmentin 500mg qdaily x 2 weeks.  Pt at increased risk for recurrent infections and forsee some type of surgical procedure in the future for source control.      Cont local wound care.     - f/u with podiatry/vascular.     - Vanco 1gm IV redosed 11/18.  Will check next level 11/20 AM (maintain trough between 10-15)  Change to PO abx upon discharge.       Will follow,    Melissa Escobedo  [phone]

## 2020-11-19 NOTE — PROGRESS NOTE ADULT - SUBJECTIVE AND OBJECTIVE BOX
EP ATTENDING    no tele  she denies palpitations, syncope, nor angina, ROS otherwise -      Review of Systems:   Constitutional: [ ] fevers, [ ] chills.   Skin: [ ] dry skin. [ ] rashes.  Psychiatric: [ ] depression, [ ] anxiety.   Gastrointestinal: [ ] BRBPR, [ ] melena.   Neurological: [ ] confusion. [ ] seizures. [ ] shuffling gait.   Ears,Nose,Mouth and Throat: [ ] ear pain [ ] sore throat.   Eyes: [ ] diplopia.   Respiratory: [ ] hemoptysis. [ ] shortness of breath  Cardiovascular: See HPI above  Hematologic/Lymphatic: [ ] anemia. [ ] painful nodes. [ ] prolonged bleeding.   Genitourinary: [ ] hematuria. [ ] flank pain.   Endocrine: [ ] significant change in weight. [ ] intolerance to heat and cold.     Review of systems [ x] otherwise negative, [ ] otherwise unable to obtain    FH: no family history of sudden cardiac death in first degree relatives    SH: [ ] tobacco, [ ] alcohol, [ ] drugs    acetaminophen   Tablet .. 650 milliGRAM(s) Oral every 6 hours PRN  allopurinol 100 milliGRAM(s) Oral daily  aluminum hydroxide/magnesium hydroxide/simethicone Suspension 30 milliLiter(s) Oral every 6 hours PRN  amLODIPine   Tablet 10 milliGRAM(s) Oral daily  cinacalcet 60 milliGRAM(s) Oral daily  dextrose 40% Gel 15 Gram(s) Oral once  dextrose 5%. 1000 milliLiter(s) IV Continuous <Continuous>  dextrose 5%. 1000 milliLiter(s) IV Continuous <Continuous>  dextrose 50% Injectable 12.5 Gram(s) IV Push once  dextrose 50% Injectable 25 Gram(s) IV Push once  dextrose 50% Injectable 25 Gram(s) IV Push once  gabapentin 100 milliGRAM(s) Oral two times a day  glucagon  Injectable 1 milliGRAM(s) IntraMuscular once  hydrALAZINE 75 milliGRAM(s) Oral three times a day  influenza  Vaccine (HIGH DOSE) 0.7 milliLiter(s) IntraMuscular once  insulin lispro (ADMELOG) corrective regimen sliding scale   SubCutaneous three times a day before meals  insulin lispro (ADMELOG) corrective regimen sliding scale   SubCutaneous at bedtime  latanoprost 0.005% Ophthalmic Solution 1 Drop(s) Both EYES at bedtime  levothyroxine 75 MICROGram(s) Oral daily  oxyCODONE    IR 5 milliGRAM(s) Oral every 8 hours PRN  piperacillin/tazobactam IVPB.. 3.375 Gram(s) IV Intermittent every 8 hours  senna 2 Tablet(s) Oral at bedtime  simvastatin 20 milliGRAM(s) Oral at bedtime                            8.3    9.02  )-----------( 210      ( 19 Nov 2020 06:23 )             27.6       11-19    139  |  97<L>  |  61<H>  ----------------------------<  111<H>  3.5   |  28  |  2.40<H>    Ca    8.4      19 Nov 2020 06:23  Phos  3.2     11-18  Mg     1.9     11-18    T(C): 36.3 (11-19-20 @ 10:51), Max: 37.2 (11-18-20 @ 17:39)  HR: 84 (11-19-20 @ 10:51) (63 - 84)  BP: 135/76 (11-19-20 @ 10:51) (135/66 - 155/76)  RR: 16 (11-19-20 @ 10:51) (14 - 18)  SpO2: 96% (11-19-20 @ 10:51) (96% - 97%)  Wt(kg): --    I&O's Summary    18 Nov 2020 07:01  -  19 Nov 2020 07:00  --------------------------------------------------------  IN: 480 mL / OUT: 300 mL / NET: 180 mL    General: Well nourished in no acute distress. Alert and Oriented * 3.   Head: Normocephalic and atraumatic.   Neck: No JVD. No bruits. Supple. Does not appear to be enlarged.   Cardiovascular: + S1,S2 ; RRR Soft systolic murmur at the left lower sternal border. No rubs noted.    Lungs: CTA b/l. No rhonchi, rales or wheezes.   Abdomen: + BS, soft. Non tender. Non distended. No rebound. No guarding.   Extremities: No clubbing/cyanosis/edema.   Neurologic: Moves all four extremities. Full range of motion.   Skin: Warm and moist. The patient's skin has normal elasticity and good skin turgor.   Psychiatric: Appropriate mood and affect.  Musculoskeletal: Normal range of motion, normal strength    echo: severe AS, normal LVEF    A/P) She is an extremely pleasant 89 year old female with a past medical history of atrial fibrillation, managed with Coumadin therapy. More recently, she was admitted to the hospital with lower GI bleeding from diverticulosis. She underwent a colonoscopy that did not require any intervention and her anticoagulation was restarted. She is now referred to me for left atrial appendage occlusion. She denies palpitations, syncope, or angina. She is a/w with a RLE wound, and is being followed by vascular surgery    -please continue coumadin to keep INR 2-3  -as long as she can tolerate coumadin I would not recommend Watchman given her advanced age  -no inpatient EP procedures expected  -f/u with Antonella after discharge    Simon Jung M.D.  Cardiac Electrophysiology  [phone]

## 2020-11-19 NOTE — PROGRESS NOTE ADULT - ASSESSMENT
89 y/o female pmh htn, dm, ckd, afib on coumadin p/w c/o infected L toes. pt being admitted for wet gangrene. Renal following for NIKHIL/CKD, vol Mx.     NIKHIL on CKD3: b/l SCr around 1.6 8/2020  Scr stable around 2.4  Resolved LE edema.   diuretics on hold.   low Na diet and fluid restriction  monitor BMP daily   dose all meds for eGFR<15ml/min.   avoid ACEi/ARB for now/NSAIDs/Nephrotoxics.    Hypokalemia -K better  HTN, bp controlled well  Hypercalcemia- improved, now 9   phos wnl,  high, [id number] level 76 noted. avoid ca/vitD suplements   c/w sensipar   wet Gangrene L foot- Mx per Podiatry. f/u w/vas sx. on vanco and Zosyn. monitor vanco trough levels    labs, chart reviewed

## 2020-11-19 NOTE — PROGRESS NOTE ADULT - SUBJECTIVE AND OBJECTIVE BOX
Infectious Diseases progress note:    Subjective: NAD, afebrile.  No new somatic complaints.     ROS:  CONSTITUTIONAL:  No fever, chills, rigors  CARDIOVASCULAR:  No chest pain or palpitations  RESPIRATORY:   No SOB, cough, dyspnea on exertion.  No wheezing  GASTROINTESTINAL:  No abd pain, N/V, diarrhea/constipation  EXTREMITIES:  No swelling or joint pain  GENITOURINARY:  No burning on urination, increased frequency or urgency.  No flank pain  NEUROLOGIC:  No HA, visual disturbances  SKIN: No rashes    Allergies    No Known Allergies    Intolerances        ANTIBIOTICS/RELEVANT:  antimicrobials  piperacillin/tazobactam IVPB.. 3.375 Gram(s) IV Intermittent every 8 hours    immunologic:  influenza  Vaccine (HIGH DOSE) 0.7 milliLiter(s) IntraMuscular once    OTHER:  acetaminophen   Tablet .. 650 milliGRAM(s) Oral every 6 hours PRN  allopurinol 100 milliGRAM(s) Oral daily  aluminum hydroxide/magnesium hydroxide/simethicone Suspension 30 milliLiter(s) Oral every 6 hours PRN  amLODIPine   Tablet 10 milliGRAM(s) Oral daily  cinacalcet 60 milliGRAM(s) Oral daily  dextrose 40% Gel 15 Gram(s) Oral once  dextrose 5%. 1000 milliLiter(s) IV Continuous <Continuous>  dextrose 5%. 1000 milliLiter(s) IV Continuous <Continuous>  dextrose 50% Injectable 25 Gram(s) IV Push once  dextrose 50% Injectable 12.5 Gram(s) IV Push once  dextrose 50% Injectable 25 Gram(s) IV Push once  gabapentin 100 milliGRAM(s) Oral two times a day  glucagon  Injectable 1 milliGRAM(s) IntraMuscular once  hydrALAZINE 75 milliGRAM(s) Oral three times a day  insulin lispro (ADMELOG) corrective regimen sliding scale   SubCutaneous three times a day before meals  insulin lispro (ADMELOG) corrective regimen sliding scale   SubCutaneous at bedtime  latanoprost 0.005% Ophthalmic Solution 1 Drop(s) Both EYES at bedtime  levothyroxine 75 MICROGram(s) Oral daily  oxyCODONE    IR 5 milliGRAM(s) Oral every 8 hours PRN  senna 2 Tablet(s) Oral at bedtime  simvastatin 20 milliGRAM(s) Oral at bedtime  warfarin 3 milliGRAM(s) Oral once      Objective:  Vital Signs Last 24 Hrs  T(C): 36.7 (19 Nov 2020 13:25), Max: 37.2 (18 Nov 2020 17:39)  T(F): 98 (19 Nov 2020 13:25), Max: 98.9 (18 Nov 2020 17:39)  HR: 82 (19 Nov 2020 13:25) (63 - 84)  BP: 132/65 (19 Nov 2020 13:25) (132/65 - 155/76)  BP(mean): 94 (19 Nov 2020 05:30) (94 - 98)  RR: 16 (19 Nov 2020 13:25) (14 - 18)  SpO2: 95% (19 Nov 2020 13:25) (95% - 97%)    PHYSICAL EXAM:  Constitutional:NAD  Eyes:OLIVIA, EOMI  Ear/Nose/Throat: no thrush, mucositis.  Moist mucous membranes	  Neck:no JVD, no lymphadenopathy, supple  Respiratory: CTA adina  Cardiovascular: S1S2 RRR, no murmurs  Gastrointestinal:soft, nontender,  nondistended (+) BS  Extremities:no e/e/c  Skin:  no rashes, open wounds or ulcerations        LABS:                        8.3    9.02  )-----------( 210      ( 19 Nov 2020 06:23 )             27.6     11-19    139  |  97<L>  |  61<H>  ----------------------------<  111<H>  3.5   |  28  |  2.40<H>    Ca    8.4      19 Nov 2020 06:23  Phos  3.2     11-18  Mg     1.9     11-18      PT/INR - ( 19 Nov 2020 06:23 )   PT: 35.3 SEC;   INR: 3.28                  Vancomycin Level, Random:  ug/mL (11-17 @ 19:27)  Vancomycin Level, Random:  ug/mL (11-16 @ 15:55)                  MICROBIOLOGY:          RADIOLOGY & ADDITIONAL STUDIES:    Culture - Abscess with Gram Stain (11.14.20 @ 18:00)   - Linezolid: S 2   - Meropenem: S <=1   - Meropenem: S <=1   - Oxacillin: R >2   - Penicillin: R >8   - Piperacillin/Tazobactam: S <=8   - Piperacillin/Tazobactam: S <=8   - RIF- Rifampin: S <=1 Should not be used as monotherapy   - Tetra/Doxy: S <=1   - Tetra/Doxy: R >8   - Tobramycin: S <=2   - Tobramycin: S <=2   - Trimethoprim/Sulfamethoxazole: S <=0.5/9.5   - Trimethoprim/Sulfamethoxazole: R >2/38   - Trimethoprim/Sulfamethoxazole: S <=0.5/9.5   - Vancomycin: S 1   - Vancomycin: S 2   - Amikacin: S <=16   - Amikacin: S <=16   - Amoxicillin/Clavulanic Acid: S <=8/4   - Amoxicillin/Clavulanic Acid: S <=8/4   - Ampicillin: S <=8 These ampicillin results predict results for amoxicillin   - Ampicillin: R 16 These ampicillin results predict results for amoxicillin   - Ampicillin: S <=2 Predicts results to ampicillin/sulbactam, amoxacillin-clavulanate and piperacillin-tazobactam.   - Ampicillin/Sulbactam: S <=4/2 Enterobacter, Citrobacter, and Serratia may develop resistance during prolonged therapy (3-4 days)   - Ampicillin/Sulbactam: S <=4/2 Enterobacter, Citrobacter, and Serratia may develop resistance during prolonged therapy (3-4 days)   - Ampicillin/Sulbactam: R <=8/4   - Aztreonam: S <=4   - Aztreonam: S <=4   - Cefazolin: I 4 Enterobacter, Citrobacter, and Serratia may develop resistance during prolonged therapy (3-4 days)   - Cefazolin: S <=2 Enterobacter, Citrobacter, and Serratia may develop resistance during prolonged therapy (3-4 days)   - Cefazolin: R 16   - Cefepime: S <=2   - Cefepime: S <=2   - Cefoxitin: S <=8   - Cefoxitin: S <=8   - Ceftriaxone: S <=1 Enterobacter, Citrobacter, and Serratia may develop resistance during prolonged therapy   - Ceftriaxone: S <=1 Enterobacter, Citrobacter, and Serratia may develop resistance during prolonged therapy   - Ciprofloxacin: S <=0.25   - Ciprofloxacin: S <=0.25   - Clindamycin: S 0.5   - Daptomycin: S 1   - Ertapenem: S <=0.5   - Ertapenem: S <=0.5   - Erythromycin: S <=0.25   - Gentamicin: S <=1 Should not be used as monotherapy   - Gentamicin: S <=2   - Gentamicin: S <=2   - Imipenem: S <=1   - Levofloxacin: S <=0.5   - Levofloxacin: S <=0.5   Specimen Source: .Abscess left foot wound   Culture Results:   After additional incubation time, Culture yields >4 types of aerobic   and/or anaerobic bacteria   Call client services within 7 days if further workup is clinically   indicated. Culture includes   Moderate Methicillin resistant Staphylococcus aureus   Few Proteus mirabilis   Rare Klebsiella oxytoca/Raoutella ornithinolytica   Moderate Enterococcus faecalis   Organism Identification: Methicillin resistant Staphylococcus aureus   Enterococcus faecalis   Proteus mirabilis   Klebsiella oxytoca /Raoutella ornithinolytica   Organism: Methicillin resistant Staphylococcus aureus   Organism: Enterococcus faecalis   Organism: Proteus mirabilis   Organism: Klebsiella oxytoca /Raoutella ornithinolytica   Method Type: ALTA   Method Type: ALTA   Method Type: ALTA   Method Type: ALTA       Historical Values  Culture - Abscess with Gram Stain (11.14.20 @ 18:00)   - Linezolid: S 2   - Meropenem: S <=1   - Meropenem: S <=1   - Oxacillin: R >2   - Penicillin: R >8   - Piperacillin/Tazobactam: S <=8   - Piperacillin/Tazobactam: S <=8   - RIF- Rifampin: S <=1 Should not be used as monotherapy   - Tetra/Doxy: S <=1   - Tetra/Doxy: R >8   - Tobramycin: S <=2   - Tobramycin: S <=2   - Trimethoprim/Sulfamethoxazole: S <=0.5/9.5   - Trimethoprim/Sulfamethoxazole: R >2/38   - Trimethoprim/Sulfamethoxazole: S <=0.5/9.5   - Vancomycin: S 1   - Vancomycin: S 2   - Amikacin: S <=16   - Amikacin: S <=16   - Amoxicillin/Clavulanic Acid: S <=8/4   - Amoxicillin/Clavulanic Acid: S <=8/4   - Ampicillin: S <=8 These ampicillin results predict results for amoxicillin   - Ampicillin: R 16 These ampicillin results predict results for amoxicillin   - Ampicillin: S <=2 Predicts results to ampicillin/sulbactam, amoxacillin-clavulanate and piperacillin-tazobactam.   - Ampicillin/Sulbactam: S <=4/2 Enterobacter, Citrobacter, and Serratia may develop resistance during prolonged therapy (3-4 days)   - Ampicillin/Sulbactam: S <=4/2 Enterobacter, Citrobacter, and Serratia may develop resistance during prolonged therapy (3-4 days)   - Ampicillin/Sulbactam: R <=8/4   - Aztreonam: S <=4   - Aztreonam: S <=4   - Cefazolin: I 4 Enterobacter, Citrobacter, and Serratia may develop resistance during prolonged therapy (3-4 days)   - Cefazolin: S <=2 Enterobacter, Citrobacter, and Serratia may develop resistance during prolonged therapy (3-4 days)   - Cefazolin: R 16   - Cefepime: S <=2   - Cefepime: S <=2   - Cefoxitin: S <=8   - Cefoxitin: S <=8   - Ceftriaxone: S <=1 Enterobacter, Citrobacter, and Serratia may develop resistance during prolonged therapy   - Ceftriaxone: S <=1 Enterobacter, Citrobacter, and Serratia may develop resistance during prolonged therapy   - Ciprofloxacin: S <=0.25   - Ciprofloxacin: S <=0.25   - Clindamycin: S 0.5   - Daptomycin: S 1   - Ertapenem: S <=0.5   - Ertapenem: S <=0.5   - Erythromycin: S <=0.25   - Gentamicin: S <=1 Should not be used as monotherapy   - Gentamicin: S <=2   - Gentamicin: S <=2   - Imipenem: S <=1   - Levofloxacin: S <=0.5   - Levofloxacin: S <=0.5   Specimen Source: .Abscess left foot wound   Culture Results:   After additional incubation time, Culture yields >4 types of aerobic   and/or anaerobic bacteria   Call client services within 7 days if further workup is clinically   indicated. Culture includes   Moderate Methicillin resistant Staphylococcus aureus   Few Proteus mirabilis   Rare Klebsiella oxytoca/Raoutella ornithinolytica   Moderate Enterococcus faecalis   Organism Identification: Methicillin resistant Staphylococcus aureus   Enterococcus faecalis   Proteus mirabilis   Klebsiella oxytoca /Raoutella ornithinolytica   Organism: Methicillin resistant Staphylococcus aureus   Organism: Enterococcus faecalis   Organism: Proteus mirabilis   Organism: Klebsiella oxytoca /Raoutella ornithinolytica   Method Type: ALTA   Method Type: ALTA   Method Type: ALTA   Method Type: ALTA   Culture - Abscess with Gram Stain (07.21.20 @ 21:26)   - Vancomycin: S 2   - Vancomycin: S 2   - Tobramycin: S <=2   - Trimethoprim/Sulfamethoxazole: S <=0.5/9.5   - Trimethoprim/Sulfamethoxazole: R >2/38   - RIF- Rifampin: S <=1 Should not be used as monotherapy   - Tetra/Doxy: S <=1   - Tetra/Doxy: R >8   - Penicillin: R >8   - Piperacillin/Tazobactam: S <=8   - Meropenem: S <=1   - Oxacillin: R >2   - Amikacin: S <=16   - Amoxicillin/Clavulanic Acid: S <=8/4   - Ampicillin: S <=8 These ampicillin results predict results for amoxicillin   - Ampicillin: S <=2 Predicts results to ampicillin/sulbactam, amoxacillin-clavulanate and piperacillin-tazobactam.   - Ampicillin/Sulbactam: R <=8/4   - Ampicillin/Sulbactam: S <=4/2 Enterobacter, Citrobacter, and Serratia may develop resistance during prolonged therapy (3-4 days)   - Levofloxacin: S <=0.5   - Linezolid: S 2   - Gentamicin: S <=1 Should not be used as monotherapy   - Gentamicin: S <=2   - Ertapenem: S <=0.5   - Erythromycin: S <=0.25   - Clindamycin: S <=0.25   - Daptomycin: S 0.5   - Ceftriaxone: S <=1 Enterobacter, Citrobacter, and Serratia may develop resistance during prolonged therapy   - Ciprofloxacin: S <=0.25   - Cefepime: S <=2   - Cefoxitin: S <=8   - Aztreonam: S <=4   - Cefazolin: S <=2 Enterobacter, Citrobacter, and Serratia may develop resistance during prolonged therapy (3-4 days)   - Cefazolin: R 8   Specimen Source: .Abscess L foot   Culture Results:   Numerous Proteus mirabilis   Moderate Enterococcus faecalis   Few Methicillin resistant Staphylococcus aureus   Organism Identification: Proteus mirabilis   Enterococcus faecalis   Methicillin resistant Staphylococcus aureus   Organism: Proteus mirabilis   Organism: Enterococcus faecalis   Organism: Methicillin resistant Staphylococcus aureus   Method Type: ALTA   Method Type: ALTA   Method Type: ALTA Infectious Diseases progress note:    Subjective: NAD, afebrile.  No new somatic complaints.     ROS:  CONSTITUTIONAL:  No fever, chills, rigors  CARDIOVASCULAR:  No chest pain or palpitations  RESPIRATORY:   No SOB, cough, dyspnea on exertion.  No wheezing  GASTROINTESTINAL:  No abd pain, N/V, diarrhea/constipation  EXTREMITIES:  No swelling or joint pain  GENITOURINARY:  No burning on urination, increased frequency or urgency.  No flank pain  NEUROLOGIC:  No HA, visual disturbances  SKIN: No rashes    Allergies    No Known Allergies    Intolerances        ANTIBIOTICS/RELEVANT:  antimicrobials  piperacillin/tazobactam IVPB.. 3.375 Gram(s) IV Intermittent every 8 hours    immunologic:  influenza  Vaccine (HIGH DOSE) 0.7 milliLiter(s) IntraMuscular once    OTHER:  acetaminophen   Tablet .. 650 milliGRAM(s) Oral every 6 hours PRN  allopurinol 100 milliGRAM(s) Oral daily  aluminum hydroxide/magnesium hydroxide/simethicone Suspension 30 milliLiter(s) Oral every 6 hours PRN  amLODIPine   Tablet 10 milliGRAM(s) Oral daily  cinacalcet 60 milliGRAM(s) Oral daily  dextrose 40% Gel 15 Gram(s) Oral once  dextrose 5%. 1000 milliLiter(s) IV Continuous <Continuous>  dextrose 5%. 1000 milliLiter(s) IV Continuous <Continuous>  dextrose 50% Injectable 25 Gram(s) IV Push once  dextrose 50% Injectable 12.5 Gram(s) IV Push once  dextrose 50% Injectable 25 Gram(s) IV Push once  gabapentin 100 milliGRAM(s) Oral two times a day  glucagon  Injectable 1 milliGRAM(s) IntraMuscular once  hydrALAZINE 75 milliGRAM(s) Oral three times a day  insulin lispro (ADMELOG) corrective regimen sliding scale   SubCutaneous three times a day before meals  insulin lispro (ADMELOG) corrective regimen sliding scale   SubCutaneous at bedtime  latanoprost 0.005% Ophthalmic Solution 1 Drop(s) Both EYES at bedtime  levothyroxine 75 MICROGram(s) Oral daily  oxyCODONE    IR 5 milliGRAM(s) Oral every 8 hours PRN  senna 2 Tablet(s) Oral at bedtime  simvastatin 20 milliGRAM(s) Oral at bedtime  warfarin 3 milliGRAM(s) Oral once      Objective:  Vital Signs Last 24 Hrs  T(C): 36.7 (19 Nov 2020 13:25), Max: 37.2 (18 Nov 2020 17:39)  T(F): 98 (19 Nov 2020 13:25), Max: 98.9 (18 Nov 2020 17:39)  HR: 82 (19 Nov 2020 13:25) (63 - 84)  BP: 132/65 (19 Nov 2020 13:25) (132/65 - 155/76)  BP(mean): 94 (19 Nov 2020 05:30) (94 - 98)  RR: 16 (19 Nov 2020 13:25) (14 - 18)  SpO2: 95% (19 Nov 2020 13:25) (95% - 97%)    PHYSICAL EXAM:  Constitutional:NAD  Eyes:OLIVIA, EOMI  Ear/Nose/Throat: no thrush, mucositis.  Moist mucous membranes	  Neck:no JVD, no lymphadenopathy, supple  Respiratory: CTA adina  Cardiovascular: S1S2 RRR, no murmurs  Gastrointestinal:soft, nontender,  nondistended (+) BS  Extremities:no e/e/c  Skin:  L ft 1st/2nd digit gangrene, drainage resolving        LABS:                        8.3    9.02  )-----------( 210      ( 19 Nov 2020 06:23 )             27.6     11-19    139  |  97<L>  |  61<H>  ----------------------------<  111<H>  3.5   |  28  |  2.40<H>    Ca    8.4      19 Nov 2020 06:23  Phos  3.2     11-18  Mg     1.9     11-18      PT/INR - ( 19 Nov 2020 06:23 )   PT: 35.3 SEC;   INR: 3.28                  Vancomycin Level, Random:  ug/mL (11-17 @ 19:27)  Vancomycin Level, Random:  ug/mL (11-16 @ 15:55)                  MICROBIOLOGY:          RADIOLOGY & ADDITIONAL STUDIES:    Culture - Abscess with Gram Stain (11.14.20 @ 18:00)   - Linezolid: S 2   - Meropenem: S <=1   - Meropenem: S <=1   - Oxacillin: R >2   - Penicillin: R >8   - Piperacillin/Tazobactam: S <=8   - Piperacillin/Tazobactam: S <=8   - RIF- Rifampin: S <=1 Should not be used as monotherapy   - Tetra/Doxy: S <=1   - Tetra/Doxy: R >8   - Tobramycin: S <=2   - Tobramycin: S <=2   - Trimethoprim/Sulfamethoxazole: S <=0.5/9.5   - Trimethoprim/Sulfamethoxazole: R >2/38   - Trimethoprim/Sulfamethoxazole: S <=0.5/9.5   - Vancomycin: S 1   - Vancomycin: S 2   - Amikacin: S <=16   - Amikacin: S <=16   - Amoxicillin/Clavulanic Acid: S <=8/4   - Amoxicillin/Clavulanic Acid: S <=8/4   - Ampicillin: S <=8 These ampicillin results predict results for amoxicillin   - Ampicillin: R 16 These ampicillin results predict results for amoxicillin   - Ampicillin: S <=2 Predicts results to ampicillin/sulbactam, amoxacillin-clavulanate and piperacillin-tazobactam.   - Ampicillin/Sulbactam: S <=4/2 Enterobacter, Citrobacter, and Serratia may develop resistance during prolonged therapy (3-4 days)   - Ampicillin/Sulbactam: S <=4/2 Enterobacter, Citrobacter, and Serratia may develop resistance during prolonged therapy (3-4 days)   - Ampicillin/Sulbactam: R <=8/4   - Aztreonam: S <=4   - Aztreonam: S <=4   - Cefazolin: I 4 Enterobacter, Citrobacter, and Serratia may develop resistance during prolonged therapy (3-4 days)   - Cefazolin: S <=2 Enterobacter, Citrobacter, and Serratia may develop resistance during prolonged therapy (3-4 days)   - Cefazolin: R 16   - Cefepime: S <=2   - Cefepime: S <=2   - Cefoxitin: S <=8   - Cefoxitin: S <=8   - Ceftriaxone: S <=1 Enterobacter, Citrobacter, and Serratia may develop resistance during prolonged therapy   - Ceftriaxone: S <=1 Enterobacter, Citrobacter, and Serratia may develop resistance during prolonged therapy   - Ciprofloxacin: S <=0.25   - Ciprofloxacin: S <=0.25   - Clindamycin: S 0.5   - Daptomycin: S 1   - Ertapenem: S <=0.5   - Ertapenem: S <=0.5   - Erythromycin: S <=0.25   - Gentamicin: S <=1 Should not be used as monotherapy   - Gentamicin: S <=2   - Gentamicin: S <=2   - Imipenem: S <=1   - Levofloxacin: S <=0.5   - Levofloxacin: S <=0.5   Specimen Source: .Abscess left foot wound   Culture Results:   After additional incubation time, Culture yields >4 types of aerobic   and/or anaerobic bacteria   Call client services within 7 days if further workup is clinically   indicated. Culture includes   Moderate Methicillin resistant Staphylococcus aureus   Few Proteus mirabilis   Rare Klebsiella oxytoca/Raoutella ornithinolytica   Moderate Enterococcus faecalis   Organism Identification: Methicillin resistant Staphylococcus aureus   Enterococcus faecalis   Proteus mirabilis   Klebsiella oxytoca /Raoutella ornithinolytica   Organism: Methicillin resistant Staphylococcus aureus   Organism: Enterococcus faecalis   Organism: Proteus mirabilis   Organism: Klebsiella oxytoca /Raoutella ornithinolytica   Method Type: ALTA   Method Type: ALTA   Method Type: ALTA   Method Type: ALTA       Historical Values  Culture - Abscess with Gram Stain (11.14.20 @ 18:00)   - Linezolid: S 2   - Meropenem: S <=1   - Meropenem: S <=1   - Oxacillin: R >2   - Penicillin: R >8   - Piperacillin/Tazobactam: S <=8   - Piperacillin/Tazobactam: S <=8   - RIF- Rifampin: S <=1 Should not be used as monotherapy   - Tetra/Doxy: S <=1   - Tetra/Doxy: R >8   - Tobramycin: S <=2   - Tobramycin: S <=2   - Trimethoprim/Sulfamethoxazole: S <=0.5/9.5   - Trimethoprim/Sulfamethoxazole: R >2/38   - Trimethoprim/Sulfamethoxazole: S <=0.5/9.5   - Vancomycin: S 1   - Vancomycin: S 2   - Amikacin: S <=16   - Amikacin: S <=16   - Amoxicillin/Clavulanic Acid: S <=8/4   - Amoxicillin/Clavulanic Acid: S <=8/4   - Ampicillin: S <=8 These ampicillin results predict results for amoxicillin   - Ampicillin: R 16 These ampicillin results predict results for amoxicillin   - Ampicillin: S <=2 Predicts results to ampicillin/sulbactam, amoxacillin-clavulanate and piperacillin-tazobactam.   - Ampicillin/Sulbactam: S <=4/2 Enterobacter, Citrobacter, and Serratia may develop resistance during prolonged therapy (3-4 days)   - Ampicillin/Sulbactam: S <=4/2 Enterobacter, Citrobacter, and Serratia may develop resistance during prolonged therapy (3-4 days)   - Ampicillin/Sulbactam: R <=8/4   - Aztreonam: S <=4   - Aztreonam: S <=4   - Cefazolin: I 4 Enterobacter, Citrobacter, and Serratia may develop resistance during prolonged therapy (3-4 days)   - Cefazolin: S <=2 Enterobacter, Citrobacter, and Serratia may develop resistance during prolonged therapy (3-4 days)   - Cefazolin: R 16   - Cefepime: S <=2   - Cefepime: S <=2   - Cefoxitin: S <=8   - Cefoxitin: S <=8   - Ceftriaxone: S <=1 Enterobacter, Citrobacter, and Serratia may develop resistance during prolonged therapy   - Ceftriaxone: S <=1 Enterobacter, Citrobacter, and Serratia may develop resistance during prolonged therapy   - Ciprofloxacin: S <=0.25   - Ciprofloxacin: S <=0.25   - Clindamycin: S 0.5   - Daptomycin: S 1   - Ertapenem: S <=0.5   - Ertapenem: S <=0.5   - Erythromycin: S <=0.25   - Gentamicin: S <=1 Should not be used as monotherapy   - Gentamicin: S <=2   - Gentamicin: S <=2   - Imipenem: S <=1   - Levofloxacin: S <=0.5   - Levofloxacin: S <=0.5   Specimen Source: .Abscess left foot wound   Culture Results:   After additional incubation time, Culture yields >4 types of aerobic   and/or anaerobic bacteria   Call client services within 7 days if further workup is clinically   indicated. Culture includes   Moderate Methicillin resistant Staphylococcus aureus   Few Proteus mirabilis   Rare Klebsiella oxytoca/Raoutella ornithinolytica   Moderate Enterococcus faecalis   Organism Identification: Methicillin resistant Staphylococcus aureus   Enterococcus faecalis   Proteus mirabilis   Klebsiella oxytoca /Raoutella ornithinolytica   Organism: Methicillin resistant Staphylococcus aureus   Organism: Enterococcus faecalis   Organism: Proteus mirabilis   Organism: Klebsiella oxytoca /Raoutella ornithinolytica   Method Type: ALTA   Method Type: ALTA   Method Type: ALTA   Method Type: ALTA   Culture - Abscess with Gram Stain (07.21.20 @ 21:26)   - Vancomycin: S 2   - Vancomycin: S 2   - Tobramycin: S <=2   - Trimethoprim/Sulfamethoxazole: S <=0.5/9.5   - Trimethoprim/Sulfamethoxazole: R >2/38   - RIF- Rifampin: S <=1 Should not be used as monotherapy   - Tetra/Doxy: S <=1   - Tetra/Doxy: R >8   - Penicillin: R >8   - Piperacillin/Tazobactam: S <=8   - Meropenem: S <=1   - Oxacillin: R >2   - Amikacin: S <=16   - Amoxicillin/Clavulanic Acid: S <=8/4   - Ampicillin: S <=8 These ampicillin results predict results for amoxicillin   - Ampicillin: S <=2 Predicts results to ampicillin/sulbactam, amoxacillin-clavulanate and piperacillin-tazobactam.   - Ampicillin/Sulbactam: R <=8/4   - Ampicillin/Sulbactam: S <=4/2 Enterobacter, Citrobacter, and Serratia may develop resistance during prolonged therapy (3-4 days)   - Levofloxacin: S <=0.5   - Linezolid: S 2   - Gentamicin: S <=1 Should not be used as monotherapy   - Gentamicin: S <=2   - Ertapenem: S <=0.5   - Erythromycin: S <=0.25   - Clindamycin: S <=0.25   - Daptomycin: S 0.5   - Ceftriaxone: S <=1 Enterobacter, Citrobacter, and Serratia may develop resistance during prolonged therapy   - Ciprofloxacin: S <=0.25   - Cefepime: S <=2   - Cefoxitin: S <=8   - Aztreonam: S <=4   - Cefazolin: S <=2 Enterobacter, Citrobacter, and Serratia may develop resistance during prolonged therapy (3-4 days)   - Cefazolin: R 8   Specimen Source: .Abscess L foot   Culture Results:   Numerous Proteus mirabilis   Moderate Enterococcus faecalis   Few Methicillin resistant Staphylococcus aureus   Organism Identification: Proteus mirabilis   Enterococcus faecalis   Methicillin resistant Staphylococcus aureus   Organism: Proteus mirabilis   Organism: Enterococcus faecalis   Organism: Methicillin resistant Staphylococcus aureus   Method Type: ALTA   Method Type: ALTA   Method Type: ALTA

## 2020-11-19 NOTE — DISCHARGE NOTE NURSING/CASE MANAGEMENT/SOCIAL WORK - PATIENT PORTAL LINK FT
You can access the FollowMyHealth Patient Portal offered by [facility] by registering at the following website: [email or web address]. By joining [facility]’s FollowMyHealth portal, you will also be able to view your health information using other applications (apps) compatible with our system.

## 2020-11-19 NOTE — DISCHARGE NOTE NURSING/CASE MANAGEMENT/SOCIAL WORK - NSDCFUADDAPPT_GEN_ALL_CORE_FT
If you are in need of a general medicine physician and post-discharge medical follow-up for further care/recommendations you may contact the [facility] for an appointment [phone]

## 2020-11-19 NOTE — PROGRESS NOTE ADULT - SUBJECTIVE AND OBJECTIVE BOX
INTERVAL HPI/OVERNIGHT EVENTS: I feel fine.   Vital Signs Last 24 Hrs  T(C): 36.8 (19 Nov 2020 05:30), Max: 37.2 (18 Nov 2020 17:39)  T(F): 98.3 (19 Nov 2020 05:30), Max: 98.9 (18 Nov 2020 17:39)  HR: 63 (19 Nov 2020 05:30) (63 - 84)  BP: 144/76 (19 Nov 2020 05:30) (135/66 - 155/76)  BP(mean): 94 (19 Nov 2020 05:30) (94 - 98)  RR: 14 (19 Nov 2020 05:30) (14 - 18)  SpO2: 97% (19 Nov 2020 05:30) (96% - 97%)  I&O's Summary    18 Nov 2020 07:01  -  19 Nov 2020 07:00  --------------------------------------------------------  IN: 480 mL / OUT: 300 mL / NET: 180 mL      MEDICATIONS  (STANDING):  allopurinol 100 milliGRAM(s) Oral daily  amLODIPine   Tablet 10 milliGRAM(s) Oral daily  cinacalcet 60 milliGRAM(s) Oral daily  dextrose 40% Gel 15 Gram(s) Oral once  dextrose 5%. 1000 milliLiter(s) (50 mL/Hr) IV Continuous <Continuous>  dextrose 5%. 1000 milliLiter(s) (100 mL/Hr) IV Continuous <Continuous>  dextrose 50% Injectable 12.5 Gram(s) IV Push once  dextrose 50% Injectable 25 Gram(s) IV Push once  dextrose 50% Injectable 25 Gram(s) IV Push once  gabapentin 100 milliGRAM(s) Oral two times a day  glucagon  Injectable 1 milliGRAM(s) IntraMuscular once  hydrALAZINE 75 milliGRAM(s) Oral three times a day  influenza  Vaccine (HIGH DOSE) 0.7 milliLiter(s) IntraMuscular once  insulin lispro (ADMELOG) corrective regimen sliding scale   SubCutaneous three times a day before meals  insulin lispro (ADMELOG) corrective regimen sliding scale   SubCutaneous at bedtime  latanoprost 0.005% Ophthalmic Solution 1 Drop(s) Both EYES at bedtime  levothyroxine 75 MICROGram(s) Oral daily  piperacillin/tazobactam IVPB.. 3.375 Gram(s) IV Intermittent every 8 hours  senna 2 Tablet(s) Oral at bedtime  simvastatin 20 milliGRAM(s) Oral at bedtime    MEDICATIONS  (PRN):  acetaminophen   Tablet .. 650 milliGRAM(s) Oral every 6 hours PRN Temp greater or equal to 38.5C (101.3F), Moderate Pain (4 - 6)  oxyCODONE    IR 5 milliGRAM(s) Oral every 8 hours PRN Severe Pain (7 - 10)    LABS:                        8.3    9.02  )-----------( 210      ( 19 Nov 2020 06:23 )             27.6     11-19    139  |  97<L>  |  61<H>  ----------------------------<  111<H>  3.5   |  28  |  2.40<H>    Ca    8.4      19 Nov 2020 06:23  Phos  3.2     11-18  Mg     1.9     11-18      PT/INR - ( 19 Nov 2020 06:23 )   PT: 35.3 SEC;   INR: 3.28              CAPILLARY BLOOD GLUCOSE      POCT Blood Glucose.: 123 mg/dL (19 Nov 2020 07:29)  POCT Blood Glucose.: 159 mg/dL (18 Nov 2020 22:47)  POCT Blood Glucose.: 101 mg/dL (18 Nov 2020 17:04)  POCT Blood Glucose.: 198 mg/dL (18 Nov 2020 11:47)          REVIEW OF SYSTEMS:  CONSTITUTIONAL: No fever, weight loss, or fatigue  EYES: No eye pain, visual disturbances, or discharge  ENMT:  No difficulty hearing, tinnitus, vertigo; No sinus or throat pain  NECK: No pain or stiffness  RESPIRATORY: No cough, wheezing, chills or hemoptysis; No shortness of breath  CARDIOVASCULAR: No chest pain, palpitations, dizziness, or leg swelling  GASTROINTESTINAL: No abdominal or epigastric pain. No nausea, vomiting, or hematemesis; No diarrhea or constipation. No melena or hematochezia.  GENITOURINARY: No dysuria, frequency, hematuria, or incontinence      Consultant(s) Notes Reviewed:  [x ] YES  [ ] NO    PHYSICAL EXAM:  GENERAL: NAD, well-groomed, well-developed ,not in any distress ,  HEAD:  Atraumatic, Normocephalic  EYES: EOMI, PERRLA, conjunctiva and sclera clear  ENMT: No tonsillar erythema, exudates, or enlargement; Moist mucous membranes, Good dentition, No lesions  NECK: Supple, No JVD, Normal thyroid  NERVOUS SYSTEM:  Alert &  No focal deficit   CHEST/LUNG: Good air entry bilateral with no  rales, rhonchi, wheezing, or rubs  HEART: Regular rate and rhythm; No murmurs, rubs, or gallops  ABDOMEN: Soft, Nontender, Nondistended; Bowel sounds present  EXTREMITIES:  foot dressed       Care Discussed with Consultants/Other Providers [ x] YES  [ ] NO

## 2020-11-19 NOTE — PROGRESS NOTE ADULT - SUBJECTIVE AND OBJECTIVE BOX
chief complaint: left foot pain    S: no chest pain or sob; ROS otherwise negative.     chief complaint: leg pain    Review of Systems:   Constitutional: [ ] fevers, [ ] chills.   Skin: [ ] dry skin. [ ] rashes.  Psychiatric: [ ] depression, [ ] anxiety.   Gastrointestinal: [ ] BRBPR, [ ] melena.   Neurological: [ ] confusion. [ ] seizures. [ ] shuffling gait.   Ears,Nose,Mouth and Throat: [ ] ear pain [ ] sore throat.   Eyes: [ ] diplopia.   Respiratory: [ ] hemoptysis. [ ] shortness of breath  Cardiovascular: See HPI above  Hematologic/Lymphatic: [ ] anemia. [ ] painful nodes. [ ] prolonged bleeding.   Genitourinary: [ ] hematuria. [ ] flank pain.   Endocrine: [ ] significant change in weight. [ ] intolerance to heat and cold.     Review of systems [x] otherwise negative, [ ] otherwise unable to obtain    FH: no family history of sudden cardiac death in first degree relatives    SH: [ ] tobacco, [ ] alcohol, [ ] drugs    acetaminophen   Tablet .. 650 milliGRAM(s) Oral every 6 hours PRN  allopurinol 100 milliGRAM(s) Oral daily  aluminum hydroxide/magnesium hydroxide/simethicone Suspension 30 milliLiter(s) Oral every 6 hours PRN  amLODIPine   Tablet 10 milliGRAM(s) Oral daily  cinacalcet 60 milliGRAM(s) Oral daily  dextrose 40% Gel 15 Gram(s) Oral once  dextrose 5%. 1000 milliLiter(s) IV Continuous <Continuous>  dextrose 5%. 1000 milliLiter(s) IV Continuous <Continuous>  dextrose 50% Injectable 25 Gram(s) IV Push once  dextrose 50% Injectable 12.5 Gram(s) IV Push once  dextrose 50% Injectable 25 Gram(s) IV Push once  gabapentin 100 milliGRAM(s) Oral two times a day  glucagon  Injectable 1 milliGRAM(s) IntraMuscular once  hydrALAZINE 75 milliGRAM(s) Oral three times a day  influenza  Vaccine (HIGH DOSE) 0.7 milliLiter(s) IntraMuscular once  insulin lispro (ADMELOG) corrective regimen sliding scale   SubCutaneous three times a day before meals  insulin lispro (ADMELOG) corrective regimen sliding scale   SubCutaneous at bedtime  latanoprost 0.005% Ophthalmic Solution 1 Drop(s) Both EYES at bedtime  levothyroxine 75 MICROGram(s) Oral daily  oxyCODONE    IR 5 milliGRAM(s) Oral every 8 hours PRN  piperacillin/tazobactam IVPB.. 3.375 Gram(s) IV Intermittent every 8 hours  senna 2 Tablet(s) Oral at bedtime  simvastatin 20 milliGRAM(s) Oral at bedtime  warfarin 3 milliGRAM(s) Oral once                        8.3    9.02  )-----------( 210      ( 19 Nov 2020 06:23 )             27.6     139  |  97<L>  |  61<H>  ----------------------------<  111<H>  3.5   |  28  |  2.40<H>    Ca    8.4      19 Nov 2020 06:23  Phos  3.2     11-18  Mg     1.9     11-18    T(C): 36.7 (11-19-20 @ 13:25), Max: 37.2 (11-18-20 @ 17:39)  HR: 82 (11-19-20 @ 13:25) (63 - 84)  BP: 132/65 (11-19-20 @ 13:25) (132/65 - 155/76)  RR: 16 (11-19-20 @ 13:25) (14 - 18)  SpO2: 95% (11-19-20 @ 13:25) (95% - 97%)    General: Well nourished in no acute distress. Alert and Oriented * 3.   Head: Normocephalic and atraumatic.   Neck: No JVD. No bruits. Supple. Does not appear to be enlarged.   Cardiovascular: + S1,S2 ; RRR Soft systolic murmur at the left lower sternal border. No rubs noted.    Lungs: CTA b/l. No rhonchi, rales or wheezes.   Abdomen: + BS, soft. Non tender. Non distended. No rebound. No guarding.   Extremities: No clubbing/cyanosis/edema.   Neurologic: Moves all four extremities. Full range of motion.   Skin: Warm and moist. The patient's skin has normal elasticity and good skin turgor.   Psychiatric: Appropriate mood and affect.  Musculoskeletal: Normal range of motion, normal strength      TELE: not on tele    TTE: < from: Transthoracic Echocardiogram (11.16.20 @ 18:38) >  1. Mitral annular calcification, otherwise normal mitral  valve. Mild mitral regurgitation.  2. Calcified trileaflet aortic valve with decreased  opening. Peak transaortic valve gradient equals 49 mm Hg,  mean transaortic valve gradient equals 36 mm Hg, estimated  aortic valve area equals 0.6 sqcm (by continuity equation),  aortic valve velocity time integral equals 86 cm,  consistent with severe aortic stenosis. Minimal aortic  regurgitation.  3. Mild concentric left ventricular hypertrophy.  4. Hyperdynamic left ventricle.  5. Normal right ventricular size and function.  6. Estimated right ventricular systolic pressure equals 59  mm Hg, assuming right atrial pressure equals 10 mm Hg,  consistent with moderate pulmonary hypertension.  *** Compared with echocardiogram of 7/22/2020, no  significant changes noted.    < end of copied text >     89 year old female with history of HTN, DM, CKD, Afib on ac with coumadin, known moderate to severe AS conservatively managed per [facility] who was admitted with dry gangrene on left first toe.     -pt. with no clinical heart failure on exam  -TTE confirms severe AS which has been managed conservatively per the patient and the patient's family's wishes, confirmed with daughter Catherine today on the phone   -ABx per ID   -BCx NGTD   -follow up podiatry  -no further cardiac workup anticipated as long as goals of care do not change

## 2020-11-20 LAB
ANION GAP SERPL CALC-SCNC: 14 MMO/L — SIGNIFICANT CHANGE UP (ref 7–14)
APTT BLD: 37.9 SEC — HIGH (ref 27–36.3)
BUN SERPL-MCNC: 64 MG/DL — HIGH (ref 7–23)
CALCIUM SERPL-MCNC: 8 MG/DL — LOW (ref 8.4–10.5)
CHLORIDE SERPL-SCNC: 97 MMOL/L — LOW (ref 98–107)
CO2 SERPL-SCNC: 27 MMOL/L — SIGNIFICANT CHANGE UP (ref 22–31)
CREAT SERPL-MCNC: 2.6 MG/DL — HIGH (ref 0.5–1.3)
GLUCOSE BLDC GLUCOMTR-MCNC: 103 MG/DL — HIGH (ref 70–99)
GLUCOSE BLDC GLUCOMTR-MCNC: 114 MG/DL — HIGH (ref 70–99)
GLUCOSE BLDC GLUCOMTR-MCNC: 115 MG/DL — HIGH (ref 70–99)
GLUCOSE BLDC GLUCOMTR-MCNC: 124 MG/DL — HIGH (ref 70–99)
GLUCOSE SERPL-MCNC: 101 MG/DL — HIGH (ref 70–99)
HCT VFR BLD CALC: 25.4 % — LOW (ref 34.5–45)
HGB BLD-MCNC: 7.9 G/DL — LOW (ref 11.5–15.5)
INR BLD: 4.04 — HIGH (ref 0.88–1.16)
MCHC RBC-ENTMCNC: 26.9 PG — LOW (ref 27–34)
MCHC RBC-ENTMCNC: 31.1 % — LOW (ref 32–36)
MCV RBC AUTO: 86.4 FL — SIGNIFICANT CHANGE UP (ref 80–100)
NRBC # FLD: 0 K/UL — SIGNIFICANT CHANGE UP (ref 0–0)
PLATELET # BLD AUTO: 199 K/UL — SIGNIFICANT CHANGE UP (ref 150–400)
PMV BLD: 10.9 FL — SIGNIFICANT CHANGE UP (ref 7–13)
POTASSIUM SERPL-MCNC: 3.4 MMOL/L — LOW (ref 3.5–5.3)
POTASSIUM SERPL-SCNC: 3.4 MMOL/L — LOW (ref 3.5–5.3)
PROTHROM AB SERPL-ACNC: 43.5 SEC — HIGH (ref 10.6–13.6)
RBC # BLD: 2.94 M/UL — LOW (ref 3.8–5.2)
RBC # FLD: 17.2 % — HIGH (ref 10.3–14.5)
SODIUM SERPL-SCNC: 138 MMOL/L — SIGNIFICANT CHANGE UP (ref 135–145)
VANCOMYCIN FLD-MCNC: 15.1 UG/ML — SIGNIFICANT CHANGE UP
WBC # BLD: 10.75 K/UL — HIGH (ref 3.8–10.5)
WBC # FLD AUTO: 10.75 K/UL — HIGH (ref 3.8–10.5)

## 2020-11-20 RX ORDER — WARFARIN SODIUM 2.5 MG/1
2.5 TABLET ORAL ONCE
Refills: 0 | Status: DISCONTINUED | OUTPATIENT
Start: 2020-11-20 | End: 2020-11-20

## 2020-11-20 RX ORDER — SODIUM CHLORIDE 9 MG/ML
500 INJECTION INTRAMUSCULAR; INTRAVENOUS; SUBCUTANEOUS
Refills: 0 | Status: DISCONTINUED | OUTPATIENT
Start: 2020-11-20 | End: 2020-11-22

## 2020-11-20 RX ORDER — POTASSIUM CHLORIDE 20 MEQ
20 PACKET (EA) ORAL ONCE
Refills: 0 | Status: COMPLETED | OUTPATIENT
Start: 2020-11-20 | End: 2020-11-20

## 2020-11-20 RX ADMIN — PIPERACILLIN AND TAZOBACTAM 25 GRAM(S): 4; .5 INJECTION, POWDER, LYOPHILIZED, FOR SOLUTION INTRAVENOUS at 21:15

## 2020-11-20 RX ADMIN — Medication 75 MILLIGRAM(S): at 15:43

## 2020-11-20 RX ADMIN — GABAPENTIN 100 MILLIGRAM(S): 400 CAPSULE ORAL at 18:13

## 2020-11-20 RX ADMIN — Medication 20 MILLIEQUIVALENT(S): at 12:48

## 2020-11-20 RX ADMIN — SODIUM CHLORIDE 75 MILLILITER(S): 9 INJECTION INTRAMUSCULAR; INTRAVENOUS; SUBCUTANEOUS at 12:48

## 2020-11-20 RX ADMIN — LATANOPROST 1 DROP(S): 0.05 SOLUTION/ DROPS OPHTHALMIC; TOPICAL at 21:16

## 2020-11-20 RX ADMIN — PIPERACILLIN AND TAZOBACTAM 25 GRAM(S): 4; .5 INJECTION, POWDER, LYOPHILIZED, FOR SOLUTION INTRAVENOUS at 05:19

## 2020-11-20 RX ADMIN — Medication 75 MILLIGRAM(S): at 05:20

## 2020-11-20 RX ADMIN — SENNA PLUS 2 TABLET(S): 8.6 TABLET ORAL at 21:15

## 2020-11-20 RX ADMIN — SIMVASTATIN 20 MILLIGRAM(S): 20 TABLET, FILM COATED ORAL at 21:15

## 2020-11-20 RX ADMIN — CINACALCET 60 MILLIGRAM(S): 30 TABLET, FILM COATED ORAL at 12:49

## 2020-11-20 RX ADMIN — GABAPENTIN 100 MILLIGRAM(S): 400 CAPSULE ORAL at 05:21

## 2020-11-20 RX ADMIN — PIPERACILLIN AND TAZOBACTAM 25 GRAM(S): 4; .5 INJECTION, POWDER, LYOPHILIZED, FOR SOLUTION INTRAVENOUS at 15:44

## 2020-11-20 RX ADMIN — Medication 100 MILLIGRAM(S): at 12:49

## 2020-11-20 RX ADMIN — Medication 75 MILLIGRAM(S): at 21:15

## 2020-11-20 RX ADMIN — AMLODIPINE BESYLATE 10 MILLIGRAM(S): 2.5 TABLET ORAL at 05:20

## 2020-11-20 RX ADMIN — Medication 75 MICROGRAM(S): at 05:21

## 2020-11-20 NOTE — PROVIDER CONTACT NOTE (OTHER) - ASSESSMENT
Patient asymptomatic Patient denies chest pain or SOB.
Patient is A&Ox4, Patient denies pain, chest pain, shortness of breath, nausea, vomiting or dizziness.
Vitals stable, NAD noted. Antibiotics remained. Contact precautions initiated. Dsg clean/dry/intact. Will continue to monitor.
alert and oriented x4; forgetful at times. no complaints of chest pain SOB or HA. on 1L NC spo2 of 97%. turn and position 2h.

## 2020-11-20 NOTE — PROGRESS NOTE ADULT - ASSESSMENT
89 y/o female pmh htn, dm, ckd, afib on coumadin c/o infected L toes. Pt admits to having dry gangrene to L first toe and was admitted in the Hospital in August. Pt states that over th elast several days, her L 2nd toe has become swollen, painful, red and has drainage. Pt went to her podiatrist, Dr. Eaton, today who sent pt to the ER for wet gangrene. Pt denies chest pain, sob, abd pain, n/v/d, weakness, dizziness ,syncope, fever or chills.     Problem/Plan - 1:  ·  Problem: Gangrene.  Plan: S/P I&D by Podiatry . IV abxs and ID helping.  Vascular helping.    No surgical intervention per Podiatry and vascular.      Problem/Plan - 2:  ·  Problem: CKD (chronic kidney disease) with NIKHIL .  Plan:  Renal following. Creatinine trending up.      Problem/Plan - 3:  ·  Problem: PVD (peripheral vascular disease).  Plan: Vascular helping.      Problem/Plan - 4:  ·  Problem: HTN (hypertension).  Plan: BP meds with hold parameters.      Problem/Plan - 5:  ·  Problem: CHF (congestive heart failure).  Plan: Holding Lasix as Euvolemic.      Problem/Plan - 6:  Problem: DM (diabetes mellitus). Plan: SSI for now.     Problem/Plan - 7:  ·  Problem: Afib.  Plan: On AC . INR went up so holding coumadin and rechecking in AM.      Problem/Plan - 8:  ·  Problem: Hypothyroid.  Plan: Home dose synthroid.      Problem/Plan - 9:  ·  Problem: Hyperparathyroidism .  Plan: Parathyroid scan outpt . Ca level normal now.      Problem/Plan - 10:  Problem: Pulmonary embolism. Plan; On AC.     Problem/Plan - 11:  ·  Problem: Delirium .  Plan: Infection VS Hospital induced. Better.     Disposition : DC planning home on hold.

## 2020-11-20 NOTE — PROGRESS NOTE ADULT - SUBJECTIVE AND OBJECTIVE BOX
chief complaint: left foot pain    S: no chest pain or sob; ROS otherwise negative.     chief complaint: leg pain    Review of Systems:   Constitutional: [ ] fevers, [ ] chills.   Skin: [ ] dry skin. [ ] rashes.  Psychiatric: [ ] depression, [ ] anxiety.   Gastrointestinal: [ ] BRBPR, [ ] melena.   Neurological: [ ] confusion. [ ] seizures. [ ] shuffling gait.   Ears,Nose,Mouth and Throat: [ ] ear pain [ ] sore throat.   Eyes: [ ] diplopia.   Respiratory: [ ] hemoptysis. [ ] shortness of breath  Cardiovascular: See HPI above  Hematologic/Lymphatic: [ ] anemia. [ ] painful nodes. [ ] prolonged bleeding.   Genitourinary: [ ] hematuria. [ ] flank pain.   Endocrine: [ ] significant change in weight. [ ] intolerance to heat and cold.     Review of systems [x] otherwise negative, [ ] otherwise unable to obtain    FH: no family history of sudden cardiac death in first degree relatives    SH: [ ] tobacco, [ ] alcohol, [ ] drugs    acetaminophen   Tablet .. 650 milliGRAM(s) Oral every 6 hours PRN  allopurinol 100 milliGRAM(s) Oral daily  aluminum hydroxide/magnesium hydroxide/simethicone Suspension 30 milliLiter(s) Oral every 6 hours PRN  amLODIPine   Tablet 10 milliGRAM(s) Oral daily  cinacalcet 60 milliGRAM(s) Oral daily  dextrose 40% Gel 15 Gram(s) Oral once  dextrose 5%. 1000 milliLiter(s) IV Continuous <Continuous>  dextrose 5%. 1000 milliLiter(s) IV Continuous <Continuous>  dextrose 50% Injectable 25 Gram(s) IV Push once  dextrose 50% Injectable 12.5 Gram(s) IV Push once  dextrose 50% Injectable 25 Gram(s) IV Push once  gabapentin 100 milliGRAM(s) Oral two times a day  glucagon  Injectable 1 milliGRAM(s) IntraMuscular once  hydrALAZINE 75 milliGRAM(s) Oral three times a day  influenza  Vaccine (HIGH DOSE) 0.7 milliLiter(s) IntraMuscular once  insulin lispro (ADMELOG) corrective regimen sliding scale   SubCutaneous three times a day before meals  insulin lispro (ADMELOG) corrective regimen sliding scale   SubCutaneous at bedtime  latanoprost 0.005% Ophthalmic Solution 1 Drop(s) Both EYES at bedtime  levothyroxine 75 MICROGram(s) Oral daily  oxyCODONE    IR 5 milliGRAM(s) Oral every 8 hours PRN  piperacillin/tazobactam IVPB.. 3.375 Gram(s) IV Intermittent every 8 hours  senna 2 Tablet(s) Oral at bedtime  simvastatin 20 milliGRAM(s) Oral at bedtime  sodium chloride 0.9%. 500 milliLiter(s) IV Continuous <Continuous>                            7.9    10.75 )-----------( 199      ( 20 Nov 2020 06:10 )             25.4       11-20    138  |  97<L>  |  64<H>  ----------------------------<  101<H>  3.4<L>   |  27  |  2.60<H>    Ca    8.0<L>      20 Nov 2020 06:10              T(C): 37.1 (11-20-20 @ 12:47), Max: 37.4 (11-20-20 @ 09:12)  HR: 84 (11-20-20 @ 12:47) (64 - 89)  BP: 136/68 (11-20-20 @ 12:47) (124/61 - 143/69)  RR: 16 (11-20-20 @ 12:47) (16 - 16)  SpO2: 97% (11-20-20 @ 12:47) (89% - 98%)  Wt(kg): --    I&O's Summary    19 Nov 2020 07:01  -  20 Nov 2020 07:00  --------------------------------------------------------  IN: 480 mL / OUT: 300 mL / NET: 180 mL        General: Well nourished in no acute distress. Alert and Oriented * 3.   Head: Normocephalic and atraumatic.   Neck: No JVD. No bruits. Supple. Does not appear to be enlarged.   Cardiovascular: + S1,S2 ; RRR Soft systolic murmur at the left lower sternal border. No rubs noted.    Lungs: CTA b/l. No rhonchi, rales or wheezes.   Abdomen: + BS, soft. Non tender. Non distended. No rebound. No guarding.   Extremities: No clubbing/cyanosis/edema.   Neurologic: Moves all four extremities. Full range of motion.   Skin: Warm and moist. The patient's skin has normal elasticity and good skin turgor.   Psychiatric: Appropriate mood and affect.  Musculoskeletal: Normal range of motion, normal strength      TELE: not on tele    TTE: < from: Transthoracic Echocardiogram (11.16.20 @ 18:38) >  1. Mitral annular calcification, otherwise normal mitral  valve. Mild mitral regurgitation.  2. Calcified trileaflet aortic valve with decreased  opening. Peak transaortic valve gradient equals 49 mm Hg,  mean transaortic valve gradient equals 36 mm Hg, estimated  aortic valve area equals 0.6 sqcm (by continuity equation),  aortic valve velocity time integral equals 86 cm,  consistent with severe aortic stenosis. Minimal aortic  regurgitation.  3. Mild concentric left ventricular hypertrophy.  4. Hyperdynamic left ventricle.  5. Normal right ventricular size and function.  6. Estimated right ventricular systolic pressure equals 59  mm Hg, assuming right atrial pressure equals 10 mm Hg,  consistent with moderate pulmonary hypertension.  *** Compared with echocardiogram of 7/22/2020, no  significant changes noted.    < end of copied text >     89 year old female with history of HTN, DM, CKD, Afib on ac with coumadin, known moderate to severe AS conservatively managed per [facility] who was admitted with dry gangrene on left first toe.     -pt. with no clinical heart failure on exam  -TTE confirms severe AS which has been managed conservatively per the patient and the patient's family's wishes, confirmed with daughter Catherine on the phone   -ABx per ID   -BCx NGTD   -follow up podiatry  -no further cardiac workup anticipated as long as goals of care do not change and BCx remain negative    Trisha Banks MD

## 2020-11-20 NOTE — PROGRESS NOTE ADULT - SUBJECTIVE AND OBJECTIVE BOX
Infectious Diseases progress note:    Subjective:  NAD, afebrile.      ROS:  CONSTITUTIONAL:  No fever, chills, rigors  CARDIOVASCULAR:  No chest pain or palpitations  RESPIRATORY:   No SOB, cough, dyspnea on exertion.  No wheezing  GASTROINTESTINAL:  No abd pain, N/V, diarrhea/constipation  EXTREMITIES:  No swelling or joint pain  GENITOURINARY:  No burning on urination, increased frequency or urgency.  No flank pain  NEUROLOGIC:  No HA, visual disturbances  SKIN: No rashes    Allergies    No Known Allergies    Intolerances        ANTIBIOTICS/RELEVANT:  antimicrobials  piperacillin/tazobactam IVPB.. 3.375 Gram(s) IV Intermittent every 8 hours    immunologic:  influenza  Vaccine (HIGH DOSE) 0.7 milliLiter(s) IntraMuscular once    OTHER:  acetaminophen   Tablet .. 650 milliGRAM(s) Oral every 6 hours PRN  allopurinol 100 milliGRAM(s) Oral daily  aluminum hydroxide/magnesium hydroxide/simethicone Suspension 30 milliLiter(s) Oral every 6 hours PRN  amLODIPine   Tablet 10 milliGRAM(s) Oral daily  cinacalcet 60 milliGRAM(s) Oral daily  dextrose 40% Gel 15 Gram(s) Oral once  dextrose 5%. 1000 milliLiter(s) IV Continuous <Continuous>  dextrose 5%. 1000 milliLiter(s) IV Continuous <Continuous>  dextrose 50% Injectable 25 Gram(s) IV Push once  dextrose 50% Injectable 25 Gram(s) IV Push once  dextrose 50% Injectable 12.5 Gram(s) IV Push once  gabapentin 100 milliGRAM(s) Oral two times a day  glucagon  Injectable 1 milliGRAM(s) IntraMuscular once  hydrALAZINE 75 milliGRAM(s) Oral three times a day  insulin lispro (ADMELOG) corrective regimen sliding scale   SubCutaneous three times a day before meals  insulin lispro (ADMELOG) corrective regimen sliding scale   SubCutaneous at bedtime  latanoprost 0.005% Ophthalmic Solution 1 Drop(s) Both EYES at bedtime  levothyroxine 75 MICROGram(s) Oral daily  oxyCODONE    IR 5 milliGRAM(s) Oral every 8 hours PRN  senna 2 Tablet(s) Oral at bedtime  simvastatin 20 milliGRAM(s) Oral at bedtime  sodium chloride 0.9%. 500 milliLiter(s) IV Continuous <Continuous>      Objective:  Vital Signs Last 24 Hrs  T(C): 37.1 (20 Nov 2020 18:15), Max: 37.4 (20 Nov 2020 09:12)  T(F): 98.7 (20 Nov 2020 18:15), Max: 99.3 (20 Nov 2020 09:12)  HR: 87 (20 Nov 2020 18:15) (64 - 89)  BP: 133/67 (20 Nov 2020 18:15) (124/61 - 143/69)  BP(mean): --  RR: 16 (20 Nov 2020 18:15) (16 - 16)  SpO2: 97% (20 Nov 2020 18:15) (89% - 98%)    PHYSICAL EXAM:  Constitutional:NAD  Eyes:OLIVIA, EOMI  Ear/Nose/Throat: no thrush, mucositis.  Moist mucous membranes	  Neck:no JVD, no lymphadenopathy, supple  Respiratory: CTA adina  Cardiovascular: S1S2 RRR, no murmurs  Gastrointestinal:soft, nontender,  nondistended (+) BS  Extremities:no e/e/c  Skin:  no rashes, open wounds or ulcerations        LABS:                        7.9    10.75 )-----------( 199      ( 20 Nov 2020 06:10 )             25.4     11-20    138  |  97<L>  |  64<H>  ----------------------------<  101<H>  3.4<L>   |  27  |  2.60<H>    Ca    8.0<L>      20 Nov 2020 06:10      PT/INR - ( 20 Nov 2020 06:10 )   PT: 43.5 SEC;   INR: 4.04          PTT - ( 20 Nov 2020 06:10 )  PTT:37.9 SEC        Vancomycin Level, Random:  ug/mL (11-20 @ 06:10)  Vancomycin Level, Random:  ug/mL (11-17 @ 19:27)  Vancomycin Level, Random:  ug/mL (11-16 @ 15:55)                  MICROBIOLOGY:          RADIOLOGY & ADDITIONAL STUDIES:

## 2020-11-20 NOTE — PROVIDER CONTACT NOTE (OTHER) - ACTION/TREATMENT ORDERED:
Continue to monitor.
verified blood pressure. OK to give hydralazine 75mg. continue to monitor.
As per provider Ana Holman no intervention needed at this time. Continue to monitor.

## 2020-11-20 NOTE — PROGRESS NOTE ADULT - ASSESSMENT
89 y/o female pmh htn, dm, ckd, afib on coumadin p/w c/o infected L toes. pt being admitted for wet gangrene. Renal following for NIKHIL/CKD, vol Mx.     NIKHIL on CKD3: b/l SCr around 1.6 8/2020  Scr steadily rising, up to 2.6 today.   Resolved LE edema.   diuretics has been on hold.   No identifiable nephrotoxin, stable BP. Can give gentle hydration, NS @75cc/hr and reassess renal function.   Check UA   monitor BMP daily   dose all meds for eGFR<15ml/min.   avoid ACEi/ARB for now/NSAIDs/Nephrotoxics.    Hypokalemia -K better  HTN, bp controlled well  Hypercalcemia- improved, now 9   phos wnl,  high, [id number] level 76 noted. avoid ca/vitD suplements   c/w sensipar   wet Gangrene L foot- Mx per Podiatry. f/u w/vas sx. on vanco and Zosyn. monitor vanco trough levels    labs, chart reviewed

## 2020-11-20 NOTE — PROVIDER CONTACT NOTE (OTHER) - BACKGROUND
89 year old female with pmhx of pulmonary embolism, glaucoma, hypothyroid, afib, DM, CHF, HTN, PVD, and CKD. present to ED with gangene to left 1st toe

## 2020-11-20 NOTE — PROGRESS NOTE ADULT - SUBJECTIVE AND OBJECTIVE BOX
Nephrology Followup Note - [phone] - Dr Catherine / Dr Garcia / Dr Tellez / Dr Sullivan / Dr Castillo / Dr Grijalva / Dr Segovia / Dr Luna  Pt seen and examined at bedside  No acute events overnight. No complaints. Pt reporting feeling stronger today, compared to yesterday.    Allergies:  No Known Allergies    Hospital Medications:   MEDICATIONS  (STANDING):  allopurinol 100 milliGRAM(s) Oral daily  amLODIPine   Tablet 10 milliGRAM(s) Oral daily  cinacalcet 60 milliGRAM(s) Oral daily  dextrose 40% Gel 15 Gram(s) Oral once  dextrose 5%. 1000 milliLiter(s) (50 mL/Hr) IV Continuous <Continuous>  dextrose 5%. 1000 milliLiter(s) (100 mL/Hr) IV Continuous <Continuous>  dextrose 50% Injectable 25 Gram(s) IV Push once  dextrose 50% Injectable 12.5 Gram(s) IV Push once  dextrose 50% Injectable 25 Gram(s) IV Push once  gabapentin 100 milliGRAM(s) Oral two times a day  glucagon  Injectable 1 milliGRAM(s) IntraMuscular once  hydrALAZINE 75 milliGRAM(s) Oral three times a day  influenza  Vaccine (HIGH DOSE) 0.7 milliLiter(s) IntraMuscular once  insulin lispro (ADMELOG) corrective regimen sliding scale   SubCutaneous three times a day before meals  insulin lispro (ADMELOG) corrective regimen sliding scale   SubCutaneous at bedtime  latanoprost 0.005% Ophthalmic Solution 1 Drop(s) Both EYES at bedtime  levothyroxine 75 MICROGram(s) Oral daily  piperacillin/tazobactam IVPB.. 3.375 Gram(s) IV Intermittent every 8 hours  senna 2 Tablet(s) Oral at bedtime  simvastatin 20 milliGRAM(s) Oral at bedtime  sodium chloride 0.9%. 500 milliLiter(s) (75 mL/Hr) IV Continuous <Continuous>    VITALS:  T(F): 98.7 (11-20-20 @ 12:47), Max: 99.3 (11-20-20 @ 09:12)  HR: 84 (11-20-20 @ 12:47)  BP: 136/68 (11-20-20 @ 12:47)  RR: 16 (11-20-20 @ 12:47)  SpO2: 97% (11-20-20 @ 12:47)  Wt(kg): --    11-19 @ 07:01  -  11-20 @ 07:00  --------------------------------------------------------  IN: 480 mL / OUT: 300 mL / NET: 180 mL        PHYSICAL EXAM:  Constitutional: NAD  HEENT: anicteric sclera, oropharynx clear, MMM  Neck: No JVD  Respiratory: CTAB, no wheezes, rales or rhonchi  Cardiovascular: S1, S2, RRR  Gastrointestinal: BS+, soft, NT/ND  Extremities: No cyanosis or clubbing. No peripheral edema  Neurological: A/O x 3, no focal deficits  Psychiatric: Normal mood, normal affect  : No CVA tenderness. No michaud.   Skin: No rashes    LABS:  11-20    138  |  97<L>  |  64<H>  ----------------------------<  101<H>  3.4<L>   |  27  |  2.60<H>    Ca    8.0<L>      20 Nov 2020 06:10      Creatinine Trend: 2.60 <--, 2.40 <--, 2.40 <--, 2.05 <--, 2.04 <--, 2.18 <--, 2.08 <--                        7.9    10.75 )-----------( 199      ( 20 Nov 2020 06:10 )             25.4     Urine Studies:      RADIOLOGY & ADDITIONAL STUDIES:

## 2020-11-20 NOTE — PROGRESS NOTE ADULT - ASSESSMENT
89 y/o female pmh htn, dm, ckd, afib on coumadin c/o infected L toes. Pt admits to having dry gangrene to L first toe and was admitted in the Hospital in August. Pt states that over the last several days, her L 2nd toe has become swollen, painful, red and has drainage. Pt went to her podiatrist, Dr. Eaton, today who sent pt to the ER for wet gangrene. Pt denies chest pain, sob, abd pain, n/v/d, weakness, dizziness ,syncope, fever or chills. (14 Nov 2020 19:20)    ER vss, afebrile.  ESR 57, CRP 51.  WBC 8.7.  Abscess cx staph aureus, GPC pairs, proteus,   klebsiella.  Pt with early wet gangrene to the left hallux and 2nd digit, malodor present, bogginess to the left 2nd digit, 5 cc of sangiopurulence, probing to bone, no erythema.  s/p I&D to the left 1st interspace, with 5cc of sangiopurulence.  Cultures sent.   Pt on vanco/cefepime.      No revascularization options as per Vascular.  Pt already had LLE angiography on 7/28/2020 demonstrates single-vessel runoff without visualization of pedal vasculature.     L ft infection:    - Pt with early wet gangrene to left hallus and 2nd digit, (+) probe to bone, s/p I&D.  Abscess cultures noted.    - Cont vanco (by level) and zosyn.     - Pt with h/o underlying PAD, no options for revascularization or further surgery.   No further intervention by Podiatry as wound unlikely to heal.    - Abx alone will not treat pt's gangrene.  I see no utility in long term IV abx in this case, as wound is unlikely to heal given PAD, and abx will have poor penetration to the area given her compromised circulation.      Change to PO doxycycline 100mg bid and augmentin 500mg qdaily x 2 weeks when pt ready for discharge.  Pt at increased risk for recurrent infections and forsee some type of surgical procedure in the future for source control.      Cont local wound care.     - f/u with podiatry/vascular.     - Vanco 1gm IV redosed 11/18.  Level remains adequate today (15).  Repeat random in AM, if < 15, can redose 1gm x 1.       Change to PO abx upon discharge.       (Dr. Barbara Winters covering on 11/21 and 11/22.  [phone])

## 2020-11-20 NOTE — PROGRESS NOTE ADULT - SUBJECTIVE AND OBJECTIVE BOX
EP ATTENDING    no tele  she denies palpitations, syncope, nor angina, ROS otherwise -      Review of Systems:   Constitutional: [ ] fevers, [ ] chills.   Skin: [ ] dry skin. [ ] rashes.  Psychiatric: [ ] depression, [ ] anxiety.   Gastrointestinal: [ ] BRBPR, [ ] melena.   Neurological: [ ] confusion. [ ] seizures. [ ] shuffling gait.   Ears,Nose,Mouth and Throat: [ ] ear pain [ ] sore throat.   Eyes: [ ] diplopia.   Respiratory: [ ] hemoptysis. [ ] shortness of breath  Cardiovascular: See HPI above  Hematologic/Lymphatic: [ ] anemia. [ ] painful nodes. [ ] prolonged bleeding.   Genitourinary: [ ] hematuria. [ ] flank pain.   Endocrine: [ ] significant change in weight. [ ] intolerance to heat and cold.     Review of systems [ x] otherwise negative, [ ] otherwise unable to obtain    FH: no family history of sudden cardiac death in first degree relatives    SH: [ ] tobacco, [ ] alcohol, [ ] drugs    acetaminophen   Tablet .. 650 milliGRAM(s) Oral every 6 hours PRN  allopurinol 100 milliGRAM(s) Oral daily  aluminum hydroxide/magnesium hydroxide/simethicone Suspension 30 milliLiter(s) Oral every 6 hours PRN  amLODIPine   Tablet 10 milliGRAM(s) Oral daily  cinacalcet 60 milliGRAM(s) Oral daily  dextrose 40% Gel 15 Gram(s) Oral once  dextrose 5%. 1000 milliLiter(s) IV Continuous <Continuous>  dextrose 5%. 1000 milliLiter(s) IV Continuous <Continuous>  dextrose 50% Injectable 25 Gram(s) IV Push once  dextrose 50% Injectable 12.5 Gram(s) IV Push once  dextrose 50% Injectable 25 Gram(s) IV Push once  gabapentin 100 milliGRAM(s) Oral two times a day  glucagon  Injectable 1 milliGRAM(s) IntraMuscular once  hydrALAZINE 75 milliGRAM(s) Oral three times a day  influenza  Vaccine (HIGH DOSE) 0.7 milliLiter(s) IntraMuscular once  insulin lispro (ADMELOG) corrective regimen sliding scale   SubCutaneous three times a day before meals  insulin lispro (ADMELOG) corrective regimen sliding scale   SubCutaneous at bedtime  latanoprost 0.005% Ophthalmic Solution 1 Drop(s) Both EYES at bedtime  levothyroxine 75 MICROGram(s) Oral daily  oxyCODONE    IR 5 milliGRAM(s) Oral every 8 hours PRN  piperacillin/tazobactam IVPB.. 3.375 Gram(s) IV Intermittent every 8 hours  senna 2 Tablet(s) Oral at bedtime  simvastatin 20 milliGRAM(s) Oral at bedtime  sodium chloride 0.9%. 500 milliLiter(s) IV Continuous <Continuous>                            7.9    10.75 )-----------( 199      ( 20 Nov 2020 06:10 )             25.4       11-20    138  |  97<L>  |  64<H>  ----------------------------<  101<H>  3.4<L>   |  27  |  2.60<H>    Ca    8.0<L>      20 Nov 2020 06:10      T(C): 37.1 (11-20-20 @ 12:47), Max: 37.4 (11-20-20 @ 09:12)  HR: 84 (11-20-20 @ 12:47) (64 - 89)  BP: 136/68 (11-20-20 @ 12:47) (124/61 - 143/69)  RR: 16 (11-20-20 @ 12:47) (16 - 16)  SpO2: 97% (11-20-20 @ 12:47) (89% - 98%)  Wt(kg): --    I&O's Summary    19 Nov 2020 07:01  -  20 Nov 2020 07:00  --------------------------------------------------------  IN: 480 mL / OUT: 300 mL / NET: 180 mL    General: Well nourished in no acute distress. Alert and Oriented * 3.   Head: Normocephalic and atraumatic.   Neck: No JVD. No bruits. Supple. Does not appear to be enlarged.   Cardiovascular: + S1,S2 ; RRR Soft systolic murmur at the left lower sternal border. No rubs noted.    Lungs: CTA b/l. No rhonchi, rales or wheezes.   Abdomen: + BS, soft. Non tender. Non distended. No rebound. No guarding.   Extremities: No clubbing/cyanosis/edema.   Neurologic: Moves all four extremities. Full range of motion.   Skin: Warm and moist. The patient's skin has normal elasticity and good skin turgor.   Psychiatric: Appropriate mood and affect.  Musculoskeletal: Normal range of motion, normal strength    echo: severe AS, normal LVEF    A/P) She is an extremely pleasant 89 year old female with a past medical history of atrial fibrillation, managed with Coumadin therapy. More recently, she was admitted to the hospital with lower GI bleeding from diverticulosis. She underwent a colonoscopy that did not require any intervention and her anticoagulation was restarted. She is now referred to me for left atrial appendage occlusion. She denies palpitations, syncope, or angina. She is a/w with a RLE wound, and is being followed by vascular surgery    -please continue coumadin to keep INR 2-3  -as long as she can tolerate coumadin I would not recommend Watchman given her advanced age  -no inpatient EP procedures expected  -f/u with Antonella after discharge    Simon Jung M.D.  Cardiac Electrophysiology  [phone]

## 2020-11-20 NOTE — PROGRESS NOTE ADULT - SUBJECTIVE AND OBJECTIVE BOX
INTERVAL HPI/OVERNIGHT EVENTS: No new concerns.   Vital Signs Last 24 Hrs  T(C): 37.1 (20 Nov 2020 18:15), Max: 37.4 (20 Nov 2020 09:12)  T(F): 98.7 (20 Nov 2020 18:15), Max: 99.3 (20 Nov 2020 09:12)  HR: 87 (20 Nov 2020 18:15) (64 - 89)  BP: 133/67 (20 Nov 2020 18:15) (124/61 - 143/69)  BP(mean): --  RR: 16 (20 Nov 2020 18:15) (16 - 16)  SpO2: 97% (20 Nov 2020 18:15) (89% - 98%)  I&O's Summary    19 Nov 2020 07:01  -  20 Nov 2020 07:00  --------------------------------------------------------  IN: 480 mL / OUT: 300 mL / NET: 180 mL      MEDICATIONS  (STANDING):  allopurinol 100 milliGRAM(s) Oral daily  amLODIPine   Tablet 10 milliGRAM(s) Oral daily  cinacalcet 60 milliGRAM(s) Oral daily  dextrose 40% Gel 15 Gram(s) Oral once  dextrose 5%. 1000 milliLiter(s) (50 mL/Hr) IV Continuous <Continuous>  dextrose 5%. 1000 milliLiter(s) (100 mL/Hr) IV Continuous <Continuous>  dextrose 50% Injectable 25 Gram(s) IV Push once  dextrose 50% Injectable 25 Gram(s) IV Push once  dextrose 50% Injectable 12.5 Gram(s) IV Push once  gabapentin 100 milliGRAM(s) Oral two times a day  glucagon  Injectable 1 milliGRAM(s) IntraMuscular once  hydrALAZINE 75 milliGRAM(s) Oral three times a day  influenza  Vaccine (HIGH DOSE) 0.7 milliLiter(s) IntraMuscular once  insulin lispro (ADMELOG) corrective regimen sliding scale   SubCutaneous three times a day before meals  insulin lispro (ADMELOG) corrective regimen sliding scale   SubCutaneous at bedtime  latanoprost 0.005% Ophthalmic Solution 1 Drop(s) Both EYES at bedtime  levothyroxine 75 MICROGram(s) Oral daily  piperacillin/tazobactam IVPB.. 3.375 Gram(s) IV Intermittent every 8 hours  senna 2 Tablet(s) Oral at bedtime  simvastatin 20 milliGRAM(s) Oral at bedtime  sodium chloride 0.9%. 500 milliLiter(s) (75 mL/Hr) IV Continuous <Continuous>    MEDICATIONS  (PRN):  acetaminophen   Tablet .. 650 milliGRAM(s) Oral every 6 hours PRN Temp greater or equal to 38.5C (101.3F), Moderate Pain (4 - 6)  aluminum hydroxide/magnesium hydroxide/simethicone Suspension 30 milliLiter(s) Oral every 6 hours PRN Dyspepsia  oxyCODONE    IR 5 milliGRAM(s) Oral every 8 hours PRN Severe Pain (7 - 10)    LABS:                        7.9    10.75 )-----------( 199      ( 20 Nov 2020 06:10 )             25.4     11-20    138  |  97<L>  |  64<H>  ----------------------------<  101<H>  3.4<L>   |  27  |  2.60<H>    Ca    8.0<L>      20 Nov 2020 06:10      PT/INR - ( 20 Nov 2020 06:10 )   PT: 43.5 SEC;   INR: 4.04          PTT - ( 20 Nov 2020 06:10 )  PTT:37.9 SEC    CAPILLARY BLOOD GLUCOSE      POCT Blood Glucose.: 114 mg/dL (20 Nov 2020 17:06)  POCT Blood Glucose.: 124 mg/dL (20 Nov 2020 12:04)  POCT Blood Glucose.: 103 mg/dL (20 Nov 2020 07:22)  POCT Blood Glucose.: 103 mg/dL (19 Nov 2020 22:15)          REVIEW OF SYSTEMS:  CONSTITUTIONAL: No fever, weight loss, or fatigue  EYES: No eye pain, visual disturbances, or discharge  ENMT:  No difficulty hearing, tinnitus, vertigo; No sinus or throat pain  NECK: No pain or stiffness  RESPIRATORY: No cough, wheezing, chills or hemoptysis; No shortness of breath  CARDIOVASCULAR: No chest pain, palpitations, dizziness, or leg swelling  GASTROINTESTINAL: No abdominal or epigastric pain. No nausea, vomiting, or hematemesis; No diarrhea or constipation. No melena or hematochezia.  GENITOURINARY: No dysuria, frequency, hematuria, or incontinence  NEUROLOGICAL: No headaches, memory loss, loss of strength, numbness, or tremors      Consultant(s) Notes Reviewed:  [x ] YES  [ ] NO    PHYSICAL EXAM:  GENERAL: NAD, , not in any distress ,  HEAD:  Atraumatic, Normocephalic  EYES: EOMI, PERRLA, conjunctiva and sclera clear  ENMT: No tonsillar erythema, exudates, or enlargement; Moist mucous membranes, Good dentition, No lesions  NECK: Supple, No JVD, Normal thyroid  NERVOUS SYSTEM:  Alert & Oriented X3, No focal deficit   CHEST/LUNG: Good air entry bilateral with no  rales, rhonchi, wheezing, or rubs  HEART: Regular rate and rhythm; No murmurs, rubs, or gallops  ABDOMEN: Soft, Nontender, Nondistended; Bowel sounds present  EXTREMITIES:  Foot dressed     Care Discussed with Consultants/Other Providers [ x] YES  [ ] NO

## 2020-11-21 LAB
ANION GAP SERPL CALC-SCNC: 16 MMO/L — HIGH (ref 7–14)
APTT BLD: 41.7 SEC — HIGH (ref 27–36.3)
BUN SERPL-MCNC: 67 MG/DL — HIGH (ref 7–23)
CALCIUM SERPL-MCNC: 8 MG/DL — LOW (ref 8.4–10.5)
CHLORIDE SERPL-SCNC: 96 MMOL/L — LOW (ref 98–107)
CO2 SERPL-SCNC: 27 MMOL/L — SIGNIFICANT CHANGE UP (ref 22–31)
CREAT SERPL-MCNC: 2.61 MG/DL — HIGH (ref 0.5–1.3)
GLUCOSE BLDC GLUCOMTR-MCNC: 114 MG/DL — HIGH (ref 70–99)
GLUCOSE BLDC GLUCOMTR-MCNC: 124 MG/DL — HIGH (ref 70–99)
GLUCOSE BLDC GLUCOMTR-MCNC: 126 MG/DL — HIGH (ref 70–99)
GLUCOSE BLDC GLUCOMTR-MCNC: 94 MG/DL — SIGNIFICANT CHANGE UP (ref 70–99)
GLUCOSE SERPL-MCNC: 74 MG/DL — SIGNIFICANT CHANGE UP (ref 70–99)
HCT VFR BLD CALC: 27.1 % — LOW (ref 34.5–45)
HGB BLD-MCNC: 8.1 G/DL — LOW (ref 11.5–15.5)
INR BLD: 4.18 — HIGH (ref 0.88–1.16)
MAGNESIUM SERPL-MCNC: 1.9 MG/DL — SIGNIFICANT CHANGE UP (ref 1.6–2.6)
MCHC RBC-ENTMCNC: 26.2 PG — LOW (ref 27–34)
MCHC RBC-ENTMCNC: 29.9 % — LOW (ref 32–36)
MCV RBC AUTO: 87.7 FL — SIGNIFICANT CHANGE UP (ref 80–100)
NRBC # FLD: 0 K/UL — SIGNIFICANT CHANGE UP (ref 0–0)
PHOSPHATE SERPL-MCNC: 3.4 MG/DL — SIGNIFICANT CHANGE UP (ref 2.5–4.5)
PLATELET # BLD AUTO: 212 K/UL — SIGNIFICANT CHANGE UP (ref 150–400)
PMV BLD: 11.3 FL — SIGNIFICANT CHANGE UP (ref 7–13)
POTASSIUM SERPL-MCNC: 3.2 MMOL/L — LOW (ref 3.5–5.3)
POTASSIUM SERPL-SCNC: 3.2 MMOL/L — LOW (ref 3.5–5.3)
PROTHROM AB SERPL-ACNC: 44.5 SEC — HIGH (ref 10.6–13.6)
RBC # BLD: 3.09 M/UL — LOW (ref 3.8–5.2)
RBC # FLD: 17.1 % — HIGH (ref 10.3–14.5)
SODIUM SERPL-SCNC: 139 MMOL/L — SIGNIFICANT CHANGE UP (ref 135–145)
VANCOMYCIN FLD-MCNC: 11.7 UG/ML — SIGNIFICANT CHANGE UP
WBC # BLD: 15.08 K/UL — HIGH (ref 3.8–10.5)
WBC # FLD AUTO: 15.08 K/UL — HIGH (ref 3.8–10.5)

## 2020-11-21 RX ORDER — POTASSIUM CHLORIDE 20 MEQ
40 PACKET (EA) ORAL ONCE
Refills: 0 | Status: COMPLETED | OUTPATIENT
Start: 2020-11-21 | End: 2020-11-21

## 2020-11-21 RX ADMIN — GABAPENTIN 100 MILLIGRAM(S): 400 CAPSULE ORAL at 16:51

## 2020-11-21 RX ADMIN — Medication 75 MILLIGRAM(S): at 21:13

## 2020-11-21 RX ADMIN — Medication 75 MICROGRAM(S): at 06:04

## 2020-11-21 RX ADMIN — CINACALCET 60 MILLIGRAM(S): 30 TABLET, FILM COATED ORAL at 12:19

## 2020-11-21 RX ADMIN — Medication 100 MILLIGRAM(S): at 12:19

## 2020-11-21 RX ADMIN — SODIUM CHLORIDE 75 MILLILITER(S): 9 INJECTION INTRAMUSCULAR; INTRAVENOUS; SUBCUTANEOUS at 10:23

## 2020-11-21 RX ADMIN — LATANOPROST 1 DROP(S): 0.05 SOLUTION/ DROPS OPHTHALMIC; TOPICAL at 21:12

## 2020-11-21 RX ADMIN — PIPERACILLIN AND TAZOBACTAM 25 GRAM(S): 4; .5 INJECTION, POWDER, LYOPHILIZED, FOR SOLUTION INTRAVENOUS at 06:03

## 2020-11-21 RX ADMIN — GABAPENTIN 100 MILLIGRAM(S): 400 CAPSULE ORAL at 06:03

## 2020-11-21 RX ADMIN — Medication 40 MILLIEQUIVALENT(S): at 12:19

## 2020-11-21 RX ADMIN — PIPERACILLIN AND TAZOBACTAM 25 GRAM(S): 4; .5 INJECTION, POWDER, LYOPHILIZED, FOR SOLUTION INTRAVENOUS at 14:49

## 2020-11-21 RX ADMIN — AMLODIPINE BESYLATE 10 MILLIGRAM(S): 2.5 TABLET ORAL at 06:03

## 2020-11-21 RX ADMIN — Medication 75 MILLIGRAM(S): at 06:03

## 2020-11-21 RX ADMIN — SIMVASTATIN 20 MILLIGRAM(S): 20 TABLET, FILM COATED ORAL at 21:12

## 2020-11-21 RX ADMIN — PIPERACILLIN AND TAZOBACTAM 25 GRAM(S): 4; .5 INJECTION, POWDER, LYOPHILIZED, FOR SOLUTION INTRAVENOUS at 21:14

## 2020-11-21 RX ADMIN — Medication 75 MILLIGRAM(S): at 14:10

## 2020-11-21 NOTE — PROGRESS NOTE ADULT - SUBJECTIVE AND OBJECTIVE BOX
INTERVAL HPI/OVERNIGHT EVENTS: No  new concerns.   Vital Signs Last 24 Hrs  T(C): 36.9 (21 Nov 2020 16:52), Max: 37.3 (20 Nov 2020 21:13)  T(F): 98.5 (21 Nov 2020 16:52), Max: 99.2 (21 Nov 2020 06:00)  HR: 74 (21 Nov 2020 16:52) (67 - 83)  BP: 135/68 (21 Nov 2020 16:52) (135/68 - 140/62)  BP(mean): --  RR: 17 (21 Nov 2020 16:52) (16 - 17)  SpO2: 96% (21 Nov 2020 16:52) (96% - 99%)  I&O's Summary    MEDICATIONS  (STANDING):  allopurinol 100 milliGRAM(s) Oral daily  amLODIPine   Tablet 10 milliGRAM(s) Oral daily  cinacalcet 60 milliGRAM(s) Oral daily  dextrose 40% Gel 15 Gram(s) Oral once  dextrose 5%. 1000 milliLiter(s) (50 mL/Hr) IV Continuous <Continuous>  dextrose 5%. 1000 milliLiter(s) (100 mL/Hr) IV Continuous <Continuous>  dextrose 50% Injectable 25 Gram(s) IV Push once  dextrose 50% Injectable 12.5 Gram(s) IV Push once  dextrose 50% Injectable 25 Gram(s) IV Push once  gabapentin 100 milliGRAM(s) Oral two times a day  glucagon  Injectable 1 milliGRAM(s) IntraMuscular once  hydrALAZINE 75 milliGRAM(s) Oral three times a day  influenza  Vaccine (HIGH DOSE) 0.7 milliLiter(s) IntraMuscular once  insulin lispro (ADMELOG) corrective regimen sliding scale   SubCutaneous three times a day before meals  insulin lispro (ADMELOG) corrective regimen sliding scale   SubCutaneous at bedtime  latanoprost 0.005% Ophthalmic Solution 1 Drop(s) Both EYES at bedtime  levothyroxine 75 MICROGram(s) Oral daily  piperacillin/tazobactam IVPB.. 3.375 Gram(s) IV Intermittent every 8 hours  senna 2 Tablet(s) Oral at bedtime  simvastatin 20 milliGRAM(s) Oral at bedtime  sodium chloride 0.9%. 500 milliLiter(s) (75 mL/Hr) IV Continuous <Continuous>    MEDICATIONS  (PRN):  acetaminophen   Tablet .. 650 milliGRAM(s) Oral every 6 hours PRN Temp greater or equal to 38.5C (101.3F), Moderate Pain (4 - 6)  aluminum hydroxide/magnesium hydroxide/simethicone Suspension 30 milliLiter(s) Oral every 6 hours PRN Dyspepsia  oxyCODONE    IR 5 milliGRAM(s) Oral every 8 hours PRN Severe Pain (7 - 10)    LABS:                        8.1    15.08 )-----------( 212      ( 21 Nov 2020 06:00 )             27.1     11-21    139  |  96<L>  |  67<H>  ----------------------------<  74  3.2<L>   |  27  |  2.61<H>    Ca    8.0<L>      21 Nov 2020 06:00  Phos  3.4     11-21  Mg     1.9     11-21      PT/INR - ( 21 Nov 2020 06:00 )   PT: 44.5 SEC;   INR: 4.18          PTT - ( 21 Nov 2020 06:00 )  PTT:41.7 SEC    CAPILLARY BLOOD GLUCOSE      POCT Blood Glucose.: 124 mg/dL (21 Nov 2020 16:46)  POCT Blood Glucose.: 126 mg/dL (21 Nov 2020 11:54)  POCT Blood Glucose.: 94 mg/dL (21 Nov 2020 07:32)  POCT Blood Glucose.: 115 mg/dL (20 Nov 2020 21:11)          REVIEW OF SYSTEMS:  CONSTITUTIONAL: No fever, weight loss, or fatigue  EYES: No eye pain, visual disturbances, or discharge  ENMT:  No difficulty hearing, tinnitus, vertigo; No sinus or throat pain  NECK: No pain or stiffness  RESPIRATORY: No cough, wheezing, chills or hemoptysis; No shortness of breath  CARDIOVASCULAR: No chest pain, palpitations, dizziness, or leg swelling  GASTROINTESTINAL: No abdominal or epigastric pain. No nausea, vomiting, or hematemesis; No diarrhea or constipation. No melena or hematochezia.  GENITOURINARY: No dysuria, frequency, hematuria, or incontinence  NEUROLOGICAL: No headaches, memory loss, loss of strength, numbness, or tremors      Consultant(s) Notes Reviewed:  [x ] YES  [ ] NO    PHYSICAL EXAM:  GENERAL: NAD, well-groomed, well-developed ,not in any distress ,  HEAD:  Atraumatic, Normocephalic  EYES: EOMI, PERRLA, conjunctiva and sclera clear  ENMT: No tonsillar erythema, exudates, or enlargement; Moist mucous membranes, Good dentition, No lesions  NECK: Supple, No JVD, Normal thyroid  NERVOUS SYSTEM:  Alert & Oriented X3, No focal deficit   CHEST/LUNG: Good air entry bilateral with no  rales, rhonchi, wheezing, or rubs  HEART: Regular rate and rhythm; No murmurs, rubs, or gallops  ABDOMEN: Soft, Nontender, Nondistended; Bowel sounds present  EXTREMITIES:  Foot dressed     Care Discussed with Consultants/Other Providers [ x] YES  [ ] NO

## 2020-11-21 NOTE — PROGRESS NOTE ADULT - ASSESSMENT
89 y/o female pmh htn, dm, ckd, afib on coumadin p/w c/o infected L toes. pt being admitted for wet gangrene. Renal following for NIKHIL/CKD, vol Mx.     NIKHIL on CKD3: b/l SCr around 1.6 8/2020  Scr steadily rising, up to 2.6 today. similar to yesterday  improving lower ext swelling  diuretics has been on hold.   hold iv fluid at end of today,   monitor BMP daily   dose all meds for eGFR<15ml/min.   avoid ACEi/ARB for now/NSAIDs/Nephrotoxics.    Hypokalemia po Potassium Chloride 40 meq x 1 dose,   HTN, bp controlled well  Hypercalcemia- improved,    phos wnl,  high, [id number] level 76 noted. avoid ca/vitD suplements   c/w sensipar   wet Gangrene L foot- Mx per Podiatry. f/u w/vas sx. on vanco and Zosyn. monitor vanco trough levels

## 2020-11-21 NOTE — PROGRESS NOTE ADULT - SUBJECTIVE AND OBJECTIVE BOX
[facility] : Ans Serv [phone], Office [phone]  Dr Sullivan/Dr Garcia / Dr Florin FELDMAN /Dr José carpio /Dr GASTON Castillo/Dr Abebe Tellez/Dr Shahid Segovia /Dr ECKERT Njeru  _______________________________________________________________________________________________    seen and examined today for renal failure  Interval : Serum Creatinine higher but stable  VITALS:  T(F): 99.2 (11-21-20 @ 06:00), Max: 99.2 (11-21-20 @ 06:00)  HR: 73 (11-21-20 @ 06:00)  BP: 135/69 (11-21-20 @ 06:00)  RR: 16 (11-21-20 @ 06:00)  SpO2: 97% (11-21-20 @ 06:00)    Physical Exam :-  Constitutional: NAD  Neck: Supple.  Respiratory: Bilateral equal breath sounds, few Crackles present.  Cardiovascular: S1, S2 normal, positive Murmur  Gastrointestinal: Bowel Sounds present, soft,  Extremities: +trace Edema Feet  Neurological: Alert and Oriented x 3  Psychiatric: Normal mood, normal affect  Data:-  Allergies :   No Known Allergies    Hospital Medications:   MEDICATIONS  (STANDING):  allopurinol 100 milliGRAM(s) Oral daily  amLODIPine   Tablet 10 milliGRAM(s) Oral daily  cinacalcet 60 milliGRAM(s) Oral daily  dextrose 40% Gel 15 Gram(s) Oral once  dextrose 5%. 1000 milliLiter(s) (50 mL/Hr) IV Continuous <Continuous>  dextrose 5%. 1000 milliLiter(s) (100 mL/Hr) IV Continuous <Continuous>  dextrose 50% Injectable 25 Gram(s) IV Push once  dextrose 50% Injectable 12.5 Gram(s) IV Push once  dextrose 50% Injectable 25 Gram(s) IV Push once  gabapentin 100 milliGRAM(s) Oral two times a day  glucagon  Injectable 1 milliGRAM(s) IntraMuscular once  hydrALAZINE 75 milliGRAM(s) Oral three times a day  influenza  Vaccine (HIGH DOSE) 0.7 milliLiter(s) IntraMuscular once  insulin lispro (ADMELOG) corrective regimen sliding scale   SubCutaneous three times a day before meals  insulin lispro (ADMELOG) corrective regimen sliding scale   SubCutaneous at bedtime  latanoprost 0.005% Ophthalmic Solution 1 Drop(s) Both EYES at bedtime  levothyroxine 75 MICROGram(s) Oral daily  piperacillin/tazobactam IVPB.. 3.375 Gram(s) IV Intermittent every 8 hours  potassium chloride    Tablet ER 40 milliEquivalent(s) Oral once  senna 2 Tablet(s) Oral at bedtime  simvastatin 20 milliGRAM(s) Oral at bedtime  sodium chloride 0.9%. 500 milliLiter(s) (75 mL/Hr) IV Continuous <Continuous>    11-21    139  |  96<L>  |  67<H>  ----------------------------<  74  3.2<L>   |  27  |  2.61<H>    Ca    8.0<L>      21 Nov 2020 06:00  Phos  3.4     11-21  Mg     1.9     11-21      Creatinine Trend: 2.61 <--, 2.60 <--, 2.40 <--, 2.40 <--, 2.05 <--, 2.04 <--, 2.18 <--, 2.08 <--                        8.1    15.08 )-----------( 212      ( 21 Nov 2020 06:00 )             27.1

## 2020-11-21 NOTE — PROGRESS NOTE ADULT - SUBJECTIVE AND OBJECTIVE BOX
EP ATTENDING    no tele  she denies palpitations, syncope, nor angina, ROS otherwise -      Review of Systems:   Constitutional: [ ] fevers, [ ] chills.   Skin: [ ] dry skin. [ ] rashes.  Psychiatric: [ ] depression, [ ] anxiety.   Gastrointestinal: [ ] BRBPR, [ ] melena.   Neurological: [ ] confusion. [ ] seizures. [ ] shuffling gait.   Ears,Nose,Mouth and Throat: [ ] ear pain [ ] sore throat.   Eyes: [ ] diplopia.   Respiratory: [ ] hemoptysis. [ ] shortness of breath  Cardiovascular: See HPI above  Hematologic/Lymphatic: [ ] anemia. [ ] painful nodes. [ ] prolonged bleeding.   Genitourinary: [ ] hematuria. [ ] flank pain.   Endocrine: [ ] significant change in weight. [ ] intolerance to heat and cold.     Review of systems [ x] otherwise negative, [ ] otherwise unable to obtain    FH: no family history of sudden cardiac death in first degree relatives    SH: [ ] tobacco, [ ] alcohol, [ ] drugs    acetaminophen   Tablet .. 650 milliGRAM(s) Oral every 6 hours PRN  allopurinol 100 milliGRAM(s) Oral daily  aluminum hydroxide/magnesium hydroxide/simethicone Suspension 30 milliLiter(s) Oral every 6 hours PRN  amLODIPine   Tablet 10 milliGRAM(s) Oral daily  cinacalcet 60 milliGRAM(s) Oral daily  dextrose 40% Gel 15 Gram(s) Oral once  dextrose 5%. 1000 milliLiter(s) IV Continuous <Continuous>  dextrose 5%. 1000 milliLiter(s) IV Continuous <Continuous>  dextrose 50% Injectable 25 Gram(s) IV Push once  dextrose 50% Injectable 12.5 Gram(s) IV Push once  dextrose 50% Injectable 25 Gram(s) IV Push once  gabapentin 100 milliGRAM(s) Oral two times a day  glucagon  Injectable 1 milliGRAM(s) IntraMuscular once  hydrALAZINE 75 milliGRAM(s) Oral three times a day  influenza  Vaccine (HIGH DOSE) 0.7 milliLiter(s) IntraMuscular once  insulin lispro (ADMELOG) corrective regimen sliding scale   SubCutaneous three times a day before meals  insulin lispro (ADMELOG) corrective regimen sliding scale   SubCutaneous at bedtime  latanoprost 0.005% Ophthalmic Solution 1 Drop(s) Both EYES at bedtime  levothyroxine 75 MICROGram(s) Oral daily  oxyCODONE    IR 5 milliGRAM(s) Oral every 8 hours PRN  piperacillin/tazobactam IVPB.. 3.375 Gram(s) IV Intermittent every 8 hours  senna 2 Tablet(s) Oral at bedtime  simvastatin 20 milliGRAM(s) Oral at bedtime  sodium chloride 0.9%. 500 milliLiter(s) IV Continuous <Continuous>                            8.1    15.08 )-----------( 212      ( 21 Nov 2020 06:00 )             27.1       11-21    139  |  96<L>  |  67<H>  ----------------------------<  74  3.2<L>   |  27  |  2.61<H>    Ca    8.0<L>      21 Nov 2020 06:00  Phos  3.4     11-21  Mg     1.9     11-21      T(C): 36.6 (11-21-20 @ 12:25), Max: 37.3 (11-20-20 @ 21:13)  HR: 67 (11-21-20 @ 14:09) (67 - 87)  BP: 140/62 (11-21-20 @ 14:09) (133/67 - 140/62)  RR: 17 (11-21-20 @ 14:09) (16 - 17)  SpO2: 99% (11-21-20 @ 14:09) (97% - 99%)  Wt(kg): --    I&O's Summary    General: Well nourished in no acute distress. Alert and Oriented * 3.   Head: Normocephalic and atraumatic.   Neck: No JVD. No bruits. Supple. Does not appear to be enlarged.   Cardiovascular: + S1,S2 ; RRR Soft systolic murmur at the left lower sternal border. No rubs noted.    Lungs: CTA b/l. No rhonchi, rales or wheezes.   Abdomen: + BS, soft. Non tender. Non distended. No rebound. No guarding.   Extremities: No clubbing/cyanosis/edema.   Neurologic: Moves all four extremities. Full range of motion.   Skin: Warm and moist. The patient's skin has normal elasticity and good skin turgor.   Psychiatric: Appropriate mood and affect.  Musculoskeletal: Normal range of motion, normal strength    echo: severe AS, normal LVEF    A/P) She is an extremely pleasant 89 year old female with a past medical history of atrial fibrillation, managed with Coumadin therapy. More recently, she was admitted to the hospital with lower GI bleeding from diverticulosis. She underwent a colonoscopy that did not require any intervention and her anticoagulation was restarted. She is now referred to me for left atrial appendage occlusion. She denies palpitations, syncope, or angina. She is a/w with a RLE wound, and is being followed by vascular surgery    -coumadin to keep INR 2-3  -as long as she can tolerate coumadin I would not recommend [facility] given her advanced age  -oral electrolyte replacement to K 4-4.5, Mg 2  -no inpatient EP procedures expected  -f/u with Antonella after discharge    Simon Jung M.D.  Cardiac Electrophysiology  [phone]

## 2020-11-21 NOTE — PROGRESS NOTE ADULT - ASSESSMENT
89 y/o female pmh htn, dm, ckd, afib on coumadin c/o infected L toes. Pt admits to having dry gangrene to L first toe and was admitted in the Hospital in August. Pt states that over th elast several days, her L 2nd toe has become swollen, painful, red and has drainage. Pt went to her podiatrist, Dr. Eaton, today who sent pt to the ER for wet gangrene. Pt denies chest pain, sob, abd pain, n/v/d, weakness, dizziness ,syncope, fever or chills.     Problem/Plan - 1:  ·  Problem: Wet Gangrene.  Plan: S/P I&D by Podiatry . IV abxs Zosyn  and ID helping.  Vascular helping.    No surgical intervention per Podiatry and vascular.    Leucocytosis  .   Problem/Plan - 2:  ·  Problem: CKD (chronic kidney disease) with NIKHIL .  Plan:  Renal following. Creatinine trending up.      Problem/Plan - 3:  ·  Problem: PVD (peripheral vascular disease).  Plan: Vascular helping.      Problem/Plan - 4:  ·  Problem: HTN (hypertension).  Plan: BP meds with hold parameters.      Problem/Plan - 5:  ·  Problem: CHF (congestive heart failure).  Plan: Holding Lasix as Euvolemic.      Problem/Plan - 6:  Problem: DM (diabetes mellitus). Plan: SSI for now.     Problem/Plan - 7:  ·  Problem: Afib.  Plan: On AC . INR went up so holding coumadin and rechecking in AM.      Problem/Plan - 8:  ·  Problem: Hypothyroid.  Plan: Home dose synthroid.      Problem/Plan - 9:  ·  Problem: Hyperparathyroidism .  Plan: Parathyroid scan outpt . Ca level normal now.      Problem/Plan - 10:  Problem: Pulmonary embolism. Plan; On AC.     Problem/Plan - 11:  ·  Problem: Delirium .  Plan: Infection VS Hospital induced. Better.     Disposition : DC planning home on hold.

## 2020-11-21 NOTE — PROGRESS NOTE ADULT - SUBJECTIVE AND OBJECTIVE BOX
chief complaint: left foot pain    S: no chest pain or sob; ROS otherwise negative.     chief complaint: leg pain    Review of Systems:   Constitutional: [ ] fevers, [ ] chills.   Skin: [ ] dry skin. [ ] rashes.  Psychiatric: [ ] depression, [ ] anxiety.   Gastrointestinal: [ ] BRBPR, [ ] melena.   Neurological: [ ] confusion. [ ] seizures. [ ] shuffling gait.   Ears,Nose,Mouth and Throat: [ ] ear pain [ ] sore throat.   Eyes: [ ] diplopia.   Respiratory: [ ] hemoptysis. [ ] shortness of breath  Cardiovascular: See HPI above  Hematologic/Lymphatic: [ ] anemia. [ ] painful nodes. [ ] prolonged bleeding.   Genitourinary: [ ] hematuria. [ ] flank pain.   Endocrine: [ ] significant change in weight. [ ] intolerance to heat and cold.     Review of systems [ x] otherwise negative, [ ] otherwise unable to obtain    FH: no family history of sudden cardiac death in first degree relatives    SH: [ ] tobacco, [ ] alcohol, [ ] drugs    acetaminophen   Tablet .. 650 milliGRAM(s) Oral every 6 hours PRN  allopurinol 100 milliGRAM(s) Oral daily  aluminum hydroxide/magnesium hydroxide/simethicone Suspension 30 milliLiter(s) Oral every 6 hours PRN  amLODIPine   Tablet 10 milliGRAM(s) Oral daily  cinacalcet 60 milliGRAM(s) Oral daily  dextrose 40% Gel 15 Gram(s) Oral once  dextrose 5%. 1000 milliLiter(s) IV Continuous <Continuous>  dextrose 5%. 1000 milliLiter(s) IV Continuous <Continuous>  dextrose 50% Injectable 25 Gram(s) IV Push once  dextrose 50% Injectable 12.5 Gram(s) IV Push once  dextrose 50% Injectable 25 Gram(s) IV Push once  gabapentin 100 milliGRAM(s) Oral two times a day  glucagon  Injectable 1 milliGRAM(s) IntraMuscular once  hydrALAZINE 75 milliGRAM(s) Oral three times a day  influenza  Vaccine (HIGH DOSE) 0.7 milliLiter(s) IntraMuscular once  insulin lispro (ADMELOG) corrective regimen sliding scale   SubCutaneous at bedtime  insulin lispro (ADMELOG) corrective regimen sliding scale   SubCutaneous three times a day before meals  latanoprost 0.005% Ophthalmic Solution 1 Drop(s) Both EYES at bedtime  levothyroxine 75 MICROGram(s) Oral daily  oxyCODONE    IR 5 milliGRAM(s) Oral every 8 hours PRN  piperacillin/tazobactam IVPB.. 3.375 Gram(s) IV Intermittent every 8 hours  senna 2 Tablet(s) Oral at bedtime  simvastatin 20 milliGRAM(s) Oral at bedtime  sodium chloride 0.9%. 500 milliLiter(s) IV Continuous <Continuous>                        8.1    15.08 )-----------( 212      ( 21 Nov 2020 06:00 )             27.1     139  |  96<L>  |  67<H>  ----------------------------<  74  3.2<L>   |  27  |  2.61<H>    Ca    8.0<L>      21 Nov 2020 06:00  Phos  3.4     11-21  Mg     1.9     11-21      T(C): 36.6 (11-21-20 @ 12:25), Max: 37.3 (11-20-20 @ 21:13)  HR: 73 (11-21-20 @ 12:25) (73 - 87)  BP: 138/56 (11-21-20 @ 12:25) (133/67 - 138/68)  RR: 17 (11-21-20 @ 12:25) (16 - 17)  SpO2: 99% (11-21-20 @ 12:25) (97% - 99%)    General: Well nourished in no acute distress. Alert and Oriented * 3.   Head: Normocephalic and atraumatic.   Neck: No JVD. No bruits. Supple. Does not appear to be enlarged.   Cardiovascular: + S1,S2 ; RRR 3/6 SUZI. No rubs noted.    Lungs: CTA b/l. No rhonchi, rales or wheezes.   Abdomen: + BS, soft. Non tender. Non distended. No rebound. No guarding.   Extremities: No clubbing/cyanosis/edema.   Neurologic: Moves all four extremities. Full range of motion.   Skin: Warm and moist. The patient's skin has normal elasticity and good skin turgor.   Psychiatric: Appropriate mood and affect.  Musculoskeletal: Normal range of motion, normal strength    TELE: not on tele    < from: Transthoracic Echocardiogram (11.16.20 @ 18:38) >  1. Mitral annular calcification, otherwise normal mitral  valve. Mild mitral regurgitation.  2. Calcified trileaflet aortic valve with decreased  opening. Peak transaortic valve gradient equals 49 mm Hg,  mean transaortic valve gradient equals 36 mm Hg, estimated  aortic valve area equals 0.6 sqcm (by continuity equation),  aortic valve velocity time integral equals 86 cm,  consistent with severe aortic stenosis. Minimal aortic  regurgitation.  3. Mild concentric left ventricular hypertrophy.  4. Hyperdynamic left ventricle.  5. Normal right ventricular size and function.  6. Estimated right ventricular systolic pressure equals 59  mm Hg, assuming right atrial pressure equals 10 mm Hg,  consistent with moderate pulmonary hypertension.  *** Compared with echocardiogram of 7/22/2020, no  significant changes noted.    < end of copied text >     89 year old female with history of HTN, DM, CKD, Afib on ac with coumadin, known moderate to severe AS conservatively managed per [facility] who was admitted with dry gangrene on left first toe.     -pt. with no clinical heart failure on exam  -TTE confirms severe AS which has been managed conservatively per the patient and the patient's family's wishes, confirmed with daughter Catherine on the phone   -ABx per ID   -BCx NGTD   -on gentle IVF per Renal  -monitor volume status  -no further cardiac workup anticipated as long as goals of care do not change and BCx remain negative

## 2020-11-22 LAB
ANION GAP SERPL CALC-SCNC: 13 MMO/L — SIGNIFICANT CHANGE UP (ref 7–14)
APTT BLD: 45.5 SEC — HIGH (ref 27–36.3)
BASOPHILS # BLD AUTO: 0.02 K/UL — SIGNIFICANT CHANGE UP (ref 0–0.2)
BASOPHILS NFR BLD AUTO: 0.1 % — SIGNIFICANT CHANGE UP (ref 0–2)
BUN SERPL-MCNC: 68 MG/DL — HIGH (ref 7–23)
CALCIUM SERPL-MCNC: 7.6 MG/DL — LOW (ref 8.4–10.5)
CHLORIDE SERPL-SCNC: 98 MMOL/L — SIGNIFICANT CHANGE UP (ref 98–107)
CO2 SERPL-SCNC: 27 MMOL/L — SIGNIFICANT CHANGE UP (ref 22–31)
CREAT SERPL-MCNC: 2.92 MG/DL — HIGH (ref 0.5–1.3)
EOSINOPHIL # BLD AUTO: 0.03 K/UL — SIGNIFICANT CHANGE UP (ref 0–0.5)
EOSINOPHIL NFR BLD AUTO: 0.1 % — SIGNIFICANT CHANGE UP (ref 0–6)
GLUCOSE BLDC GLUCOMTR-MCNC: 128 MG/DL — HIGH (ref 70–99)
GLUCOSE BLDC GLUCOMTR-MCNC: 132 MG/DL — HIGH (ref 70–99)
GLUCOSE BLDC GLUCOMTR-MCNC: 135 MG/DL — HIGH (ref 70–99)
GLUCOSE BLDC GLUCOMTR-MCNC: 155 MG/DL — HIGH (ref 70–99)
GLUCOSE SERPL-MCNC: 118 MG/DL — HIGH (ref 70–99)
HCT VFR BLD CALC: 27.4 % — LOW (ref 34.5–45)
HCT VFR BLD CALC: 27.4 % — LOW (ref 34.5–45)
HGB BLD-MCNC: 8.2 G/DL — LOW (ref 11.5–15.5)
HGB BLD-MCNC: 8.2 G/DL — LOW (ref 11.5–15.5)
IMM GRANULOCYTES NFR BLD AUTO: 0.4 % — SIGNIFICANT CHANGE UP (ref 0–1.5)
INR BLD: 6.54 — CRITICAL HIGH (ref 0.88–1.16)
LYMPHOCYTES # BLD AUTO: 0.59 K/UL — LOW (ref 1–3.3)
LYMPHOCYTES # BLD AUTO: 2.6 % — LOW (ref 13–44)
MAGNESIUM SERPL-MCNC: 1.8 MG/DL — SIGNIFICANT CHANGE UP (ref 1.6–2.6)
MCHC RBC-ENTMCNC: 26.5 PG — LOW (ref 27–34)
MCHC RBC-ENTMCNC: 26.5 PG — LOW (ref 27–34)
MCHC RBC-ENTMCNC: 29.9 % — LOW (ref 32–36)
MCHC RBC-ENTMCNC: 29.9 % — LOW (ref 32–36)
MCV RBC AUTO: 88.7 FL — SIGNIFICANT CHANGE UP (ref 80–100)
MCV RBC AUTO: 88.7 FL — SIGNIFICANT CHANGE UP (ref 80–100)
MONOCYTES # BLD AUTO: 1.67 K/UL — HIGH (ref 0–0.9)
MONOCYTES NFR BLD AUTO: 7.4 % — SIGNIFICANT CHANGE UP (ref 2–14)
NEUTROPHILS # BLD AUTO: 20.27 K/UL — HIGH (ref 1.8–7.4)
NEUTROPHILS NFR BLD AUTO: 89.4 % — HIGH (ref 43–77)
NRBC # FLD: 0 K/UL — SIGNIFICANT CHANGE UP (ref 0–0)
NRBC # FLD: 0 K/UL — SIGNIFICANT CHANGE UP (ref 0–0)
PHOSPHATE SERPL-MCNC: 3.6 MG/DL — SIGNIFICANT CHANGE UP (ref 2.5–4.5)
PLATELET # BLD AUTO: 211 K/UL — SIGNIFICANT CHANGE UP (ref 150–400)
PLATELET # BLD AUTO: 211 K/UL — SIGNIFICANT CHANGE UP (ref 150–400)
PMV BLD: 11 FL — SIGNIFICANT CHANGE UP (ref 7–13)
PMV BLD: 11 FL — SIGNIFICANT CHANGE UP (ref 7–13)
POTASSIUM SERPL-MCNC: 3.2 MMOL/L — LOW (ref 3.5–5.3)
POTASSIUM SERPL-SCNC: 3.2 MMOL/L — LOW (ref 3.5–5.3)
PROTHROM AB SERPL-ACNC: 68.8 SEC — HIGH (ref 10.6–13.6)
RBC # BLD: 3.09 M/UL — LOW (ref 3.8–5.2)
RBC # BLD: 3.09 M/UL — LOW (ref 3.8–5.2)
RBC # FLD: 17.2 % — HIGH (ref 10.3–14.5)
RBC # FLD: 17.2 % — HIGH (ref 10.3–14.5)
SODIUM SERPL-SCNC: 138 MMOL/L — SIGNIFICANT CHANGE UP (ref 135–145)
WBC # BLD: 22.67 K/UL — HIGH (ref 3.8–10.5)
WBC # BLD: 22.67 K/UL — HIGH (ref 3.8–10.5)
WBC # FLD AUTO: 22.67 K/UL — HIGH (ref 3.8–10.5)
WBC # FLD AUTO: 22.67 K/UL — HIGH (ref 3.8–10.5)

## 2020-11-22 PROCEDURE — 73630 X-RAY EXAM OF FOOT: CPT | Mod: 26,LT

## 2020-11-22 PROCEDURE — 71045 X-RAY EXAM CHEST 1 VIEW: CPT | Mod: 26

## 2020-11-22 RX ORDER — SODIUM CHLORIDE 9 MG/ML
1000 INJECTION INTRAMUSCULAR; INTRAVENOUS; SUBCUTANEOUS
Refills: 0 | Status: DISCONTINUED | OUTPATIENT
Start: 2020-11-22 | End: 2020-11-22

## 2020-11-22 RX ORDER — POTASSIUM CHLORIDE 20 MEQ
20 PACKET (EA) ORAL
Refills: 0 | Status: COMPLETED | OUTPATIENT
Start: 2020-11-22 | End: 2020-11-22

## 2020-11-22 RX ORDER — VANCOMYCIN HCL 1 G
1000 VIAL (EA) INTRAVENOUS ONCE
Refills: 0 | Status: COMPLETED | OUTPATIENT
Start: 2020-11-22 | End: 2020-11-22

## 2020-11-22 RX ORDER — VANCOMYCIN HCL 1 G
500 VIAL (EA) INTRAVENOUS ONCE
Refills: 0 | Status: DISCONTINUED | OUTPATIENT
Start: 2020-11-22 | End: 2020-11-22

## 2020-11-22 RX ORDER — LANOLIN ALCOHOL/MO/W.PET/CERES
3 CREAM (GRAM) TOPICAL AT BEDTIME
Refills: 0 | Status: DISCONTINUED | OUTPATIENT
Start: 2020-11-22 | End: 2020-11-24

## 2020-11-22 RX ADMIN — Medication 20 MILLIEQUIVALENT(S): at 09:39

## 2020-11-22 RX ADMIN — Medication 75 MILLIGRAM(S): at 05:35

## 2020-11-22 RX ADMIN — PIPERACILLIN AND TAZOBACTAM 25 GRAM(S): 4; .5 INJECTION, POWDER, LYOPHILIZED, FOR SOLUTION INTRAVENOUS at 05:29

## 2020-11-22 RX ADMIN — Medication 2: at 16:53

## 2020-11-22 RX ADMIN — LATANOPROST 1 DROP(S): 0.05 SOLUTION/ DROPS OPHTHALMIC; TOPICAL at 21:45

## 2020-11-22 RX ADMIN — SODIUM CHLORIDE 75 MILLILITER(S): 9 INJECTION INTRAMUSCULAR; INTRAVENOUS; SUBCUTANEOUS at 12:07

## 2020-11-22 RX ADMIN — SIMVASTATIN 20 MILLIGRAM(S): 20 TABLET, FILM COATED ORAL at 21:45

## 2020-11-22 RX ADMIN — SODIUM CHLORIDE 75 MILLILITER(S): 9 INJECTION INTRAMUSCULAR; INTRAVENOUS; SUBCUTANEOUS at 05:29

## 2020-11-22 RX ADMIN — GABAPENTIN 100 MILLIGRAM(S): 400 CAPSULE ORAL at 05:35

## 2020-11-22 RX ADMIN — PIPERACILLIN AND TAZOBACTAM 25 GRAM(S): 4; .5 INJECTION, POWDER, LYOPHILIZED, FOR SOLUTION INTRAVENOUS at 21:45

## 2020-11-22 RX ADMIN — PIPERACILLIN AND TAZOBACTAM 25 GRAM(S): 4; .5 INJECTION, POWDER, LYOPHILIZED, FOR SOLUTION INTRAVENOUS at 15:17

## 2020-11-22 RX ADMIN — CINACALCET 60 MILLIGRAM(S): 30 TABLET, FILM COATED ORAL at 12:09

## 2020-11-22 RX ADMIN — Medication 75 MICROGRAM(S): at 05:34

## 2020-11-22 RX ADMIN — Medication 75 MILLIGRAM(S): at 21:45

## 2020-11-22 RX ADMIN — AMLODIPINE BESYLATE 10 MILLIGRAM(S): 2.5 TABLET ORAL at 05:35

## 2020-11-22 RX ADMIN — Medication 100 MILLIGRAM(S): at 12:07

## 2020-11-22 RX ADMIN — Medication 20 MILLIEQUIVALENT(S): at 12:07

## 2020-11-22 RX ADMIN — Medication 250 MILLIGRAM(S): at 13:33

## 2020-11-22 RX ADMIN — GABAPENTIN 100 MILLIGRAM(S): 400 CAPSULE ORAL at 17:00

## 2020-11-22 NOTE — PROGRESS NOTE ADULT - ASSESSMENT
89 y/o female pmh htn, dm, ckd, afib on coumadin p/w c/o infected L toes. pt being admitted for wet gangrene. Renal following for NIKHIL/CKD, vol Mx.     NIKHIL on CKD3: b/l SCr around 1.6 8/2020  Scr steadily rising, up to 2.6-->2.9 today  improving lower ext swelling  diuretics has been on hold.   hold iv fluid at end of today,   monitor BMP daily   dose all meds for eGFR<15ml/min.   avoid ACEi/ARB for now/NSAIDs/Nephrotoxics.    Hypokalemia po Potassium Chloride 40 meq x 2 dose today  HTN, bp controlled well  Hypercalcemia- improved,    phos wnl,  high, [id number] level 76 noted. avoid ca/vitD suplements   c/w sensipar   wet Gangrene L foot- Mx per Podiatry. f/u w/vas sx. on vanco and Zosyn. monitor vanco trough levels

## 2020-11-22 NOTE — CONSULT NOTE ADULT - ASSESSMENT
89 yo f w/ left foot dry gangrene to left hallux and 2nd digit  -pt seen and re-evaluated  -afebrile, WBC 22  -LF X ray re-ordered, no gas, no OM  - Dry gangrene to the left hallux and 2nd digit, no malodor present, no purulence, no drainage, no erythema, no cellulitis  - Foot unlikely source of leukocytosis, rec r/o other sources  - No acute surgical intervention at this time, as any amputation unlikely to heal  - Pod plan for LWC, and IV abx. Rec pt f/u w/ outpatient w/ interventional cardiology. Rec appointment w/ Dr Valles for endovascular intervention upon d/c  - Podiatry will follow  - follow up info in discharge paperwork  - discussed with attending

## 2020-11-22 NOTE — PROGRESS NOTE ADULT - SUBJECTIVE AND OBJECTIVE BOX
[facility] : [facility] [phone], Office [phone]  Dr Sullivan/Dr Garcia / Dr Florin FELDMAN /Dr José carpio /Dr GASTON Castillo/Dr Abebe Tellez/Dr Shahid Segovia /Dr ECKERT Njeru  _______________________________________________________________________________________________    seen and examined today for renal failure  Interval : serum k level lower today  VITALS:  T(F): 97.8 (11-22-20 @ 05:24), Max: 99.4 (11-21-20 @ 20:58)  HR: 84 (11-22-20 @ 05:24)  BP: 138/75 (11-22-20 @ 05:24)  RR: 16 (11-22-20 @ 05:24)  SpO2: 98% (11-22-20 @ 05:24)    Physical Exam :-  Constitutional: NAD  Neck: Supple.  Respiratory: Bilateral equal breath sounds, few Crackles present.  Cardiovascular: S1, S2 normal, positive Murmur  Gastrointestinal: Bowel Sounds present, soft, non tender.  Extremities: + Edema Feet  Neurological: Alert and Oriented x 3  Psychiatric: Normal mood, normal affect  Data:-  Allergies :   No Known Allergies    Hospital Medications:   MEDICATIONS  (STANDING):  allopurinol 100 milliGRAM(s) Oral daily  amLODIPine   Tablet 10 milliGRAM(s) Oral daily  cinacalcet 60 milliGRAM(s) Oral daily  dextrose 40% Gel 15 Gram(s) Oral once  dextrose 5%. 1000 milliLiter(s) (50 mL/Hr) IV Continuous <Continuous>  dextrose 5%. 1000 milliLiter(s) (100 mL/Hr) IV Continuous <Continuous>  dextrose 50% Injectable 25 Gram(s) IV Push once  dextrose 50% Injectable 12.5 Gram(s) IV Push once  dextrose 50% Injectable 25 Gram(s) IV Push once  gabapentin 100 milliGRAM(s) Oral two times a day  glucagon  Injectable 1 milliGRAM(s) IntraMuscular once  hydrALAZINE 75 milliGRAM(s) Oral three times a day  influenza  Vaccine (HIGH DOSE) 0.7 milliLiter(s) IntraMuscular once  insulin lispro (ADMELOG) corrective regimen sliding scale   SubCutaneous three times a day before meals  insulin lispro (ADMELOG) corrective regimen sliding scale   SubCutaneous at bedtime  latanoprost 0.005% Ophthalmic Solution 1 Drop(s) Both EYES at bedtime  levothyroxine 75 MICROGram(s) Oral daily  piperacillin/tazobactam IVPB.. 3.375 Gram(s) IV Intermittent every 8 hours  senna 2 Tablet(s) Oral at bedtime  simvastatin 20 milliGRAM(s) Oral at bedtime  sodium chloride 0.9%. 1000 milliLiter(s) (75 mL/Hr) IV Continuous <Continuous>  vancomycin  IVPB 500 milliGRAM(s) IV Intermittent once    11-22    138  |  98  |  68<H>  ----------------------------<  118<H>  3.2<L>   |  27  |  2.92<H>    Ca    7.6<L>      22 Nov 2020 06:26  Phos  3.6     11-22  Mg     1.8     11-22      Creatinine Trend: 2.92 <--, 2.61 <--, 2.60 <--, 2.40 <--, 2.40 <--, 2.05 <--, 2.04 <--                        8.2    22.67 )-----------( 211      ( 22 Nov 2020 06:26 )             27.4

## 2020-11-22 NOTE — PROGRESS NOTE ADULT - SUBJECTIVE AND OBJECTIVE BOX
pt seen and examined, no complaints, ROS - .        acetaminophen   Tablet .. 650 milliGRAM(s) Oral every 6 hours PRN  allopurinol 100 milliGRAM(s) Oral daily  aluminum hydroxide/magnesium hydroxide/simethicone Suspension 30 milliLiter(s) Oral every 6 hours PRN  amLODIPine   Tablet 10 milliGRAM(s) Oral daily  cinacalcet 60 milliGRAM(s) Oral daily  dextrose 40% Gel 15 Gram(s) Oral once  dextrose 5%. 1000 milliLiter(s) IV Continuous <Continuous>  dextrose 5%. 1000 milliLiter(s) IV Continuous <Continuous>  dextrose 50% Injectable 25 Gram(s) IV Push once  dextrose 50% Injectable 12.5 Gram(s) IV Push once  dextrose 50% Injectable 25 Gram(s) IV Push once  gabapentin 100 milliGRAM(s) Oral two times a day  glucagon  Injectable 1 milliGRAM(s) IntraMuscular once  hydrALAZINE 75 milliGRAM(s) Oral three times a day  influenza  Vaccine (HIGH DOSE) 0.7 milliLiter(s) IntraMuscular once  insulin lispro (ADMELOG) corrective regimen sliding scale   SubCutaneous three times a day before meals  insulin lispro (ADMELOG) corrective regimen sliding scale   SubCutaneous at bedtime  latanoprost 0.005% Ophthalmic Solution 1 Drop(s) Both EYES at bedtime  levothyroxine 75 MICROGram(s) Oral daily  oxyCODONE    IR 5 milliGRAM(s) Oral every 8 hours PRN  piperacillin/tazobactam IVPB.. 3.375 Gram(s) IV Intermittent every 8 hours  potassium chloride    Tablet ER 20 milliEquivalent(s) Oral every 2 hours  senna 2 Tablet(s) Oral at bedtime  simvastatin 20 milliGRAM(s) Oral at bedtime  sodium chloride 0.9%. 500 milliLiter(s) IV Continuous <Continuous>                            8.2    22.67 )-----------( 211      ( 22 Nov 2020 06:26 )             27.4       Hemoglobin: 8.2 g/dL (11-22 @ 06:26)  Hemoglobin: 8.2 g/dL (11-22 @ 06:26)  Hemoglobin: 8.1 g/dL (11-21 @ 06:00)  Hemoglobin: 7.9 g/dL (11-20 @ 06:10)  Hemoglobin: 8.3 g/dL (11-19 @ 06:23)      11-22    138  |  98  |  68<H>  ----------------------------<  118<H>  3.2<L>   |  27  |  2.92<H>    Ca    7.6<L>      22 Nov 2020 06:26  Phos  3.6     11-22  Mg     1.8     11-22      Creatinine Trend: 2.92<--, 2.61<--, 2.60<--, 2.40<--, 2.40<--, 2.05<--    COAGS: PT/INR - ( 22 Nov 2020 06:26 )   PT: 68.8 SEC;   INR: 6.54          PTT - ( 22 Nov 2020 06:26 )  PTT:45.5 SEC          T(C): 36.6 (11-22-20 @ 05:24), Max: 37.4 (11-21-20 @ 20:58)  HR: 84 (11-22-20 @ 05:24) (67 - 84)  BP: 138/75 (11-22-20 @ 05:24) (132/65 - 140/62)  RR: 16 (11-22-20 @ 05:24) (16 - 17)  SpO2: 98% (11-22-20 @ 05:24) (96% - 99%)  Wt(kg): --    I&O's Summary    General: Well nourished in no acute distress. Alert and Oriented * 3.   Head: Normocephalic and atraumatic.   Neck: No JVD. No bruits. Supple. Does not appear to be enlarged.   Cardiovascular: + S1,S2 ; RRR 3/6 SUZI. No rubs noted.    Lungs: CTA b/l. No rhonchi, rales or wheezes.   Abdomen: + BS, soft. Non tender. Non distended. No rebound. No guarding.   Extremities: No clubbing/cyanosis/edema.   Neurologic: Moves all four extremities. Full range of motion.   Skin: Warm and moist. The patient's skin has normal elasticity and good skin turgor.   Psychiatric: Appropriate mood and affect.  Musculoskeletal: Normal range of motion, normal strength    TELE: not on tele    < from: Transthoracic Echocardiogram (11.16.20 @ 18:38) >  1. Mitral annular calcification, otherwise normal mitral  valve. Mild mitral regurgitation.  2. Calcified trileaflet aortic valve with decreased  opening. Peak transaortic valve gradient equals 49 mm Hg,  mean transaortic valve gradient equals 36 mm Hg, estimated  aortic valve area equals 0.6 sqcm (by continuity equation),  aortic valve velocity time integral equals 86 cm,  consistent with severe aortic stenosis. Minimal aortic  regurgitation.  3. Mild concentric left ventricular hypertrophy.  4. Hyperdynamic left ventricle.  5. Normal right ventricular size and function.  6. Estimated right ventricular systolic pressure equals 59  mm Hg, assuming right atrial pressure equals 10 mm Hg,  consistent with moderate pulmonary hypertension.  *** Compared with echocardiogram of 7/22/2020, no  significant changes noted.    < end of copied text >     89 year old female with history of HTN, DM, CKD, Afib on ac with coumadin, known moderate to severe AS conservatively managed per [facility] who was admitted with dry gangrene on left first toe.     -pt. with no clinical heart failure on exam  -TTE confirms severe AS which has been managed conservatively per the patient and the patient's family's wishes   -ABx per ID , wound care   -  keep holding diuretics at present , renal follow up    -no further cardiac workup anticipated as long as goals of care do not change and BCx remain negative

## 2020-11-22 NOTE — PROGRESS NOTE ADULT - ASSESSMENT
89 y/o female pmh htn, dm, ckd, afib on coumadin c/o infected L toes. Pt admits to having dry gangrene to L first toe and was admitted in the Hospital in August. Pt states that over th elast several days, her L 2nd toe has become swollen, painful, red and has drainage. Pt went to her podiatrist, Dr. Eaton, today who sent pt to the ER for wet gangrene. Pt denies chest pain, sob, abd pain, n/v/d, weakness, dizziness ,syncope, fever or chills.     Problem/Plan - 1:  ·  Problem: Wet Gangrene.  Plan: S/P I&D by Podiatry . IV abxs Zosyn  and ID helping.  Vascular helping.    No surgical intervention per Podiatry and vascular.    Leucocytosis  .   Problem/Plan - 2:  ·  Problem: CKD (chronic kidney disease) with NIKHIL .  Plan:  Renal following. Creatinine trending up.      Problem/Plan - 3:  ·  Problem: PVD (peripheral vascular disease).  Plan: Vascular helping.      Problem/Plan - 4:  ·  Problem: HTN (hypertension).  Plan: BP meds with hold parameters.      Problem/Plan - 5:  ·  Problem: CHF (congestive heart failure).  Plan: Holding Lasix as Euvolemic.      Problem/Plan - 6:  Problem: DM (diabetes mellitus). Plan: SSI for now.     Problem/Plan - 7:  ·  Problem: Afib.  Plan: On AC . INR went up so holding coumadin and rechecking in AM.      Problem/Plan - 8:  ·  Problem: Hypothyroid.  Plan: Home dose synthroid.      Problem/Plan - 9:  ·  Problem: Hyperparathyroidism .  Plan: Parathyroid scan outpt . Ca level normal now.      Problem/Plan - 10:  Problem: Pulmonary embolism. Plan; On AC.     Problem/Plan - 11:  ·  Problem: Delirium .  Plan: Infection VS Hospital induced. Better.     Disposition : DC planning home with hospice. Palliative consulted as prognosis very poor. DNR. Spoke to daughter in great detail.

## 2020-11-22 NOTE — PROGRESS NOTE ADULT - SUBJECTIVE AND OBJECTIVE BOX
INTERVAL HPI/OVERNIGHT EVENTS: Not eating well and more lethargic .  Vital Signs Last 24 Hrs  T(C): 36.9 (22 Nov 2020 13:37), Max: 37.4 (21 Nov 2020 20:58)  T(F): 98.4 (22 Nov 2020 13:37), Max: 99.4 (21 Nov 2020 20:58)  HR: 72 (22 Nov 2020 13:37) (72 - 84)  BP: 115/54 (22 Nov 2020 13:37) (115/54 - 138/75)  BP(mean): --  RR: 17 (22 Nov 2020 13:37) (16 - 17)  SpO2: 97% (22 Nov 2020 13:37) (96% - 98%)  I&O's Summary    MEDICATIONS  (STANDING):  allopurinol 100 milliGRAM(s) Oral daily  amLODIPine   Tablet 10 milliGRAM(s) Oral daily  cinacalcet 60 milliGRAM(s) Oral daily  dextrose 40% Gel 15 Gram(s) Oral once  dextrose 5%. 1000 milliLiter(s) (50 mL/Hr) IV Continuous <Continuous>  dextrose 5%. 1000 milliLiter(s) (100 mL/Hr) IV Continuous <Continuous>  dextrose 50% Injectable 25 Gram(s) IV Push once  dextrose 50% Injectable 12.5 Gram(s) IV Push once  dextrose 50% Injectable 25 Gram(s) IV Push once  gabapentin 100 milliGRAM(s) Oral two times a day  glucagon  Injectable 1 milliGRAM(s) IntraMuscular once  hydrALAZINE 75 milliGRAM(s) Oral three times a day  influenza  Vaccine (HIGH DOSE) 0.7 milliLiter(s) IntraMuscular once  insulin lispro (ADMELOG) corrective regimen sliding scale   SubCutaneous three times a day before meals  insulin lispro (ADMELOG) corrective regimen sliding scale   SubCutaneous at bedtime  latanoprost 0.005% Ophthalmic Solution 1 Drop(s) Both EYES at bedtime  levothyroxine 75 MICROGram(s) Oral daily  piperacillin/tazobactam IVPB.. 3.375 Gram(s) IV Intermittent every 8 hours  senna 2 Tablet(s) Oral at bedtime  simvastatin 20 milliGRAM(s) Oral at bedtime    MEDICATIONS  (PRN):  acetaminophen   Tablet .. 650 milliGRAM(s) Oral every 6 hours PRN Temp greater or equal to 38.5C (101.3F), Moderate Pain (4 - 6)  aluminum hydroxide/magnesium hydroxide/simethicone Suspension 30 milliLiter(s) Oral every 6 hours PRN Dyspepsia  oxyCODONE    IR 5 milliGRAM(s) Oral every 8 hours PRN Severe Pain (7 - 10)    LABS:                        8.2    22.67 )-----------( 211      ( 22 Nov 2020 06:26 )             27.4     11-22    138  |  98  |  68<H>  ----------------------------<  118<H>  3.2<L>   |  27  |  2.92<H>    Ca    7.6<L>      22 Nov 2020 06:26  Phos  3.6     11-22  Mg     1.8     11-22      PT/INR - ( 22 Nov 2020 06:26 )   PT: 68.8 SEC;   INR: 6.54          PTT - ( 22 Nov 2020 06:26 )  PTT:45.5 SEC    CAPILLARY BLOOD GLUCOSE      POCT Blood Glucose.: 132 mg/dL (22 Nov 2020 11:44)  POCT Blood Glucose.: 128 mg/dL (22 Nov 2020 07:36)  POCT Blood Glucose.: 114 mg/dL (21 Nov 2020 21:04)  POCT Blood Glucose.: 124 mg/dL (21 Nov 2020 16:46)        Consultant(s) Notes Reviewed:  [x ] YES  [ ] NO    PHYSICAL EXAM:  GENERAL: NAD, well-groomed, well-developed ,not in any distress ,  HEAD:  Atraumatic, Normocephalic  EYES: EOMI, PERRLA, conjunctiva and sclera clear  ENMT: No tonsillar erythema, exudates, or enlargement; Moist mucous membranes, Good dentition, No lesions  NECK: Supple, No JVD, Normal thyroid  NERVOUS SYSTEM:  Alert & Oriented X3, No focal deficit   CHEST/LUNG: Good air entry bilateral with no  rales, rhonchi, wheezing, or rubs  HEART: Regular rate and rhythm; No murmurs, rubs, or gallops  ABDOMEN: Soft, Nontender, Nondistended; Bowel sounds present  EXTREMITIES:  Foot dressed     Care Discussed with Consultants/Other Providers [ x] YES  [ ] NO

## 2020-11-22 NOTE — PROGRESS NOTE ADULT - SUBJECTIVE AND OBJECTIVE BOX
EP ATTENDING    no tele  lethargic over the last day or two, minimally interactive with staff.  does not offer or respond to questions re: complaints.      Review of Systems:   Constitutional: [ ] fevers, [ ] chills.   Skin: [ ] dry skin. [ ] rashes.  Psychiatric: [ ] depression, [ ] anxiety.   Gastrointestinal: [ ] BRBPR, [ ] melena.   Neurological: [ ] confusion. [ ] seizures. [ ] shuffling gait.   Ears,Nose,Mouth and Throat: [ ] ear pain [ ] sore throat.   Eyes: [ ] diplopia.   Respiratory: [ ] hemoptysis. [ ] shortness of breath  Cardiovascular: See HPI above  Hematologic/Lymphatic: [ ] anemia. [ ] painful nodes. [ ] prolonged bleeding.   Genitourinary: [ ] hematuria. [ ] flank pain.   Endocrine: [ ] significant change in weight. [ ] intolerance to heat and cold.     Review of systems [ x] otherwise negative, [ ] otherwise unable to obtain    FH: no family history of sudden cardiac death in first degree relatives    SH: [ ] tobacco, [ ] alcohol, [ ] drugs    acetaminophen   Tablet .. 650 milliGRAM(s) Oral every 6 hours PRN  allopurinol 100 milliGRAM(s) Oral daily  aluminum hydroxide/magnesium hydroxide/simethicone Suspension 30 milliLiter(s) Oral every 6 hours PRN  amLODIPine   Tablet 10 milliGRAM(s) Oral daily  cinacalcet 60 milliGRAM(s) Oral daily  dextrose 40% Gel 15 Gram(s) Oral once  dextrose 5%. 1000 milliLiter(s) IV Continuous <Continuous>  dextrose 5%. 1000 milliLiter(s) IV Continuous <Continuous>  dextrose 50% Injectable 25 Gram(s) IV Push once  dextrose 50% Injectable 12.5 Gram(s) IV Push once  dextrose 50% Injectable 25 Gram(s) IV Push once  gabapentin 100 milliGRAM(s) Oral two times a day  glucagon  Injectable 1 milliGRAM(s) IntraMuscular once  hydrALAZINE 75 milliGRAM(s) Oral three times a day  influenza  Vaccine (HIGH DOSE) 0.7 milliLiter(s) IntraMuscular once  insulin lispro (ADMELOG) corrective regimen sliding scale   SubCutaneous three times a day before meals  insulin lispro (ADMELOG) corrective regimen sliding scale   SubCutaneous at bedtime  latanoprost 0.005% Ophthalmic Solution 1 Drop(s) Both EYES at bedtime  levothyroxine 75 MICROGram(s) Oral daily  oxyCODONE    IR 5 milliGRAM(s) Oral every 8 hours PRN  piperacillin/tazobactam IVPB.. 3.375 Gram(s) IV Intermittent every 8 hours  senna 2 Tablet(s) Oral at bedtime  simvastatin 20 milliGRAM(s) Oral at bedtime                            8.2    22.67 )-----------( 211      ( 22 Nov 2020 06:26 )             27.4       11-22    138  |  98  |  68<H>  ----------------------------<  118<H>  3.2<L>   |  27  |  2.92<H>    Ca    7.6<L>      22 Nov 2020 06:26  Phos  3.6     11-22  Mg     1.8     11-22      T(C): 36.9 (11-22-20 @ 13:37), Max: 37.4 (11-21-20 @ 20:58)  HR: 72 (11-22-20 @ 13:37) (72 - 84)  BP: 115/54 (11-22-20 @ 13:37) (115/54 - 138/75)  RR: 17 (11-22-20 @ 13:37) (16 - 17)  SpO2: 97% (11-22-20 @ 13:37) (96% - 98%)  Wt(kg): --    I&O's Summary    General: lethargic, Oriented x 3 but not participatory in exam..   Head: Normocephalic and atraumatic.   Neck: + JVD. No bruits. Supple. Does not appear to be enlarged.   Cardiovascular: + S1,S2 ; RRR Soft systolic murmur at the left lower sternal border. No rubs noted.    Lungs: CTA b/l. No rhonchi, rales or wheezes.   Abdomen: + BS, soft. Non tender. Non distended. No rebound. No guarding.   Extremities: No clubbing/cyanosis/edema.   Neurologic: Moves all four extremities. Full range of motion.   Skin: Warm and moist. The patient's skin has normal elasticity and good skin turgor.   Psychiatric: Appropriate mood and affect.  Musculoskeletal: Normal range of motion, normal strength    echo: severe AS, normal LVEF    A/P) She is an extremely pleasant 89 year old female with a past medical history of atrial fibrillation, managed with Coumadin therapy. More recently, she was admitted to the hospital with lower GI bleeding from diverticulosis. She underwent a colonoscopy that did not require any intervention and her anticoagulation was restarted. She is now referred to me for left atrial appendage occlusion. She denies palpitations, syncope, or angina. She is a/w with a RLE wound, and is being followed by vascular surgery    -coumadin to keep INR 2-3, needs to skip a few doses to allow INR to drift back down.  -as long as she can tolerate coumadin I would not recommend [facility] given her advanced age  -oral electrolyte replacement to K 4-4.5, Mg 2  -has severe aortic stenosis and does NOT need IV saline.  allow to hydrate by mouth to avoid worsening of diastolic heart failure.  -no inpatient EP procedures expected  -f/u with [facility] after discharge    Simon Jung M.D.  Cardiac Electrophysiology  [phone]

## 2020-11-22 NOTE — CONSULT NOTE ADULT - SUBJECTIVE AND OBJECTIVE BOX
Podiatry pager #: [phone] ([place])/ [phone] ([facility])    Patient is a 89y old  Female who presents with a chief complaint of left foot pain (22 Nov 2020 08:23)      HPI:  89 y/o female pmh htn, dm, ckd, afib on coumadin c/o infected L toes. Pt admits to having dry gangrene to L first toe and was admitted in the Hospital in August. Pt states that over th elast several days, her L 2nd toe has become swollen, painful, red and has drainage. Pt went to her podiatrist, Dr. Eaton, today who sent pt to the ER for wet gangrene. Pt denies chest pain, sob, abd pain, n/v/d, weakness, dizziness ,syncope, fever or chills. (14 Nov 2020 19:20)      PAST MEDICAL & SURGICAL HISTORY:  CKD (chronic kidney disease)    CVA (cerebrovascular accident)    Personal history of PE (pulmonary embolism)    Glaucoma    PVD (peripheral vascular disease)    Hypothyroid    HTN (hypertension)    HLD (hyperlipidemia)    CHF (congestive heart failure)    DM (diabetes mellitus)    Afib    Elective surgery  abdominal abcess removals    History of partial thyroidectomy        MEDICATIONS  (STANDING):  allopurinol 100 milliGRAM(s) Oral daily  amLODIPine   Tablet 10 milliGRAM(s) Oral daily  cinacalcet 60 milliGRAM(s) Oral daily  dextrose 40% Gel 15 Gram(s) Oral once  dextrose 5%. 1000 milliLiter(s) (50 mL/Hr) IV Continuous <Continuous>  dextrose 5%. 1000 milliLiter(s) (100 mL/Hr) IV Continuous <Continuous>  dextrose 50% Injectable 25 Gram(s) IV Push once  dextrose 50% Injectable 12.5 Gram(s) IV Push once  dextrose 50% Injectable 25 Gram(s) IV Push once  gabapentin 100 milliGRAM(s) Oral two times a day  glucagon  Injectable 1 milliGRAM(s) IntraMuscular once  hydrALAZINE 75 milliGRAM(s) Oral three times a day  influenza  Vaccine (HIGH DOSE) 0.7 milliLiter(s) IntraMuscular once  insulin lispro (ADMELOG) corrective regimen sliding scale   SubCutaneous three times a day before meals  insulin lispro (ADMELOG) corrective regimen sliding scale   SubCutaneous at bedtime  latanoprost 0.005% Ophthalmic Solution 1 Drop(s) Both EYES at bedtime  levothyroxine 75 MICROGram(s) Oral daily  piperacillin/tazobactam IVPB.. 3.375 Gram(s) IV Intermittent every 8 hours  potassium chloride    Tablet ER 20 milliEquivalent(s) Oral every 2 hours  senna 2 Tablet(s) Oral at bedtime  simvastatin 20 milliGRAM(s) Oral at bedtime  sodium chloride 0.9%. 1000 milliLiter(s) (75 mL/Hr) IV Continuous <Continuous>    MEDICATIONS  (PRN):  acetaminophen   Tablet .. 650 milliGRAM(s) Oral every 6 hours PRN Temp greater or equal to 38.5C (101.3F), Moderate Pain (4 - 6)  aluminum hydroxide/magnesium hydroxide/simethicone Suspension 30 milliLiter(s) Oral every 6 hours PRN Dyspepsia  oxyCODONE    IR 5 milliGRAM(s) Oral every 8 hours PRN Severe Pain (7 - 10)      Allergies    No Known Allergies    Intolerances        VITALS:    Vital Signs Last 24 Hrs  T(C): 36.6 (22 Nov 2020 05:24), Max: 37.4 (21 Nov 2020 20:58)  T(F): 97.8 (22 Nov 2020 05:24), Max: 99.4 (21 Nov 2020 20:58)  HR: 84 (22 Nov 2020 05:24) (67 - 84)  BP: 138/75 (22 Nov 2020 05:24) (132/65 - 140/62)  BP(mean): --  RR: 16 (22 Nov 2020 05:24) (16 - 17)  SpO2: 98% (22 Nov 2020 05:24) (96% - 99%)    LABS:                          8.2    22.67 )-----------( 211      ( 22 Nov 2020 06:26 )             27.4       11-22    138  |  98  |  68<H>  ----------------------------<  118<H>  3.2<L>   |  27  |  2.92<H>    Ca    7.6<L>      22 Nov 2020 06:26  Phos  3.6     11-22  Mg     1.8     11-22        CAPILLARY BLOOD GLUCOSE      POCT Blood Glucose.: 128 mg/dL (22 Nov 2020 07:36)  POCT Blood Glucose.: 114 mg/dL (21 Nov 2020 21:04)  POCT Blood Glucose.: 124 mg/dL (21 Nov 2020 16:46)  POCT Blood Glucose.: 126 mg/dL (21 Nov 2020 11:54)      PT/INR - ( 22 Nov 2020 06:26 )   PT: 68.8 SEC;   INR: 6.54          PTT - ( 22 Nov 2020 06:26 )  PTT:45.5 SEC    LOWER EXTREMITY PHYSICAL EXAM:    Vascular: DP/PT 0/4 B/L, CFT absent to left hallux, CFT <3 seconds x 9, Temperature gradient warm to cool B/L except to left hallux warm to cold  Neuro: Epicritic sensation intact to the level of forefoot B/L  Musculoskeletal/Ortho: no deformity noted  Skin: dry gangrene to the left hallux and 2nd digit, no malodor present, no purulence, no drainage, no fluctuance, no cellulitis

## 2020-11-22 NOTE — PROGRESS NOTE ADULT - ASSESSMENT
89 y/o female pmh htn, dm, ckd, afib on coumadin c/o infected L toes. Pt admits to having dry gangrene to L first toe and was admitted in the Hospital in August. Pt states that over the last several days, her L 2nd toe has become swollen, painful, red and has drainage. Pt went to her podiatrist, Dr. Eaton, today who sent pt to the ER for wet gangrene. Pt denies chest pain, sob, abd pain, n/v/d, weakness, dizziness, syncope, fever or chills.  ER vss, afebrile.  ESR 57, CRP 51.  WBC 8.7.  Abscess cx staph aureus, GPC pairs, proteus,   klebsiella.  Pt with early wet gangrene to the left hallux and 2nd digit, malodor present, bogginess to the left 2nd digit, 5 cc of sangiopurulence, probing to bone, no erythema.  s/p I&D to the left 1st interspace, with 5cc of sangiopurulence.  Cultures sent.   Pt on vanco/cefepime.    No revascularization options as per Vascular.  Pt already had LLE angiography on 7/28/2020 demonstrates single-vessel runoff without visualization of pedal vasculature.     Worsening leukocytosis    Pt w/ worsening leukocytosis despite appropriate Abx therapy. Leukocytosis in the setting of possible new onset infection vs source control  Pt see by podiatry this AM; appreciate recs: LF X ray re-ordered, no gas, no OM; no intervention indicated    -Would repeat BCx x2 sets, UA/UCx at this time to r/o other sources of infection    -If pt develops diarrhea, can send C. diff    -At this time would not change Abx, continue as below    L ft infection:    - Pt with early wet gangrene to left hallus and 2nd digit, (+) probe to bone, s/p I&D.  Abscess cultures noted.    - Cont vanco (by level) and zosyn.   Vancomycin levels slightly subtherapeutic    - Pt with h/o underlying PAD, no options for revascularization or further surgery.   No further intervention by Podiatry as wound unlikely to heal.    - Cont local wound care.     - f/u with podiatry/vascular.      - Abx alone will not treat pt's gangrene.  I see no utility in long term IV abx in this case, as wound is unlikely to heal given PAD, and abx will have poor penetration to the area given her compromised circulation.    Change to PO doxycycline 100mg bid and augmentin 500mg qdaily x 2 weeks when pt ready for discharge.  Pt at increased risk for recurrent infections and forsee some type of surgical procedure in the future for source control.

## 2020-11-23 DIAGNOSIS — R52 PAIN, UNSPECIFIED: ICD-10-CM

## 2020-11-23 DIAGNOSIS — R53.81 OTHER MALAISE: ICD-10-CM

## 2020-11-23 DIAGNOSIS — Z51.5 ENCOUNTER FOR PALLIATIVE CARE: ICD-10-CM

## 2020-11-23 LAB
ANION GAP SERPL CALC-SCNC: 14 MMO/L — SIGNIFICANT CHANGE UP (ref 7–14)
APTT BLD: 48.8 SEC — HIGH (ref 27–36.3)
BUN SERPL-MCNC: 73 MG/DL — HIGH (ref 7–23)
CALCIUM SERPL-MCNC: 7.2 MG/DL — LOW (ref 8.4–10.5)
CHLORIDE SERPL-SCNC: 97 MMOL/L — LOW (ref 98–107)
CO2 SERPL-SCNC: 24 MMOL/L — SIGNIFICANT CHANGE UP (ref 22–31)
CREAT SERPL-MCNC: 3.54 MG/DL — HIGH (ref 0.5–1.3)
GLUCOSE BLDC GLUCOMTR-MCNC: 113 MG/DL — HIGH (ref 70–99)
GLUCOSE BLDC GLUCOMTR-MCNC: 122 MG/DL — HIGH (ref 70–99)
GLUCOSE BLDC GLUCOMTR-MCNC: 128 MG/DL — HIGH (ref 70–99)
GLUCOSE BLDC GLUCOMTR-MCNC: 132 MG/DL — HIGH (ref 70–99)
GLUCOSE SERPL-MCNC: 99 MG/DL — SIGNIFICANT CHANGE UP (ref 70–99)
HCT VFR BLD CALC: 26 % — LOW (ref 34.5–45)
HGB BLD-MCNC: 8.1 G/DL — LOW (ref 11.5–15.5)
INR BLD: 8.1 — CRITICAL HIGH (ref 0.88–1.16)
MAGNESIUM SERPL-MCNC: 1.9 MG/DL — SIGNIFICANT CHANGE UP (ref 1.6–2.6)
MCHC RBC-ENTMCNC: 27.2 PG — SIGNIFICANT CHANGE UP (ref 27–34)
MCHC RBC-ENTMCNC: 31.2 % — LOW (ref 32–36)
MCV RBC AUTO: 87.2 FL — SIGNIFICANT CHANGE UP (ref 80–100)
NRBC # FLD: 0 K/UL — SIGNIFICANT CHANGE UP (ref 0–0)
PHOSPHATE SERPL-MCNC: 4.1 MG/DL — SIGNIFICANT CHANGE UP (ref 2.5–4.5)
PLATELET # BLD AUTO: 228 K/UL — SIGNIFICANT CHANGE UP (ref 150–400)
PMV BLD: 10.4 FL — SIGNIFICANT CHANGE UP (ref 7–13)
POTASSIUM SERPL-MCNC: 3.8 MMOL/L — SIGNIFICANT CHANGE UP (ref 3.5–5.3)
POTASSIUM SERPL-SCNC: 3.8 MMOL/L — SIGNIFICANT CHANGE UP (ref 3.5–5.3)
PROTHROM AB SERPL-ACNC: 83.6 SEC — HIGH (ref 10.6–13.6)
RBC # BLD: 2.98 M/UL — LOW (ref 3.8–5.2)
RBC # FLD: 17.1 % — HIGH (ref 10.3–14.5)
SODIUM SERPL-SCNC: 135 MMOL/L — SIGNIFICANT CHANGE UP (ref 135–145)
VANCOMYCIN FLD-MCNC: 18.4 UG/ML — SIGNIFICANT CHANGE UP
WBC # BLD: 24.39 K/UL — HIGH (ref 3.8–10.5)
WBC # FLD AUTO: 24.39 K/UL — HIGH (ref 3.8–10.5)

## 2020-11-23 PROCEDURE — 99223 1ST HOSP IP/OBS HIGH 75: CPT | Mod: GC

## 2020-11-23 RX ORDER — VANCOMYCIN HCL 1 G
1000 VIAL (EA) INTRAVENOUS ONCE
Refills: 0 | Status: COMPLETED | OUTPATIENT
Start: 2020-11-23 | End: 2020-11-23

## 2020-11-23 RX ORDER — SODIUM CHLORIDE 9 MG/ML
1000 INJECTION INTRAMUSCULAR; INTRAVENOUS; SUBCUTANEOUS
Refills: 0 | Status: DISCONTINUED | OUTPATIENT
Start: 2020-11-23 | End: 2020-11-24

## 2020-11-23 RX ORDER — AZTREONAM 2 G
500 VIAL (EA) INJECTION EVERY 12 HOURS
Refills: 0 | Status: DISCONTINUED | OUTPATIENT
Start: 2020-11-23 | End: 2020-11-24

## 2020-11-23 RX ADMIN — Medication 110 MILLIGRAM(S): at 17:06

## 2020-11-23 RX ADMIN — Medication 50 MILLIGRAM(S): at 18:28

## 2020-11-23 RX ADMIN — OXYCODONE HYDROCHLORIDE 5 MILLIGRAM(S): 5 TABLET ORAL at 16:15

## 2020-11-23 RX ADMIN — SIMVASTATIN 20 MILLIGRAM(S): 20 TABLET, FILM COATED ORAL at 22:14

## 2020-11-23 RX ADMIN — OXYCODONE HYDROCHLORIDE 5 MILLIGRAM(S): 5 TABLET ORAL at 05:00

## 2020-11-23 RX ADMIN — SENNA PLUS 2 TABLET(S): 8.6 TABLET ORAL at 22:14

## 2020-11-23 RX ADMIN — LATANOPROST 1 DROP(S): 0.05 SOLUTION/ DROPS OPHTHALMIC; TOPICAL at 22:14

## 2020-11-23 RX ADMIN — GABAPENTIN 100 MILLIGRAM(S): 400 CAPSULE ORAL at 17:07

## 2020-11-23 RX ADMIN — Medication 75 MILLIGRAM(S): at 05:04

## 2020-11-23 RX ADMIN — Medication 250 MILLIGRAM(S): at 12:19

## 2020-11-23 RX ADMIN — Medication 75 MICROGRAM(S): at 05:02

## 2020-11-23 RX ADMIN — SODIUM CHLORIDE 75 MILLILITER(S): 9 INJECTION INTRAMUSCULAR; INTRAVENOUS; SUBCUTANEOUS at 18:28

## 2020-11-23 RX ADMIN — CINACALCET 60 MILLIGRAM(S): 30 TABLET, FILM COATED ORAL at 12:19

## 2020-11-23 RX ADMIN — OXYCODONE HYDROCHLORIDE 5 MILLIGRAM(S): 5 TABLET ORAL at 16:53

## 2020-11-23 RX ADMIN — Medication 100 MILLIGRAM(S): at 12:19

## 2020-11-23 RX ADMIN — PIPERACILLIN AND TAZOBACTAM 25 GRAM(S): 4; .5 INJECTION, POWDER, LYOPHILIZED, FOR SOLUTION INTRAVENOUS at 13:28

## 2020-11-23 RX ADMIN — PIPERACILLIN AND TAZOBACTAM 25 GRAM(S): 4; .5 INJECTION, POWDER, LYOPHILIZED, FOR SOLUTION INTRAVENOUS at 05:05

## 2020-11-23 RX ADMIN — OXYCODONE HYDROCHLORIDE 5 MILLIGRAM(S): 5 TABLET ORAL at 06:00

## 2020-11-23 RX ADMIN — GABAPENTIN 100 MILLIGRAM(S): 400 CAPSULE ORAL at 05:03

## 2020-11-23 NOTE — CONSULT NOTE ADULT - PROBLEM SELECTOR RECOMMENDATION 3
pt with assistance prior to admission now with worsening function  kps 50% which as per family was higher prior to admission  will need support at home  family willing to pay privately for help

## 2020-11-23 NOTE — CONSULT NOTE ADULT - SUBJECTIVE AND OBJECTIVE BOX
HPI:  89 y/o female pmh htn, dm, ckd, afib on coumadin c/o infected L toes. Pt admits to having dry gangrene to L first toe and was admitted in the Hospital in August. Pt states that over th elast several days, her L 2nd toe has become swollen, painful, red and has drainage. Pt went to her podiatrist, Dr. Eaton, today who sent pt to the ER for wet gangrene. Pt denies chest pain, sob, abd pain, n/v/d, weakness, dizziness ,syncope, fever or chills. (14 Nov 2020 19:20)    Pt awake, alert.  Pain with movement of left foot.  Otherwise, appears comfortable.     PERTINENT PM/SXH:   CKD (chronic kidney disease)    CVA (cerebrovascular accident)    Personal history of PE (pulmonary embolism)    Glaucoma    PVD (peripheral vascular disease)    Hypothyroid    HTN (hypertension)    HLD (hyperlipidemia)    CHF (congestive heart failure)    DM (diabetes mellitus)    Afib      Elective surgery    History of partial thyroidectomy      FAMILY HISTORY:  No pertinent family history in first degree relatives      ITEMS NOT CHECKED ARE NOT PRESENT    SOCIAL HISTORY:   Significant other/partner:  [x ]  Children:  [ x]  Confucianist/Spirituality:  Substance hx:  [ ]   Tobacco hx:  [ ]   Alcohol hx: [ ]   Home Opioid hx:  [ ] I-Stop Reference No:  Living Situation: [x ]Home  [ ]Long term care  [ ]Rehab [ ]Other    ADVANCE DIRECTIVES:    DNR  Yes    MOLST  [ ]  Living Will  [ ]   DECISION MAKER(s):  [ ] Health Care Proxy(s)  [ ] Surrogate(s)  [ ] Guardian           Name(s): Phone Number(s):  Catherine [phone]    BASELINE (I)ADL(s) (prior to admission):  [place]: [ ]Total  [x ] Moderate [ ]Dependent    Allergies    No Known Allergies    Intolerances    MEDICATIONS  (STANDING):  allopurinol 100 milliGRAM(s) Oral daily  amLODIPine   Tablet 10 milliGRAM(s) Oral daily  aztreonam  IVPB 500 milliGRAM(s) IV Intermittent every 12 hours  cinacalcet 60 milliGRAM(s) Oral daily  dextrose 40% Gel 15 Gram(s) Oral once  dextrose 5%. 1000 milliLiter(s) (50 mL/Hr) IV Continuous <Continuous>  dextrose 5%. 1000 milliLiter(s) (100 mL/Hr) IV Continuous <Continuous>  dextrose 50% Injectable 12.5 Gram(s) IV Push once  dextrose 50% Injectable 25 Gram(s) IV Push once  dextrose 50% Injectable 25 Gram(s) IV Push once  doxycycline IVPB 100 milliGRAM(s) IV Intermittent every 12 hours  gabapentin 100 milliGRAM(s) Oral two times a day  glucagon  Injectable 1 milliGRAM(s) IntraMuscular once  hydrALAZINE 75 milliGRAM(s) Oral three times a day  influenza  Vaccine (HIGH DOSE) 0.7 milliLiter(s) IntraMuscular once  insulin lispro (ADMELOG) corrective regimen sliding scale   SubCutaneous three times a day before meals  insulin lispro (ADMELOG) corrective regimen sliding scale   SubCutaneous at bedtime  latanoprost 0.005% Ophthalmic Solution 1 Drop(s) Both EYES at bedtime  levothyroxine 75 MICROGram(s) Oral daily  senna 2 Tablet(s) Oral at bedtime  simvastatin 20 milliGRAM(s) Oral at bedtime    MEDICATIONS  (PRN):  acetaminophen   Tablet .. 650 milliGRAM(s) Oral every 6 hours PRN Temp greater or equal to 38.5C (101.3F), Moderate Pain (4 - 6)  aluminum hydroxide/magnesium hydroxide/simethicone Suspension 30 milliLiter(s) Oral every 6 hours PRN Dyspepsia  melatonin 3 milliGRAM(s) Oral at bedtime PRN Insomnia  oxyCODONE    IR 5 milliGRAM(s) Oral every 8 hours PRN Severe Pain (7 - 10)    PRESENT SYMPTOMS: [ ]Unable to obtain due to poor mentation   Source if other than patient:  [ ]Family   [ ]Team     Pain (Impact on QOL):  yes  Location -     left foot     Minimal acceptable level (0-10 scale):  unable to assess                   Aggrevating factors - movement  Quality - dull  Radiation - none  Severity (0-10 scale) -  unable to assess  Timing - comes and goes    PAIN AD Score:     http://geriatrictoolkit.[employer].edu/cog/painad.pdf (press ctrl +  left click to view)    Dyspnea:                           [ ]Mild [ ]Moderate [ ]Severe  Anxiety:                             [ ]Mild [ ]Moderate [ ]Severe  Fatigue:                             [ ]Mild [ ]Moderate [ x]Severe  Nausea:                             [ ]Mild [ ]Moderate [ ]Severe  Loss of appetite:              [ ]Mild [x ]Moderate [ ]Severe  Constipation:                    [ ]Mild [ ]Moderate [ ]Severe    Other Symptoms:  [x ]All other review of systems negative     Karnofsky Performance Score/Palliative Performance Status Version 2:   50      %  PHYSICAL EXAM:  Vital Signs Last 24 Hrs  T(C): 36.5 (23 Nov 2020 12:24), Max: 36.9 (23 Nov 2020 04:49)  T(F): 97.7 (23 Nov 2020 12:24), Max: 98.5 (23 Nov 2020 04:49)  HR: 81 (23 Nov 2020 13:27) (66 - 84)  BP: 103/53 (23 Nov 2020 13:27) (103/53 - 129/66)  BP(mean): --  RR: 18 (23 Nov 2020 12:24) (18 - 20)  SpO2: 97% (23 Nov 2020 12:24) (97% - 98%) I&O's Summary    22 Nov 2020 07:01  -  23 Nov 2020 07:00  --------------------------------------------------------  IN: 0 mL / OUT: 200 mL / NET: -200 mL    GENERAL:  [x ]Alert  [x ]Oriented x  1 [ ]Lethargic  [ ]Cachexia  [ ]Unarousable  [ ]Verbal  [ ]Non-Verbal  Behavioral:   [ ] Anxiety  [x ] Delirium [ ] Agitation [ ] Other  HEENT:  [ x]Normal   [ ]Dry mouth   [ ]ET Tube/Trach  [ ]Oral lesions  PULMONARY:   [x ]Clear [ ]Tachypnea  [ ]Audible excessive secretions   [ ]Rhonchi        [ ]Right [ ]Left [ ]Bilateral  [ ]Crackles        [ ]Right [ ]Left [ ]Bilateral  [ ]Wheezing     [ ]Right [ ]Left [ ]Bilateral  CARDIOVASCULAR:    [ x]Regular [ ]Irregular [ ]Tachy  [ ]Saúl [ ]Murmur [ ]Other  GASTROINTESTINAL:  [x ]Soft  [ ]Distended   [x ]+BS  [x ]Non tender [ ]Tender  [ ]PEG [ ]OGT/ NGT  Last BM:   GENITOURINARY:  [ ]Normal [x ] Incontinent   [ ]Oliguria/Anuria   [ ]Romero  MUSCULOSKELETAL:   [ ]Normal   [ x]Weakness  [ ]Bed/Wheelchair bound [ ]Edema  NEUROLOGIC:   [ ]No focal deficits  [x ] Cognitive impairment  [ ] Dysphagia [ ]Dysarthria [ ] Paresis [ ]Other   SKIN:   [ ]Normal   [ ]Pressure ulcer(s)  [ ]Rash +left foot wrapped    CRITICAL CARE:  [ ] Shock Present  [ ]Septic [ ]Cardiogenic [ ]Neurologic [ ]Hypovolemic  [ ]  Vasopressors [ ]  Inotropes   [ ] Respiratory failure present  [ ] Acute  [ ] Chronic [ ] Hypoxic  [ ] Hypercarbic [ ] Other  [ ] Other organ failure     LABS:                        8.1    24.39 )-----------( 228      ( 23 Nov 2020 04:46 )             26.0   11-23    135  |  97<L>  |  73<H>  ----------------------------<  99  3.8   |  24  |  3.54<H>    Ca    7.2<L>      23 Nov 2020 04:46  Phos  4.1     11-23  Mg     1.9     11-23    PT/INR - ( 23 Nov 2020 04:46 )   PT: 83.6 SEC;   INR: 8.10          PTT - ( 23 Nov 2020 04:46 )  PTT:48.8 SEC      RADIOLOGY & ADDITIONAL STUDIES:    PROTEIN CALORIE MALNUTRITION:   [ ] PPSV2 < or = to 30% [ ] significant weight loss  [ ] poor nutritional intake [ ] catabolic state [ ] anasarca     Albumin, Serum: 3.7 g/dL (11-16-20 @ 06:03)  Artificial Nutrition [ ]     REFERRALS:   [ ]Chaplaincy  [ ] Hospice  [ ]Child Life  [ ]Social Work  [ ]Case management [ ]Holistic Therapy   Goals of Care Discussion Document:

## 2020-11-23 NOTE — DIETITIAN INITIAL EVALUATION ADULT. - OTHER INFO
Per chart review patient with medical history of HTN, DM, CKD, afib on coumadin presenting with infected Left toes. Patient reports not feeling hungry and is only requesting and consuming water and ice chips. Denies any swallowing difficulties when consuming liquids and ice. No nausea/vomiting reported. Noted with loose BM 11/22 - patient is on antibiotic therapy which could lead to loose stools. Per EMR, palliative consult pending. Per Internal Medicine note 11/22: "DC planning home with hospice. Palliative consulted as prognosis very poor." Per chart review patient with medical history of HTN, DM, CKD, afib on coumadin presenting with infected Left toes. Patient reports not feeling hungry and is only requesting and consuming water and ice chips. Denies any swallowing difficulties when consuming liquids and ice. No nausea/vomiting reported. Noted with loose BM 11/22 - patient is on antibiotic therapy which could lead to loose stools. Patient unable to report UBW. Per chart review patient with past weight 72.7kg (8/03/20). No admit weight noted in chart. Bed-scale weight during visit recording 79.1kg which may be inaccurate. Per EMR, palliative consult pending. Per Internal Medicine note 11/22: "DC planning home with hospice. Palliative consulted as prognosis very poor."

## 2020-11-23 NOTE — PROGRESS NOTE ADULT - SUBJECTIVE AND OBJECTIVE BOX
EP ATTENDING    no tele  lethargic, says "I feel tired", does not offer or respond to questions re: complaints.      Review of Systems:   Constitutional: [ ] fevers, [ ] chills.   Skin: [ ] dry skin. [ ] rashes.  Psychiatric: [ ] depression, [ ] anxiety.   Gastrointestinal: [ ] BRBPR, [ ] melena.   Neurological: [ ] confusion. [ ] seizures. [ ] shuffling gait.   Ears,Nose,Mouth and Throat: [ ] ear pain [ ] sore throat.   Eyes: [ ] diplopia.   Respiratory: [ ] hemoptysis. [ ] shortness of breath  Cardiovascular: See HPI above  Hematologic/Lymphatic: [ ] anemia. [ ] painful nodes. [ ] prolonged bleeding.   Genitourinary: [ ] hematuria. [ ] flank pain.   Endocrine: [ ] significant change in weight. [ ] intolerance to heat and cold.     Review of systems [ x] otherwise negative, [ ] otherwise unable to obtain    FH: no family history of sudden cardiac death in first degree relatives    SH: [ ] tobacco, [ ] alcohol, [ ] drugs    acetaminophen   Tablet .. 650 milliGRAM(s) Oral every 6 hours PRN  allopurinol 100 milliGRAM(s) Oral daily  aluminum hydroxide/magnesium hydroxide/simethicone Suspension 30 milliLiter(s) Oral every 6 hours PRN  amLODIPine   Tablet 10 milliGRAM(s) Oral daily  aztreonam  IVPB 500 milliGRAM(s) IV Intermittent every 12 hours  cinacalcet 60 milliGRAM(s) Oral daily  dextrose 40% Gel 15 Gram(s) Oral once  dextrose 5%. 1000 milliLiter(s) IV Continuous <Continuous>  dextrose 5%. 1000 milliLiter(s) IV Continuous <Continuous>  dextrose 50% Injectable 12.5 Gram(s) IV Push once  dextrose 50% Injectable 25 Gram(s) IV Push once  dextrose 50% Injectable 25 Gram(s) IV Push once  doxycycline IVPB 100 milliGRAM(s) IV Intermittent every 12 hours  gabapentin 100 milliGRAM(s) Oral two times a day  glucagon  Injectable 1 milliGRAM(s) IntraMuscular once  hydrALAZINE 75 milliGRAM(s) Oral three times a day  influenza  Vaccine (HIGH DOSE) 0.7 milliLiter(s) IntraMuscular once  insulin lispro (ADMELOG) corrective regimen sliding scale   SubCutaneous three times a day before meals  insulin lispro (ADMELOG) corrective regimen sliding scale   SubCutaneous at bedtime  latanoprost 0.005% Ophthalmic Solution 1 Drop(s) Both EYES at bedtime  levothyroxine 75 MICROGram(s) Oral daily  melatonin 3 milliGRAM(s) Oral at bedtime PRN  oxyCODONE    IR 5 milliGRAM(s) Oral every 8 hours PRN  senna 2 Tablet(s) Oral at bedtime  simvastatin 20 milliGRAM(s) Oral at bedtime                            8.1    24.39 )-----------( 228      ( 23 Nov 2020 04:46 )             26.0       11-23    135  |  97<L>  |  73<H>  ----------------------------<  99  3.8   |  24  |  3.54<H>    Ca    7.2<L>      23 Nov 2020 04:46  Phos  4.1     11-23  Mg     1.9     11-23        T(C): 36.5 (11-23-20 @ 12:24), Max: 36.9 (11-23-20 @ 04:49)  HR: 81 (11-23-20 @ 13:27) (66 - 84)  BP: 103/53 (11-23-20 @ 13:27) (103/53 - 129/66)  RR: 18 (11-23-20 @ 12:24) (18 - 20)  SpO2: 97% (11-23-20 @ 12:24) (97% - 98%)  Wt(kg): --    I&O's Summary    22 Nov 2020 07:01  -  23 Nov 2020 07:00  --------------------------------------------------------  IN: 0 mL / OUT: 200 mL / NET: -200 mL    General: awake but not participatory in exam..   Head: Normocephalic and atraumatic.   Neck: + JVD. No bruits. Supple. Does not appear to be enlarged.   Cardiovascular: + S1,S2 ; RRR Soft systolic murmur at the left lower sternal border. No rubs noted.    Lungs: CTA b/l. No rhonchi, rales or wheezes.   Abdomen: + BS, soft. Non tender. Non distended. No rebound. No guarding.   Extremities: No clubbing/cyanosis/edema.   Neurologic: Moves all four extremities. Full range of motion.   Skin: Warm and moist. The patient's skin has normal elasticity and good skin turgor.   Psychiatric: Appropriate mood and affect.  Musculoskeletal: Normal range of motion, normal strength    echo: severe AS, normal LVEF    A/P) She is an extremely pleasant 89 year old female with a past medical history of atrial fibrillation, managed with Coumadin therapy. More recently, she was admitted to the hospital with lower GI bleeding from diverticulosis. She underwent a colonoscopy that did not require any intervention and her anticoagulation was restarted. She is now referred to me for left atrial appendage occlusion. She denies palpitations, syncope, or angina. She is a/w with a RLE wound, and is being followed by vascular surgery    -? liver failure as INR continues to climb without coumadin being dosed.  watch for spontaneous bleeding.  -oral electrolyte replacement to K 4-4.5, Mg 2  -has severe aortic stenosis and does NOT need IV saline.  allow to hydrate by mouth to avoid worsening of diastolic heart failure.  -no inpatient EP procedures expected  -awaiting discharge to comfort-care at home, as she is doing worse (renal and ? liver failure) on antibiotics for osteomyelitis.    Simon Jung M.D.  Cardiac Electrophysiology  [phone]

## 2020-11-23 NOTE — CONSULT NOTE ADULT - PROBLEM SELECTOR RECOMMENDATION 9
can offer low dose oxy ir 2.5-5mg po q 4 hours prn, neurontin would hold due to worsening renal functiona  bowel regimen- senna

## 2020-11-23 NOTE — CONSULT NOTE ADULT - PROVIDER SPECIALTY LIST ADULT
Infectious Disease
Cardiology
Electrophysiology
Podiatry
Podiatry
Vascular Surgery
Nephrology
Palliative Care

## 2020-11-23 NOTE — DIETITIAN INITIAL EVALUATION ADULT. - RD TO REMAIN AVAILABLE
4. Consider discussion to determine nutrition goals of care in setting of poor PO intake, severe malnutrition.

## 2020-11-23 NOTE — DIETITIAN INITIAL EVALUATION ADULT. - DIET TYPE
1. Recommend to liberalize diet to Regular given poor appetite, advanced age and severe malnutrition.

## 2020-11-23 NOTE — CONSULT NOTE ADULT - PROBLEM SELECTOR RECOMMENDATION 4
pt is dnr/dni  goals are for comfort  discharge planning  family deciding on home with home care vs hospice care  spoke with pt's daughter Catherine who is aware of diagnosis / prognosis  referral made to hospice

## 2020-11-23 NOTE — PROGRESS NOTE ADULT - ASSESSMENT
89 y/o female pmh htn, dm, ckd, afib on coumadin c/o infected L toes. Pt admits to having dry gangrene to L first toe and was admitted in the Hospital in August. Pt states that over th elast several days, her L 2nd toe has become swollen, painful, red and has drainage. Pt went to her podiatrist, Dr. Eaton, today who sent pt to the ER for wet gangrene. Pt denies chest pain, sob, abd pain, n/v/d, weakness, dizziness ,syncope, fever or chills.     Problem/Plan - 1:  ·  Problem: Wet Gangrene.  Plan: S/P I&D by Podiatry . IV abxs   and ID helping.  Vascular helping.    No surgical intervention per Podiatry and vascular.    Leucocytosis  . D/W ID attending and changing Abxs.    Problem/Plan - 2:  ·  Problem: CKD (chronic kidney disease) with NIKHIL .  Plan:  Renal following. Creatinine trending up.      Problem/Plan - 3:  ·  Problem: PVD (peripheral vascular disease).  Plan: Vascular helping.      Problem/Plan - 4:  ·  Problem: HTN (hypertension).  Plan: BP meds with hold parameters.      Problem/Plan - 5:  ·  Problem: CHF (congestive heart failure).  Plan: Holding Lasix as Euvolemic.      Problem/Plan - 6:  Problem: DM (diabetes mellitus). Plan: SSI for now.     Problem/Plan - 7:  ·  Problem: Afib.  Plan: On AC . INR high so holding AC.     Problem/Plan - 8:  ·  Problem: Hypothyroid.  Plan: Home dose synthroid.      Problem/Plan - 9:  ·  Problem: Hyperparathyroidism .  Plan: Parathyroid scan outpt . Ca level normal now.      Problem/Plan - 10:  Problem: Pulmonary embolism. Plan; On AC.     Problem/Plan - 11:  ·  Problem: Delirium .  Plan: Infection VS Hospital induced. Better.     Disposition : DC planning home with hospice. Palliative consulted as prognosis very poor. DNR. Spoke to daughter in great detail.

## 2020-11-23 NOTE — PROGRESS NOTE ADULT - ASSESSMENT
89 y/o female pmh htn, dm, ckd, afib on coumadin c/o infected L toes. Pt admits to having dry gangrene to L first toe and was admitted in the Hospital in August. Pt states that over the last several days, her L 2nd toe has become swollen, painful, red and has drainage. Pt went to her podiatrist, Dr. Eaton, today who sent pt to the ER for wet gangrene. Pt denies chest pain, sob, abd pain, n/v/d, weakness, dizziness, syncope, fever or chills.  ER vss, afebrile.  ESR 57, CRP 51.  WBC 8.7.  Abscess cx staph aureus, GPC pairs, proteus,   klebsiella.  Pt with early wet gangrene to the left hallux and 2nd digit, malodor present, bogginess to the left 2nd digit, 5 cc of sangiopurulence, probing to bone, no erythema.  s/p I&D to the left 1st interspace, with 5cc of sangiopurulence.  Cultures sent.   Pt on vanco/cefepime.    No revascularization options as per Vascular.  Pt already had LLE angiography on 7/28/2020 demonstrates single-vessel runoff without visualization of pedal vasculature.     Worsening leukocytosis    Pt w/ worsening leukocytosis despite appropriate Abx therapy. Leukocytosis in the setting of possible new onset infection vs source control  Pt see by podiatry this AM; appreciate recs: LF X ray re-ordered (11/22) shows subcut gas and concern for OM.  No further intervention as per podiatry.     - f/u repeat BCx x2 sets, UA/UCx at this time to r/o other sources of infection.  Cxr shows bibasilar hazy opacities - possible pna vs. atelectasis.  Pt currently w/o cough.     -If pt develops diarrhea, can send C. diff    Worsening Creatinine:    - Due to worsening renal function, will d/c further vanco and zosyn.  ? vanco toxicity vs. AIN from zosyn.  Change to doxy 100mg IV bid and aztreonam 1gm iV qdaily to cover MRSA and gram neg.  Suspect enterococcus from abscess cultures is a colonizer and has negligible infectious impact in this polymicrobial wound.     L ft gangrene:    - Pt with early wet gangrene to left hallus and 2nd digit, (+) probe to bone, s/p I&D.  Abscess cultures noted.      - Pt with h/o underlying PAD, no options for revascularization or further surgery.   No further intervention by Podiatry as wound unlikely to heal.    - Cont local wound care.     - f/u with podiatry/vascular.      - Abx alone will not treat pt's gangrene.  I see no utility in long term IV abx in this case, as wound is unlikely to heal given PAD, and abx will have poor penetration to the area given her compromised circulation.    Change to PO doxycycline 100mg bid and ceftin 250mg qdaily x 2 weeks when pt ready for discharge.  Pt at increased risk for recurrent infections and forsee some type of surgical procedure in the future for source control.         * Palliative care may be considered as per primary team, given worsening medical condition.  To discuss with daughter.    Will follow,    Melissa Escobedo  [phone]   89 y/o female pmh htn, dm, ckd, afib on coumadin c/o infected L toes. Pt admits to having dry gangrene to L first toe and was admitted in the Hospital in August. Pt states that over the last several days, her L 2nd toe has become swollen, painful, red and has drainage. Pt went to her podiatrist, Dr. Eaton, today who sent pt to the ER for wet gangrene. Pt denies chest pain, sob, abd pain, n/v/d, weakness, dizziness, syncope, fever or chills.  ER vss, afebrile.  ESR 57, CRP 51.  WBC 8.7.  Abscess cx staph aureus, GPC pairs, proteus,   klebsiella.  Pt with early wet gangrene to the left hallux and 2nd digit, malodor present, bogginess to the left 2nd digit, 5 cc of sangiopurulence, probing to bone, no erythema.  s/p I&D to the left 1st interspace, with 5cc of sangiopurulence.  Cultures sent.   Pt on vanco/cefepime.    No revascularization options as per Vascular.  Pt already had LLE angiography on 7/28/2020 demonstrates single-vessel runoff without visualization of pedal vasculature.     Worsening leukocytosis    Pt w/ worsening leukocytosis despite appropriate Abx therapy. Leukocytosis in the setting of possible new onset infection vs source control  Pt see by podiatry this AM; appreciate recs: LF X ray re-ordered (11/22) shows subcut gas and concern for OM.  No further intervention as per podiatry.     - f/u repeat BCx x2 sets, UA/UCx at this time to r/o other sources of infection.  Cxr shows bibasilar hazy opacities - possible pna vs. atelectasis.  Pt currently w/o cough.     -If pt develops diarrhea, can send C. diff    Worsening Creatinine:    - Due to worsening renal function, will d/c further vanco and zosyn.  ? vanco toxicity vs. AIN from zosyn.  Change to doxy 100mg IV bid and aztreonam 500mg IV bid to cover MRSA and gram neg.  Suspect enterococcus from abscess cultures is a colonizer and has negligible infectious impact in this polymicrobial wound.     L ft gangrene:    - Pt with early wet gangrene to left hallus and 2nd digit, (+) probe to bone, s/p I&D.  Abscess cultures noted.      - Pt with h/o underlying PAD, no options for revascularization or further surgery.   No further intervention by Podiatry as wound unlikely to heal.    - Cont local wound care.     - f/u with podiatry/vascular.      - Abx alone will not treat pt's gangrene.  I see no utility in long term IV abx in this case, as wound is unlikely to heal given PAD, and abx will have poor penetration to the area given her compromised circulation.    Change to PO doxycycline 100mg bid and ceftin 250mg qdaily x 2 weeks when pt ready for discharge.  Pt at increased risk for recurrent infections and forsee some type of surgical procedure in the future for source control.         * Palliative care may be considered as per primary team, given worsening medical condition.  To discuss with daughter.    Will follow,    Melissa Escobedo  [phone]

## 2020-11-23 NOTE — PROGRESS NOTE ADULT - SUBJECTIVE AND OBJECTIVE BOX
INTERVAL HPI/OVERNIGHT EVENTS: I feel fine.   Vital Signs Last 24 Hrs  T(C): 36.6 (23 Nov 2020 18:14), Max: 36.9 (23 Nov 2020 04:49)  T(F): 97.8 (23 Nov 2020 18:14), Max: 98.5 (23 Nov 2020 04:49)  HR: 87 (23 Nov 2020 18:14) (66 - 87)  BP: 112/79 (23 Nov 2020 18:14) (103/53 - 128/65)  BP(mean): --  RR: 16 (23 Nov 2020 18:14) (16 - 20)  SpO2: 98% (23 Nov 2020 18:14) (97% - 98%)  I&O's Summary    22 Nov 2020 07:01  -  23 Nov 2020 07:00  --------------------------------------------------------  IN: 0 mL / OUT: 200 mL / NET: -200 mL      MEDICATIONS  (STANDING):  allopurinol 100 milliGRAM(s) Oral daily  amLODIPine   Tablet 10 milliGRAM(s) Oral daily  aztreonam  IVPB 500 milliGRAM(s) IV Intermittent every 12 hours  cinacalcet 60 milliGRAM(s) Oral daily  dextrose 40% Gel 15 Gram(s) Oral once  dextrose 5%. 1000 milliLiter(s) (50 mL/Hr) IV Continuous <Continuous>  dextrose 5%. 1000 milliLiter(s) (100 mL/Hr) IV Continuous <Continuous>  dextrose 50% Injectable 12.5 Gram(s) IV Push once  dextrose 50% Injectable 25 Gram(s) IV Push once  dextrose 50% Injectable 25 Gram(s) IV Push once  doxycycline IVPB 100 milliGRAM(s) IV Intermittent every 12 hours  gabapentin 100 milliGRAM(s) Oral two times a day  glucagon  Injectable 1 milliGRAM(s) IntraMuscular once  hydrALAZINE 75 milliGRAM(s) Oral three times a day  influenza  Vaccine (HIGH DOSE) 0.7 milliLiter(s) IntraMuscular once  insulin lispro (ADMELOG) corrective regimen sliding scale   SubCutaneous three times a day before meals  insulin lispro (ADMELOG) corrective regimen sliding scale   SubCutaneous at bedtime  latanoprost 0.005% Ophthalmic Solution 1 Drop(s) Both EYES at bedtime  levothyroxine 75 MICROGram(s) Oral daily  senna 2 Tablet(s) Oral at bedtime  simvastatin 20 milliGRAM(s) Oral at bedtime  sodium chloride 0.9%. 1000 milliLiter(s) (75 mL/Hr) IV Continuous <Continuous>    MEDICATIONS  (PRN):  acetaminophen   Tablet .. 650 milliGRAM(s) Oral every 6 hours PRN Temp greater or equal to 38.5C (101.3F), Moderate Pain (4 - 6)  aluminum hydroxide/magnesium hydroxide/simethicone Suspension 30 milliLiter(s) Oral every 6 hours PRN Dyspepsia  melatonin 3 milliGRAM(s) Oral at bedtime PRN Insomnia  oxyCODONE    IR 5 milliGRAM(s) Oral every 8 hours PRN Severe Pain (7 - 10)    LABS:                        8.1    24.39 )-----------( 228      ( 23 Nov 2020 04:46 )             26.0     11-23    135  |  97<L>  |  73<H>  ----------------------------<  99  3.8   |  24  |  3.54<H>    Ca    7.2<L>      23 Nov 2020 04:46  Phos  4.1     11-23  Mg     1.9     11-23      PT/INR - ( 23 Nov 2020 04:46 )   PT: 83.6 SEC;   INR: 8.10          PTT - ( 23 Nov 2020 04:46 )  PTT:48.8 SEC    CAPILLARY BLOOD GLUCOSE      POCT Blood Glucose.: 128 mg/dL (23 Nov 2020 17:34)  POCT Blood Glucose.: 113 mg/dL (23 Nov 2020 11:56)  POCT Blood Glucose.: 122 mg/dL (23 Nov 2020 07:45)  POCT Blood Glucose.: 135 mg/dL (22 Nov 2020 21:37)            Consultant(s) Notes Reviewed:  [x ] YES  [ ] NO    PHYSICAL EXAM:  GENERAL: NAD, well-groomed, well-developed, not in any distress ,  HEAD:  Atraumatic, Normocephalic  EYES: EOMI, PERRLA, conjunctiva and sclera clear  NECK: Supple, No JVD, Normal thyroid  NERVOUS SYSTEM:  Alert & , No focal deficit   CHEST/LUNG: Good air entry bilateral with no  rales, rhonchi, wheezing, or rubs  HEART: Regular rate and rhythm; No murmurs, rubs, or gallops  ABDOMEN: Soft, Nontender, Nondistended; Bowel sounds present  EXTREMITIES:  No clubbing, cyanosis, or edema    Care Discussed with Consultants/Other Providers [ x] YES  [ ] NO

## 2020-11-23 NOTE — DIETITIAN INITIAL EVALUATION ADULT. - PERTINENT MEDS FT
allopurinol  aluminum hydroxide/magnesium hydroxide/simethicone Suspension PRN  amLODIPine   Tablet  cinacalcet  gabapentin  hydrALAZINE  insulin lispro (ADMELOG) corrective regimen sliding scale  latanoprost 0.005% Ophthalmic Solution  levothyroxine  melatonin PRN  oxyCODONE    IR PRN  piperacillin/tazobactam IVPB..  senna  simvastatin  vancomycin  IVPB

## 2020-11-23 NOTE — PROGRESS NOTE ADULT - ASSESSMENT
89 y/o female pmh htn, dm, ckd, afib on coumadin p/w c/o infected L toes. pt being admitted for wet gangrene. Renal following for NIKHIL/CKD, vol Mx.     NIKHIL on CKD3: b/l SCr around 1.6 8/2020  Scr steadily rising, up to 2.6-->2.9 ->3.5 today  improving lower ext swelling  diuretics has been on hold.   s/p iv fluid    check UA, urine lytes, eosinophils  monitor BMP daily   dose all meds for eGFR<15ml/min.   avoid ACEi/ARB for now/NSAIDs/Nephrotoxics.    Hypokalemia -s/p Potassium Chloride 40 meq x 2 dose  HTN, bp controlled well  Hypercalcemia- resolved,    phos wnl,  high, [id number] level 76 noted. avoid ca/vitD suplements   c/w sensipar   wet Gangrene L foot- Mx per Podiatry. f/u w/vas sx. on vanco and Zosyn. monitor vanco trough levels                     89 y/o female pmh htn, dm, ckd, afib on coumadin p/w c/o infected L toes. pt being admitted for wet gangrene. Renal following for NIKHIL/CKD, vol Mx.     NIKHIL on CKD3: b/l SCr around 1.6 8/2020  Scr steadily rising, up to 2.6-->2.9 ->3.5 today  improving lower ext swelling  diuretics has been on hold.   s/p iv fluid 11/22, continue for overnight, 12hrs and reasses tomorrow   check UA, urine lytes, eosinophils  monitor BMP daily   dose all meds for eGFR<15ml/min.   avoid ACEi/ARB for now/NSAIDs/Nephrotoxics.    Hypokalemia -s/p Potassium Chloride 40 meq x 2 dose  HTN, bp controlled well  Hypercalcemia- resolved,    phos wnl,  high, [id number] level 76 noted. avoid ca/vitD suplements   c/w sensipar   wet Gangrene L foot- Mx per Podiatry. f/u w/vas sx. on vanco and Zosyn. monitor vanco trough levels      labs, rad, chart reviewed

## 2020-11-23 NOTE — PROGRESS NOTE ADULT - SUBJECTIVE AND OBJECTIVE BOX
Infectious Diseases progress note:    Subjective: Coverage appreciated.  WBC 24 today.  Pt w/o new somatic complaints.  Afebrile.  Cxr with b/l lower lobe patchy opacities.  Pt denies cough/cp/sob.  Cr trending upwards    ROS:  CONSTITUTIONAL:  No fever, chills, rigors  CARDIOVASCULAR:  No chest pain or palpitations  RESPIRATORY:   No SOB, cough, dyspnea on exertion.  No wheezing  GASTROINTESTINAL:  No abd pain, N/V, diarrhea/constipation  EXTREMITIES:  No swelling or joint pain  GENITOURINARY:  No burning on urination, increased frequency or urgency.  No flank pain  NEUROLOGIC:  No HA, visual disturbances  SKIN: No rashes    Allergies    No Known Allergies    Intolerances        ANTIBIOTICS/RELEVANT:  antimicrobials  piperacillin/tazobactam IVPB.. 3.375 Gram(s) IV Intermittent every 8 hours    immunologic:  influenza  Vaccine (HIGH DOSE) 0.7 milliLiter(s) IntraMuscular once    OTHER:  acetaminophen   Tablet .. 650 milliGRAM(s) Oral every 6 hours PRN  allopurinol 100 milliGRAM(s) Oral daily  aluminum hydroxide/magnesium hydroxide/simethicone Suspension 30 milliLiter(s) Oral every 6 hours PRN  amLODIPine   Tablet 10 milliGRAM(s) Oral daily  cinacalcet 60 milliGRAM(s) Oral daily  dextrose 40% Gel 15 Gram(s) Oral once  dextrose 5%. 1000 milliLiter(s) IV Continuous <Continuous>  dextrose 5%. 1000 milliLiter(s) IV Continuous <Continuous>  dextrose 50% Injectable 12.5 Gram(s) IV Push once  dextrose 50% Injectable 25 Gram(s) IV Push once  dextrose 50% Injectable 25 Gram(s) IV Push once  gabapentin 100 milliGRAM(s) Oral two times a day  glucagon  Injectable 1 milliGRAM(s) IntraMuscular once  hydrALAZINE 75 milliGRAM(s) Oral three times a day  insulin lispro (ADMELOG) corrective regimen sliding scale   SubCutaneous three times a day before meals  insulin lispro (ADMELOG) corrective regimen sliding scale   SubCutaneous at bedtime  latanoprost 0.005% Ophthalmic Solution 1 Drop(s) Both EYES at bedtime  levothyroxine 75 MICROGram(s) Oral daily  melatonin 3 milliGRAM(s) Oral at bedtime PRN  oxyCODONE    IR 5 milliGRAM(s) Oral every 8 hours PRN  senna 2 Tablet(s) Oral at bedtime  simvastatin 20 milliGRAM(s) Oral at bedtime      Objective:  Vital Signs Last 24 Hrs  T(C): 36.5 (23 Nov 2020 12:24), Max: 36.9 (23 Nov 2020 04:49)  T(F): 97.7 (23 Nov 2020 12:24), Max: 98.5 (23 Nov 2020 04:49)  HR: 81 (23 Nov 2020 13:27) (66 - 84)  BP: 103/53 (23 Nov 2020 13:27) (103/53 - 129/66)  BP(mean): --  RR: 18 (23 Nov 2020 12:24) (18 - 20)  SpO2: 97% (23 Nov 2020 12:24) (97% - 98%)    PHYSICAL EXAM:  Constitutional:NAD  Eyes:OLIVIA, EOMI  Ear/Nose/Throat: no thrush, mucositis.  Moist mucous membranes	  Neck:no JVD, no lymphadenopathy, supple  Respiratory: CTA adina  Cardiovascular: S1S2 RRR, no murmurs  Gastrointestinal:soft, nontender,  nondistended (+) BS  Extremities:no e/e/c  Skin:  Lft ft toes with dry gangrene, minimal drainage.         LABS:                        8.1    24.39 )-----------( 228      ( 23 Nov 2020 04:46 )             26.0     11-23    135  |  97<L>  |  73<H>  ----------------------------<  99  3.8   |  24  |  3.54<H>    Ca    7.2<L>      23 Nov 2020 04:46  Phos  4.1     11-23  Mg     1.9     11-23      PT/INR - ( 23 Nov 2020 04:46 )   PT: 83.6 SEC;   INR: 8.10          PTT - ( 23 Nov 2020 04:46 )  PTT:48.8 SEC        Vancomycin Level, Random:  ug/mL (11-23 @ 04:46)  Vancomycin Level, Random:  ug/mL (11-21 @ 06:00)  Vancomycin Level, Random:  ug/mL (11-20 @ 06:10)                  MICROBIOLOGY:      Culture - Abscess with Gram Stain (11.14.20 @ 18:00)   - Linezolid: S 2   - Meropenem: S <=1   - Meropenem: S <=1   - Oxacillin: R >2   - Penicillin: R >8   - Piperacillin/Tazobactam: S <=8   - Piperacillin/Tazobactam: S <=8   - RIF- Rifampin: S <=1 Should not be used as monotherapy   - Tetra/Doxy: S <=1   - Tetra/Doxy: R >8   - Tobramycin: S <=2   - Tobramycin: S <=2   - Trimethoprim/Sulfamethoxazole: S <=0.5/9.5   - Trimethoprim/Sulfamethoxazole: S <=0.5/9.5   - Trimethoprim/Sulfamethoxazole: R >2/38   - Vancomycin: S 2   - Vancomycin: S 1   - Amikacin: S <=16   - Amikacin: S <=16   - Amoxicillin/Clavulanic Acid: S <=8/4   - Amoxicillin/Clavulanic Acid: S <=8/4   - Ampicillin: R 16 These ampicillin results predict results for amoxicillin   - Ampicillin: S <=8 These ampicillin results predict results for amoxicillin   - Ampicillin: S <=2 Predicts results to ampicillin/sulbactam, amoxacillin-clavulanate and piperacillin-tazobactam.   - Ampicillin/Sulbactam: S <=4/2 Enterobacter, Citrobacter, and Serratia may develop resistance during prolonged therapy (3-4 days)   - Ampicillin/Sulbactam: S <=4/2 Enterobacter, Citrobacter, and Serratia may develop resistance during prolonged therapy (3-4 days)   - Ampicillin/Sulbactam: R <=8/4   - Aztreonam: S <=4   - Aztreonam: S <=4   - Cefazolin: S <=2 Enterobacter, Citrobacter, and Serratia may develop resistance during prolonged therapy (3-4 days)   - Cefazolin: I 4 Enterobacter, Citrobacter, and Serratia may develop resistance during prolonged therapy (3-4 days)   - Cefazolin: R 16   - Cefepime: S <=2   - Cefepime: S <=2   - Cefoxitin: S <=8   - Cefoxitin: S <=8   - Ceftriaxone: S <=1 Enterobacter, Citrobacter, and Serratia may develop resistance during prolonged therapy   - Ceftriaxone: S <=1 Enterobacter, Citrobacter, and Serratia may develop resistance during prolonged therapy   - Ciprofloxacin: S <=0.25   - Ciprofloxacin: S <=0.25   - Clindamycin: S 0.5   - Daptomycin: S 1   - Ertapenem: S <=0.5   - Ertapenem: S <=0.5   - Erythromycin: S <=0.25   - Gentamicin: S <=1 Should not be used as monotherapy   - Gentamicin: S <=2   - Gentamicin: S <=2   - Imipenem: S <=1   - Levofloxacin: S <=0.5   - Levofloxacin: S <=0.5   Specimen Source: .Abscess left foot wound   Culture Results:   After additional incubation time, Culture yields >4 types of aerobic   and/or anaerobic bacteria   Call client services within 7 days if further workup is clinically   indicated. Culture includes   Moderate Methicillin resistant Staphylococcus aureus   Few Proteus mirabilis   Rare Klebsiella oxytoca/Raoutella ornithinolytica   Moderate Enterococcus faecalis   Organism Identification: Methicillin resistant Staphylococcus aureus   Enterococcus faecalis   Proteus mirabilis   Klebsiella oxytoca /Raoutella ornithinolytica   Organism: Methicillin resistant Staphylococcus aureus   Organism: Enterococcus faecalis   Organism: Proteus mirabilis   Organism: Klebsiella oxytoca /Raoutella ornithinolytica   Method Type: ALTA   Method Type: ALTA   Method Type: ALTA   Method Type: ALTA         RADIOLOGY & ADDITIONAL STUDIES:    < from: Xray Chest 1 View AP/PA (11.22.20 @ 18:04) >    IMPRESSION:  Bilateral patchy lower lobe predominant opacities may represent pneumonia.  Stable left mid lung linear scarring.    < end of copied text >

## 2020-11-23 NOTE — PROGRESS NOTE ADULT - SUBJECTIVE AND OBJECTIVE BOX
chief complaint: left foot pain    S: no chest pain or sob; ROS otherwise negative.     chief complaint: leg pain    Review of Systems:   Constitutional: [ ] fevers, [ ] chills.   Skin: [ ] dry skin. [ ] rashes.  Psychiatric: [ ] depression, [ ] anxiety.   Gastrointestinal: [ ] BRBPR, [ ] melena.   Neurological: [ ] confusion. [ ] seizures. [ ] shuffling gait.   Ears,Nose,Mouth and Throat: [ ] ear pain [ ] sore throat.   Eyes: [ ] diplopia.   Respiratory: [ ] hemoptysis. [ ] shortness of breath  Cardiovascular: See HPI above  Hematologic/Lymphatic: [ ] anemia. [ ] painful nodes. [ ] prolonged bleeding.   Genitourinary: [ ] hematuria. [ ] flank pain.   Endocrine: [ ] significant change in weight. [ ] intolerance to heat and cold.     Review of systems [ x] otherwise negative, [ ] otherwise unable to obtain    FH: no family history of sudden cardiac death in first degree relatives    SH: [ ] tobacco, [ ] alcohol, [ ] drugs    acetaminophen   Tablet .. 650 milliGRAM(s) Oral every 6 hours PRN  allopurinol 100 milliGRAM(s) Oral daily  aluminum hydroxide/magnesium hydroxide/simethicone Suspension 30 milliLiter(s) Oral every 6 hours PRN  amLODIPine   Tablet 10 milliGRAM(s) Oral daily  cinacalcet 60 milliGRAM(s) Oral daily  dextrose 40% Gel 15 Gram(s) Oral once  dextrose 5%. 1000 milliLiter(s) IV Continuous <Continuous>  dextrose 5%. 1000 milliLiter(s) IV Continuous <Continuous>  dextrose 50% Injectable 25 Gram(s) IV Push once  dextrose 50% Injectable 12.5 Gram(s) IV Push once  dextrose 50% Injectable 25 Gram(s) IV Push once  gabapentin 100 milliGRAM(s) Oral two times a day  glucagon  Injectable 1 milliGRAM(s) IntraMuscular once  hydrALAZINE 75 milliGRAM(s) Oral three times a day  influenza  Vaccine (HIGH DOSE) 0.7 milliLiter(s) IntraMuscular once  insulin lispro (ADMELOG) corrective regimen sliding scale   SubCutaneous at bedtime  insulin lispro (ADMELOG) corrective regimen sliding scale   SubCutaneous three times a day before meals  latanoprost 0.005% Ophthalmic Solution 1 Drop(s) Both EYES at bedtime  levothyroxine 75 MICROGram(s) Oral daily  oxyCODONE    IR 5 milliGRAM(s) Oral every 8 hours PRN  piperacillin/tazobactam IVPB.. 3.375 Gram(s) IV Intermittent every 8 hours  senna 2 Tablet(s) Oral at bedtime  simvastatin 20 milliGRAM(s) Oral at bedtime  sodium chloride 0.9%. 500 milliLiter(s) IV Continuous <Continuous>                        8.1    15.08 )-----------( 212      ( 21 Nov 2020 06:00 )             27.1     139  |  96<L>  |  67<H>  ----------------------------<  74  3.2<L>   |  27  |  2.61<H>    Ca    8.0<L>      21 Nov 2020 06:00  Phos  3.4     11-21  Mg     1.9     11-21      T(C): 36.6 (11-21-20 @ 12:25), Max: 37.3 (11-20-20 @ 21:13)  HR: 73 (11-21-20 @ 12:25) (73 - 87)  BP: 138/56 (11-21-20 @ 12:25) (133/67 - 138/68)  RR: 17 (11-21-20 @ 12:25) (16 - 17)  SpO2: 99% (11-21-20 @ 12:25) (97% - 99%)    General: Well nourished in no acute distress. Alert and Oriented * 3.   Head: Normocephalic and atraumatic.   Neck: No JVD. No bruits. Supple. Does not appear to be enlarged.   Cardiovascular: + S1,S2 ; RRR 3/6 SUZI. No rubs noted.    Lungs: CTA b/l. No rhonchi, rales or wheezes.   Abdomen: + BS, soft. Non tender. Non distended. No rebound. No guarding.   Extremities: No clubbing/cyanosis/edema.   Neurologic: Moves all four extremities. Full range of motion.   Skin: Warm and moist. The patient's skin has normal elasticity and good skin turgor.   Psychiatric: Appropriate mood and affect.  Musculoskeletal: Normal range of motion, normal strength    TELE: not on tele    < from: Transthoracic Echocardiogram (11.16.20 @ 18:38) >  1. Mitral annular calcification, otherwise normal mitral  valve. Mild mitral regurgitation.  2. Calcified trileaflet aortic valve with decreased  opening. Peak transaortic valve gradient equals 49 mm Hg,  mean transaortic valve gradient equals 36 mm Hg, estimated  aortic valve area equals 0.6 sqcm (by continuity equation),  aortic valve velocity time integral equals 86 cm,  consistent with severe aortic stenosis. Minimal aortic  regurgitation.  3. Mild concentric left ventricular hypertrophy.  4. Hyperdynamic left ventricle.  5. Normal right ventricular size and function.  6. Estimated right ventricular systolic pressure equals 59  mm Hg, assuming right atrial pressure equals 10 mm Hg,  consistent with moderate pulmonary hypertension.  *** Compared with echocardiogram of 7/22/2020, no  significant changes noted.    < end of copied text >     89 year old female with history of HTN, DM, CKD, Afib on ac with coumadin, known moderate to severe AS conservatively managed per [facility] who was admitted with dry gangrene on left first toe.     -pt. with no clinical heart failure on exam  -TTE confirms severe AS which has been managed conservatively per the patient and the patient's family's wishes, confirmed with daughter Catherine on the phone   -ABx per ID   -BCx NGTD   -on gentle IVF per Renal  -monitor volume status  -no further cardiac workup anticipated as long as goals of care do not change and BCx remain negative     chief complaint: left foot pain    S: no chest pain or sob; ROS otherwise negative.     chief complaint: leg pain    Review of Systems:   Constitutional: [ ] fevers, [ ] chills.   Skin: [ ] dry skin. [ ] rashes.  Psychiatric: [ ] depression, [ ] anxiety.   Gastrointestinal: [ ] BRBPR, [ ] melena.   Neurological: [ ] confusion. [ ] seizures. [ ] shuffling gait.   Ears,Nose,Mouth and Throat: [ ] ear pain [ ] sore throat.   Eyes: [ ] diplopia.   Respiratory: [ ] hemoptysis. [ ] shortness of breath  Cardiovascular: See HPI above  Hematologic/Lymphatic: [ ] anemia. [ ] painful nodes. [ ] prolonged bleeding.   Genitourinary: [ ] hematuria. [ ] flank pain.   Endocrine: [ ] significant change in weight. [ ] intolerance to heat and cold.     Review of systems [x ] otherwise negative, [ ] otherwise unable to obtain    FH: no family history of sudden cardiac death in first degree relatives    SH: [ ] tobacco, [ ] alcohol, [ ] drugs    acetaminophen   Tablet .. 650 milliGRAM(s) Oral every 6 hours PRN  allopurinol 100 milliGRAM(s) Oral daily  aluminum hydroxide/magnesium hydroxide/simethicone Suspension 30 milliLiter(s) Oral every 6 hours PRN  amLODIPine   Tablet 10 milliGRAM(s) Oral daily  aztreonam  IVPB 500 milliGRAM(s) IV Intermittent every 12 hours  cinacalcet 60 milliGRAM(s) Oral daily  dextrose 40% Gel 15 Gram(s) Oral once  dextrose 5%. 1000 milliLiter(s) IV Continuous <Continuous>  dextrose 5%. 1000 milliLiter(s) IV Continuous <Continuous>  dextrose 50% Injectable 12.5 Gram(s) IV Push once  dextrose 50% Injectable 25 Gram(s) IV Push once  dextrose 50% Injectable 25 Gram(s) IV Push once  doxycycline IVPB 100 milliGRAM(s) IV Intermittent every 12 hours  gabapentin 100 milliGRAM(s) Oral two times a day  glucagon  Injectable 1 milliGRAM(s) IntraMuscular once  hydrALAZINE 75 milliGRAM(s) Oral three times a day  influenza  Vaccine (HIGH DOSE) 0.7 milliLiter(s) IntraMuscular once  insulin lispro (ADMELOG) corrective regimen sliding scale   SubCutaneous three times a day before meals  insulin lispro (ADMELOG) corrective regimen sliding scale   SubCutaneous at bedtime  latanoprost 0.005% Ophthalmic Solution 1 Drop(s) Both EYES at bedtime  levothyroxine 75 MICROGram(s) Oral daily  melatonin 3 milliGRAM(s) Oral at bedtime PRN  oxyCODONE    IR 5 milliGRAM(s) Oral every 8 hours PRN  senna 2 Tablet(s) Oral at bedtime  simvastatin 20 milliGRAM(s) Oral at bedtime                            8.1    24.39 )-----------( 228      ( 23 Nov 2020 04:46 )             26.0     135  |  97<L>  |  73<H>  ----------------------------<  99  3.8   |  24  |  3.54<H>    Ca    7.2<L>      23 Nov 2020 04:46  Phos  4.1     11-23  Mg     1.9     11-23      T(C): 36.5 (11-23-20 @ 12:24), Max: 36.9 (11-23-20 @ 04:49)  HR: 81 (11-23-20 @ 13:27) (66 - 84)  BP: 103/53 (11-23-20 @ 13:27) (103/53 - 129/66)  RR: 18 (11-23-20 @ 12:24) (18 - 20)  SpO2: 97% (11-23-20 @ 12:24) (97% - 98%)  Wt(kg): --    I&O's Summary    22 Nov 2020 07:01  -  23 Nov 2020 07:00  --------------------------------------------------------  IN: 0 mL / OUT: 200 mL / NET: -200 mL        General: Well nourished in no acute distress. Alert and Oriented * 3.   Head: Normocephalic and atraumatic.   Neck: No JVD. No bruits. Supple. Does not appear to be enlarged.   Cardiovascular: + S1,S2 ; RRR Soft systolic murmur at the left lower sternal border. No rubs noted.    Lungs: CTA b/l. No rhonchi, rales or wheezes.   Abdomen: + BS, soft. Non tender. Non distended. No rebound. No guarding.   Extremities: No clubbing/cyanosis/edema.   Neurologic: Moves all four extremities. Full range of motion.   Skin: Warm and moist. The patient's skin has normal elasticity and good skin turgor.   Psychiatric: Appropriate mood and affect.  Musculoskeletal: Normal range of motion, normal strength      TELE: not on tele    < from: Transthoracic Echocardiogram (11.16.20 @ 18:38) >  1. Mitral annular calcification, otherwise normal mitral  valve. Mild mitral regurgitation.  2. Calcified trileaflet aortic valve with decreased  opening. Peak transaortic valve gradient equals 49 mm Hg,  mean transaortic valve gradient equals 36 mm Hg, estimated  aortic valve area equals 0.6 sqcm (by continuity equation),  aortic valve velocity time integral equals 86 cm,  consistent with severe aortic stenosis. Minimal aortic  regurgitation.  3. Mild concentric left ventricular hypertrophy.  4. Hyperdynamic left ventricle.  5. Normal right ventricular size and function.  6. Estimated right ventricular systolic pressure equals 59  mm Hg, assuming right atrial pressure equals 10 mm Hg,  consistent with moderate pulmonary hypertension.  *** Compared with echocardiogram of 7/22/2020, no  significant changes noted.    < end of copied text >     89 year old female with history of HTN, DM, CKD, Afib on ac with coumadin, known moderate to severe AS conservatively managed per [facility] who was admitted with dry gangrene on left first toe.     -pt. with no clinical heart failure on exam  -TTE confirms severe AS which has been managed conservatively per the patient and the patient's family's wishes, confirmed with daughter Catherine on the phone   -ABx per ID   -BCx NGTD   -monitor volume status  -no further cardiac workup anticipated as long as goals of care do not change and BCx remain negative

## 2020-11-23 NOTE — DIETITIAN INITIAL EVALUATION ADULT. - PERTINENT LABORATORY DATA
11-23 Na 135 mmol/L Glu 99 mg/dL K+ 3.8 mmol/L Cr 3.54 mg/dL<H> BUN 73 mg/dL<H> Phos 4.1 mg/dL Hgb 8.1 g/dL<L> Hct 26.0 %<L> Glucose, Serum: 99 mg/dL   24Hr FS:122 mg/dL  135 mg/dL  155 mg/dL  132 mg/dL  11-15 HbA1c 6.1

## 2020-11-23 NOTE — CONSULT NOTE ADULT - ASSESSMENT
89 y/o female pmh htn, dm, ckd, afib on coumadin  p/w with gangrene left toe.  Asked to see for goc and symptom management

## 2020-11-23 NOTE — PROGRESS NOTE ADULT - SUBJECTIVE AND OBJECTIVE BOX
[facility] - S Radha / Florin S /D Nate/ S Jonathan/ S Rosalinda/ Shahid Segovia / BABAR Escalerau/ O Rudi  service -[phone], office [phone]  ---------------------------------------------------------------------------------------------------------------    Patient seen and examined bedside    Subjective and Objective: No overnight events, sob resolved. No complaints today. feeling better    Allergies: No Known Allergies      Hospital Medications:   MEDICATIONS  (STANDING):  allopurinol 100 milliGRAM(s) Oral daily  amLODIPine   Tablet 10 milliGRAM(s) Oral daily  cinacalcet 60 milliGRAM(s) Oral daily  dextrose 40% Gel 15 Gram(s) Oral once  dextrose 5%. 1000 milliLiter(s) (50 mL/Hr) IV Continuous <Continuous>  dextrose 5%. 1000 milliLiter(s) (100 mL/Hr) IV Continuous <Continuous>  dextrose 50% Injectable 12.5 Gram(s) IV Push once  dextrose 50% Injectable 25 Gram(s) IV Push once  dextrose 50% Injectable 25 Gram(s) IV Push once  gabapentin 100 milliGRAM(s) Oral two times a day  glucagon  Injectable 1 milliGRAM(s) IntraMuscular once  hydrALAZINE 75 milliGRAM(s) Oral three times a day  influenza  Vaccine (HIGH DOSE) 0.7 milliLiter(s) IntraMuscular once  insulin lispro (ADMELOG) corrective regimen sliding scale   SubCutaneous three times a day before meals  insulin lispro (ADMELOG) corrective regimen sliding scale   SubCutaneous at bedtime  latanoprost 0.005% Ophthalmic Solution 1 Drop(s) Both EYES at bedtime  levothyroxine 75 MICROGram(s) Oral daily  piperacillin/tazobactam IVPB.. 3.375 Gram(s) IV Intermittent every 8 hours  senna 2 Tablet(s) Oral at bedtime  simvastatin 20 milliGRAM(s) Oral at bedtime      VITALS:  T(F): 97.7 (11-23-20 @ 12:24), Max: 98.5 (11-23-20 @ 04:49)  HR: 83 (11-23-20 @ 12:24)  BP: 112/62 (11-23-20 @ 12:24)  RR: 18 (11-23-20 @ 12:24)  SpO2: 97% (11-23-20 @ 12:24)  Wt(kg): --    11-22 @ 07:01  -  11-23 @ 07:00  --------------------------------------------------------  IN: 0 mL / OUT: 200 mL / NET: -200 mL      PHYSICAL EXAM:  Constitutional: NAD  HEENT: anicteric sclera, oropharynx clear  Neck: No JVD  Respiratory: CTAB, no wheezes, rales or rhonchi  Cardiovascular: S1, S2, RRR  Gastrointestinal: BS+, soft, NT/ND  Extremities: No cyanosis or clubbing. No peripheral edema  Neurological: A/O x 3, no focal deficits  Psychiatric: Normal mood, normal affect  : No CVA tenderness. No michaud.   Skin: No rashes  Vascular Access:    LABS:  11-23    135  |  97<L>  |  73<H>  ----------------------------<  99  3.8   |  24  |  3.54<H>    Ca    7.2<L>      23 Nov 2020 04:46  Phos  4.1     11-23  Mg     1.9     11-23      Creatinine Trend: 3.54 <--, 2.92 <--, 2.61 <--, 2.60 <--, 2.40 <--, 2.40 <--, 2.05 <--                        8.1    24.39 )-----------( 228      ( 23 Nov 2020 04:46 )             26.0     Urine Studies:        RADIOLOGY & ADDITIONAL STUDIES:   [facility] - S Radha / Florin S /D Nate/ S Jonathan/ S Rosalinda/ Shahid Segovia / BABAR Escalerau/ O Rudi  service -[phone], office [phone]  ---------------------------------------------------------------------------------------------------------------    Patient seen and examined bedside    Subjective and Objective: No overnight events, denied V/D/sob. No complaints today. poor appetite, poor po intake.     Allergies: No Known Allergies      Hospital Medications:   MEDICATIONS  (STANDING):  allopurinol 100 milliGRAM(s) Oral daily  amLODIPine   Tablet 10 milliGRAM(s) Oral daily  cinacalcet 60 milliGRAM(s) Oral daily  dextrose 40% Gel 15 Gram(s) Oral once  dextrose 5%. 1000 milliLiter(s) (50 mL/Hr) IV Continuous <Continuous>  dextrose 5%. 1000 milliLiter(s) (100 mL/Hr) IV Continuous <Continuous>  dextrose 50% Injectable 12.5 Gram(s) IV Push once  dextrose 50% Injectable 25 Gram(s) IV Push once  dextrose 50% Injectable 25 Gram(s) IV Push once  gabapentin 100 milliGRAM(s) Oral two times a day  glucagon  Injectable 1 milliGRAM(s) IntraMuscular once  hydrALAZINE 75 milliGRAM(s) Oral three times a day  influenza  Vaccine (HIGH DOSE) 0.7 milliLiter(s) IntraMuscular once  insulin lispro (ADMELOG) corrective regimen sliding scale   SubCutaneous three times a day before meals  insulin lispro (ADMELOG) corrective regimen sliding scale   SubCutaneous at bedtime  latanoprost 0.005% Ophthalmic Solution 1 Drop(s) Both EYES at bedtime  levothyroxine 75 MICROGram(s) Oral daily  piperacillin/tazobactam IVPB.. 3.375 Gram(s) IV Intermittent every 8 hours  senna 2 Tablet(s) Oral at bedtime  simvastatin 20 milliGRAM(s) Oral at bedtime      VITALS:  T(F): 97.7 (11-23-20 @ 12:24), Max: 98.5 (11-23-20 @ 04:49)  HR: 83 (11-23-20 @ 12:24)  BP: 112/62 (11-23-20 @ 12:24)  RR: 18 (11-23-20 @ 12:24)  SpO2: 97% (11-23-20 @ 12:24)  Wt(kg): --    11-22 @ 07:01  -  11-23 @ 07:00  --------------------------------------------------------  IN: 0 mL / OUT: 200 mL / NET: -200 mL      PHYSICAL EXAM:  Constitutional: NAD  HEENT: anicteric sclera  Neck: No JVD  Respiratory: CTAB, no wheezes, rales or rhonchi  Cardiovascular: S1, S2, RRR  Gastrointestinal: BS+, soft, NT/ND  Extremities: No peripheral edema. left foot dressing+  Neurological: A/O x 3  Psychiatric: Normal mood, normal affect  : No michaud.     LABS:  11-23    135  |  97<L>  |  73<H>  ----------------------------<  99  3.8   |  24  |  3.54<H>    Ca    7.2<L>      23 Nov 2020 04:46  Phos  4.1     11-23  Mg     1.9     11-23      Creatinine Trend: 3.54 <--, 2.92 <--, 2.61 <--, 2.60 <--, 2.40 <--, 2.40 <--, 2.05 <--                        8.1    24.39 )-----------( 228      ( 23 Nov 2020 04:46 )             26.0     Urine Studies:    RADIOLOGY & ADDITIONAL STUDIES:  < from: Xray Chest 1 View AP/PA (11.22.20 @ 18:04) >  IMPRESSION:  Bilateral patchy lower lobe predominant opacities may represent pneumonia.  Stable left mid lung linear scarring.    < end of copied text >

## 2020-11-23 NOTE — DIETITIAN INITIAL EVALUATION ADULT. - ORAL INTAKE PTA/DIET HISTORY

## 2020-11-23 NOTE — GOALS OF CARE CONVERSATION - ADVANCED CARE PLANNING - CONVERSATION DETAILS
[facility] - Dtr Catherine wishes Home Care and will transition to Home Hospice at some point in the future

## 2020-11-24 VITALS
DIASTOLIC BLOOD PRESSURE: 54 MMHG | RESPIRATION RATE: 17 BRPM | TEMPERATURE: 97 F | HEART RATE: 60 BPM | SYSTOLIC BLOOD PRESSURE: 112 MMHG | OXYGEN SATURATION: 96 %

## 2020-11-24 LAB
ANION GAP SERPL CALC-SCNC: 18 MMO/L — HIGH (ref 7–14)
APTT BLD: 52.3 SEC — HIGH (ref 27–36.3)
BUN SERPL-MCNC: 79 MG/DL — HIGH (ref 7–23)
CALCIUM SERPL-MCNC: 7 MG/DL — LOW (ref 8.4–10.5)
CHLORIDE SERPL-SCNC: 97 MMOL/L — LOW (ref 98–107)
CO2 SERPL-SCNC: 22 MMOL/L — SIGNIFICANT CHANGE UP (ref 22–31)
CREAT SERPL-MCNC: 4.76 MG/DL — HIGH (ref 0.5–1.3)
GLUCOSE BLDC GLUCOMTR-MCNC: 125 MG/DL — HIGH (ref 70–99)
GLUCOSE BLDC GLUCOMTR-MCNC: 137 MG/DL — HIGH (ref 70–99)
GLUCOSE SERPL-MCNC: 88 MG/DL — SIGNIFICANT CHANGE UP (ref 70–99)
HCT VFR BLD CALC: 26.1 % — LOW (ref 34.5–45)
HGB BLD-MCNC: 7.6 G/DL — LOW (ref 11.5–15.5)
INR BLD: 9.57 — CRITICAL HIGH (ref 0.88–1.16)
MAGNESIUM SERPL-MCNC: 1.7 MG/DL — SIGNIFICANT CHANGE UP (ref 1.6–2.6)
MCHC RBC-ENTMCNC: 25.9 PG — LOW (ref 27–34)
MCHC RBC-ENTMCNC: 29.1 % — LOW (ref 32–36)
MCV RBC AUTO: 89.1 FL — SIGNIFICANT CHANGE UP (ref 80–100)
NRBC # FLD: 0.03 K/UL — SIGNIFICANT CHANGE UP (ref 0–0)
PHOSPHATE SERPL-MCNC: 4.7 MG/DL — HIGH (ref 2.5–4.5)
PLATELET # BLD AUTO: 238 K/UL — SIGNIFICANT CHANGE UP (ref 150–400)
PMV BLD: 10.8 FL — SIGNIFICANT CHANGE UP (ref 7–13)
POTASSIUM SERPL-MCNC: 3.7 MMOL/L — SIGNIFICANT CHANGE UP (ref 3.5–5.3)
POTASSIUM SERPL-SCNC: 3.7 MMOL/L — SIGNIFICANT CHANGE UP (ref 3.5–5.3)
PROTHROM AB SERPL-ACNC: 98.8 SEC — HIGH (ref 10.6–13.6)
RBC # BLD: 2.93 M/UL — LOW (ref 3.8–5.2)
RBC # FLD: 17.6 % — HIGH (ref 10.3–14.5)
SODIUM SERPL-SCNC: 137 MMOL/L — SIGNIFICANT CHANGE UP (ref 135–145)
VANCOMYCIN FLD-MCNC: 22.7 UG/ML — SIGNIFICANT CHANGE UP
WBC # BLD: 19.88 K/UL — HIGH (ref 3.8–10.5)
WBC # FLD AUTO: 19.88 K/UL — HIGH (ref 3.8–10.5)

## 2020-11-24 RX ORDER — OXYCODONE HYDROCHLORIDE 5 MG/1
1 TABLET ORAL
Qty: 21 | Refills: 0
Start: 2020-11-24 | End: 2020-11-30

## 2020-11-24 RX ORDER — LANOLIN ALCOHOL/MO/W.PET/CERES
1 CREAM (GRAM) TOPICAL
Qty: 30 | Refills: 0
Start: 2020-11-24 | End: 2020-12-23

## 2020-11-24 RX ORDER — CEFUROXIME AXETIL 250 MG
1 TABLET ORAL
Qty: 14 | Refills: 0
Start: 2020-11-24 | End: 2020-12-07

## 2020-11-24 RX ORDER — GABAPENTIN 400 MG/1
1 CAPSULE ORAL
Qty: 60 | Refills: 0
Start: 2020-11-24 | End: 2020-12-23

## 2020-11-24 RX ORDER — OXYCODONE HYDROCHLORIDE 5 MG/1
1 TABLET ORAL
Qty: 15 | Refills: 0
Start: 2020-11-24 | End: 2020-11-28

## 2020-11-24 RX ORDER — NYSTATIN 500MM UNIT
2 POWDER (EA) MISCELLANEOUS
Qty: 112 | Refills: 0
Start: 2020-11-24 | End: 2020-12-07

## 2020-11-24 RX ORDER — PHYTONADIONE (VIT K1) 5 MG
1 TABLET ORAL
Qty: 2 | Refills: 0
Start: 2020-11-24 | End: 2020-11-25

## 2020-11-24 RX ORDER — CALCIFEDIOL 30 UG/1
1 CAPSULE, EXTENDED RELEASE ORAL
Qty: 0 | Refills: 0 | DISCHARGE

## 2020-11-24 RX ORDER — PHYTONADIONE (VIT K1) 5 MG
5 TABLET ORAL ONCE
Refills: 0 | Status: COMPLETED | OUTPATIENT
Start: 2020-11-24 | End: 2020-11-24

## 2020-11-24 RX ADMIN — Medication 110 MILLIGRAM(S): at 06:14

## 2020-11-24 RX ADMIN — Medication 50 MILLIGRAM(S): at 06:48

## 2020-11-24 RX ADMIN — GABAPENTIN 100 MILLIGRAM(S): 400 CAPSULE ORAL at 06:14

## 2020-11-24 RX ADMIN — CINACALCET 60 MILLIGRAM(S): 30 TABLET, FILM COATED ORAL at 13:57

## 2020-11-24 RX ADMIN — Medication 101 MILLIGRAM(S): at 11:41

## 2020-11-24 RX ADMIN — OXYCODONE HYDROCHLORIDE 5 MILLIGRAM(S): 5 TABLET ORAL at 10:50

## 2020-11-24 RX ADMIN — Medication 75 MICROGRAM(S): at 06:14

## 2020-11-24 RX ADMIN — OXYCODONE HYDROCHLORIDE 5 MILLIGRAM(S): 5 TABLET ORAL at 11:20

## 2020-11-24 RX ADMIN — Medication 100 MILLIGRAM(S): at 13:57

## 2020-11-24 NOTE — PROGRESS NOTE ADULT - ATTENDING COMMENTS
Infectious Diseases will continue to follow. Please call with any questions.   Barbara Winters M.D.  [facility], Division of Infectious Diseases [phone]  For over the weekend and after hours, please call [phone]  I am covering for Dr. Escobedo this weekend, Dr. Escobedo will resume service Monday 11/23/2020
[facility]  Office [phone]  Ans Serv [phone]  [facility] - [phone].
[facility]  Office [phone]  Ans Serv [phone]  [facility] - [phone].
Solomon Catherine MD  [facility]  Office [phone]  Ans Serv [phone] cell - [phone]
Solomon Catherine MD  [facility]  Office [phone]  Ans Serv [phone] cell - [phone]
Solomon Catherine MD  [facility]  Office [phone]  Ans Serv [phone] cell - [phone]
Solomon Catherine MD  [facility]  Office [phone]  Ans Serv [phone] cell - [phone]
contact with question   cell [phone]  [facility]- [phone]
contact with question   cell [phone]  [facility]- [phone]
contact with questions   cell [phone]  [facility]- [phone]
seen and agree w/ PA.  no change in EP plan.
Agree with above  Continue with conservative management of known AS  ABx per CONNER Banks MD
Agree with above  No further cardiac workup anticipated as long as BCx remain negative    Trisha Banks MD
Agree with above  Pt. with known severe AS - pt. and patient's family wants to continue with conservative care  No further cardiac workup anticipated at this time    Trisha Banks MD
[facility]  Office [phone]  Ans Serv [phone]  [facility] - [phone].
Patient seen and examined.  Agree with above.   Continue with conservative care of known severe AS per C  Follow up palliative care    Trisha Banks MD

## 2020-11-24 NOTE — PROGRESS NOTE ADULT - REASON FOR ADMISSION
left foot pain

## 2020-11-24 NOTE — PROGRESS NOTE ADULT - SUBJECTIVE AND OBJECTIVE BOX
Infectious Diseases progress note:    Subjective: Events noted, pt with worsening Creatinine, planned for comfort care, Palliative following.  Pt remains on abx    ROS:  CONSTITUTIONAL:  No fever, chills, rigors  CARDIOVASCULAR:  No chest pain or palpitations  RESPIRATORY:   No SOB, cough, dyspnea on exertion.  No wheezing  GASTROINTESTINAL:  No abd pain, N/V, diarrhea/constipation  EXTREMITIES:  No swelling or joint pain  GENITOURINARY:  No burning on urination, increased frequency or urgency.  No flank pain  NEUROLOGIC:  No HA, visual disturbances  SKIN: No rashes    Allergies    No Known Allergies    Intolerances        ANTIBIOTICS/RELEVANT:  antimicrobials  aztreonam  IVPB 500 milliGRAM(s) IV Intermittent every 12 hours  doxycycline IVPB 100 milliGRAM(s) IV Intermittent every 12 hours    immunologic:  influenza  Vaccine (HIGH DOSE) 0.7 milliLiter(s) IntraMuscular once    OTHER:  acetaminophen   Tablet .. 650 milliGRAM(s) Oral every 6 hours PRN  allopurinol 100 milliGRAM(s) Oral daily  aluminum hydroxide/magnesium hydroxide/simethicone Suspension 30 milliLiter(s) Oral every 6 hours PRN  amLODIPine   Tablet 10 milliGRAM(s) Oral daily  cinacalcet 60 milliGRAM(s) Oral daily  dextrose 40% Gel 15 Gram(s) Oral once  dextrose 5%. 1000 milliLiter(s) IV Continuous <Continuous>  dextrose 5%. 1000 milliLiter(s) IV Continuous <Continuous>  dextrose 50% Injectable 25 Gram(s) IV Push once  dextrose 50% Injectable 12.5 Gram(s) IV Push once  dextrose 50% Injectable 25 Gram(s) IV Push once  gabapentin 100 milliGRAM(s) Oral two times a day  glucagon  Injectable 1 milliGRAM(s) IntraMuscular once  hydrALAZINE 75 milliGRAM(s) Oral three times a day  insulin lispro (ADMELOG) corrective regimen sliding scale   SubCutaneous three times a day before meals  insulin lispro (ADMELOG) corrective regimen sliding scale   SubCutaneous at bedtime  latanoprost 0.005% Ophthalmic Solution 1 Drop(s) Both EYES at bedtime  levothyroxine 75 MICROGram(s) Oral daily  melatonin 3 milliGRAM(s) Oral at bedtime PRN  oxyCODONE    IR 5 milliGRAM(s) Oral every 8 hours PRN  senna 2 Tablet(s) Oral at bedtime  simvastatin 20 milliGRAM(s) Oral at bedtime  sodium chloride 0.9%. 1000 milliLiter(s) IV Continuous <Continuous>      Objective:  Vital Signs Last 24 Hrs  T(C): 36.3 (24 Nov 2020 13:52), Max: 36.7 (24 Nov 2020 06:12)  T(F): 97.4 (24 Nov 2020 13:52), Max: 98 (24 Nov 2020 06:12)  HR: 60 (24 Nov 2020 13:52) (60 - 87)  BP: 112/54 (24 Nov 2020 13:52) (109/46 - 117/52)  BP(mean): --  RR: 17 (24 Nov 2020 13:52) (16 - 17)  SpO2: 96% (24 Nov 2020 13:52) (96% - 100%)    PHYSICAL EXAM:  Constitutional:NAD  Eyes:OLIVIA, EOMI  Ear/Nose/Throat: no thrush, mucositis.  Moist mucous membranes	  Neck:no JVD, no lymphadenopathy, supple  Respiratory: CTA adina  Cardiovascular: S1S2 RRR, no murmurs  Gastrointestinal:soft, nontender,  nondistended (+) BS  Extremities:no e/e/c  Skin:  L ft 1st/2nd toe with gangrene, minimal fluid drainage.         LABS:                        7.6    19.88 )-----------( 238      ( 24 Nov 2020 05:30 )             26.1     11-24    137  |  97<L>  |  79<H>  ----------------------------<  88  3.7   |  22  |  4.76<H>    Ca    7.0<L>      24 Nov 2020 05:30  Phos  4.7     11-24  Mg     1.7     11-24      PT/INR - ( 24 Nov 2020 05:30 )   PT: 98.8 SEC;   INR: 9.57          PTT - ( 24 Nov 2020 05:30 )  PTT:52.3 SEC        Vancomycin Level, Random:  ug/mL (11-24 @ 05:30)  Vancomycin Level, Random:  ug/mL (11-23 @ 04:46)  Vancomycin Level, Random:  ug/mL (11-21 @ 06:00)  Vancomycin Level, Random:  ug/mL (11-20 @ 06:10)                  MICROBIOLOGY:      Culture - Blood (11.22.20 @ 16:35)   Specimen Source: .Blood Blood   Culture Results:   No growth to date.       RADIOLOGY & ADDITIONAL STUDIES:    < from: Xray Chest 1 View AP/PA (11.22.20 @ 18:04) >  FINDINGS:  The heart is enlarged.  Bilateral patchy lower lobe predominant opacities.  Stable left mid lung linear scarring.  No pneumothorax.    IMPRESSION:  Bilateral patchy lower lobe predominant opacities may represent pneumonia.  Stable left mid lung linear scarring.    < end of copied text >      < from: Xray Foot AP + Lateral + Oblique, Left (11.22.20 @ 12:23) >  FINDINGS: No fracture or dislocation is seen in the left foot. There is osteopenia. There is lucency in the base of the distal phalanx of the left first toe. There is subcutaneous gas in the soft tissues overlying the left first toe. Vascular calcifications are noted.    IMPRESSION: Subcutaneous gas in the soft tissues overlying the left first toe. Lucency in the base of the distal phalanx of the left first toe, may represent osteomyelitis. If clinically warranted, MRI of the left foot may be performed for confirmation.    < end of copied text >

## 2020-11-24 NOTE — PROGRESS NOTE ADULT - SUBJECTIVE AND OBJECTIVE BOX
chief complaint: left foot pain    S: no chest pain or sob; ROS otherwise negative.     chief complaint: leg pain    Review of Systems:   Constitutional: [ ] fevers, [ ] chills.   Skin: [ ] dry skin. [ ] rashes.  Psychiatric: [ ] depression, [ ] anxiety.   Gastrointestinal: [ ] BRBPR, [ ] melena.   Neurological: [ ] confusion. [ ] seizures. [ ] shuffling gait.   Ears,Nose,Mouth and Throat: [ ] ear pain [ ] sore throat.   Eyes: [ ] diplopia.   Respiratory: [ ] hemoptysis. [ ] shortness of breath  Cardiovascular: See HPI above  Hematologic/Lymphatic: [ ] anemia. [ ] painful nodes. [ ] prolonged bleeding.   Genitourinary: [ ] hematuria. [ ] flank pain.   Endocrine: [ ] significant change in weight. [ ] intolerance to heat and cold.     Review of systems [x ] otherwise negative, [ ] otherwise unable to obtain    FH: no family history of sudden cardiac death in first degree relatives    SH: [ ] tobacco, [ ] alcohol, [ ] drugs      acetaminophen   Tablet .. 650 milliGRAM(s) Oral every 6 hours PRN  allopurinol 100 milliGRAM(s) Oral daily  aluminum hydroxide/magnesium hydroxide/simethicone Suspension 30 milliLiter(s) Oral every 6 hours PRN  amLODIPine   Tablet 10 milliGRAM(s) Oral daily  aztreonam  IVPB 500 milliGRAM(s) IV Intermittent every 12 hours  cinacalcet 60 milliGRAM(s) Oral daily  dextrose 40% Gel 15 Gram(s) Oral once  dextrose 5%. 1000 milliLiter(s) IV Continuous <Continuous>  dextrose 5%. 1000 milliLiter(s) IV Continuous <Continuous>  dextrose 50% Injectable 25 Gram(s) IV Push once  dextrose 50% Injectable 12.5 Gram(s) IV Push once  dextrose 50% Injectable 25 Gram(s) IV Push once  doxycycline IVPB 100 milliGRAM(s) IV Intermittent every 12 hours  gabapentin 100 milliGRAM(s) Oral two times a day  glucagon  Injectable 1 milliGRAM(s) IntraMuscular once  hydrALAZINE 75 milliGRAM(s) Oral three times a day  influenza  Vaccine (HIGH DOSE) 0.7 milliLiter(s) IntraMuscular once  insulin lispro (ADMELOG) corrective regimen sliding scale   SubCutaneous three times a day before meals  insulin lispro (ADMELOG) corrective regimen sliding scale   SubCutaneous at bedtime  latanoprost 0.005% Ophthalmic Solution 1 Drop(s) Both EYES at bedtime  levothyroxine 75 MICROGram(s) Oral daily  melatonin 3 milliGRAM(s) Oral at bedtime PRN  oxyCODONE    IR 5 milliGRAM(s) Oral every 8 hours PRN  senna 2 Tablet(s) Oral at bedtime  simvastatin 20 milliGRAM(s) Oral at bedtime  sodium chloride 0.9%. 1000 milliLiter(s) IV Continuous <Continuous>                            7.6    19.88 )-----------( 238      ( 24 Nov 2020 05:30 )             26.1       11-24    137  |  97<L>  |  79<H>  ----------------------------<  88  3.7   |  22  |  4.76<H>    Ca    7.0<L>      24 Nov 2020 05:30  Phos  4.7     11-24  Mg     1.7     11-24              T(C): 36.4 (11-24-20 @ 10:10), Max: 36.7 (11-24-20 @ 06:12)  HR: 78 (11-24-20 @ 10:10) (64 - 87)  BP: 117/52 (11-24-20 @ 10:10) (109/46 - 117/52)  RR: 16 (11-24-20 @ 10:10) (16 - 17)  SpO2: 99% (11-24-20 @ 10:10) (97% - 100%)  Wt(kg): --    I&O's Summary          General: Well nourished in no acute distress. Alert and Oriented * 3.   Head: Normocephalic and atraumatic.   Neck: No JVD. No bruits. Supple. Does not appear to be enlarged.   Cardiovascular: + S1,S2 ; RRR 2/6 systolic murmur heard throughout. No rubs noted.    Lungs: CTA b/l. No rhonchi, rales or wheezes.   Abdomen: + BS, soft. Non tender. Non distended. No rebound. No guarding.   Extremities: No clubbing/cyanosis/edema.   Neurologic: Moves all four extremities. Full range of motion.   Skin: Warm and moist. The patient's skin has normal elasticity and good skin turgor.   Psychiatric: Appropriate mood and affect.  Musculoskeletal: Normal range of motion, normal strength      TELE: not on tele    < from: Transthoracic Echocardiogram (11.16.20 @ 18:38) >  1. Mitral annular calcification, otherwise normal mitral  valve. Mild mitral regurgitation.  2. Calcified trileaflet aortic valve with decreased  opening. Peak transaortic valve gradient equals 49 mm Hg,  mean transaortic valve gradient equals 36 mm Hg, estimated  aortic valve area equals 0.6 sqcm (by continuity equation),  aortic valve velocity time integral equals 86 cm,  consistent with severe aortic stenosis. Minimal aortic  regurgitation.  3. Mild concentric left ventricular hypertrophy.  4. Hyperdynamic left ventricle.  5. Normal right ventricular size and function.  6. Estimated right ventricular systolic pressure equals 59  mm Hg, assuming right atrial pressure equals 10 mm Hg,  consistent with moderate pulmonary hypertension.  *** Compared with echocardiogram of 7/22/2020, no  significant changes noted.    < end of copied text >     89 year old female with history of HTN, DM, CKD, Afib on ac with coumadin, known moderate to severe AS conservatively managed per GOC who was admitted with dry gangrene on left first toe.     -pt. with no clinical heart failure on exam  -TTE confirms severe AS which has been managed conservatively per the patient and the patient's family's wishes, confirmed with daughter Catherine on the phone   -ABx per ID   -BCx NGTD   -monitor volume status  -no further cardiac workup anticipated as long as goals of care do not change and BCx remain negative    Trisha Banks MD

## 2020-11-24 NOTE — PROGRESS NOTE ADULT - SUBJECTIVE AND OBJECTIVE BOX
EP ATTENDING    no tele  somnolent, does not open eyes to verbal stimuli.  ROS not obtainable due to mental status.    Review of Systems:   Constitutional: [ ] fevers, [ ] chills.   Skin: [ ] dry skin. [ ] rashes.  Psychiatric: [ ] depression, [ ] anxiety.   Gastrointestinal: [ ] BRBPR, [ ] melena.   Neurological: [ ] confusion. [ ] seizures. [ ] shuffling gait.   Ears,Nose,Mouth and Throat: [ ] ear pain [ ] sore throat.   Eyes: [ ] diplopia.   Respiratory: [ ] hemoptysis. [ ] shortness of breath  Cardiovascular: See HPI above  Hematologic/Lymphatic: [ ] anemia. [ ] painful nodes. [ ] prolonged bleeding.   Genitourinary: [ ] hematuria. [ ] flank pain.   Endocrine: [ ] significant change in weight. [ ] intolerance to heat and cold.     Review of systems [ x] otherwise negative, [ ] otherwise unable to obtain    FH: no family history of sudden cardiac death in first degree relatives    SH: [ ] tobacco, [ ] alcohol, [ ] drugs    acetaminophen   Tablet .. 650 milliGRAM(s) Oral every 6 hours PRN  allopurinol 100 milliGRAM(s) Oral daily  aluminum hydroxide/magnesium hydroxide/simethicone Suspension 30 milliLiter(s) Oral every 6 hours PRN  amLODIPine   Tablet 10 milliGRAM(s) Oral daily  aztreonam  IVPB 500 milliGRAM(s) IV Intermittent every 12 hours  cinacalcet 60 milliGRAM(s) Oral daily  dextrose 40% Gel 15 Gram(s) Oral once  dextrose 5%. 1000 milliLiter(s) IV Continuous <Continuous>  dextrose 5%. 1000 milliLiter(s) IV Continuous <Continuous>  dextrose 50% Injectable 25 Gram(s) IV Push once  dextrose 50% Injectable 12.5 Gram(s) IV Push once  dextrose 50% Injectable 25 Gram(s) IV Push once  doxycycline IVPB 100 milliGRAM(s) IV Intermittent every 12 hours  gabapentin 100 milliGRAM(s) Oral two times a day  glucagon  Injectable 1 milliGRAM(s) IntraMuscular once  hydrALAZINE 75 milliGRAM(s) Oral three times a day  influenza  Vaccine (HIGH DOSE) 0.7 milliLiter(s) IntraMuscular once  insulin lispro (ADMELOG) corrective regimen sliding scale   SubCutaneous three times a day before meals  insulin lispro (ADMELOG) corrective regimen sliding scale   SubCutaneous at bedtime  latanoprost 0.005% Ophthalmic Solution 1 Drop(s) Both EYES at bedtime  levothyroxine 75 MICROGram(s) Oral daily  melatonin 3 milliGRAM(s) Oral at bedtime PRN  oxyCODONE    IR 5 milliGRAM(s) Oral every 8 hours PRN  senna 2 Tablet(s) Oral at bedtime  simvastatin 20 milliGRAM(s) Oral at bedtime  sodium chloride 0.9%. 1000 milliLiter(s) IV Continuous <Continuous>                          7.6    19.88 )-----------( 238      ( 24 Nov 2020 05:30 )             26.1       11-24    137  |  97<L>  |  79<H>  ----------------------------<  88  3.7   |  22  |  4.76<H>    Ca    7.0<L>      24 Nov 2020 05:30  Phos  4.7     11-24  Mg     1.7     11-24      T(C): 36.3 (11-24-20 @ 13:52), Max: 36.7 (11-24-20 @ 06:12)  HR: 60 (11-24-20 @ 13:52) (60 - 87)  BP: 112/54 (11-24-20 @ 13:52) (109/46 - 117/52)  RR: 17 (11-24-20 @ 13:52) (16 - 17)  SpO2: 96% (11-24-20 @ 13:52) (96% - 100%)  Wt(kg): --    I&O's Summary      General: somnolent, not participatory in exam..   Head: Normocephalic and atraumatic.   Neck: + JVD. No bruits. Supple. Does not appear to be enlarged.   Cardiovascular: + S1,S2 ; RRR Soft systolic murmur at the left lower sternal border. No rubs noted.    Lungs: CTA b/l. No rhonchi, rales or wheezes.   Abdomen: + BS, soft. Non tender. Non distended. No rebound. No guarding.   Extremities: No clubbing/cyanosis/edema.   Neurologic: Moves all four extremities. Full range of motion.   Skin: Warm and moist. The patient's skin has normal elasticity and good skin turgor.   Psychiatric: Appropriate mood and affect.  Musculoskeletal: Normal range of motion, normal strength    echo: severe AS, normal LVEF    A/P) She is an extremely pleasant 89 year old female with a past medical history of atrial fibrillation, managed with Coumadin therapy. More recently, she was admitted to the hospital with lower GI bleeding from diverticulosis. She underwent a colonoscopy that did not require any intervention and her anticoagulation was restarted. She is now referred to me for left atrial appendage occlusion. She denies palpitations, syncope, or angina. She is a/w with a RLE wound, and is being followed by vascular surgery    -no inpatient EP procedures expected  -awaiting discharge to comfort-care at home, as she is doing worse (renal and ? liver failure) on antibiotics for osteomyelitis.    Simon Jung M.D.  Cardiac Electrophysiology  [phone]

## 2020-11-24 NOTE — PROGRESS NOTE ADULT - SUBJECTIVE AND OBJECTIVE BOX
INTERVAL HPI/OVERNIGHT EVENTS: I am fine.   Vital Signs Last 24 Hrs  T(C): 36.3 (24 Nov 2020 13:52), Max: 36.7 (24 Nov 2020 06:12)  T(F): 97.4 (24 Nov 2020 13:52), Max: 98 (24 Nov 2020 06:12)  HR: 60 (24 Nov 2020 13:52) (60 - 87)  BP: 112/54 (24 Nov 2020 13:52) (109/46 - 117/52)  BP(mean): --  RR: 17 (24 Nov 2020 13:52) (16 - 17)  SpO2: 96% (24 Nov 2020 13:52) (96% - 100%)  I&O's Summary    MEDICATIONS  (STANDING):  allopurinol 100 milliGRAM(s) Oral daily  amLODIPine   Tablet 10 milliGRAM(s) Oral daily  aztreonam  IVPB 500 milliGRAM(s) IV Intermittent every 12 hours  cinacalcet 60 milliGRAM(s) Oral daily  dextrose 40% Gel 15 Gram(s) Oral once  dextrose 5%. 1000 milliLiter(s) (100 mL/Hr) IV Continuous <Continuous>  dextrose 5%. 1000 milliLiter(s) (50 mL/Hr) IV Continuous <Continuous>  dextrose 50% Injectable 25 Gram(s) IV Push once  dextrose 50% Injectable 12.5 Gram(s) IV Push once  dextrose 50% Injectable 25 Gram(s) IV Push once  doxycycline IVPB 100 milliGRAM(s) IV Intermittent every 12 hours  gabapentin 100 milliGRAM(s) Oral two times a day  glucagon  Injectable 1 milliGRAM(s) IntraMuscular once  hydrALAZINE 75 milliGRAM(s) Oral three times a day  influenza  Vaccine (HIGH DOSE) 0.7 milliLiter(s) IntraMuscular once  insulin lispro (ADMELOG) corrective regimen sliding scale   SubCutaneous three times a day before meals  insulin lispro (ADMELOG) corrective regimen sliding scale   SubCutaneous at bedtime  latanoprost 0.005% Ophthalmic Solution 1 Drop(s) Both EYES at bedtime  levothyroxine 75 MICROGram(s) Oral daily  senna 2 Tablet(s) Oral at bedtime  simvastatin 20 milliGRAM(s) Oral at bedtime  sodium chloride 0.9%. 1000 milliLiter(s) (75 mL/Hr) IV Continuous <Continuous>    MEDICATIONS  (PRN):  acetaminophen   Tablet .. 650 milliGRAM(s) Oral every 6 hours PRN Temp greater or equal to 38.5C (101.3F), Moderate Pain (4 - 6)  aluminum hydroxide/magnesium hydroxide/simethicone Suspension 30 milliLiter(s) Oral every 6 hours PRN Dyspepsia  melatonin 3 milliGRAM(s) Oral at bedtime PRN Insomnia  oxyCODONE    IR 5 milliGRAM(s) Oral every 8 hours PRN Severe Pain (7 - 10)    LABS:                        7.6    19.88 )-----------( 238      ( 24 Nov 2020 05:30 )             26.1     11-24    137  |  97<L>  |  79<H>  ----------------------------<  88  3.7   |  22  |  4.76<H>    Ca    7.0<L>      24 Nov 2020 05:30  Phos  4.7     11-24  Mg     1.7     11-24      PT/INR - ( 24 Nov 2020 05:30 )   PT: 98.8 SEC;   INR: 9.57          PTT - ( 24 Nov 2020 05:30 )  PTT:52.3 SEC    CAPILLARY BLOOD GLUCOSE      POCT Blood Glucose.: 137 mg/dL (24 Nov 2020 11:43)  POCT Blood Glucose.: 125 mg/dL (24 Nov 2020 07:37)  POCT Blood Glucose.: 132 mg/dL (23 Nov 2020 21:07)  POCT Blood Glucose.: 128 mg/dL (23 Nov 2020 17:34)              Consultant(s) Notes Reviewed:  [x ] YES  [ ] NO    PHYSICAL EXAM:  GENERAL: NAD, not in any distress ,  HEAD:  Atraumatic, Normocephalic  EYES: EOMI, PERRLA, conjunctiva and sclera clear  ENMT: tongue is coated.   NECK: Supple, No JVD, Normal thyroid  NERVOUS SYSTEM:  Alert & Oriented X3, No focal deficit   CHEST/LUNG: Good air entry bilateral with no  rales, rhonchi, wheezing, or rubs  HEART: Regular rate and rhythm; No murmurs, rubs, or gallops  ABDOMEN: Soft, Nontender, Nondistended; Bowel sounds present  EXTREMITIES:  No clubbing, cyanosis, or edema    Care Discussed with Consultants/Other Providers [ x] YES  [ ] NO

## 2020-11-24 NOTE — PROGRESS NOTE ADULT - SUBJECTIVE AND OBJECTIVE BOX
Nephrology Followup Note - [phone] - Dr Catherine / Dr Garcia / Dr Tellez / Dr Sullivan / Dr Castillo / Dr Grijalva / Dr Segovia / Dr Luna  Pt seen and examined at bedside  Pt more lethargic compared to prior, not in respiratory distress.     Allergies:  No Known Allergies    Hospital Medications:   MEDICATIONS  (STANDING):  allopurinol 100 milliGRAM(s) Oral daily  amLODIPine   Tablet 10 milliGRAM(s) Oral daily  aztreonam  IVPB 500 milliGRAM(s) IV Intermittent every 12 hours  cinacalcet 60 milliGRAM(s) Oral daily  dextrose 40% Gel 15 Gram(s) Oral once  dextrose 5%. 1000 milliLiter(s) (50 mL/Hr) IV Continuous <Continuous>  dextrose 5%. 1000 milliLiter(s) (100 mL/Hr) IV Continuous <Continuous>  dextrose 50% Injectable 25 Gram(s) IV Push once  dextrose 50% Injectable 12.5 Gram(s) IV Push once  dextrose 50% Injectable 25 Gram(s) IV Push once  doxycycline IVPB 100 milliGRAM(s) IV Intermittent every 12 hours  gabapentin 100 milliGRAM(s) Oral two times a day  glucagon  Injectable 1 milliGRAM(s) IntraMuscular once  hydrALAZINE 75 milliGRAM(s) Oral three times a day  influenza  Vaccine (HIGH DOSE) 0.7 milliLiter(s) IntraMuscular once  insulin lispro (ADMELOG) corrective regimen sliding scale   SubCutaneous three times a day before meals  insulin lispro (ADMELOG) corrective regimen sliding scale   SubCutaneous at bedtime  latanoprost 0.005% Ophthalmic Solution 1 Drop(s) Both EYES at bedtime  levothyroxine 75 MICROGram(s) Oral daily  senna 2 Tablet(s) Oral at bedtime  simvastatin 20 milliGRAM(s) Oral at bedtime  sodium chloride 0.9%. 1000 milliLiter(s) (75 mL/Hr) IV Continuous <Continuous>    VITALS:  T(F): 97.6 (11-24-20 @ 10:10), Max: 98 (11-24-20 @ 06:12)  HR: 78 (11-24-20 @ 10:10)  BP: 117/52 (11-24-20 @ 10:10)  RR: 16 (11-24-20 @ 10:10)  SpO2: 99% (11-24-20 @ 10:10)  Wt(kg): --      PHYSICAL EXAM:  Constitutional: NAD  HEENT: anicteric sclera, oropharynx clear, MMM  Neck: No JVD  Respiratory: CTAB, no wheezes, rales or rhonchi  Cardiovascular: S1, S2, RRR  Gastrointestinal: BS+, soft, NT/ND  Extremities: No cyanosis or clubbing. No peripheral edema  Neurological: lethargic, no focal deficits  : No CVA tenderness. No michaud.   Skin: No rashes    LABS:  11-24    137  |  97<L>  |  79<H>  ----------------------------<  88  3.7   |  22  |  4.76<H>    Ca    7.0<L>      24 Nov 2020 05:30  Phos  4.7     11-24  Mg     1.7     11-24      Creatinine Trend: 4.76 <--, 3.54 <--, 2.92 <--, 2.61 <--, 2.60 <--, 2.40 <--, 2.40 <--                        7.6    19.88 )-----------( 238      ( 24 Nov 2020 05:30 )             26.1     Urine Studies:      RADIOLOGY & ADDITIONAL STUDIES:

## 2020-11-24 NOTE — PROGRESS NOTE ADULT - NUTRITIONAL ASSESSMENT
This patient has been assessed with a concern for Malnutrition and has been determined to have a diagnosis/diagnoses of Severe protein-calorie malnutrition.    This patient is being managed with:   Diet Regular-  Consistent Carbohydrate {Evening Snack} (CSTCHOSN)  DASH/TLC {Sodium & Cholesterol Restricted} (DASH)  Entered: Nov 14 2020  7:44PM    

## 2020-11-24 NOTE — PROGRESS NOTE ADULT - ASSESSMENT
89 y/o female pmh htn, dm, ckd, afib on coumadin c/o infected L toes. Pt admits to having dry gangrene to L first toe and was admitted in the Hospital in August. Pt states that over the last several days, her L 2nd toe has become swollen, painful, red and has drainage. Pt went to her podiatrist, Dr. Eaton, today who sent pt to the ER for wet gangrene. Pt denies chest pain, sob, abd pain, n/v/d, weakness, dizziness, syncope, fever or chills.  ER vss, afebrile.  ESR 57, CRP 51.  WBC 8.7.  Abscess cx staph aureus, GPC pairs, proteus,   klebsiella.  Pt with early wet gangrene to the left hallux and 2nd digit, malodor present, bogginess to the left 2nd digit, 5 cc of sangiopurulence, probing to bone, no erythema.  s/p I&D to the left 1st interspace, with 5cc of sangiopurulence.  Cultures sent.   Pt on vanco/cefepime.    No revascularization options as per Vascular.  Pt already had LLE angiography on 7/28/2020 demonstrates single-vessel runoff without visualization of pedal vasculature.     Worsening leukocytosis    Pt w/ worsening leukocytosis despite appropriate Abx therapy. Leukocytosis in the setting of possible new onset infection vs source control  Pt see by podiatry this AM; appreciate recs: LF X ray re-ordered (11/22) shows subcut gas and concern for OM.  No further intervention as per podiatry.     - f/u repeat BCx x2 sets, UA/UCx at this time to r/o other sources of infection.  Cxr shows bibasilar hazy opacities - possible pna vs. atelectasis.  Pt currently w/o cough.     -If pt develops diarrhea, can send C. diff    Worsening Creatinine:    - Due to worsening renal function, will d/c further vanco and zosyn.  ? vanco toxicity vs. AIN from zosyn.  Change to doxy 100mg IV bid and aztreonam 500mg IV bid to cover MRSA and gram neg.  Suspect enterococcus from abscess cultures is a colonizer and has negligible infectious impact in this polymicrobial wound.     L ft gangrene:    - Pt with early wet gangrene to left hallus and 2nd digit, (+) probe to bone, s/p I&D.  Abscess cultures noted.      - Pt with h/o underlying PAD, no options for revascularization or further surgery.   No further intervention by Podiatry as wound unlikely to heal.    - Cont local wound care.     - f/u with podiatry/vascular.      - Abx alone will not treat pt's gangrene.  I see no utility in long term IV abx in this case, as wound is unlikely to heal given PAD, and abx will have poor penetration to the area given her compromised circulation.    Change to PO doxycycline 100mg bid and ceftin 250mg qdaily x 2 weeks when pt ready for discharge.  Pt at increased risk for recurrent infections given uncontrolled source.        * Palliative care now following, given worsening medical condition.  Goal is for comfort care, pt to go home with home services.  Can change to PO abx upon discharge x 2 more weeks.     Will follow,    Melissa Escobedo  [phone]

## 2020-11-24 NOTE — PROGRESS NOTE ADULT - ASSESSMENT
89 y/o female pmh htn, dm, ckd, afib on coumadin p/w c/o infected L toes. pt being admitted for wet gangrene. Renal following for NIKHIL/CKD, vol Mx.     NIKHIL on CKD3: b/l SCr around 1.6 8/2020  Scr steadily rising, not improving with IVF resuscitation.   NIKHIL on CKD may be 2/2 ATN.   relatively stable hemodynamics.  improving lower ext swelling  diuretics has been on hold. Vanc levels not elevated.   IV abx changed to PO doxycycline, as per ID  Pall care note reviewed, planning for discharge with home hospice.   Can consider bladder US r/o urinary retention, although seems unlikely.    No plans for dialysis, as it is not in line with her GOC.  dose all meds for eGFR<15ml/min.   avoid ACEi/ARB for now/NSAIDs/Nephrotoxics.    Hypokalemia -s/p Potassium Chloride 40 meq x 2 dose  HTN, bp controlled well  Hypercalcemia- resolved,    phos wnl,  high, [id number] level 76 noted. avoid ca/vitD suplements   c/w sensipar   wet Gangrene L foot- Mx per Podiatry. f/u w/vas sx. on vanco and Zosyn. monitor vanco trough levels      labs, rad, chart reviewed

## 2020-11-24 NOTE — PROVIDER CONTACT NOTE (CRITICAL VALUE NOTIFICATION) - NAME OF MD/NP/PA/DO NOTIFIED:
Senia Tellez, [id number] [facility] &[id number] Sarahi
WU Mojica pager #[phone]
Gely Sprague, ACP

## 2020-11-24 NOTE — CHART NOTE - NSCHARTNOTEFT_GEN_A_CORE
Pt's daughter plan on going taking patient home on home care.  They are aware of terminal diagnosis and goal is for comfort.  Will sign off.  Please reconsult as needed.  Nayeli Hardwick DO

## 2020-11-24 NOTE — PROGRESS NOTE ADULT - ASSESSMENT
89 y/o female pmh htn, dm, ckd, afib on coumadin c/o infected L toes. Pt admits to having dry gangrene to L first toe and was admitted in the Hospital in August. Pt states that over th elast several days, her L 2nd toe has become swollen, painful, red and has drainage. Pt went to her podiatrist, Dr. Eaton, today who sent pt to the ER for wet gangrene. Pt denies chest pain, sob, abd pain, n/v/d, weakness, dizziness ,syncope, fever or chills.     Problem/Plan - 1:  ·  Problem: Wet Gangrene.  Plan: S/P I&D by Podiatry . IV abxs   and ID helping.  Vascular helping.    No surgical intervention per Podiatry and vascular.    Leucocytosis  . D/W ID attending and changing Abxs.    Problem/Plan - 2:  ·  Problem: CKD (chronic kidney disease) with NIKHIL .  Plan:  Renal following. Creatinine trending up.      Problem/Plan - 3:  ·  Problem: PVD (peripheral vascular disease).  Plan: Vascular helping.      Problem/Plan - 4:  ·  Problem: HTN (hypertension).  Plan: BP meds with hold parameters.      Problem/Plan - 5:  ·  Problem: CHF (congestive heart failure).  Plan: Holding Lasix as Euvolemic.      Problem/Plan - 6:  Problem: DM (diabetes mellitus). Plan: SSI for now.     Problem/Plan - 7:  ·  Problem: Afib.  Plan: On AC . INR high so holding AC. Vitamin K .     Problem/Plan - 8:  ·  Problem: Hypothyroid.  Plan: Home dose synthroid.      Problem/Plan - 9:  ·  Problem: Hyperparathyroidism .  Plan: Parathyroid scan outpt . Ca level normal now.      Problem/Plan - 10:  Problem: Pulmonary embolism. Plan; On AC.     Problem/Plan - 11:  ·  Problem: Delirium .  Plan: Infection VS Hospital induced. Better.     Disposition : DC planning home with home care and will  arrange get Hospice from home. Palliative help appreciated.  Prognosis very poor. DNR.                     Spoke to daughter in great detail. She wants to take her home so can spend her last days with family . She understands she is very sick and wants her comfortable .

## 2020-11-27 LAB
CULTURE RESULTS: SIGNIFICANT CHANGE UP
SPECIMEN SOURCE: SIGNIFICANT CHANGE UP

## 2021-01-01 NOTE — PATIENT PROFILE ADULT - FUNCTIONAL SCREEN CURRENT LEVEL: COMMUNICATION, MLM
Progress NOTE  Rufina Cordon Girl MRN: [id number] [facility]: [id number]   DOL: 16? GA: 29 wks 5 d? CGA: 32 wks 1 d   BW: 1160? Weight: 1405? Change 24h: 35? Change 7d: 205   Place of Service: NICU? Intensive Cardiac and respiratory monitoring, continuous and/or frequent vital sign monitoring  Vitals / Measurements: T: 98.4? HR: 164? RR: 45? BP: 67/37? SpO2: 99? ? Physical Exam:    General Exam: Alert and active with exam   Head/Neck: Anterior fontanel is soft and flat. No oral lesions. NGT in place   Chest: Clear, equal breath sounds in RA. Good aeration. Comfortable WOB    Heart: Regular rate. Gr 1-2/6 heard at left mid chest radiating to axilla murmur. pulse equal upper and lower extremities. Abdomen: Soft, round, nontender w/ good bowel sounds. Genitalia:  female   Extremities: No deformities noted. Normal range of motion for all extremities. Neurologic: Normal tone and activity for GA. Skin: Pink with no rashes, vesicles, or other lesions are noted. Medication  Active Medications:  Caffeine Citrate, Start Date: 2021  Cholecalciferol, Start Date: 2021  Ferrous Sulfate, Start Date: 2021    Respiratory Support:   Type: Room Air? Started: 2021? Duration: 16  FEN/Nutrition   Daily Weight (g): 1405? Dry Weight (g): 1405? Weight Gain Over 7 Days (g): 165   Intake   Prior Enteral (Total Enteral: 153.74 mL/kg/d)   Base Feeding: Breast Milk? Subtype Feeding: Breast Milk - Francisco? Fortifier: Similac Human Milk fortifier? Dewey/Oz: 24? mL/Feed: 27? Feeds/d: 8?mL/hr: 9? Total (mL): 216? Total (mL/kg/d): 153.74  Planned Enteral (Total Enteral: 153.74 mL/kg/d)   Base Feeding: Breast Milk? Subtype Feeding: Breast Milk - Francisco? Fortifier: Similac Human Milk fortifier? Dewey/Oz: 24?Route: NG/PO   mL/Feed: 27? Feeds/d: 8?mL/hr: 9? Total (mL): 216? Total (mL/kg/d): 153.74  Output   Number of Voids: 7? Total Output     Stools: 7? Last Stool Date: 2021  Diagnoses  System: FEN/GI   Diagnosis: Nutritional Support starting 2021           Assessment: Tolerating gavage feeds of EBM 24 ren at with fluid goal 150-160 ml/kg/day. PO fed x1, took 8 mL. Voiding and stooling. Gained 35g. Plan: continue current nutrition FBM at 24 ren/oz, volume goal 150-160 ml/kg/day  continue vitamin D and Fe  Follow intake, tolerance, and weight. 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 Infant on room air and 32 weeks corrected. Plan: Continuous monitoring and oximetry. Consider discontinuing caffeine     System: Cardiovascular   Diagnosis: Murmur - other (R01.1) starting 2021           History: History of murmur on exam, hemodynamically stable. Assessment: Hx of intermittent murmur, location and consistency characteristic of PBPS. Plan: Follow clinically  If still heard at discharge consider echocardiogram     System: Neurology   Diagnosis: At risk for [facility] Disease starting 2021           Assessment:  Head ultrasound on DOL 10 without abnormality. Plan: Repeat head ultrasound at 36 weeks or prior to discharge. Neuroimaging  Date: 2021? Type: Cranial Ultrasound  Grade-L: Normal?Grade-R: Normal?  Comment: No abnormalities     System: Gestation   Diagnosis: Prematurity 8132-5930 gm (P07.14) starting 2021           Assessment: 16 day old infant, now 28 1/7 weeks. Stable in RA, on full enteral feeds 24 ren/oz, beginning po attempts, and thermal support via isolette. Plan: Continue NICU care of  infant. Parental updates. Developmentally appropriate care. [facility] after discharge. System: Hematology   Diagnosis: At risk for Anemia of Prematurity starting 2021           History: 29 week GA at birth. Initial H/H 19/53.7. Iron supplement started 10/26.      Plan: Continue fortified feeds and iron supplement  Obtain H/H, retic Mon 11/1 (ordered)     System: Ophthalmology   Diagnosis: At risk for Retinopathy of Prematurity starting 2021           Assessment: At risk for Retinopathy of Prematurity. Plan: Ophthalmology referral for retinopathy screening. Parent Communication  Mort Mor - 2021 08:27  Parents updated daily  Attestation  The attending physician provided on-site coordination of the healthcare team inclusive of the advanced practitioner which included patient assessment, directing the patient's plan of care, and making decisions regarding the patient's management on this visit's date of service as reflected in the documentation above. Authenticated by: JENNA Nava   Date/Time: 2021 10:52  As the supervising physician, I provided oversight of the advanced practitioner via telehealth technology which included a telehealth-enabled patient assessment and establishing the patient's plan of care along with decision-making regarding the patient's management on this visit's date of service as reflected in the documentation above.    Authenticated by: Rosemary Kohli MD   Date/Time: 2021 15:12 0 = understands/communicates without difficulty

## 2021-06-06 NOTE — DISCHARGE NOTE PROVIDER - NS AS DC PROVIDER CONTACT Y/N MULTI
Medication Question or Clarification  Who is calling: wife   What medication are you calling about (include dose and sig)?: donepeziL (ARICEPT) 5 MG tablet   Who prescribed the medication?: Ld Moy MD    What is your question/concern?: States that medication is giving him diarrhea again. Please advise  Requested Pharmacy: [facility]   Okay to leave a detailed message?: Yes         Yes

## 2021-06-10 NOTE — CONSULT NOTE ADULT - PROBLEM SELECTOR PROBLEM 1
To ER if complains of increased neck pain over the last for 5 days. Patient has history of chronic neck pain for the past 20 years she sees pain management. He has had nerve block done in the past and is awaiting to be scheduled for repeat nerve block. He was seen on Reyna 3 for med refill and at that time expressed increased pain in the neck. Still awaiting appointment time for his repeat nerve block. He states pain medicines are not controlling his pain and feels he needs something done today. After reviewing the note it is mentioned obtaining a cervical CT scan but patient states she has not been scheduled for this. He was able to drive himself here he denies any nausea vomiting headache or blurred vision    The history is provided by the patient. Neck Pain   This is a chronic problem. Episode onset: 20 Years. The problem occurs constantly. The problem has been gradually worsening. The pain is associated with an unknown factor. There has been no fever. The pain is present in the generalized neck. The quality of the pain is described as aching. The pain radiates to the left shoulder. The pain is at a severity of 10/10. The pain is moderate. The pain is the same all the time. Pertinent negatives include no headaches and no weakness. He has tried analgesics for the symptoms. The treatment provided mild relief. No past medical history on file. No past surgical history on file. No family history on file.     Social History     Socioeconomic History    Marital status:      Spouse name: Not on file    Number of children: Not on file    Years of education: Not on file    Highest education level: Not on file   Occupational History    Not on file   Tobacco Use    Smoking status: Not on file   Substance and Sexual Activity    Alcohol use: Not on file    Drug use: Not on file    Sexual activity: Not on file   Other Topics Concern    Not on file   Social History Narrative    Not on file Social Determinants of Health     Financial Resource Strain:     Difficulty of Paying Living Expenses:    Food Insecurity:     Worried About Running Out of Food in the Last Year:     [address] in the Last Year:    Transportation Needs:     Lack of Transportation (Medical):  Lack of Transportation (Non-Medical):    Physical Activity:     Days of Exercise per Week:     Minutes of Exercise per Session:    Stress:     Feeling of Stress :    Social Connections:     Frequency of Communication with Friends and Family:     Frequency of Social Gatherings with Friends and Family:     Attends Buddhism Services:     Active Member of Clubs or Organizations:     Attends Club or Organization Meetings:     Marital Status:    Intimate Partner Violence:     Fear of Current or Ex-Partner:     Emotionally Abused:     Physically Abused:     Sexually Abused: ALLERGIES: Patient has no known allergies. Review of Systems   Musculoskeletal: Positive for neck pain. Neurological: Negative for weakness and headaches. All other systems reviewed and are negative. Vitals:    06/10/21 1210   BP: (!) 142/75   Pulse: 61   Resp: 16   Temp: 97.9 °F (36.6 °C)   SpO2: 96%   Weight: 89.8 kg (198 lb)   Height: 6' (1.829 m)            Physical Exam  Vitals and nursing note reviewed. Constitutional:       General: He is not in acute distress. Appearance: Normal appearance. He is well-developed and normal weight. He is not diaphoretic. HENT:      Head: Normocephalic and atraumatic. Eyes:      Pupils: Pupils are equal, round, and reactive to light. Neck:      Comments: Patient has very good rotation of the cervical spine he has increased pain on flexion and extension of the cervical spine. States pain radiates in the bilateral trapezius areas left greater than right and also into the left shoulder area  Cardiovascular:      Rate and Rhythm: Normal rate and regular rhythm.    Pulmonary: Effort: Pulmonary effort is normal.      Breath sounds: Normal breath sounds. Abdominal:      General: Bowel sounds are normal.      Palpations: Abdomen is soft. Musculoskeletal:         General: Normal range of motion. Cervical back: Normal range of motion and neck supple. Skin:     General: Skin is warm. Neurological:      General: No focal deficit present. Mental Status: He is alert and oriented to person, place, and time. Psychiatric:         Mood and Affect: Mood normal.         Behavior: Behavior normal.          MDM  Number of Diagnoses or Management Options  Diagnosis management comments: CT SPINE CERV WO CONT   Final Result    No acute abnormality noted in cervical spine. Fairly advanced    multilevel cervical spondylosis present.       Given 10 mg of Decadron IM in the ER will give prescription of Robaxin to go with his current pain medicines call his pain management office for any further injections or evaluations       Amount and/or Complexity of Data Reviewed  Tests in the radiology section of CPT®: ordered and reviewed  Review and summarize past medical records: yes    Risk of Complications, Morbidity, and/or Mortality  Presenting problems: moderate  Diagnostic procedures: moderate  Management options: low    Patient Progress  Patient progress: improved         Procedures Pain

## 2021-10-05 NOTE — PATIENT PROFILE ADULT. - NS PRO OT REFERRAL QUES 2 YN
Subjective   Annmarie Perera is a 32 y.o.white female who presents today for follow up in the office    Chief Complaint:  Anxiety, hearing voices, hair pulling better    History of Present Illness:   Says she is doing well, still seeing her daughter once a week, supervised visits, she will be 3 yrs old, getting along well with her Dad  Hearing voices some, asking about increasing the zyprexa  Working two days a week now at at [employer] ([employer]), doesn't hear voices when working  She is trying to stay on track because she does not want to go back to [facility]  No more hospitalizations    Rare auditory hallucinations or paranoia, able to ignore it, doing well with Zyprexa tabs instead of Abilify tabs but still on Abilify maintenna  Her hair is growing out, hair pulling significantly improved.  She was put in [facility] in Sept 2019 and then transferred to [facility] for 11 months, where she got Zyprexa twice daily discharged in Sept 2020 to a Group home in [place] x 3 wks but left and went back home with her Dad  She sees Dr. Resendez tomorrow for her Depo injection, will discuss nipple d/c, last prolactin level was normal  Had her last Abilify Maintenna injection yesterday, [place] on [address],   She has been having trouble sleeping, did better on the Zyprexa and would like to go back on it instead of the Abilify tablets  She was on Zyprexa while in the hospital but Salazar (Junior Kasper, NP via phone visit) took her off the Zyprexa and put her on Abilify Maintenna plus the Abilify 30mg tabs and weaning her off the Abilify  Dimitris (son) in foster care and cannot get him back but her daughter (lyudmila) Dad got custody   She was treated for Syphilis before she was admitted, got 3 antibiotic shots  Depression 3/10  Denies SI/HI  Anxiety 4/10, says Gabapentin helping but may need increase      The following portions of the patient's history were reviewed and updated  as appropriate: allergies, current medications, past family history, past medical history, past social history, past surgical history and problem list.    PAST PSYCHIATRIC HISTORY  Axis I  Affective/Bipoloar Disorder, Anxiety/Panic Disorder, Cognitive Disorder  Axis II  None,     PAST OUTPATIENT TREATMENT  Diagnosis treated:  Affective Disorder, Anxiety/Panic Disorder, Cognitive Disorder  Treatment Type:  Individual Therapy, Group Therapy, Medication Management  Prior Psychiatric Medications:  Pristiq  Saphris  Lamictal    Lexapro, no emotion  risperidone - she did not like how she was feeling so she stopped all of them  Neurontin   Prozac, Zoloft, did not like , no emotions  Abilify  Clonazepam - effective   Vraylar - increased agitation   Seroquel  Trazodone, did not like it  Palperidone caused hands and arms to draw up  Buspar  Zyprexa, hyperprolactinemia  Abilify Maintenna  Support Groups:  Narcotics Anonymous (NA)  Sequelae Of Mental Disorder:  suicide attempt, arrest, social isolation, family disruption, financial hardships, emotional distress      Interval History  Improved    Side Effects  None    Past Psych Hx was reviewed and compared to 6/1/21 visit and appropriate updates were made.    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   • Vaginitis 11/20/2017   • Withdrawal symptoms, drug or narcotic (HCC)        Social History:  Social History     Socioeconomic History   • Marital status: Single     Spouse name: Not on file   • Number of children: Not on file   • Years of education: Not on file   • Highest education level: Not on file   Tobacco Use   • Smoking status: Current Every Day Smoker     Types: Cigarettes   • Smokeless tobacco: Never Used   • Tobacco comment: Passive Smoke: Y   Substance and Sexual Activity   • Alcohol use: No   • Drug use: Not Currently     Types: Cocaine(coke), Benzodiazepines, Oxycodone, Hydrocodone, Marijuana   • Sexual activity: Never       Family History:  History reviewed. No pertinent family history.    Past Surgical History:  Past Surgical History:   Procedure Laterality Date   • PAP SMEAR  2017    [facility]raction from [facility]       Problem List:  Patient Active Problem List   Diagnosis   • Abdominal pain, epigastric   • Generalized anxiety disorder   • Chronic pain disorder   • Contraceptive management   • Difficult or painful urination   • Genital herpes   • High risk sexual behavior   • Irregular menstruation   • Knee pain, right   • Poor compliance with medication   • Pyelonephritis, acute   • Vaginitis   • Schizophrenia, paranoid (HCC)   • Trichotillomania   • Tobacco use   • Acute opioid withdrawal ([facility])   • Panic disorder       Allergy:   Allergies   Allergen Reactions   • Haldol [Haloperidol] Other (See Comments) and Mental Status Change     Abstraction from [facility]         Discontinued Medications:  Medications Discontinued During This Encounter   Medication Reason   • cefdinir (OMNICEF) 300 MG capsule *Therapy completed   • gabapentin (NEURONTIN) 300 MG capsule    • OLANZapine (ZyPREXA) 5 MG  tablet Alternate therapy       Current Medications:   Current Outpatient Medications   Medication Sig Dispense Refill   • Abilify Maintena 400 MG Suspension Reconstituted ER IM injection ER INJECT 400 MG INTO THE MUSCLE AS DIRECTED BY PRESCRIBER EVERY 28 DAYS 1 each 5   • Calcium Carbonate-Vitamin D (calcium-vitamin D) 500-200 MG-UNIT tablet per tablet      • Cholecalciferol (Vitamin D3) 250 MCG (10225 UT) tablet      • diclofenac (VOLTAREN) 75 MG EC tablet Take 1 tablet by mouth 2 (Two) Times a Day. 20 tablet 0   • gabapentin (NEURONTIN) 400 MG capsule Take 1 capsule by mouth 3 (Three) Times a Day. 270 capsule 1   • Lumateperone Tosylate (Caplyta) 42 MG capsule Take 1 capsule by mouth Daily With Breakfast. 28 capsule 0   • medroxyPROGESTERone (DEPO-PROVERA) 150 MG/ML injection      • nitrofurantoin, macrocrystal-monohydrate, (MACROBID) 100 MG capsule Take 1 capsule by mouth 2 (Two) Times a Day. 10 capsule 0   • ondansetron ODT (ZOFRAN-ODT) 4 MG disintegrating tablet Place 1 tablet on the tongue Every 8 (Eight) Hours As Needed for Nausea or Vomiting. 20 tablet 0   • terbinafine (lamiSIL) 250 MG tablet        No current facility-administered medications for this visit.         Review of Symptoms:    Psychiatric/Behavioral: Negative for agitation, behavioral problems, confusion, decreased concentration, dysphoric mood, hallucinations, self-injury, sleep disturbance and suicidal ideas. The patient is nervous/anxious and is not hyperactive.        Physical Exam:   not currently breastfeeding.    Mental Status Exam:   Hygiene:   fair  Cooperation:  Cooperative  Eye Contact:  Good  Psychomotor Behavior:  Appropriate  Affect:  Blunted  Mood: Normal  Hopelessness: Denies  Speech:  Normal rate and volume  Thought Process:  Goal directed  Thought Content:  Normal  Suicidal:  None  Homicidal:  None  Hallucinations:  Auditory (occasional now)  Delusion:  None currently  Memory:  Deficits  Orientation:  Person, Place, Time and  Situation  Reliability:  Fair  Insight:  Fair  Judgement: Fair    Impulse Control:  Fair  Physical/Medical Issues:  No      Mental Status Exam was reviewed and compared to 6/1/21 visit and updates were made.     PHQ-9 Depression Screening  Little interest or pleasure in doing things? 1   Feeling down, depressed, or hopeless? 1   Trouble falling or staying asleep, or sleeping too much? 1   Feeling tired or having little energy? 1   Poor appetite or overeating? 1   Feeling bad about yourself - or that you are a failure or have let yourself or your family down? 1   Trouble concentrating on things, such as reading the newspaper or watching television? 1   Moving or speaking so slowly that other people could have noticed? Or the opposite - being so fidgety or restless that you have been moving around a lot more than usual? 0   Thoughts that you would be better off dead, or of hurting yourself in some way? 0   PHQ-9 Total Score 7   If you checked off any problems, how difficult have these problems made it for you to do your work, take care of things at home, or get along with other people? Somewhat difficult           Current every day smoker less than 3 minutes spent counseling Not agreeable to stopping    I advised Annmarie of the risks of tobacco use.     Lab Results:   No visits with results within 3 Month(s) from this visit.   Latest known visit with results is:   Admission on 05/15/2021, Discharged on 05/15/2021   Component Date Value Ref Range Status   • HCG, Urine QL 05/15/2021 Negative  Negative Final   • Color, UA 05/15/2021 Felicia* Yellow, Straw Final    Result checked    • Appearance, UA 05/15/2021 Cloudy* Clear Final    Result checked    • pH, UA 05/15/2021 8.5* 5.0 - 8.0 Final   • Specific Gravity, UA 05/15/2021 1.020  1.005 - 1.030 Final   • Glucose, UA 05/15/2021 Negative  Negative Final   • Ketones, UA 05/15/2021 Negative  Negative Final   • Bilirubin, UA 05/15/2021 Negative  Negative Final   • Blood, UA  05/15/2021 Large (3+)* Negative Final   • Protein, UA 05/15/2021 100 mg/dL (2+)* Negative Final   • Leuk Esterase, UA 05/15/2021 Moderate (2+)* Negative Final   • Nitrite, UA 05/15/2021 Negative  Negative Final   • Urobilinogen, UA 05/15/2021 1.0 E.U./dL  0.2 - 1.0 E.U./dL Final   • RBC, UA 05/15/2021 Too Numerous to Count* None Seen /HPF Final   • WBC, UA 05/15/2021 Too Numerous to Count* None Seen /HPF Final   • Bacteria, UA 05/15/2021 1+* None Seen /HPF Final   • Squamous Epithelial Cells, UA 05/15/2021 3-6* None Seen, 0-2 /HPF Final   • Renal Epithelial Cells, UA 05/15/2021 3-6* 0 - 2 /HPF Final   • Hyaline Casts, UA 05/15/2021 None Seen  None Seen /LPF Final   • Methodology 05/15/2021 Manual Light Microscopy   Final   • Color, UA 05/15/2021 Yellow  Yellow, Straw Final   • Appearance, UA 05/15/2021 Cloudy* Clear Final    Result checked    • pH, UA 05/15/2021 8.0  5.0 - 8.0 Final   • Specific Gravity, UA 05/15/2021 1.010  1.005 - 1.030 Final   • Glucose, UA 05/15/2021 Negative  Negative Final   • Ketones, UA 05/15/2021 Negative  Negative Final   • Bilirubin, UA 05/15/2021 Negative  Negative Final   • Blood, UA 05/15/2021 Small (1+)* Negative Final   • Protein, UA 05/15/2021 30 mg/dL (1+)* Negative Final   • Leuk Esterase, UA 05/15/2021 Large (3+)* Negative Final   • Nitrite, UA 05/15/2021 Negative  Negative Final   • Urobilinogen, UA 05/15/2021 1.0 E.U./dL  0.2 - 1.0 E.U./dL Final   • RBC, UA 05/15/2021 6-12* None Seen /HPF Final   • WBC, UA 05/15/2021 Too Numerous to Count* None Seen /HPF Final   • Bacteria, UA 05/15/2021 1+* None Seen /HPF Final   • Squamous Epithelial Cells, UA 05/15/2021 None Seen  None Seen, 0-2 /HPF Final   • Hyaline Casts, UA 05/15/2021 3-6  None Seen /LPF Final   • Methodology 05/15/2021 Automated Microscopy   Final   • Urine Culture 05/15/2021 >100,000 CFU/mL Escherichia coli*  Final       Assessment/Plan   Problems Addressed this Visit        Mental Health    Generalized anxiety  disorder (Chronic)    Relevant Medications    Lumateperone Tosylate (Caplyta) 42 MG capsule    Schizophrenia, paranoid (HCC) - Primary (Chronic)    Relevant Medications    Lumateperone Tosylate (Caplyta) 42 MG capsule    Trichotillomania (Chronic)    Relevant Medications    Lumateperone Tosylate (Caplyta) 42 MG capsule    Panic disorder (Chronic)    Relevant Medications    Lumateperone Tosylate (Caplyta) 42 MG capsule      Diagnoses       Codes Comments    Schizophrenia, paranoid (HCC)    -  Primary ICD-10-CM: F20.0  ICD-9-CM: 295.30     Generalized anxiety disorder     ICD-10-CM: F41.1  ICD-9-CM: 300.02     Trichotillomania     ICD-10-CM: F63.3  ICD-9-CM: 312.39     Panic disorder     ICD-10-CM: F41.0  ICD-9-CM: 300.01           Visit Diagnoses:    ICD-10-CM ICD-9-CM   1. Schizophrenia, paranoid (HCC)  F20.0 295.30   2. Generalized anxiety disorder  F41.1 300.02   3. Trichotillomania  F63.3 312.39   4. Panic disorder  F41.0 300.01       TREATMENT PLAN/GOALS: Continue supportive psychotherapy efforts and medications as indicated. Treatment and medication options discussed during today's visit. Patient ackowledged and verbally consented to continue with current treatment plan and was educated on the importance of compliance with treatment and follow-up appointments.    MEDICATION ISSUES:  INSPECT reviewed as expected  Discussed medication options and treatment plan of prescribed medication as well as the risks, benefits, and side effects including potential falls, possible impaired driving and metabolic adversities among others. Patient is agreeable to call the office with any worsening of symptoms or onset of side effects. Patient is agreeable to call 911 or go to the nearest ER should he/she begin having SI/HI. No medication side effects or related complaints today.     Patient spent almost a year in [facility], doing all she can to stay on track so she doesn't have to go back  Continue [facility]  400 mg injection every 28 days  Will try Capylta 42mg daily with food, wean off Zyprexa to see if AVH better controlled  Increase Gabapentin to 400mg TID for anxiety instead    MEDS ORDERED DURING VISIT:  New Medications Ordered This Visit   Medications   • gabapentin (NEURONTIN) 400 MG capsule     Sig: Take 1 capsule by mouth 3 (Three) Times a Day.     Dispense:  270 capsule     Refill:  1   • Lumateperone Tosylate (Caplyta) 42 MG capsule     Sig: Take 1 capsule by mouth Daily With Breakfast.     Dispense:  28 capsule     Refill:  0     Samples given       Return in about 3 months (around 1/5/2022).         This document has been electronically signed by Batool Carter PA-C  October 5, 2021 14:22 EDT   no

## 2022-01-14 NOTE — PATIENT PROFILE ADULT - NSTOBACCONEVERSMOKERY/N_GEN_A
Is This A New Presentation, Or A Follow-Up?: Wart How Severe Are Your Warts?: moderate Treatment Number (Optional): 2 No

## 2022-02-28 NOTE — PHYSICAL THERAPY INITIAL EVALUATION ADULT - LONG TERM MEMORY, REHAB EVAL
History   Chief Complaint:  Palpitations       The history is provided by the patient.      Jose Goldman is a 14 year old male who presents with palpitations. Jose was playing with his friend this evening, feeling otherwise fine. After playing he developed palpitations with lightheadedness. He also developed short, fast breathing. Here at the ED he confirms shortness of breath but denies chest pain. He also states that he has had this series of symptoms intermittently for the past 3 years. No caffeine taken today.    Review of Systems   Respiratory: Positive for shortness of breath.    Cardiovascular: Negative for chest pain.   All other systems reviewed and are negative.    Allergies:  No Known Allergies    Medications:  Adderall    Past Medical History:     The patient denies any significant past medical history.     Social History:  Patient accompanied by father  PCP: No primary care provider on file.     Physical Exam     Patient Vitals for the past 24 hrs:   BP Temp Temp src Pulse Resp   02/27/22 2248 -- -- -- 100 16   02/27/22 2049 120/56 97.6  F (36.4  C) Tympanic (!) 210 18       Physical Exam    Constitutional: Alert, attentive, GCS 15  HENT:    Nose: Nose normal.    Mouth/Throat: Oropharynx is clear, mucous membranes are moist  Eyes: EOM are normal, anicteric, conjugate gaze  CV: regular rate and rhythm; no murmurs  Chest: Effort normal and breath sounds clear without wheezing or rales, symmetric bilaterally   GI:  non tender. No distension. No guarding or rebound.    MSK: No LE edema, no tenderness to palpation of BLE.  Neurological: Alert, attentive, moving all extremities equally.   Skin: Skin is warm and dry.    Emergency Department Course   ECG  ECG obtained at 2050, ECG read at 2020  Pediatric ECG analysis. SVT.    Agree with computer interpretation.  Rate 212 bpm. NJ interval * ms. QRS duration 102 ms. QT/QTc 216/405 ms. P-R-T axes * 48 -15.    ECG  ECG taken at 2057, ECG read at  2057  Pediatric ECG analysis. Normal sinus rhythm, Normal ECG. No pre-excitation. SVT resolved  SVT resolved from earlier as compared to prior, dated 02/27/2022.  Rate 105 bpm. MO interval 150 ms. QRS duration 80 ms. QT/QTc 346/457 ms. P-R-T axes 71 70 71.      Imaging:  POC US ECHO LIMITED   Final Result   PROCEDURE NOTE: ED BEDSIDE LIMITED ULTRASOUND   Name of the study: Limited Echo   Performed by: Dr. Rudd   Indications: SVT, ? recurrent   Body Location / Organs Imaged: Multiple planes of the heart, bilateral lungs.   Findings: Good global LV function, no RV dilation, IVC is normal in caliber with respiration variation   Interpretation: Normal limited bedside echo   Archiving of images: Images were also saved digitally to the internal hard drive of the ultrasound machine/PACS.              Report per radiology    Laboratory:  Labs Ordered and Resulted from Time of ED Arrival to Time of ED Departure   BASIC METABOLIC PANEL - Abnormal       Result Value    Sodium 139      Potassium 3.8      Chloride 108      Carbon Dioxide (CO2) 25      Anion Gap 6      Urea Nitrogen 14      Creatinine 0.77 (*)     Calcium 9.5      Glucose 130 (*)     GFR Estimate       MAGNESIUM - Abnormal    Magnesium 2.4 (*)    CBC WITH PLATELETS AND DIFFERENTIAL - Abnormal    WBC Count 8.2      RBC Count 5.28      Hemoglobin 15.2      Hematocrit 47.6 (*)     MCV 90      MCH 28.8      MCHC 31.9      RDW 13.5      Platelet Count 277      % Neutrophils 39      % Lymphocytes 46      % Monocytes 10      % Eosinophils 4      % Basophils 1      % Immature Granulocytes 0      NRBCs per 100 WBC 0      Absolute Neutrophils 3.2      Absolute Lymphocytes 3.8      Absolute Monocytes 0.9      Absolute Eosinophils 0.3      Absolute Basophils 0.1      Absolute Immature Granulocytes 0.0      Absolute NRBCs 0.0     TSH WITH FREE T4 REFLEX - Normal    TSH 1.88        Emergency Department Course:         Reviewed:  I reviewed nursing notes, vitals, past medical  history and Care Everywhere    Assessments/Consults:  ED Course as of 22 2351   Sun  Obtained history and examined the patient as noted above.    Ultrasound performed      Interventions:  2119 NS 1000 mL, IV    Disposition:  The patient was discharged to home.     Impression & Plan   Medical Decision Makin-year-old with no significant past medical history presenting for rapid heart rate.  EKG on arrival confirms narrow complex tachycardia with ventricular rate of 212, however upon placement in the room he spontaneously converted to normal sinus rhythm.  On repeat EKG I do not see evidence of preexcitation.  He had no chest pain chest pressure he noticed changes in his breathing but no overt shortness of breath.  He reports he has felt popping and too fast heart rhythms he thinks maybe even for 3 years though usually it is short-term.  He plays hockey and sometimes will have a rapid heart rate after playing when he feels like he gets dehydrated but he is never fainted passed out or has any exertional intolerance.  His labs here are unremarkable.  Limited bedside echo showed no overt structural changes or dilation.  I do feel he is safe for outpatient follow-up though given potentially recurrent episodes I do think is reasonable for him to follow-up with pediatric cardiology and they plan to touch base with his pediatrician to facilitate this.  Return precautions were reviewed and he was discharged home after I also reviewed vagal maneuvers that could potentially abort this.      Diagnosis:    ICD-10-CM    1. SVT (supraventricular tachycardia) (H)  I47.1      Chetan Rudd MD  [employer]  11:51 PM 22       Scribe Disclosure:  I, Donovan Rahman, am serving as a scribe at 8:54 PM on 2022 to document services personally performed by Chetan Rudd MD based on my observations and the provider's statements to me.           Tobin  Chetan Laboy MD  02/27/22 1396     intact

## 2022-05-14 NOTE — PATIENT PROFILE ADULT - NSTRANSFERBELONGINGSDISPO_GEN_A_NUR
Yue Robin is a 89 year old female presenting for regularly scheduled visit together with her daughter Lucrecia.    Patient is doing well.  Daughter daughter does not have any acute concerns for today's visit symptoms of lightheadedness are better daughter make sure that month stays well hydrated patient continues to live independently but daughter checks on her very frequently.  She helps to manage medications.  No recent infections.  Appetite is good.  Daughter is cooking.  Patient denies trouble with bowels   No shortness of breath or chest pain with regular physical activity she remains under cardiac care due to history of aortic valve replacement.  She was able to quit smoking.  She has after abdominal aneurysm repair.    Current Outpatient Medications   Medication   • enalapril (VASOTEC) 20 MG tablet   • carvedilol (COREG) 3.125 MG tablet   • pravastatin (PRAVACHOL) 40 MG tablet   • vitamin B-12 (CYANOCOBALAMIN) 1000 MCG tablet   • Cholecalciferol (VITAMIN D) 2000 units tablet   • aspirin 81 MG tablet   • cephalexin (Keflex) 500 MG capsule   • Omega-3 Fatty Acids (FISH OIL) 1000 MG capsule   • clobetasol (TEMOVATE) 0.05 % cream     No current facility-administered medications for this visit.       Allergies as of 05/13/2022   • (No Known Allergies)       PAST MEDICAL HISTORY:    Osteoporosis, unspecified                       04/10/2012    CAD (coronary artery disease)                                 Unspecified essential hypertension                            Other and unspecified hyperlipidemia                          Aortic stenosis                                                 Comment: s/p replacement    Carotid artery stenosis                                         Comment: nilat    Fracture                                                        Comment: right humerus, right arm    SOCIAL HISTORY:  Marital status: single  Alcohol Use: No  Tobacco use: No, former smoker    REVIEW OF  SYSTEMS:  General: No recent symptoms of infections, fever or chills. Good energy level.  HEENT:  No visual disturbance, blurred vision, double vision or sudden vision loss. No change in hearing.  Cardiovascular: Denies chest pain, chest pressure, palpitations or claudication.  Respiratory:  Denies any shortness of breath, pleurisy, cough.  No sputum.  Gastrointestinal: Appetite is normal. No symptoms of heartburn, dysphagia, abdominal discomfort and bowels are regular.   No blood, mucus in the stool.  Genitourinary: No difficulties voiding.  No burning upon urination.  Skin: Denies rashes or open skin areas.  Musculoskeletal: Full range of motion in all extremities. Normal muscular tone.  Neurologic:  Denies headache, focal weakness or tremors.   Psychiatric:  Denies depression or anxiety, memory loss.    PHYSICAL EXAMINATION:  Visit Vitals  /68 (BP Location: LUE - Left upper extremity, Patient Position: Sitting, Cuff Size: Regular)   Pulse 66   Resp 16   Ht 5' 1.5\" (1.562 m)   Wt 71.4 kg (157 lb 4.8 oz)   SpO2 94%   BMI 29.24 kg/m²     General:  Alert and oriented times 3. Looks stated age.  Not in acute respiratory distress.  Lymphatics: No neck, axillary or inguinal lymphadenopathy.  Head: Atraumatic. No pressure around sinuses.  Eyes:  PERRLA(Pupils equal, round, reactive to light and accommodation). Conjunctivae are pink. Sclerae are non-icteric.  Ears: Tympanic membranes and ear canals look normal.  Mouth:  Oral mucosa moist. Pharynx not injected.  Neck:  Supple.  Symmetric in appearance.  No lymphadenopathy.  No bruits.  Lungs:: Bilaterally clear to auscultation.  No wheezes or crackles.  Heart:: Regular rate.  No additional heart murmurs or sounds.  Abdomen: Soft, non-distended.  Bowel sounds present in all four quadrants.  No hepatosplenomegaly.  Extremities: No swelling. Palpable pedal pulses.  Neurologic: Grossly intact.  Psychiatric: Pleasant.    ASSESSMENT AND PLAN:  1. Essential hypertension,  benign.  Under control.  We discussed sodium in diet.  We talked about hydration.  2. S/P AVR (aortic valve replacement), under care of cardiology doing well  3. S/P AAA (abdominal aortic aneurysm) repair, under care of vascular medicine.  4. Former smoker.  5. Generalized OA .  She stays physically active.  Patient is still living at her home with close supervision from her daughter Lucrecia.   with patient

## 2022-07-14 NOTE — PROGRESS NOTE ADULT - PROBLEM SELECTOR PROBLEM 2
"Chief Complaint   Patient presents with     Physical     Non fasting     initial /62   Pulse 61   Temp 98.4  F (36.9  C) (Oral)   Resp 18   Ht 1.74 m (5' 8.5\")   Wt 76.7 kg (169 lb)   SpO2 98%   BMI 25.32 kg/m   Estimated body mass index is 25.32 kg/m  as calculated from the following:    Height as of this encounter: 1.74 m (5' 8.5\").    Weight as of this encounter: 76.7 kg (169 lb)..  bp completed using cuff size large  LEA FRANKS LPN  " HTN (hypertension)

## 2022-12-10 NOTE — ED ADULT TRIAGE NOTE - TEMPERATURE IN FAHRENHEIT (DEGREES F)
INTERIM UPDATE - 2117 EST on 12/09/2022    Patient presents with Bilateral Lower Extremity circumferential Petechiae without report of viral prodrome or itching. Laboratory studies are chiefly within normal limits with only WBC 14.0, Neut# 9.6, aPTT 52.4, D-Dimer 0.77, and Total Bilirubin 2.0 being abnormal.  No other symptomatology is reported. INR is 1.0, Platelets are 550N, Transaminases are not elevated, and Hgb is 15.5 g/dL. BLE Venous Duplexes were performed and were reportedly negative. Patient reports no Family History of Blood Disorders. Plan:  Recommended that Fibrinogen and Degradation of Fibrinogen Products be obtained from Patient for completeness. Currently, Patient does not have a clinical vignette or laboratory findings suggestive of needing acute hospitalization with current work-up. Recommend follow-up with PCP. No recommended alterations on Discharge Medical regiment proposed by Emergency Medicine services. 98.3

## 2023-03-14 NOTE — DISCHARGE NOTE ADULT - NS AS DC STROKE DX YN
Pt has been summoned for jury duty and is requesting an excuse due to anxiety. Julian Courtland dates of April 17-28  Master Juror Number [id number] [facility]  Fax     Please call pt to let her know if this can be done/when it is completed. no

## 2023-06-13 NOTE — PATIENT PROFILE ADULT - NSPROCHRONICPAIN_GEN_A_NUR
Health Maintenance Due   Topic Date Due   • Shingles Vaccine (1 of 2) Never done   • COVID-19 Vaccine (5 - Booster for Moderna series) 08/11/2022   • Medicare Advantage- Medicare Wellness Visit  01/01/2023       Patient is due for topics as listed above but is not proceeding with Immunization(s) COVID-19 and Shingles at this time. Education provided for MWV (Medicare Wellness Visit).   no

## 2023-11-30 NOTE — ED ADULT TRIAGE NOTE - LOCATION:
Called and left  for Misti returning her call regarding stye. Recommended returning our call after 12:45 letting her know we would most likely be on lunch if she called back right away.    Left arm;

## 2024-06-27 NOTE — DIETITIAN INITIAL EVALUATION ADULT. - NUTRITION DIAGNOSITC TERMINOLOGY #1
Leatha called the office this morning stating that she would be seeing an Ophthalmologist on 8/7/24.     Altered GI Function

## 2024-07-07 NOTE — DISCHARGE NOTE NURSING/CASE MANAGEMENT/SOCIAL WORK - PATIENT PORTAL LINK FT
"Reason for Disposition  • [1] Shingles rash (matches SYMPTOMS) AND [2] onset within past 72 hours    Answer Assessment - Initial Assessment Questions  1. APPEARANCE of RASH: \"Describe the rash.\"       The rash looks like red bumps in a cluster  fluid filled, three clusters,   2. LOCATION: \"Where is the rash located?\"       4 days ago she had nerve pain on the leg and hip area and the rash started today  3. ONSET: \"When did the rash start?\"       4 days ago with rash appearing today  4. ITCHING: \"Does the rash itch?\" If Yes, ask: \"How bad is the itch?\"  (Scale 1-10; or mild, moderate, severe)      Not itchy  5. PAIN: \"Does the rash hurt?\" If Yes, ask: \"How bad is the pain?\"  (Scale 1-10; or mild, moderate, severe)      They are warm and painful to the tucoch  6. OTHER SYMPTOMS: \"Do you have any other symptoms?\" (e.g., fever)      No known exposure to chicken pox,  headache,fatigue, diarrhea three loose stools today and rt side leg nerve pain. No fever  7. PREGNANCY: \"Is there any chance you are pregnant?\" \"When was your last menstrual period?\"      Has a 2 month old    Protocols used: Shingles-ADULT-    " You can access the FollowMyHealth Patient Portal offered by [facility] by registering at the following website: [email or web address]. By joining [employer]’s FollowMyHealth portal, you will also be able to view your health information using other applications (apps) compatible with our system.

## 2024-07-22 NOTE — DISCHARGE NOTE NURSING/CASE MANAGEMENT/SOCIAL WORK - NSSCCARECORD_GEN_ALL_CORE
Anesthesia Post-op Note    Patient: Hillary Al  Procedure(s) Performed: (OA) COLONOSCOPY   Anesthesia type: MAC    Vitals Value Taken Time   Temp 36.7 °C (98 °F) 07/22/24 1026   Pulse 62 07/22/24 1026   Resp 18 07/22/24 1026   SpO2 99 % 07/22/24 1026   /56 07/22/24 1026         Patient Location: Phase II  Post-op Vital Signs:stable  Level of Consciousness: participates in exam, awake, alert and oriented  Respiratory Status: spontaneous ventilation  Cardiovascular blood pressure returned to baseline  Hydration: euvolemic  Pain Management: well controlled  Handoff: Handoff to receiving clinician was performed and questions were answered  Vomiting: none  Nausea: None  Airway Patency:patent  Post-op Assessment: awake, alert, appropriately conversant, or baseline, no complications, patient tolerated procedure well, no evidence of recall, dentition within defined limits, moving all extremities and No Corneal Abrasion      There were no known notable events for this encounter.                      
[facility]

## 2024-09-13 NOTE — DISCHARGE NOTE NURSING/CASE MANAGEMENT/SOCIAL WORK - NSDCPEPTCOWADR_GEN_ALL_CORE
S/w pt, states he had flu vaccine 9/10, asked if this would be an issue for injection on 9/19. RN advised pt recommendation is two week window between vaccine and injection. Pt made aware steroid can reduce efficacy of vaccine, pt states he would like to proceed with injection regardless. RN advised pt FQ to make final determination if pt can keep inj appt as scheduled. Please advise.    Pt states he will be going out of town, departing 9/23 so he would prefer not to r/s if possible.    Coumadin/Warfarin increases the risk for bleeding. Notify your doctor if you see any bleeding or any of the side effects listed in the Coumadin/Warfarin Booklet. Diet and medications can affect the PT/INR blood level. When Coumadin/Warfarin is taken with other medicines it can change the way other medicines work. Other medicines can also change the way Coumadin/Warfarin works. It is very important to tell your healthcare provider about all of the other medicines, including over-the-counter medications, herbs, diet supplements, or products containing Vitamin K. Call your doctor before starting, stopping, or changing the dose of any prescription or over-the-counter medications.    Any product containing Aspirin lessens the blood's ability to form clots and adds to the effect of Coumadin/Warfarin.    Never take Aspirin without speaking with your health care provider.

## 2024-11-11 NOTE — PROGRESS NOTE ADULT - SUBJECTIVE AND OBJECTIVE BOX
Patient ID: Sixto Valles S: no chest pain or sob; ROS - .      acetaminophen   Tablet .. 650 milliGRAM(s) Oral every 6 hours PRN  acetylcysteine  Oral Solution 1200 milliGRAM(s) Oral two times a day  allopurinol 100 milliGRAM(s) Oral daily  amLODIPine   Tablet 10 milliGRAM(s) Oral daily  cefepime   IVPB 1000 milliGRAM(s) IV Intermittent every 24 hours  dextrose 40% Gel 15 Gram(s) Oral once PRN  dextrose 5% + sodium chloride 0.45%. 1000 milliLiter(s) IV Continuous <Continuous>  dextrose 5%. 1000 milliLiter(s) IV Continuous <Continuous>  dextrose 50% Injectable 12.5 Gram(s) IV Push once  dextrose 50% Injectable 25 Gram(s) IV Push once  dextrose 50% Injectable 25 Gram(s) IV Push once  gabapentin 100 milliGRAM(s) Oral three times a day  glucagon  Injectable 1 milliGRAM(s) IntraMuscular once PRN  hydrALAZINE 75 milliGRAM(s) Oral three times a day  insulin lispro (HumaLOG) corrective regimen sliding scale   SubCutaneous three times a day before meals  insulin lispro (HumaLOG) corrective regimen sliding scale   SubCutaneous at bedtime  latanoprost 0.005% Ophthalmic Solution 1 Drop(s) Left EYE at bedtime  levothyroxine 75 MICROGram(s) Oral daily  polyethylene glycol 3350 17 Gram(s) Oral daily  senna 2 Tablet(s) Oral at bedtime  simvastatin 20 milliGRAM(s) Oral at bedtime  sodium chloride 0.9%. 1000 milliLiter(s) IV Continuous <Continuous>                            8.6    8.21  )-----------( 248      ( 28 Jul 2020 06:00 )             27.1       07-28    138  |  100  |  57<H>  ----------------------------<  149<H>  4.5   |  24  |  2.13<H>    Ca    9.9      28 Jul 2020 06:00  Phos  3.9     07-28  Mg     2.0     07-28              T(C): 36.6 (07-28-20 @ 16:05), Max: 37.1 (07-27-20 @ 19:18)  HR: 66 (07-28-20 @ 16:05) (62 - 81)  BP: 129/48 (07-28-20 @ 16:05) (129/48 - 157/65)  RR: 16 (07-28-20 @ 16:05) (16 - 18)  SpO2: 100% (07-28-20 @ 16:05) (100% - 100%)  Wt(kg): --    I&O's Summary        Gen: Appears well in NAD  HEENT:  (-)icterus (-)pallor  CV: Normal S1, there is still a sharp S2.  Mid-to-late peaking 3/6 systolic murmur at the upper chest, but No pulsus parvus et tardus (+)2 Pulses B/l  Resp:  Clear to ausculatation B/L, normal effort  GI: (+) BS Soft, NT, ND  Lymph:  (-)Edema, (-)obvious lymphadenopathy  Skin: +left foot dressing, Warm to touch, Normal turgor  Psych: Appropriate mood and affect      TELEMETRY: None	      < from: Transthoracic Echocardiogram (07.22.20 @ 16:27) >  CONCLUSIONS:  1. Mitral annular calcification, otherwise normal mitral  valve. Mild-moderate mitral regurgitation.  2. Calcified trileaflet aortic valve with decreased  opening.  Peak transaortic valve gradient weasio54 mm Hg,  mean transaortic valve gradient equals 26 mm Hg, estimated  aortic valve area equals 1 sqcm (by continuity equation),  consistent with moderate to severe aortic stenosis.  3. Severely dilated left atrium.  LA volume index = 57  cc/m2.  4. Mild concentric left ventricular hypertrophy.  5. Normal left ventricular systolic function. No segmental  wall motion abnormalities.  6. Severe diastolic dysfunction.  7. Severe right atrial enlargement.  8. Normal right ventricular size and function.  9. Estimated right ventricular systolic pressure equals 53  mm Hg, assuming right atrial pressure equals 10 mm Hg,  consistent with moderate pulmonary hypertension.    < end of copied text >      ASSESSMENT/PLAN: Patient is a 89 year old Female well known to our office (Cardiologist - Dr. Berman), who presented with a foot wound.  Consult requested for pre-op cardiovascular risk stratification.  Her known PMH includes persistent Afib on coumadin who was not a candidate for left atrial appendage occlusion with Watchman, chronic HFpEF, HTN, HLD, hypothyroidism, CKD, Type 2 DM, CVA, and hx of PE.    - LE angio pending   - Continue AC for cva prevention if no contraindications - on hep gtt currently   - Continue with PO lasix   - Podiatry/ID follow up  - TTE noted above with normal LVEF, severe diastolic dysfunction, mild-mod MR, mod pulm HTN, and mod-severe AS: auscultation favors moderate aortic stenosis.  Yearly Echo for followup.  Patient does not have any 'red flag' symptoms such as syncope, crushing angina, dyspnea on exertion (limited by her foot), so will continue to monitor in the office.  - In addition, the patient and her family prefers conservative medical management of her valvular disease and does not want any further invasive workup or surgery  - no further inpatient cardiac w/u needed at this time  - s/p LE angio - tolerated procedure well in terms of cv perspective  - follow up vascular     Trisha Banks MD Stephan Recinos MD;  Location: [facility] 7421   Wound Description  "Yellow;Slough;Pink 

## 2024-11-27 NOTE — PATIENT PROFILE ADULT. - HAS THE PATIENT HAD A SIGNIFICANT CHANGE IN FUNCTIONAL STATUS DUE TO CVA, HEAD TRAUMA, ORTHOPEDIC TRAUMA/SURGERY, OR FALL, WITH THE WEEK PRIOR TO ADMISSION
Spoke with pharmacy staff - they stated the prescription sent on 11/14/24 was cancelled for some reason and they needed a new prescription sent. Medication refill approved.    no

## 2025-01-03 NOTE — DIETITIAN INITIAL EVALUATION ADULT. - CALCULATED FROM (CAL/KG)
What Type Of Note Output Would You Prefer (Optional)?: Standard Output How Severe Are Your Spot(S)?: moderate Have Your Spot(S) Been Treated In The Past?: has not been treated Hpi Title: Evaluation of Skin Lesions Additional History: Pt presents today for a TBSE. Patient declines chaperone. Family Member: Brother and Sister [id number]

## 2025-07-09 NOTE — PROGRESS NOTE ADULT - ASSESSMENT
89 year old female PMH  a-fib on coumadin, diastolic CHF (with EF 63% on TTE 8/13/19), HTN, HLD, CKD stage 3-4, IDDM, PVD, CVA, PE, hypothyroidism, p/w L foot wound. Patient seen today at clinic and sent for cellulitis and abscess, wanting to rule out OM. Patient denies fever, chills, cp, sob, abd pain, n/v, weakness, or numbness/tingling.     Problem/Plan - 1:  ·  Problem: Toe gangrene.  Plan: Podiatry consult noted. S/P IV Abxs for 7 days . NO fever or Leucocytosis so likely ischemic .   ID and Vascular helping. No intervention per podiatry .        Problem/Plan - 2:  ·  Problem: Stage 3 chronic kidney disease.  Plan: Renal helping .  Baseline Creatinine 1.8.      Problem/Plan - 3:  ·  Problem: PVD (peripheral vascular disease).  Plan: LIZBETH/PVD noted.  Vascular consult noted. S/P angiogram .Aspirin started as recommended by Vascular .      Problem/Plan - 4:  ·  Problem: Chronic diastolic congestive heart failure.  Plan: Continuing Lasix .  Cardiology helping.      Problem/Plan - 5:  ·  Problem: DM (diabetes mellitus).  Plan: SSI for now .      Problem/Plan - 6:  Problem: Afib. Plan: ON AC. Heparin till INR therapeutic .      Problem/Plan - 7:  ·  Problem: Pulmonary embolism.  Plan: On AC.      Problem/Plan - 8:  ·  Problem:  Hypothyroid.  Plan: Synthroid.      Problem/Plan - 9:  ·  Problem: Anemia.  Plan: Chronic . Watching CBC . Transfuse PRBC for HGb <7.5G .Hh stable .      Problem/Plan - 10:  ·  Problem: Uncontrolled Hypertension .  Plan: Adjusting BP meds.       Disposition : DC planning pending Therapeutic INR,. patient